# Patient Record
Sex: FEMALE | Race: BLACK OR AFRICAN AMERICAN | Employment: OTHER | URBAN - METROPOLITAN AREA
[De-identification: names, ages, dates, MRNs, and addresses within clinical notes are randomized per-mention and may not be internally consistent; named-entity substitution may affect disease eponyms.]

---

## 2017-01-11 ENCOUNTER — OFFICE VISIT (OUTPATIENT)
Dept: INTERNAL MEDICINE CLINIC | Age: 77
End: 2017-01-11

## 2017-01-11 VITALS
SYSTOLIC BLOOD PRESSURE: 150 MMHG | RESPIRATION RATE: 18 BRPM | BODY MASS INDEX: 32.85 KG/M2 | HEART RATE: 68 BPM | DIASTOLIC BLOOD PRESSURE: 80 MMHG | OXYGEN SATURATION: 95 % | HEIGHT: 61 IN | WEIGHT: 174 LBS | TEMPERATURE: 98 F

## 2017-01-11 DIAGNOSIS — I10 ESSENTIAL HYPERTENSION: ICD-10-CM

## 2017-01-11 DIAGNOSIS — L03.116 CELLULITIS OF LEFT LOWER EXTREMITY: Primary | ICD-10-CM

## 2017-01-11 DIAGNOSIS — E78.00 PURE HYPERCHOLESTEROLEMIA: ICD-10-CM

## 2017-01-11 RX ORDER — CLINDAMYCIN HYDROCHLORIDE 300 MG/1
300 CAPSULE ORAL 3 TIMES DAILY
Qty: 21 CAP | Refills: 0 | Status: SHIPPED | OUTPATIENT
Start: 2017-01-11 | End: 2017-01-25 | Stop reason: ALTCHOICE

## 2017-01-11 NOTE — MR AVS SNAPSHOT
Visit Information Date & Time Provider Department Dept. Phone Encounter #  
 1/11/2017  8:45 AM Erik Dennison MD [address] [phone] [id number] Follow-up Instructions Return in about 1 week (around 1/18/2017), or if symptoms worsen or fail to improve. Upcoming Health Maintenance Date Due Pneumococcal 65+ Low/Medium Risk (2 of 2 - PCV13) 9/11/2015 DTaP/Tdap/Td series (1 - Tdap) 4/20/2017* MEDICARE YEARLY EXAM 1/25/2017 GLAUCOMA SCREENING Q2Y 3/8/2018 *Topic was postponed. The date shown is not the original due date. Allergies as of 1/11/2017  Review Complete On: 1/11/2017 By: Erik Dennison MD  
  
 Severity Noted Reaction Type Reactions Latex  10/10/2011    Rash Aspirin  10/10/2011   Side Effect Other (comments) Anything higher than 81mg makes pt jittery Codeine  10/10/2011    Hives, Itching Iodinated Contrast Media - Oral And Iv Dye  06/23/2011   Side Effect Itching Pcn [Penicillins]  10/10/2011    Hives, Itching Tape [Adhesive]  05/08/2014    Rash Current Immunizations  Reviewed on 12/12/2016 Name Date Influenza High Dose Vaccine PF 10/15/2016 Influenza Vaccine 9/11/2014 Influenza Vaccine Split 11/8/2012 Pneumococcal Polysaccharide (PPSV-23) 9/11/2014 Not reviewed this visit You Were Diagnosed With   
  
 Codes Comments Cellulitis of left lower extremity    -  Primary ICD-10-CM: L03.116 ICD-9-CM: 682.6 Essential hypertension     ICD-10-CM: I10 
ICD-9-CM: 401.9 Pure hypercholesterolemia     ICD-10-CM: E78.00 ICD-9-CM: 272.0 Vitals BP Pulse Temp Resp Height(growth percentile) Weight(growth percentile) 150/80 68 98 °F (36.7 °C) (Oral) 18 5' 1\" (1.549 m) 174 lb (78.9 kg) SpO2 BMI OB Status Smoking Status 95% 32.88 kg/m2 Hysterectomy Never Smoker BMI and BSA Data  Body Mass Index Body Surface Area  
 32.88 kg/m 2 1.84 m 2  
 Preferred Pharmacy Pharmacy Name Phone [phone] [facility] Trevon Jeong [phone] Your Updated Medication List  
  
   
This list is accurate as of: 1/11/17  9:50 AM.  Always use your most recent med list. amLODIPine 10 mg tablet Commonly known as:  [place] Mend Take 0.5 Tabs by mouth daily. aspirin delayed-release 81 mg tablet Take  by mouth daily. atorvastatin 20 mg tablet Commonly known as:  LIPITOR Take 1 Tab by mouth daily. bumetanide 1 mg tablet Commonly known as:  Malachy Bile Take 1 Tab by mouth two (2) times a day. clindamycin 300 mg capsule Commonly known as:  CLEOCIN Take 1 Cap by mouth three (3) times daily. cloNIDine HCl 0.2 mg tablet Commonly known as:  CATAPRES Take 1 Tab by mouth two (2) times a day. fluticasone 50 mcg/actuation nasal spray Commonly known as:  Cathlyn Alan Administer to right and left nostril.  
  
 gabapentin 100 mg capsule Commonly known as:  NEURONTIN Take 1 Cap by mouth two (2) times a day. latanoprost 0.005 % ophthalmic solution Commonly known as:  Armando Blind Administer 1 Drop to right eye nightly. losartan 100 mg tablet Commonly known as:  COZAAR Take 1 Tab by mouth daily. meclizine 25 mg tablet Commonly known as:  ANTIVERT  
1 tab tid prn  
  
 metOLazone 5 mg tablet Commonly known as:  Roxy Gaston Take 1 Tab by mouth every other day. omeprazole 40 mg capsule Commonly known as:  PriLOSEC Take 1 Cap by mouth daily. potassium chloride 10 mEq tablet Commonly known as:  K-DUR, KLOR-CON Take 1 Tab by mouth daily. propafenone 150 mg tablet Commonly known as:  RYTHMOL Take 1 Tab by mouth every eight (8) hours. raNITIdine 150 mg tablet Commonly known as:  ZANTAC Take 150 mg by mouth two (2) times a day. traMADol 50 mg tablet Commonly known as:  [employer]  
 Take 1 Tab by mouth every six (6) hours as needed for Pain. Max Daily Amount: 200 mg.  
  
 WOMEN'S MULTIPLE VITAMINS PO Take  by mouth. Prescriptions Sent to Pharmacy Refills  
 clindamycin (CLEOCIN) 300 mg capsule 0 Sig: Take 1 Cap by mouth three (3) times daily. Class: Normal  
 Pharmacy: [facility] [address] #: [phone] Route: Oral  
  
We Performed the Following REMV/REVISN BOOT/BODY CAST [id number] CPT(R)] Follow-up Instructions Return in about 1 week (around 2017), or if symptoms worsen or fail to improve. Patient Instructions [employer] Activation Thank you for requesting access to [employer]. Please follow the instructions below to securely access and download your online medical record. [facility]Boost allows you to send messages to your doctor, view your test results, renew your prescriptions, schedule appointments, and more. How Do I Sign Up? 1. In your internet browser, go to [email or web address] 
2. Click on the First Time User? Click Here link in the Sign In box. You will be redirect to the New Member Sign Up page. 3. Enter your AlphaBoost Access Code exactly as it appears below. You will not need to use this code after youve completed the sign-up process. If you do not sign up before the expiration date, you must request a new code. AlphaBoost Access Code: Activation code not generated Current AlphaBoost Status: Patient Declined (This is the date your AlphaBoost access code will ) 4. Enter the last four digits of your Social Security Number (xxxx) and Date of Birth (mm/dd/yyyy) as indicated and click Submit. You will be taken to the next sign-up page. 5. Create a AlphaBoost ID. This will be your AlphaBoost login ID and cannot be changed, so think of one that is secure and easy to remember. 6. Create a AlphaBoost password. You can change your password at any time. 7. Enter your Password Reset Question and Answer. This can be used at a later time if you forget your password. 8. Enter your e-mail address. You will receive e-mail notification when new information is available in [address] 9. Click Sign Up. You can now view and download portions of your medical record. 10. Click the Download Summary menu link to download a portable copy of your medical information. Additional Information If you have questions, please visit the Frequently Asked Questions section of the [employer] website at [email or web address] [email or web address]/. Remember, [employer] is NOT to be used for urgent needs. For medical emergencies, dial 911. Please provide this summary of care documentation to your next provider. Your primary care clinician is listed as Sofia Soni. If you have any questions after today's visit, please call [phone].

## 2017-01-11 NOTE — PROGRESS NOTES
Dario Rosas is a 64 y.o. female and presents with Claudication and Leg Pain  . Subjective:  Cellulitis Review:  Patient presents for evaluation of a  skin infection located on the lower lower leg. Onset of symptoms was gradual, with unchanged  since that time. Symptoms included erythema located on leg: left. There is a history trauma to the area. Treatment to date has included antibiotics. she has drainage in the site. the area has been painful;        Hypertension Review:  The patient has hypertension . Diet and Lifestyle: generally follows a low sodium diet, exercises sporadically  Home BP Monitoring: is not measured at home. Pertinent ROS: taking medications as instructed, no medication side effects noted, no TIA's, no chest pain on exertion, no dyspnea on exertion, no swelling of ankles. Dyslipidemia Review:  Patient presents for evaluation of lipids. Compliance with treatment thus far has been excellent. A repeat fasting lipid profile was done. The patient does not use medications that may worsen dyslipidemias . The patient exercises sporadically. The patient is not known to have coexisting coronary artery disease  She has less of a leg drainage      Review of Systems  Constitutional: negative for fevers, chills, anorexia and weight loss  Eyes:   negative for visual disturbance and irritation  ENT:   negative for tinnitus,sore throat,nasal congestion,ear pains. hoarseness  Respiratory:  negative for cough, hemoptysis, dyspnea,wheezing  CV:   negative for chest pain, palpitations, lower extremity edema  GI:   negative for nausea, vomiting, diarrhea, abdominal pain,melena  Endo:               negative for polyuria,polydipsia,polyphagia,heat intolerance  Genitourinary: negative for frequency, dysuria and hematuria  Integument:  negative for rash and pruritus  Hematologic:  negative for easy bruising and gum/nose bleeding  Musculoskel:myalgias, arthralgias, back pain, muscle weakness, joint pain  Neurological:  negative for headaches, dizziness, vertigo, memory problems and gait   Behavl/Psych: negative for feelings of anxiety, depression, mood changes    Past Medical History   Diagnosis Date    Acute pharyngitis 2/8/2011    Allergic rhinitis, cause unspecified 2/8/2011    Arrhythmia      PVC    Asymptomatic varicose veins 2/8/2011    Cancer ([facility] [address]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     Carpal tunnel syndrome 2/8/2011    Degenerative Joint Disese ( improved/resolving) 2/8/2011    Degenetrative Dis Disease, 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    Diverticulosis of colon (without mention of hemorrhage) 2/8/2011    Dysphagia 2/8/2011    Edema, peripheral 2/8/2011    GERD (gastroesophageal reflux disease)     GERD Gastro- Esophageal Reflux Disease 2/8/2011    Herniated nucleus pulposus ( slipped disc) 2/8/2011    Menopausal 2/8/2011    PUD (peptic ulcer disease) 2012    Pure hypercholesterolemia 2/8/2011    Recurrent ear pain 2/8/2011    S/P radiation therapy     Scalp lesion 10/23/2014    Sebaceous cyst 4/22/2014    Unspecified cataract 2/8/2011    Unspecified essential hypertension 2/8/2011    Unspecified sleep apnea      Past Surgical History   Procedure Laterality Date    Hx hysterectomy      Hx orthopaedic  neck/back 2006    Hx other surgical  5/8/2014      Excise recurrent scalp lesion and application of ACell    Hx other surgical  5/8/2014     Excise keloid, anterior chest wall, and application of ACell    Hx other surgical  5/8/2014      Excise sebaceous cyst, anterior chest wall    Hx other surgical  5/8/2014     Punch biopsy, skin lesions, right forearm x2, right calf x1     Social History     Social History    Marital status:      Spouse name: N/A    Number of children: N/A    Years of education: N/A     Social History Main Topics    Smoking status: Never Smoker    Smokeless tobacco: Never Used    Alcohol use No    Drug use: No    Sexual activity: Yes     Partners: Male     Birth control/ protection: None     Other Topics Concern    None     Social History Narrative     Family History   Problem Relation Age of Onset    Hypertension Mother     Stroke Mother     Hypertension Father     Cancer Father      PROSTATE, MELANOMA    Anesth Problems Neg Hx      Current Outpatient Prescriptions   Medication Sig Dispense Refill    clindamycin (CLEOCIN) 300 mg capsule Take 1 Cap by mouth three (3) times daily. 21 Cap 0    traMADol (ULTRAM) 50 mg tablet Take 1 Tab by mouth every six (6) hours as needed for Pain. Max Daily Amount: 200 mg. 30 Tab 0    propafenone (RYTHMOL) 150 mg tablet Take 1 Tab by mouth every eight (8) hours. 90 Tab 3    losartan (COZAAR) 100 mg tablet Take 1 Tab by mouth daily. 30 Tab 12    cloNIDine HCl (CATAPRES) 0.2 mg tablet Take 1 Tab by mouth two (2) times a day. 60 Tab 12    potassium chloride (K-DUR, KLOR-CON) 10 mEq tablet Take 1 Tab by mouth daily. 30 Tab 12    ranitidine (ZANTAC) 150 mg tablet Take 150 mg by mouth two (2) times a day.  latanoprost (XALATAN) 0.005 % ophthalmic solution Administer 1 Drop to right eye nightly.  atorvastatin (LIPITOR) 20 mg tablet Take 1 Tab by mouth daily. 30 Tab 6    gabapentin (NEURONTIN) 100 mg capsule Take 1 Cap by mouth two (2) times a day. 180 Cap 3    bumetanide (BUMEX) 1 mg tablet Take 1 Tab by mouth two (2) times a day. (Patient taking differently: Take 2 mg by mouth two (2) times a day.) 60 Tab 12    omeprazole (PRILOSEC) 40 mg capsule Take 1 Cap by mouth daily. 90 Cap 3    amLODIPine (NORVASC) 10 mg tablet Take 0.5 Tabs by mouth daily. (Patient taking differently: Take 2.5 mg by mouth daily.) 90 Tab 3    meclizine (ANTIVERT) 25 mg tablet 1 tab tid prn 270 Tab 3    MULTIVIT WITH CALCIUM,IRON,MIN (WOMEN'S MULTIPLE VITAMINS PO) Take  by mouth.  aspirin delayed-release 81 mg tablet Take  by mouth daily.  fluticasone (FLONASE) 50 mcg/actuation nasal spray Administer to right and left nostril.  1 Bottle 12    metolazone (ZAROXOLYN) 5 mg tablet Take 1 Tab by mouth every other day. 15 Tab 0     Allergies   Allergen Reactions    Latex Rash    Aspirin Other (comments)     Anything higher than 81mg makes pt jittery    Codeine Hives and Itching    Iodinated Contrast Media - Oral And Iv Dye Itching    Pcn [Penicillins] Hives and Itching    Tape [Adhesive] Rash       Objective:  Visit Vitals    /80    Pulse 68    Temp 98 °F (36.7 °C) (Oral)    Resp 18    Ht 5' 1\" (1.549 m)    Wt 174 lb (78.9 kg)    SpO2 95%    BMI 32.88 kg/m2     Physical Exam:   General appearance - alert, well appearing, and in no distress  Mental status - alert, oriented to person, place, and time  EYE-DARLENE, EOMI, corneas normal, no foreign bodies  ENT-ENT exam normal, no neck nodes or sinus tenderness  Nose - normal and patent, no erythema, discharge or polyps  Mouth - mucous membranes moist, pharynx normal without lesions  Neck - supple, no significant adenopathy   Chest - clear to auscultation, no wheezes, rales or rhonchi, symmetric air entry   Heart - normal rate, regular rhythm, normal S1, S2, no murmurs, rubs, clicks or gallops   Abdomen - soft, nontender, nondistended, no masses or organomegaly  Lymph- no adenopathy palpable  Ext-peripheral pulses normal, no pedal edema, no clubbing or cyanosis  Skin-Warm and dry. no hyperpigmentation, vitiligo, or suspicious lesions  Neuro -alert, oriented, normal speech, no focal findings or movement disorder noted  Neck-normal C-spine, no tenderness, full ROM without pain  oval mass scalp with no active bleeding noted. Lt.lower leg minimal edema noted  pus noted and erythema noted.     Results for orders placed or performed in visit on 11/08/16   AMB POC LIPID PROFILE   Result Value Ref Range    Cholesterol (POC) 172     Triglycerides (POC) 94     HDL Cholesterol (POC) 79     LDL Cholesterol (POC) 74     Non-HDL Goal (POC) 93     TChol/HDL Ratio (POC) 2.2        Assessment/Plan: ICD-10-CM ICD-9-CM    1. Cellulitis of left lower extremity L03.116 682.6 REMV/REVISN BOOT/BODY CAST   2. Essential hypertension I10 401.9    3. Pure hypercholesterolemia E78.00 272.0      Orders Placed This Encounter    REMV/REVISN BOOT/BODY CAST    clindamycin (CLEOCIN) 300 mg capsule     Sig: Take 1 Cap by mouth three (3) times daily. Dispense:  21 Cap     Refill:  0     lose weight, follow low fat diet, follow low salt diet,Take 81mg aspirin daily,unna boot compression placed to bleeding site. unna boot applied    Patient Instructions   MyChart Activation    Thank you for requesting access to [employer]. Please follow the instructions below to securely access and download your online medical record. [employer] allows you to send messages to your doctor, view your test results, renew your prescriptions, schedule appointments, and more. How Do I Sign Up? 1. In your internet browser, go to [email or web address]  2. Click on the First Time User? Click Here link in the Sign In box. You will be redirect to the New Member Sign Up page. 3. Enter your IndigoVision Access Code exactly as it appears below. You will not need to use this code after youve completed the sign-up process. If you do not sign up before the expiration date, you must request a new code. Ind[employer]Vision Access Code: Activation code not generated  Current IndigoVision Status: Patient Declined (This is the date your Nippot access code will )    4. Enter the last four digits of your Social Security Number (xxxx) and Date of Birth (mm/dd/yyyy) as indicated and click Submit. You will be taken to the next sign-up page. 5. Create a IndigoVision ID. This will be your IndigoVision login ID and cannot be changed, so think of one that is secure and easy to remember. 6. Create a IndigoVision password. You can change your password at any time. 7. Enter your Password Reset Question and Answer. This can be used at a later time if you forget your password.    8. Enter your e-mail address. You will receive e-mail notification when new information is available in [address] 9. Click Sign Up. You can now view and download portions of your medical record. 10. Click the Download Summary menu link to download a portable copy of your medical information. Additional Information    If you have questions, please visit the Frequently Asked Questions section of the [employer] website at [email or web address] Remember, [employer] is NOT to be used for urgent needs. For medical emergencies, dial 911. Follow-up Disposition:  Return in about 1 week (around 1/18/2017), or if symptoms worsen or fail to improve. I have reviewed with the patient details of the assessment and plan and all questions were answered. Relevent patient education was performed    An After Visit Summary was printed and given to the patient.

## 2017-01-11 NOTE — PATIENT INSTRUCTIONS
[facility] Activation    Thank you for requesting access to "[employer]. Please follow the instructions below to securely access and download your online medical record. "[employer] allows you to send messages to your doctor, view your test results, renew your prescriptions, schedule appointments, and more. How Do I Sign Up? 1. In your internet browser, go to [email or web address]  2. Click on the First Time User? Click Here link in the Sign In box. You will be redirect to the New Member Sign Up page. 3. Enter your "[employer] Access Code exactly as it appears below. You will not need to use this code after youve completed the sign-up process. If you do not sign up before the expiration date, you must request a new code. "[employer] Access Code: Activation code not generated  Current "[employer] Status: Patient Declined (This is the date your "[employer] access code will )    4. Enter the last four digits of your Social Security Number (xxxx) and Date of Birth (mm/dd/yyyy) as indicated and click Submit. You will be taken to the next sign-up page. 5. Create a "[employer] ID. This will be your "[employer] login ID and cannot be changed, so think of one that is secure and easy to remember. 6. Create a "[employer] password. You can change your password at any time. 7. Enter your Password Reset Question and Answer. This can be used at a later time if you forget your password. 8. Enter your e-mail address. You will receive e-mail notification when new information is available in [address] 9. Click Sign Up. You can now view and download portions of your medical record. 10. Click the Download Summary menu link to download a portable copy of your medical information. Additional Information    If you have questions, please visit the Frequently Asked Questions section of the "[employer] website at [email or web address] Remember, "[employer] is NOT to be used for urgent needs. For medical emergencies, dial 911.

## 2017-01-23 ENCOUNTER — OFFICE VISIT (OUTPATIENT)
Dept: INTERNAL MEDICINE CLINIC | Age: 77
End: 2017-01-23

## 2017-01-23 VITALS
HEIGHT: 61 IN | RESPIRATION RATE: 16 BRPM | SYSTOLIC BLOOD PRESSURE: 138 MMHG | HEART RATE: 75 BPM | BODY MASS INDEX: 31.91 KG/M2 | TEMPERATURE: 98 F | OXYGEN SATURATION: 98 % | DIASTOLIC BLOOD PRESSURE: 72 MMHG | WEIGHT: 169 LBS

## 2017-01-23 DIAGNOSIS — L03.116 CELLULITIS OF LEFT LOWER EXTREMITY: Primary | ICD-10-CM

## 2017-01-23 DIAGNOSIS — R55 SYNCOPE, UNSPECIFIED SYNCOPE TYPE: ICD-10-CM

## 2017-01-23 DIAGNOSIS — E78.00 PURE HYPERCHOLESTEROLEMIA: ICD-10-CM

## 2017-01-23 DIAGNOSIS — I10 ESSENTIAL HYPERTENSION: ICD-10-CM

## 2017-01-23 NOTE — MR AVS SNAPSHOT
Visit Information Date & Time Provider Department Dept. Phone Encounter #  
 1/23/2017 12:00 PM Wandy García MD [facility] [phone] [id number] Follow-up Instructions Return in about 1 week (around 1/30/2017). Follow-up and Disposition History Upcoming Health Maintenance Date Due Pneumococcal 65+ Low/Medium Risk (2 of 2 - PCV13) 9/11/2015 DTaP/Tdap/Td series (1 - Tdap) 4/20/2017* MEDICARE YEARLY EXAM 1/25/2017 GLAUCOMA SCREENING Q2Y 3/8/2018 *Topic was postponed. The date shown is not the original due date. Allergies as of 1/23/2017  Review Complete On: 1/23/2017 By: Wandy García MD  
  
 Severity Noted Reaction Type Reactions Latex  10/10/2011    Rash Aspirin  10/10/2011   Side Effect Other (comments) Anything higher than 81mg makes pt jittery Codeine  10/10/2011    Hives, Itching Iodinated Contrast Media - Oral And Iv Dye  06/23/2011   Side Effect Itching Pcn [Penicillins]  10/10/2011    Hives, Itching Tape [Adhesive]  05/08/2014    Rash Current Immunizations  Reviewed on 12/12/2016 Name Date Influenza High Dose Vaccine PF 10/15/2016 Influenza Vaccine 9/11/2014 Influenza Vaccine Split 11/8/2012 Pneumococcal Polysaccharide (PPSV-23) 9/11/2014 Not reviewed this visit You Were Diagnosed With   
  
 Codes Comments Cellulitis of left lower extremity    -  Primary ICD-10-CM: L03.116 ICD-9-CM: 682.6 Essential hypertension     ICD-10-CM: I10 
ICD-9-CM: 401.9 Pure hypercholesterolemia     ICD-10-CM: E78.00 ICD-9-CM: 272.0 Syncope, unspecified syncope type     ICD-10-CM: R55 
ICD-9-CM: 780. 2 Vitals BP Pulse Temp Resp Height(growth percentile) Weight(growth percentile) 138/72 75 98 °F (36.7 °C) (Oral) 16 5' 1\" (1.549 m) 169 lb (76.7 kg) SpO2 BMI OB Status Smoking Status 98% 31.93 kg/m2 Hysterectomy Never Smoker BMI and BSA Data Body Mass Index Body Surface Area  
 31.93 kg/m 2 1.82 m 2 Preferred Pharmacy Pharmacy Name Phone [facility] [address] [facility] Trevon Jeong [phone] Your Updated Medication List  
  
   
This list is accurate as of: 1/23/17 12:16 PM.  Always use your most recent med list. amLODIPine 10 mg tablet Commonly known as:  Winfield Royals Take 0.5 Tabs by mouth daily. aspirin delayed-release 81 mg tablet Take  by mouth daily. atorvastatin 20 mg tablet Commonly known as:  LIPITOR Take 1 Tab by mouth daily. bumetanide 1 mg tablet Commonly known as:  Shiela Neri Take 1 Tab by mouth two (2) times a day. clindamycin 300 mg capsule Commonly known as:  CLEOCIN Take 1 Cap by mouth three (3) times daily. cloNIDine HCl 0.2 mg tablet Commonly known as:  CATAPRES Take 1 Tab by mouth two (2) times a day. fluticasone 50 mcg/actuation nasal spray Commonly known as:  Rebeca Frederick Administer to right and left nostril.  
  
 gabapentin 100 mg capsule Commonly known as:  NEURONTIN Take 1 Cap by mouth two (2) times a day. latanoprost 0.005 % ophthalmic solution Commonly known as:  Kelly Hark Administer 1 Drop to right eye nightly. losartan 100 mg tablet Commonly known as:  COZAAR Take 1 Tab by mouth daily. meclizine 25 mg tablet Commonly known as:  ANTIVERT  
1 tab tid prn  
  
 metOLazone 5 mg tablet Commonly known as:  Shanique Dominic Take 1 Tab by mouth every other day. omeprazole 40 mg capsule Commonly known as:  PriLOSEC Take 1 Cap by mouth daily. potassium chloride 10 mEq tablet Commonly known as:  K-DUR, KLOR-CON Take 1 Tab by mouth daily. propafenone 150 mg tablet Commonly known as:  RYTHMOL Take 1 Tab by mouth every eight (8) hours. raNITIdine 150 mg tablet Commonly known as:  ZANTAC Take 150 mg by mouth two (2) times a day. traMADol 50 mg tablet Commonly known as:  ULTRAM  
Take 1 Tab by mouth every six (6) hours as needed for Pain. Max Daily Amount: 200 mg.  
  
 WOMEN'S MULTIPLE VITAMINS PO Take  by mouth. We Performed the Following REFERRAL TO CARDIOLOGY [SUL29 Custom] Comments:  
 Please evaluate patient for syncope. Follow-up Instructions Return in about 1 week (around 2017). Referral Information Referral ID Referred By Referred To  
  
 [id number] [place], [address] [place] MD [address] Phone: [phone] Visits Status Start Date End Date 1 New Request 17 If your referral has a status of pending review or denied, additional information will be sent to support the outcome of this decision. Patient Instructions ""hart Activation Thank you for requesting access to KIHEITAI. Please follow the instructions below to securely access and download your online medical record. KIHEITAI allows you to send messages to your doctor, view your test results, renew your prescriptions, schedule appointments, and more. How Do I Sign Up? 1. In your internet browser, go to [email or web address] 
2. Click on the First Time User? Click Here link in the Sign In box. You will be redirect to the New Member Sign Up page. 3. Enter your KIHEITAI Access Code exactly as it appears below. You will not need to use this code after youve completed the sign-up process. If you do not sign up before the expiration date, you must request a new code. KIHEITAI Access Code: Activation code not generated Current KIHEITAI Status: Patient Declined (This is the date your KIHEITAI access code will ) 4. Enter the last four digits of your Social Security Number (xxxx) and Date of Birth (mm/dd/yyyy) as indicated and click Submit.  You will be taken to the next sign-up page. 5. Create a "[employer]" ID. This will be your "[employer]" login ID and cannot be changed, so think of one that is secure and easy to remember. 6. Create a "[employer]" password. You can change your password at any time. 7. Enter your Password Reset Question and Answer. This can be used at a later time if you forget your password. 8. Enter your e-mail address. You will receive e-mail notification when new information is available in [address]. 9. Click Sign Up. You can now view and download portions of your medical record. 10. Click the Download Summary menu link to download a portable copy of your medical information. Additional Information If you have questions, please visit the Frequently Asked Questions section of the "[employer]" website at [email or web address] Remember, "[employer]" is NOT to be used for urgent needs. For medical emergencies, dial 911. Please provide this summary of care documentation to your next provider. Your primary care clinician is listed as Slava Carballo. If you have any questions after today's visit, please call [phone].

## 2017-01-23 NOTE — MR AVS SNAPSHOT
Visit Information Date & Time Provider Department Dept. Phone Encounter #  
 1/23/2017 11:30 AM [place]ennison [place] [id number] [address] [phone] [id number] Upcoming Health Maintenance Date Due Pneumococcal 65+ Low/Medium Risk (2 of 2 - PCV13) 9/11/2015 DTaP/Tdap/Td series (1 - Tdap) 4/20/2017* MEDICARE YEARLY EXAM 1/25/2017 GLAUCOMA SCREENING Q2Y 3/8/2018 *Topic was postponed. The date shown is not the original due date. Allergies as of 1/23/2017  Review Complete On: 1/23/2017 By: Erik Dennison MD  
  
 Severity Noted Reaction Type Reactions Latex  10/10/2011    Rash Aspirin  10/10/2011   Side Effect Other (comments) Anything higher than 81mg makes pt jittery Codeine  10/10/2011    Hives, Itching Iodinated Contrast Media - Oral And Iv Dye  06/23/2011   Side Effect Itching Pcn [Penicillins]  10/10/2011    Hives, Itching Tape [Adhesive]  05/08/2014    Rash Current Immunizations  Reviewed on 12/12/2016 Name Date Influenza High Dose Vaccine PF 10/15/2016 Influenza Vaccine 9/11/2014 Influenza Vaccine Split 11/8/2012 Pneumococcal Polysaccharide (PPSV-23) 9/11/2014 Not reviewed this visit Vitals OB Status Smoking Status Hysterectomy Never Smoker Your Updated Medication List  
  
   
This list is accurate as of: 1/23/17 12:18 PM.  Always use your most recent med list. amLODIPine 10 mg tablet Commonly known as:  Sharron Darting Take 0.5 Tabs by mouth daily. aspirin delayed-release 81 mg tablet Take  by mouth daily. atorvastatin 20 mg tablet Commonly known as:  LIPITOR Take 1 Tab by mouth daily. bumetanide 1 mg tablet Commonly known as:  Arletta Shai Take 1 Tab by mouth two (2) times a day. clindamycin 300 mg capsule Commonly known as:  CLEOCIN Take 1 Cap by mouth three (3) times daily. cloNIDine HCl 0.2 mg tablet Commonly known as:  CATAPRES Take 1 Tab by mouth two (2) times a day. fluticasone 50 mcg/actuation nasal spray Commonly known as:  Filbert Chard Administer to right and left nostril.  
  
 gabapentin 100 mg capsule Commonly known as:  NEURONTIN Take 1 Cap by mouth two (2) times a day. latanoprost 0.005 % ophthalmic solution Commonly known as:  Hussein Quails Administer 1 Drop to right eye nightly. losartan 100 mg tablet Commonly known as:  COZAAR Take 1 Tab by mouth daily. meclizine 25 mg tablet Commonly known as:  ANTIVERT  
1 tab tid prn  
  
 metOLazone 5 mg tablet Commonly known as:  Vivian Savanna Take 1 Tab by mouth every other day. omeprazole 40 mg capsule Commonly known as:  PriLOSEC Take 1 Cap by mouth daily. potassium chloride 10 mEq tablet Commonly known as:  K-DUR, KLOR-CON Take 1 Tab by mouth daily. propafenone 150 mg tablet Commonly known as:  RYTHMOL Take 1 Tab by mouth every eight (8) hours. raNITIdine 150 mg tablet Commonly known as:  ZANTAC Take 150 mg by mouth two (2) times a day. traMADol 50 mg tablet Commonly known as:  ULTRAM  
Take 1 Tab by mouth every six (6) hours as needed for Pain. Max Daily Amount: 200 mg.  
  
 WOMEN'S MULTIPLE VITAMINS PO Take  by mouth. Please provide this summary of care documentation to your next provider. Your primary care clinician is listed as Justino Jaime. If you have any questions after today's visit, please call [phone].

## 2017-01-23 NOTE — PROGRESS NOTES
Augustina Pabon is a 64 y.o. female and presents with Skin Infection (bilateral legs)  . Subjective:  Cellulitis Review:  Patient presents for evaluation of a  skin infection located on the lower lower leg. Onset of symptoms was gradual, with unchanged  since that time. Symptoms included erythema located on leg: left. There is a history trauma to the area. Treatment to date has included antibiotics. she has drainage in the site. the area has been painful; She has admitted to  having a syncopal episode on several occasions she has been followed by cardiology    Hypertension Review:  The patient has hypertension . Diet and Lifestyle: generally follows a low sodium diet, exercises sporadically  Home BP Monitoring: is not measured at home. Pertinent ROS: taking medications as instructed, no medication side effects noted, no TIA's, no chest pain on exertion, no dyspnea on exertion, no swelling of ankles. Dyslipidemia Review:  Patient presents for evaluation of lipids. Compliance with treatment thus far has been excellent. A repeat fasting lipid profile was done. The patient does not use medications that may worsen dyslipidemias . The patient exercises sporadically. The patient is not known to have coexisting coronary artery disease  She has less of a leg drainage      Review of Systems  Constitutional: negative for fevers, chills, anorexia and weight loss  Eyes:   negative for visual disturbance and irritation  ENT:   negative for tinnitus,sore throat,nasal congestion,ear pains. hoarseness  Respiratory:  negative for cough, hemoptysis, dyspnea,wheezing  CV:   negative for chest pain, palpitations, lower extremity edema  GI:   negative for nausea, vomiting, diarrhea, abdominal pain,melena  Endo:               negative for polyuria,polydipsia,polyphagia,heat intolerance  Genitourinary: negative for frequency, dysuria and hematuria  Integument:  negative for rash and pruritus  Hematologic:  negative for easy bruising and gum/nose bleeding  Musculoskel:myalgias, arthralgias, back pain, muscle weakness, joint pain  Neurological:  negative for headaches, dizziness, vertigo, memory problems and gait   Behavl/Psych: negative for feelings of anxiety, depression, mood changes    Past Medical History   Diagnosis Date    Acute pharyngitis 2/8/2011    Allergic rhinitis, cause unspecified 2/8/2011    Arrhythmia      PVC    Asymptomatic varicose veins 2/8/2011    Cancer ([facility] [address]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     Carpal tunnel syndrome 2/8/2011    Degenerative Joint Disese ( improved/resolving) 2/8/2011    Degenetrative Dis Disease, 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    Diverticulosis of colon (without mention of hemorrhage) 2/8/2011    Dysphagia 2/8/2011    Edema, peripheral 2/8/2011    GERD (gastroesophageal reflux disease)     GERD Gastro- Esophageal Reflux Disease 2/8/2011    Herniated nucleus pulposus ( slipped disc) 2/8/2011    Menopausal 2/8/2011    PUD (peptic ulcer disease) 2012    Pure hypercholesterolemia 2/8/2011    Recurrent ear pain 2/8/2011    S/P radiation therapy     Scalp lesion 10/23/2014    Sebaceous cyst 4/22/2014    Unspecified cataract 2/8/2011    Unspecified essential hypertension 2/8/2011    Unspecified sleep apnea      Past Surgical History   Procedure Laterality Date    Hx hysterectomy      Hx orthopaedic  neck/back 2006    Hx other surgical  5/8/2014      Excise recurrent scalp lesion and application of ACell    Hx other surgical  5/8/2014     Excise keloid, anterior chest wall, and application of ACell    Hx other surgical  5/8/2014      Excise sebaceous cyst, anterior chest wall    Hx other surgical  5/8/2014     Punch biopsy, skin lesions, right forearm x2, right calf x1     Social History     Social History    Marital status:      Spouse name: N/A    Number of children: N/A    Years of education: N/A     Social History Main Topics    Smoking status: Never Smoker    Smokeless tobacco: Never Used  Alcohol use No    Drug use: No    Sexual activity: Yes     Partners: Male     Birth control/ protection: None     Other Topics Concern    None     Social History Narrative     Family History   Problem Relation Age of Onset    Hypertension Mother     Stroke Mother     Hypertension Father     Cancer Father      PROSTATE, MELANOMA    Anesth Problems Neg Hx      Current Outpatient Prescriptions   Medication Sig Dispense Refill    traMADol (ULTRAM) 50 mg tablet Take 1 Tab by mouth every six (6) hours as needed for Pain. Max Daily Amount: 200 mg. 30 Tab 0    propafenone (RYTHMOL) 150 mg tablet Take 1 Tab by mouth every eight (8) hours. 90 Tab 3    losartan (COZAAR) 100 mg tablet Take 1 Tab by mouth daily. 30 Tab 12    cloNIDine HCl (CATAPRES) 0.2 mg tablet Take 1 Tab by mouth two (2) times a day. 60 Tab 12    potassium chloride (K-DUR, KLOR-CON) 10 mEq tablet Take 1 Tab by mouth daily. 30 Tab 12    ranitidine (ZANTAC) 150 mg tablet Take 150 mg by mouth two (2) times a day.  latanoprost (XALATAN) 0.005 % ophthalmic solution Administer 1 Drop to right eye nightly.  atorvastatin (LIPITOR) 20 mg tablet Take 1 Tab by mouth daily. 30 Tab 6    gabapentin (NEURONTIN) 100 mg capsule Take 1 Cap by mouth two (2) times a day. 180 Cap 3    bumetanide (BUMEX) 1 mg tablet Take 1 Tab by mouth two (2) times a day. (Patient taking differently: Take 2 mg by mouth two (2) times a day.) 60 Tab 12    omeprazole (PRILOSEC) 40 mg capsule Take 1 Cap by mouth daily. 90 Cap 3    amLODIPine (NORVASC) 10 mg tablet Take 0.5 Tabs by mouth daily. (Patient taking differently: Take 2.5 mg by mouth daily.) 90 Tab 3    meclizine (ANTIVERT) 25 mg tablet 1 tab tid prn 270 Tab 3    MULTIVIT WITH CALCIUM,IRON,MIN (WOMEN'S MULTIPLE VITAMINS PO) Take  by mouth.  fluticasone (FLONASE) 50 mcg/actuation nasal spray Administer to right and left nostril.  1 Bottle 12    clindamycin (CLEOCIN) 300 mg capsule Take 1 Cap by mouth three (3) times daily. 21 Cap 0    metolazone (ZAROXOLYN) 5 mg tablet Take 1 Tab by mouth every other day. 15 Tab 0    aspirin delayed-release 81 mg tablet Take  by mouth daily. Allergies   Allergen Reactions    Latex Rash    Aspirin Other (comments)     Anything higher than 81mg makes pt jittery    Codeine Hives and Itching    Iodinated Contrast Media - Oral And Iv Dye Itching    Pcn [Penicillins] Hives and Itching    Tape [Adhesive] Rash       Objective:  Visit Vitals    /72    Pulse 75    Temp 98 °F (36.7 °C) (Oral)    Resp 16    Ht 5' 1\" (1.549 m)    Wt 169 lb (76.7 kg)    SpO2 98%    BMI 31.93 kg/m2     Physical Exam:   General appearance - alert, well appearing, and in no distress  Mental status - alert, oriented to person, place, and time  EYE-DARLENE, EOMI, corneas normal, no foreign bodies  ENT-ENT exam normal, no neck nodes or sinus tenderness  Nose - normal and patent, no erythema, discharge or polyps  Mouth - mucous membranes moist, pharynx normal without lesions  Neck - supple, no significant adenopathy   Chest - clear to auscultation, no wheezes, rales or rhonchi, symmetric air entry   Heart - normal rate, regular rhythm, normal S1, S2, no murmurs, rubs, clicks or gallops   Abdomen - soft, nontender, nondistended, no masses or organomegaly  Lymph- no adenopathy palpable  Ext-peripheral pulses normal, no pedal edema, no clubbing or cyanosis  Skin-Warm and dry. no hyperpigmentation, vitiligo, or suspicious lesions  Neuro -alert, oriented, normal speech, no focal findings or movement disorder noted  Neck-normal C-spine, no tenderness, full ROM without pain  oval mass scalp with no active bleeding noted. Lt.lower leg minimal edema noted  pus noted and erythema noted.     Results for orders placed or performed in visit on 11/08/16   AMB POC LIPID PROFILE   Result Value Ref Range    Cholesterol (POC) 172     Triglycerides (POC) 94     HDL Cholesterol (POC) 79     LDL Cholesterol (POC) 74     Non-HDL Goal (POC) 93     TChol/HDL Ratio (POC) 2.2        Assessment/Plan:    ICD-10-CM ICD-9-CM    1. Cellulitis of left lower extremity L03.116 682.6    2. Essential hypertension I10 401.9    3. Pure hypercholesterolemia E78.00 272.0    4. Syncope, unspecified syncope type R55 780.2 REFERRAL TO CARDIOLOGY     Orders Placed This Encounter    REFERRAL TO CARDIOLOGY     Referral Priority:   Routine     Referral Type:   Consultation     Referral Reason:   Specialty Services Required     Referral Location:   [facility]     Referred to Provider:   Cassandra Serrato MD     lose weight, follow low fat diet, follow low salt diet,Take 81mg aspirin daily,unna boot compression placed to bleeding site. unna boot applied    Patient Instructions   [employer] Activation    Thank you for requesting access to Brandizi. Please follow the instructions below to securely access and download your online medical record. Brandizi allows you to send messages to your doctor, view your test results, renew your prescriptions, schedule appointments, and more. How Do I Sign Up? 1. In your internet browser, go to [email or web address]  2. Click on the First Time User? Click Here link in the Sign In box. You will be redirect to the New Member Sign Up page. 3. Enter your Brandizi Access Code exactly as it appears below. You will not need to use this code after youve completed the sign-up process. If you do not sign up before the expiration date, you must request a new code. Brandizi Access Code: Activation code not generated  Current Brandizi Status: Patient Declined (This is the date your Brandizi access code will )    4. Enter the last four digits of your Social Security Number (xxxx) and Date of Birth (mm/dd/yyyy) as indicated and click Submit. You will be taken to the next sign-up page. 5. Create a Brandizi ID.  This will be your Brandizi login ID and cannot be changed, so think of one that is secure and easy to remember. 6. Create a [email or web address] password. You can change your password at any time. 7. Enter your Password Reset Question and Answer. This can be used at a later time if you forget your password. 8. Enter your e-mail address. You will receive e-mail notification when new information is available in [address] 9. Click Sign Up. You can now view and download portions of your medical record. 10. Click the Download Summary menu link to download a portable copy of your medical information. Additional Information    If you have questions, please visit the Frequently Asked Questions section of the [email or web address] website at [email or web address] Remember, [email or web address] is NOT to be used for urgent needs. For medical emergencies, dial 911. Follow-up Disposition:  Return in about 1 week (around 1/30/2017). I have reviewed with the patient details of the assessment and plan and all questions were answered. Relevent patient education was performed    An After Visit Summary was printed and given to the patient.

## 2017-01-25 ENCOUNTER — OFFICE VISIT (OUTPATIENT)
Dept: CARDIOLOGY CLINIC | Age: 77
End: 2017-01-25

## 2017-01-25 VITALS
HEIGHT: 61 IN | HEART RATE: 64 BPM | BODY MASS INDEX: 32.28 KG/M2 | WEIGHT: 171 LBS | SYSTOLIC BLOOD PRESSURE: 172 MMHG | DIASTOLIC BLOOD PRESSURE: 68 MMHG

## 2017-01-25 DIAGNOSIS — I10 ESSENTIAL HYPERTENSION: ICD-10-CM

## 2017-01-25 DIAGNOSIS — Z01.810 PREOP CARDIOVASCULAR EXAM: ICD-10-CM

## 2017-01-25 DIAGNOSIS — I25.10 CORONARY ARTERY DISEASE INVOLVING NATIVE CORONARY ARTERY OF NATIVE HEART WITHOUT ANGINA PECTORIS: ICD-10-CM

## 2017-01-25 DIAGNOSIS — L03.116 CELLULITIS OF LEFT LOWER EXTREMITY: ICD-10-CM

## 2017-01-25 DIAGNOSIS — I49.5 TACHY-BRADY SYNDROME (HCC): ICD-10-CM

## 2017-01-25 DIAGNOSIS — I48.0 PAROXYSMAL ATRIAL FIBRILLATION (HCC): Primary | ICD-10-CM

## 2017-01-25 DIAGNOSIS — C44.40 SKIN CANCER OF SCALP: ICD-10-CM

## 2017-01-25 RX ORDER — AMLODIPINE BESYLATE 10 MG/1
5 TABLET ORAL DAILY
COMMUNITY
End: 2018-01-11 | Stop reason: SDUPTHER

## 2017-01-25 NOTE — PROGRESS NOTES
Chief Complaint   Patient presents with    Irregular Heart Beat     a-fib    Hypertension    Follow-up     late 2 week follow up     Attempted to reach patient by telephone. A message was left for return call.

## 2017-01-25 NOTE — PROGRESS NOTES
Cardiac Electrophysiology Office  Note     Subjective: Steve Gregory is a 64 y.o. 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 She has had several changes since she was last seen. She has skin cancer on her scalp and had this removed in September at [facility]. She still has a large malignant area on her head that needs to be removed, so she is going to see a oncologists & plastic surgeon tomorrow about this. She says she has been dealing with chronic cellulitis in the [facility] and she has had the leg wrapped and wearing a boot. She had stopped the rythmol and then Dr Antonia Renner restarted the Rythmol because she has had two episodes of \"A f  The last event was New Years 2016. ib\" in the past two weeks ago. Dr Mynor Simmons had stopped the Rythmol because he thought it could have been contributing to the black outs. She did not take her losartan or norvasc this morning. She only took her Rythmol. She says last Tuesday she felt faint and sat down then she broke out in a sweat. She was not taking the Rythmol at this time. No syncope. No chest pain or SOB. Echo 5/2016 LVEF 60 % to 65 %. No RWMA. Mild concentric hypertrophy. Mild TR      Past Hx:  The last event was New Years 2016.    holter 6/23/2014 sinus rhythm with minimum 39 bpm at 5 pm PVC's , mean HR 50 bpm while she is on medication  She was last seen 8/2014 and has missed several follow up appointments. She was seen last for follow up for syncope, she has passed out twice December and Jan 1. She went the ED at [place] both times. The metoprolol was discontinued and she was started on lisinopril and clonidine for high BP. First episode 12/31/15 occurred while she was sitting on the stool in her kitchen, she started to feel tighten around her head then she LOC and fell off the stool.     The next day she had a similar episode, she was doing things around the house and felt dizzy she sat down and put a cold compress on her head. No LOC. 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 Say Zambrano and had venous duplex without DVT. Echo with normal LVEF and inferior wall hypokinesis in 2014     Mother with stroke and HBP and heart disease death at age 77  Father with cancer at age 68  Sister with cancer  at age of 76  Current Outpatient Prescriptions   Medication Sig Dispense Refill    amLODIPine (NORVASC) 10 mg tablet Take 5 mg by mouth daily.  traMADol (ULTRAM) 50 mg tablet Take 1 Tab by mouth every six (6) hours as needed for Pain. Max Daily Amount: 200 mg. 30 Tab 0    losartan (COZAAR) 100 mg tablet Take 1 Tab by mouth daily. 30 Tab 12    cloNIDine HCl (CATAPRES) 0.2 mg tablet Take 1 Tab by mouth two (2) times a day. 60 Tab 12    potassium chloride (K-DUR, KLOR-CON) 10 mEq tablet Take 1 Tab by mouth daily. 30 Tab 12    ranitidine (ZANTAC) 150 mg tablet Take 150 mg by mouth two (2) times a day.  latanoprost (XALATAN) 0.005 % ophthalmic solution Administer 1 Drop to right eye nightly.  atorvastatin (LIPITOR) 20 mg tablet Take 1 Tab by mouth daily. 30 Tab 6    gabapentin (NEURONTIN) 100 mg capsule Take 1 Cap by mouth two (2) times a day. 180 Cap 3    bumetanide (BUMEX) 1 mg tablet Take 1 Tab by mouth two (2) times a day.  (Patient taking differently: Take 2 mg by mouth two (2) times a day.) 60 Tab 12    omeprazole (PRILOSEC) 40 mg capsule Take 1 Cap by mouth daily. 90 Cap 3    meclizine (ANTIVERT) 25 mg tablet 1 tab tid prn 270 Tab 3    MULTIVIT WITH CALCIUM,IRON,MIN (WOMEN'S MULTIPLE VITAMINS PO) Take  by mouth.  aspirin delayed-release 81 mg tablet Take  by mouth daily.  fluticasone (FLONASE) 50 mcg/actuation nasal spray Administer to right and left nostril.  1 Bottle 12     Allergies   Allergen Reactions    Latex Rash    Aspirin Other (comments)     Anything higher than 81mg makes pt jittery    Codeine Hives and Itching    Iodinated Contrast Media - Oral And Iv Dye Itching    Pcn [Penicillins] Hives and Itching    Tape [Adhesive] Rash     Past Medical History   Diagnosis Date    Acute pharyngitis 2/8/2011    Allergic rhinitis, cause unspecified 2/8/2011    Arrhythmia      PVC    Asymptomatic varicose veins 2/8/2011    Cancer ([facility] [address]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     Carpal tunnel syndrome 2/8/2011    Degenerative Joint Disese ( improved/resolving) 2/8/2011    Degenetrative Dis Disease, 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    Diverticulosis of colon (without mention of hemorrhage) 2/8/2011    Dysphagia 2/8/2011    Edema, peripheral 2/8/2011    GERD (gastroesophageal reflux disease)     GERD Gastro- Esophageal Reflux Disease 2/8/2011    Herniated nucleus pulposus ( slipped disc) 2/8/2011    Menopausal 2/8/2011    PUD (peptic ulcer disease) 2012    Pure hypercholesterolemia 2/8/2011    Recurrent ear pain 2/8/2011    S/P radiation therapy     Scalp lesion 10/23/2014    Sebaceous cyst 4/22/2014    Unspecified cataract 2/8/2011    Unspecified essential hypertension 2/8/2011    Unspecified sleep apnea      Past Surgical History   Procedure Laterality Date    Hx hysterectomy      Hx orthopaedic  neck/back 2006    Hx other surgical  5/8/2014      Excise recurrent scalp lesion and application of ACell    Hx other surgical  5/8/2014 Excise keloid, anterior chest wall, and application of ACell    Hx other surgical  5/8/2014      Excise sebaceous cyst, anterior chest wall    Hx other surgical  5/8/2014     Punch biopsy, skin lesions, right forearm x2, right calf x1     Family History   Problem Relation Age of Onset    Hypertension Mother     Stroke Mother     Hypertension Father     Cancer Father      PROSTATE, MELANOMA    Anesth Problems Neg Hx      Social History   Substance Use Topics    Smoking status: Never Smoker    Smokeless tobacco: Never Used    Alcohol use No        Review of Systems:   Constitutional: Negative for fever, chills, weight loss, + malaise/fatigue. HEENT: Negative for nosebleeds, vision changes. Respiratory: Negative for cough, hemoptysis, sputum production, and wheezing. Cardiovascular: Negative for orthopnea, claudication,  and PND. +  edema   Gastrointestinal: Negative for constipation, blood in stool and melena. +GERD   Genitourinary: Negative for dysuria, and hematuria. Musculoskeletal: Negative for myalgias, + arthralgia. Skin: Negative for rash. Heme: Does not bleed or bruise easily. Neurological: Negative for speech change and focal weakness     Objective:     Visit Vitals    /68 (BP 1 Location: Right arm, BP Patient Position: Sitting)    Pulse 64    Ht 5' 1\" (1.549 m)    Wt 171 lb (77.6 kg)    BMI 32.31 kg/m2      Physical Exam:   Constitutional: well-developed and well-nourished. No distress. Head: Normocephalic and atraumatic. Eyes: Pupils are equal, round  Neck: supple. No JVD present. Cardiovascular: normal rate, regular rhythm and normal heart sounds. Exam reveals no gallop and no friction rub. No murmur heard. Pulmonary/Chest: Effort normal and breath sounds normal.   Abdominal: Soft, mild obesity  Musculoskeletal: LLE 2 + edema wrapped in clean and dry bandage. no edema RLE  Neurological: alert,oriented. Skin: Skin is warm and dry.  Keloid scar at the top of the sternum about 2 inches (mole removal)  Psychiatric: normal mood and affect. Behavior is normal. Judgment and thought content normal.       ECG sinus rhythm HR 64 bpm    Assessment/Plan:       ICD-10-CM ICD-9-CM    1. Paroxysmal atrial fibrillation (HCC) I48.0 427.31 AMB POC EKG ROUTINE W/ 12 LEADS, INTER & REP   2. Essential hypertension I10 401.9 AMB POC EKG ROUTINE W/ 12 LEADS, INTER & REP   3. 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 Coronary artery disease involving native coronary artery of native heart without angina pectoris I25.10 414.01    5. Skin cancer of scalp C44.40 173.40    6. Cellulitis of left lower extremity L03.116 682.6    Stop Rythmol, do not recommend this with hx of bradycardia. No BB d/t bradycardia. She will see the plastic surgeon and oncologists tomorrow for consultation about upcoming surgery. Once we have more information we will be able to make further recommendation regarding surgery. BP elevated because she did not take her antihypertensives today. No OAC d/t high fall risk. Echo 5/2016 shows normal LVEF, she is compensated on exam. No acute s/s of CHF. Dr Adriel Leal is managing her LLE cellulitis. Thank you for involving me in this patient's care and please call with further concerns or questions. Ml Diop M.D.   Electrophysiology/Cardiology  [facility] [place]  [phone] , [address]  [address]  [phone]

## 2017-01-25 NOTE — PROGRESS NOTES
Cardiac Electrophysiology Office  Note     Subjective: Kailyn Montanez is a 64 y.o. 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 She has had several changes since she was last seen. She has skin cancer on her scalp and had this removed in September at [facility]. She still has a large malignant area on her head that needs to be removed, so she is going to see a oncologists & plastic surgeon tomorrow about this. She do not have their names available at this time  She says she has been dealing with chronic cellulitis in the [facility] and she has had the leg wrapped and wearing a boot. She had stopped the rythmol and then Dr Jourdan Espinoza restarted the Rythmol because she has had two episodes of \"A fib\"  I had stopped the Rythmol because it could have been contributing to the black outs, I was concerned about intermittent bradycardia. She did not take her losartan or norvasc this morning so BP is high. She says last Tuesday she felt faint and sat down then she broke out in a sweat. She was not taking the Rythmol at this time. No syncope. No chest pain or SOB. Echo 5/2016 LVEF 60 % to 65 %. No RWMA. Mild concentric hypertrophy. Mild TR      Past Hx:  The last event was New Years 2016.    holter 6/23/2014 sinus rhythm with minimum 39 bpm at 5 pm PVC's , mean HR 50 bpm while she is on medication  She was last seen 8/2014 and has missed several follow up appointments. She was seen last for follow up for syncope, she has passed out twice December and Jan 1. She went the ED at [facility] times. The metoprolol was discontinued and she was started on lisinopril and clonidine for high BP. First episode 12/31/15 occurred while she was sitting on the stool in her kitchen, she started to feel tighten around her head then she LOC and fell off the stool.     The next day she had a similar episode, she was doing things around the house and felt dizzy she sat down and put a cold compress on her head. No LOC. 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 Michaela Hernandez and had venous duplex without DVT. Echo with normal LVEF and inferior wall hypokinesis in 2014     Mother with stroke and HBP and heart disease death at age 77  Father with cancer at age 68  Sister with cancer  at age of 76  Current Outpatient Prescriptions   Medication Sig Dispense Refill    amLODIPine (NORVASC) 10 mg tablet Take 5 mg by mouth daily.  traMADol (ULTRAM) 50 mg tablet Take 1 Tab by mouth every six (6) hours as needed for Pain. Max Daily Amount: 200 mg. 30 Tab 0    losartan (COZAAR) 100 mg tablet Take 1 Tab by mouth daily. 30 Tab 12    cloNIDine HCl (CATAPRES) 0.2 mg tablet Take 1 Tab by mouth two (2) times a day. 60 Tab 12    potassium chloride (K-DUR, KLOR-CON) 10 mEq tablet Take 1 Tab by mouth daily. 30 Tab 12    ranitidine (ZANTAC) 150 mg tablet Take 150 mg by mouth two (2) times a day.  latanoprost (XALATAN) 0.005 % ophthalmic solution Administer 1 Drop to right eye nightly.  atorvastatin (LIPITOR) 20 mg tablet Take 1 Tab by mouth daily. 30 Tab 6    gabapentin (NEURONTIN) 100 mg capsule Take 1 Cap by mouth two (2) times a day. 180 Cap 3    bumetanide (BUMEX) 1 mg tablet Take 1 Tab by mouth two (2) times a day.  (Patient taking differently: Take 2 mg by mouth two (2) times a day.) 60 Tab 12    omeprazole (PRILOSEC) 40 mg capsule Take 1 Cap by mouth daily. 90 Cap 3    meclizine (ANTIVERT) 25 mg tablet 1 tab tid prn 270 Tab 3    MULTIVIT WITH CALCIUM,IRON,MIN (WOMEN'S MULTIPLE VITAMINS PO) Take  by mouth.  aspirin delayed-release 81 mg tablet Take  by mouth daily.  fluticasone (FLONASE) 50 mcg/actuation nasal spray Administer to right and left nostril.  1 Bottle 12     Allergies   Allergen Reactions    Latex Rash    Aspirin Other (comments)     Anything higher than 81mg makes pt jittery    Codeine Hives and Itching    Iodinated Contrast Media - Oral And Iv Dye Itching    Pcn [Penicillins] Hives and Itching    Tape [Adhesive] Rash     Past Medical History   Diagnosis Date    Acute pharyngitis 2/8/2011    Allergic rhinitis, cause unspecified 2/8/2011    Arrhythmia      PVC    Asymptomatic varicose veins 2/8/2011    Cancer ([facility] [address]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     Carpal tunnel syndrome 2/8/2011    Degenerative Joint Disese ( improved/resolving) 2/8/2011    Degenetrative Dis Disease, 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    Diverticulosis of colon (without mention of hemorrhage) 2/8/2011    Dysphagia 2/8/2011    Edema, peripheral 2/8/2011    GERD (gastroesophageal reflux disease)     GERD Gastro- Esophageal Reflux Disease 2/8/2011    Herniated nucleus pulposus ( slipped disc) 2/8/2011    Menopausal 2/8/2011    PUD (peptic ulcer disease) 2012    Pure hypercholesterolemia 2/8/2011    Recurrent ear pain 2/8/2011    S/P radiation therapy     Scalp lesion 10/23/2014    Sebaceous cyst 4/22/2014    Unspecified cataract 2/8/2011    Unspecified essential hypertension 2/8/2011    Unspecified sleep apnea      Past Surgical History   Procedure Laterality Date    Hx hysterectomy      Hx orthopaedic  neck/back 2006    Hx other surgical  5/8/2014      Excise recurrent scalp lesion and application of ACell    Hx other surgical  5/8/2014 Excise keloid, anterior chest wall, and application of ACell    Hx other surgical  5/8/2014      Excise sebaceous cyst, anterior chest wall    Hx other surgical  5/8/2014     Punch biopsy, skin lesions, right forearm x2, right calf x1     Family History   Problem Relation Age of Onset    Hypertension Mother     Stroke Mother     Hypertension Father     Cancer Father      PROSTATE, MELANOMA    Anesth Problems Neg Hx      Social History   Substance Use Topics    Smoking status: Never Smoker    Smokeless tobacco: Never Used    Alcohol use No        Review of Systems:   Constitutional: Negative for fever, chills, weight loss, + malaise/fatigue. HEENT: Negative for nosebleeds, vision changes. Respiratory: Negative for cough, hemoptysis, sputum production, and wheezing. Cardiovascular: Negative for orthopnea, claudication,  and PND. +  edema  + FRANK  Gastrointestinal: Negative for constipation, blood in stool and melena. +GERD   Genitourinary: Negative for dysuria, and hematuria. Musculoskeletal: Negative for myalgias, + arthralgia. Skin: + skin discoloration or cancer on scalp  Heme: Does not bleed or bruise easily. Neurological: Negative for speech change and focal weakness + dizziness     Objective:     Visit Vitals    /68 (BP 1 Location: Right arm, BP Patient Position: Sitting)    Pulse 64    Ht 5' 1\" (1.549 m)    Wt 171 lb (77.6 kg)    BMI 32.31 kg/m2      Physical Exam:   Constitutional: well-developed and well-nourished. No distress. Head: Normocephalic and atraumatic. Eyes: Pupils are equal, round  Neck: supple. No JVD present. Cardiovascular: normal rate, regular rhythm and normal heart sounds. Exam reveals no gallop and no friction rub. No murmur heard. Pulmonary/Chest: Effort normal and breath sounds normal.   Abdominal: Soft, mild obesity  Musculoskeletal: LLE 2 + edema wrapped in clean and dry bandage. no edema RLE  Neurological: alert,oriented.    Skin: Skin is warm and dry. Keloid scar at the top of the sternum about 2 inches (mole removal)  Psychiatric: normal mood and affect. Behavior is normal. Judgment and thought content normal.       ECG sinus rhythm HR 64 bpm    Assessment/Plan:       ICD-10-CM ICD-9-CM    1. Paroxysmal atrial fibrillation (HCC) I48.0 427.31 AMB POC EKG ROUTINE W/ 12 LEADS, INTER & REP   2. Essential hypertension I10 401.9 AMB POC EKG ROUTINE W/ 12 LEADS, INTER & REP   3. 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 Coronary artery disease involving native coronary artery of native heart without angina pectoris I25.10 414.01    5. Skin cancer of scalp C44.40 173.40    6. Cellulitis of left lower extremity L03.116 682.6    7. Preop cardiovascular exam Z01.810 V72.81    Stop Rythmol, do not recommend this with hx of bradycardia unless she has dual chamber pacemaker. No BB or calcium channel blocker at this time   She will see the plastic surgeon and oncologist tomorrow for consultation about upcoming surgery. Once we have more information we will be able to make further recommendation regarding surgery. BP elevated because she did not take her antihypertensives today. She should take them now  No OAC d/t high fall risk. Echo 5/2016 shows normal LVEF, she is compensated on exam.    Dr Heath Matamoros is managing her LLE cellulitis. I recommend that if her surgery is imminent and cannot postpone then we avoid all rate control medications so she would not have syncope or severe bradycardia with anesthesia. Otherwise we will need to proceed with dual chamber pacer and let that heal for a month before surgery. Then she can be on rate or rhythm control medications    Follow-up Disposition:  Return in about 4 weeks (around 2/22/2017). or sooner depending on urgency of surgery    Thank you for involving me in this patient's care and please call with further concerns or questions. Lincoln Gannon M.D.   Electrophysiology/Cardiology  [facility] [place]  [address]  [phone]

## 2017-01-25 NOTE — PATIENT INSTRUCTIONS
Call us with additional information regarding your plastic surgery. Return to see Dr. Mynor Simmons in 4 weeks.

## 2017-01-25 NOTE — MR AVS SNAPSHOT
Visit Information Date & Time Provider Department Dept. Phone Encounter #  
 1/25/2017  9:00 AM Cassandra Serrato MD [facility] [place] [phone] [id number] Your Appointments 1/31/2017  3:00 PM  
ROUTINE CARE with Ashley Velasquez MD  
[facility] - Janice Fisher ([address]) Appt Note: 1 week fu  
 [address] [place] [place] [address] [phone]  
[phone]  
  
   
 [address] [place] [place] [address] [phone] Upcoming Health Maintenance Date Due Pneumococcal 65+ Low/Medium Risk (2 of 2 - PCV13) 9/11/2015 MEDICARE YEARLY EXAM 1/25/2017 DTaP/Tdap/Td series (1 - Tdap) 4/20/2017* GLAUCOMA SCREENING Q2Y 3/8/2018 *Topic was postponed. The date shown is not the original due date. Allergies as of 1/25/2017  Review Complete On: 1/25/2017 By: Cassandra Serrato MD  
  
 Severity Noted Reaction Type Reactions Latex  10/10/2011    Rash Aspirin  10/10/2011   Side Effect Other (comments) Anything higher than 81mg makes pt jittery Codeine  10/10/2011    Hives, Itching Iodinated Contrast Media - Oral And Iv Dye  06/23/2011   Side Effect Itching Pcn [Penicillins]  10/10/2011    Hives, Itching Tape [Adhesive]  05/08/2014    Rash Current Immunizations  Reviewed on 12/12/2016 Name Date Influenza High Dose Vaccine PF 10/15/2016 Influenza Vaccine 9/11/2014 Influenza Vaccine Split 11/8/2012 Pneumococcal Polysaccharide (PPSV-23) 9/11/2014 Not reviewed this visit You Were Diagnosed With   
  
 Codes Comments Paroxysmal atrial fibrillation (HCC)    -  Primary ICD-10-CM: I48.0 ICD-9-CM: 427.31 Essential hypertension     ICD-10-CM: I10 
ICD-9-CM: 401.9 Vitals BP Pulse Height(growth percentile) Weight(growth percentile) BMI OB Status 172/68 (BP 1 Location: Right arm, BP Patient Position: Sitting) 64 5' 1\" (1.549 m) 171 lb (77.6 kg) 32.31 kg/m2 Hysterectomy Smoking Status Never Smoker Vitals History BMI and BSA Data Body Mass Index Body Surface Area  
 32.31 kg/m 2 1.83 m 2 Preferred Pharmacy Pharmacy Name Phone [facility] Trevon Jeong [phone] Your Updated Medication List  
  
   
This list is accurate as of: 1/25/17 10:00 AM.  Always use your most recent med list.  
  
  
  
  
 aspirin delayed-release 81 mg tablet Take  by mouth daily. atorvastatin 20 mg tablet Commonly known as:  LIPITOR Take 1 Tab by mouth daily. bumetanide 1 mg tablet Commonly known as:  Corinn Kiang Take 1 Tab by mouth two (2) times a day. cloNIDine HCl 0.2 mg tablet Commonly known as:  CATAPRES Take 1 Tab by mouth two (2) times a day. fluticasone 50 mcg/actuation nasal spray Commonly known as:  Euna Ben Administer to right and left nostril.  
  
 gabapentin 100 mg capsule Commonly known as:  NEURONTIN Take 1 Cap by mouth two (2) times a day. latanoprost 0.005 % ophthalmic solution Commonly known as:  Deyanne Pace Administer 1 Drop to right eye nightly. losartan 100 mg tablet Commonly known as:  COZAAR Take 1 Tab by mouth daily. meclizine 25 mg tablet Commonly known as:  ANTIVERT  
1 tab tid prn NORVASC 10 mg tablet Generic drug:  amLODIPine Take 5 mg by mouth daily. omeprazole 40 mg capsule Commonly known as:  PriLOSEC Take 1 Cap by mouth daily. potassium chloride 10 mEq tablet Commonly known as:  K-DUR, KLOR-CON Take 1 Tab by mouth daily. raNITIdine 150 mg tablet Commonly known as:  ZANTAC Take 150 mg by mouth two (2) times a day. traMADol 50 mg tablet Commonly known as:  ULTRAM  
Take 1 Tab by mouth every six (6) hours as needed for Pain. Max Daily Amount: 200 mg.  
  
 WOMEN'S MULTIPLE VITAMINS PO Take  by mouth. We Performed the Following AMB POC EKG ROUTINE W/ 12 LEADS, INTER & REP [31273 CPT(R)] Patient Instructions Call us with additional information regarding your plastic surgery. Return to see Dr. Joe Omer in 4 weeks. Please provide this summary of care documentation to your next provider. Your primary care clinician is listed as [place]. If you have any questions after today's visit, please call [phone].

## 2017-01-31 ENCOUNTER — OFFICE VISIT (OUTPATIENT)
Dept: INTERNAL MEDICINE CLINIC | Age: 77
End: 2017-01-31

## 2017-01-31 VITALS
RESPIRATION RATE: 16 BRPM | TEMPERATURE: 98.2 F | BODY MASS INDEX: 32.66 KG/M2 | HEIGHT: 61 IN | OXYGEN SATURATION: 73 % | HEART RATE: 98 BPM | WEIGHT: 173 LBS | DIASTOLIC BLOOD PRESSURE: 64 MMHG | SYSTOLIC BLOOD PRESSURE: 134 MMHG

## 2017-01-31 DIAGNOSIS — L03.116 CELLULITIS OF LEFT LOWER EXTREMITY: Primary | ICD-10-CM

## 2017-01-31 DIAGNOSIS — E78.00 PURE HYPERCHOLESTEROLEMIA: ICD-10-CM

## 2017-01-31 DIAGNOSIS — I10 ESSENTIAL HYPERTENSION: ICD-10-CM

## 2017-01-31 DIAGNOSIS — R22.0 SCALP MASS: ICD-10-CM

## 2017-01-31 NOTE — MR AVS SNAPSHOT
Visit Information Date & Time Provider Department Dept. Phone Encounter #  
 1/31/2017  3:00 PM Estela Thornton MD [facility] [phone] [id number] Follow-up Instructions Return in about 4 weeks (around 2/28/2017), or if symptoms worsen or fail to improve. Your Appointments 2/23/2017  9:40 AM  
ESTABLISHED PATIENT with Delma Weathers MD  
[facility] ([facility]) Appt Note: 4 week follow up  
 [address]  
[address] [facility],[address] [address] Upcoming Health Maintenance Date Due Pneumococcal 65+ High/Highest Risk (2 of 2 - PCV13) 9/11/2015 MEDICARE YEARLY EXAM 1/25/2017 DTaP/Tdap/Td series (1 - Tdap) 4/20/2017* GLAUCOMA SCREENING Q2Y 3/8/2018 *Topic was postponed. The date shown is not the original due date. Allergies as of 1/31/2017  Review Complete On: 1/31/2017 By: Estela Thornton MD  
  
 Severity Noted Reaction Type Reactions Latex  10/10/2011    Rash Aspirin  10/10/2011   Side Effect Other (comments) Anything higher than 81mg makes pt jittery Codeine  10/10/2011    Hives, Itching Iodinated Contrast Media - Oral And Iv Dye  06/23/2011   Side Effect Itching Pcn [Penicillins]  10/10/2011    Hives, Itching Tape [Adhesive]  05/08/2014    Rash Current Immunizations  Reviewed on 12/12/2016 Name Date Influenza High Dose Vaccine PF 10/15/2016 Influenza Vaccine 9/11/2014 Influenza Vaccine Split 11/8/2012 Pneumococcal Polysaccharide (PPSV-23) 9/11/2014 Not reviewed this visit You Were Diagnosed With   
  
 Codes Comments Cellulitis of left lower extremity    -  Primary ICD-10-CM: L03.116 ICD-9-CM: 682.6 Essential hypertension     ICD-10-CM: I10 
ICD-9-CM: 401.9 Pure hypercholesterolemia     ICD-10-CM: E78.00 ICD-9-CM: 272.0 Scalp mass     ICD-10-CM: R22.0 ICD-9-CM: 376. 2 Vitals BP Pulse Temp Resp Height(growth percentile) Weight(growth percentile) 134/64 98 98.2 °F (36.8 °C) (Oral) 16 5' 1\" (1.549 m) 173 lb (78.5 kg) SpO2 BMI OB Status Smoking Status (!) 73% 32.69 kg/m2 Hysterectomy Never Smoker BMI and BSA Data Body Mass Index Body Surface Area  
 32.69 kg/m 2 1.84 m 2 Preferred Pharmacy Pharmacy Name Phone [facility] [phone] [facility] Trevon Jeong [phone] Your Updated Medication List  
  
   
This list is accurate as of: 1/31/17  4:22 PM.  Always use your most recent med list.  
  
  
  
  
 aspirin delayed-release 81 mg tablet Take  by mouth daily. atorvastatin 20 mg tablet Commonly known as:  LIPITOR Take 1 Tab by mouth daily. bumetanide 1 mg tablet Commonly known as:  Assunta Brink Take 1 Tab by mouth two (2) times a day. cloNIDine HCl 0.2 mg tablet Commonly known as:  CATAPRES Take 1 Tab by mouth two (2) times a day. fluticasone 50 mcg/actuation nasal spray Commonly known as:  Caffie Euler Administer to right and left nostril.  
  
 gabapentin 100 mg capsule Commonly known as:  NEURONTIN Take 1 Cap by mouth two (2) times a day. latanoprost 0.005 % ophthalmic solution Commonly known as:  Myron Kulwant Administer 1 Drop to right eye nightly. losartan 100 mg tablet Commonly known as:  COZAAR Take 1 Tab by mouth daily. meclizine 25 mg tablet Commonly known as:  ANTIVERT  
1 tab tid prn NORVASC 10 mg tablet Generic drug:  amLODIPine Take 5 mg by mouth daily. omeprazole 40 mg capsule Commonly known as:  PriLOSEC Take 1 Cap by mouth daily. potassium chloride 10 mEq tablet Commonly known as:  K-DUR, KLOR-CON Take 1 Tab by mouth daily. raNITIdine 150 mg tablet Commonly known as:  ZANTAC Take 150 mg by mouth two (2) times a day. traMADol 50 mg tablet Commonly known as:  ULTRAM  
Take 1 Tab by mouth every six (6) hours as needed for Pain. Max Daily Amount: 200 mg.  
  
 WOMEN'S MULTIPLE VITAMINS PO Take  by mouth. Follow-up Instructions Return in about 4 weeks (around 2017), or if symptoms worsen or fail to improve. Patient Instructions Privlohart Activation Thank you for requesting access to Secucloud. Please follow the instructions below to securely access and download your online medical record. Secucloud allows you to send messages to your doctor, view your test results, renew your prescriptions, schedule appointments, and more. How Do I Sign Up? 1. In your internet browser, go to [email or web address] 
2. Click on the First Time User? Click Here link in the Sign In box. You will be redirect to the New Member Sign Up page. 3. Enter your Secucloud Access Code exactly as it appears below. You will not need to use this code after youve completed the sign-up process. If you do not sign up before the expiration date, you must request a new code. Secucloud Access Code: Activation code not generated Current Secucloud Status: Patient Declined (This is the date your Secucloud access code will ) 4. Enter the last four digits of your Social Security Number (xxxx) and Date of Birth (mm/dd/yyyy) as indicated and click Submit. You will be taken to the next sign-up page. 5. Create a Secucloud ID. This will be your Secucloud login ID and cannot be changed, so think of one that is secure and easy to remember. 6. Create a Secucloud password. You can change your password at any time. 7. Enter your Password Reset Question and Answer. This can be used at a later time if you forget your password. 8. Enter your e-mail address. You will receive e-mail notification when new information is available in [address] 9. Click Sign Up. You can now view and download portions of your medical record. 10. Click the Download Summary menu link to download a portable copy of your medical information. Additional Information If you have questions, please visit the Frequently Asked Questions section of the CinemaNow website at [email or web address] Remember, CinemaNow is NOT to be used for urgent needs. For medical emergencies, dial 911. Please provide this summary of care documentation to your next provider. Your primary care clinician is listed as Nina Looney. If you have any questions after today's visit, please call [phone].

## 2017-01-31 NOTE — PATIENT INSTRUCTIONS
[employer]ek Activation    Thank you for requesting access to AppVault. Please follow the instructions below to securely access and download your online medical record. AppVault allows you to send messages to your doctor, view your test results, renew your prescriptions, schedule appointments, and more. How Do I Sign Up? 1. In your internet browser, go to [email or web address]  2. Click on the First Time User? Click Here link in the Sign In box. You will be redirect to the New Member Sign Up page. 3. Enter your AppVault Access Code exactly as it appears below. You will not need to use this code after youve completed the sign-up process. If you do not sign up before the expiration date, you must request a new code. AppVault Access Code: Activation code not generated  Current AppVault Status: Patient Declined (This is the date your AppVault access code will )    4. Enter the last four digits of your Social Security Number (xxxx) and Date of Birth (mm/dd/yyyy) as indicated and click Submit. You will be taken to the next sign-up page. 5. Create a AppVault ID. This will be your AppVault login ID and cannot be changed, so think of one that is secure and easy to remember. 6. Create a AppVault password. You can change your password at any time. 7. Enter your Password Reset Question and Answer. This can be used at a later time if you forget your password. 8. Enter your e-mail address. You will receive e-mail notification when new information is available in [address] 9. Click Sign Up. You can now view and download portions of your medical record. 10. Click the Download Summary menu link to download a portable copy of your medical information. Additional Information    If you have questions, please visit the Frequently Asked Questions section of the AppVault website at [email or web address] Remember, AppVault is NOT to be used for urgent needs. For medical emergencies, dial 911.

## 2017-01-31 NOTE — PROGRESS NOTES
Oli Pino is a 64 y.o. female and presents with Leg Swelling (left)  . Subjective:  Cellulitis Review:  Patient presents for evaluation of a  skin infection located on the lower lower leg. Onset of symptoms was gradual, with unchanged  since that time. Symptoms included less erythema located on leg: left. There is a history trauma to the area. Treatment to date has included antibiotics. she has had little drainage in the site. the area has been less painful;    Hypertension Review:  The patient has hypertension . Diet and Lifestyle: generally follows a low sodium diet, exercises sporadically  Home BP Monitoring: is not measured at home. Pertinent ROS: taking medications as instructed, no medication side effects noted, no TIA's, no chest pain on exertion, no dyspnea on exertion, no swelling of ankles. Dyslipidemia Review:  Patient presents for evaluation of lipids. Compliance with treatment thus far has been excellent. A repeat fasting lipid profile was done. The patient does not use medications that may worsen dyslipidemias . The patient exercises sporadically. The patient is not known to have coexisting coronary artery disease  She has less of a leg drainage      Review of Systems  Constitutional: negative for fevers, chills, anorexia and weight loss  Eyes:   negative for visual disturbance and irritation  ENT:   negative for tinnitus,sore throat,nasal congestion,ear pains. hoarseness  Respiratory:  negative for cough, hemoptysis, dyspnea,wheezing  CV:   negative for chest pain, palpitations, lower extremity edema  GI:   negative for nausea, vomiting, diarrhea, abdominal pain,melena  Endo:               negative for polyuria,polydipsia,polyphagia,heat intolerance  Genitourinary: negative for frequency, dysuria and hematuria  Integument:  negative for rash and pruritus  Hematologic:  negative for easy bruising and gum/nose bleeding  Musculoskel:myalgias, arthralgias, back pain, muscle weakness, joint pain  Neurological:  negative for headaches, dizziness, vertigo, memory problems and gait   Behavl/Psych: negative for feelings of anxiety, depression, mood changes    Past Medical History   Diagnosis Date    Acute pharyngitis 2/8/2011    Allergic rhinitis, cause unspecified 2/8/2011    Arrhythmia      PVC    Asymptomatic varicose veins 2/8/2011    Cancer ([facility] [address]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     Carpal tunnel syndrome 2/8/2011    Degenerative Joint Disese ( improved/resolving) 2/8/2011    Degenetrative Dis Disease, 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    Diverticulosis of colon (without mention of hemorrhage) 2/8/2011    Dysphagia 2/8/2011    Edema, peripheral 2/8/2011    GERD (gastroesophageal reflux disease)     GERD Gastro- Esophageal Reflux Disease 2/8/2011    Herniated nucleus pulposus ( slipped disc) 2/8/2011    Menopausal 2/8/2011    PUD (peptic ulcer disease) 2012    Pure hypercholesterolemia 2/8/2011    Recurrent ear pain 2/8/2011    S/P radiation therapy     Scalp lesion 10/23/2014    Sebaceous cyst 4/22/2014    Unspecified cataract 2/8/2011    Unspecified essential hypertension 2/8/2011    Unspecified sleep apnea      Past Surgical History   Procedure Laterality Date    Hx hysterectomy      Hx orthopaedic  neck/back 2006    Hx other surgical  5/8/2014      Excise recurrent scalp lesion and application of ACell    Hx other surgical  5/8/2014     Excise keloid, anterior chest wall, and application of ACell    Hx other surgical  5/8/2014      Excise sebaceous cyst, anterior chest wall    Hx other surgical  5/8/2014     Punch biopsy, skin lesions, right forearm x2, right calf x1     Social History     Social History    Marital status:      Spouse name: N/A    Number of children: N/A    Years of education: N/A     Social History Main Topics    Smoking status: Never Smoker    Smokeless tobacco: Never Used    Alcohol use No    Drug use: No    Sexual activity: Yes     Partners: Male     Birth control/ protection: None     Other Topics Concern    None     Social History Narrative     Family History   Problem Relation Age of Onset    Hypertension Mother     Stroke Mother     Hypertension Father     Cancer Father      PROSTATE, MELANOMA    Anesth Problems Neg Hx      Current Outpatient Prescriptions   Medication Sig Dispense Refill    amLODIPine (NORVASC) 10 mg tablet Take 5 mg by mouth daily.  traMADol (ULTRAM) 50 mg tablet Take 1 Tab by mouth every six (6) hours as needed for Pain. Max Daily Amount: 200 mg. 30 Tab 0    losartan (COZAAR) 100 mg tablet Take 1 Tab by mouth daily. 30 Tab 12    cloNIDine HCl (CATAPRES) 0.2 mg tablet Take 1 Tab by mouth two (2) times a day. 60 Tab 12    potassium chloride (K-DUR, KLOR-CON) 10 mEq tablet Take 1 Tab by mouth daily. 30 Tab 12    ranitidine (ZANTAC) 150 mg tablet Take 150 mg by mouth two (2) times a day.  latanoprost (XALATAN) 0.005 % ophthalmic solution Administer 1 Drop to right eye nightly.  atorvastatin (LIPITOR) 20 mg tablet Take 1 Tab by mouth daily. 30 Tab 6    gabapentin (NEURONTIN) 100 mg capsule Take 1 Cap by mouth two (2) times a day. 180 Cap 3    bumetanide (BUMEX) 1 mg tablet Take 1 Tab by mouth two (2) times a day. (Patient taking differently: Take 2 mg by mouth two (2) times a day.) 60 Tab 12    omeprazole (PRILOSEC) 40 mg capsule Take 1 Cap by mouth daily. 90 Cap 3    meclizine (ANTIVERT) 25 mg tablet 1 tab tid prn 270 Tab 3    MULTIVIT WITH CALCIUM,IRON,MIN (WOMEN'S MULTIPLE VITAMINS PO) Take  by mouth.  aspirin delayed-release 81 mg tablet Take  by mouth daily.  fluticasone (FLONASE) 50 mcg/actuation nasal spray Administer to right and left nostril.  1 Bottle 12     Allergies   Allergen Reactions    Latex Rash    Aspirin Other (comments)     Anything higher than 81mg makes pt jittery    Codeine Hives and Itching    Iodinated Contrast Media - Oral And Iv Dye Itching    Pcn [Penicillins] Hives and Itching  Tape [Adhesive] Rash       Objective:  Visit Vitals    /64    Pulse 98    Temp 98.2 °F (36.8 °C) (Oral)    Resp 16    Ht 5' 1\" (1.549 m)    Wt 173 lb (78.5 kg)    SpO2 (!) 73%    BMI 32.69 kg/m2     Physical Exam:   General appearance - alert, well appearing, and in no distress  Mental status - alert, oriented to person, place, and time  EYE-DARLENE, EOMI, corneas normal, no foreign bodies  ENT-ENT exam normal, no neck nodes or sinus tenderness  Nose - normal and patent, no erythema, discharge or polyps  Mouth - mucous membranes moist, pharynx normal without lesions  Neck - supple, no significant adenopathy   Chest - clear to auscultation, no wheezes, rales or rhonchi, symmetric air entry   Heart - normal rate, regular rhythm, normal S1, S2, no murmurs, rubs, clicks or gallops   Abdomen - soft, nontender, nondistended, no masses or organomegaly  Lymph- no adenopathy palpable  Ext-peripheral pulses normal, no pedal edema, no clubbing or cyanosis  Skin-Warm and dry. no hyperpigmentation, vitiligo, or suspicious lesions  Neuro -alert, oriented, normal speech, no focal findings or movement disorder noted  Neck-normal C-spine, no tenderness, full ROM without pain  oval mass scalp with no active bleeding noted. Lt.lower leg minimal edema noted  pus noted and erythema noted. Results for orders placed or performed in visit on 11/08/16   AMB POC LIPID PROFILE   Result Value Ref Range    Cholesterol (POC) 172     Triglycerides (POC) 94     HDL Cholesterol (POC) 79     LDL Cholesterol (POC) 74     Non-HDL Goal (POC) 93     TChol/HDL Ratio (POC) 2.2        Assessment/Plan:    ICD-10-CM ICD-9-CM    1. Cellulitis of left lower extremity L03.116 682.6    2. Essential hypertension I10 401.9    3. Pure hypercholesterolemia E78.00 272.0    4. Scalp mass R22.0 782.2      No orders of the defined types were placed in this encounter.     lose weight, follow low fat diet, follow low salt diet,Take 81mg aspirin daily,unna boot compression removed. Patient Instructions   MyChart Activation    Thank you for requesting access to Talkbits. Please follow the instructions below to securely access and download your online medical record. Talkbits allows you to send messages to your doctor, view your test results, renew your prescriptions, schedule appointments, and more. How Do I Sign Up? 1. In your internet browser, go to [email or web address]  2. Click on the First Time User? Click Here link in the Sign In box. You will be redirect to the New Member Sign Up page. 3. Enter your Talkbits Access Code exactly as it appears below. You will not need to use this code after youve completed the sign-up process. If you do not sign up before the expiration date, you must request a new code. Ozmosist Access Code: Activation code not generated  Current Talkbits Status: Patient Declined (This is the date your Talkbits access code will )    4. Enter the last four digits of your Social Security Number (xxxx) and Date of Birth (mm/dd/yyyy) as indicated and click Submit. You will be taken to the next sign-up page. 5. Create a Talkbits ID. This will be your Talkbits login ID and cannot be changed, so think of one that is secure and easy to remember. 6. Create a Talkbits password. You can change your password at any time. 7. Enter your Password Reset Question and Answer. This can be used at a later time if you forget your password. 8. Enter your e-mail address. You will receive e-mail notification when new information is available in [address] 9. Click Sign Up. You can now view and download portions of your medical record. 10. Click the Download Summary menu link to download a portable copy of your medical information. Additional Information    If you have questions, please visit the Frequently Asked Questions section of the Talkbits website at [email or web address] Remember, Talkbits is NOT to be used for urgent needs. For medical emergencies, dial 911. Follow-up Disposition:  Return in about 4 weeks (around 2/28/2017), or if symptoms worsen or fail to improve. I have reviewed with the patient details of the assessment and plan and all questions were answered. Relevent patient education was performed    An After Visit Summary was printed and given to the patient.

## 2017-02-21 ENCOUNTER — DOCUMENTATION ONLY (OUTPATIENT)
Dept: CARDIOLOGY CLINIC | Age: 77
End: 2017-02-21

## 2017-02-21 NOTE — PROGRESS NOTES
Last office visit note was faxed to the office of Dr. Judith Elena at [phone]. Fax confirmation was received.

## 2017-02-23 ENCOUNTER — OFFICE VISIT (OUTPATIENT)
Dept: CARDIOLOGY CLINIC | Age: 77
End: 2017-02-23

## 2017-02-23 VITALS
DIASTOLIC BLOOD PRESSURE: 80 MMHG | WEIGHT: 174 LBS | HEART RATE: 60 BPM | SYSTOLIC BLOOD PRESSURE: 172 MMHG | HEIGHT: 61 IN | BODY MASS INDEX: 32.85 KG/M2

## 2017-02-23 DIAGNOSIS — I49.5 TACHY-BRADY SYNDROME (HCC): ICD-10-CM

## 2017-02-23 DIAGNOSIS — R60.0 BILATERAL LEG EDEMA: ICD-10-CM

## 2017-02-23 DIAGNOSIS — Z01.810 PREOP CARDIOVASCULAR EXAM: Primary | ICD-10-CM

## 2017-02-23 DIAGNOSIS — C44.40 SKIN CANCER OF SCALP: ICD-10-CM

## 2017-02-23 DIAGNOSIS — I48.0 PAROXYSMAL ATRIAL FIBRILLATION (HCC): ICD-10-CM

## 2017-02-23 DIAGNOSIS — I25.10 CORONARY ARTERY DISEASE INVOLVING NATIVE CORONARY ARTERY OF NATIVE HEART WITHOUT ANGINA PECTORIS: ICD-10-CM

## 2017-02-23 DIAGNOSIS — I10 ESSENTIAL HYPERTENSION: ICD-10-CM

## 2017-02-23 RX ORDER — METOPROLOL SUCCINATE 25 MG/1
25 TABLET, EXTENDED RELEASE ORAL DAILY
Qty: 30 TAB | Refills: 3 | Status: SHIPPED | OUTPATIENT
Start: 2017-02-23 | End: 2017-05-19 | Stop reason: SDUPTHER

## 2017-02-23 RX ORDER — ACETAMINOPHEN 325 MG/1
650 TABLET, FILM COATED ORAL
COMMUNITY
End: 2022-06-07

## 2017-02-23 NOTE — PROGRESS NOTES
Cardiac Electrophysiology Office Note     Subjective: Pita Méndez is a 64 y.o. 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 She is having skin cancer removed from her scalp next Tuesday. She says she has been taking the Rythmol 1-2x week when her 'heart starts up\". She thinks this keeps her heart from beating fast. She denies her \"blacking out\" episodes or syncope. She does not take her bumex daily any more because it makes her urinate to much. She takes it prn for lower extremity. Dr Adam Easton is referring her to the lymphedema for her chronic cellulitis. She is having a lot of pain in her legs because she has some sores. She did not take her losartan or norvasc this morning so BP is high. We spoke with her plastic surgeon on Tuesday Dr Jose Miguel Maxwell who will be doing the surgery. She has skin cancer on her scalp and had this removed in September at [facility]. She still has a large malignant area on her head that needs to be removed. She says she has been dealing with chronic cellulitis in the E and she has had the leg wrapped and wearing a boot. She had stopped the rythmol and then Dr Adam Easton restarted the Rythmol because she has had two episodes of \"A fib\"  I had stopped the Rythmol because it could have been contributing to the black outs, I was concerned about intermittent bradycardia. Last time she was seen she felt faint and sat down then she broke out in a sweat. She was not taking the Rythmol at this time. Echo 5/2016 LVEF 60 % to 65 %. No RWMA. Mild concentric hypertrophy. Mild TR. Dr Adam Easton is referring her to the lymphedema for her chronic cellulitis.      Past Hx:  The last event was New Years 2016.    holter 6/23/2014 sinus rhythm with minimum 39 bpm at 5 pm PVC's , mean HR 50 bpm while she is on medication  She was last seen 2014 and has missed several follow up appointments. She was seen last for follow up for syncope, she has passed out twice December and . She went the ED at [facility] times. The metoprolol was discontinued and she was started on lisinopril and clonidine for high BP. First episode 12/31/15 occurred while she was sitting on the stool in her kitchen, she started to feel tighten around her head then she LOC and fell off the stool. The next day she had a similar episode, she was doing things around the house and felt dizzy she sat down and put a cold compress on her head. No LOC. 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 Austin Carbone and had venous duplex without DVT. Echo with normal LVEF and inferior wall hypokinesis in 2014     Mother with stroke and HBP and heart disease death at age 77  Father with cancer at age 68  Sister with cancer  at age of 76  Current Outpatient Prescriptions   Medication Sig Dispense Refill    acetaminophen (TYLENOL) 325 mg tablet Take 325 mg by mouth every four (4) hours as needed for Pain.  metoprolol succinate (TOPROL-XL) 25 mg XL tablet Take 1 Tab by mouth daily. 30 Tab 3    amLODIPine (NORVASC) 10 mg tablet Take 5 mg by mouth daily.  traMADol (ULTRAM) 50 mg tablet Take 1 Tab by mouth every six (6) hours as needed for Pain. Max Daily Amount: 200 mg. 30 Tab 0    cloNIDine HCl (CATAPRES) 0.2 mg tablet Take 1 Tab by mouth two (2) times a day. 60 Tab 12    potassium chloride (K-DUR, KLOR-CON) 10 mEq tablet Take 1 Tab by mouth daily. 30 Tab 12    ranitidine (ZANTAC) 150 mg tablet Take 150 mg by mouth two (2) times a day.  latanoprost (XALATAN) 0.005 % ophthalmic solution Administer 1 Drop to right eye nightly.  atorvastatin (LIPITOR) 20 mg tablet Take 1 Tab by mouth daily. 30 Tab 6    gabapentin (NEURONTIN) 100 mg capsule Take 1 Cap by mouth two (2) times a day. 180 Cap 3    omeprazole (PRILOSEC) 40 mg capsule Take 1 Cap by mouth daily. 90 Cap 3    meclizine (ANTIVERT) 25 mg tablet 1 tab tid prn 270 Tab 3    MULTIVIT WITH CALCIUM,IRON,MIN (WOMEN'S MULTIPLE VITAMINS PO) Take  by mouth.  aspirin delayed-release 81 mg tablet Take  by mouth daily.  fluticasone (FLONASE) 50 mcg/actuation nasal spray Administer to right and left nostril.  1 Bottle 12     Allergies   Allergen Reactions    Latex Rash    Aspirin Other (comments)     Anything higher than 81mg makes pt jittery    Codeine Hives and Itching    Iodinated Contrast Media - Oral And Iv Dye Itching    Pcn [Penicillins] Hives and Itching    Tape [Adhesive] Rash     Past Medical History:   Diagnosis Date    Acute pharyngitis 2/8/2011    Allergic rhinitis, cause unspecified 2/8/2011    Arrhythmia     PVC    Asymptomatic varicose veins 2/8/2011    Cancer ([facility] [address]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     Carpal tunnel syndrome 2/8/2011    Degenerative Joint Disese ( improved/resolving) 2/8/2011    Degenetrative Dis Disease, 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    Diverticulosis of colon (without mention of hemorrhage) 2/8/2011    Dysphagia 2/8/2011    Edema, peripheral 2/8/2011    GERD (gastroesophageal reflux disease)     GERD Gastro- Esophageal Reflux Disease 2/8/2011    Herniated nucleus pulposus ( slipped disc) 2/8/2011    Menopausal 2/8/2011    PUD (peptic ulcer disease) 2012    Pure hypercholesterolemia 2/8/2011    Recurrent ear pain 2/8/2011    S/P radiation therapy     Scalp lesion 10/23/2014    Sebaceous cyst 4/22/2014    Unspecified cataract 2/8/2011    Unspecified essential hypertension 2/8/2011    Unspecified sleep apnea      Past Surgical History:   Procedure Laterality Date    HX HYSTERECTOMY      HX ORTHOPAEDIC  neck/back 2006    HX OTHER SURGICAL  5/8/2014     Excise recurrent scalp lesion and application of ACell    HX OTHER SURGICAL  5/8/2014    Excise keloid, anterior chest wall, and application of ACell    HX OTHER SURGICAL  5/8/2014     Excise sebaceous cyst, anterior chest wall    HX OTHER SURGICAL  5/8/2014    Punch biopsy, skin lesions, right forearm x2, right calf x1     Family History   Problem Relation Age of Onset    Hypertension Mother     Stroke Mother     Hypertension Father     Cancer Father      PROSTATE, MELANOMA    Anesth Problems Neg Hx      Social History   Substance Use Topics    Smoking status: Never Smoker    Smokeless tobacco: Never Used    Alcohol use No        Review of Systems:   Constitutional: Negative for fever, chills, weight loss, + malaise/fatigue. HEENT: Negative for nosebleeds, vision changes. Respiratory: Negative for cough, hemoptysis, sputum production, and wheezing. Cardiovascular: Negative for orthopnea, claudication,  and PND. +  Chronic cellulitis , + occasional palpitations  Gastrointestinal: Negative for constipation, blood in stool and melena. +GERD   Genitourinary: Negative for dysuria, and hematuria. Musculoskeletal: Negative for myalgias, + arthralgia. Skin: + skin discoloration or cancer on scalp  Heme: Does not bleed or bruise easily.    Neurological: Negative for speech change and focal weakness + dizziness     Objective:     Visit Vitals    /80 (BP 1 Location: Right arm, BP Patient Position: Sitting)    Pulse 60    Ht 5' 1\" (1.549 m)    Wt 174 lb (78.9 kg)    BMI 32.88 kg/m2      Physical Exam:   Constitutional: Well-nourished. No distress. scarf on her head. Head: Normocephalic and atraumatic. Eyes: Pupils are equal, round  Neck: supple. No JVD present. Cardiovascular: normal rate, regular rhythm and normal heart sounds. Exam reveals no gallop and no friction rub. No murmur heard. Pulmonary/Chest: Effort normal and breath sounds normal.   Abdominal: Soft, mild obesity  Musculoskeletal: LLE wrapped in clean and dry bandage   Neurological: alert,oriented. Skin: Skin is warm and dry. Keloid scar at the top of the sternum about 2 inches (mole removal)  Psychiatric: normal mood and affect. Behavior is normal. Judgment and thought content normal.       ECG sinus rhythm HR 61 bpm    Assessment/Plan:       ICD-10-CM ICD-9-CM    1. Preop cardiovascular exam Z01.810 V72.81    2. 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 Coronary artery disease involving native coronary artery of native heart without angina pectoris I25.10 414.01 AMB POC EKG ROUTINE W/ 12 LEADS, INTER & REP   4. Essential hypertension I10 401.9 AMB POC EKG ROUTINE W/ 12 LEADS, INTER & REP   5. Paroxysmal atrial fibrillation (HCC) I48.0 427.31    6. Skin cancer of scalp C44.40 173.40    7. Bilateral leg edema R60.0 782. 3         Reviewed drug and side effects in detail. Highly recommend she does NOT take the Rythmol PRN, I discussed this with her and she agrees. Will start low dose beta blocker to help palpitations. Reviewed importance of taking her BP medications every morning and controlling the blood pressure. Discussed case with Dr Egdar Marquis on Tuesday, she may proceed with surgery this coming Tuesday and she will follow up after for further evaluation to see if pacemaker can be done  Echo 5/2016 shows normal LVEF, she is compensated on exam, no acute s/s of CHF on exam. She has acceptable functional status and no further cardiac testing would be needed prior to surgery.       Follow-up Disposition:  Return in about 4 weeks (around 3/23/2017). or sooner depending on urgency of surgery    Thank you for involving me in this patient's care and please call with further concerns or questions. Radha Casanova M.D.   Electrophysiology/Cardiology  [facility] [place]  [address], [facility] [address] [facility], [address]  [phone]

## 2017-02-23 NOTE — PROGRESS NOTES
Today's office visit notes were faxed to the office of Dr. Jonathan Pittman at [phone]. Fax confirmation was received.

## 2017-02-23 NOTE — PROGRESS NOTES
Chief Complaint   Patient presents with    Irregular Heart Beat     a-fib    Hypertension    Follow-up     4 week follow up     Verified medications with the patient. Verified pharmacy with patient.

## 2017-02-23 NOTE — PATIENT INSTRUCTIONS
Begin taking Toprol XL 25mg once daily. Do not take Rythmol. Return to see Dr. Eduardo Miller in 1 month for pacemaker decision.

## 2017-02-23 NOTE — PROGRESS NOTES
Cardiac Electrophysiology Office  Note     Subjective: Deanna Lucas is a 64 y.o. 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 She still has a large malignant area on her head that needs to be removed. She says she has been dealing with chronic cellulitis in the E and she has had the leg wrapped and wearing a boot. She had stopped the rythmol and then Dr Ajay Green restarted the Rythmol because she has had two episodes of \"A fib\"  I had stopped the Rythmol because it could have been contributing to the black outs, I was concerned about intermittent bradycardia. Last time she was seen she felt faint and sat down then she broke out in a sweat. She was not taking the Rythmol at this time. Echo 5/2016 LVEF 60 % to 65 %. No RWMA. Mild concentric hypertrophy. Mild TR. She is here today for follow up and is scheduled to have surgery to have the skin cancer on her scalp removed. She is having skin cancer removed from her scalp next Tuesday. She says she has been taking the Rythmol 1-2x week when her 'heart starts up\". She thinks this keeps her heart from beating fast. She denies her \"blacking out\" episodes or syncope. She does not take her bumex daily any more because it makes her urinate to much. She takes it prn for lower extremity. Dr Ajay Green is referring her to the lymphedema for her chronic cellulitis. She is having a lot of pain in her legs because she has some sores. She did not take her losartan or norvasc this morning so BP is high. I spoke with her plastic surgeon on Tuesday Dr Fransico Koyanagi who will be doing the surgery. Past Hx:  The last event was New Years 2016.    holter 6/23/2014 sinus rhythm with minimum 39 bpm at 5 pm PVC's , mean HR 50 bpm while she is on medication  She was last seen 8/2014 and has missed several follow up appointments.    She was seen last for follow up for syncope, she has passed out twice December and . She went the ED at [facility] both times. The metoprolol was discontinued and she was started on lisinopril and clonidine for high BP. First episode 12/31/15 occurred while she was sitting on the stool in her kitchen, she started to feel tighten around her head then she LOC and fell off the stool. The next day she had a similar episode, she was doing things around the house and felt dizzy she sat down and put a cold compress on her head. No LOC. 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 Wood Pabon and had venous duplex without DVT. Echo with normal LVEF and inferior wall hypokinesis in 2014     Mother with stroke and HBP and heart disease death at age 77  Father with cancer at age 68  Sister with cancer  at age of 76  Current Outpatient Prescriptions   Medication Sig Dispense Refill    acetaminophen (TYLENOL) 325 mg tablet Take 325 mg by mouth every four (4) hours as needed for Pain.  metoprolol succinate (TOPROL-XL) 25 mg XL tablet Take 1 Tab by mouth daily. 30 Tab 3    amLODIPine (NORVASC) 10 mg tablet Take 5 mg by mouth daily.  traMADol (ULTRAM) 50 mg tablet Take 1 Tab by mouth every six (6) hours as needed for Pain.  Max Daily Amount: 200 mg. 30 Tab 0    cloNIDine HCl (CATAPRES) 0.2 mg tablet Take 1 Tab by mouth two (2) times a day. 60 Tab 12    potassium chloride (K-DUR, KLOR-CON) 10 mEq tablet Take 1 Tab by mouth daily. 30 Tab 12    ranitidine (ZANTAC) 150 mg tablet Take 150 mg by mouth two (2) times a day.  latanoprost (XALATAN) 0.005 % ophthalmic solution Administer 1 Drop to right eye nightly.  atorvastatin (LIPITOR) 20 mg tablet Take 1 Tab by mouth daily. 30 Tab 6    gabapentin (NEURONTIN) 100 mg capsule Take 1 Cap by mouth two (2) times a day. 180 Cap 3    omeprazole (PRILOSEC) 40 mg capsule Take 1 Cap by mouth daily. 90 Cap 3    meclizine (ANTIVERT) 25 mg tablet 1 tab tid prn 270 Tab 3    MULTIVIT WITH CALCIUM,IRON,MIN (WOMEN'S MULTIPLE VITAMINS PO) Take  by mouth.  aspirin delayed-release 81 mg tablet Take  by mouth daily.  fluticasone (FLONASE) 50 mcg/actuation nasal spray Administer to right and left nostril.  1 Bottle 12     Allergies   Allergen Reactions    Latex Rash    Aspirin Other (comments)     Anything higher than 81mg makes pt jittery    Codeine Hives and Itching    Iodinated Contrast Media - Oral And Iv Dye Itching    Pcn [Penicillins] Hives and Itching    Tape [Adhesive] Rash     Past Medical History:   Diagnosis Date    Acute pharyngitis 2/8/2011    Allergic rhinitis, cause unspecified 2/8/2011    Arrhythmia     PVC    Asymptomatic varicose veins 2/8/2011    Cancer ([facility] [address]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     Carpal tunnel syndrome 2/8/2011    Degenerative Joint Disese ( improved/resolving) 2/8/2011    Degenetrative Dis Disease, 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    Diverticulosis of colon (without mention of hemorrhage) 2/8/2011    Dysphagia 2/8/2011    Edema, peripheral 2/8/2011    GERD (gastroesophageal reflux disease)     GERD Gastro- Esophageal Reflux Disease 2/8/2011    Herniated nucleus pulposus ( slipped disc) 2/8/2011    Menopausal 2/8/2011    PUD (peptic ulcer disease) 2012    Pure hypercholesterolemia 2/8/2011    Recurrent ear pain 2/8/2011    S/P radiation therapy     Scalp lesion 10/23/2014    Sebaceous cyst 4/22/2014    Unspecified cataract 2/8/2011    Unspecified essential hypertension 2/8/2011    Unspecified sleep apnea      Past Surgical History:   Procedure Laterality Date    HX HYSTERECTOMY      HX ORTHOPAEDIC  neck/back 2006    HX OTHER SURGICAL  5/8/2014     Excise recurrent scalp lesion and application of ACell    HX OTHER SURGICAL  5/8/2014    Excise keloid, anterior chest wall, and application of ACell    HX OTHER SURGICAL  5/8/2014     Excise sebaceous cyst, anterior chest wall    HX OTHER SURGICAL  5/8/2014    Punch biopsy, skin lesions, right forearm x2, right calf x1     Family History   Problem Relation Age of Onset    Hypertension Mother     Stroke Mother     Hypertension Father     Cancer Father      PROSTATE, MELANOMA    Anesth Problems Neg Hx      Social History   Substance Use Topics    Smoking status: Never Smoker    Smokeless tobacco: Never Used    Alcohol use No        Review of Systems:   Constitutional: Negative for fever, chills, weight loss, + malaise/fatigue. HEENT: Negative for nosebleeds, vision changes. Respiratory: Negative for cough, hemoptysis, sputum production, and wheezing. Cardiovascular: Negative for orthopnea, claudication,  and PND. +  Chronic cellulitis , + occasional palpitations  Gastrointestinal: Negative for constipation, blood in stool and melena. +GERD   Genitourinary: Negative for dysuria, and hematuria. Musculoskeletal: Negative for myalgias, + arthralgia. Skin: + skin discoloration or cancer on scalp  Heme: Does not bleed or bruise easily. Neurological: Negative for speech change and focal weakness + dizziness     Objective:     Visit Vitals    /80 (BP 1 Location: Right arm, BP Patient Position: Sitting)    Pulse 60    Ht 5' 1\" (1.549 m)    Wt 174 lb (78.9 kg)    BMI 32.88 kg/m2      Physical Exam:   Constitutional: Well-nourished. No distress. scarf on her head. Head: Normocephalic and atraumatic. Eyes: Pupils are equal, round  Neck: supple. No JVD present. Cardiovascular: normal rate, regular rhythm and normal heart sounds. Exam reveals no gallop and no friction rub. No murmur heard. Pulmonary/Chest: Effort normal and breath sounds normal.   Abdominal: Soft, mild obesity  Musculoskeletal: LLE wrapped in clean and dry bandage, no edema. no edema RLE. Neurological: alert,oriented. Skin: Skin is warm and dry. Keloid scar at the top of the sternum about 2 inches (mole removal)  Psychiatric: normal mood and affect. Behavior is normal. Judgment and thought content normal.       ECG sinus rhythm HR 61 bpm    Assessment/Plan:       ICD-10-CM ICD-9-CM    1. Preop cardiovascular exam Z01.810 V72.81    2. 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 Coronary artery disease involving native coronary artery of native heart without angina pectoris I25.10 414.01 AMB POC EKG ROUTINE W/ 12 LEADS, INTER & REP   4. Essential hypertension I10 401.9 AMB POC EKG ROUTINE W/ 12 LEADS, INTER & REP   5. Paroxysmal atrial fibrillation (HCC) I48.0 427.31    6. Skin cancer of scalp C44.40 173.40    7. Bilateral leg edema R60.0 782. 3         Reviewed drug and side effects in detail. Highly recommend she does NOT take the Rythmol PRN, I discussed this with her and she agrees. Will start low dose beta blocker to help palpitations. Reviewed importance of taking her BP medications every morning and controlling the blood pressure. Discussed case with Dr Lilian Montana on Tuesday, she may proceed with surgery this coming Tuesday and she will follow up after for further evaluation to see if pacemaker would be need. Echo 5/2016 shows normal LVEF, she is compensated on exam, no acute s/s of CHF on exam. She has acceptable functional status and no further cardiac testing would be needed prior to surgery. Follow-up Disposition:  Return in about 4 weeks (around 3/23/2017).  or sooner depending on urgency of surgery    Thank you for involving me in this patient's care and please call with further concerns or questions. Cruzito Brasher M.D.   Electrophysiology/Cardiology  [facility] [place]  [facility] [address], [facility] [[email or web address]] [address] [place] [address]  [phone]

## 2017-02-23 NOTE — MR AVS SNAPSHOT
Visit Information Date & Time Provider Department Dept. Phone Encounter #  
 2/23/2017  9:40 AM Lilliam Stevenson MD [facility] Shannon Garrett [phone] [id number] Follow-up Instructions Return in about 4 weeks (around 3/23/2017). Your Appointments 2/28/2017  3:00 PM  
ROUTINE CARE with Priscila Stone MD  
[facility] - [facility] ([address]) Appt Note: 1mo fu  
 [address] [facility],[address] [facility] 7 [address]  
[phone]  
  
   
 [address] [place] [address],[address] [place] [id number] Upcoming Health Maintenance Date Due Pneumococcal 65+ High/Highest Risk (2 of 2 - PCV13) 9/11/2015 MEDICARE YEARLY EXAM 1/25/2017 DTaP/Tdap/Td series (1 - Tdap) 4/20/2017* GLAUCOMA SCREENING Q2Y 3/8/2018 *Topic was postponed. The date shown is not the original due date. Allergies as of 2/23/2017  Review Complete On: 2/23/2017 By: Lilliam Stevenson MD  
  
 Severity Noted Reaction Type Reactions Latex  10/10/2011    Rash Aspirin  10/10/2011   Side Effect Other (comments) Anything higher than 81mg makes pt jittery Codeine  10/10/2011    Hives, Itching Iodinated Contrast Media - Oral And Iv Dye  06/23/2011   Side Effect Itching Pcn [Penicillins]  10/10/2011    Hives, Itching Tape [Adhesive]  05/08/2014    Rash Current Immunizations  Reviewed on 12/12/2016 Name Date Influenza High Dose Vaccine PF 10/15/2016 Influenza Vaccine 9/11/2014 Influenza Vaccine Split 11/8/2012 Pneumococcal Polysaccharide (PPSV-23) 9/11/2014 Not reviewed this visit You Were Diagnosed With   
  
 Codes Comments Preop cardiovascular exam    -  Primary ICD-10-CM: Z01.810 ICD-9-CM: V72.81 Tachy-chino syndrome ([facility] [address]     ICD-10-CM: I49.5 ICD-9-CM: 427.81 Coronary artery disease involving native coronary artery of native heart without angina pectoris     ICD-10-CM: I25.10 ICD-9-CM: 414.01   
 Essential hypertension     ICD-10-CM: I10 
ICD-9-CM: 401.9 Paroxysmal atrial fibrillation (HCC)     ICD-10-CM: I48.0 ICD-9-CM: 427.31 Skin cancer of scalp     ICD-10-CM: C44.40 ICD-9-CM: 173.40 Bilateral leg edema     ICD-10-CM: R60.0 ICD-9-CM: 388. 3 Vitals BP  
  
  
  
  
  
 172/80 (BP 1 Location: Right arm, BP Patient Position: Sitting) Vitals History BMI and BSA Data Body Mass Index Body Surface Area  
 32.88 kg/m 2 1.84 m 2 Preferred Pharmacy Pharmacy Name Phone [phone] [phone] Your Updated Medication List  
  
   
This list is accurate as of: 2/23/17 11:05 AM.  Always use your most recent med list.  
  
  
  
  
 aspirin delayed-release 81 mg tablet Take  by mouth daily. atorvastatin 20 mg tablet Commonly known as:  LIPITOR Take 1 Tab by mouth daily. cloNIDine HCl 0.2 mg tablet Commonly known as:  CATAPRES Take 1 Tab by mouth two (2) times a day. fluticasone 50 mcg/actuation nasal spray Commonly known as:  Low Redo Administer to right and left nostril.  
  
 gabapentin 100 mg capsule Commonly known as:  NEURONTIN Take 1 Cap by mouth two (2) times a day. latanoprost 0.005 % ophthalmic solution Commonly known as:  Carlo Jumbo Administer 1 Drop to right eye nightly. meclizine 25 mg tablet Commonly known as:  ANTIVERT  
1 tab tid prn  
  
 metoprolol succinate 25 mg XL tablet Commonly known as:  TOPROL-XL Take 1 Tab by mouth daily. NORVASC 10 mg tablet Generic drug:  amLODIPine Take 5 mg by mouth daily. omeprazole 40 mg capsule Commonly known as:  PriLOSEC Take 1 Cap by mouth daily. potassium chloride 10 mEq tablet Commonly known as:  K-DUR, KLOR-CON Take 1 Tab by mouth daily. raNITIdine 150 mg tablet Commonly known as:  ZANTAC Take 150 mg by mouth two (2) times a day. traMADol 50 mg tablet Commonly known as:  ULTRAM  
Take 1 Tab by mouth every six (6) hours as needed for Pain. Max Daily Amount: 200 mg.  
  
 TYLENOL 325 mg tablet Generic drug:  acetaminophen Take 325 mg by mouth every four (4) hours as needed for Pain. WOMEN'S MULTIPLE VITAMINS PO Take  by mouth. Prescriptions Sent to Pharmacy Refills  
 metoprolol succinate (TOPROL-XL) 25 mg XL tablet 3 Sig: Take 1 Tab by mouth daily. Class: Normal  
 Pharmacy: [facility] [address] #: [phone] Route: Oral  
  
We Performed the Following AMB POC EKG ROUTINE W/ 12 LEADS, INTER & REP [87392 CPT(R)] Follow-up Instructions Return in about 4 weeks (around 3/23/2017). Patient Instructions Begin taking Toprol XL 25mg once daily. Do not take Rythmol. Return to see Dr. Any Cha in 1 month for pacemaker decision. Please provide this summary of care documentation to your next provider. Your primary care clinician is listed as Mac Huber. If you have any questions after today's visit, please call [phone].

## 2017-04-14 ENCOUNTER — OFFICE VISIT (OUTPATIENT)
Dept: CARDIOLOGY CLINIC | Age: 77
End: 2017-04-14

## 2017-04-14 VITALS
DIASTOLIC BLOOD PRESSURE: 80 MMHG | HEIGHT: 61 IN | HEART RATE: 64 BPM | WEIGHT: 173.2 LBS | SYSTOLIC BLOOD PRESSURE: 160 MMHG | RESPIRATION RATE: 16 BRPM | BODY MASS INDEX: 32.7 KG/M2

## 2017-04-14 DIAGNOSIS — I48.0 PAROXYSMAL ATRIAL FIBRILLATION (HCC): Primary | ICD-10-CM

## 2017-04-14 DIAGNOSIS — Z01.812 PRE-PROCEDURE LAB EXAM: ICD-10-CM

## 2017-04-14 DIAGNOSIS — I07.1 TRICUSPID VALVE INSUFFICIENCY, UNSPECIFIED ETIOLOGY: ICD-10-CM

## 2017-04-14 DIAGNOSIS — I10 HTN (HYPERTENSION), BENIGN: ICD-10-CM

## 2017-04-14 DIAGNOSIS — I25.10 CORONARY ARTERY DISEASE INVOLVING NATIVE CORONARY ARTERY OF NATIVE HEART WITHOUT ANGINA PECTORIS: ICD-10-CM

## 2017-04-14 DIAGNOSIS — I49.5 TACHY-BRADY SYNDROME (HCC): ICD-10-CM

## 2017-04-14 DIAGNOSIS — I10 ESSENTIAL HYPERTENSION: ICD-10-CM

## 2017-04-14 NOTE — PATIENT INSTRUCTIONS
Your implantable loop recorder procedure has been scheduled for 5/29/17 at 10:45am, at [address]    Please report to Admitting Department by 8:45am, or 2 hours prior to your scheduled procedure. Please bring a list of your current medications and medication bottles, if able, to the hospital on this day. You will need to have nothing to eat or drink after midnight, the night prior to your procedure. You may have small sips of water, if needed, to take with your medication. You will need labs drawn prior to your procedure. Please go to [facility] to have this done no sooner than 4/29/17 and no later than 5/20/17. You should not stop your medications prior to your scheduled procedure. After your procedure, you will need to follow up with Dr. Mary Villegas.  Your follow-up appointment has been scheduled for 6/21/17 at 11:40am.

## 2017-04-14 NOTE — PROGRESS NOTES
Cardiac Electrophysiology Office Note     Subjective: Rosi Hayden is a 64 y.o. 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 She had skin cancer removed from her scalp at [facility] and said she will have radiation therapy. She said at night she felt palpitations and she can use toprol low dose to avoid syncope with bradycardia  I had stopped the Rythmol because it could have been contributing to the black outs, I was concerned about intermittent bradycardia. Last time she was seen she felt faint and sat down then she broke out in a sweat. She was not taking the Rythmol at this time. Echo 5/2016 LVEF 60 % to 65 %. No RWMA. Mild concentric hypertrophy. Mild TR. External loop recorder 2/2017 showed no AFIB but mild sinus bradycardia and NSR    Past Hx:  The last event was New Years 2016.    holter 6/23/2014 sinus rhythm with minimum 39 bpm at 5 pm PVC's , mean HR 50 bpm while she is on medication  She was last seen 8/2014 and has missed several follow up appointments. She was seen last for follow up for syncope, she has passed out twice December and Jan 1. She went the ED at [facility] both times. The metoprolol was discontinued and she was started on lisinopril and clonidine for high BP. First episode 12/31/15 occurred while she was sitting on the stool in her kitchen, she started to feel tighten around her head then she LOC and fell off the stool. The next day she had a similar episode, she was doing things around the house and felt dizzy she sat down and put a cold compress on her head. No LOC.   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 Samaria Iraheta and had venous duplex without DVT. Echo with normal LVEF and inferior wall hypokinesis in 2014     Mother with stroke and HBP and heart disease death at age 77  Father with cancer at age 68  Sister with cancer  at age of 76  Current Outpatient Prescriptions   Medication Sig Dispense Refill    acetaminophen (TYLENOL) 325 mg tablet Take 325 mg by mouth every four (4) hours as needed for Pain.  metoprolol succinate (TOPROL-XL) 25 mg XL tablet Take 1 Tab by mouth daily. 30 Tab 3    amLODIPine (NORVASC) 10 mg tablet Take 5 mg by mouth daily.  traMADol (ULTRAM) 50 mg tablet Take 1 Tab by mouth every six (6) hours as needed for Pain. Max Daily Amount: 200 mg. 30 Tab 0    cloNIDine HCl (CATAPRES) 0.2 mg tablet Take 1 Tab by mouth two (2) times a day. 60 Tab 12    potassium chloride (K-DUR, KLOR-CON) 10 mEq tablet Take 1 Tab by mouth daily. 30 Tab 12    ranitidine (ZANTAC) 150 mg tablet Take 150 mg by mouth two (2) times a day.  latanoprost (XALATAN) 0.005 % ophthalmic solution Administer 1 Drop to right eye nightly.  atorvastatin (LIPITOR) 20 mg tablet Take 1 Tab by mouth daily. 30 Tab 6    gabapentin (NEURONTIN) 100 mg capsule Take 1 Cap by mouth two (2) times a day. 180 Cap 3    omeprazole (PRILOSEC) 40 mg capsule Take 1 Cap by mouth daily. 90 Cap 3    meclizine (ANTIVERT) 25 mg tablet 1 tab tid prn 270 Tab 3    MULTIVIT WITH CALCIUM,IRON,MIN (WOMEN'S MULTIPLE VITAMINS PO) Take  by mouth.  aspirin delayed-release 81 mg tablet Take  by mouth daily.  fluticasone (FLONASE) 50 mcg/actuation nasal spray Administer to right and left nostril. 1 Bottle 12     Allergies   Allergen Reactions    Latex Rash    Aspirin Other (comments)     Anything higher than 81mg makes pt jittery    Codeine Hives and Itching    Iodinated Contrast Media - Oral And Iv Dye Itching    Pcn [Penicillins] Hives and Itching    Tape [Adhesive] Rash     Past Medical History:   Diagnosis Date    Acute pharyngitis 2/8/2011    Allergic rhinitis, cause unspecified 2/8/2011    Arrhythmia     PVC    Asymptomatic varicose veins 2/8/2011    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     Carpal tunnel syndrome 2/8/2011    Degenerative Joint Disese ( improved/resolving) 2/8/2011    Degenetrative Dis Disease, 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    Diverticulosis of colon (without mention of hemorrhage) 2/8/2011    Dysphagia 2/8/2011    Edema, peripheral 2/8/2011    GERD (gastroesophageal reflux disease)     GERD Gastro- Esophageal Reflux Disease 2/8/2011    Herniated nucleus pulposus ( slipped disc) 2/8/2011    Menopausal 2/8/2011    PUD (peptic ulcer disease) 2012    Pure hypercholesterolemia 2/8/2011    Recurrent ear pain 2/8/2011    S/P radiation therapy     Scalp lesion 10/23/2014    Sebaceous cyst 4/22/2014    Unspecified cataract 2/8/2011    Unspecified essential hypertension 2/8/2011    Unspecified sleep apnea      Past Surgical History:   Procedure Laterality Date    HX HYSTERECTOMY      HX ORTHOPAEDIC  neck/back 2006    HX OTHER SURGICAL  5/8/2014     Excise recurrent scalp lesion and application of ACell    HX OTHER SURGICAL  5/8/2014    Excise keloid, anterior chest wall, and application of ACell    HX OTHER SURGICAL  5/8/2014     Excise sebaceous cyst, anterior chest wall    HX OTHER SURGICAL  5/8/2014    Punch biopsy, skin lesions, right forearm x2, right calf x1     Family History   Problem Relation Age of Onset    Hypertension Mother     Stroke Mother     Hypertension Father     Cancer Father      PROSTATE, MELANOMA    Anesth Problems Neg Hx      Social History Substance Use Topics    Smoking status: Never Smoker    Smokeless tobacco: Never Used    Alcohol use No        Review of Systems:   Constitutional: Negative for fever, chills, weight loss   HEENT: Negative for nosebleeds, vision changes. Respiratory: Negative for cough, hemoptysis, sputum production, and wheezing. Cardiovascular: Negative for orthopnea, claudication,  and PND. Gastrointestinal: Negative for constipation, blood in stool and melena. +GERD   Genitourinary: Negative for dysuria, and hematuria. Musculoskeletal: Negative for myalgias, + arthralgia. Skin: + skin discoloration or cancer on scalp  Heme: Does not bleed or bruise easily. Neurological: Negative for speech change and focal weakness + dizziness     Objective:     Visit Vitals    /80 (BP 1 Location: Left arm, BP Patient Position: Sitting)    Pulse 64    Resp 16    Ht 5' 1\" (1.549 m)    Wt 173 lb 3.2 oz (78.6 kg)    BMI 32.73 kg/m2      Physical Exam:   Constitutional: Well-nourished. No distress. scarf on her head. Head: Normocephalic and atraumatic. Eyes: Pupils are equal, round  Neck: supple. No JVD present. Cardiovascular: normal rate, regular rhythm and normal heart sounds. Exam reveals no gallop and no friction rub. No murmur heard. Pulmonary/Chest: Effort normal and breath sounds normal.   Abdominal: Soft, mild obesity  Musculoskeletal: LLE wrapped in clean and dry bandage   Neurological: alert,oriented. Skin: Skin is warm and dry. Keloid scar at the top of the sternum about 2 inches (mole removal)  Psychiatric: normal mood and affect. Behavior is normal. Judgment and thought content normal.         Assessment/Plan:       ICD-10-CM ICD-9-CM    1. Paroxysmal atrial fibrillation (HCC) I48.0 427.31    2. 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 Essential hypertension I10 401.9    4. Coronary artery disease involving native coronary artery of native heart without angina pectoris I25.10 414.01    5. Tricuspid valve insufficiency, unspecified etiology I07.1 397.0    6. HTN (hypertension), benign I10 401.1         Reviewed drug and side effects in detail. Low dose beta blocker is not enough to help palpitations. She and I talked about pacemaker but I need more evidence of PAF and external loop did not   I recommend implantable loop recorder to sort out need of more medications, indication for pacer and anticoagulant  She is getting radiation therapy. Thank you for involving me in this patient's care and please call with further concerns or questions. Filbert Kayser, M.D.   Electrophysiology/Cardiology  [facility] [place]  [address] [facility], [address]  [phone]

## 2017-04-14 NOTE — MR AVS SNAPSHOT
Visit Information Date & Time Provider Department Dept. Phone Encounter #  
 4/14/2017  9:20 AM Mable Cook MD [facility] Dar Li [phone] [id number] Your Appointments 4/27/2017  2:15 PM  
ROUTINE CARE with Alex Cuellar MD  
[facility] - [address] [facility] ([address]) Appt Note: check up [facility] [address] [address]  
[phone]  
  
   
 [address] [place] [address] Upcoming Health Maintenance Date Due Pneumococcal 65+ High/Highest Risk (2 of 2 - PCV13) 9/11/2015 MEDICARE YEARLY EXAM 1/25/2017 DTaP/Tdap/Td series (1 - Tdap) 4/20/2017* GLAUCOMA SCREENING Q2Y 3/8/2018 *Topic was postponed. The date shown is not the original due date. Allergies as of 4/14/2017  Review Complete On: 4/14/2017 By: Mable Cook MD  
  
 Severity Noted Reaction Type Reactions Latex  10/10/2011    Rash Aspirin  10/10/2011   Side Effect Other (comments) Anything higher than 81mg makes pt jittery Codeine  10/10/2011    Hives, Itching Iodinated Contrast Media - Oral And Iv Dye  06/23/2011   Side Effect Itching Pcn [Penicillins]  10/10/2011    Hives, Itching Tape [Adhesive]  05/08/2014    Rash Current Immunizations  Reviewed on 12/12/2016 Name Date Influenza High Dose Vaccine PF 10/15/2016 Influenza Vaccine 9/11/2014 Influenza Vaccine Split 11/8/2012 Pneumococcal Polysaccharide (PPSV-23) 9/11/2014 Not reviewed this visit You Were Diagnosed With   
  
 Codes Comments Paroxysmal atrial fibrillation (HCC)    -  Primary ICD-10-CM: I48.0 ICD-9-CM: 427.31 Tachy-chino syndrome ([facility] [address]     ICD-10-CM: I49.5 ICD-9-CM: 427.81 Essential hypertension     ICD-10-CM: I10 
ICD-9-CM: 401.9 Coronary artery disease involving native coronary artery of native heart without angina pectoris     ICD-10-CM: I25.10 ICD-9-CM: 414.01   
 Tricuspid valve insufficiency, unspecified etiology     ICD-10-CM: I07.1 ICD-9-CM: 397.0   
 HTN (hypertension), benign     ICD-10-CM: I10 
ICD-9-CM: 401.1 Pre-procedure lab exam     ICD-10-CM: P83.977 ICD-9-CM: V72.63 Vitals BP Pulse Resp Height(growth percentile) Weight(growth percentile) BMI  
 160/80 (BP 1 Location: Left arm, BP Patient Position: Sitting) 64 16 5' 1\" (1.549 m) 173 lb 3.2 oz (78.6 kg) 32.73 kg/m2 OB Status Smoking Status Hysterectomy Never Smoker Vitals History BMI and BSA Data Body Mass Index Body Surface Area 32.73 kg/m 2 1.84 m 2 Preferred Pharmacy Pharmacy Name Phone [facility] [phone] [place] [address] [phone] Your Updated Medication List  
  
   
This list is accurate as of: 4/14/17 10:18 AM.  Always use your most recent med list.  
  
  
  
  
 aspirin delayed-release 81 mg tablet Take  by mouth daily. atorvastatin 20 mg tablet Commonly known as:  LIPITOR Take 1 Tab by mouth daily. cloNIDine HCl 0.2 mg tablet Commonly known as:  CATAPRES Take 1 Tab by mouth two (2) times a day. fluticasone 50 mcg/actuation nasal spray Commonly known as:  Wilda Earnest Administer to right and left nostril.  
  
 gabapentin 100 mg capsule Commonly known as:  NEURONTIN Take 1 Cap by mouth two (2) times a day. latanoprost 0.005 % ophthalmic solution Commonly known as:  Jadine Bjornstad Administer 1 Drop to right eye nightly. meclizine 25 mg tablet Commonly known as:  ANTIVERT  
1 tab tid prn  
  
 metoprolol succinate 25 mg XL tablet Commonly known as:  TOPROL-XL Take 1 Tab by mouth daily. NORVASC 10 mg tablet Generic drug:  amLODIPine Take 5 mg by mouth daily. omeprazole 40 mg capsule Commonly known as:  PriLOSEC Take 1 Cap by mouth daily. potassium chloride 10 mEq tablet Commonly known as:  K-DUR, KLOR-CON Take 1 Tab by mouth daily. raNITIdine 150 mg tablet Commonly known as:  ZANTAC Take 150 mg by mouth two (2) times a day. traMADol 50 mg tablet Commonly known as:  ULTRAM  
Take 1 Tab by mouth every six (6) hours as needed for Pain. Max Daily Amount: 200 mg.  
  
 TYLENOL 325 mg tablet Generic drug:  acetaminophen Take 325 mg by mouth every four (4) hours as needed for Pain. WOMEN'S MULTIPLE VITAMINS PO Take  by mouth. We Performed the Following CBC WITH AUTOMATED DIFF [54900 CPT(R)] METABOLIC PANEL, BASIC [59848 CPT(R)] Patient Instructions Your implantable loop recorder procedure has been scheduled for 5/29/17 at 10:45am, at [facility]. 
 
Please report to Admitting Department by 8:45am, or 2 hours prior to your scheduled procedure. Please bring a list of your current medications and medication bottles, if able, to the hospital on this day. You will need to have nothing to eat or drink after midnight, the night prior to your procedure. You may have small sips of water, if needed, to take with your medication. You will need labs drawn prior to your procedure. Please go to [facility] to have this done no sooner than 4/29/17 and no later than 5/20/17. You should not stop your medications prior to your scheduled procedure. After your procedure, you will need to follow up with Dr. Morelia Edwards. Your follow-up appointment has been scheduled for 6/21/17 at 11:40am.  
 
 
 
  
 Please provide this summary of care documentation to your next provider. Your primary care clinician is listed as Sonido Medrano. If you have any questions after today's visit, please call [phone].

## 2017-04-17 ENCOUNTER — TELEPHONE (OUTPATIENT)
Dept: CARDIOLOGY CLINIC | Age: 77
End: 2017-04-17

## 2017-04-17 NOTE — LETTER
4/17/2017 3:34 PM 
 
Ms. Jaycee Barroso [address] [facility] [phone] Your implantable loop recorder procedure has been rescheduled for 6/6/17 at 1:00pm, at [address]. 
 
Please report to Admitting Department by 11:00am, or 2 hours prior to your scheduled procedure. Please bring a list of your current medications and medication bottles, if able, to the hospital on this day. You will need to have nothing to eat or drink after midnight, the night prior to your procedure. You may have small sips of water, if needed, to take with your medication. You will need labs drawn prior to your procedure. Please go to [facility] to have this done no sooner than 5/3/17 and no later than 6/1/17. You should not stop your medications prior to your scheduled procedure. After your procedure, you will need to follow up with Dr. Timothy Robles.  Your follow-up appointment has been scheduled for 6/21/17 at 11:40am.  
 
 
 
 
 
 
 
 
 
Sincerely, 
 
 
Collin Dumont MD

## 2017-04-17 NOTE — TELEPHONE ENCOUNTER
Procedure date changed due to holiday. Changed procedure to 6/6/17 at 1:00pm. Notified patient of this change and mailed letter to patient with change.

## 2017-04-28 ENCOUNTER — OFFICE VISIT (OUTPATIENT)
Dept: INTERNAL MEDICINE CLINIC | Age: 77
End: 2017-04-28

## 2017-04-28 VITALS
HEART RATE: 74 BPM | DIASTOLIC BLOOD PRESSURE: 80 MMHG | RESPIRATION RATE: 18 BRPM | BODY MASS INDEX: 32.28 KG/M2 | TEMPERATURE: 98.1 F | WEIGHT: 171 LBS | OXYGEN SATURATION: 99 % | HEIGHT: 61 IN | SYSTOLIC BLOOD PRESSURE: 160 MMHG

## 2017-04-28 DIAGNOSIS — Z00.00 MEDICARE ANNUAL WELLNESS VISIT, SUBSEQUENT: ICD-10-CM

## 2017-04-28 DIAGNOSIS — I25.10 CORONARY ARTERY DISEASE INVOLVING NATIVE CORONARY ARTERY OF NATIVE HEART WITHOUT ANGINA PECTORIS: ICD-10-CM

## 2017-04-28 DIAGNOSIS — E78.00 PURE HYPERCHOLESTEROLEMIA: Primary | ICD-10-CM

## 2017-04-28 DIAGNOSIS — I10 ESSENTIAL HYPERTENSION: ICD-10-CM

## 2017-04-28 DIAGNOSIS — R60.0 LOCALIZED EDEMA: ICD-10-CM

## 2017-04-28 LAB
CHOLEST SERPL-MCNC: 176 MG/DL
HDLC SERPL-MCNC: 69 MG/DL
LDL CHOLESTEROL POC: 76
NON-HDL GOAL (POC): 107
TCHOL/HDL RATIO (POC): 2.5
TRIGL SERPL-MCNC: 153 MG/DL

## 2017-04-28 NOTE — PROGRESS NOTES
Stephanie Hutchinson is a 64 y.o. female and presents with Hypertension; Coronary Artery Disease; and Annual Wellness Visit  . Subjective:  Hypertension Review:  The patient has essential hypertension  Diet and Lifestyle: generally follows a  low sodium diet, exercises sporadically  Home BP Monitoring: is not measured at home. Pertinent ROS: taking medications as instructed, no medication side effects noted, no TIA's, no chest pain on exertion, no dyspnea on exertion, no swelling of ankles. Coronary Disease Review:  Patient complains of no chest pain today. There has not been the need to use NTG. Previous cardiac testing has included: Electrocardiogram (EKG), Echocardiogram,. States her wound on her scalp continues to heal.      Stephanie Hutchinson is a 64 y.o. female and presents for annual Medicare Wellness Visit. Problem List: Reviewed with patient and discussed risk factors.     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    Carpal tunnel syndrome G56.00    Unspecified cataract H26.9    GERD Gastro- Esophageal Reflux Disease K21.9    Degenerative Joint Disese ( improved/resolving) M19.90    Menopausal N95.1    Asymptomatic varicose veins I83.90    Sebaceous cyst L72.3    Keloid L91.0    Scalp lesion L98.9    Abnormal nuclear stress test R94.39    Syncope R55    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    CAD (coronary artery disease) I25.10       Current medical providers:  Patient Care Team:  Sony Lopez MD as PCP - General (Internal Medicine)  Marilu Grace MD (Cardiology)    PSH: Reviewed with patient  Past Surgical History:   Procedure Laterality Date    HX HYSTERECTOMY      HX ORTHOPAEDIC  neck/back 2006    HX OTHER SURGICAL  5/8/2014     Excise recurrent scalp lesion and application of ACell    HX OTHER SURGICAL  5/8/2014    Excise keloid, anterior chest wall, and application of ACell    HX OTHER SURGICAL  5/8/2014     Excise sebaceous cyst, anterior chest wall    HX OTHER SURGICAL  5/8/2014    Punch biopsy, skin lesions, right forearm x2, right calf x1        SH: Reviewed with patient  Social History   Substance Use Topics    Smoking status: Never Smoker    Smokeless tobacco: Never Used    Alcohol use No       FH: Reviewed with patient  Family History   Problem Relation Age of Onset    Hypertension Mother     Stroke Mother     Hypertension Father     Cancer Father      PROSTATE, MELANOMA    Anesth Problems Neg Hx        Medications/Allergies: Reviewed with patient  Current Outpatient Prescriptions on File Prior to Visit   Medication Sig Dispense Refill    acetaminophen (TYLENOL) 325 mg tablet Take 325 mg by mouth every four (4) hours as needed for Pain.  metoprolol succinate (TOPROL-XL) 25 mg XL tablet Take 1 Tab by mouth daily. 30 Tab 3    amLODIPine (NORVASC) 10 mg tablet Take 5 mg by mouth daily.  cloNIDine HCl (CATAPRES) 0.2 mg tablet Take 1 Tab by mouth two (2) times a day. 60 Tab 12    potassium chloride (K-DUR, KLOR-CON) 10 mEq tablet Take 1 Tab by mouth daily. 30 Tab 12    ranitidine (ZANTAC) 150 mg tablet Take 150 mg by mouth two (2) times a day.  latanoprost (XALATAN) 0.005 % ophthalmic solution Administer 1 Drop to right eye nightly.  atorvastatin (LIPITOR) 20 mg tablet Take 1 Tab by mouth daily. 30 Tab 6    gabapentin (NEURONTIN) 100 mg capsule Take 1 Cap by mouth two (2) times a day. 180 Cap 3    omeprazole (PRILOSEC) 40 mg capsule Take 1 Cap by mouth daily.  90 Cap 3    meclizine (ANTIVERT) 25 mg tablet 1 tab tid prn 270 Tab 3    MULTIVIT WITH CALCIUM,IRON,MIN [facility] MULTIPLE VITAMINS PO) Take  by mouth.  aspirin delayed-release 81 mg tablet Take  by mouth daily.  fluticasone (FLONASE) 50 mcg/actuation nasal spray Administer to right and left nostril. 1 Bottle 12     No current facility-administered medications on file prior to visit. Allergies   Allergen Reactions    Latex Rash    Aspirin Other (comments)     Anything higher than 81mg makes pt jittery    Codeine Hives and Itching    Iodinated Contrast Media - Oral And Iv Dye Itching    Pcn [Penicillins] Hives and Itching    Tape [Adhesive] Rash       Objective:  Visit Vitals    /80    Pulse 74    Temp 98.1 °F (36.7 °C) (Oral)    Resp 18    Ht 5' 1\" (1.549 m)    Wt 171 lb (77.6 kg)    SpO2 99%    BMI 32.31 kg/m2    Body mass index is 32.31 kg/(m^2). Assessment of cognitive impairment: Alert and oriented x 3    Depression Screen:   PHQ 2 / 9, over the last two weeks 4/28/2017   Little interest or pleasure in doing things Not at all   Feeling down, depressed or hopeless Not at all   Total Score PHQ 2 0       Fall Risk Assessment:    Fall Risk Assessment, last 12 mths 4/28/2017   Able to walk? Yes   Fall in past 12 months? No       Functional Ability:   Does the patient exhibit a steady gait? yes   How long did it take the patient to get up and walk from a sitting position? seconds   Is the patient self reliant?  (ie can do own laundry, meals, household chores)  yes     Does the patient handle his/her own medications? yes     Does the patient handle his/her own money? yes     Is the patients home safe (ie good lighting, handrails on stairs and bath, etc.)? yes     Did you notice or did patient express any hearing difficulties? no     Did you notice or did patient express any vision difficulties?   no     Were distance and reading eye charts used?   no       Advance Care Planning:   Patient was offered the opportunity to discuss advance care planning:  yes     Does patient have an Advance Directive:  no   If no, did you provide information on Caring Connections? yes       Plan:      No orders of the defined types were placed in this encounter. Health Maintenance   Topic Date Due    DTaP/Tdap/Td series (1 - Tdap) 12/21/1959    Pneumococcal 65+ High/Highest Risk (2 of 2 - PCV13) 09/11/2015    GLAUCOMA SCREENING Q2Y  03/08/2018    MEDICARE YEARLY EXAM  04/29/2018    OSTEOPOROSIS SCREENING (DEXA)  Completed    ZOSTER VACCINE AGE 60>  Addressed    INFLUENZA AGE 9 TO ADULT  Completed       *Patient verbalized understanding and agreement with the plan. A copy of the After Visit Summary with personalized health plan was given to the patient today. Review of Systems  Constitutional: negative for fevers, chills, anorexia and weight loss  Eyes:   negative for visual disturbance and irritation  ENT:   negative for tinnitus,sore throat,nasal congestion,ear pains. hoarseness  Respiratory:  negative for cough, hemoptysis, dyspnea,wheezing  CV:   negative for chest pain, palpitations, lower extremity edema  GI:   negative for nausea, vomiting, diarrhea, abdominal pain,melena  Endo:               negative for polyuria,polydipsia,polyphagia,heat intolerance  Genitourinary: negative for frequency, dysuria and hematuria  Integument:  negative for rash and pruritus  Hematologic:  negative for easy bruising and gum/nose bleeding  Musculoskel: negative for myalgias, arthralgias, back pain, muscle weakness, joint pain  Neurological:  negative for headaches, dizziness, vertigo, memory problems and gait   Behavl/Psych: negative for feelings of anxiety, depression, mood changes    Past Medical History:   Diagnosis Date    Acute pharyngitis 2/8/2011    Allergic rhinitis, cause unspecified 2/8/2011    Arrhythmia     PVC    Asymptomatic varicose veins 2/8/2011    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     Carpal tunnel syndrome 2/8/2011    Degenerative Joint Disese ( improved/resolving) 2/8/2011    Degenetrative Dis Disease, 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    Diverticulosis of colon (without mention of hemorrhage) 2/8/2011    Dysphagia 2/8/2011    Edema, peripheral 2/8/2011    GERD (gastroesophageal reflux disease)     GERD Gastro- Esophageal Reflux Disease 2/8/2011    Herniated nucleus pulposus ( slipped disc) 2/8/2011    Menopausal 2/8/2011    PUD (peptic ulcer disease) 2012    Pure hypercholesterolemia 2/8/2011    Recurrent ear pain 2/8/2011    S/P radiation therapy     Scalp lesion 10/23/2014    Sebaceous cyst 4/22/2014    Unspecified cataract 2/8/2011    Unspecified essential hypertension 2/8/2011    Unspecified sleep apnea      Past Surgical History:   Procedure Laterality Date    HX HYSTERECTOMY      HX ORTHOPAEDIC  neck/back 2006    HX OTHER SURGICAL  5/8/2014     Excise recurrent scalp lesion and application of ACell    HX OTHER SURGICAL  5/8/2014    Excise keloid, anterior chest wall, and application of ACell    HX OTHER SURGICAL  5/8/2014     Excise sebaceous cyst, anterior chest wall    HX OTHER SURGICAL  5/8/2014    Punch biopsy, skin lesions, right forearm x2, right calf x1     Social History     Social History    Marital status:      Spouse name: N/A    Number of children: N/A    Years of education: N/A     Social History Main Topics    Smoking status: Never Smoker    Smokeless tobacco: Never Used    Alcohol use No    Drug use: No    Sexual activity: Yes     Partners: Male     Birth control/ protection: None     Other Topics Concern    None     Social History Narrative     Family History   Problem Relation Age of Onset    Hypertension Mother     Stroke Mother     Hypertension Father     Cancer Father      PROSTATE, MELANOMA    Anesth Problems Neg Hx      Current Outpatient Prescriptions   Medication Sig Dispense Refill    acetaminophen (TYLENOL) 325 mg tablet Take 325 mg by mouth every four (4) hours as needed for Pain.       metoprolol succinate (TOPROL-XL) 25 mg XL tablet Take 1 Tab by mouth daily. 30 Tab 3    amLODIPine (NORVASC) 10 mg tablet Take 5 mg by mouth daily.  cloNIDine HCl (CATAPRES) 0.2 mg tablet Take 1 Tab by mouth two (2) times a day. 60 Tab 12    potassium chloride (K-DUR, KLOR-CON) 10 mEq tablet Take 1 Tab by mouth daily. 30 Tab 12    ranitidine (ZANTAC) 150 mg tablet Take 150 mg by mouth two (2) times a day.  latanoprost (XALATAN) 0.005 % ophthalmic solution Administer 1 Drop to right eye nightly.  atorvastatin (LIPITOR) 20 mg tablet Take 1 Tab by mouth daily. 30 Tab 6    gabapentin (NEURONTIN) 100 mg capsule Take 1 Cap by mouth two (2) times a day. 180 Cap 3    omeprazole (PRILOSEC) 40 mg capsule Take 1 Cap by mouth daily. 90 Cap 3    meclizine (ANTIVERT) 25 mg tablet 1 tab tid prn 270 Tab 3    MULTIVIT WITH CALCIUM,IRON,MIN (WOMEN'S MULTIPLE VITAMINS PO) Take  by mouth.  aspirin delayed-release 81 mg tablet Take  by mouth daily.  fluticasone (FLONASE) 50 mcg/actuation nasal spray Administer to right and left nostril.  1 Bottle 12     Allergies   Allergen Reactions    Latex Rash    Aspirin Other (comments)     Anything higher than 81mg makes pt jittery    Codeine Hives and Itching    Iodinated Contrast Media - Oral And Iv Dye Itching    Pcn [Penicillins] Hives and Itching    Tape [Adhesive] Rash       Objective:  Visit Vitals    /80    Pulse 74    Temp 98.1 °F (36.7 °C) (Oral)    Resp 18    Ht 5' 1\" (1.549 m)    Wt 171 lb (77.6 kg)    SpO2 99%    BMI 32.31 kg/m2     Physical Exam:   General appearance - alert, well appearing, and in no distress  Mental status - alert, oriented to person, place, and time  EYE-DARLENE, EOMI, corneas normal, no foreign bodies  ENT-ENT exam normal, no neck nodes or sinus tenderness  Nose - normal and patent, no erythema, discharge or polyps  Mouth - mucous membranes moist, pharynx normal without lesions  Neck - supple, no significant adenopathy   Chest - clear to auscultation, no wheezes, rales or rhonchi, symmetric air entry   Heart - normal rate, regular rhythm, normal S1, S2, no murmurs, rubs, clicks or gallops   Abdomen - soft, nontender, nondistended, no masses or organomegaly  Lymph- no adenopathy palpable  Ext-peripheral pulses normal, no pedal edema, no clubbing or cyanosis  Skin-Warm and dry. no hyperpigmentation, vitiligo, or suspicious lesions  Neuro -alert, oriented, normal speech, no focal findings or movement disorder noted  Neck-normal C-spine, no tenderness, full ROM without pain  Feet-no nail deformities or callus formation with good pulses noted  Lt.left lower wrapped     Results for orders placed or performed in visit on 11/08/16   AMB POC LIPID PROFILE   Result Value Ref Range    Cholesterol (POC) 172     Triglycerides (POC) 94     HDL Cholesterol (POC) 79     LDL Cholesterol (POC) 74     Non-HDL Goal (POC) 93     TChol/HDL Ratio (POC) 2.2        Assessment/Plan:    ICD-10-CM ICD-9-CM    1. Pure hypercholesterolemia E78.00 272.0    2. Essential hypertension I10 401.9    3. Coronary artery disease involving native coronary artery of native heart without angina pectoris I25.10 414.01    4. Localized edema R60.0 782.3    5. Medicare annual wellness visit, subsequent Z00.00 V70.0      No orders of the defined types were placed in this encounter. lose weight, increase physical activity, follow low fat diet, follow low salt diet  Patient Instructions   [employer] Activation    Thank you for requesting access to [employer]. Please follow the instructions below to securely access and download your online medical record. [employer] allows you to send messages to your doctor, view your test results, renew your prescriptions, schedule appointments, and more. How Do I Sign Up? 1. In your internet browser, go to [email or web address]  2. Click on the First Time User? Click Here link in the Sign In box. You will be redirect to the New Member Sign Up page.   3. Enter your [employer] Access Code exactly as it appears below. You will not need to use this code after youve completed the sign-up process. If you do not sign up before the expiration date, you must request a new code. [facility] [facility] Access Code: Activation code not generated  Current [facility] Status: Patient Declined (This is the date your [employer] access code will )    4. Enter the last four digits of your Social Security Number (xxxx) and Date of Birth (mm/dd/yyyy) as indicated and click Submit. You will be taken to the next sign-up page. 5. Create a D[employer]t ID. This will be your [facility] [facility] login ID and cannot be changed, so think of one that is secure and easy to remember. 6. Create a Derm[employer] password. You can change your password at any time. 7. Enter your Password Reset Question and Answer. This can be used at a later time if you forget your password. 8. Enter your e-mail address. You will receive e-mail notification when new information is available in [address] 9. Click Sign Up. You can now view and download portions of your medical record. 10. Click the Download Summary menu link to download a portable copy of your medical information. Additional Information    If you have questions, please visit the Frequently Asked Questions section of the [facility] [facility] website at [email or web address] Remember, [employer] is NOT to be used for urgent needs. For medical emergencies, dial 911. Follow-up Disposition:  Return in about 3 months (around 2017), or if symptoms worsen or fail to improve. I have reviewed with the patient details of the assessment and plan and all questions were answered. Relevent patient education was performed    An After Visit Summary was printed and given to the patient.

## 2017-04-28 NOTE — MR AVS SNAPSHOT
Visit Information Date & Time Provider Department Dept. Phone Encounter #  
 4/28/2017  8:15 AM Coleman Salcedo MD [address] [place] [address] [phone] [id number] Follow-up Instructions Return in about 3 months (around 7/28/2017), or if symptoms worsen or fail to improve. Your Appointments 6/21/2017 11:40 AM  
ESTABLISHED PATIENT with Annalee Pineda MD  
[facility] Appt Note: 2 week ILR wound check [address] [place] [address]  
[address] [id number] Upcoming Health Maintenance Date Due DTaP/Tdap/Td series (1 - Tdap) 12/21/1959 Pneumococcal 65+ High/Highest Risk (2 of 2 - PCV13) 9/11/2015 GLAUCOMA SCREENING Q2Y 3/8/2018 MEDICARE YEARLY EXAM 4/29/2018 Allergies as of 4/28/2017  Review Complete On: 4/28/2017 By: Anup Jose LPN Severity Noted Reaction Type Reactions Latex  10/10/2011    Rash Aspirin  10/10/2011   Side Effect Other (comments) Anything higher than 81mg makes pt jittery Codeine  10/10/2011    Hives, Itching Iodinated Contrast Media - Oral And Iv Dye  06/23/2011   Side Effect Itching Pcn [Penicillins]  10/10/2011    Hives, Itching Tape [Adhesive]  05/08/2014    Rash Current Immunizations  Reviewed on 12/12/2016 Name Date Influenza High Dose Vaccine PF 10/15/2016 Influenza Vaccine 9/11/2014 Influenza Vaccine Split 11/8/2012 Pneumococcal Polysaccharide (PPSV-23) 9/11/2014 Not reviewed this visit You Were Diagnosed With   
  
 Codes Comments Pure hypercholesterolemia    -  Primary ICD-10-CM: E78.00 ICD-9-CM: 272.0 Essential hypertension     ICD-10-CM: I10 
ICD-9-CM: 401.9 Coronary artery disease involving native coronary artery of native heart without angina pectoris     ICD-10-CM: I25.10 ICD-9-CM: 414.01   
 Localized edema     ICD-10-CM: R60.0 ICD-9-CM: 665. 3 Medicare annual wellness visit, subsequent     ICD-10-CM: Z00.00 ICD-9-CM: V70.0 Vitals BP Pulse Temp Resp Height(growth percentile) Weight(growth percentile) 160/80 74 98.1 °F (36.7 °C) (Oral) 18 5' 1\" (1.549 m) 171 lb (77.6 kg) SpO2 BMI OB Status Smoking Status 99% 32.31 kg/m2 Hysterectomy Never Smoker Vitals History BMI and BSA Data Body Mass Index Body Surface Area  
 32.31 kg/m 2 1.83 m 2 Preferred Pharmacy Pharmacy Name Phone [facility] [phone] [facility] Trevon Jeong [facility] [phone] Your Updated Medication List  
  
   
This list is accurate as of: 4/28/17  9:15 AM.  Always use your most recent med list.  
  
  
  
  
 aspirin delayed-release 81 mg tablet Take  by mouth daily. atorvastatin 20 mg tablet Commonly known as:  LIPITOR Take 1 Tab by mouth daily. cloNIDine HCl 0.2 mg tablet Commonly known as:  CATAPRES Take 1 Tab by mouth two (2) times a day. fluticasone 50 mcg/actuation nasal spray Commonly known as:  Edman Shaver Administer to right and left nostril.  
  
 gabapentin 100 mg capsule Commonly known as:  NEURONTIN Take 1 Cap by mouth two (2) times a day. latanoprost 0.005 % ophthalmic solution Commonly known as:  Jeanette Pines Administer 1 Drop to right eye nightly. meclizine 25 mg tablet Commonly known as:  ANTIVERT  
1 tab tid prn  
  
 metoprolol succinate 25 mg XL tablet Commonly known as:  TOPROL-XL Take 1 Tab by mouth daily. NORVASC 10 mg tablet Generic drug:  amLODIPine Take 5 mg by mouth daily. omeprazole 40 mg capsule Commonly known as:  PriLOSEC Take 1 Cap by mouth daily. potassium chloride 10 mEq tablet Commonly known as:  KLOR-CON Take 1 Tab by mouth daily. raNITIdine 150 mg tablet Commonly known as:  ZANTAC Take 150 mg by mouth two (2) times a day. TYLENOL 325 mg tablet Generic drug:  acetaminophen Take 325 mg by mouth every four (4) hours as needed for Pain. WOMEN'S MULTIPLE VITAMINS PO Take  by mouth. We Performed the Following AMB POC LIPID PROFILE [29394 CPT(R)] Follow-up Instructions Return in about 3 months (around 7/28/2017), or if symptoms worsen or fail to improve. To-Do List   
 06/06/2017 12:00 PM  
  Appointment with CATH ROOM 3 [facility] at [address] [place] [address] ([phone]) NPO AFTER MIDNIGHT! ROUTINE CASES:  Please arrive 2 hour prior to your scheduled appointment time. If your scheduled appointment is for 0730, 0800, 0815, please arrive by 0645. NON ROUTINE CASES:  PATIENTS WHO REQUIRE LABS, X-RAY, EKG, or MEDS:  PLEASE ARRIVE 3 HOURS PRIOR TO YOUR SCHEDULED APPOINTMENT. If you require hydration prior to your procedure, PLEASE ARRIVE 4 HOURS PRIOR TO YOUR APPOINTMENT  **** IT IS THE OFFICE SCHEDULARS RESPONSBILITY TO NOTIFY THE CATH LAB SCHEDULAR IF THE PATIENT WILL REQUIRE ANY ADDITIONAL TIME FOR PREP FROM ROUTINE CASE ***** Patient Instructions [employer] Activation Thank you for requesting access to [employer]. Please follow the instructions below to securely access and download your online medical record. [employer] allows you to send messages to your doctor, view your test results, renew your prescriptions, schedule appointments, and more. How Do I Sign Up? 1. In your internet browser, go to [email or web address] 
2. Click on the First Time User? Click Here link in the Sign In box. You will be redirect to the New Member Sign Up page. 3. Enter your [employer] Access Code exactly as it appears below. You will not need to use this code after youve completed the sign-up process. If you do not sign up before the expiration date, you must request a new code. [employer] Access Code: Activation code not generated Current [employer] Status: Patient Declined (This is the date your [employer] access code will ) 4. Enter the last four digits of your Social Security Number (xxxx) and Date of Birth (mm/dd/yyyy) as indicated and click Submit. You will be taken to the next sign-up page. 5. Create a [employer] ID. This will be your [employer] login ID and cannot be changed, so think of one that is secure and easy to remember. 6. Create a [employer] password. You can change your password at any time. 7. Enter your Password Reset Question and Answer. This can be used at a later time if you forget your password. 8. Enter your e-mail address. You will receive e-mail notification when new information is available in [address] 9. Click Sign Up. You can now view and download portions of your medical record. 10. Click the Download Summary menu link to download a portable copy of your medical information. Additional Information If you have questions, please visit the Frequently Asked Questions section of the [employer] website at [email or web address] ([email or web address] [email or web address] Remember, [employer] is NOT to be used for urgent needs. For medical emergencies, dial 911. Please provide this summary of care documentation to your next provider. Your primary care clinician is listed as Colonel Messenger. If you have any questions after today's visit, please call [phone].

## 2017-04-28 NOTE — PATIENT INSTRUCTIONS
[email or web address] Activation    Thank you for requesting access to [employer]. Please follow the instructions below to securely access and download your online medical record. [employer] allows you to send messages to your doctor, view your test results, renew your prescriptions, schedule appointments, and more. How Do I Sign Up? 1. In your internet browser, go to [email or web address]  2. Click on the First Time User? Click Here link in the Sign In box. You will be redirect to the New Member Sign Up page. 3. Enter your [employer] Access Code exactly as it appears below. You will not need to use this code after youve completed the sign-up process. If you do not sign up before the expiration date, you must request a new code. [employer] Access Code: Activation code not generated  Current [employer] Status: Patient Declined (This is the date your [employer] access code will )    4. Enter the last four digits of your Social Security Number (xxxx) and Date of Birth (mm/dd/yyyy) as indicated and click Submit. You will be taken to the next sign-up page. 5. Create a [employer] ID. This will be your [employer] login ID and cannot be changed, so think of one that is secure and easy to remember. 6. Create a [employer] password. You can change your password at any time. 7. Enter your Password Reset Question and Answer. This can be used at a later time if you forget your password. 8. Enter your e-mail address. You will receive e-mail notification when new information is available in [address] 9. Click Sign Up. You can now view and download portions of your medical record. 10. Click the Download Summary menu link to download a portable copy of your medical information. Additional Information    If you have questions, please visit the Frequently Asked Questions section of the [employer] website at [email or web address] Remember, [employer] is NOT to be used for urgent needs. For medical emergencies, dial 911.

## 2017-05-19 DIAGNOSIS — I10 ESSENTIAL HYPERTENSION: ICD-10-CM

## 2017-05-19 DIAGNOSIS — E78.00 PURE HYPERCHOLESTEROLEMIA: ICD-10-CM

## 2017-05-20 LAB
BASOPHILS # BLD AUTO: 0 X10E3/UL (ref 0–0.2)
BASOPHILS NFR BLD AUTO: 1 %
BUN SERPL-MCNC: 25 MG/DL (ref 8–27)
BUN/CREAT SERPL: 25 (ref 12–28)
CALCIUM SERPL-MCNC: 9 MG/DL (ref 8.7–10.3)
CHLORIDE SERPL-SCNC: 106 MMOL/L (ref 96–106)
CO2 SERPL-SCNC: 26 MMOL/L (ref 18–29)
CREAT SERPL-MCNC: 0.99 MG/DL (ref 0.57–1)
EOSINOPHIL # BLD AUTO: 0.2 X10E3/UL (ref 0–0.4)
EOSINOPHIL NFR BLD AUTO: 5 %
ERYTHROCYTE [DISTWIDTH] IN BLOOD BY AUTOMATED COUNT: 18.6 % (ref 12.3–15.4)
GLUCOSE SERPL-MCNC: 91 MG/DL (ref 65–99)
HCT VFR BLD AUTO: 30.7 % (ref 34–46.6)
HGB BLD-MCNC: 9.2 G/DL (ref 11.1–15.9)
IMM GRANULOCYTES # BLD: 0 X10E3/UL (ref 0–0.1)
IMM GRANULOCYTES NFR BLD: 0 %
INTERPRETATION: NORMAL
LYMPHOCYTES # BLD AUTO: 1 X10E3/UL (ref 0.7–3.1)
LYMPHOCYTES NFR BLD AUTO: 22 %
MCH RBC QN AUTO: 21.4 PG (ref 26.6–33)
MCHC RBC AUTO-ENTMCNC: 30 G/DL (ref 31.5–35.7)
MCV RBC AUTO: 71 FL (ref 79–97)
MONOCYTES # BLD AUTO: 0.4 X10E3/UL (ref 0.1–0.9)
MONOCYTES NFR BLD AUTO: 8 %
NEUTROPHILS # BLD AUTO: 2.8 X10E3/UL (ref 1.4–7)
NEUTROPHILS NFR BLD AUTO: 64 %
PLATELET # BLD AUTO: 329 X10E3/UL (ref 150–379)
POTASSIUM SERPL-SCNC: 3.7 MMOL/L (ref 3.5–5.2)
RBC # BLD AUTO: 4.3 X10E6/UL (ref 3.77–5.28)
SODIUM SERPL-SCNC: 146 MMOL/L (ref 134–144)
WBC # BLD AUTO: 4.4 X10E3/UL (ref 3.4–10.8)

## 2017-05-22 ENCOUNTER — TELEPHONE (OUTPATIENT)
Dept: CARDIOLOGY CLINIC | Age: 77
End: 2017-05-22

## 2017-05-22 RX ORDER — METOPROLOL SUCCINATE 25 MG/1
25 TABLET, EXTENDED RELEASE ORAL DAILY
Qty: 30 TAB | Refills: 3 | Status: SHIPPED | OUTPATIENT
Start: 2017-05-22 | End: 2017-08-23 | Stop reason: SDUPTHER

## 2017-05-22 RX ORDER — POTASSIUM CHLORIDE 750 MG/1
10 TABLET, EXTENDED RELEASE ORAL DAILY
Qty: 30 TAB | Refills: 12 | Status: SHIPPED | OUTPATIENT
Start: 2017-05-22 | End: 2017-08-23 | Stop reason: SDUPTHER

## 2017-05-22 NOTE — PROGRESS NOTES
Drop in Hgb 9.2 from 11.5  Please assess for any s/s of bleeding  May repeat CBC day of procedure   Will d/w Dr. Lulu Velasco  6/6/2017   12:00 PM   CATH ROOM 3 [place] [address].

## 2017-05-22 NOTE — TELEPHONE ENCOUNTER
----- Message from Lloyd Brooks NP sent at 5/22/2017  9:42 AM EDT -----  Drop in Hgb 9.2 from 11.5  Please assess for any s/s of bleeding  May repeat CBC day of procedure   Will d/w Dr. Carol Garcia  6/6/2017   12:00 PM   CATH ROOM 3 [place] [address].

## 2017-05-22 NOTE — TELEPHONE ENCOUNTER
Verified patient with two types of identifiers. Spoke to patient and she is doing fine with no s/s of bleeding. Notified her of lab results and told her that we would recheck the day of the procedure. Patient verbalizes understanding. And will call with any questions or concerns.

## 2017-06-02 RX ORDER — SODIUM CHLORIDE 0.9 % (FLUSH) 0.9 %
5-10 SYRINGE (ML) INJECTION EVERY 8 HOURS
Status: CANCELLED | OUTPATIENT
Start: 2017-06-02

## 2017-06-02 RX ORDER — SODIUM CHLORIDE 0.9 % (FLUSH) 0.9 %
5-10 SYRINGE (ML) INJECTION AS NEEDED
Status: CANCELLED | OUTPATIENT
Start: 2017-06-02

## 2017-06-06 ENCOUNTER — OFFICE VISIT (OUTPATIENT)
Dept: CARDIOLOGY CLINIC | Age: 77
End: 2017-06-06

## 2017-06-06 ENCOUNTER — HOSPITAL ENCOUNTER (OUTPATIENT)
Dept: NON INVASIVE DIAGNOSTICS | Age: 77
Discharge: HOME | End: 2017-06-06
Attending: INTERNAL MEDICINE | Admitting: INTERNAL MEDICINE
Payer: MEDICARE

## 2017-06-06 VITALS
HEART RATE: 62 BPM | DIASTOLIC BLOOD PRESSURE: 72 MMHG | SYSTOLIC BLOOD PRESSURE: 254 MMHG | BODY MASS INDEX: 31.47 KG/M2 | HEIGHT: 62 IN | RESPIRATION RATE: 18 BRPM | WEIGHT: 171 LBS | TEMPERATURE: 98.4 F | OXYGEN SATURATION: 96 %

## 2017-06-06 DIAGNOSIS — Z95.818 STATUS POST PLACEMENT OF IMPLANTABLE LOOP RECORDER: Primary | ICD-10-CM

## 2017-06-06 PROCEDURE — 74011000250 HC RX REV CODE- 250: Performed by: INTERNAL MEDICINE

## 2017-06-06 PROCEDURE — 74011250637 HC RX REV CODE- 250/637: Performed by: NURSE PRACTITIONER

## 2017-06-06 PROCEDURE — C1764 EVENT RECORDER, CARDIAC: HCPCS

## 2017-06-06 PROCEDURE — 74011250637 HC RX REV CODE- 250/637: Performed by: INTERNAL MEDICINE

## 2017-06-06 PROCEDURE — 33282 CARDIAC CATHETERIZATION: CPT

## 2017-06-06 RX ORDER — AMLODIPINE BESYLATE 5 MG/1
10 TABLET ORAL DAILY
Status: DISCONTINUED | OUTPATIENT
Start: 2017-06-07 | End: 2017-06-06 | Stop reason: HOSPADM

## 2017-06-06 RX ORDER — LEVOFLOXACIN 500 MG/1
500 TABLET, FILM COATED ORAL ONCE
Status: COMPLETED | OUTPATIENT
Start: 2017-06-06 | End: 2017-06-06

## 2017-06-06 RX ORDER — METOPROLOL SUCCINATE 25 MG/1
25 TABLET, EXTENDED RELEASE ORAL DAILY
Status: DISCONTINUED | OUTPATIENT
Start: 2017-06-06 | End: 2017-06-06 | Stop reason: HOSPADM

## 2017-06-06 RX ORDER — VANCOMYCIN/0.9 % SOD CHLORIDE 1.5G/250ML
1500 PLASTIC BAG, INJECTION (ML) INTRAVENOUS ONCE
Status: DISCONTINUED | OUTPATIENT
Start: 2017-06-06 | End: 2017-06-06 | Stop reason: HOSPADM

## 2017-06-06 RX ORDER — CLONIDINE HYDROCHLORIDE 0.1 MG/1
0.2 TABLET ORAL
Status: COMPLETED | OUTPATIENT
Start: 2017-06-06 | End: 2017-06-06

## 2017-06-06 RX ORDER — CLINDAMYCIN HYDROCHLORIDE 150 MG/1
150 CAPSULE ORAL 3 TIMES DAILY
Qty: 9 CAP | Refills: 0 | Status: SHIPPED | OUTPATIENT
Start: 2017-06-06 | End: 2019-10-31 | Stop reason: SDUPTHER

## 2017-06-06 RX ORDER — LIDOCAINE HYDROCHLORIDE AND EPINEPHRINE 10; 10 MG/ML; UG/ML
1.5 INJECTION, SOLUTION INFILTRATION; PERINEURAL
Status: DISCONTINUED | OUTPATIENT
Start: 2017-06-06 | End: 2017-06-06 | Stop reason: HOSPADM

## 2017-06-06 RX ORDER — AMLODIPINE BESYLATE 5 MG/1
10 TABLET ORAL
Status: COMPLETED | OUTPATIENT
Start: 2017-06-06 | End: 2017-06-06

## 2017-06-06 RX ADMIN — LEVOFLOXACIN 500 MG: 500 TABLET, FILM COATED ORAL at 12:11

## 2017-06-06 RX ADMIN — CLONIDINE HYDROCHLORIDE 0.2 MG: 0.1 TABLET ORAL at 12:30

## 2017-06-06 RX ADMIN — METOPROLOL SUCCINATE 25 MG: 25 TABLET, EXTENDED RELEASE ORAL at 12:29

## 2017-06-06 RX ADMIN — LIDOCAINE HYDROCHLORIDE,EPINEPHRINE BITARTRATE 15 MG: 10; .01 INJECTION, SOLUTION INFILTRATION; PERINEURAL at 12:05

## 2017-06-06 RX ADMIN — AMLODIPINE BESYLATE 5 MG: 5 TABLET ORAL at 12:29

## 2017-06-06 NOTE — PROGRESS NOTES
SBP > 200's; patient stated she did not take any meds today; BP meds x 3 givben which is her home meds--Norvasc 10mg; Toprol XL 25mg; and Clonidine 0.2mg. Continue to monitor.

## 2017-06-06 NOTE — PROGRESS NOTES
Pt. discharge with questions answered and pt/family verbalized understanding. Will call if any questions arise.

## 2017-06-06 NOTE — H&P
Cardiac Electrophysiology H&P Note     Subjective: Juana Jo is a 64 y.o. female patient who is at [facility] today for ILR. Since she was last seen she has not had an syncope. She reports two brief episodes of palpitations. She says she takes the Toprol daily. She has been evaluated in the office sinus bradycardia and also possible tachycardia/bradycardia syndrome  She had surgery to have the skin cancer on her scalp removed. She had skin cancer removed from her scalp at [facility] and said she will have radiation therapy. She had been taking a low dose toprol toprol  to avoid syncope with bradycardia. I had stopped the Rythmol because it could have been contributing to the black outs, I was concerned about intermittent bradycardia. Last time she was seen she felt faint and sat down then she broke out in a sweat. She was not taking the Rythmol at this time. Echo 5/2016 LVEF 60 % to 65 %. No RWMA. Mild concentric hypertrophy. Mild TR. External loop recorder 2/2016 showed no AFIB but mild sinus bradycardia and NSR  2/5/16 Stress test which she did and could do only 3 min exercise, cardiolite stress test with basal to mid inferior wall ischemia LVEF 66%. Poor exercise capacity, HR did increase  I referred her to Dr Nash Cortes to see for cardiac cath   She had cardiac cath and it showed 50% LAD, 60% LCx and 100% RCA with left to right collateral so Dr Nash Cortes only recommended medical therapies  Echo with normal LVEF and inferior wall hypokinesis in June 2014   Holter 6/23/2014 sinus rhythm with minimum 39 bpm at 5 pm PVC's , mean HR 50 bpm while she is on medication    Past Hx:    She was last seen 8/2014 and has missed several follow up appointments. She had passed out twice December 2015 and Jan 1 2016. She went the ED at [facility] both times.    First episode 12/31/15 occurred while she was sitting on the stool in her kitchen, she started to feel tighten around her head then she LOC and fell off the tool.   The next day she had a similar episode, she was doing things around the house and felt dizzy she sat down and put a cold compress on her head. No LOC. 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    She had seen Dr. Sultana Mcclendon and had venous duplex without DVT.      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    Scalp lesion 10/23/2014    Sebaceous cyst 2014    Keloid 2014    Dysphagia 2011    Recurrent ear pain 2011    Acute pharyngitis 2011    Essential hypertension 2011    Edema, peripheral 2011    Pure hypercholesterolemia 2011    Allergic rhinitis, cause unspecified 2011    Diverticulosis of colon (without mention of hemorrhage) 2011    Herniated nucleus pulposus ( slipped disc) 2011    Degenetrative Dis Disease, Cervical 2011    Carpal tunnel syndrome 2011    Unspecified cataract 2011    GERD Gastro- Esophageal Reflux Disease 2011    Degenerative Joint Disese ( improved/resolving) 2011    Menopausal 2011    Asymptomatic varicose veins 2011       Allergies   Allergen Reactions    Latex Rash    Bactrim [Sulfamethoprim Ds] Hives    Aspirin Other (comments)     Anything higher than 81mg makes pt jittery    Codeine Hives and Itching    Iodinated Contrast Media - Oral And Iv Dye Itching    Pcn [Penicillins] Hives and Itching    Tape [Adhesive] Rash     Past Medical History:   Diagnosis Date    Acute pharyngitis 2011    Allergic rhinitis, cause unspecified 2/8/2011    Arrhythmia     PVC    Asymptomatic varicose veins 2/8/2011    Cancer ([facility] [address]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     Carpal tunnel syndrome 2/8/2011    Degenerative Joint Disese ( improved/resolving) 2/8/2011    Degenetrative Dis Disease, 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    Diverticulosis of colon (without mention of hemorrhage) 2/8/2011    Dysphagia 2/8/2011    Edema, peripheral 2/8/2011    GERD (gastroesophageal reflux disease)     GERD Gastro- Esophageal Reflux Disease 2/8/2011    Herniated nucleus pulposus ( slipped disc) 2/8/2011    Menopausal 2/8/2011    PUD (peptic ulcer disease) 2012    Pure hypercholesterolemia 2/8/2011    Recurrent ear pain 2/8/2011    S/P radiation therapy     Scalp lesion 10/23/2014    Sebaceous cyst 4/22/2014    Unspecified cataract 2/8/2011    Unspecified essential hypertension 2/8/2011    Unspecified sleep apnea      Past Surgical History:   Procedure Laterality Date    HX HYSTERECTOMY      HX ORTHOPAEDIC  neck/back 2006    HX OTHER SURGICAL  5/8/2014     Excise recurrent scalp lesion and application of ACell    HX OTHER SURGICAL  5/8/2014    Excise keloid, anterior chest wall, and application of ACell    HX OTHER SURGICAL  5/8/2014     Excise sebaceous cyst, anterior chest wall    HX OTHER SURGICAL  5/8/2014    Punch biopsy, skin lesions, right forearm x2, right calf x1     Family History   Problem Relation Age of Onset    Hypertension Mother     Stroke Mother     Hypertension Father     Cancer Father      PROSTATE, MELANOMA    Anesth Problems Neg Hx      Social History   Substance Use Topics    Smoking status: Never Smoker    Smokeless tobacco: Never Used    Alcohol use No        Review of Systems:   Constitutional: Negative for fever, chills, weight loss, malaise/fatigue. HEENT: Negative for nosebleeds, vision changes. Respiratory: Negative for cough, hemoptysis, sputum production, and wheezing.    Cardiovascular: Negative for chest pain, + occasional palpitations, no orthopnea, claudication, leg swelling, hx of syncope and near syncope, and no PND. Gastrointestinal: Negative for nausea, vomiting, diarrhea, constipation, blood in stool and melena. Genitourinary: Negative for dysuria, and hematuria. Musculoskeletal: Negative for myalgias, arthralgia. Skin: Negative for rash. Heme: Does not bleed or bruise easily. Neurological: Negative for speech change and focal weakness     Objective:     Visit Vitals    Ht 5' 2\" (1.575 m)    Wt 171 lb (77.6 kg)    Breastfeeding No    BMI 31.28 kg/m2      Physical Exam:   Constitutional: well-developed and well-nourished. No distress. Head: Normocephalic and atraumatic. Scars from skin cancer removal. Small scab. Eyes: Pupils are equal, round  Neck: supple. No JVD present. Cardiovascular: Normal rate, regular rhythm. Exam reveals no gallop and no friction rub. No murmur heard. Pulmonary/Chest: Effort normal and breath sounds normal. No wheezes. Abdominal: Soft, no tenderness. Musculoskeletal: no edema. MEY hose are on, LLE two 2x2 bandages CDI  Neurological: alert,oriented. Skin: Skin is warm and dry  Psychiatric: normal mood and affect. Behavior is normal. Judgment and thought content normal.            Assessment/Plan:   Syncope  Near syncope  Sinus bradycardia   CAD  ? PAF    Reviewed ILR procedure and rationale for ILR. Thank you for involving me in this patient's care and please call with further concerns or questions.     Leoncio Anaya NP  [phone]  [facility] [address] from  attending:   I have seen, examined patient, and discussed with nurse practitioner, registered nurse, reviewed, updated note and agree with the assessment and plan    I have talked to her this am. She is feeling fine, no concerns  Vital signs BP is high since she has not taken BP meds today  Exam shows regular rhythm and no rub  Chest wall without redness  Assessment and Plan:  Continue to proceed with ILR to monitor for sick sinus syndrome as the cause of syncope and PAF  She agrees  Give home BP meds  She is not allergic to levaquin so will give this for surgical prophylaxis

## 2017-06-06 NOTE — IP AVS SNAPSHOT
[address] [facility] [address] [place] [address] 
[phone] Patient: Suzanne Mccann MRN: [id number] VDM:[birth year] You are allergic to the following Allergen Reactions Latex Rash Bactrim (Sulfamethoprim Ds) Hives Aspirin Other (comments) Anything higher than 81mg makes pt jittery Codeine Hives Itching Iodinated Contrast Media - Oral And Iv Dye Itching Pcn (Penicillins) Hives Itching Tape (Adhesive) Rash Recent Documentation Height Weight Breastfeeding? BMI OB Status Smoking Status 1.575 m 77.6 kg No 31.28 kg/m2 Hysterectomy Never Smoker Emergency Contacts Name Discharge Info Relation Home Work Mobile Isidro Felton DISCHARGE CAREGIVER [3] Child [2] [phone] Mady Cabrera   [phone] About your hospitalization You were admitted on:  June 6, 2017 You last received care in the:  [address] [place] [address] You were discharged on:  June 6, 2017 Unit phone number:  [phone] Why you were hospitalized Your primary diagnosis was:  Syncope Your diagnoses also included:  Essential Hypertension Providers Seen During Your Hospitalizations Provider Role Specialty Primary office phone Mable Cook MD Attending Provider Cardiology [phone] Your Primary Care Physician (PCP) Primary Care Physician Office Phone Office Fax [address] [facility], [address] [place] [address] [phone] Follow-up Information Follow up With Details Comments Contact Info Mable Cook MD On 6/21/2017 806 [facility] [address] [place] [address] 
[phone] Alex Cuellar MD   [address] 
[phone] Your Appointments Wednesday June 21, 2017 11:40 AM EDT  
ESTABLISHED PATIENT with Mable Cook MD  
[facility] ([address]) [address] [place],[address] [place]   
[phone] Current Discharge Medication List  
  
START taking these medications Dose & Instructions Dispensing Information Comments Morning Noon Evening Bedtime  
 clindamycin 150 mg capsule Commonly known as:  CLEOCIN Your last dose was: Your next dose is:    
   
   
 Dose:  150 mg Take 1 Cap by mouth three (3) times daily for 3 days. Quantity:  9 Cap Refills:  0 CONTINUE these medications which have NOT CHANGED Dose & Instructions Dispensing Information Comments Morning Noon Evening Bedtime  
 aspirin delayed-release 81 mg tablet Your last dose was: Your next dose is: Take  by mouth daily. Refills:  0  
     
   
   
   
  
 atorvastatin 20 mg tablet Commonly known as:  LIPITOR Your last dose was: Your next dose is:    
   
   
 Dose:  20 mg Take 1 Tab by mouth daily. Quantity:  30 Tab Refills:  6  
     
   
   
   
  
 cloNIDine HCl 0.2 mg tablet Commonly known as:  CATAPRES Your last dose was: Your next dose is:    
   
   
 Dose:  0.2 mg Take 1 Tab by mouth two (2) times a day. Quantity:  60 Tab Refills:  12  
     
   
   
   
  
 fluticasone 50 mcg/actuation nasal spray Commonly known as:  Yaya Marrow Your last dose was: Your next dose is:    
   
   
 Administer to right and left nostril. Quantity:  1 Bottle Refills:  12  
     
   
   
   
  
 gabapentin 100 mg capsule Commonly known as:  NEURONTIN Your last dose was: Your next dose is:    
   
   
 Dose:  100 mg Take 1 Cap by mouth two (2) times a day. Quantity:  180 Cap Refills:  3  
     
   
   
   
  
 latanoprost 0.005 % ophthalmic solution Commonly known as:  Ethjanettn Ayan Your last dose was: Your next dose is:    
   
   
 Dose:  1 Drop Administer 1 Drop to right eye nightly. Refills:  0  
     
   
   
   
  
 meclizine 25 mg tablet Commonly known as:  ANTIVERT Your last dose was: Your next dose is:    
   
   
 1 tab tid prn Quantity:  270 Tab Refills:  3  
     
   
   
   
  
 metoprolol succinate 25 mg XL tablet Commonly known as:  TOPROL-XL Your last dose was: Your next dose is:    
   
   
 Dose:  25 mg Take 1 Tab by mouth daily. Quantity:  30 Tab Refills:  3 NORVASC 10 mg tablet Generic drug:  amLODIPine Your last dose was: Your next dose is:    
   
   
 Dose:  5 mg Take 5 mg by mouth daily. Refills:  0  
     
   
   
   
  
 omeprazole 40 mg capsule Commonly known as:  PriLOSEC Your last dose was: Your next dose is:    
   
   
 Dose:  40 mg Take 1 Cap by mouth daily. Quantity:  90 Cap Refills:  3  
     
   
   
   
  
 potassium chloride 10 mEq tablet Commonly known as:  KLOR-CON Your last dose was: Your next dose is:    
   
   
 Dose:  10 mEq Take 1 Tab by mouth daily. Quantity:  30 Tab Refills:  12  
     
   
   
   
  
 raNITIdine 150 mg tablet Commonly known as:  ZANTAC Your last dose was: Your next dose is:    
   
   
 Dose:  150 mg Take 150 mg by mouth two (2) times a day. Refills:  0  
     
   
   
   
  
 TYLENOL 325 mg tablet Generic drug:  acetaminophen Your last dose was: Your next dose is:    
   
   
 Dose:  325 mg Take 325 mg by mouth every four (4) hours as needed for Pain. Refills:  0  
     
   
   
   
  
 WOMEN'S MULTIPLE VITAMINS PO Your last dose was: Your next dose is: Take  by mouth. Refills:  0 Where to Get Your Medications These medications were sent to [address] [place] [address] - [address] [address] Phone:  [phone]  
  clindamycin 150 mg capsule Discharge Instructions PATIENT INSTRUCTIONS FOR IMPLANTABLE LOOP RECORDER 1. Start antibiotic today and continue for 3 days 2. May shower tomorrow. Gentle wash incision with soap and water. Do not scrub. 3.  Once your bandage and tape is taken off your incision will appear shiny, this is skin glue that will keep your incision closed, please DO NOT attempt to peel this off of incision. Please DO NOT try to scrub the skin glue off once you are able to take a shower. The skin glue will eventually fall off.  
 
 
5.  Call Dr. Reid Wilcox at [phone] if you experience any of the following symptoms: 1. Redness at the loop recorder site 2. Swelling at or around the loop recorder site 3. Pain around the loop recorder 4. Dizziness, lightheadedness, fainting spells 5. Lack of energy 6. Shortness of breath 7. Rapid heart rate 8. Chest or muscle twitches 6. Follow up with Dr Reid Wilcox as scheduled @Future Appointments Date Time Provider Nasim Blossom 6/6/2017 12:00 PM CATH ROOM 3 [facility]  
6/21/2017 11:40 AM Adelina Ramirez  [address]  
7/28/2017 8:30 AM MD Abbie Jennings 480  
@  
 
7. You may use Extra Strength Tylenol and ice pack for pain relief as needed. Bharati Herron M.D. [facility] Electrophysiology/Cardiology [facility] [place] [address], [place], [address] 
[phone] Discharge Orders None General Information Please provide this summary of care documentation to your next provider.  
  
  
    
    
 Patient Signature: ____________________________________________________________ Date:  ____________________________________________________________  
  
Christia Sicilian Provider Signature:  ____________________________________________________________ Date:  ____________________________________________________________

## 2017-06-06 NOTE — ROUTINE PROCESS
Cardiac Cath Lab Recovery Arrival Note:      Jaycee Barroso arrived to Cardiac Cath Lab, Recovery Area. Staff introduced to patient. Patient identifiers verified with NAME and DATE OF BIRTH. Procedure verified with patient. Consent forms reviewed and signed by patient or authorized representative and verified. Allergies verified. Patient and family oriented to department. Patient and family informed of procedure and plan of care. Questions answered with review. Patient prepped for procedure, per orders from physician, prior to arrival.    Patient on cardiac monitor, non-invasive blood pressure, SPO2 monitor. On room air. Patient is A&Ox 3. Patient reports no CP. Patient in stretcher, in low position, with side rails up, call bell within reach, patient instructed to call if assistance as needed.     Patient prep in: [facility] Recovery Area, [place] 4   Prep by: DON

## 2017-06-06 NOTE — DISCHARGE INSTRUCTIONS
PATIENT INSTRUCTIONS FOR IMPLANTABLE LOOP RECORDER    1. Start antibiotic today and continue for 3 days    2. May shower tomorrow. Gentle wash incision with soap and water. Do not scrub. 3.  Once your bandage and tape is taken off your incision will appear shiny, this is skin glue that will keep your incision closed, please DO NOT attempt to peel this off of incision. Please DO NOT try to scrub the skin glue off once you are able to take a shower. The skin glue will eventually fall off.       5.  Call Dr. Kyle Grimes at [phone] if you experience any of the following symptoms:  1. Redness at the loop recorder site  2. Swelling at or around the loop recorder site   3. Pain around the loop recorder  4. Dizziness, lightheadedness, fainting spells  5. Lack of energy  6. Shortness of breath  7. Rapid heart rate  8. Chest or muscle twitches    6. Follow up with Dr Kyle Grimes as scheduled   @Future Appointments  Date Time Provider Nasim Cerrato   6/6/2017 12:00 PM CATH ROOM 3 [address] [place] [address]   6/21/2017 11:40 AM Yris Bryant  [address]   7/28/2017 8:30 AM MD Abbie Calderon [address]   @     7. You may use Extra Strength Tylenol and ice pack for pain relief as needed. Malgorzata Adler M.D.  [facility]  Electrophysiology/Cardiology  [facility] [place]  [address], [address], [address]  [facility], [address] [place] [address] Avenue                             [address] [place] [address]  [phone]

## 2017-06-06 NOTE — IP AVS SNAPSHOT
Current Discharge Medication List  
  
START taking these medications Dose & Instructions Dispensing Information Comments Morning Noon Evening Bedtime  
 clindamycin 150 mg capsule Commonly known as:  CLEOCIN Your last dose was: Your next dose is:    
   
   
 Dose:  150 mg Take 1 Cap by mouth three (3) times daily for 3 days. Quantity:  9 Cap Refills:  0 CONTINUE these medications which have NOT CHANGED Dose & Instructions Dispensing Information Comments Morning Noon Evening Bedtime  
 aspirin delayed-release 81 mg tablet Your last dose was: Your next dose is: Take  by mouth daily. Refills:  0  
     
   
   
   
  
 atorvastatin 20 mg tablet Commonly known as:  LIPITOR Your last dose was: Your next dose is:    
   
   
 Dose:  20 mg Take 1 Tab by mouth daily. Quantity:  30 Tab Refills:  6  
     
   
   
   
  
 cloNIDine HCl 0.2 mg tablet Commonly known as:  CATAPRES Your last dose was: Your next dose is:    
   
   
 Dose:  0.2 mg Take 1 Tab by mouth two (2) times a day. Quantity:  60 Tab Refills:  12  
     
   
   
   
  
 fluticasone 50 mcg/actuation nasal spray Commonly known as:  Val Mad Your last dose was: Your next dose is:    
   
   
 Administer to right and left nostril. Quantity:  1 Bottle Refills:  12  
     
   
   
   
  
 gabapentin 100 mg capsule Commonly known as:  NEURONTIN Your last dose was: Your next dose is:    
   
   
 Dose:  100 mg Take 1 Cap by mouth two (2) times a day. Quantity:  180 Cap Refills:  3  
     
   
   
   
  
 latanoprost 0.005 % ophthalmic solution Commonly known as:  Saw [place] Your last dose was: Your next dose is:    
   
   
 Dose:  1 Drop Administer 1 Drop to right eye nightly. Refills:  0  
     
   
   
   
  
 meclizine 25 mg tablet Commonly known as:  ANTIVERT Your last dose was: Your next dose is:    
   
   
 1 tab tid prn Quantity:  270 Tab Refills:  3  
     
   
   
   
  
 metoprolol succinate 25 mg XL tablet Commonly known as:  TOPROL-XL Your last dose was: Your next dose is:    
   
   
 Dose:  25 mg Take 1 Tab by mouth daily. Quantity:  30 Tab Refills:  3 NORVASC 10 mg tablet Generic drug:  amLODIPine Your last dose was: Your next dose is:    
   
   
 Dose:  5 mg Take 5 mg by mouth daily. Refills:  0  
     
   
   
   
  
 omeprazole 40 mg capsule Commonly known as:  PriLOSEC Your last dose was: Your next dose is:    
   
   
 Dose:  40 mg Take 1 Cap by mouth daily. Quantity:  90 Cap Refills:  3  
     
   
   
   
  
 potassium chloride 10 mEq tablet Commonly known as:  KLOR-CON Your last dose was: Your next dose is:    
   
   
 Dose:  10 mEq Take 1 Tab by mouth daily. Quantity:  30 Tab Refills:  12  
     
   
   
   
  
 raNITIdine 150 mg tablet Commonly known as:  ZANTAC Your last dose was: Your next dose is:    
   
   
 Dose:  150 mg Take 150 mg by mouth two (2) times a day. Refills:  0  
     
   
   
   
  
 TYLENOL 325 mg tablet Generic drug:  acetaminophen Your last dose was: Your next dose is:    
   
   
 Dose:  325 mg Take 325 mg by mouth every four (4) hours as needed for Pain. Refills:  0  
     
   
   
   
  
 WOMEN'S MULTIPLE VITAMINS PO Your last dose was: Your next dose is: Take  by mouth. Refills:  0 Where to Get Your Medications These medications were sent to [address] [place] [id number] Phone:  [phone]  
  clindamycin 150 mg capsule

## 2017-06-06 NOTE — PROGRESS NOTES
TRANSFER - IN REPORT:    Verbal report received from Kylee Ruiz on Yvette Kurtz  being received from procedure room for routine progression of care. Report consisted of patients Situation, Background, Assessment and Recommendations(SBAR). Information from the following report(s) SBAR was reviewed with the receiving clinician. Opportunity for questions and clarification was provided. Assessment completed upon patients arrival to [address] [place] [address] and care assumed. Cardiac Cath Lab Recovery Arrival Note:    Yvette Kurtz arrived to [facility] recovery area. Patient procedure= Loop Recorder. Patient on cardiac monitor, non-invasive blood pressure, SPO2 monitor. On room air. Patient status doing well without problems. Patient is A&Ox 3. Patient reports no CP. PROCEDURE SITE CHECK:    Procedure site:without any bleeding and no hematoma, No pain/discomfort reported at procedure site. No change in patient status. Continue to monitor patient and status.

## 2017-06-08 DIAGNOSIS — I10 HTN (HYPERTENSION), MALIGNANT: ICD-10-CM

## 2017-06-08 DIAGNOSIS — G56.00 CARPAL TUNNEL SYNDROME, UNSPECIFIED LATERALITY: ICD-10-CM

## 2017-06-08 DIAGNOSIS — K21.9 GASTROESOPHAGEAL REFLUX DISEASE WITHOUT ESOPHAGITIS: ICD-10-CM

## 2017-06-08 RX ORDER — OMEPRAZOLE 40 MG/1
40 CAPSULE, DELAYED RELEASE ORAL DAILY
Qty: 90 CAP | Refills: 3 | Status: SHIPPED | OUTPATIENT
Start: 2017-06-08 | End: 2017-08-23 | Stop reason: SDUPTHER

## 2017-06-08 RX ORDER — CLONIDINE HYDROCHLORIDE 0.2 MG/1
0.2 TABLET ORAL 2 TIMES DAILY
Qty: 60 TAB | Refills: 12 | Status: SHIPPED | OUTPATIENT
Start: 2017-06-08 | End: 2017-08-23 | Stop reason: SDUPTHER

## 2017-06-08 RX ORDER — GABAPENTIN 100 MG/1
100 CAPSULE ORAL 2 TIMES DAILY
Qty: 180 CAP | Refills: 3 | Status: SHIPPED | OUTPATIENT
Start: 2017-06-08 | End: 2017-08-23 | Stop reason: SDUPTHER

## 2017-06-08 RX ORDER — RANITIDINE 150 MG/1
150 TABLET, FILM COATED ORAL 2 TIMES DAILY
Qty: 60 TAB | Refills: 12 | Status: SHIPPED | OUTPATIENT
Start: 2017-06-08 | End: 2017-08-23 | Stop reason: SDUPTHER

## 2017-06-08 RX ORDER — ATORVASTATIN CALCIUM 20 MG/1
20 TABLET, FILM COATED ORAL DAILY
Qty: 30 TAB | Refills: 6 | Status: SHIPPED | OUTPATIENT
Start: 2017-06-08 | End: 2017-08-07 | Stop reason: SDUPTHER

## 2017-06-21 ENCOUNTER — OFFICE VISIT (OUTPATIENT)
Dept: CARDIOLOGY CLINIC | Age: 77
End: 2017-06-21

## 2017-06-21 VITALS
RESPIRATION RATE: 16 BRPM | HEIGHT: 62 IN | WEIGHT: 170 LBS | OXYGEN SATURATION: 99 % | SYSTOLIC BLOOD PRESSURE: 140 MMHG | DIASTOLIC BLOOD PRESSURE: 70 MMHG | BODY MASS INDEX: 31.28 KG/M2 | HEART RATE: 62 BPM

## 2017-06-21 DIAGNOSIS — Z95.818 STATUS POST PLACEMENT OF IMPLANTABLE LOOP RECORDER: Primary | ICD-10-CM

## 2017-06-21 NOTE — PROGRESS NOTES
Cardiac Electrophysiology Wound Check Note      Wound Check s/p ILR 6/6/17   Patient is here for wound check. No fever, drainage   Physical Exam   Constitutional:  well-nourished. Skin: Left side ILR is healing without redness, drainage, hematoma. The wound edges are intact. ASSESSMENT and PLAN   The incision is healing without redness, drainage, hematoma. Device check shows proper, she activated the loop June 6th for palpitations. Loop showed PVCs. Follow-up Disposition:   In 6 months

## 2017-06-21 NOTE — PROGRESS NOTES
Cardiac Electrophysiology Wound Check Note      Wound Check s/p ILR 6/6/17   Patient is here for wound check. No fever, drainage   I informed her about the expiration date of the loop and it was a few days before the implant but did not get noted by nurses before it was given to me for implant  We did check and device work properly   [employer] and hospital are informed as well  Physical Exam   Constitutional:  well-nourished. Skin: Left side ILR is healing without redness, drainage, hematoma. The wound edges are intact. ASSESSMENT and PLAN     ICD-10-CM ICD-9-CM    1. Status post placement of implantable loop recorder Z95.818 V45.09        The incision is healing without redness, drainage, hematoma. Device check shows proper function, she activated the loop June 6th for palpitations. Loop showed PVCs. There is no PAF or severe bradycardia yet  She is pleased with information received and was not concerned about the label of expiration date on the loop recorder box    Follow-up Disposition:   In 6 months

## 2017-06-21 NOTE — MR AVS SNAPSHOT
Visit Information Date & Time Provider Department Dept. Phone Encounter #  
 6/21/2017 11:40 AM Petar Cuello MD [facility] [place]  [phone] [id number] Your Appointments 7/28/2017  8:30 AM  
ROUTINE CARE with Donnie Back MD  
[facility] [place] ([facility]) Appt Note: 3 month check  up [facility] [address] [address]  
[phone]  
  
   
 [address] [id number] Upcoming Health Maintenance Date Due DTaP/Tdap/Td series (1 - Tdap) 12/21/1961 Pneumococcal 65+ High/Highest Risk (2 of 2 - PCV13) 9/11/2015 INFLUENZA AGE 9 TO ADULT 8/1/2017 GLAUCOMA SCREENING Q2Y 3/8/2018 MEDICARE YEARLY EXAM 4/29/2018 Allergies as of 6/21/2017  Review Complete On: 6/21/2017 By: Unified Severity Noted Reaction Type Reactions Latex  10/10/2011    Rash Bactrim [Sulfamethoprim Ds] High 06/06/2017    Hives Aspirin  10/10/2011   Side Effect Other (comments) Anything higher than 81mg makes pt jittery Codeine  10/10/2011    Hives, Itching Iodinated Contrast- Oral And Iv Dye  06/23/2011   Side Effect Itching Pcn [Penicillins]  10/10/2011    Hives, Itching Tape [Adhesive]  05/08/2014    Rash Current Immunizations  Reviewed on 12/12/2016 Name Date Influenza High Dose Vaccine PF 10/15/2016 Influenza Vaccine 9/11/2014 Influenza Vaccine Split 11/8/2012 Pneumococcal Polysaccharide (PPSV-23) 9/11/2014 Not reviewed this visit Vitals BP Pulse Resp Height(growth percentile) Weight(growth percentile) SpO2  
 140/70 (BP 1 Location: Left arm, BP Patient Position: Sitting) 62 16 5' 2\" (1.575 m) 170 lb (77.1 kg) 99% BMI OB Status Smoking Status 31.09 kg/m2 Hysterectomy Never Smoker BMI and BSA Data Body Mass Index Body Surface Area 31.09 kg/m 2 1.84 m 2 Preferred Pharmacy Pharmacy Name Phone [facility] [address] [facility] Trevon Jeong [phone] Your Updated Medication List  
  
   
This list is accurate as of: 6/21/17 11:56 AM.  Always use your most recent med list.  
  
  
  
  
 aspirin delayed-release 81 mg tablet Take  by mouth daily. atorvastatin 20 mg tablet Commonly known as:  LIPITOR Take 1 Tab by mouth daily. cloNIDine HCl 0.2 mg tablet Commonly known as:  CATAPRES Take 1 Tab by mouth two (2) times a day. fluticasone 50 mcg/actuation nasal spray Commonly known as:  Reanna Fray Administer to right and left nostril.  
  
 gabapentin 100 mg capsule Commonly known as:  NEURONTIN Take 1 Cap by mouth two (2) times a day. latanoprost 0.005 % ophthalmic solution Commonly known as:  [place] Administer 1 Drop to right eye nightly. meclizine 25 mg tablet Commonly known as:  ANTIVERT  
1 tab tid prn  
  
 metoprolol succinate 25 mg XL tablet Commonly known as:  TOPROL-XL Take 1 Tab by mouth daily. NORVASC 10 mg tablet Generic drug:  amLODIPine Take 5 mg by mouth daily. omeprazole 40 mg capsule Commonly known as:  PriLOSEC Take 1 Cap by mouth daily. potassium chloride 10 mEq tablet Commonly known as:  KLOR-CON Take 1 Tab by mouth daily. raNITIdine 150 mg tablet Commonly known as:  ZANTAC Take 1 Tab by mouth two (2) times a day. TYLENOL 325 mg tablet Generic drug:  acetaminophen Take 325 mg by mouth every four (4) hours as needed for Pain. WOMEN'S MULTIPLE VITAMINS PO Take  by mouth. Patient Instructions You will need to follow up in clinic with Dr. Kelsi Zaman in 6 months. Please provide this summary of care documentation to your next provider. Your primary care clinician is listed as Flori Egan. If you have any questions after today's visit, please call [phone].

## 2017-07-06 ENCOUNTER — OFFICE VISIT (OUTPATIENT)
Dept: CARDIOLOGY CLINIC | Age: 77
End: 2017-07-06

## 2017-07-06 DIAGNOSIS — Z95.818 STATUS POST PLACEMENT OF IMPLANTABLE LOOP RECORDER: Primary | ICD-10-CM

## 2017-07-17 ENCOUNTER — OFFICE VISIT (OUTPATIENT)
Dept: CARDIOLOGY CLINIC | Age: 77
End: 2017-07-17

## 2017-07-17 DIAGNOSIS — Z95.818 STATUS POST PLACEMENT OF IMPLANTABLE LOOP RECORDER: Primary | ICD-10-CM

## 2017-08-02 ENCOUNTER — OFFICE VISIT (OUTPATIENT)
Dept: CARDIOLOGY CLINIC | Age: 77
End: 2017-08-02

## 2017-08-02 DIAGNOSIS — Z95.818 STATUS POST PLACEMENT OF IMPLANTABLE LOOP RECORDER: Primary | ICD-10-CM

## 2017-08-07 ENCOUNTER — OFFICE VISIT (OUTPATIENT)
Dept: INTERNAL MEDICINE CLINIC | Age: 77
End: 2017-08-07

## 2017-08-07 VITALS
DIASTOLIC BLOOD PRESSURE: 70 MMHG | WEIGHT: 176 LBS | OXYGEN SATURATION: 97 % | HEIGHT: 62 IN | RESPIRATION RATE: 16 BRPM | HEART RATE: 63 BPM | BODY MASS INDEX: 32.39 KG/M2 | SYSTOLIC BLOOD PRESSURE: 130 MMHG | TEMPERATURE: 98 F

## 2017-08-07 DIAGNOSIS — R22.0 SCALP MASS: ICD-10-CM

## 2017-08-07 DIAGNOSIS — I10 ESSENTIAL HYPERTENSION: ICD-10-CM

## 2017-08-07 DIAGNOSIS — E78.00 PURE HYPERCHOLESTEROLEMIA: Primary | ICD-10-CM

## 2017-08-07 LAB
CHOLEST SERPL-MCNC: 198 MG/DL
HDLC SERPL-MCNC: 57 MG/DL
LDL CHOLESTEROL POC: 105 MG/DL
NON-HDL GOAL (POC): 141
TCHOL/HDL RATIO (POC): 3.5
TRIGL SERPL-MCNC: 180 MG/DL

## 2017-08-07 RX ORDER — ATORVASTATIN CALCIUM 40 MG/1
40 TABLET, FILM COATED ORAL DAILY
Qty: 90 TAB | Refills: 3 | Status: SHIPPED | OUTPATIENT
Start: 2017-08-07 | End: 2017-08-23 | Stop reason: SDUPTHER

## 2017-08-07 NOTE — PATIENT INSTRUCTIONS
ByAllAccountsharHi-Dis([employer]) Activation    Thank you for requesting access to Tryouts. Please follow the instructions below to securely access and download your online medical record. Tryouts allows you to send messages to your doctor, view your test results, renew your prescriptions, schedule appointments, and more. How Do I Sign Up? 1. In your internet browser, go to [email or web address]  2. Click on the First Time User? Click Here link in the Sign In box. You will be redirect to the New Member Sign Up page. 3. Enter your Tryouts Access Code exactly as it appears below. You will not need to use this code after youve completed the sign-up process. If you do not sign up before the expiration date, you must request a new code. Tryouts Access Code: Activation code not generated  Current Tryouts Status: Patient Declined (This is the date your Tryouts access code will )    4. Enter the last four digits of your Social Security Number (xxxx) and Date of Birth (mm/dd/yyyy) as indicated and click Submit. You will be taken to the next sign-up page. 5. Create a Tryouts ID. This will be your Tryouts login ID and cannot be changed, so think of one that is secure and easy to remember. 6. Create a Tryouts password. You can change your password at any time. 7. Enter your Password Reset Question and Answer. This can be used at a later time if you forget your password. 8. Enter your e-mail address. You will receive e-mail notification when new information is available in [address] 9. Click Sign Up. You can now view and download portions of your medical record. 10. Click the Download Summary menu link to download a portable copy of your medical information. Additional Information    If you have questions, please visit the Frequently Asked Questions section of the Tryouts website at [email or web address] Remember, Tryouts is NOT to be used for urgent needs. For medical emergencies, dial 911.

## 2017-08-07 NOTE — PROGRESS NOTES
Catrachita Sommers is a 64 y.o. female and presents with Cholesterol Problem and Hypertension  . Subjective:    Hypertension Review:  The patient has hypertension . Diet and Lifestyle: generally follows a low sodium diet, exercises sporadically  Home BP Monitoring: is not measured at home. Pertinent ROS: taking medications as instructed, no medication side effects noted, no TIA's, no chest pain on exertion, no dyspnea on exertion, no swelling of ankles. Dyslipidemia Review:  Patient presents for evaluation of lipids. Compliance with treatment thus far has been excellent. A repeat fasting lipid profile was done. The patient does not use medications that may worsen dyslipidemias . The patient exercises sporadically. The patient is not known to have coexisting coronary artery disease    States she is to obtain radiation on the scalp. GERD Review:   Patient has a history of gastroesophageal reflux with heartburn. Symptoms have been present for a few months. She denies dysphagia. She  has not lost weight. She denies melena, hematochezia, hematemesis, and coffee ground emesis. This has been associated with fullness after meals. She denies abdominal bloating and none. Medical therapy in the past has included proton pump inhibitor      Review of Systems  Constitutional: negative for fevers, chills, anorexia and weight loss  Eyes:   negative for visual disturbance and irritation  ENT:   negative for tinnitus,sore throat,nasal congestion,ear pains. hoarseness  Respiratory:  negative for cough, hemoptysis, dyspnea,wheezing  CV:   negative for chest pain, palpitations, lower extremity edema  GI:   negative for nausea, vomiting, diarrhea, abdominal pain,melena  Endo:               negative for polyuria,polydipsia,polyphagia,heat intolerance  Genitourinary: negative for frequency, dysuria and hematuria  Integument:  negative for rash and pruritus  Hematologic:  negative for easy bruising and gum/nose bleeding  Musculoskel: negative for myalgias, arthralgias, back pain, muscle weakness, joint pain  Neurological:  negative for headaches, dizziness, vertigo, memory problems and gait   Behavl/Psych: negative for feelings of anxiety, depression, mood changes    Past Medical History:   Diagnosis Date    Acute pharyngitis 2/8/2011    Allergic rhinitis, cause unspecified 2/8/2011    Arrhythmia     PVC    Asymptomatic varicose veins 2/8/2011    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     Carpal tunnel syndrome 2/8/2011    Degenerative Joint Disese ( improved/resolving) 2/8/2011    Degenetrative Dis Disease, 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    Diverticulosis of colon (without mention of hemorrhage) 2/8/2011    Dysphagia 2/8/2011    Edema, peripheral 2/8/2011    GERD (gastroesophageal reflux disease)     GERD Gastro- Esophageal Reflux Disease 2/8/2011    Herniated nucleus pulposus ( slipped disc) 2/8/2011    Menopausal 2/8/2011    PUD (peptic ulcer disease) 2012    Pure hypercholesterolemia 2/8/2011    Recurrent ear pain 2/8/2011    S/P radiation therapy     Scalp lesion 10/23/2014    Sebaceous cyst 4/22/2014    Unspecified cataract 2/8/2011    Unspecified essential hypertension 2/8/2011    Unspecified sleep apnea      Past Surgical History:   Procedure Laterality Date    HX HYSTERECTOMY      HX ORTHOPAEDIC  neck/back 2006    HX OTHER SURGICAL  5/8/2014     Excise recurrent scalp lesion and application of ACell    HX OTHER SURGICAL  5/8/2014    Excise keloid, anterior chest wall, and application of ACell    HX OTHER SURGICAL  5/8/2014     Excise sebaceous cyst, anterior chest wall    HX OTHER SURGICAL  5/8/2014    Punch biopsy, skin lesions, right forearm x2, right calf x1     Social History     Social History    Marital status:      Spouse name: N/A    Number of children: N/A    Years of education: N/A     Social History Main Topics    Smoking status: Never Smoker    Smokeless tobacco: Never Used  Alcohol use No    Drug use: No    Sexual activity: Yes     Partners: Male     Birth control/ protection: None     Other Topics Concern    None     Social History Narrative     Family History   Problem Relation Age of Onset    Hypertension Mother     Stroke Mother     Hypertension Father     Cancer Father      PROSTATE, MELANOMA    Anesth Problems Neg Hx      Current Outpatient Prescriptions   Medication Sig Dispense Refill    atorvastatin (LIPITOR) 40 mg tablet Take 1 Tab by mouth daily. 90 Tab 3    gabapentin (NEURONTIN) 100 mg capsule Take 1 Cap by mouth two (2) times a day. 180 Cap 3    raNITIdine (ZANTAC) 150 mg tablet Take 1 Tab by mouth two (2) times a day. 60 Tab 12    omeprazole (PRILOSEC) 40 mg capsule Take 1 Cap by mouth daily. 90 Cap 3    cloNIDine HCl (CATAPRES) 0.2 mg tablet Take 1 Tab by mouth two (2) times a day. 60 Tab 12    metoprolol succinate (TOPROL-XL) 25 mg XL tablet Take 1 Tab by mouth daily. 30 Tab 3    potassium chloride (KLOR-CON) 10 mEq tablet Take 1 Tab by mouth daily. 30 Tab 12    acetaminophen (TYLENOL) 325 mg tablet Take 325 mg by mouth every four (4) hours as needed for Pain.  amLODIPine (NORVASC) 10 mg tablet Take 5 mg by mouth daily.  latanoprost (XALATAN) 0.005 % ophthalmic solution Administer 1 Drop to right eye nightly.  meclizine (ANTIVERT) 25 mg tablet 1 tab tid prn 270 Tab 3    MULTIVIT WITH CALCIUM,IRON,MIN (WOMEN'S MULTIPLE VITAMINS PO) Take  by mouth.  aspirin delayed-release 81 mg tablet Take  by mouth daily.  fluticasone (FLONASE) 50 mcg/actuation nasal spray Administer to right and left nostril.  1 Bottle 12     Allergies   Allergen Reactions    Latex Rash    Bactrim [Sulfamethoprim Ds] Hives    Aspirin Other (comments)     Anything higher than 81mg makes pt jittery    Codeine Hives and Itching    Iodinated Contrast- Oral And Iv Dye Itching    Pcn [Penicillins] Hives and Itching    Tape [Adhesive] Rash Objective:  Visit Vitals    /70    Pulse 63    Temp 98 °F (36.7 °C) (Oral)    Resp 16    Ht 5' 2\" (1.575 m)    Wt 176 lb (79.8 kg)    SpO2 97%    BMI 32.19 kg/m2     Physical Exam:   General appearance - alert, well appearing, and in no distress  Mental status - alert, oriented to person, place, and time  EYE-DARLENE, EOMI, corneas normal, no foreign bodies  ENT-ENT exam normal, no neck nodes or sinus tenderness  Nose - normal and patent, no erythema, discharge or polyps  Mouth - mucous membranes moist, pharynx normal without lesions  Neck - supple, no significant adenopathy   Chest - clear to auscultation, no wheezes, rales or rhonchi, symmetric air entry   Heart - normal rate, regular rhythm, normal S1, S2, no murmurs, rubs, clicks or gallops   Abdomen - soft, nontender, nondistended, no masses or organomegaly  Lymph- no adenopathy palpable  Ext-peripheral pulses normal, no pedal edema, no clubbing or cyanosis  Skin-Warm and dry. no hyperpigmentation, vitiligo, or suspicious lesions  Neuro -alert, oriented, normal speech, no focal findings or movement disorder noted  Neck-normal C-spine, no tenderness, full ROM without pain  Feet-no nail deformities or callus formation with good pulses noted      Results for orders placed or performed in visit on 08/07/17   AMB POC LIPID PROFILE   Result Value Ref Range    Cholesterol (POC) 198     Triglycerides (POC) 180     HDL Cholesterol (POC) 57     Non-HDL Goal (POC) 141     LDL Cholesterol (POC) 105 MG/DL    TChol/HDL Ratio (POC) 3.5        Assessment/Plan:    ICD-10-CM ICD-9-CM    1. Pure hypercholesterolemia E78.00 272.0 AMB POC LIPID PROFILE   2. Essential hypertension I10 401.9 AMB POC LIPID PROFILE   3. Scalp mass R22.0 782.2      Orders Placed This Encounter    AMB POC LIPID PROFILE    atorvastatin (LIPITOR) 40 mg tablet     Sig: Take 1 Tab by mouth daily.      Dispense:  90 Tab     Refill:  3     lose weight, increase physical activity, follow low fat diet, follow low salt diet, continue present plan,Take 81mg aspirin daily  Patient Instructions   MyChart Activation    Thank you for requesting access to Buy buy tea. Please follow the instructions below to securely access and download your online medical record. Buy buy tea allows you to send messages to your doctor, view your test results, renew your prescriptions, schedule appointments, and more. How Do I Sign Up? 1. In your internet browser, go to [email or web address]  2. Click on the First Time User? Click Here link in the Sign In box. You will be redirect to the New Member Sign Up page. 3. Enter your Buy buy tea Access Code exactly as it appears below. You will not need to use this code after youve completed the sign-up process. If you do not sign up before the expiration date, you must request a new code. Buy buy tea Access Code: Activation code not generated  Current Buy buy tea Status: Patient Declined (This is the date your Buy buy tea access code will )    4. Enter the last four digits of your Social Security Number (xxxx) and Date of Birth (mm/dd/yyyy) as indicated and click Submit. You will be taken to the next sign-up page. 5. Create a Buy buy tea ID. This will be your Buy buy tea login ID and cannot be changed, so think of one that is secure and easy to remember. 6. Create a Buy buy tea password. You can change your password at any time. 7. Enter your Password Reset Question and Answer. This can be used at a later time if you forget your password. 8. Enter your e-mail address. You will receive e-mail notification when new information is available in [address] 9. Click Sign Up. You can now view and download portions of your medical record. 10. Click the Download Summary menu link to download a portable copy of your medical information. Additional Information    If you have questions, please visit the Frequently Asked Questions section of the Buy buy tea website at [email or web address] Remember, MyChart is NOT to be used for urgent needs. For medical emergencies, dial 911. Follow-up Disposition:  Return in about 3 months (around 11/7/2017), or if symptoms worsen or fail to improve. I have reviewed with the patient details of the assessment and plan and all questions were answered. Relevent patient education was performed. The most recent lab findings were reviewed with the patient. An After Visit Summary was printed and given to the patient.

## 2017-08-07 NOTE — MR AVS SNAPSHOT
Visit Information Date & Time Provider Department Dept. Phone Encounter #  
 8/7/2017  3:00 PM [facility] Villegas [facility] [place] [phone] [id number] Follow-up Instructions Return in about 3 months (around 11/7/2017), or if symptoms worsen or fail to improve. Your Appointments 12/14/2017  9:20 AM  
ESTABLISHED PATIENT with Daquan Flanagan MD  
[facility] Appt Note: 6 month follow up  
 [address]  
[address] [address] Upcoming Health Maintenance Date Due DTaP/Tdap/Td series (1 - Tdap) 12/21/1961 Pneumococcal 65+ High/Highest Risk (2 of 2 - PCV13) 9/11/2015 INFLUENZA AGE 9 TO ADULT 8/1/2017 GLAUCOMA SCREENING Q2Y 3/8/2018 MEDICARE YEARLY EXAM 4/29/2018 Allergies as of 8/7/2017  Review Complete On: 8/7/2017 By: Marce Kong LPN Severity Noted Reaction Type Reactions Latex  10/10/2011    Rash Bactrim [Sulfamethoprim Ds] High 06/06/2017    Hives Aspirin  10/10/2011   Side Effect Other (comments) Anything higher than 81mg makes pt jittery Codeine  10/10/2011    Hives, Itching Iodinated Contrast- Oral And Iv Dye  06/23/2011   Side Effect Itching Pcn [Penicillins]  10/10/2011    Hives, Itching Tape [Adhesive]  05/08/2014    Rash Current Immunizations  Reviewed on 12/12/2016 Name Date Influenza High Dose Vaccine PF 10/15/2016 Influenza Vaccine 9/11/2014 Influenza Vaccine Split 11/8/2012 Pneumococcal Polysaccharide (PPSV-23) 9/11/2014 Not reviewed this visit You Were Diagnosed With   
  
 Codes Comments Pure hypercholesterolemia    -  Primary ICD-10-CM: E78.00 ICD-9-CM: 272.0 Essential hypertension     ICD-10-CM: I10 
ICD-9-CM: 401.9 Scalp mass     ICD-10-CM: R22.0 ICD-9-CM: 086. 2 Vitals BP Pulse Temp Resp Height(growth percentile) Weight(growth percentile) 130/70 63 98 °F (36.7 °C) (Oral) 16 5' 2\" (1.575 m) 176 lb (79.8 kg) SpO2 BMI OB Status Smoking Status 97% 32.19 kg/m2 Hysterectomy Never Smoker Vitals History BMI and BSA Data Body Mass Index Body Surface Area  
 32.19 kg/m 2 1.87 m 2 Preferred Pharmacy Pharmacy Name Phone [address] [facility], [address] [place] [address] [address] Praveen Carlson Your Updated Medication List  
  
   
This list is accurate as of: 8/7/17  4:14 PM.  Always use your most recent med list.  
  
  
  
  
 aspirin delayed-release 81 mg tablet Take  by mouth daily. atorvastatin 40 mg tablet Commonly known as:  LIPITOR Take 1 Tab by mouth daily. cloNIDine HCl 0.2 mg tablet Commonly known as:  CATAPRES Take 1 Tab by mouth two (2) times a day. fluticasone 50 mcg/actuation nasal spray Commonly known as:  Therese Mealy Administer to right and left nostril.  
  
 gabapentin 100 mg capsule Commonly known as:  NEURONTIN Take 1 Cap by mouth two (2) times a day. latanoprost 0.005 % ophthalmic solution Commonly known as:  Willem Labrum Administer 1 Drop to right eye nightly. meclizine 25 mg tablet Commonly known as:  ANTIVERT  
1 tab tid prn  
  
 metoprolol succinate 25 mg XL tablet Commonly known as:  TOPROL-XL Take 1 Tab by mouth daily. NORVASC 10 mg tablet Generic drug:  amLODIPine Take 5 mg by mouth daily. omeprazole 40 mg capsule Commonly known as:  PriLOSEC Take 1 Cap by mouth daily. potassium chloride 10 mEq tablet Commonly known as:  KLOR-CON Take 1 Tab by mouth daily. raNITIdine 150 mg tablet Commonly known as:  ZANTAC Take 1 Tab by mouth two (2) times a day. TYLENOL 325 mg tablet Generic drug:  acetaminophen Take 325 mg by mouth every four (4) hours as needed for Pain. WOMEN'S MULTIPLE VITAMINS PO Take  by mouth. Prescriptions Sent to Pharmacy Refills  
 atorvastatin (LIPITOR) 40 mg tablet 3 Sig: Take 1 Tab by mouth daily. Class: Normal  
 Pharmacy: [address], [address]  #: [phone] Route: Oral  
  
We Performed the Following AMB POC LIPID PROFILE [17665 CPT(R)] Follow-up Instructions Return in about 3 months (around 2017), or if symptoms worsen or fail to improve. Patient Instructions Olarkhart Activation Thank you for requesting access to ProductBio. Please follow the instructions below to securely access and download your online medical record. ProductBio allows you to send messages to your doctor, view your test results, renew your prescriptions, schedule appointments, and more. How Do I Sign Up? 1. In your internet browser, go to [email or web address] 
2. Click on the First Time User? Click Here link in the Sign In box. You will be redirect to the New Member Sign Up page. 3. Enter your ProductBio Access Code exactly as it appears below. You will not need to use this code after youve completed the sign-up process. If you do not sign up before the expiration date, you must request a new code. ProductBio Access Code: Activation code not generated Current ProductBio Status: Patient Declined (This is the date your ProductBio access code will ) 4. Enter the last four digits of your Social Security Number (xxxx) and Date of Birth (mm/dd/yyyy) as indicated and click Submit. You will be taken to the next sign-up page. 5. Create a ProductBio ID. This will be your ProductBio login ID and cannot be changed, so think of one that is secure and easy to remember. 6. Create a ProductBio password. You can change your password at any time. 7. Enter your Password Reset Question and Answer. This can be used at a later time if you forget your password. 8. Enter your e-mail address.  You will receive e-mail notification when new information is available in NetManagehart. 9. Click Sign Up. You can now view and download portions of your medical record. 10. Click the Download Summary menu link to download a portable copy of your medical information. Additional Information If you have questions, please visit the Frequently Asked Questions section of the VideoMining website at [email or web address] Remember, VideoMining is NOT to be used for urgent needs. For medical emergencies, dial 911. Please provide this summary of care documentation to your next provider. Your primary care clinician is listed as Kelly Stallworth. If you have any questions after today's visit, please call [phone].

## 2017-08-16 ENCOUNTER — TELEPHONE (OUTPATIENT)
Dept: CARDIOLOGY CLINIC | Age: 77
End: 2017-08-16

## 2017-08-16 NOTE — TELEPHONE ENCOUNTER
A voicemail was left requesting a call back. She needs to manually download from her Kozio remote system. \"Maximum count met\" alert came, episodes are not uploaded to the system.

## 2017-08-18 ENCOUNTER — OFFICE VISIT (OUTPATIENT)
Dept: CARDIOLOGY CLINIC | Age: 77
End: 2017-08-18

## 2017-08-18 DIAGNOSIS — Z95.818 STATUS POST PLACEMENT OF IMPLANTABLE LOOP RECORDER: Primary | ICD-10-CM

## 2017-08-21 ENCOUNTER — TELEPHONE (OUTPATIENT)
Dept: CARDIOLOGY CLINIC | Age: 77
End: 2017-08-21

## 2017-08-21 NOTE — TELEPHONE ENCOUNTER
I was able to reach Tomasz Brood this call; ID verified x2. She prefers to come into clinic, not sure about the [facility] unit.   Appointment scheduled for 8-22 at 10 am.

## 2017-08-22 ENCOUNTER — CLINICAL SUPPORT (OUTPATIENT)
Dept: CARDIOLOGY CLINIC | Age: 77
End: 2017-08-22

## 2017-08-22 DIAGNOSIS — I25.10 CORONARY ARTERY DISEASE INVOLVING NATIVE CORONARY ARTERY OF NATIVE HEART WITHOUT ANGINA PECTORIS: ICD-10-CM

## 2017-08-22 DIAGNOSIS — R00.1 BRADYCARDIA: ICD-10-CM

## 2017-08-22 DIAGNOSIS — I49.5 SICK SINUS SYNDROME DUE TO SA NODE DYSFUNCTION (HCC): Primary | ICD-10-CM

## 2017-08-22 DIAGNOSIS — R55 SYNCOPE, UNSPECIFIED SYNCOPE TYPE: ICD-10-CM

## 2017-08-22 DIAGNOSIS — Z01.812 PRE-PROCEDURE LAB EXAM: ICD-10-CM

## 2017-08-22 DIAGNOSIS — I10 ESSENTIAL HYPERTENSION: ICD-10-CM

## 2017-08-22 RX ORDER — CHLORHEXIDINE GLUCONATE 4 G/100ML
SOLUTION TOPICAL
Qty: 1 BOTTLE | Refills: 0 | Status: SHIPPED | OUTPATIENT
Start: 2017-08-22 | End: 2017-09-29

## 2017-08-22 NOTE — PATIENT INSTRUCTIONS
Your removal of implantable loop recorder and pacemaker implant procedure has been scheduled for 9/29/17 at 10:45am, at [facility].    Please report to Admitting Department by 8:45am, or 2 hours prior to your scheduled procedure. Please bring a list of your current medications and medication bottles, if able, to the hospital on this day. You will be unable to drive after your procedure so please make sure to bring someone with you to your procedure. You will need to have nothing to eat or drink after midnight, the night prior to your procedure. You may have small sips of water, if needed, to take with your medication. You will need labs drawn prior to your procedure. Please go to [facility] to have this done no sooner than 8/29/17 and no later than 9/20/17. You should not stop your medications prior to your scheduled procedure. After your procedure, you will need to follow up with Dr. Abram Wong. Your follow-up appointment has been scheduled for 10/12/17 at 10:30am.     Hibiclens 4% topical solution has been ordered and sent into your pharmacy  Patient it start Hibiclens application 5 days prior to procedure date    Directions Hibiclens 4%: Start cleanse 5 days prior to procedure   1. Rinse area (upper chest and upper arms) with water. 2. Apply minimum amount necessary to scrub the upper chest area from shoulder/neck to mid line of chest and to below the nipple each of  5 nights before the day of the procedure  3. Let solution dry.

## 2017-08-22 NOTE — PROGRESS NOTES
Loop recorder showed many episodes of recurrent sinus bradycardia to 20-30 bpm  No syncope but this confirmed sick sinus syndrome related to her syncope near syncope before  I discussed and recommended pacemaker implant and ILR removal

## 2017-08-22 NOTE — MR AVS SNAPSHOT
Visit Information Date & Time Provider Department Dept. Phone Encounter #  
 8/22/2017 10:00  [place] [facility] [phone] [id number] Your Appointments 9/14/2017 10:45 AM  
ROUTINE CARE with Fer Delacruz MD  
[facility] [address] [facility] ([address]) Appt Note: 1 month check up [address] [facility],[address] [address] [facility]  
[phone]  
  
   
 [address] [address]  
  
    
 10/12/2017 10:30 AM  
PACEMAKER with [address] [facility] ([facility]) Appt Note: 2 week PPM and ILR removal wound check [address]  
[address] [facility] [address]  
  
    
 10/12/2017 10:40 AM  
ESTABLISHED PATIENT with Kailey Azevedo MD  
[facility] ([address]) Appt Note: 2 week PPM and ILR removal wound check [address]  
[address] [facility]  
  
    
 12/14/2017  9:20 AM  
ESTABLISHED PATIENT with Kailey Azevedo MD  
[facility] ([address]) Appt Note: 6 month follow up  
 [address]  
[phone] Upcoming Health Maintenance Date Due DTaP/Tdap/Td series (1 - Tdap) 12/21/1961 Pneumococcal 65+ High/Highest Risk (2 of 2 - PCV13) 9/11/2015 INFLUENZA AGE 9 TO ADULT 8/1/2017 GLAUCOMA SCREENING Q2Y 3/8/2018 MEDICARE YEARLY EXAM 4/29/2018 Allergies as of 8/22/2017  Review Complete On: 8/7/2017 By: Marce Decker LPN Severity Noted Reaction Type Reactions Latex  10/10/2011    Rash Bactrim [Sulfamethoprim Ds] High 06/06/2017    Hives Aspirin  10/10/2011   Side Effect Other (comments) Anything higher than 81mg makes pt jittery Codeine  10/10/2011    Hives, Itching Iodinated Contrast- Oral And Iv Dye  06/23/2011   Side Effect Itching Pcn [Penicillins]  10/10/2011    Hives, Itching Tape [Adhesive]  05/08/2014    Rash Current Immunizations  Reviewed on 12/12/2016 Name Date Influenza High Dose Vaccine PF 10/15/2016 Influenza Vaccine 9/11/2014 Influenza Vaccine Split 11/8/2012 Pneumococcal Polysaccharide (PPSV-23) 9/11/2014 Not reviewed this visit You Were Diagnosed With   
  
 Codes Comments Pre-procedure lab exam    -  Primary ICD-10-CM: N83.013 ICD-9-CM: V72.63 Essential hypertension     ICD-10-CM: I10 
ICD-9-CM: 401.9 Syncope, unspecified syncope type     ICD-10-CM: R55 
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  
  
   
This list is accurate as of: 8/22/17 12:10 PM.  Always use your most recent med list.  
  
  
  
  
 aspirin delayed-release 81 mg tablet Take  by mouth daily. atorvastatin 40 mg tablet Commonly known as:  LIPITOR Take 1 Tab by mouth daily. chlorhexidine 4 % liquid Commonly known as:  HIBICLENS Apply to the upper chest area from shoulder/neck to mid line of chest and to below the nipple every day, 5 days prior to the procedure. cloNIDine HCl 0.2 mg tablet Commonly known as:  CATAPRES Take 1 Tab by mouth two (2) times a day. fluticasone 50 mcg/actuation nasal spray Commonly known as:  Leafy Few Administer to right and left nostril.  
  
 gabapentin 100 mg capsule Commonly known as:  NEURONTIN Take 1 Cap by mouth two (2) times a day. latanoprost 0.005 % ophthalmic solution Commonly known as:  John Herndon Administer 1 Drop to right eye nightly. meclizine 25 mg tablet Commonly known as:  ANTIVERT  
1 tab tid prn  
  
 metoprolol succinate 25 mg XL tablet Commonly known as:  TOPROL-XL Take 1 Tab by mouth daily. NORVASC 10 mg tablet Generic drug:  amLODIPine Take 5 mg by mouth daily. omeprazole 40 mg capsule Commonly known as:  PriLOSEC Take 1 Cap by mouth daily. potassium chloride 10 mEq tablet Commonly known as:  KLOR-CON Take 1 Tab by mouth daily. raNITIdine 150 mg tablet Commonly known as:  ZANTAC Take 1 Tab by mouth two (2) times a day. TYLENOL 325 mg tablet Generic drug:  acetaminophen Take 325 mg by mouth every four (4) hours as needed for Pain. WOMEN'S MULTIPLE VITAMINS PO Take  by mouth. Prescriptions Sent to Pharmacy Refills  
 chlorhexidine (HIBICLENS) 4 % liquid 0 Sig: Apply to the upper chest area from shoulder/neck to mid line of chest and to below the nipple every day, 5 days prior to the procedure. Class: Normal  
 Pharmacy: [facility] [address] [place] [address] #: [phone] We Performed the Following CBC WITH AUTOMATED DIFF [99460 CPT(R)] METABOLIC PANEL, COMPREHENSIVE [06644 CPT(R)] To-Do List   
 09/29/2017 10:45 AM  
  Appointment with CATH ROOM 3 [facility] at [address] [place] [address] ([phone]) Patient Instructions Your removal of implantable loop recorder and pacemaker implant procedure has been scheduled for 9/29/17 at 10:45am, at [facility]. 
 
Please report to Admitting Department by 8:45am, or 2 hours prior to your scheduled procedure. Please bring a list of your current medications and medication bottles, if able, to the hospital on this day.   You will be unable to drive after your procedure so please make sure to bring someone with you to your procedure. You will need to have nothing to eat or drink after midnight, the night prior to your procedure. You may have small sips of water, if needed, to take with your medication. You will need labs drawn prior to your procedure. Please go to [facility] to have this done no sooner than 8/29/17 and no later than 9/20/17. You should not stop your medications prior to your scheduled procedure. After your procedure, you will need to follow up with Dr. Rhonda Hays. Your follow-up appointment has been scheduled for 10/12/17 at 10:30am.  
 
Hibiclens 4% topical solution has been ordered and sent into your pharmacy Patient it start Hibiclens application 5 days prior to procedure date Directions Hibiclens 4%: Start cleanse 5 days prior to procedure 1. Rinse area (upper chest and upper arms) with water. 2. Apply minimum amount necessary to scrub the upper chest area from shoulder/neck to mid line of chest and to below the nipple each of  5 nights before the day of the procedure 3. Let solution dry. Please provide this summary of care documentation to your next provider. Your primary care clinician is listed as Worthy Gerard. If you have any questions after today's visit, please call [phone].

## 2017-08-23 DIAGNOSIS — K21.9 GASTROESOPHAGEAL REFLUX DISEASE WITHOUT ESOPHAGITIS: ICD-10-CM

## 2017-08-23 DIAGNOSIS — I10 ESSENTIAL HYPERTENSION: ICD-10-CM

## 2017-08-23 DIAGNOSIS — I10 HTN (HYPERTENSION), MALIGNANT: ICD-10-CM

## 2017-08-23 DIAGNOSIS — G56.00 CARPAL TUNNEL SYNDROME, UNSPECIFIED LATERALITY: ICD-10-CM

## 2017-08-23 DIAGNOSIS — E78.00 PURE HYPERCHOLESTEROLEMIA: ICD-10-CM

## 2017-08-24 RX ORDER — CLONIDINE HYDROCHLORIDE 0.2 MG/1
0.2 TABLET ORAL 2 TIMES DAILY
Qty: 180 TAB | Refills: 3 | Status: SHIPPED | OUTPATIENT
Start: 2017-08-24 | End: 2018-08-21

## 2017-08-24 RX ORDER — GABAPENTIN 100 MG/1
100 CAPSULE ORAL 2 TIMES DAILY
Qty: 180 CAP | Refills: 3 | Status: SHIPPED | OUTPATIENT
Start: 2017-08-24 | End: 2018-01-24 | Stop reason: SDUPTHER

## 2017-08-24 RX ORDER — RANITIDINE 150 MG/1
150 TABLET, FILM COATED ORAL 2 TIMES DAILY
Qty: 180 TAB | Refills: 3 | Status: SHIPPED | OUTPATIENT
Start: 2017-08-24 | End: 2017-09-22 | Stop reason: SDUPTHER

## 2017-08-24 RX ORDER — METOPROLOL SUCCINATE 25 MG/1
25 TABLET, EXTENDED RELEASE ORAL DAILY
Qty: 90 TAB | Refills: 0 | Status: SHIPPED | OUTPATIENT
Start: 2017-08-24 | End: 2017-09-18 | Stop reason: SDUPTHER

## 2017-08-24 RX ORDER — OMEPRAZOLE 40 MG/1
40 CAPSULE, DELAYED RELEASE ORAL DAILY
Qty: 90 CAP | Refills: 3 | Status: SHIPPED | OUTPATIENT
Start: 2017-08-24 | End: 2018-12-05 | Stop reason: SDUPTHER

## 2017-08-24 RX ORDER — ATORVASTATIN CALCIUM 40 MG/1
40 TABLET, FILM COATED ORAL DAILY
Qty: 90 TAB | Refills: 3 | Status: SHIPPED | OUTPATIENT
Start: 2017-08-24 | End: 2018-10-29 | Stop reason: SDUPTHER

## 2017-08-24 RX ORDER — POTASSIUM CHLORIDE 750 MG/1
10 TABLET, EXTENDED RELEASE ORAL DAILY
Qty: 90 TAB | Refills: 3 | Status: SHIPPED | OUTPATIENT
Start: 2017-08-24 | End: 2019-04-03 | Stop reason: SDUPTHER

## 2017-09-18 ENCOUNTER — OFFICE VISIT (OUTPATIENT)
Dept: INTERNAL MEDICINE CLINIC | Age: 77
End: 2017-09-18

## 2017-09-18 VITALS
SYSTOLIC BLOOD PRESSURE: 136 MMHG | RESPIRATION RATE: 16 BRPM | TEMPERATURE: 97.9 F | HEART RATE: 62 BPM | DIASTOLIC BLOOD PRESSURE: 80 MMHG | OXYGEN SATURATION: 97 % | HEIGHT: 62 IN | WEIGHT: 169 LBS | BODY MASS INDEX: 31.1 KG/M2

## 2017-09-18 DIAGNOSIS — Z23 ENCOUNTER FOR IMMUNIZATION: ICD-10-CM

## 2017-09-18 DIAGNOSIS — I10 ESSENTIAL HYPERTENSION: ICD-10-CM

## 2017-09-18 DIAGNOSIS — K21.9 GASTROESOPHAGEAL REFLUX DISEASE WITHOUT ESOPHAGITIS: ICD-10-CM

## 2017-09-18 DIAGNOSIS — M12.811 ROTATOR CUFF ARTHROPATHY, RIGHT: Primary | ICD-10-CM

## 2017-09-18 RX ORDER — TRIAMCINOLONE ACETONIDE 40 MG/ML
40 INJECTION, SUSPENSION INTRA-ARTICULAR; INTRAMUSCULAR ONCE
Qty: 1 VIAL | Refills: 0
Start: 2017-09-18 | End: 2017-09-18

## 2017-09-18 RX ORDER — METOPROLOL SUCCINATE 25 MG/1
25 TABLET, EXTENDED RELEASE ORAL DAILY
Qty: 90 TAB | Refills: 3 | Status: ON HOLD | OUTPATIENT
Start: 2017-09-18 | End: 2017-09-29

## 2017-09-18 NOTE — MR AVS SNAPSHOT
Visit Information Date & Time Provider Department Dept. Phone Encounter #  
 9/18/2017  2:45 PM Rubina Lea MD [address] [facility] [phone] [id number] Follow-up Instructions Return in about 3 months (around 12/18/2017), or if symptoms worsen or fail to improve. Follow-up and Disposition History Your Appointments 10/12/2017 10:30 AM  
PACEMAKER with PACEMAKER3 [facility] ([facility]) Appt Note: 2 week PPM and ILR removal wound check [address]  
[address] [address]  
  
    
 10/12/2017 10:40 AM  
ESTABLISHED PATIENT with Hanna Kearns MD  
[facility] ([facility]) Appt Note: 2 week PPM and ILR removal wound check [address]  
[address] [facility] [address]  
  
    
 12/14/2017  9:20 AM  
ESTABLISHED PATIENT with Hanna Kearns MD  
[facility] ([facility]) Appt Note: 6 month follow up  
 [address]  
[phone] Upcoming Health Maintenance Date Due DTaP/Tdap/Td series (1 - Tdap) 12/21/1961 Pneumococcal 65+ High/Highest Risk (2 of 2 - PCV13) 9/11/2015 INFLUENZA AGE 9 TO ADULT 8/1/2017 GLAUCOMA SCREENING Q2Y 3/8/2018 MEDICARE YEARLY EXAM 4/29/2018 Allergies as of 9/18/2017  Review Complete On: 9/18/2017 By: Rubina Lea MD  
  
 Severity Noted Reaction Type Reactions Latex  10/10/2011    Rash Bactrim [Sulfamethoprim Ds] High 06/06/2017    Hives Aspirin  10/10/2011   Side Effect Other (comments) Anything higher than 81mg makes pt jittery Codeine  10/10/2011    Hives, Itching Iodinated Contrast- Oral And Iv Dye  06/23/2011   Side Effect Itching Pcn [Penicillins]  10/10/2011    Hives, Itching Tape [Adhesive]  05/08/2014    Rash Current Immunizations  Reviewed on 12/12/2016 Name Date Influenza High Dose Vaccine PF 10/15/2016 Influenza Vaccine 9/11/2014 Influenza Vaccine Split 11/8/2012 Pneumococcal Polysaccharide (PPSV-23) 9/11/2014 Not reviewed this visit You Were Diagnosed With   
  
 Codes Comments Rotator cuff arthropathy, right    -  Primary ICD-10-CM: D49.818 ICD-9-CM: 716.81 Encounter for immunization     ICD-10-CM: G06 ICD-9-CM: V03.89 Essential hypertension     ICD-10-CM: I10 
ICD-9-CM: 401.9 Gastroesophageal reflux disease without esophagitis     ICD-10-CM: K21.9 ICD-9-CM: 530.81 Vitals BP Pulse Temp Resp Height(growth percentile) Weight(growth percentile) 136/80 62 97.9 °F (36.6 °C) (Oral) 16 5' 2\" (1.575 m) 169 lb (76.7 kg) SpO2 BMI OB Status Smoking Status 97% 30.91 kg/m2 Hysterectomy Never Smoker Vitals History BMI and BSA Data Body Mass Index Body Surface Area 30.91 kg/m 2 1.83 m 2 Preferred Pharmacy Pharmacy Name Phone [address] 55[address] [facility] [address] [phone] Your Updated Medication List  
  
   
This list is accurate as of: 9/18/17  4:38 PM.  Always use your most recent med list.  
  
  
  
  
 aspirin delayed-release 81 mg tablet Take  by mouth daily. atorvastatin 40 mg tablet Commonly known as:  LIPITOR Take 1 Tab by mouth daily. chlorhexidine 4 % liquid Commonly known as:  HIBICLENS Apply to the upper chest area from shoulder/neck to mid line of chest and to below the nipple every day, 5 days prior to the procedure. cloNIDine HCl 0.2 mg tablet Commonly known as:  CATAPRES Take 1 Tab by mouth two (2) times a day. fluticasone 50 mcg/actuation nasal spray Commonly known as:  Joss Bruce Administer to right and left nostril. gabapentin 100 mg capsule Commonly known as:  NEURONTIN Take 1 Cap by mouth two (2) times a day. latanoprost 0.005 % ophthalmic solution Commonly known as:  Teresa Finders Administer 1 Drop to right eye nightly. meclizine 25 mg tablet Commonly known as:  ANTIVERT  
1 tab tid prn  
  
 metoprolol succinate 25 mg XL tablet Commonly known as:  TOPROL-XL Take 1 Tab by mouth daily. NORVASC 10 mg tablet Generic drug:  amLODIPine Take 5 mg by mouth daily. omeprazole 40 mg capsule Commonly known as:  PriLOSEC Take 1 Cap by mouth daily. potassium chloride 10 mEq tablet Commonly known as:  KLOR-CON Take 1 Tab by mouth daily. raNITIdine 150 mg tablet Commonly known as:  ZANTAC Take 1 Tab by mouth two (2) times a day. triamcinolone acetonide 40 mg/mL injection Commonly known as:  KENALOG-40  
1 mL by Intra artICUlar route once for 1 dose. TYLENOL 325 mg tablet Generic drug:  acetaminophen Take 325 mg by mouth every four (4) hours as needed for Pain. WOMEN'S MULTIPLE VITAMINS PO Take  by mouth. Prescriptions Sent to Pharmacy Refills  
 metoprolol succinate (TOPROL-XL) 25 mg XL tablet 3 Sig: Take 1 Tab by mouth daily. Class: Normal  
 Pharmacy: [address] [place] [address], [address] [place] [address] #: [phone] Route: Oral  
  
Follow-up Instructions Return in about 3 months (around 12/18/2017), or if symptoms worsen or fail to improve. To-Do List   
 09/29/2017 10:45 AM  
  Appointment with [facility] 3 [facility] at [address] [place] [address] ([phone]) Patient Instructions Powerhouse Biologicst Activation Thank you for requesting access to Artillery. Please follow the instructions below to securely access and download your online medical record. Artillery allows you to send messages to your doctor, view your test results, renew your prescriptions, schedule appointments, and more. How Do I Sign Up? 1. In your internet browser, go to [email or web address] 
2. Click on the First Time User? Click Here link in the Sign In box. You will be redirect to the New Member Sign Up page. 3. Enter your AB Tasty Access Code exactly as it appears below. You will not need to use this code after youve completed the sign-up process. If you do not sign up before the expiration date, you must request a new code. MyChart Access Code: Activation code not generated Current AB Tasty Status: Patient Declined (This is the date your MyChart access code will ) 4. Enter the last four digits of your Social Security Number (xxxx) and Date of Birth (mm/dd/yyyy) as indicated and click Submit. You will be taken to the next sign-up page. 5. Create a Vanut ID. This will be your AB Tasty login ID and cannot be changed, so think of one that is secure and easy to remember. 6. Create a AB Tasty password. You can change your password at any time. 7. Enter your Password Reset Question and Answer. This can be used at a later time if you forget your password. 8. Enter your e-mail address. You will receive e-mail notification when new information is available in [address] 9. Click Sign Up. You can now view and download portions of your medical record. 10. Click the Download Summary menu link to download a portable copy of your medical information. Additional Information If you have questions, please visit the Frequently Asked Questions section of the AB Tasty website at [email or web address] Remember, AB Tasty is NOT to be used for urgent needs. For medical emergencies, dial 911. Please provide this summary of care documentation to your next provider. Your primary care clinician is listed as Yuan Priest. If you have any questions after today's visit, please call [phone].

## 2017-09-18 NOTE — PATIENT INSTRUCTIONS
[employer] Activation    Thank you for requesting access to DoubleVerify. Please follow the instructions below to securely access and download your online medical record. DoubleVerify allows you to send messages to your doctor, view your test results, renew your prescriptions, schedule appointments, and more. How Do I Sign Up? 1. In your internet browser, go to [email or web address]  2. Click on the First Time User? Click Here link in the Sign In box. You will be redirect to the New Member Sign Up page. 3. Enter your DoubleVerify Access Code exactly as it appears below. You will not need to use this code after youve completed the sign-up process. If you do not sign up before the expiration date, you must request a new code. DoubleVerify Access Code: Activation code not generated  Current DoubleVerify Status: Patient Declined (This is the date your DoubleVerify access code will )    4. Enter the last four digits of your Social Security Number (xxxx) and Date of Birth (mm/dd/yyyy) as indicated and click Submit. You will be taken to the next sign-up page. 5. Create a DoubleVerify ID. This will be your DoubleVerify login ID and cannot be changed, so think of one that is secure and easy to remember. 6. Create a DoubleVerify password. You can change your password at any time. 7. Enter your Password Reset Question and Answer. This can be used at a later time if you forget your password. 8. Enter your e-mail address. You will receive e-mail notification when new information is available in [address] 9. Click Sign Up. You can now view and download portions of your medical record. 10. Click the Download Summary menu link to download a portable copy of your medical information. Additional Information    If you have questions, please visit the Frequently Asked Questions section of the DoubleVerify website at [email or web address] Remember, DoubleVerify is NOT to be used for urgent needs. For medical emergencies, dial 911.

## 2017-09-18 NOTE — PROGRESS NOTES
Brenton Alfaro is a 64 y.o. female and presents with Leg Pain (left); Shoulder Pain (right); and Immunization/Injection  . Subjective:    Shoulder Pain Review:  Patient complains of right side shoulder pain. The symptoms began several weeks ago Course of symptoms since onset has been gradually worsening. Pain is described as overall severity = moderate. Symptoms were incited by no known event. Patient denies N/A. Therapy to date includes OTC analgesics: effective. Hypertension Review:  The patient has hypertension . Diet and Lifestyle: generally follows a low sodium diet, exercises sporadically  Home BP Monitoring: is not measured at home. Pertinent ROS: taking medications as instructed, no medication side effects noted, no TIA's, no chest pain on exertion, no dyspnea on exertion, she has bilateral swelling of ankles that is intermittent. Dyslipidemia Review:  Patient presents for evaluation of lipids. Compliance with treatment thus far has been excellent. A repeat fasting lipid profile was done. The patient does not use medications that may worsen dyslipidemias . The patient exercises sporadically. The patient is not known to have coexisting coronary artery disease    States she is to obtain radiation on the scalp. GERD Review:   Patient has a history of gastroesophageal reflux with heartburn. Symptoms have been present for a few months. She denies dysphagia. She  has not lost weight. She denies melena, hematochezia, hematemesis, and coffee ground emesis. This has been associated with fullness after meals. She denies abdominal bloating and none. Medical therapy in the past has included proton pump inhibitor      Review of Systems  Constitutional: negative for fevers, chills, anorexia and weight loss  Eyes:   negative for visual disturbance and irritation  ENT:   negative for tinnitus,sore throat,nasal congestion,ear pains. hoarseness  Respiratory:  negative for cough, hemoptysis, dyspnea,wheezing  CV:   negative for chest pain, palpitations, lower extremity edema  GI:   negative for nausea, vomiting, diarrhea, abdominal pain,melena  Endo:               negative for polyuria,polydipsia,polyphagia,heat intolerance  Genitourinary: negative for frequency, dysuria and hematuria  Integument:  negative for rash and pruritus  Hematologic:  negative for easy bruising and gum/nose bleeding  Musculoskel: myalgias, arthralgias, joint pain  Neurological:  negative for headaches, dizziness, vertigo, memory problems and gait   Behavl/Psych: negative for feelings of anxiety, depression, mood changes    Past Medical History:   Diagnosis Date    Acute pharyngitis 2/8/2011    Allergic rhinitis, cause unspecified 2/8/2011    Arrhythmia     PVC    Asymptomatic varicose veins 2/8/2011    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     Carpal tunnel syndrome 2/8/2011    Degenerative Joint Disese ( improved/resolving) 2/8/2011    Degenetrative Dis Disease, 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    Diverticulosis of colon (without mention of hemorrhage) 2/8/2011    Dysphagia 2/8/2011    Edema, peripheral 2/8/2011    GERD (gastroesophageal reflux disease)     GERD Gastro- Esophageal Reflux Disease 2/8/2011    Herniated nucleus pulposus ( slipped disc) 2/8/2011    Menopausal 2/8/2011    PUD (peptic ulcer disease) 2012    Pure hypercholesterolemia 2/8/2011    Recurrent ear pain 2/8/2011    S/P radiation therapy     Scalp lesion 10/23/2014    Sebaceous cyst 4/22/2014    Unspecified cataract 2/8/2011    Unspecified essential hypertension 2/8/2011    Unspecified sleep apnea      Past Surgical History:   Procedure Laterality Date    HX HYSTERECTOMY      HX ORTHOPAEDIC  neck/back 2006    HX OTHER SURGICAL  5/8/2014     Excise recurrent scalp lesion and application of ACell    HX OTHER SURGICAL  5/8/2014    Excise keloid, anterior chest wall, and application of ACell    HX OTHER SURGICAL  5/8/2014     Excise sebaceous cyst, anterior chest wall    HX OTHER SURGICAL  5/8/2014    Punch biopsy, skin lesions, right forearm x2, right calf x1     Social History     Social History    Marital status:      Spouse name: N/A    Number of children: N/A    Years of education: N/A     Social History Main Topics    Smoking status: Never Smoker    Smokeless tobacco: Never Used    Alcohol use No    Drug use: No    Sexual activity: Yes     Partners: Male     Birth control/ protection: None     Other Topics Concern    None     Social History Narrative     Family History   Problem Relation Age of Onset    Hypertension Mother     Stroke Mother     Hypertension Father     Cancer Father      PROSTATE, MELANOMA    Anesth Problems Neg Hx      Current Outpatient Prescriptions   Medication Sig Dispense Refill    metoprolol succinate (TOPROL-XL) 25 mg XL tablet Take 1 Tab by mouth daily. 90 Tab 3    triamcinolone acetonide (KENALOG-40) 40 mg/mL injection 1 mL by Intra artICUlar route once for 1 dose. 1 Vial 0    omeprazole (PRILOSEC) 40 mg capsule Take 1 Cap by mouth daily. 90 Cap 3    gabapentin (NEURONTIN) 100 mg capsule Take 1 Cap by mouth two (2) times a day. 180 Cap 3    atorvastatin (LIPITOR) 40 mg tablet Take 1 Tab by mouth daily. 90 Tab 3    raNITIdine (ZANTAC) 150 mg tablet Take 1 Tab by mouth two (2) times a day. 180 Tab 3    potassium chloride (KLOR-CON) 10 mEq tablet Take 1 Tab by mouth daily. 90 Tab 3    cloNIDine HCl (CATAPRES) 0.2 mg tablet Take 1 Tab by mouth two (2) times a day. 180 Tab 3    chlorhexidine (HIBICLENS) 4 % liquid Apply to the upper chest area from shoulder/neck to mid line of chest and to below the nipple every day, 5 days prior to the procedure. 1 Bottle 0    acetaminophen (TYLENOL) 325 mg tablet Take 325 mg by mouth every four (4) hours as needed for Pain.  amLODIPine (NORVASC) 10 mg tablet Take 5 mg by mouth daily.       latanoprost (XALATAN) 0.005 % ophthalmic solution Administer 1 Drop to right eye nightly.  meclizine (ANTIVERT) 25 mg tablet 1 tab tid prn 270 Tab 3    MULTIVIT WITH CALCIUM,IRON,MIN (WOMEN'S MULTIPLE VITAMINS PO) Take  by mouth.  aspirin delayed-release 81 mg tablet Take  by mouth daily.  fluticasone (FLONASE) 50 mcg/actuation nasal spray Administer to right and left nostril. 1 Bottle 12     Allergies   Allergen Reactions    Latex Rash    Bactrim [Sulfamethoprim Ds] Hives    Aspirin Other (comments)     Anything higher than 81mg makes pt jittery    Codeine Hives and Itching    Iodinated Contrast- Oral And Iv Dye Itching    Pcn [Penicillins] Hives and Itching    Tape [Adhesive] Rash       Objective:  Visit Vitals    /80    Pulse 62    Temp 97.9 °F (36.6 °C) (Oral)    Resp 16    Ht 5' 2\" (1.575 m)    Wt 169 lb (76.7 kg)    SpO2 97%    BMI 30.91 kg/m2     Physical Exam:   General appearance - alert, well appearing, and in mild distress  Mental status - alert, oriented to person, place, and time  EYE-DARLENE, EOMI, corneas normal, no foreign bodies  ENT-ENT exam normal, no neck nodes or sinus tenderness  Nose - normal and patent, no erythema, discharge or polyps  Mouth - mucous membranes moist, pharynx normal without lesions  Neck - supple, no significant adenopathy   Chest - clear to auscultation, no wheezes, rales or rhonchi, symmetric air entry   Heart - normal rate, regular rhythm, normal S1, S2, no murmurs, rubs, clicks or gallops   Abdomen - soft, nontender, nondistended, no masses or organomegaly  Lymph- no adenopathy palpable  Ext-peripheral pulses normal, no pedal edema, no clubbing or cyanosis  Skin-Warm and dry.  no hyperpigmentation, vitiligo, or suspicious lesions  Neuro -alert, oriented, normal speech, no focal findings or movement disorder noted  Neck-normal C-spine, no tenderness, full ROM without pain  Feet-no nail deformities or callus formation with good pulses noted  Rt.shoulder-reduced range of motion of rt., subdeltoid tenderness    Results for orders placed or performed in visit on 17   AMB POC LIPID PROFILE   Result Value Ref Range    Cholesterol (POC) 198     Triglycerides (POC) 180     HDL Cholesterol (POC) 57     Non-HDL Goal (POC) 141     LDL Cholesterol (POC) 105 MG/DL    TChol/HDL Ratio (POC) 3.5        Assessment/Plan:    ICD-10-CM ICD-9-CM    1. Rotator cuff arthropathy, right M12.811 716.81 triamcinolone acetonide (KENALOG-40) 40 mg/mL injection   2. Encounter for immunization Z23 V03.89 CANCELED: INFLUENZA VIRUS VACCINE, HIGH DOSE SEASONAL, PRESERVATIVE FREE   3. Essential hypertension I10 401.9 metoprolol succinate (TOPROL-XL) 25 mg XL tablet   4. Gastroesophageal reflux disease without esophagitis K21.9 530.81      Orders Placed This Encounter    metoprolol succinate (TOPROL-XL) 25 mg XL tablet     Sig: Take 1 Tab by mouth daily. Dispense:  90 Tab     Refill:  3    triamcinolone acetonide (KENALOG-40) 40 mg/mL injection     Si mL by Intra artICUlar route once for 1 dose. Dispense:  1 Vial     Refill:  0     lose weight, increase physical activity, follow low fat diet, follow low salt diet, continue present plan,Take 81mg aspirin daily    Indications:   Symptomatic relief of pain    Procedure:  After consent was obtained, using sterile technique the right shoulder joint was prepped using alcohol. Local anesthetic used: 1% lidocaine. . The joint was entered and Kenalog 40 mg was mixed with 1% lidocaine 3 ml  and injected into the joint and the needle withdrawn. The procedure was well tolerated. The patient is asked to continue to rest the joint for a few more days before resuming regular activities. It may be more painful for the first 1-2 days. Watch for fever, or increased swelling or persistent pain in the joint. Call or return to clinic prn if such symptoms occur or there is failure to improve as anticipated.       [address], [address] NOTE        Chart reviewed for the following:   Aguila Dahl MD, have reviewed the History, Physical and updated the Allergic reactions for Lokerrya Marrowbone     TIME OUT performed immediately prior to start of procedure:   I, Zak Linton MD, have performed the following reviews on Lomonica Marrowbone prior to the start of the procedure:            * Patient was identified by name and date of birth   * Agreement on procedure being performed was verified  * Risks and Benefits explained to the patient  * Procedure site verified and marked as necessary  * Patient was positioned for comfort       Time: 4:24pm      Date of procedure: 2017    Procedure performed by:  Zak Linton MD    Patient assisted by: self    How tolerated by patient: tolerated the procedure well with no complications    Comments: none                Patient Instructions   OneTouchEMR[employer] Activation    Thank you for requesting access to [employer]. Please follow the instructions below to securely access and download your online medical record. SportsPursuit allows you to send messages to your doctor, view your test results, renew your prescriptions, schedule appointments, and more. How Do I Sign Up? 1. In your internet browser, go to [email or web address]  2. Click on the First Time User? Click Here link in the Sign In box. You will be redirect to the New Member Sign Up page. 3. Enter your SportsPursuit Access Code exactly as it appears below. You will not need to use this code after youve completed the sign-up process. If you do not sign up before the expiration date, you must request a new code. SportsPursuit Access Code: Activation code not generated  Current SportsPursuit Status: Patient Declined (This is the date your SportsPursuit access code will )    4. Enter the last four digits of your Social Security Number (xxxx) and Date of Birth (mm/dd/yyyy) as indicated and click Submit. You will be taken to the next sign-up page. 5. Create a SportsPursuit ID. This will be your [employer]Mater[employer]ux login ID and cannot be changed, so think of one that is secure and easy to remember. 6. Create a [employer] password. You can change your password at any time. 7. Enter your Password Reset Question and Answer. This can be used at a later time if you forget your password. 8. Enter your e-mail address. You will receive e-mail notification when new information is available in [address] 9. Click Sign Up. You can now view and download portions of your medical record. 10. Click the Download Summary menu link to download a portable copy of your medical information. Additional Information    If you have questions, please visit the Frequently Asked Questions section of the MesM[employer]ux website at [email or web address] Remember, MesMateriaux is NOT to be used for urgent needs. For medical emergencies, dial 911. Follow-up Disposition:  Return in about 3 months (around 12/18/2017), or if symptoms worsen or fail to improve. I have reviewed with the patient details of the assessment and plan and all questions were answered. Relevent patient education was performed. The most recent lab findings were reviewed with the patient. An After Visit Summary was printed and given to the patient.

## 2017-09-21 RX ORDER — SODIUM CHLORIDE 0.9 % (FLUSH) 0.9 %
5-10 SYRINGE (ML) INJECTION AS NEEDED
Status: CANCELLED | OUTPATIENT
Start: 2017-09-21

## 2017-09-21 RX ORDER — SODIUM CHLORIDE 0.9 % (FLUSH) 0.9 %
5-10 SYRINGE (ML) INJECTION EVERY 8 HOURS
Status: CANCELLED | OUTPATIENT
Start: 2017-09-21

## 2017-09-22 DIAGNOSIS — R42 VERTIGO: ICD-10-CM

## 2017-09-22 DIAGNOSIS — G44.319 ACUTE POST-TRAUMATIC HEADACHE, NOT INTRACTABLE: ICD-10-CM

## 2017-09-25 RX ORDER — RANITIDINE 150 MG/1
150 TABLET, FILM COATED ORAL 2 TIMES DAILY
Qty: 180 TAB | Refills: 3 | Status: SHIPPED | OUTPATIENT
Start: 2017-09-25 | End: 2018-12-05 | Stop reason: SDUPTHER

## 2017-09-25 RX ORDER — MECLIZINE HYDROCHLORIDE 25 MG/1
TABLET ORAL
Qty: 270 TAB | Refills: 3 | Status: SHIPPED | OUTPATIENT
Start: 2017-09-25 | End: 2017-10-12

## 2017-09-25 RX ORDER — FLUTICASONE PROPIONATE 50 MCG
SPRAY, SUSPENSION (ML) NASAL
Qty: 1 BOTTLE | Refills: 12 | Status: SHIPPED | OUTPATIENT
Start: 2017-09-25 | End: 2018-12-05 | Stop reason: SDUPTHER

## 2017-09-26 LAB
ALBUMIN SERPL-MCNC: 3.7 G/DL (ref 3.5–4.8)
ALBUMIN/GLOB SERPL: 1.3 {RATIO} (ref 1.2–2.2)
ALP SERPL-CCNC: 94 IU/L (ref 39–117)
ALT SERPL-CCNC: 29 IU/L (ref 0–32)
AST SERPL-CCNC: 19 IU/L (ref 0–40)
BASOPHILS # BLD AUTO: 0 X10E3/UL (ref 0–0.2)
BASOPHILS NFR BLD AUTO: 0 %
BILIRUB SERPL-MCNC: 0.2 MG/DL (ref 0–1.2)
BUN SERPL-MCNC: 30 MG/DL (ref 8–27)
BUN/CREAT SERPL: 31 (ref 12–28)
CALCIUM SERPL-MCNC: 9.1 MG/DL (ref 8.7–10.3)
CHLORIDE SERPL-SCNC: 106 MMOL/L (ref 96–106)
CO2 SERPL-SCNC: 26 MMOL/L (ref 18–29)
CREAT SERPL-MCNC: 0.96 MG/DL (ref 0.57–1)
EOSINOPHIL # BLD AUTO: 0.1 X10E3/UL (ref 0–0.4)
EOSINOPHIL NFR BLD AUTO: 1 %
ERYTHROCYTE [DISTWIDTH] IN BLOOD BY AUTOMATED COUNT: 23.8 % (ref 12.3–15.4)
GLOBULIN SER CALC-MCNC: 2.8 G/DL (ref 1.5–4.5)
GLUCOSE SERPL-MCNC: 87 MG/DL (ref 65–99)
HCT VFR BLD AUTO: 33.7 % (ref 34–46.6)
HGB BLD-MCNC: 10.2 G/DL (ref 11.1–15.9)
IMM GRANULOCYTES # BLD: 0.1 X10E3/UL (ref 0–0.1)
IMM GRANULOCYTES NFR BLD: 1 %
INTERPRETATION: NORMAL
LYMPHOCYTES # BLD AUTO: 1.1 X10E3/UL (ref 0.7–3.1)
LYMPHOCYTES NFR BLD AUTO: 11 %
MCH RBC QN AUTO: 21.7 PG (ref 26.6–33)
MCHC RBC AUTO-ENTMCNC: 30.3 G/DL (ref 31.5–35.7)
MCV RBC AUTO: 72 FL (ref 79–97)
MONOCYTES # BLD AUTO: 0.6 X10E3/UL (ref 0.1–0.9)
MONOCYTES NFR BLD AUTO: 6 %
MORPHOLOGY BLD-IMP: ABNORMAL
NEUTROPHILS # BLD AUTO: 8.2 X10E3/UL (ref 1.4–7)
NEUTROPHILS NFR BLD AUTO: 81 %
PLATELET # BLD AUTO: 304 X10E3/UL (ref 150–379)
POTASSIUM SERPL-SCNC: 4 MMOL/L (ref 3.5–5.2)
PROT SERPL-MCNC: 6.5 G/DL (ref 6–8.5)
RBC # BLD AUTO: 4.69 X10E6/UL (ref 3.77–5.28)
SODIUM SERPL-SCNC: 147 MMOL/L (ref 134–144)
WBC # BLD AUTO: 10.1 X10E3/UL (ref 3.4–10.8)

## 2017-09-29 ENCOUNTER — APPOINTMENT (OUTPATIENT)
Dept: GENERAL RADIOLOGY | Age: 77
End: 2017-09-29
Attending: INTERNAL MEDICINE
Payer: MEDICARE

## 2017-09-29 ENCOUNTER — TELEPHONE (OUTPATIENT)
Dept: CARDIOLOGY CLINIC | Age: 77
End: 2017-09-29

## 2017-09-29 ENCOUNTER — APPOINTMENT (OUTPATIENT)
Dept: GENERAL RADIOLOGY | Age: 77
End: 2017-09-29
Attending: NURSE PRACTITIONER
Payer: MEDICARE

## 2017-09-29 ENCOUNTER — HOSPITAL ENCOUNTER (OUTPATIENT)
Dept: NON INVASIVE DIAGNOSTICS | Age: 77
Discharge: HOME | End: 2017-09-29
Attending: INTERNAL MEDICINE | Admitting: INTERNAL MEDICINE
Payer: MEDICARE

## 2017-09-29 VITALS
HEIGHT: 62 IN | DIASTOLIC BLOOD PRESSURE: 56 MMHG | TEMPERATURE: 98.2 F | OXYGEN SATURATION: 99 % | SYSTOLIC BLOOD PRESSURE: 151 MMHG | BODY MASS INDEX: 31.65 KG/M2 | WEIGHT: 172 LBS | RESPIRATION RATE: 17 BRPM | HEART RATE: 66 BPM

## 2017-09-29 DIAGNOSIS — I10 ESSENTIAL HYPERTENSION: ICD-10-CM

## 2017-09-29 PROBLEM — I49.5 SSS (SICK SINUS SYNDROME) (HCC): Status: ACTIVE | Noted: 2017-09-29

## 2017-09-29 PROBLEM — R00.1 BRADYCARDIA: Status: ACTIVE | Noted: 2017-09-29

## 2017-09-29 PROBLEM — Z95.0 S/P CARDIAC PACEMAKER PROCEDURE: Status: ACTIVE | Noted: 2017-09-29

## 2017-09-29 PROBLEM — Z95.818 STATUS POST PLACEMENT OF IMPLANTABLE LOOP RECORDER: Status: ACTIVE | Noted: 2017-09-29

## 2017-09-29 PROBLEM — Z95.818 STATUS POST PLACEMENT OF IMPLANTABLE LOOP RECORDER: Status: RESOLVED | Noted: 2017-09-29 | Resolved: 2017-09-29

## 2017-09-29 PROCEDURE — 77030031139 HC SUT VCRL2 J&J -A

## 2017-09-29 PROCEDURE — 71010 XR CHEST PORT: CPT

## 2017-09-29 PROCEDURE — 74011000250 HC RX REV CODE- 250: Performed by: INTERNAL MEDICINE

## 2017-09-29 PROCEDURE — C1892 INTRO/SHEATH,FIXED,PEEL-AWAY: HCPCS

## 2017-09-29 PROCEDURE — A4565 SLINGS: HCPCS

## 2017-09-29 PROCEDURE — 77030018729 HC ELECTRD DEFIB PAD CARD -B

## 2017-09-29 PROCEDURE — 77030011640 HC PAD GRND REM COVD -A

## 2017-09-29 PROCEDURE — C1785 PMKR, DUAL, RATE-RESP: HCPCS

## 2017-09-29 PROCEDURE — 74011250636 HC RX REV CODE- 250/636: Performed by: INTERNAL MEDICINE

## 2017-09-29 PROCEDURE — 74011250636 HC RX REV CODE- 250/636: Performed by: NURSE PRACTITIONER

## 2017-09-29 PROCEDURE — 77030018547 HC SUT ETHBND1 J&J -B

## 2017-09-29 PROCEDURE — C1898 LEAD, PMKR, OTHER THAN TRANS: HCPCS

## 2017-09-29 PROCEDURE — 33208 INSRT HEART PM ATRIAL & VENT: CPT

## 2017-09-29 PROCEDURE — 74011000272 HC RX REV CODE- 272: Performed by: INTERNAL MEDICINE

## 2017-09-29 PROCEDURE — 77030012935 HC DRSG AQUACEL BMS -B

## 2017-09-29 RX ORDER — VANCOMYCIN/0.9 % SOD CHLORIDE 1.5G/250ML
1500 PLASTIC BAG, INJECTION (ML) INTRAVENOUS ONCE
Status: COMPLETED | OUTPATIENT
Start: 2017-09-29 | End: 2017-09-29

## 2017-09-29 RX ORDER — METOPROLOL SUCCINATE 100 MG/1
100 TABLET, EXTENDED RELEASE ORAL DAILY
Qty: 30 TAB | Refills: 5 | Status: SHIPPED | OUTPATIENT
Start: 2017-09-29 | End: 2019-05-02 | Stop reason: ALTCHOICE

## 2017-09-29 RX ORDER — SODIUM CHLORIDE 0.9 % (FLUSH) 0.9 %
5-10 SYRINGE (ML) INJECTION EVERY 8 HOURS
Status: DISCONTINUED | OUTPATIENT
Start: 2017-09-29 | End: 2017-09-29 | Stop reason: HOSPADM

## 2017-09-29 RX ORDER — LIDOCAINE HYDROCHLORIDE 10 MG/ML
10-50 INJECTION INFILTRATION; PERINEURAL ONCE
Status: COMPLETED | OUTPATIENT
Start: 2017-09-29 | End: 2017-09-29

## 2017-09-29 RX ORDER — SODIUM CHLORIDE 0.9 % (FLUSH) 0.9 %
5-10 SYRINGE (ML) INJECTION AS NEEDED
Status: DISCONTINUED | OUTPATIENT
Start: 2017-09-29 | End: 2017-09-29 | Stop reason: HOSPADM

## 2017-09-29 RX ORDER — SODIUM CHLORIDE 0.9 % (FLUSH) 0.9 %
10 SYRINGE (ML) INJECTION AS NEEDED
Status: DISCONTINUED | OUTPATIENT
Start: 2017-09-29 | End: 2017-09-29 | Stop reason: HOSPADM

## 2017-09-29 RX ORDER — HYDRALAZINE HYDROCHLORIDE 20 MG/ML
10 INJECTION INTRAMUSCULAR; INTRAVENOUS ONCE
Status: COMPLETED | OUTPATIENT
Start: 2017-09-29 | End: 2017-09-29

## 2017-09-29 RX ORDER — FENTANYL CITRATE 50 UG/ML
25-200 INJECTION, SOLUTION INTRAMUSCULAR; INTRAVENOUS
Status: DISCONTINUED | OUTPATIENT
Start: 2017-09-29 | End: 2017-09-29

## 2017-09-29 RX ORDER — TRAMADOL HYDROCHLORIDE 50 MG/1
50 TABLET ORAL
Qty: 15 TAB | Refills: 0 | Status: SHIPPED | OUTPATIENT
Start: 2017-09-29 | End: 2017-12-07 | Stop reason: SDUPTHER

## 2017-09-29 RX ORDER — SODIUM CHLORIDE 9 MG/ML
25 INJECTION, SOLUTION INTRAVENOUS CONTINUOUS
Status: DISCONTINUED | OUTPATIENT
Start: 2017-09-29 | End: 2017-09-29 | Stop reason: HOSPADM

## 2017-09-29 RX ORDER — VANCOMYCIN HYDROCHLORIDE 1 G/20ML
1000 INJECTION, POWDER, LYOPHILIZED, FOR SOLUTION INTRAVENOUS ONCE
Status: COMPLETED | OUTPATIENT
Start: 2017-09-29 | End: 2017-09-29

## 2017-09-29 RX ORDER — MIDAZOLAM HYDROCHLORIDE 1 MG/ML
.5-1 INJECTION, SOLUTION INTRAMUSCULAR; INTRAVENOUS
Status: DISCONTINUED | OUTPATIENT
Start: 2017-09-29 | End: 2017-09-29

## 2017-09-29 RX ORDER — CLINDAMYCIN HYDROCHLORIDE 300 MG/1
300 CAPSULE ORAL 3 TIMES DAILY
Qty: 15 CAP | Refills: 0 | Status: SHIPPED | OUTPATIENT
Start: 2017-09-29 | End: 2017-10-12 | Stop reason: ALTCHOICE

## 2017-09-29 RX ORDER — NALOXONE HYDROCHLORIDE 0.4 MG/ML
0.4 INJECTION, SOLUTION INTRAMUSCULAR; INTRAVENOUS; SUBCUTANEOUS AS NEEDED
Status: DISCONTINUED | OUTPATIENT
Start: 2017-09-29 | End: 2017-09-29 | Stop reason: HOSPADM

## 2017-09-29 RX ORDER — HYDRALAZINE HYDROCHLORIDE 20 MG/ML
20 INJECTION INTRAMUSCULAR; INTRAVENOUS ONCE
Status: COMPLETED | OUTPATIENT
Start: 2017-09-29 | End: 2017-09-29

## 2017-09-29 RX ORDER — ATROPINE SULFATE 0.1 MG/ML
1 INJECTION INTRAVENOUS AS NEEDED
Status: DISCONTINUED | OUTPATIENT
Start: 2017-09-29 | End: 2017-09-29

## 2017-09-29 RX ADMIN — SODIUM CHLORIDE 50000 UNITS: 900 IRRIGANT IRRIGATION at 14:16

## 2017-09-29 RX ADMIN — SODIUM CHLORIDE 25 ML/HR: 900 INJECTION, SOLUTION INTRAVENOUS at 10:45

## 2017-09-29 RX ADMIN — VANCOMYCIN HYDROCHLORIDE 1000 MG: 1 INJECTION, POWDER, LYOPHILIZED, FOR SOLUTION INTRAVENOUS at 14:17

## 2017-09-29 RX ADMIN — FENTANYL CITRATE 50 MCG: 50 INJECTION, SOLUTION INTRAMUSCULAR; INTRAVENOUS at 13:58

## 2017-09-29 RX ADMIN — VANCOMYCIN HYDROCHLORIDE 1500 MG: 10 INJECTION, POWDER, LYOPHILIZED, FOR SOLUTION INTRAVENOUS at 13:00

## 2017-09-29 RX ADMIN — MIDAZOLAM HYDROCHLORIDE 2 MG: 1 INJECTION, SOLUTION INTRAMUSCULAR; INTRAVENOUS at 13:58

## 2017-09-29 RX ADMIN — LIDOCAINE HYDROCHLORIDE 30 ML: 10 INJECTION, SOLUTION INFILTRATION; PERINEURAL at 13:56

## 2017-09-29 RX ADMIN — HYDRALAZINE HYDROCHLORIDE 20 MG: 20 INJECTION INTRAMUSCULAR; INTRAVENOUS at 16:27

## 2017-09-29 RX ADMIN — MIDAZOLAM HYDROCHLORIDE 1 MG: 1 INJECTION, SOLUTION INTRAMUSCULAR; INTRAVENOUS at 13:51

## 2017-09-29 RX ADMIN — HYDRALAZINE HYDROCHLORIDE 10 MG: 20 INJECTION INTRAMUSCULAR; INTRAVENOUS at 12:02

## 2017-09-29 NOTE — PROGRESS NOTES
Cardiac Cath Lab Procedure Area Arrival Note:    Cristel Vicente arrived to Cardiac Cath Lab, Procedure Area. Patient identifiers verified with NAME and DATE OF BIRTH. Procedure verified with patient. Consent forms verified. Allergies verified. Patient informed of procedure and plan of care. Questions answered with review. Patient voiced understanding of procedure and plan of care. Patient on cardiac monitor, non-invasive blood pressure, SPO2 monitor. On RA, placed on O2 @ 2 lpm via nc. IV of ns on pump at 25 ml/hr. Patient status doing well without problems. Patient is A&Ox 3. Patient reports no pain. Patient medicated during procedure with orders obtained and verified by Dr. Destiney Hameed. Refer to patients Cardiac Cath Lab PROCEDURE REPORT for vital signs, assessment, status, and response during procedure, printed at end of case. Printed report on chart or scanned into chart.

## 2017-09-29 NOTE — DISCHARGE INSTRUCTIONS
INCREASE toprol XL to 100 mg daily    PATIENT INSTRUCTIONS POST-PACEMAKER IMPLANT    1. No heavy lifting or exercises with the left arm for 4 weeks. This includes the following:  Do not raise arm above the shoulder level to comb hair, pull on clothes, etc... Do not use the affected arm to pull up or push up from a seated or laying  down position. Do not allow anyone else to pull on the affected arm. 2. Remove aquacel dressing in a week. Your incision will have skin glue covering the incision, the skin glue will help to reinforce the incision to prevent it from opening, please DO NOT attempt to peel the glue off of the incision. You do have sutures on the inside of the incision that are not visible. Please DO NOT try to scrub the skin glue off once you are able to take a shower. The skin glue will eventually fall off     3. Do not drive for 3 days    4. Call Dr. Shazia Terry at [phone] if you experience any of the following symptoms:  1. Redness at the pacemaker site  2. Swelling at or around the pacemaker or in the left arm  3. Pain around the pacemaker  4. Dizziness, lightheadedness, fainting spells  5. Lack of energy  6. Shortness of breath  7. Rapid heart rate  8. Chest or muscle twitches    5. Follow-up with Dr. Shazia Terry as scheduled  Future Appointments  Date Time Provider Nasim Cerrato          10/12/2017 10:30 AM Katei Knee DOLORES [address]   10/12/2017 10:40 AM Daquan Flanagan  [address]   12/14/2017 9:20 AM Daquan Flanagan  [address]       6. You may use pain medication and ice pack for pain relief as needed. You may wear the sling as a reminder to keep your arm below the your shoulder. Jovany Chaparro M.D.  [facility]  Electrophysiology/Cardiology  [facility]  [address], [address], [address]  [place] [phone]

## 2017-09-29 NOTE — PROGRESS NOTES
TRANSFER - IN REPORT:    Verbal report received from [facility] on Cyrus León  being received from procedure area for routine progression of care. Report consisted of patients Situation, Background, Assessment and Recommendations(SBAR). Information from the following report(s) SBAR, Kardex, MAR, Recent Results and Cardiac Rhythm NSR was reviewed with the receiving clinician. Opportunity for questions and clarification was provided. Assessment completed upon patients arrival to [address] [place] [address] and care assumed. Cardiac Cath Lab Recovery Arrival Note:    Cyrus León arrived to [facility] recovery area. Patient procedure= PPI/ LOOP RECORDER REMOVAL. Patient on cardiac monitor, non-invasive blood pressure, SPO2 monitor. On room air. IV  of NS on pump at 25 ml/hr. Patient status doing well without problems. Patient is A&Ox 4  . Patient reports No Pain. PROCEDURE SITE CHECK:    Procedure site:without any bleeding and No hematoma, No pain/discomfort reported at procedure site. No change in patient status. Continue to monitor patient and status.

## 2017-09-29 NOTE — PROGRESS NOTES
Pt and Family verbalized understanding of discharge instructions. Pt feeling well and ambulated to bathroom with no complaints of dizziness. Right and Left PIV lockks removed; no redness or swelling. Left chest PPI site and Loop recorder removal site is unremarkable. Pt escorted to car via wheelchair with belongings.

## 2017-09-29 NOTE — H&P
Cardiac Electrophysiology H&P Note     Subjective: Robb Power is a 64 y.o. female patient who is here today for removal of implantable loop monitor and placement of permanent pacemaker. S/p ILR 6/2017. ILR recorder showed many episodes of recurrent sinus bradycardia to 20-30 bpm  No syncope, but this confirmed sick sinus syndrome related to her syncope near syncope before. She has been having occasional dizziness episodes with turning her head to fast.   She also mentions she has been having episodes of \"sweating\" suddenly and randomly. 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 She had surgery to have the skin cancer on her scalp removed. She had skin cancer removed from her scalp at [facility] and said she will have radiation therapy. She had been taking a low dose toprol toprol  to avoid syncope with bradycardia. I had stopped the Rythmol because it could have been contributing to the black outs, I was concerned about intermittent bradycardia. Last time she was seen she felt faint and sat down then she broke out in a sweat. She was not taking the Rythmol at this time.      Echo 5/2016 LVEF 60 % to 65 %. No RWMA. Mild concentric hypertrophy. Mild TR. External loop recorder 2/2016 showed no AFIB but mild sinus bradycardia and NSR  2/5/16 Stress test which she did and could do only 3 min exercise, cardiolite stress test with basal to mid inferior wall ischemia LVEF 66%.  Poor exercise capacity, HR did increase  I referred her to Dr Loren Carey to see for cardiac cath   She had cardiac cath and it showed 50% LAD, 60% LCx and 100% RCA with left to right collateral so Dr Loren Carey only recommended medical therapies  Echo with normal LVEF and inferior wall hypokinesis in June 2014   Holter 6/23/2014 sinus rhythm with minimum 39 bpm at 5 pm PVC's , mean HR 50 bpm while she is on medication     Past Hx:      She had passed out twice December  and 2016. She went the ED at [facility] both times. First episode 12/31/15 occurred while she was sitting on the stool in her kitchen, she started to feel tighten around her head then she LOC and fell off the tool. The next day she had a similar episode, she was doing things around the house and felt dizzy she sat down and put a cold compress on her head. No LOC. 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 She had seen Dr. Terrell Stone and had venous duplex without DVT.        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    Scalp lesion 10/23/2014    Sebaceous cyst 2014    Keloid 2014    Dysphagia 2011    Recurrent ear pain 2011    Acute pharyngitis 2011    Essential hypertension 2011    Edema, peripheral 2011    Pure hypercholesterolemia 2011    Allergic rhinitis, cause unspecified 2011    Diverticulosis of colon (without mention of hemorrhage) 2011    Herniated nucleus pulposus ( slipped disc) 2011    Degenetrative Dis Disease, Cervical 2011    Carpal tunnel syndrome 2011    Unspecified cataract 2011    GERD Gastro- Esophageal Reflux Disease 2011    Degenerative Joint Disese ( improved/resolving) 2011    Menopausal 2011    Asymptomatic varicose veins 2011       Allergies   Allergen Reactions    Latex Rash    Bactrim [Sulfamethoprim Ds] Hives    Aspirin Other (comments)     Anything higher than 81mg makes pt jittery    Codeine Hives and Itching    Iodinated Contrast- Oral And Iv Dye Itching    Pcn [Penicillins] Hives and Itching    Tape [Adhesive] Rash     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    Diverticulosis of colon (without mention of hemorrhage) 2/8/2011    Dysphagia 2/8/2011    Edema, peripheral 2/8/2011    GERD (gastroesophageal reflux disease)     GERD Gastro- Esophageal Reflux Disease 2/8/2011    Herniated nucleus pulposus ( slipped disc) 2/8/2011    Menopausal 2/8/2011    PUD (peptic ulcer disease) 2012    Pure hypercholesterolemia 2/8/2011    Recurrent ear pain 2/8/2011    S/P radiation therapy     Scalp lesion 10/23/2014    Sebaceous cyst 4/22/2014    Unspecified cataract 2/8/2011    Unspecified essential hypertension 2/8/2011    Unspecified sleep apnea      Past Surgical History:   Procedure Laterality Date    HX HYSTERECTOMY      HX ORTHOPAEDIC  neck/back 2006    HX OTHER SURGICAL  5/8/2014     Excise recurrent scalp lesion and application of ACell    HX OTHER SURGICAL  5/8/2014    Excise keloid, anterior chest wall, and application of ACell    HX OTHER SURGICAL  5/8/2014     Excise sebaceous cyst, anterior chest wall    HX OTHER SURGICAL  5/8/2014    Punch biopsy, skin lesions, right forearm x2, right calf x1     Family History   Problem Relation Age of Onset    Hypertension Mother     Stroke Mother     Hypertension Father     Cancer Father      PROSTATE, MELANOMA    Anesth Problems Neg Hx      Social History   Substance Use Topics    Smoking status: Never Smoker    Smokeless tobacco: Never Used    Alcohol use No        Review of Systems:   Constitutional: Negative for fever, chills, weight loss, malaise/fatigue. + dizziness/ + diaphoresis   HEENT: Negative for nosebleeds, vision changes. Respiratory: Negative for cough, hemoptysis, sputum production, and wheezing. Cardiovascular: Negative for chest pain, palpitations, orthopnea, claudication, leg swelling, + hx syncope, and PND. Gastrointestinal: Negative for nausea, vomiting, diarrhea, constipation, blood in stool and melena. Genitourinary: Negative for dysuria, and hematuria. Musculoskeletal: Negative for myalgias, arthralgia. Skin: Negative for rash. Heme: Does not bleed or bruise easily. Neurological: Negative for speech change and focal weakness     Objective:     Visit Vitals    Ht 5' 2\" (1.575 m)    Wt 172 lb (78 kg)    Breastfeeding No    BMI 31.46 kg/m2      Physical Exam:   Constitutional: well-developed and well-nourished. No distress. Head: Normocephalic and atraumatic. Scars from skin cancer removal.   Eyes: Pupils are equal, round  Neck: supple. No JVD present. Cardiovascular: Normal rate, regular rhythm. Exam reveals no gallop and no friction rub. No murmur heard. Pulmonary/Chest: Effort normal and breath sounds normal. No wheezes. loop scar unremarkable left side  Abdominal: Soft, no tenderness. Musculoskeletal: +1 edema RLE. No edema LLE. RLE healing ulcer, no openings, redness or drainage. Neurological: alert,oriented. Skin: Skin is warm and dry  Psychiatric: normal mood and affect. Behavior is normal. Judgment and thought content normal.          Assessment/Plan:   Hx of Syncope  Near syncope  Sick sinus syndrome   Sinus bradycardia   CAD  Anemia       Pre procedure labs reviewed, chronic anemia. Reviewed removal of ILR and  Pacemaker procedure & risk. She agrees to proceed.    Discussed with the patient     - Post procedure wound care and arm restrictions  - 2 week follow up - wound and device check   - Prophylactic antibiotic for 5 days post procedure  - Pain medication and ice pack for pain relief         Thank you for involving me in this patient's care and please call with further concerns or questions. Lovie Lesches NP  [phone]  [facility] [address] from  attending:   I have seen, examined patient, and discussed with nurse practitioner, registered nurse, reviewed, updated note and agree with the assessment and plan    I have talked to her today and she is glad she is here she said she is dizzy and cannot wait to have pacemaker done  Vital signs are stable  Exam shows regular rhythm and no rub     Assessment and Plan:  Continue to proceed with dual chamber pacemaker implant and remove loop recorder  She agrees with risks and benefits  Risks involve device implant include but are not limited to bleeding, infection, valvular damage, heart attack, stroke, lung collapse (pneumothorax or hemothorax), heart collapse (pericardial tamponade), heart perforation, kidney failure, death. Elective or emergency surgery may be required to repair some of these complications. Prolonged hospitalization would be required. General anesthesia may be required for the procedure.

## 2017-09-29 NOTE — PROGRESS NOTES
Cardiac Cath Lab Recovery Arrival Note:      Pamela Holland arrived to Cardiac Cath Lab, Recovery Area. Staff introduced to patient. Patient identifiers verified with NAME and DATE OF BIRTH. Procedure verified with patient. Consent forms reviewed and signed by patient or authorized representative and verified. Allergies verified. Patient and family oriented to department. Patient and family informed of procedure and plan of care. Questions answered with review. Patient prepped for procedure, per orders from physician, prior to arrival.    Patient on cardiac monitor, non-invasive blood pressure, SPO2 monitor. On Room Air. Patient is A&Ox 4. Patient reports No Pain. Patient in stretcher, in low position, with side rails up, call bell within reach, patient instructed to call if assistance as needed. Patient prep in: [address], [place]. Family in: Waiting Room.    Prep by: Mahesh Blackwell RN

## 2017-09-29 NOTE — PROGRESS NOTES
Pt with elevated BP; assisted to bathroom and continued to monitor BP post activity and at rest. BP remained 198/70 HR=42. Pt asymptomatic. Judy Clifford NP made aware. See MAR for Hydralazine order. Will monitor Pt status.

## 2017-09-29 NOTE — PROGRESS NOTES
Pt assisted to dress and felt dizzy. Pt placed back on strectcher and placed on monitor. Dr Ami Cooper to be made aware. Will Monitor BP and Pt status.

## 2017-09-29 NOTE — PROGRESS NOTES
TRANSFER - OUT REPORT:    Verbal report given to Radha Brady on Adelina Nolan being transferred to  for routine progression of care       Report consisted of patients Situation, Background, Assessment and   Recommendations(SBAR). Information from the following report(s) SBAR, Procedure Summary and MAR was reviewed with the receiving nurse. Opportunity for questions and clarification was provided.

## 2017-09-29 NOTE — IP AVS SNAPSHOT
[address] 
[phone] Patient: Marc Gibbons MRN: [id number]:[id number] Current Discharge Medication List  
  
START taking these medications Dose & Instructions Dispensing Information Comments Morning Noon Evening Bedtime  
 clindamycin 300 mg capsule Commonly known as:  CLEOCIN Your last dose was: Your next dose is:    
   
   
 Dose:  300 mg Take 1 Cap by mouth three (3) times daily. Quantity:  15 Cap Refills:  0  
     
   
   
   
  
 traMADol 50 mg tablet Commonly known as:  ULTRAM  
   
Your last dose was: Your next dose is:    
   
   
 Dose:  50 mg Take 1 Tab by mouth every eight (8) hours as needed for Pain. Max Daily Amount: 150 mg.  
 Quantity:  15 Tab Refills:  0 CONTINUE these medications which have CHANGED Dose & Instructions Dispensing Information Comments Morning Noon Evening Bedtime  
 metoprolol succinate 100 mg tablet Commonly known as:  TOPROL-XL What changed:   
- medication strength 
- how much to take Your last dose was: Your next dose is:    
   
   
 Dose:  100 mg Take 1 Tab by mouth daily. Quantity:  30 Tab Refills:  5 CONTINUE these medications which have NOT CHANGED Dose & Instructions Dispensing Information Comments Morning Noon Evening Bedtime  
 aspirin delayed-release 81 mg tablet Your last dose was: Your next dose is: Take  by mouth daily. Refills:  0  
     
   
   
   
  
 atorvastatin 40 mg tablet Commonly known as:  LIPITOR Your last dose was: Your next dose is:    
   
   
 Dose:  40 mg Take 1 Tab by mouth daily. Quantity:  90 Tab Refills:  3  
     
   
   
   
  
 cloNIDine HCl 0.2 mg tablet Commonly known as:  CATAPRES Your last dose was: Your next dose is: Dose:  0.2 mg Take 1 Tab by mouth two (2) times a day. Quantity:  180 Tab Refills:  3  
     
   
   
   
  
 fluticasone 50 mcg/actuation nasal spray Commonly known as:  Kristen Davis Your last dose was: Your next dose is:    
   
   
 Administer to right and left nostril. Quantity:  1 Bottle Refills:  12  
     
   
   
   
  
 gabapentin 100 mg capsule Commonly known as:  NEURONTIN Your last dose was: Your next dose is:    
   
   
 Dose:  100 mg Take 1 Cap by mouth two (2) times a day. Quantity:  180 Cap Refills:  3  
     
   
   
   
  
 latanoprost 0.005 % ophthalmic solution Commonly known as:  Donata [place] Your last dose was: Your next dose is:    
   
   
 Dose:  1 Drop Administer 1 Drop to right eye nightly. Refills:  0  
     
   
   
   
  
 meclizine 25 mg tablet Commonly known as:  ANTIVERT Your last dose was: Your next dose is:    
   
   
 1 tab tid prn Quantity:  270 Tab Refills:  3 NORVASC 10 mg tablet Generic drug:  amLODIPine Your last dose was: Your next dose is:    
   
   
 Dose:  5 mg Take 5 mg by mouth daily. Refills:  0  
     
   
   
   
  
 omeprazole 40 mg capsule Commonly known as:  PriLOSEC Your last dose was: Your next dose is:    
   
   
 Dose:  40 mg Take 1 Cap by mouth daily. Quantity:  90 Cap Refills:  3  
     
   
   
   
  
 potassium chloride 10 mEq tablet Commonly known as:  KLOR-CON Your last dose was: Your next dose is:    
   
   
 Dose:  10 mEq Take 1 Tab by mouth daily. Quantity:  90 Tab Refills:  3  
     
   
   
   
  
 raNITIdine 150 mg tablet Commonly known as:  ZANTAC Your last dose was: Your next dose is:    
   
   
 Dose:  150 mg Take 1 Tab by mouth two (2) times a day. Quantity:  180 Tab Refills:  3  
     
   
   
   
  
 TYLENOL 325 mg tablet Generic drug:  acetaminophen Your last dose was: Your next dose is:    
   
   
 Dose:  325 mg Take 325 mg by mouth every four (4) hours as needed for Pain. Refills:  0  
     
   
   
   
  
 WOMEN'S MULTIPLE VITAMINS PO Your last dose was: Your next dose is: Take  by mouth. Refills:  0 STOP taking these medications   
 chlorhexidine 4 % liquid Commonly known as:  HIBICLENS Where to Get Your Medications These medications were sent to [address] [place] [address] Phone:  [phone]  
  clindamycin 300 mg capsule  
 metoprolol succinate 100 mg tablet Information on where to get these meds will be given to you by the nurse or doctor. ! Ask your nurse or doctor about these medications  
  traMADol 50 mg tablet

## 2017-09-29 NOTE — PROCEDURES
Cardiac Procedure Note   Patient: Jarod Goins  MRN: [id number]  SSN: [id number]   YOB: 1940 Age: 64 y.o.   Sex: female    Date of Procedure: 9/29/2017   Pre-procedure Diagnosis: syncope sinus bradycardia sick sinus syndrome  Post-procedure Diagnosis: same  Procedure: Permanent Pacemaker Insertion  Removal of ILR  :  Dr. Dawn Zavala MD    Assistant(s):  None  Anesthesia: Moderate Sedation   Estimated Blood Loss: Less than 10 mL   Specimens Removed: Medtronic ILR  Findings: no venogram  Apical RV septum RV lead  Complications: None   Implants:  Dual chamber Medtronic pacemaker  Signed by:  Dawn Zavala MD  9/29/2017  2:39 PM

## 2017-09-29 NOTE — IP AVS SNAPSHOT
[address] 
[phone] Patient: Shamika Chambers MRN: [id number] You are allergic to the following Allergen Reactions Latex Rash Bactrim (Sulfamethoprim Ds) Hives Aspirin Other (comments) Anything higher than 81mg makes pt jittery Codeine Hives Itching Iodinated Contrast- Oral And Iv Dye Itching Pcn (Penicillins) Hives Itching Tape (Adhesive) Rash Recent Documentation Height Weight Breastfeeding? BMI OB Status Smoking Status 1.575 m 78 kg No 31.46 kg/m2 Hysterectomy Never Smoker Emergency Contacts Name Discharge Info Relation Home Work Mobile Jose FranciscoIsidro DISCHARGE CAREGIVER [3] Child [2] [phone] [facility]   [phone] About your hospitalization You were admitted on:  September 29, 2017 You last received care in the[address] You were discharged on:  September 29, 2017 Unit phone number:  [phone] Why you were hospitalized Your primary diagnosis was:  S/P Cardiac Pacemaker Procedure Your diagnoses also included:  Status Post Placement Of Implantable Loop Recorder, Bradycardia, Sss (Sick Sinus Syndrome) (Hcc), Syncope Providers Seen During Your Hospitalizations Provider Role Specialty Primary office phone Lance Lynch MD Attending Provider Cardiology [phone] Your Primary Care Physician (PCP) Primary Care Physician Office Phone Office Fax [address] [phone] Follow-up Information Follow up With Details Comments Contact Info Lance Lynch MD On 10/10/2017 [address] [place] [address] [address] [place] [address] 
[phone] Nima Nelson MD   [address] [place] [address] 
[phone] Your Appointments  Thursday October 12, 2017 10:30 AM EDT  
 PACEMAKER with Ana Napoles [facility] ([facility]) [address]  
[phone] Thursday October 12, 2017 10:40 AM EDT  
ESTABLISHED PATIENT with Cornelius Youngblood MD  
[facility] ([facility]) [address]  
[phone] Current Discharge Medication List  
  
START taking these medications Dose & Instructions Dispensing Information Comments Morning Noon Evening Bedtime  
 clindamycin 300 mg capsule Commonly known as:  CLEOCIN Your last dose was: Your next dose is:    
   
   
 Dose:  300 mg Take 1 Cap by mouth three (3) times daily. Quantity:  15 Cap Refills:  0  
     
   
   
   
  
 traMADol 50 mg tablet Commonly known as:  ULTRAM  
   
Your last dose was: Your next dose is:    
   
   
 Dose:  50 mg Take 1 Tab by mouth every eight (8) hours as needed for Pain. Max Daily Amount: 150 mg.  
 Quantity:  15 Tab Refills:  0 CONTINUE these medications which have CHANGED Dose & Instructions Dispensing Information Comments Morning Noon Evening Bedtime  
 metoprolol succinate 100 mg tablet Commonly known as:  TOPROL-XL What changed:   
- medication strength 
- how much to take Your last dose was: Your next dose is:    
   
   
 Dose:  100 mg Take 1 Tab by mouth daily. Quantity:  30 Tab Refills:  5 CONTINUE these medications which have NOT CHANGED Dose & Instructions Dispensing Information Comments Morning Noon Evening Bedtime  
 aspirin delayed-release 81 mg tablet Your last dose was: Your next dose is: Take  by mouth daily. Refills:  0  
     
   
   
   
  
 atorvastatin 40 mg tablet Commonly known as:  LIPITOR Your last dose was: Your next dose is:    
   
   
 Dose:  40 mg Take 1 Tab by mouth daily. Quantity:  90 Tab Refills:  3  
     
   
   
   
  
 cloNIDine HCl 0.2 mg tablet Commonly known as:  CATAPRES Your last dose was: Your next dose is:    
   
   
 Dose:  0.2 mg Take 1 Tab by mouth two (2) times a day. Quantity:  180 Tab Refills:  3  
     
   
   
   
  
 fluticasone 50 mcg/actuation nasal spray Commonly known as:  Genie Jass Your last dose was: Your next dose is:    
   
   
 Administer to right and left nostril. Quantity:  1 Bottle Refills:  12  
     
   
   
   
  
 gabapentin 100 mg capsule Commonly known as:  NEURONTIN Your last dose was: Your next dose is:    
   
   
 Dose:  100 mg Take 1 Cap by mouth two (2) times a day. Quantity:  180 Cap Refills:  3  
     
   
   
   
  
 latanoprost 0.005 % ophthalmic solution Commonly known as:  Adelbert Holding Your last dose was: Your next dose is:    
   
   
 Dose:  1 Drop Administer 1 Drop to right eye nightly. Refills:  0  
     
   
   
   
  
 meclizine 25 mg tablet Commonly known as:  ANTIVERT Your last dose was: Your next dose is:    
   
   
 1 tab tid prn Quantity:  270 Tab Refills:  3 NORVASC 10 mg tablet Generic drug:  amLODIPine Your last dose was: Your next dose is:    
   
   
 Dose:  5 mg Take 5 mg by mouth daily. Refills:  0  
     
   
   
   
  
 omeprazole 40 mg capsule Commonly known as:  PriLOSEC Your last dose was: Your next dose is:    
   
   
 Dose:  40 mg Take 1 Cap by mouth daily. Quantity:  90 Cap Refills:  3  
     
   
   
   
  
 potassium chloride 10 mEq tablet Commonly known as:  KLOR-CON Your last dose was: Your next dose is:    
   
   
 Dose:  10 mEq Take 1 Tab by mouth daily. Quantity:  90 Tab Refills:  3 raNITIdine 150 mg tablet Commonly known as:  ZANTAC Your last dose was: Your next dose is:    
   
   
 Dose:  150 mg Take 1 Tab by mouth two (2) times a day. Quantity:  180 Tab Refills:  3  
     
   
   
   
  
 TYLENOL 325 mg tablet Generic drug:  acetaminophen Your last dose was: Your next dose is:    
   
   
 Dose:  325 mg Take 325 mg by mouth every four (4) hours as needed for Pain. Refills:  0  
     
   
   
   
  
 WOMEN'S MULTIPLE VITAMINS PO Your last dose was: Your next dose is: Take  by mouth. Refills:  0 STOP taking these medications   
 chlorhexidine 4 % liquid Commonly known as:  HIBICLENS Where to Get Your Medications These medications were sent to [address] [place] [address] Phone:  [phone]  
  clindamycin 300 mg capsule  
 metoprolol succinate 100 mg tablet Information on where to get these meds will be given to you by the nurse or doctor. ! Ask your nurse or doctor about these medications  
  traMADol 50 mg tablet Discharge Instructions INCREASE toprol XL to 100 mg daily PATIENT INSTRUCTIONS POST-PACEMAKER IMPLANT 1. No heavy lifting or exercises with the left arm for 4 weeks. This includes the following: Do not raise arm above the shoulder level to comb hair, pull on clothes, etc... Do not use the affected arm to pull up or push up from a seated or laying 
down position. Do not allow anyone else to pull on the affected arm. 2. Remove aquacel dressing in a week. Your incision will have skin glue covering the incision, the skin glue will help to reinforce the incision to prevent it from opening, please DO NOT attempt to peel the glue off of the incision.  You do have sutures on the inside of the incision that are not visible. Please DO NOT try to scrub the skin glue off once you are able to take a shower. The skin glue will eventually fall off 3. Do not drive for 3 days 4. Call Dr. Viky Ortez at [phone] if you experience any of the following symptoms: 1. Redness at the pacemaker site 2. Swelling at or around the pacemaker or in the left arm 3. Pain around the pacemaker 4. Dizziness, lightheadedness, fainting spells 5. Lack of energy 6. Shortness of breath 7. Rapid heart rate 8. Chest or muscle twitches 5. Follow-up with Dr. Viky Ortez as scheduled Future Appointments Date Time Provider Nasim Cerrato 10/12/2017 10:30 AM PACEMAKER3, [place]  
10/12/2017 10:40 AM Rose Ryan  [address]  
12/14/2017 9:20 AM Rose Ryan  [address]. You may use pain medication and ice pack for pain relief as needed. You may wear the sling as a reminder to keep your arm below the your shoulder. Adalgisa Meek M.D. [facility] Electrophysiology/Cardiology [facility] [place] [facility], [facility] [address], [place] [place], [address] [place] [address] 
[phone] Discharge Orders None General Information Please provide this summary of care documentation to your next provider. Patient Signature:  ____________________________________________________________ Date:  ____________________________________________________________  
  
Cesia Vieyra Provider Signature:  ____________________________________________________________ Date:  ____________________________________________________________

## 2017-09-29 NOTE — TELEPHONE ENCOUNTER
Attempted to reach patient by telephone. Patient not at [facility] yet for scheduled procedure at 10:45 am.  A message was left for return call.

## 2017-09-30 NOTE — PROCEDURES
DATE OF PROCEDURE: 9/29/2017    PROCEDURE:    1. Implantation of dual-chamber pacemaker with fluoroscopy. 2. Removal of implantable loop recorder    HISTORY:  This is a 64 y.o. woman with recurrent syncope, hypertension and palpitations from paroxsymal atrial fibrillation but she could not take antiarrhythmic medication or beta blocker duet o intermittent sinus bradycardia. The implantable loop recorder confirmed symptomatic irreversible sinus bradycardia while she was off beta blocker. She meets the indication for dual chamber pacer insertion and removal of the loop recorder. The risks and benefits were discussed with the patient and her family and she agreed to proceed. PROCEDURE IN DETAIL:  After the informed written consent had been obtained, the patient was brought to the Electrophysiology Suite where the patient was prepped and draped in the usual sterile fashion. Conscious sedation was initiated and maintain with intravenous Versed and fentanyl. Local anesthesia with 2% Xylocaine was given in the left infraclavicular area. The #10 blade scalpel was then used to make a 3 cm incision below the left clavicle. Using the modified Seldinger technique and wire retention 1 guidewire was advanced into the left subclavian vein. A subcutaneous device pocket was made in the inferomedial direction by blunt dissection. Over the guidewire, a 9-Bengali peel-away introducer dilator was then advanced inside the vein. An active fixation pacing and sensing lead was positioned in the right ventricular apical septum. The lead was anchored down with #2 Ethibond sutures after the pacing and sensing were optimal.  Over the retained guidewire another 7-Bengali peel-away introducer dilator was then advanced inside the vein. The active fixation pacing and sensing lead was then advanced and positioned in the right atrial atrial appendage    There was no diaphragmatic stimulation with 10 volts maximal output for either lead. The pocket is now irrigated with antibiotic solution and the lead was connected to the device and inserted inside the pocket. Vancomycin powder is placed inside the pocket. The pocket is now closed in three consecutive layers using 2-0 Vicryl sutures in continuous fashion. The incision over the implantable loop recorder was anesthetized with lidocaine and # 10 blade scapel was used to make incision, the loop recorder was removed and the incision was closed with 2-0 vicryl    Dermabond tape is now applied over the surgical wounds. COMPLICATIONS: none    Aquacel is now applied over the surgical wound. ESTIMATED BLOOD LOSS: 20 mL. Specimen removed: loop recorder [id number], Serial # [id number], DOI 6/6/17    IMPLANTED HARDWARE:  The pacemaker is a Qubell RUTH Sneed DR, model # [id number] [id number], serial # [id number]    ATRIAL LEAD: medtronic [id number], serial # [id number]    VENTRICULAR LEAD:  Medtronic model # [id number] and serial # [id number]    DATA:  The P wave is 2.9 mV, pacing impedance 771 ohms and pacing threshold acutely is 1.5 volts at 0.5 ms. The ventricular lead had the R wave sensing 16.9 mV and the pacing impedance 962 ohms and pacing threshold 0.6 volts at 0.5 ms. PROGRAMMED PARAMETER:  The device is programmed to MVPR pacing mode with the low rate of 60 beats per minute and upper tracking rate of 130 beats per minute. ASSESSMENT AND PLAN:  The patient will follow up with me in 2 weeks    Jannette Arnold M.D.  [facility]  Electrophysiology/Cardiology  [facility] [place]  [address] [place] [address]  [phone]

## 2017-10-02 ENCOUNTER — TELEPHONE (OUTPATIENT)
Dept: CARDIAC CATH/INVASIVE PROCEDURES | Age: 77
End: 2017-10-02

## 2017-10-12 ENCOUNTER — OFFICE VISIT (OUTPATIENT)
Dept: CARDIOLOGY CLINIC | Age: 77
End: 2017-10-12

## 2017-10-12 ENCOUNTER — CLINICAL SUPPORT (OUTPATIENT)
Dept: CARDIOLOGY CLINIC | Age: 77
End: 2017-10-12

## 2017-10-12 DIAGNOSIS — Z95.0 S/P CARDIAC PACEMAKER PROCEDURE: Primary | ICD-10-CM

## 2017-10-12 DIAGNOSIS — Z95.0 CARDIAC PACEMAKER IN SITU: Primary | ICD-10-CM

## 2017-10-12 DIAGNOSIS — I49.5 SSS (SICK SINUS SYNDROME) (HCC): ICD-10-CM

## 2017-10-12 DIAGNOSIS — I49.5 TACHY-BRADY SYNDROME (HCC): ICD-10-CM

## 2017-10-12 DIAGNOSIS — I48.0 PAROXYSMAL ATRIAL FIBRILLATION (HCC): ICD-10-CM

## 2017-10-12 NOTE — MR AVS SNAPSHOT
Visit Information Date & Time Provider Department Dept. Phone Encounter #  
 10/12/2017 10:40 AM Pb Ge [facility] [phone] [id number] Your Appointments 12/14/2017  9:20 AM  
ESTABLISHED PATIENT with Pb Ge MD  
[facility] ([address]) Appt Note: 6 month follow up  
 [address]  
[address] [address]  
  
    
 1/23/2018 10:30 AM  
PACEMAKER with Mir Valente [facility] ([facility]) Appt Note: mdt ppi, rc, chronic ck, CL, see Valdez b 10/12/17  
 [address] [place] [address] [address]  
[address] [facility]  
  
    
 1/23/2018 10:40 AM  
ESTABLISHED PATIENT with Pb Ge MD  
[facility] ([address]) Appt Note: mdt ppi, rc, chronic ck, CL, see Valdez b 10/12/17  
 [address] [place] [address] [address]  
[phone] Upcoming Health Maintenance Date Due DTaP/Tdap/Td series (1 - Tdap) 12/21/1959 Pneumococcal 65+ High/Highest Risk (2 of 2 - PCV13) 9/11/2015 INFLUENZA AGE 9 TO ADULT 8/1/2017 GLAUCOMA SCREENING Q2Y 3/8/2018 MEDICARE YEARLY EXAM 4/29/2018 Allergies as of 10/12/2017  Review Complete On: 10/12/2017 By: Modesta Zaragoza LPN Severity Noted Reaction Type Reactions Latex  10/10/2011    Rash Bactrim [Sulfamethoprim Ds] High 06/06/2017    Hives Aspirin  10/10/2011   Side Effect Other (comments) Anything higher than 81mg makes pt jittery Codeine  10/10/2011    Hives, Itching Iodinated Contrast- Oral And Iv Dye  06/23/2011   Side Effect Itching Pcn [Penicillins]  10/10/2011    Hives, Itching Tape [Adhesive]  05/08/2014    Rash Current Immunizations  Reviewed on 12/12/2016 Name Date Influenza High Dose Vaccine PF 10/15/2016 Influenza Vaccine 9/11/2014 Influenza Vaccine Split 11/8/2012 Pneumococcal Polysaccharide (PPSV-23) 9/11/2014 Not reviewed this visit Vitals OB Status Smoking Status Hysterectomy Never Smoker Preferred Pharmacy Pharmacy Name Phone [facility]essia [facility] [place] [facility] [address] [phone] Your Updated Medication List  
  
   
This list is accurate as of: 10/12/17 11:22 AM.  Always use your most recent med list.  
  
  
  
  
 aspirin delayed-release 81 mg tablet Take  by mouth daily. atorvastatin 40 mg tablet Commonly known as:  LIPITOR Take 1 Tab by mouth daily. cloNIDine HCl 0.2 mg tablet Commonly known as:  CATAPRES Take 1 Tab by mouth two (2) times a day. fluticasone 50 mcg/actuation nasal spray Commonly known as:  Vivian Jumper Administer to right and left nostril.  
  
 gabapentin 100 mg capsule Commonly known as:  NEURONTIN Take 1 Cap by mouth two (2) times a day. latanoprost 0.005 % ophthalmic solution Commonly known as:  Abiodun Ramirez Administer 1 Drop to right eye nightly. metoprolol succinate 100 mg tablet Commonly known as:  TOPROL-XL Take 1 Tab by mouth daily. NORVASC 10 mg tablet Generic drug:  amLODIPine Take 5 mg by mouth daily. omeprazole 40 mg capsule Commonly known as:  PriLOSEC Take 1 Cap by mouth daily. potassium chloride 10 mEq tablet Commonly known as:  KLOR-CON Take 1 Tab by mouth daily. raNITIdine 150 mg tablet Commonly known as:  ZANTAC Take 1 Tab by mouth two (2) times a day. traMADol 50 mg tablet Commonly known as:  ULTRAM  
Take 1 Tab by mouth every eight (8) hours as needed for Pain. Max Daily Amount: 150 mg.  
  
 TYLENOL 325 mg tablet Generic drug:  acetaminophen Take 325 mg by mouth every four (4) hours as needed for Pain.   
  
 WOMEN'S MULTIPLE VITAMINS PO  
 Take  by mouth. Please provide this summary of care documentation to your next provider. Your primary care clinician is listed as Sallie Vora. If you have any questions after today's visit, please call [phone].

## 2017-10-12 NOTE — PROGRESS NOTES
Cardiac Electrophysiology Wound Check Note      Wound Check  s/p 9/29/17 dual chamber pacemaker and removal of ILR  Patient is here for wound check. No fever, drainage     Physical Exam   Constitutional: well-developed and well-nourished. Skin: Left side pocket is healing without redness, drainage, hematoma. The wound is intact with skin glue. ILR removal incision is well approximated, covered with skin glue. ASSESSMENT and PLAN     ICD-10-CM ICD-9-CM    1. S/P cardiac pacemaker procedure Z95.0 V45.01    2. SSS (sick sinus syndrome) ([facility]) I49.5 427.81    3. 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 Paroxysmal atrial fibrillation ([facility]) I48.0 427.31      The incision is healing without redness, drainage, hematoma. Intact with skin glue. The patient has finished their anti-biotic and been compliant with arm restrictions.    They will continue arms restrictions for 2 more weeks until 10/29  current treatment plan is effective, no change in therapy she has resumed beta blocker for bp control and PAF rate control  Device check shows proper lead and generator functions    Follow-up Disposition:  Return 3 months I will check via device clinic or remote monitoring in the future     Future Appointments  Date Time Provider Nasim Cerrato   12/14/2017 9:20 AM Betsy Blizzard,  [address]   1/23/2018 10:30 AM PACEMAKER3, [address]   1/23/2018 10:40 AM Betsy Blizzard,  [address]

## 2017-10-12 NOTE — PROGRESS NOTES
Cardiac Electrophysiology Wound Check Note      Wound Check s/p 9/29/17 dual chamber pacemaker and removal of ILR  Patient is here for wound check. No fever, drainage     Physical Exam   Constitutional: well-developed and well-nourished. Skin: Left side pocket is healing without redness, drainage, hematoma. The wound is intact with skin glue. ILR removal incision is well approximated, covered with skin glue. ASSESSMENT and PLAN     ICD-10-CM ICD-9-CM    1. S/P cardiac pacemaker procedure Z95.0 V45.01    2. SSS (sick sinus syndrome) ([facility]) I49.5 427.81    3. 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 Intact with skin glue. The patient has finished their anti-biotic and been compliant with arm restrictions.    They will continue arms restrictions for 2 more weeks until 10/29  current treatment plan is effective, no change in therapy   Device check shows proper lead and generator functions    Follow-up Disposition:  Return 3 months I will check via device clinic or remote monitoring in the future     Future Appointments  Date Time Provider Nasim Cerrato   12/14/2017 9:20 AM Karen Andrade  [address]   1/23/2018 10:30 AM Cristiana Watson, [address] [facility]   1/23/2018 10:40 AM Karen Andrade  [address]

## 2017-12-05 ENCOUNTER — TELEPHONE (OUTPATIENT)
Dept: CARDIOLOGY CLINIC | Age: 77
End: 2017-12-05

## 2017-12-05 NOTE — TELEPHONE ENCOUNTER
Verified patient with two types of identifiers. C/o of shooting pain in her left leg from her calf to her foot. She does note that both ankles are slightly swollen. No redness in the left calf area. It hurts all day and night to the point that she is not able to walk. Will check with MD/NP to see if ok to do left LE doppler.

## 2017-12-05 NOTE — TELEPHONE ENCOUNTER
Pt would like to speak with you regarding pain in her legs and her feet. Pt can be reached at [phone].     Thank you,  Ho Rivera

## 2017-12-06 NOTE — TELEPHONE ENCOUNTER
Verified patient with two types of identifiers. Notified patient to have leg assessed by PCP. Patient states she will get an appointment with PCP. Patient verbalized understanding and will call with any other questions.

## 2017-12-07 ENCOUNTER — OFFICE VISIT (OUTPATIENT)
Dept: INTERNAL MEDICINE CLINIC | Age: 77
End: 2017-12-07

## 2017-12-07 VITALS
SYSTOLIC BLOOD PRESSURE: 144 MMHG | HEART RATE: 84 BPM | BODY MASS INDEX: 31.65 KG/M2 | RESPIRATION RATE: 16 BRPM | TEMPERATURE: 97.7 F | WEIGHT: 172 LBS | OXYGEN SATURATION: 99 % | DIASTOLIC BLOOD PRESSURE: 74 MMHG | HEIGHT: 62 IN

## 2017-12-07 DIAGNOSIS — L03.116 CELLULITIS OF LEFT LOWER EXTREMITY: Primary | ICD-10-CM

## 2017-12-07 DIAGNOSIS — E78.00 PURE HYPERCHOLESTEROLEMIA: ICD-10-CM

## 2017-12-07 DIAGNOSIS — I73.9 PVD (PERIPHERAL VASCULAR DISEASE) (HCC): ICD-10-CM

## 2017-12-07 DIAGNOSIS — I10 ESSENTIAL HYPERTENSION: ICD-10-CM

## 2017-12-07 LAB
CHOLEST SERPL-MCNC: 167 MG/DL
HDLC SERPL-MCNC: 70 MG/DL
LDL CHOLESTEROL POC: 66 MG/DL
NON-HDL CHOLESTEROL, 011976: 97
TCHOL/HDL RATIO (POC): 2.4
TRIGL SERPL-MCNC: 155 MG/DL

## 2017-12-07 RX ORDER — TRAMADOL HYDROCHLORIDE 50 MG/1
50 TABLET ORAL
Qty: 30 TAB | Refills: 0 | Status: SHIPPED | OUTPATIENT
Start: 2017-12-07 | End: 2018-08-21 | Stop reason: ALTCHOICE

## 2017-12-07 NOTE — PATIENT INSTRUCTIONS
Tyfone Activation    Thank you for requesting access to [employer]. Please follow the instructions below to securely access and download your online medical record. Tyfone allows you to send messages to your doctor, view your test results, renew your prescriptions, schedule appointments, and more. How Do I Sign Up? 1. In your internet browser, go to [email or web address]  2. Click on the First Time User? Click Here link in the Sign In box. You will be redirect to the New Member Sign Up page. 3. Enter your Tyfone Access Code exactly as it appears below. You will not need to use this code after youve completed the sign-up process. If you do not sign up before the expiration date, you must request a new code. Tyfone Access Code: [email or web address]  Expires: 3/7/2018 10:52 AM (This is the date your Tyfone access code will )    4. Enter the last four digits of your Social Security Number (xxxx) and Date of Birth (mm/dd/yyyy) as indicated and click Submit. You will be taken to the next sign-up page. 5. Create a Tyfone ID. This will be your Tyfone login ID and cannot be changed, so think of one that is secure and easy to remember. 6. Create a Tyfone password. You can change your password at any time. 7. Enter your Password Reset Question and Answer. This can be used at a later time if you forget your password. 8. Enter your e-mail address. You will receive e-mail notification when new information is available in [address]. 9. Click Sign Up. You can now view and download portions of your medical record. 10. Click the Download Summary menu link to download a portable copy of your medical information. Additional Information    If you have questions, please visit the Frequently Asked Questions section of the Tyfone website at [email or web address] Remember, Tyfone is NOT to be used for urgent needs. For medical emergencies, dial 911.

## 2017-12-07 NOTE — MR AVS SNAPSHOT
Visit Information Date & Time Provider Department Dept. Phone Encounter #  
 12/7/2017  8:45 AM Jason Prater  [facility] [phone] [id number] Follow-up Instructions Return in about 4 weeks (around 1/4/2018), or if symptoms worsen or fail to improve. Follow-up and Disposition History Your Appointments 1/23/2018 10:30 AM  
PACEMAKER with PACEMAKER3JES [facility] ([facility]) Appt Note: mdt ppi, rc, chronic ck, CL, see Valdez b 10/12/17  
 [address] [place] [address] Dr  
  
    
 1/23/2018 10:40 AM  
ESTABLISHED PATIENT with Diane Carbone MD  
[facility] ([address]) Appt Note: mdt ppi, rc, chronic ck, CL, see Valdez b 10/12/17  
 [address] [place] [address] Dr  
  
    
 2/15/2018  4:00 PM  
ESTABLISHED PATIENT with Diane Carbone MD  
[facility] ([address]) Appt Note: 6 month follow up; 6 month follow up r/s from 12/14 to 2/15  
 [address] [place] [address]  
[phone] Upcoming Health Maintenance Date Due DTaP/Tdap/Td series (1 - Tdap) 12/21/1959 Pneumococcal 65+ High/Highest Risk (2 of 2 - PCV13) 9/11/2015 Influenza Age 5 to Adult 8/1/2017 GLAUCOMA SCREENING Q2Y 3/8/2018 MEDICARE YEARLY EXAM 4/29/2018 Allergies as of 12/7/2017  Review Complete On: 10/12/2017 By: Diane Carbone MD  
  
 Severity Noted Reaction Type Reactions Latex  10/10/2011    Rash Bactrim [Sulfamethoprim Ds] High 06/06/2017    Hives Aspirin  10/10/2011   Side Effect Other (comments) Anything higher than 81mg makes pt jittery Codeine  10/10/2011    Hives, Itching Iodinated Contrast- Oral And Iv Dye  06/23/2011   Side Effect Itching Pcn [Penicillins]  10/10/2011    Hives, Itching Tape [Adhesive]  05/08/2014    Rash Current Immunizations  Reviewed on 12/12/2016 Name Date Influenza High Dose Vaccine PF 10/15/2016 Influenza Vaccine 9/11/2014 Influenza Vaccine Split 11/8/2012 Pneumococcal Polysaccharide (PPSV-23) 9/11/2014 Not reviewed this visit You Were Diagnosed With   
  
 Codes Comments Cellulitis of left lower extremity    -  Primary ICD-10-CM: L03.116 ICD-9-CM: 682.6 Pure hypercholesterolemia     ICD-10-CM: E78.00 ICD-9-CM: 272.0 Essential hypertension     ICD-10-CM: I10 
ICD-9-CM: 401.9 PVD (peripheral vascular disease) ([facility]     ICD-10-CM: I73.9 ICD-9-CM: 443. 9 Vitals BP Pulse Temp Resp Height(growth percentile) Weight(growth percentile) 144/74 84 97.7 °F (36.5 °C) (Oral) 16 5' 2\" (1.575 m) 172 lb (78 kg) SpO2 BMI OB Status Smoking Status 99% 31.46 kg/m2 Hysterectomy Never Smoker BMI and BSA Data Body Mass Index Body Surface Area  
 31.46 kg/m 2 1.85 m 2 Preferred Pharmacy Pharmacy Name Phone [facility]ia  [address] [facility] [address] [place] [address] [phone] Your Updated Medication List  
  
   
This list is accurate as of: 12/7/17 10:56 AM.  Always use your most recent med list.  
  
  
  
  
 aspirin delayed-release 81 mg tablet Take  by mouth daily. atorvastatin 40 mg tablet Commonly known as:  LIPITOR Take 1 Tab by mouth daily. cloNIDine HCl 0.2 mg tablet Commonly known as:  CATAPRES Take 1 Tab by mouth two (2) times a day. fluticasone 50 mcg/actuation nasal spray Commonly known as:  Le Disla Administer to right and left nostril.  
  
 gabapentin 100 mg capsule Commonly known as:  NEURONTIN Take 1 Cap by mouth two (2) times a day. latanoprost 0.005 % ophthalmic solution Commonly known as:  Meaghan Rogers  
 Administer 1 Drop to right eye nightly. metoprolol succinate 100 mg tablet Commonly known as:  TOPROL-XL Take 1 Tab by mouth daily. NORVASC 10 mg tablet Generic drug:  amLODIPine Take 5 mg by mouth daily. omeprazole 40 mg capsule Commonly known as:  PriLOSEC Take 1 Cap by mouth daily. potassium chloride 10 mEq tablet Commonly known as:  KLOR-CON Take 1 Tab by mouth daily. raNITIdine 150 mg tablet Commonly known as:  ZANTAC Take 1 Tab by mouth two (2) times a day. traMADol 50 mg tablet Commonly known as:  ULTRAM  
Take 1 Tab by mouth every eight (8) hours as needed for Pain. Max Daily Amount: 150 mg.  
  
 TYLENOL 325 mg tablet Generic drug:  acetaminophen Take 325 mg by mouth every four (4) hours as needed for Pain. WOMEN'S MULTIPLE VITAMINS PO Take  by mouth. Prescriptions Printed Refills  
 traMADol (ULTRAM) 50 mg tablet 0 Sig: Take 1 Tab by mouth every eight (8) hours as needed for Pain. Max Daily Amount: 150 mg.  
 Class: Print Route: Oral  
  
We Performed the Following AMB POC LIPID PROFILE [40119 CPT(R)] Follow-up Instructions Return in about 4 weeks (around 1/4/2018), or if symptoms worsen or fail to improve. To-Do List   
 12/07/2017 Imaging:  ANKLE BRACHIAL INDEX Patient Instructions Blue SourceharWallstr Activation Thank you for requesting access to [employer]. Please follow the instructions below to securely access and download your online medical record. [employer] allows you to send messages to your doctor, view your test results, renew your prescriptions, schedule appointments, and more. How Do I Sign Up? 1. In your internet browser, go to [email or web address] 
2. Click on the First Time User? Click Here link in the Sign In box. You will be redirect to the New Member Sign Up page. 3. Enter your [employer] Access Code exactly as it appears below.  You will not need to use this code after youve completed the sign-up process. If you do not sign up before the expiration date, you must request a new code. Rewardable Access Code: [email or web address] Expires: 3/7/2018 10:52 AM (This is the date your Rewardable access code will ) 4. Enter the last four digits of your Social Security Number (xxxx) and Date of Birth (mm/dd/yyyy) as indicated and click Submit. You will be taken to the next sign-up page. 5. Create a Rewardable ID. This will be your Rewardable login ID and cannot be changed, so think of one that is secure and easy to remember. 6. Create a Rewardable password. You can change your password at any time. 7. Enter your Password Reset Question and Answer. This can be used at a later time if you forget your password. 8. Enter your e-mail address. You will receive e-mail notification when new information is available in [address] 9. Click Sign Up. You can now view and download portions of your medical record. 10. Click the Download Summary menu link to download a portable copy of your medical information. Additional Information If you have questions, please visit the Frequently Asked Questions section of the Rewardable website at [email or web address] Remember, Rewardable is NOT to be used for urgent needs. For medical emergencies, dial 911. Introducing [facility] & [facility]! [employer] introduces Rewardable patient portal. Now you can access parts of your medical record, email your doctor's office, and request medication refills online. 1. In your internet browser, go to [email or web address] 2. Click on the First Time User? Click Here link in the Sign In box. You will see the New Member Sign Up page. 3. Enter your Rewardable Access Code exactly as it appears below. You will not need to use this code after youve completed the sign-up process.  If you do not sign up before the expiration date, you must request a new code. 
 
· B[employer]more Access Code: [email or web address] Expires: 3/7/2018 10:52 AM 
 
4. Enter the last four digits of your Social Security Number (xxxx) and Date of Birth (mm/dd/yyyy) as indicated and click Submit. You will be taken to the next sign-up page. 5. Create a Bizmore ID. This will be your Bizmore login ID and cannot be changed, so think of one that is secure and easy to remember. 6. Create a Bizmore password. You can change your password at any time. 7. Enter your Password Reset Question and Answer. This can be used at a later time if you forget your password. 8. Enter your e-mail address. You will receive e-mail notification when new information is available in [address] 9. Click Sign Up. You can now view and download portions of your medical record. 10. Click the Download Summary menu link to download a portable copy of your medical information. If you have questions, please visit the Frequently Asked Questions section of the Bizmore website. Remember, Bizmore is NOT to be used for urgent needs. For medical emergencies, dial 911. Now available from your iPhone and Android! Please provide this summary of care documentation to your next provider. Your primary care clinician is listed as Faheem Fernández. If you have any questions after today's visit, please call [phone].

## 2017-12-07 NOTE — PROGRESS NOTES
Dayo Morris is a 64 y.o. female and presents with Cholesterol Problem; Hypertension; and Leg Pain (left)  . Subjective:  She has pains involving the lt. lower leg with swelling reported    Hypertension Review:  The patient has essential hypertension . Diet and Lifestyle: generally follows a low sodium diet, exercises sporadically  Home BP Monitoring: is not measured at home. Pertinent ROS: taking medications as instructed, no medication side effects noted, no TIA's, no chest pain on exertion, no dyspnea on exertion, no swelling of ankles. Diabetes Mellitus Review:  She has diabetes mellitus. Diabetic ROS - medication compliance: compliant all of the time, diabetic diet compliance: compliant all of the time, home glucose monitoring: is performed. Known diabetic complications: none  Cardiovascular risk factors: family history, dyslipidemia, diabetes mellitus, obesity, hypertension  Current diabetic medications include oral agents/insulin   Eye exam current (within one year): no  Weight trend: stable  Prior visit with dietician: no  Current diet: \"healthy\" diet  in general  Current exercise: walking  Current monitoring regimen: home blood tests - daily  Home blood sugar records: trend: stable  Any episodes of hypoglycemia? no  Is She on ACE inhibitor or angiotensin II receptor blocker? Yes         Review of Systems  Constitutional: negative for fevers, chills, anorexia and weight loss  Eyes:   negative for visual disturbance and irritation  ENT:   negative for tinnitus,sore throat,nasal congestion,ear pains. hoarseness  Respiratory:  negative for cough, hemoptysis, dyspnea,wheezing  CV:   negative for chest pain, palpitations, lower extremity edema  GI:   negative for nausea, vomiting, diarrhea, abdominal pain,melena  Endo:               negative for polyuria,polydipsia,polyphagia,heat intolerance  Genitourinary: negative for frequency, dysuria and hematuria  Integument:  negative for rash and pruritus  Hematologic:  negative for easy bruising and gum/nose bleeding  Musculoskel: myalgias, arthralgias, joint pain  Neurological:  negative for headaches, dizziness, vertigo, memory problems and gait   Behavl/Psych: negative for feelings of anxiety, depression, mood changes    Past Medical History:   Diagnosis Date    Acute pharyngitis 2/8/2011    Allergic rhinitis, cause unspecified 2/8/2011    Arrhythmia     PVC    Asymptomatic varicose veins 2/8/2011    Cancer ([facility] [address]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     Carpal tunnel syndrome 2/8/2011    Degenerative Joint Disese ( improved/resolving) 2/8/2011    Degenetrative Dis Disease, 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    Diverticulosis of colon (without mention of hemorrhage) 2/8/2011    Dysphagia 2/8/2011    Edema, peripheral 2/8/2011    GERD (gastroesophageal reflux disease)     GERD Gastro- Esophageal Reflux Disease 2/8/2011    Herniated nucleus pulposus ( slipped disc) 2/8/2011    Menopausal 2/8/2011    PUD (peptic ulcer disease) 2012    Pure hypercholesterolemia 2/8/2011    Recurrent ear pain 2/8/2011    S/P radiation therapy     Scalp lesion 10/23/2014    Sebaceous cyst 4/22/2014    Unspecified cataract 2/8/2011    Unspecified essential hypertension 2/8/2011    Unspecified sleep apnea      Past Surgical History:   Procedure Laterality Date    HX HYSTERECTOMY      HX ORTHOPAEDIC  neck/back 2006    HX OTHER SURGICAL  5/8/2014     Excise recurrent scalp lesion and application of ACell    HX OTHER SURGICAL  5/8/2014    Excise keloid, anterior chest wall, and application of ACell    HX OTHER SURGICAL  5/8/2014     Excise sebaceous cyst, anterior chest wall    HX OTHER SURGICAL  5/8/2014    Punch biopsy, skin lesions, right forearm x2, right calf x1    MS INS NEW/RPLCMT PRM PM W/TRANSV ELTRD ATRIAL&VENT  9/30/2017         MS RMVL IMPLANTABLE PT-ACTIVATED CAR EVENT RECORDER  9/30/2017          Social History     Social History    Marital status:      Spouse name: N/A    Number of children: N/A    Years of education: N/A     Social History Main Topics    Smoking status: Never Smoker    Smokeless tobacco: Never Used    Alcohol use No    Drug use: No    Sexual activity: Yes     Partners: Male     Birth control/ protection: None     Other Topics Concern    None     Social History Narrative     Family History   Problem Relation Age of Onset    Hypertension Mother     Stroke Mother     Hypertension Father     Cancer Father      PROSTATE, MELANOMA    Anesth Problems Neg Hx      Current Outpatient Prescriptions   Medication Sig Dispense Refill    traMADol (ULTRAM) 50 mg tablet Take 1 Tab by mouth every eight (8) hours as needed for Pain. Max Daily Amount: 150 mg. 30 Tab 0    metoprolol succinate (TOPROL-XL) 100 mg tablet Take 1 Tab by mouth daily. 30 Tab 5    raNITIdine (ZANTAC) 150 mg tablet Take 1 Tab by mouth two (2) times a day. 180 Tab 3    fluticasone (FLONASE) 50 mcg/actuation nasal spray Administer to right and left nostril. 1 Bottle 12    gabapentin (NEURONTIN) 100 mg capsule Take 1 Cap by mouth two (2) times a day. 180 Cap 3    atorvastatin (LIPITOR) 40 mg tablet Take 1 Tab by mouth daily. 90 Tab 3    potassium chloride (KLOR-CON) 10 mEq tablet Take 1 Tab by mouth daily. 90 Tab 3    cloNIDine HCl (CATAPRES) 0.2 mg tablet Take 1 Tab by mouth two (2) times a day. 180 Tab 3    acetaminophen (TYLENOL) 325 mg tablet Take 325 mg by mouth every four (4) hours as needed for Pain.  amLODIPine (NORVASC) 10 mg tablet Take 5 mg by mouth daily.  latanoprost (XALATAN) 0.005 % ophthalmic solution Administer 1 Drop to right eye nightly.  MULTIVIT WITH CALCIUM,IRON,MIN [facility] MULTIPLE VITAMINS PO) Take  by mouth.  aspirin delayed-release 81 mg tablet Take  by mouth daily.  omeprazole (PRILOSEC) 40 mg capsule Take 1 Cap by mouth daily.  90 Cap 3     Allergies   Allergen Reactions    Latex Rash    Bactrim [Sulfamethoprim Ds] Hives    Aspirin Other (comments)     Anything higher than 81mg makes pt jittery    Codeine Hives and Itching    Iodinated Contrast- Oral And Iv Dye Itching    Pcn [Penicillins] Hives and Itching    Tape [Adhesive] Rash       Objective:  Visit Vitals    /74    Pulse 84    Temp 97.7 °F (36.5 °C) (Oral)    Resp 16    Ht 5' 2\" (1.575 m)    Wt 172 lb (78 kg)    SpO2 99%    BMI 31.46 kg/m2     Physical Exam:   General appearance - alert, well appearing, and in no distress  Mental status - alert, oriented to person, place, and time  EYE-DARLENE, EOMI, corneas normal, no foreign bodies  ENT-ENT exam normal, no neck nodes or sinus tenderness  Nose - normal and patent, no erythema, discharge or polyps  Mouth - mucous membranes moist, pharynx normal without lesions  Neck - supple, no significant adenopathy   Chest - clear to auscultation, no wheezes, rales or rhonchi, symmetric air entry   Heart - normal rate, regular rhythm, normal S1, S2, no murmurs, rubs, clicks or gallops   Abdomen - soft, nontender, nondistended, no masses or organomegaly  Lymph- no adenopathy palpable  Ext-peripheral pulses normal, no pedal edema, no clubbing or cyanosis  Skin-Warm and dry. no hyperpigmentation, vitiligo, or suspicious lesions  Neuro -alert, oriented, normal speech, no focal findings or movement disorder noted  Neck-normal C-spine, no tenderness, full ROM without pain  Feet-no nail deformities or callus formation with good pulses noted      Results for orders placed or performed in visit on 12/07/17   AMB POC LIPID PROFILE   Result Value Ref Range    Cholesterol (POC) 167     Triglycerides (POC) 155     HDL Cholesterol (POC) 70     Non-HDL Cholesterol 97     LDL Cholesterol (POC) 66 MG/DL    TChol/HDL Ratio (POC) 2.4        Assessment/Plan:    ICD-10-CM ICD-9-CM    1. Cellulitis of left lower extremity L03.116 682.6 traMADol (ULTRAM) 50 mg tablet   2. Pure hypercholesterolemia E78.00 272.0 AMB POC LIPID PROFILE   3.  Essential hypertension I10 401.9 AMB POC LIPID PROFILE   4. PVD (peripheral vascular disease) ([facility]) I73.9 443. 9 ANKLE BRACHIAL INDEX      traMADol (ULTRAM) 50 mg tablet     Orders Placed This Encounter    ANKLE BRACHIAL INDEX     Standing Status:   Future     Standing Expiration Date:   2019     Order Specific Question:   Reason for Exam     Answer:   leg pain    AMB POC LIPID PROFILE    traMADol (ULTRAM) 50 mg tablet     Sig: Take 1 Tab by mouth every eight (8) hours as needed for Pain. Max Daily Amount: 150 mg. Dispense:  30 Tab     Refill:  0     lose weight, increase physical activity, follow low fat diet, follow low salt diet,Take 81mg aspirin daily  Patient Instructions   Authentixhart Activation    Thank you for requesting access to [employer]. Please follow the instructions below to securely access and download your online medical record. NanoTune allows you to send messages to your doctor, view your test results, renew your prescriptions, schedule appointments, and more. How Do I Sign Up? 1. In your internet browser, go to [email or web address]  2. Click on the First Time User? Click Here link in the Sign In box. You will be redirect to the New Member Sign Up page. 3. Enter your NanoTune Access Code exactly as it appears below. You will not need to use this code after youve completed the sign-up process. If you do not sign up before the expiration date, you must request a new code. NanoTune Access Code: [email or web address]  Expires: 3/7/2018 10:52 AM (This is the date your NanoTune access code will )    4. Enter the last four digits of your Social Security Number (xxxx) and Date of Birth (mm/dd/yyyy) as indicated and click Submit. You will be taken to the next sign-up page. 5. Create a NanoTune ID. This will be your NanoTune login ID and cannot be changed, so think of one that is secure and easy to remember. 6. Create a NanoTune password. You can change your password at any time.   7. Enter your Password Reset Question and Answer. This can be used at a later time if you forget your password. 8. Enter your e-mail address. You will receive e-mail notification when new information is available in [address] 9. Click Sign Up. You can now view and download portions of your medical record. 10. Click the Download Summary menu link to download a portable copy of your medical information. Additional Information    If you have questions, please visit the Frequently Asked Questions section of the [employer] website at [email or web address] Mobile Cohesion/The Mother Listt/. Remember, [employer] is NOT to be used for urgent needs. For medical emergencies, dial 911. Follow-up Disposition:  Return in about 4 weeks (around 1/4/2018), or if symptoms worsen or fail to improve. I have reviewed with the patient details of the assessment and plan and all questions were answered. Relevent patient education was performed. The most recent lab findings were reviewed with the patient. An After Visit Summary was printed and given to the patient.

## 2017-12-12 ENCOUNTER — HOSPITAL ENCOUNTER (OUTPATIENT)
Dept: VASCULAR SURGERY | Age: 77
Discharge: HOME | End: 2017-12-12
Attending: INTERNAL MEDICINE
Payer: MEDICARE

## 2017-12-12 DIAGNOSIS — I73.9 PVD (PERIPHERAL VASCULAR DISEASE) (HCC): ICD-10-CM

## 2017-12-12 PROCEDURE — 93922 UPR/L XTREMITY ART 2 LEVELS: CPT

## 2017-12-12 NOTE — PROCEDURES
[facility]  *** FINAL REPORT ***    Name: Matty Kim  MRN: [id number]    Outpatient  : 21 Dec 1940  HIS Order #: [id number]  [address] [facility] Visit #: [id number]  Date: 12 Dec 2017    TYPE OF TEST: Peripheral Arterial Testing    REASON FOR TEST  Rest pain (both sides)    Right Leg  Segmentals: Normal                     mmHg  Brachial         153  High thigh  Low thigh  Calf  Posterior tibial 174  Dorsalis pedis   182  Peroneal  Metatarsal  Toe pressure  Doppler:    Normal  PVR:  Ankle/Brachial: 1.18    Left Leg  Segmentals: Normal                     mmHg  Brachial         154  High thigh  Low thigh  Calf  Posterior tibial 167  Dorsalis pedis   164  Peroneal  Metatarsal  Toe pressure  Doppler:    Normal  PVR:  Ankle/Brachial: 1.08    INTERPRETATION/FINDINGS  1. No evidence of significant peripheral arterial disease at rest in  the right leg. 2. No evidence of significant peripheral arterial disease at rest in  the left leg. 3. The right ankle/brachial index is 1.18 and the left ankle/brachial  index is 1.08.    ADDITIONAL COMMENTS    I have personally reviewed the data relevant to the interpretation of  this  study. TECHNOLOGIST: GIBRAN Botello  Signed:    PHYSICIAN: Jonny Christine.  Sohan Voss MD  Signed: 2017 04:00 PM

## 2018-01-11 RX ORDER — AMLODIPINE BESYLATE 10 MG/1
5 TABLET ORAL DAILY
Qty: 30 TAB | Refills: 11 | Status: SHIPPED | COMMUNITY
Start: 2018-01-11 | End: 2019-09-18 | Stop reason: SDUPTHER

## 2018-01-23 ENCOUNTER — OFFICE VISIT (OUTPATIENT)
Dept: CARDIOLOGY CLINIC | Age: 78
End: 2018-01-23

## 2018-01-23 DIAGNOSIS — I49.5 SSS (SICK SINUS SYNDROME) (HCC): ICD-10-CM

## 2018-01-23 DIAGNOSIS — I48.0 PAROXYSMAL ATRIAL FIBRILLATION (HCC): ICD-10-CM

## 2018-01-23 DIAGNOSIS — Z95.0 CARDIAC PACEMAKER IN SITU: Primary | ICD-10-CM

## 2018-01-24 ENCOUNTER — OFFICE VISIT (OUTPATIENT)
Dept: INTERNAL MEDICINE CLINIC | Age: 78
End: 2018-01-24

## 2018-01-24 VITALS
RESPIRATION RATE: 16 BRPM | TEMPERATURE: 98.2 F | OXYGEN SATURATION: 97 % | BODY MASS INDEX: 31.65 KG/M2 | HEIGHT: 62 IN | DIASTOLIC BLOOD PRESSURE: 86 MMHG | WEIGHT: 172 LBS | HEART RATE: 87 BPM | SYSTOLIC BLOOD PRESSURE: 124 MMHG

## 2018-01-24 DIAGNOSIS — L03.116 CELLULITIS AND ABSCESS OF LEFT LEG: ICD-10-CM

## 2018-01-24 DIAGNOSIS — I25.10 CORONARY ARTERY DISEASE INVOLVING NATIVE CORONARY ARTERY OF NATIVE HEART WITHOUT ANGINA PECTORIS: ICD-10-CM

## 2018-01-24 DIAGNOSIS — L02.416 CELLULITIS AND ABSCESS OF LEFT LEG: ICD-10-CM

## 2018-01-24 DIAGNOSIS — G56.00 CARPAL TUNNEL SYNDROME, UNSPECIFIED LATERALITY: ICD-10-CM

## 2018-01-24 DIAGNOSIS — Z95.0 S/P CARDIAC PACEMAKER PROCEDURE: ICD-10-CM

## 2018-01-24 DIAGNOSIS — I10 ESSENTIAL HYPERTENSION: ICD-10-CM

## 2018-01-24 DIAGNOSIS — G63 NEUROPATHY DUE TO MEDICAL CONDITION (HCC): Primary | ICD-10-CM

## 2018-01-24 RX ORDER — GABAPENTIN 300 MG/1
300 CAPSULE ORAL 3 TIMES DAILY
Qty: 270 CAP | Refills: 3 | Status: SHIPPED | OUTPATIENT
Start: 2018-01-24 | End: 2019-08-15 | Stop reason: SDUPTHER

## 2018-01-24 NOTE — MR AVS SNAPSHOT
Barbara Pulse 
 
 
 [address] [facility] [facility] [address] [address] 
[phone] Patient: Everett Garcia MRN: [id number] WBI:[id number] Visit Information Date & Time Provider Department Dept. Phone Encounter #  
 1/24/2018  8:00 AM Clifford Magana MD [address] [facility] [phone] [id number] Follow-up Instructions Return in about 2 weeks (around 2/8/2018), or if symptoms worsen or fail to improve. Your Appointments 2/8/2018  9:30 AM  
ROUTINE CARE with Clifford Magana MD  
[facility] [address] [facility] ([address]) Appt Note: fu  
 [address] [facility]  
[phone]  
  
   
 [address] [facility]  
  
    
 2/15/2018  4:00 PM  
ESTABLISHED PATIENT with Loerto Rodriguez MD  
[facility] ([address]) Appt Note: 6 month follow up; 6 month follow up r/s from 12/14 to 2/15  
 [address]  
[address] [facility] [facility] [address] Upcoming Health Maintenance Date Due DTaP/Tdap/Td series (1 - Tdap) 12/21/1961 Pneumococcal 65+ High/Highest Risk (2 of 2 - PCV13) 9/11/2015 GLAUCOMA SCREENING Q2Y 3/8/2018 MEDICARE YEARLY EXAM 4/29/2018 Allergies as of 1/24/2018  Review Complete On: 1/24/2018 By: April Adwoa Esquivel LPN Severity Noted Reaction Type Reactions Latex  10/10/2011    Rash Bactrim [Sulfamethoprim Ds] High 06/06/2017    Hives Aspirin  10/10/2011   Side Effect Other (comments) Anything higher than 81mg makes pt jittery Codeine  10/10/2011    Hives, Itching Iodinated Contrast- Oral And Iv Dye  06/23/2011   Side Effect Itching Pcn [Penicillins]  10/10/2011    Hives, Itching Tape [Adhesive]  05/08/2014    Rash Current Immunizations  Reviewed on 12/12/2016 Name Date Influenza High Dose Vaccine PF 10/15/2016 Influenza Vaccine 9/11/2014 Influenza Vaccine Split 11/8/2012 Pneumococcal Polysaccharide (PPSV-23) 9/11/2014 Not reviewed this visit You Were Diagnosed With   
  
 Codes Comments Neuropathy due to medical condition [facility])    -  Primary ICD-10-CM: E00 ICD-9-CM: 357.4 Carpal tunnel syndrome, unspecified laterality     ICD-10-CM: G56.00 
ICD-9-CM: 354.0 Coronary artery disease involving native coronary artery of native heart without angina pectoris     ICD-10-CM: I25.10 ICD-9-CM: 414.01   
 S/P cardiac pacemaker procedure     ICD-10-CM: Z95.0 ICD-9-CM: V45.01 Essential hypertension     ICD-10-CM: I10 
ICD-9-CM: 401.9 Cellulitis and abscess of left leg     ICD-10-CM: L03.116, L02.416 ICD-9-CM: 248. 6 Vitals BP Pulse Temp Resp Height(growth percentile) Weight(growth percentile) 124/86 87 98.2 °F (36.8 °C) (Oral) 16 5' 2\" (1.575 m) 172 lb (78 kg) SpO2 BMI OB Status Smoking Status 97% 31.46 kg/m2 Hysterectomy Never Smoker BMI and BSA Data Body Mass Index Body Surface Area  
 31.46 kg/m 2 1.85 m 2 Preferred Pharmacy Pharmacy Name Phone [facility] [place] [address] [facility] [address] [phone] Your Updated Medication List  
  
   
This list is accurate as of: 1/24/18 10:22 AM.  Always use your most recent med list. amLODIPine 10 mg tablet Commonly known as:  Madelyn Cazares Take 0.5 Tabs by mouth daily. aspirin delayed-release 81 mg tablet Take  by mouth daily. atorvastatin 40 mg tablet Commonly known as:  LIPITOR Take 1 Tab by mouth daily. cloNIDine HCl 0.2 mg tablet Commonly known as:  CATAPRES Take 1 Tab by mouth two (2) times a day. fluticasone 50 mcg/actuation nasal spray Commonly known as:  Ian Shyam Administer to right and left nostril.  
  
 gabapentin 300 mg capsule Commonly known as:  NEURONTIN  
 Take 1 Cap by mouth three (3) times daily. latanoprost 0.005 % ophthalmic solution Commonly known as:  Beth Candelario Administer 1 Drop to right eye nightly. metoprolol succinate 100 mg tablet Commonly known as:  TOPROL-XL Take 1 Tab by mouth daily. omeprazole 40 mg capsule Commonly known as:  PriLOSEC Take 1 Cap by mouth daily. potassium chloride 10 mEq tablet Commonly known as:  KLOR-CON Take 1 Tab by mouth daily. raNITIdine 150 mg tablet Commonly known as:  ZANTAC Take 1 Tab by mouth two (2) times a day. traMADol 50 mg tablet Commonly known as:  ULTRAM  
Take 1 Tab by mouth every eight (8) hours as needed for Pain. Max Daily Amount: 150 mg.  
  
 TYLENOL 325 mg tablet Generic drug:  acetaminophen Take 325 mg by mouth every four (4) hours as needed for Pain. WOMEN'S MULTIPLE VITAMINS PO Take  by mouth. Prescriptions Sent to Pharmacy Refills  
 gabapentin (NEURONTIN) 300 mg capsule 3 Sig: Take 1 Cap by mouth three (3) times daily. Class: Normal  
 Pharmacy: [address] [place] [address], [address] [place] [address] #: [phone] Route: Oral  
  
Follow-up Instructions Return in about 2 weeks (around 2/8/2018), or if symptoms worsen or fail to improve. Patient Instructions FatRedCouch Activation Thank you for requesting access to FatRedCouch. Please follow the instructions below to securely access and download your online medical record. FatRedCouch allows you to send messages to your doctor, view your test results, renew your prescriptions, schedule appointments, and more. How Do I Sign Up? 1. In your internet browser, go to [email or web address] 
2. Click on the First Time User? Click Here link in the Sign In box. You will be redirect to the New Member Sign Up page. 3. Enter your FatRedCouch Access Code exactly as it appears below.  You will not need to use this code after youve completed the sign-up process. If you do not sign up before the expiration date, you must request a new code. Wand[employer]jia Access Code: [email or web address] Expires: 3/7/2018 10:52 AM (This is the date your Wand[employer]jia access code will ) 4. Enter the last four digits of your Social Security Number (xxxx) and Date of Birth (mm/dd/yyyy) as indicated and click Submit. You will be taken to the next sign-up page. 5. Create a Wandoujia ID. This will be your Wandoujia login ID and cannot be changed, so think of one that is secure and easy to remember. 6. Create a Wandoujia password. You can change your password at any time. 7. Enter your Password Reset Question and Answer. This can be used at a later time if you forget your password. 8. Enter your e-mail address. You will receive e-mail notification when new information is available in [address] 9. Click Sign Up. You can now view and download portions of your medical record. 10. Click the Download Summary menu link to download a portable copy of your medical information. Additional Information If you have questions, please visit the Frequently Asked Questions section of the [employer]jia website at [email or web address] Remember, Wand[employer]jia is NOT to be used for urgent needs. For medical emergencies, dial 911. Introducing \[facility] [place] [facility]\"" & HEALTH SERVICES! Merritt Orantes introduces [employer] patient portal. Now you can access parts of your medical record, email your doctor's office, and request medication refills online. 1. In your internet browser, go to [email or web address]/Good [email or web address] 2. Click on the First Time User? Click Here link in the Sign In box. You will see the New Member Sign Up page. 3. Enter your Wandoujia Access Code exactly as it appears below. You will not need to use this code after youve completed the sign-up process.  If you do not sign up before the expiration date, you must request a new code. 
 
· Iker[employer] Access Code: [email or web address] Expires: 3/7/2018 10:52 AM 
 
4. Enter the last four digits of your Social Security Number (xxxx) and Date of Birth (mm/dd/yyyy) as indicated and click Submit. You will be taken to the next sign-up page. 5. Create a IkerChem ID. This will be your I[employer] login ID and cannot be changed, so think of one that is secure and easy to remember. 6. Create a IkerChem password. You can change your password at any time. 7. Enter your Password Reset Question and Answer. This can be used at a later time if you forget your password. 8. Enter your e-mail address. You will receive e-mail notification when new information is available in [address] 9. Click Sign Up. You can now view and download portions of your medical record. 10. Click the Download Summary menu link to download a portable copy of your medical information. If you have questions, please visit the Frequently Asked Questions section of the [employer] website. Remember, [employer] is NOT to be used for urgent needs. For medical emergencies, dial 911. Now available from your iPhone and Android! Please provide this summary of care documentation to your next provider. Your primary care clinician is listed as Joanne Grimes. If you have any questions after today's visit, please call [phone].

## 2018-01-24 NOTE — PROGRESS NOTES
Luis Angel Stanley is a 76 y.o. female and presents with Results  . Subjective:  She has recurrent pains involving the lt. lower leg with swelling reported  The pains are shooting in nature. Hypertension Review:  The patient has essential hypertension . Diet and Lifestyle: generally follows a low sodium diet, exercises sporadically  Home BP Monitoring: is not measured at home. Pertinent ROS: taking medications as instructed, no medication side effects noted, no TIA's, no chest pain on exertion, no dyspnea on exertion, no swelling of ankles. Diabetes Mellitus Review:  She has diabetes mellitus. Diabetic ROS - medication compliance: compliant all of the time, diabetic diet compliance: compliant all of the time, home glucose monitoring: is performed. Known diabetic complications: none  Cardiovascular risk factors: family history, dyslipidemia, diabetes mellitus, obesity, hypertension  Current diabetic medications include oral agents/insulin   Eye exam current (within one year): no  Weight trend: stable  Prior visit with dietician: no  Current diet: \"healthy\" diet  in general  Current exercise: walking  Current monitoring regimen: home blood tests - daily  Home blood sugar records: trend: stable  Any episodes of hypoglycemia? no  Is She on ACE inhibitor or angiotensin II receptor blocker? Yes         Review of Systems  Constitutional: negative for fevers, chills, anorexia and weight loss  Eyes:   negative for visual disturbance and irritation  ENT:   negative for tinnitus,sore throat,nasal congestion,ear pains. hoarseness  Respiratory:  negative for cough, hemoptysis, dyspnea,wheezing  CV:   negative for chest pain, palpitations, lower extremity edema  GI:   negative for nausea, vomiting, diarrhea, abdominal pain,melena  Endo:               negative for polyuria,polydipsia,polyphagia,heat intolerance  Genitourinary: negative for frequency, dysuria and hematuria  Integument:  negative for rash and pruritus  Hematologic:  negative for easy bruising and gum/nose bleeding  Musculoskel: myalgias, arthralgias, joint pain  Neurological:  negative for headaches, dizziness, vertigo, memory problems and gait   Behavl/Psych: negative for feelings of anxiety, depression, mood changes    Past Medical History:   Diagnosis Date    Acute pharyngitis 2/8/2011    Allergic rhinitis, cause unspecified 2/8/2011    Arrhythmia     PVC    Asymptomatic varicose veins 2/8/2011    Cancer ([facility] [address]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     Carpal tunnel syndrome 2/8/2011    Degenerative Joint Disese ( improved/resolving) 2/8/2011    Degenetrative Dis Disease, 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    Diverticulosis of colon (without mention of hemorrhage) 2/8/2011    Dysphagia 2/8/2011    Edema, peripheral 2/8/2011    GERD (gastroesophageal reflux disease)     GERD Gastro- Esophageal Reflux Disease 2/8/2011    Herniated nucleus pulposus ( slipped disc) 2/8/2011    Menopausal 2/8/2011    PUD (peptic ulcer disease) 2012    Pure hypercholesterolemia 2/8/2011    Recurrent ear pain 2/8/2011    S/P radiation therapy     Scalp lesion 10/23/2014    Sebaceous cyst 4/22/2014    Unspecified cataract 2/8/2011    Unspecified essential hypertension 2/8/2011    Unspecified sleep apnea      Past Surgical History:   Procedure Laterality Date    HX HYSTERECTOMY      HX ORTHOPAEDIC  neck/back 2006    HX OTHER SURGICAL  5/8/2014     Excise recurrent scalp lesion and application of ACell    HX OTHER SURGICAL  5/8/2014    Excise keloid, anterior chest wall, and application of ACell    HX OTHER SURGICAL  5/8/2014     Excise sebaceous cyst, anterior chest wall    HX OTHER SURGICAL  5/8/2014    Punch biopsy, skin lesions, right forearm x2, right calf x1    NE INS NEW/RPLCMT PRM PM W/TRANSV ELTRD ATRIAL&VENT  9/30/2017         NE RMVL IMPLANTABLE PT-ACTIVATED CAR EVENT RECORDER  9/30/2017          Social History     Social History    Marital status:      Spouse name: N/A    Number of children: N/A    Years of education: N/A     Social History Main Topics    Smoking status: Never Smoker    Smokeless tobacco: Never Used    Alcohol use No    Drug use: No    Sexual activity: Yes     Partners: Male     Birth control/ protection: None     Other Topics Concern    None     Social History Narrative     Family History   Problem Relation Age of Onset    Hypertension Mother     Stroke Mother     Hypertension Father     Cancer Father      PROSTATE, MELANOMA    Anesth Problems Neg Hx      Current Outpatient Prescriptions   Medication Sig Dispense Refill    gabapentin (NEURONTIN) 300 mg capsule Take 1 Cap by mouth three (3) times daily. 270 Cap 3    amLODIPine (NORVASC) 10 mg tablet Take 0.5 Tabs by mouth daily. 30 Tab 11    traMADol (ULTRAM) 50 mg tablet Take 1 Tab by mouth every eight (8) hours as needed for Pain. Max Daily Amount: 150 mg. 30 Tab 0    metoprolol succinate (TOPROL-XL) 100 mg tablet Take 1 Tab by mouth daily. 30 Tab 5    raNITIdine (ZANTAC) 150 mg tablet Take 1 Tab by mouth two (2) times a day. 180 Tab 3    fluticasone (FLONASE) 50 mcg/actuation nasal spray Administer to right and left nostril. 1 Bottle 12    omeprazole (PRILOSEC) 40 mg capsule Take 1 Cap by mouth daily. 90 Cap 3    atorvastatin (LIPITOR) 40 mg tablet Take 1 Tab by mouth daily. 90 Tab 3    potassium chloride (KLOR-CON) 10 mEq tablet Take 1 Tab by mouth daily. 90 Tab 3    cloNIDine HCl (CATAPRES) 0.2 mg tablet Take 1 Tab by mouth two (2) times a day. 180 Tab 3    acetaminophen (TYLENOL) 325 mg tablet Take 325 mg by mouth every four (4) hours as needed for Pain.  latanoprost (XALATAN) 0.005 % ophthalmic solution Administer 1 Drop to right eye nightly.  MULTIVIT WITH CALCIUM,IRON,MIN [facility] MULTIPLE VITAMINS PO) Take  by mouth.  aspirin delayed-release 81 mg tablet Take  by mouth daily.        Allergies   Allergen Reactions    Latex Rash    Bactrim [Sulfamethoprim Ds] Hives    Aspirin Other (comments)     Anything higher than 81mg makes pt jittery    Codeine Hives and Itching    Iodinated Contrast- Oral And Iv Dye Itching    Pcn [Penicillins] Hives and Itching    Tape [Adhesive] Rash       Objective:  Visit Vitals    /86    Pulse 87    Temp 98.2 °F (36.8 °C) (Oral)    Resp 16    Ht 5' 2\" (1.575 m)    Wt 172 lb (78 kg)    SpO2 97%    BMI 31.46 kg/m2     Physical Exam:   General appearance - alert, well appearing, and in no distress  Mental status - alert, oriented to person, place, and time  EYE-DARLENE, EOMI, corneas normal, no foreign bodies  ENT-ENT exam normal, no neck nodes or sinus tenderness  Nose - normal and patent, no erythema, discharge or polyps  Mouth - mucous membranes moist, pharynx normal without lesions  Neck - supple, no significant adenopathy   Chest - clear to auscultation, no wheezes, rales or rhonchi, symmetric air entry   Heart - normal rate, regular rhythm, normal S1, S2, no murmurs, rubs, clicks or gallops   Abdomen - soft, nontender, nondistended, no masses or organomegaly  Lymph- no adenopathy palpable  Ext-peripheral pulses normal, no pedal edema, no clubbing or cyanosis  Skin-Warm and dry. no hyperpigmentation, vitiligo, or suspicious lesions  Neuro -alert, oriented, normal speech, no focal findings or movement disorder noted  Neck-normal C-spine, no tenderness, full ROM without pain  Feet-no nail deformities or callus formation with good pulses noted  Lt.foot-erythema stocking apperance    Results for orders placed or performed in visit on 12/07/17   AMB POC LIPID PROFILE   Result Value Ref Range    Cholesterol (POC) 167     Triglycerides (POC) 155     HDL Cholesterol (POC) 70     Non-HDL Cholesterol 97     LDL Cholesterol (POC) 66 MG/DL    TChol/HDL Ratio (POC) 2.4        Assessment/Plan:    ICD-10-CM ICD-9-CM    1. Neuropathy due to medical condition ([facility] [address] G63 357.4    2.  Carpal tunnel syndrome, unspecified laterality G56.00 354.0 gabapentin (NEURONTIN) 300 mg capsule   3. Coronary artery disease involving native coronary artery of native heart without angina pectoris I25.10 414.01    4. S/P cardiac pacemaker procedure Z95.0 V45.01    5. Essential hypertension I10 401.9    6. Cellulitis and abscess of left leg L03.116 682.6     L02.416       Orders Placed This Encounter    gabapentin (NEURONTIN) 300 mg capsule     Sig: Take 1 Cap by mouth three (3) times daily. Dispense:  270 Cap     Refill:  3     lose weight, increase physical activity, follow low fat diet, follow low salt diet,Take 81mg aspirin daily  Patient Instructions   MyChart Activation    Thank you for requesting access to [email or web address]. Please follow the instructions below to securely access and download your online medical record. [email or web address] allows you to send messages to your doctor, view your test results, renew your prescriptions, schedule appointments, and more. How Do I Sign Up? 1. In your internet browser, go to [email or web address]  2. Click on the First Time User? Click Here link in the Sign In box. You will be redirect to the New Member Sign Up page. 3. Enter your [email or web address].com Access Code exactly as it appears below. You will not need to use this code after youve completed the sign-up process. If you do not sign up before the expiration date, you must request a new code. [email or web address].com Access Code: [email or web address]  Expires: 3/7/2018 10:52 AM (This is the date your [email or web address].com access code will )    4. Enter the last four digits of your Social Security Number (xxxx) and Date of Birth (mm/dd/yyyy) as indicated and click Submit. You will be taken to the next sign-up page. 5. Create a [email or web address] ID. This will be your [email or web address].com login ID and cannot be changed, so think of one that is secure and easy to remember. 6. Create a [email or web address].com password. You can change your password at any time. 7. Enter your Password Reset Question and Answer.  This can be used at a later time if you forget your password. 8. Enter your e-mail address. You will receive e-mail notification when new information is available in [address] 9. Click Sign Up. You can now view and download portions of your medical record. 10. Click the Download Summary menu link to download a portable copy of your medical information. Additional Information    If you have questions, please visit the Frequently Asked Questions section of the [employer] website at [email or web address] Remember, [employer] is NOT to be used for urgent needs. For medical emergencies, dial 911. Follow-up Disposition:  Return in about 2 weeks (around 2/8/2018), or if symptoms worsen or fail to improve. I have reviewed with the patient details of the assessment and plan and all questions were answered. Relevent patient education was performed. The most recent lab findings were reviewed with the patient. An After Visit Summary was printed and given to the patient.

## 2018-02-08 ENCOUNTER — OFFICE VISIT (OUTPATIENT)
Dept: INTERNAL MEDICINE CLINIC | Age: 78
End: 2018-02-08

## 2018-02-08 ENCOUNTER — PATIENT OUTREACH (OUTPATIENT)
Dept: INTERNAL MEDICINE CLINIC | Age: 78
End: 2018-02-08

## 2018-02-08 VITALS
SYSTOLIC BLOOD PRESSURE: 150 MMHG | WEIGHT: 174 LBS | OXYGEN SATURATION: 97 % | HEIGHT: 62 IN | BODY MASS INDEX: 32.02 KG/M2 | HEART RATE: 82 BPM | TEMPERATURE: 98.1 F | RESPIRATION RATE: 14 BRPM | DIASTOLIC BLOOD PRESSURE: 70 MMHG

## 2018-02-08 DIAGNOSIS — L03.116 CELLULITIS AND ABSCESS OF LEFT LEG: ICD-10-CM

## 2018-02-08 DIAGNOSIS — L02.416 CELLULITIS AND ABSCESS OF LEFT LEG: ICD-10-CM

## 2018-02-08 DIAGNOSIS — I25.10 CORONARY ARTERY DISEASE INVOLVING NATIVE CORONARY ARTERY OF NATIVE HEART WITHOUT ANGINA PECTORIS: ICD-10-CM

## 2018-02-08 DIAGNOSIS — I10 ESSENTIAL HYPERTENSION: ICD-10-CM

## 2018-02-08 DIAGNOSIS — Z95.0 S/P CARDIAC PACEMAKER PROCEDURE: ICD-10-CM

## 2018-02-08 DIAGNOSIS — G63 NEUROPATHY DUE TO MEDICAL CONDITION (HCC): Primary | ICD-10-CM

## 2018-02-08 NOTE — PROGRESS NOTES
Case management    Navigator met the patient during today's office visit. The patient states that she has been doing good. Except for pain in her LLE.   Mrs. Sonu Muñoz has noticed increased drowsiness and decreased energy since she started taking Gabapentin 2 weeks ago. We discussed a dosing schedule of: 0800,1600,2400. She admits that she may have been taking the doses too close to each other. The patient states that she can adopt to the new schedule. I asked the patient to examine her energy level over the next 2 weeks and to contact the PCP office if she does notice an improvement. She agrees to do so and continued to her appointment. Plan: There were no other case management needs identified during this contact. Will close the current episode at this time.

## 2018-02-08 NOTE — PATIENT INSTRUCTIONS
eMagin Activation    Thank you for requesting access to eMagin. Please follow the instructions below to securely access and download your online medical record. eMagin allows you to send messages to your doctor, view your test results, renew your prescriptions, schedule appointments, and more. How Do I Sign Up? 1. In your internet browser, go to [email or web address] [email or web address]  2. Click on the First Time User? Click Here link in the Sign In box. You will be redirect to the New Member Sign Up page. 3. Enter your eMagin Access Code exactly as it appears below. You will not need to use this code after youve completed the sign-up process. If you do not sign up before the expiration date, you must request a new code. eMagin Access Code: [email or web address]  Expires: 3/7/2018 10:52 AM (This is the date your eMagin access code will )    4. Enter the last four digits of your Social Security Number (xxxx) and Date of Birth (mm/dd/yyyy) as indicated and click Submit. You will be taken to the next sign-up page. 5. Create a eMagin ID. This will be your eMagin login ID and cannot be changed, so think of one that is secure and easy to remember. 6. Create a eMagin password. You can change your password at any time. 7. Enter your Password Reset Question and Answer. This can be used at a later time if you forget your password. 8. Enter your e-mail address. You will receive e-mail notification when new information is available in [address] 9. Click Sign Up. You can now view and download portions of your medical record. 10. Click the Download Summary menu link to download a portable copy of your medical information. Additional Information    If you have questions, please visit the Frequently Asked Questions section of the eMagin website at [email or web address] Remember, eMagin is NOT to be used for urgent needs. For medical emergencies, dial 911.

## 2018-02-08 NOTE — MR AVS SNAPSHOT
[address] [facility] [address] 
[phone] Patient: Jewell Jones MRN: [id number] Visit Information Date & Time Provider Department Dept. Phone Encounter #  
 2/8/2018  9:30 AM MD Nestor Alatorre [address] [place] [address] [phone] [id number] Follow-up Instructions Return in about 3 months (around 5/8/2018), or if symptoms worsen or fail to improve. Follow-up and Disposition History Your Appointments 2/15/2018  4:00 PM  
ESTABLISHED PATIENT with Naa Jacome MD  
[facility] ([facility]) Appt Note: 6 month follow up; 6 month follow up r/s from 12/14 to 2/15  
 [address]  
[address] [facility] 7 [address] Upcoming Health Maintenance Date Due DTaP/Tdap/Td series (1 - Tdap) 12/21/1961 Pneumococcal 65+ High/Highest Risk (2 of 2 - PCV13) 9/11/2015 GLAUCOMA SCREENING Q2Y 3/8/2018 MEDICARE YEARLY EXAM 4/29/2018 Allergies as of 2/8/2018  Review Complete On: 2/8/2018 By: Levy Toney MD  
  
 Severity Noted Reaction Type Reactions Latex  10/10/2011    Rash Bactrim [Sulfamethoprim Ds] High 06/06/2017    Hives Aspirin  10/10/2011   Side Effect Other (comments) Anything higher than 81mg makes pt jittery Codeine  10/10/2011    Hives, Itching Iodinated Contrast- Oral And Iv Dye  06/23/2011   Side Effect Itching Pcn [Penicillins]  10/10/2011    Hives, Itching Tape [Adhesive]  05/08/2014    Rash Current Immunizations  Reviewed on 12/12/2016 Name Date Influenza High Dose Vaccine PF 10/15/2016 Influenza Vaccine 9/11/2014 Influenza Vaccine Split 11/8/2012 Pneumococcal Polysaccharide (PPSV-23) 9/11/2014 Not reviewed this visit You Were Diagnosed With   
  
 Codes Comments Neuropathy due to medical condition [facility])    -  Primary ICD-10-CM: C47 ICD-9-CM: 357.4 Coronary artery disease involving native coronary artery of native heart without angina pectoris     ICD-10-CM: I25.10 ICD-9-CM: 414.01   
 S/P cardiac pacemaker procedure     ICD-10-CM: Z95.0 ICD-9-CM: V45.01 Essential hypertension     ICD-10-CM: I10 
ICD-9-CM: 401.9 Cellulitis and abscess of left leg     ICD-10-CM: L03.116, L02.416 ICD-9-CM: 773. 6 Vitals BP Pulse Temp Resp Height(growth percentile) Weight(growth percentile) 150/70 82 98.1 °F (36.7 °C) (Oral) 14 5' 2\" (1.575 m) 174 lb (78.9 kg) SpO2 BMI OB Status Smoking Status 97% 31.83 kg/m2 Hysterectomy Never Smoker Vitals History BMI and BSA Data Body Mass Index Body Surface Area  
 31.83 kg/m 2 1.86 m 2 Preferred Pharmacy Pharmacy Name Phone [address] [facility] [address] [place] [address] [phone] Your Updated Medication List  
  
   
This list is accurate as of: 2/8/18 11:42 AM.  Always use your most recent med list. amLODIPine 10 mg tablet Commonly known as:  Arva Brazen Take 0.5 Tabs by mouth daily. aspirin delayed-release 81 mg tablet Take  by mouth daily. atorvastatin 40 mg tablet Commonly known as:  LIPITOR Take 1 Tab by mouth daily. cloNIDine HCl 0.2 mg tablet Commonly known as:  CATAPRES Take 1 Tab by mouth two (2) times a day. fluticasone 50 mcg/actuation nasal spray Commonly known as:  [place] Oats Administer to right and left nostril.  
  
 gabapentin 300 mg capsule Commonly known as:  NEURONTIN Take 1 Cap by mouth three (3) times daily. latanoprost 0.005 % ophthalmic solution Commonly known as:  [place] Administer 1 Drop to right eye nightly. metoprolol succinate 100 mg tablet Commonly known as:  TOPROL-XL Take 1 Tab by mouth daily. omeprazole 40 mg capsule Commonly known as:  PriLOSEC Take 1 Cap by mouth daily. potassium chloride 10 mEq tablet Commonly known as:  KLOR-CON Take 1 Tab by mouth daily. raNITIdine 150 mg tablet Commonly known as:  ZANTAC Take 1 Tab by mouth two (2) times a day. traMADol 50 mg tablet Commonly known as:  ULTRAM  
Take 1 Tab by mouth every eight (8) hours as needed for Pain. Max Daily Amount: 150 mg.  
  
 TYLENOL 325 mg tablet Generic drug:  acetaminophen Take 325 mg by mouth every four (4) hours as needed for Pain. WOMEN'S MULTIPLE VITAMINS PO Take  by mouth. We Performed the Following CBC W/O DIFF [42346 CPT(R)] METABOLIC PANEL, COMPREHENSIVE [17634 CPT(R)] Follow-up Instructions Return in about 3 months (around 2018), or if symptoms worsen or fail to improve. Patient Instructions ExactTarget Activation Thank you for requesting access to Exact[employer]. Please follow the instructions below to securely access and download your online medical record. [employer] allows you to send messages to your doctor, view your test results, renew your prescriptions, schedule appointments, and more. How Do I Sign Up? 1. In your internet browser, go to [email or web address] 
2. Click on the First Time User? Click Here link in the Sign In box. You will be redirect to the New Member Sign Up page. 3. Enter your Ex[employer] Access Code exactly as it appears below. You will not need to use this code after youve completed the sign-up process. If you do not sign up before the expiration date, you must request a new code. ExactTarget Access Code: [email or web address] Expires: 3/7/2018 10:52 AM (This is the date your ExactTarget access code will ) 4. Enter the last four digits of your Social Security Number (xxxx) and Date of Birth (mm/dd/yyyy) as indicated and click Submit. You will be taken to the next sign-up page. 5. Create a Wellkeepert ID. This will be your [employer] login ID and cannot be changed, so think of one that is secure and easy to remember. 6. Create a [employer] password. You can change your password at any time. 7. Enter your Password Reset Question and Answer. This can be used at a later time if you forget your password. 8. Enter your e-mail address. You will receive e-mail notification when new information is available in [address] 9. Click Sign Up. You can now view and download portions of your medical record. 10. Click the Download Summary menu link to download a portable copy of your medical information. Additional Information If you have questions, please visit the Frequently Asked Questions section of the [employer] website at [email or web address] Remember, [employer] is NOT to be used for urgent needs. For medical emergencies, dial 911. Introducing hospitals & [facility] SERVICES! Karen Webb introduces Global Grind patient portal. Now you can access parts of your medical record, email your doctor's office, and request medication refills online. 1. In your internet browser, go to [email or web address] 2. Click on the First Time User? Click Here link in the Sign In box. You will see the New Member Sign Up page. 3. Enter your [employer] Access Code exactly as it appears below. You will not need to use this code after youve completed the sign-up process. If you do not sign up before the expiration date, you must request a new code. · [employer] Access Code: [email or web address] Expires: 3/7/2018 10:52 AM 
 
4. Enter the last four digits of your Social Security Number (xxxx) and Date of Birth (mm/dd/yyyy) as indicated and click Submit. You will be taken to the next sign-up page. 5. Create a Wellkeepert ID. This will be your Global Grind login ID and cannot be changed, so think of one that is secure and easy to remember. 6. Create a [employer] Systems password. You can change your password at any time. 7. Enter your Password Reset Question and Answer. This can be used at a later time if you forget your password. 8. Enter your e-mail address. You will receive e-mail notification when new information is available in [address] 9. Click Sign Up. You can now view and download portions of your medical record. 10. Click the Download Summary menu link to download a portable copy of your medical information. If you have questions, please visit the Frequently Asked Questions section of the [employer] website. Remember, [employer] is NOT to be used for urgent needs. For medical emergencies, dial 911. Now available from your iPhone and Android! Please provide this summary of care documentation to your next provider. Your primary care clinician is listed as Hortensia Meyer. If you have any questions after today's visit, please call [phone].

## 2018-02-08 NOTE — PROGRESS NOTES
Yoli Benedict is a 76 y.o. female and presents with Leg Pain (left leg)  . Subjective:  She has recurrent pains involving the lt. lower leg with swelling reported  The pains are shooting in nature. Hypertension Review:  The patient has essential hypertension . Diet and Lifestyle: generally follows a low sodium diet, exercises sporadically  Home BP Monitoring: is not measured at home. Pertinent ROS: taking medications as instructed, no medication side effects noted, no TIA's, no chest pain on exertion, no dyspnea on exertion, no swelling of ankles. Diabetes Mellitus Review:  She has diabetes mellitus. Diabetic ROS - medication compliance: compliant all of the time, diabetic diet compliance: compliant all of the time, home glucose monitoring: is performed. Known diabetic complications: none  Cardiovascular risk factors: family history, dyslipidemia, diabetes mellitus, obesity, hypertension  Current diabetic medications include oral agents/  Eye exam current (within one year): no  Weight trend: stable  Prior visit with dietician: no  Current diet: \"healthy\" diet  in general  Current exercise: walking  Current monitoring regimen: home blood tests - daily  Home blood sugar records: trend: stable  Any episodes of hypoglycemia? no  Is She on ACE inhibitor or angiotensin II receptor blocker? Yes         Review of Systems  Constitutional: negative for fevers, chills, anorexia and weight loss  Eyes:   negative for visual disturbance and irritation  ENT:   negative for tinnitus,sore throat,nasal congestion,ear pains. hoarseness  Respiratory:  negative for cough, hemoptysis, dyspnea,wheezing  CV:   negative for chest pain, palpitations, lower extremity edema  GI:   negative for nausea, vomiting, diarrhea, abdominal pain,melena  Endo:               negative for polyuria,polydipsia,polyphagia,heat intolerance  Genitourinary: negative for frequency, dysuria and hematuria  Integument:  negative for rash and pruritus  Hematologic:  negative for easy bruising and gum/nose bleeding  Musculoskel: myalgias, arthralgias, joint pain  Neurological:  negative for headaches, dizziness, vertigo, memory problems and gait   Behavl/Psych: negative for feelings of anxiety, depression, mood changes    Past Medical History:   Diagnosis Date    Acute pharyngitis 2/8/2011    Allergic rhinitis, cause unspecified 2/8/2011    Arrhythmia     PVC    Asymptomatic varicose veins 2/8/2011    Cancer ([facility] [address] [facility]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     Carpal tunnel syndrome 2/8/2011    Degenerative Joint Disese ( improved/resolving) 2/8/2011    Degenetrative Dis Disease, 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    Diverticulosis of colon (without mention of hemorrhage) 2/8/2011    Dysphagia 2/8/2011    Edema, peripheral 2/8/2011    GERD (gastroesophageal reflux disease)     GERD Gastro- Esophageal Reflux Disease 2/8/2011    Herniated nucleus pulposus ( slipped disc) 2/8/2011    Menopausal 2/8/2011    PUD (peptic ulcer disease) 2012    Pure hypercholesterolemia 2/8/2011    Recurrent ear pain 2/8/2011    S/P radiation therapy     Scalp lesion 10/23/2014    Sebaceous cyst 4/22/2014    Unspecified cataract 2/8/2011    Unspecified essential hypertension 2/8/2011    Unspecified sleep apnea      Past Surgical History:   Procedure Laterality Date    HX HYSTERECTOMY      HX ORTHOPAEDIC  neck/back 2006    HX OTHER SURGICAL  5/8/2014     Excise recurrent scalp lesion and application of ACell    HX OTHER SURGICAL  5/8/2014    Excise keloid, anterior chest wall, and application of ACell    HX OTHER SURGICAL  5/8/2014     Excise sebaceous cyst, anterior chest wall    HX OTHER SURGICAL  5/8/2014    Punch biopsy, skin lesions, right forearm x2, right calf x1    NM INS NEW/RPLCMT PRM PM W/TRANSV ELTRD ATRIAL&VENT  9/30/2017         NM RMVL IMPLANTABLE PT-ACTIVATED CAR EVENT RECORDER  9/30/2017          Social History     Social History    Marital status:      Spouse name: N/A    Number of children: N/A    Years of education: N/A     Social History Main Topics    Smoking status: Never Smoker    Smokeless tobacco: Never Used    Alcohol use No    Drug use: No    Sexual activity: Yes     Partners: Male     Birth control/ protection: None     Other Topics Concern    None     Social History Narrative     Family History   Problem Relation Age of Onset    Hypertension Mother     Stroke Mother     Hypertension Father     Cancer Father      PROSTATE, MELANOMA    Anesth Problems Neg Hx      Current Outpatient Prescriptions   Medication Sig Dispense Refill    gabapentin (NEURONTIN) 300 mg capsule Take 1 Cap by mouth three (3) times daily. 270 Cap 3    amLODIPine (NORVASC) 10 mg tablet Take 0.5 Tabs by mouth daily. 30 Tab 11    traMADol (ULTRAM) 50 mg tablet Take 1 Tab by mouth every eight (8) hours as needed for Pain. Max Daily Amount: 150 mg. 30 Tab 0    metoprolol succinate (TOPROL-XL) 100 mg tablet Take 1 Tab by mouth daily. 30 Tab 5    raNITIdine (ZANTAC) 150 mg tablet Take 1 Tab by mouth two (2) times a day. 180 Tab 3    fluticasone (FLONASE) 50 mcg/actuation nasal spray Administer to right and left nostril. 1 Bottle 12    omeprazole (PRILOSEC) 40 mg capsule Take 1 Cap by mouth daily. 90 Cap 3    atorvastatin (LIPITOR) 40 mg tablet Take 1 Tab by mouth daily. 90 Tab 3    potassium chloride (KLOR-CON) 10 mEq tablet Take 1 Tab by mouth daily. 90 Tab 3    cloNIDine HCl (CATAPRES) 0.2 mg tablet Take 1 Tab by mouth two (2) times a day. 180 Tab 3    acetaminophen (TYLENOL) 325 mg tablet Take 325 mg by mouth every four (4) hours as needed for Pain.  latanoprost (XALATAN) 0.005 % ophthalmic solution Administer 1 Drop to right eye nightly.  MULTIVIT WITH CALCIUM,IRON,MIN [facility] MULTIPLE VITAMINS PO) Take  by mouth.  aspirin delayed-release 81 mg tablet Take  by mouth daily.        Allergies   Allergen Reactions    Latex Rash    Bactrim [Sulfamethoprim Ds] Hives    Aspirin Other (comments)     Anything higher than 81mg makes pt jittery    Codeine Hives and Itching    Iodinated Contrast- Oral And Iv Dye Itching    Pcn [Penicillins] Hives and Itching    Tape [Adhesive] Rash       Objective:  Visit Vitals    /70    Pulse 82    Temp 98.1 °F (36.7 °C) (Oral)    Resp 14    Ht 5' 2\" (1.575 m)    Wt 174 lb (78.9 kg)    SpO2 97%    BMI 31.83 kg/m2     Physical Exam:   General appearance - alert, well appearing, and in no distress  Mental status - alert, oriented to person, place, and time  EYE-DARLENE, EOMI, corneas normal, no foreign bodies  ENT-ENT exam normal, no neck nodes or sinus tenderness  Nose - normal and patent, no erythema, discharge or polyps  Mouth - mucous membranes moist, pharynx normal without lesions  Neck - supple, no significant adenopathy   Chest - clear to auscultation, no wheezes, rales or rhonchi, symmetric air entry   Heart - normal rate, regular rhythm, normal S1, S2, no murmurs, rubs, clicks or gallops   Abdomen - soft, nontender, nondistended, no masses or organomegaly  Lymph- no adenopathy palpable  Ext-peripheral pulses normal, no pedal edema, no clubbing or cyanosis  Skin-Warm and dry. no hyperpigmentation, vitiligo, or suspicious lesions  Neuro -alert, oriented, normal speech, no focal findings or movement disorder noted  Neck-normal C-spine, no tenderness, full ROM without pain  Feet-no nail deformities or callus formation with good pulses noted  Lt.foot-erythema stocking apperance    Results for orders placed or performed in visit on 12/07/17   AMB POC LIPID PROFILE   Result Value Ref Range    Cholesterol (POC) 167     Triglycerides (POC) 155     HDL Cholesterol (POC) 70     Non-HDL Cholesterol 97     LDL Cholesterol (POC) 66 MG/DL    TChol/HDL Ratio (POC) 2.4        Assessment/Plan:    ICD-10-CM ICD-9-CM    1. [facility]urop[facility] ([facility] G63 357.4    2.  Coronary artery disease involving native coronary artery of native heart without angina pectoris I25.10 414.01    3. S/P cardiac pacemaker procedure Z95.0 V45.01    4. Essential hypertension X49 668.4 METABOLIC PANEL, COMPREHENSIVE      CBC W/O DIFF   5. Cellulitis and abscess of left leg L03.116 682.6     L02.416       Orders Placed This Encounter    METABOLIC PANEL, COMPREHENSIVE    CBC W/O DIFF     lose weight, increase physical activity, follow low fat diet, follow low salt diet,Take 81mg aspirin daily  Patient Instructions   MyChart Activation    Thank you for requesting access to [employer]. Please follow the instructions below to securely access and download your online medical record. [employer] allows you to send messages to your doctor, view your test results, renew your prescriptions, schedule appointments, and more. How Do I Sign Up? 1. In your internet browser, go to [email or web address]  2. Click on the First Time User? Click Here link in the Sign In box. You will be redirect to the New Member Sign Up page. 3. Enter your [employer] Access Code exactly as it appears below. You will not need to use this code after youve completed the sign-up process. If you do not sign up before the expiration date, you must request a new code. [employer] Access Code: [email or web address]  Expires: 3/7/2018 10:52 AM (This is the date your [employer] access code will )    4. Enter the last four digits of your Social Security Number (xxxx) and Date of Birth (mm/dd/yyyy) as indicated and click Submit. You will be taken to the next sign-up page. 5. Create a [employer] ID. This will be your [employer] login ID and cannot be changed, so think of one that is secure and easy to remember. 6. Create a [employer] password. You can change your password at any time. 7. Enter your Password Reset Question and Answer. This can be used at a later time if you forget your password. 8. Enter your e-mail address.  You will receive e-mail notification when new information is available in [employer]. 9. Click Sign Up. You can now view and download portions of your medical record. 10. Click the Download Summary menu link to download a portable copy of your medical information. Additional Information    If you have questions, please visit the Frequently Asked Questions section of the [employer] website at [email or web address] Remember, [employer] is NOT to be used for urgent needs. For medical emergencies, dial 911. Follow-up Disposition:  Return in about 3 months (around 5/8/2018), or if symptoms worsen or fail to improve. I have reviewed with the patient details of the assessment and plan and all questions were answered. Relevent patient education was performed. The most recent lab findings were reviewed with the patient. An After Visit Summary was printed and given to the patient.

## 2018-02-09 LAB
ALBUMIN SERPL-MCNC: 4.1 G/DL (ref 3.5–4.8)
ALBUMIN/GLOB SERPL: 1.5 {RATIO} (ref 1.2–2.2)
ALP SERPL-CCNC: 99 IU/L (ref 39–117)
ALT SERPL-CCNC: 15 IU/L (ref 0–32)
AST SERPL-CCNC: 16 IU/L (ref 0–40)
BILIRUB SERPL-MCNC: <0.2 MG/DL (ref 0–1.2)
BUN SERPL-MCNC: 32 MG/DL (ref 8–27)
BUN/CREAT SERPL: 30 (ref 12–28)
CALCIUM SERPL-MCNC: 9.1 MG/DL (ref 8.7–10.3)
CHLORIDE SERPL-SCNC: 105 MMOL/L (ref 96–106)
CO2 SERPL-SCNC: 27 MMOL/L (ref 18–29)
CREAT SERPL-MCNC: 1.08 MG/DL (ref 0.57–1)
ERYTHROCYTE [DISTWIDTH] IN BLOOD BY AUTOMATED COUNT: 21 % (ref 12.3–15.4)
GFR SERPLBLD CREATININE-BSD FMLA CKD-EPI: 49 ML/MIN/1.73
GFR SERPLBLD CREATININE-BSD FMLA CKD-EPI: 56 ML/MIN/1.73
GLOBULIN SER CALC-MCNC: 2.8 G/DL (ref 1.5–4.5)
GLUCOSE SERPL-MCNC: 86 MG/DL (ref 65–99)
HCT VFR BLD AUTO: 37.8 % (ref 34–46.6)
HGB BLD-MCNC: 11.4 G/DL (ref 11.1–15.9)
INTERPRETATION: NORMAL
MCH RBC QN AUTO: 22.8 PG (ref 26.6–33)
MCHC RBC AUTO-ENTMCNC: 30.2 G/DL (ref 31.5–35.7)
MCV RBC AUTO: 76 FL (ref 79–97)
PLATELET # BLD AUTO: 305 X10E3/UL (ref 150–379)
POTASSIUM SERPL-SCNC: 3.9 MMOL/L (ref 3.5–5.2)
PROT SERPL-MCNC: 6.9 G/DL (ref 6–8.5)
RBC # BLD AUTO: 5 X10E6/UL (ref 3.77–5.28)
SODIUM SERPL-SCNC: 148 MMOL/L (ref 134–144)
WBC # BLD AUTO: 6.1 X10E3/UL (ref 3.4–10.8)

## 2018-02-13 ENCOUNTER — TELEPHONE (OUTPATIENT)
Dept: CARDIOLOGY CLINIC | Age: 78
End: 2018-02-13

## 2018-02-13 NOTE — TELEPHONE ENCOUNTER
Returned call; unable to leave a message. Her mailbox is not set up. She is scheduled to come into clinic 2-15 (her pacer check was not rescheduled when made; added on her device check. She will be notified when she comes in.

## 2018-02-13 NOTE — TELEPHONE ENCOUNTER
Pt would like to reschedule her pacemaker appointment that she missed in January. She has an appointment with Dr. Jaguar Lacey on Thursday and would like to know if she can come in before that to have her pacemaker checked. Please give her a call back @ [phone]. Thanks!   Tika Pryor

## 2018-02-13 NOTE — TELEPHONE ENCOUNTER
Patient would like a call back regarding her pacemaker. She stated that she hasn't used the pacemaker.   Phone [phone]

## 2018-02-15 ENCOUNTER — OFFICE VISIT (OUTPATIENT)
Dept: CARDIOLOGY CLINIC | Age: 78
End: 2018-02-15

## 2018-02-15 ENCOUNTER — CLINICAL SUPPORT (OUTPATIENT)
Dept: CARDIOLOGY CLINIC | Age: 78
End: 2018-02-15

## 2018-02-15 VITALS
OXYGEN SATURATION: 99 % | BODY MASS INDEX: 32.87 KG/M2 | SYSTOLIC BLOOD PRESSURE: 136 MMHG | RESPIRATION RATE: 18 BRPM | DIASTOLIC BLOOD PRESSURE: 76 MMHG | WEIGHT: 178.6 LBS | HEART RATE: 65 BPM | HEIGHT: 62 IN

## 2018-02-15 DIAGNOSIS — Z95.0 CARDIAC PACEMAKER IN SITU: Primary | ICD-10-CM

## 2018-02-15 DIAGNOSIS — I10 ESSENTIAL HYPERTENSION: ICD-10-CM

## 2018-02-15 DIAGNOSIS — I48.0 PAROXYSMAL ATRIAL FIBRILLATION (HCC): ICD-10-CM

## 2018-02-15 DIAGNOSIS — I49.5 SSS (SICK SINUS SYNDROME) (HCC): ICD-10-CM

## 2018-02-15 DIAGNOSIS — I47.1 PAT (PAROXYSMAL ATRIAL TACHYCARDIA) (HCC): ICD-10-CM

## 2018-02-15 DIAGNOSIS — I51.7 LVH (LEFT VENTRICULAR HYPERTROPHY): ICD-10-CM

## 2018-02-15 NOTE — PROGRESS NOTES
Cardiac Electrophysiology Office Note     Subjective: Romulo Roper is a 76 y.o. 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 Since then she has been doing well without recurrent syncope. She does not feel palpitation chest pain shortness of breath. Her leg edema also resolved. The patient has peripheral neuropathy and she taking gabapentin and that has helped significantly. In the past,  She had surgery to have the skin cancer on her scalp removed. She had skin cancer removed from her scalp at [address] [place] [address] and said she will have radiation therapy. She said at night she felt palpitations and she can use toprol low dose to avoid syncope with bradycardia  I had stopped the Rythmol because it could have been contributing to the black outs, I was concerned about intermittent bradycardia. Last time she was seen she felt faint and sat down then she broke out in a sweat. She was not taking the Rythmol at this time. Echo 5/2016 LVEF 60 % to 65 %. No RWMA. Mild concentric hypertrophy. Mild TR. External loop recorder 2/2017 showed no AFIB but mild sinus bradycardia and NSR    Past Hx:  The last event was New Years 2016.    holter 6/23/2014 sinus rhythm with minimum 39 bpm at 5 pm PVC's , mean HR 50 bpm while she is on medication  She was last seen 8/2014 and has missed several follow up appointments. She was seen last for follow up for syncope, she has passed out twice December and Jan 1. She went the ED at [facility] times. The metoprolol was discontinued and she was started on lisinopril and clonidine for high BP. First episode 12/31/15 occurred while she was sitting on the stool in her kitchen, she started to feel tighten around her head then she LOC and fell off the stool.     The next day she had a similar episode, she was doing things around the house and felt dizzy she sat down and put a cold compress on her head. No LOC. 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 Keeley Ackerman and had venous duplex without DVT. Echo with normal LVEF and inferior wall hypokinesis in 2014     Mother with stroke and HBP and heart disease death at age 77  Father with cancer at age 68  Sister with cancer  at age of 76  Current Outpatient Prescriptions   Medication Sig Dispense Refill    gabapentin (NEURONTIN) 300 mg capsule Take 1 Cap by mouth three (3) times daily. 270 Cap 3    amLODIPine (NORVASC) 10 mg tablet Take 0.5 Tabs by mouth daily. 30 Tab 11    traMADol (ULTRAM) 50 mg tablet Take 1 Tab by mouth every eight (8) hours as needed for Pain. Max Daily Amount: 150 mg. 30 Tab 0    metoprolol succinate (TOPROL-XL) 100 mg tablet Take 1 Tab by mouth daily. 30 Tab 5    raNITIdine (ZANTAC) 150 mg tablet Take 1 Tab by mouth two (2) times a day. 180 Tab 3    fluticasone (FLONASE) 50 mcg/actuation nasal spray Administer to right and left nostril. 1 Bottle 12    omeprazole (PRILOSEC) 40 mg capsule Take 1 Cap by mouth daily. 90 Cap 3    atorvastatin (LIPITOR) 40 mg tablet Take 1 Tab by mouth daily. 90 Tab 3    potassium chloride (KLOR-CON) 10 mEq tablet Take 1 Tab by mouth daily. 90 Tab 3    cloNIDine HCl (CATAPRES) 0.2 mg tablet Take 1 Tab by mouth two (2) times a day.  180 Tab 3    acetaminophen (TYLENOL) 325 mg tablet Take 325 mg by mouth every four (4) hours as needed for Pain.  latanoprost (XALATAN) 0.005 % ophthalmic solution Administer 1 Drop to right eye nightly.  MULTIVIT WITH CALCIUM,IRON,MIN [facility] MULTIPLE VITAMINS PO) Take  by mouth daily.  aspirin delayed-release 81 mg tablet Take  by mouth daily.        Allergies   Allergen Reactions    Latex Rash    Bactrim [Sulfamethoprim Ds] Hives    Aspirin Other (comments)     Anything higher than 81mg makes pt jittery    Codeine Hives and Itching    Iodinated Contrast- Oral And Iv Dye Itching    Pcn [Penicillins] Hives and Itching    Tape [Adhesive] Rash     Past Medical History:   Diagnosis Date    Acute pharyngitis 2/8/2011    Allergic rhinitis, cause unspecified 2/8/2011    Arrhythmia     PVC    Asymptomatic varicose veins 2/8/2011    Cancer ([facility] [address]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     Carpal tunnel syndrome 2/8/2011    Degenerative Joint Disese ( improved/resolving) 2/8/2011    Degenetrative Dis Disease, 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    Diverticulosis of colon (without mention of hemorrhage) 2/8/2011    Dysphagia 2/8/2011    Edema, peripheral 2/8/2011    GERD (gastroesophageal reflux disease)     GERD Gastro- Esophageal Reflux Disease 2/8/2011    Herniated nucleus pulposus ( slipped disc) 2/8/2011    Menopausal 2/8/2011    PUD (peptic ulcer disease) 2012    Pure hypercholesterolemia 2/8/2011    Recurrent ear pain 2/8/2011    S/P radiation therapy     Scalp lesion 10/23/2014    Sebaceous cyst 4/22/2014    Unspecified cataract 2/8/2011    Unspecified essential hypertension 2/8/2011    Unspecified sleep apnea      Past Surgical History:   Procedure Laterality Date    HX HYSTERECTOMY      HX ORTHOPAEDIC  neck/back 2006    HX OTHER SURGICAL  5/8/2014     Excise recurrent scalp lesion and application of ACell    HX OTHER SURGICAL  5/8/2014    Excise keloid, anterior chest wall, and application of ACell    HX OTHER SURGICAL  5/8/2014     Excise sebaceous cyst, anterior chest wall    HX OTHER SURGICAL  5/8/2014    Punch biopsy, skin lesions, right forearm x2, right calf x1    AR INS NEW/RPLCMT PRM PM W/TRANSV ELTRD ATRIAL&VENT  9/30/2017         AR RMVL IMPLANTABLE PT-ACTIVATED CAR EVENT RECORDER  9/30/2017          Family History   Problem Relation Age of Onset    Hypertension Mother     Stroke Mother     Hypertension Father     Cancer Father      PROSTATE, MELANOMA    Anesth Problems Neg Hx      Social History   Substance Use Topics    Smoking status: Never Smoker    Smokeless tobacco: Never Used    Alcohol use No        Review of Systems:   Constitutional: Negative for fever, chills, weight loss   HEENT: Negative for nosebleeds, vision changes. Respiratory: Negative for cough, hemoptysis, sputum production, and wheezing. Cardiovascular: Negative for orthopnea, claudication,  and PND. Gastrointestinal: Negative for constipation, blood in stool and melena. Genitourinary: Negative for dysuria, and hematuria. Musculoskeletal: Negative for myalgias, + arthralgia. Skin: + skin discoloration or cancer on scalp  Heme: Does not bleed or bruise easily. Neurological: Negative for speech change and focal weakness      Objective:     Visit Vitals    /76 (BP 1 Location: Left arm)    Pulse 65    Resp 18    Ht 5' 2\" (1.575 m)    Wt 178 lb 9.6 oz (81 kg)    SpO2 99%    BMI 32.67 kg/m2      Physical Exam:   Constitutional: Well-nourished. No distress  Head: Normocephalic and atraumatic. Eyes: Pupils are equal, round  Neck: supple. No JVD present. Cardiovascular: normal rate, regular rhythm and normal heart sounds. Exam reveals no gallop and no friction rub. No murmur heard. Pulmonary/Chest: Effort normal and breath sounds normal.   Abdominal: Soft, mild obesity  Musculoskeletal: LLE wrapped in clean and dry bandage   Neurological: alert,oriented. Skin: Skin is warm and dry.   Left-sided pacemaker site is clean no drainage or redness. Psychiatric: normal mood and affect. Behavior is normal. Judgment and thought content normal.         Assessment/Plan:       ICD-10-CM ICD-9-CM    1. Cardiac pacemaker in situ Z95.0 V45.01    2. Paroxysmal atrial fibrillation ([facility]) I48.0 427.31    3. SSS (sick sinus syndrome) ([facility]) I49.5 427.81    4. Essential hypertension I10 401.9    5. LVH (left ventricular hypertrophy) I51.7 429.3    6. PAT (paroxysmal atrial tachycardia) ([facility]) I47.1 427.0       The patient has done well after the pacemaker implantation. She has sick sinus and therefore pacing in the atrium of 51% at a time. The patient has episodes of atrial tachycardia. The EGM also showed nonsustained ventricular tachycardia. The patient is asymptomatic does not have angina at this time. Her functional capacity has improved and the pacemaker was placed. She no longer have syncope or dizziness. I have not seen atrial fibrillation. She is on aspirin. She has coronary disease before. If she has recurrent angina or shortness of breath on exertion I would recommend to repeat the stress test  cardiolite stress test 2014 with basal to mid inferior wall ischemia LVEF 66%  Poor exercise capacity  I referred her to Dr Lelia Ahumada to see for cardiac cath   She had cardiac cath 2014 and it showed 50% LAD, 60% LCx and 100% RCA with left to right collateral so Dr Lelia Ahumada only recommended medical therapies  Follow-up Disposition:  Return in about 6 months (around 8/15/2018). Thank you for involving me in this patient's care and please call with further concerns or questions. Naye Wyatt M.D.   Electrophysiology/Cardiology  [facility]  [address] , [address]  [facility], [address]                             [address] [place] [address]  [phone]

## 2018-02-28 ENCOUNTER — TELEPHONE (OUTPATIENT)
Dept: CARDIOLOGY CLINIC | Age: 78
End: 2018-02-28

## 2018-02-28 NOTE — TELEPHONE ENCOUNTER
Patient called and needs cardiac clearance for her dental work @ 9am tomorrow morning. She stated that she has chipped a tooth and is having another tooth pulled.   Phone [phone]  Shakira Ramírez [phone]  Tesha Foss

## 2018-03-01 NOTE — TELEPHONE ENCOUNTER
Verified patient with two types of identifiers. Spoke with patient's son, Payam Tanner, notified no clearance necessary unless dentist needs something specific. Patient's son verbalized understanding and will call with any other questions.

## 2018-05-10 ENCOUNTER — OFFICE VISIT (OUTPATIENT)
Dept: INTERNAL MEDICINE CLINIC | Age: 78
End: 2018-05-10

## 2018-05-10 VITALS
OXYGEN SATURATION: 98 % | DIASTOLIC BLOOD PRESSURE: 90 MMHG | RESPIRATION RATE: 16 BRPM | HEIGHT: 62 IN | TEMPERATURE: 98 F | BODY MASS INDEX: 31.83 KG/M2 | WEIGHT: 173 LBS | HEART RATE: 88 BPM | SYSTOLIC BLOOD PRESSURE: 150 MMHG

## 2018-05-10 DIAGNOSIS — I89.0 LYMPHEDEMA OF LEFT ARM: ICD-10-CM

## 2018-05-10 DIAGNOSIS — I25.10 CORONARY ARTERY DISEASE INVOLVING NATIVE CORONARY ARTERY OF NATIVE HEART WITHOUT ANGINA PECTORIS: ICD-10-CM

## 2018-05-10 DIAGNOSIS — Z00.00 MEDICARE ANNUAL WELLNESS VISIT, SUBSEQUENT: ICD-10-CM

## 2018-05-10 DIAGNOSIS — E78.00 PURE HYPERCHOLESTEROLEMIA: Primary | ICD-10-CM

## 2018-05-10 DIAGNOSIS — Z95.0 S/P CARDIAC PACEMAKER PROCEDURE: ICD-10-CM

## 2018-05-10 DIAGNOSIS — I10 ESSENTIAL HYPERTENSION: ICD-10-CM

## 2018-05-10 LAB
CHOLEST SERPL-MCNC: 171 MG/DL
HDLC SERPL-MCNC: 61 MG/DL
LDL CHOLESTEROL POC: 85 MG/DL
NON-HDL CHOLESTEROL, 011976: 110
TCHOL/HDL RATIO (POC): 2.8
TRIGL SERPL-MCNC: 126 MG/DL

## 2018-05-10 NOTE — PATIENT INSTRUCTIONS
[employer] Activation    Thank you for requesting access to [employer]. Please follow the instructions below to securely access and download your online medical record. [employer] allows you to send messages to your doctor, view your test results, renew your prescriptions, schedule appointments, and more. How Do I Sign Up? 1. In your internet browser, go to [email or web address]  2. Click on the First Time User? Click Here link in the Sign In box. You will be redirect to the New Member Sign Up page. 3. Enter your [employer] Access Code exactly as it appears below. You will not need to use this code after youve completed the sign-up process. If you do not sign up before the expiration date, you must request a new code. [employer] Access Code: [email or web address]  Expires: 2018 11:52 AM (This is the date your [employer] access code will )    4. Enter the last four digits of your Social Security Number (xxxx) and Date of Birth (mm/dd/yyyy) as indicated and click Submit. You will be taken to the next sign-up page. 5. Create a [employer] ID. This will be your [employer] login ID and cannot be changed, so think of one that is secure and easy to remember. 6. Create a [employer] password. You can change your password at any time. 7. Enter your Password Reset Question and Answer. This can be used at a later time if you forget your password. 8. Enter your e-mail address. You will receive e-mail notification when new information is available in [address]. 9. Click Sign Up. You can now view and download portions of your medical record. 10. Click the Download Summary menu link to download a portable copy of your medical information. Additional Information    If you have questions, please visit the Frequently Asked Questions section of the [employer] website at [email or web address] Remember, [employer] is NOT to be used for urgent needs. For medical emergencies, dial 911.

## 2018-05-10 NOTE — MR AVS SNAPSHOT
Apoorva Mckeon 
 
 
 [address] [facility] [place] [address] 
[phone] Patient: Catrachita Sommers MRN: [id number] PHN:[id number] Visit Information Date & Time Provider Department Dept. Phone Encounter #  
 5/10/2018 11:00 AM Han Grant MD [address] [place] [address] [phone] [id number] Follow-up Instructions Return in about 3 months (around 8/10/2018), or if symptoms worsen or fail to improve. Your Appointments 8/21/2018  8:40 AM  
ESTABLISHED PATIENT with Marin Miller MD  
[facility] Appt Note: 6 month f/u  
 [address]  
[address] [address] [facility] [address]  
  
    
 3/12/2019  9:30 AM  
PACEMAKER with Danielle Foster [facility] ([facility]) Appt Note: med threshold/rc/Valdez annual b [address] [place]  
[phone]  
  
   
 [address] [address] [facility] [address]  
  
    
 3/12/2019  9:40 AM  
ESTABLISHED PATIENT with Marin Miller MD  
[facility] ([facility]) Appt Note: med threshold/rc/Valdez annual b [address] [place]  
[phone]  
  
    
  
 5/21/2018  2:30 PM  
REMOTE OFFICE VISIT with [place] Park [facility] ([facility]) Appt Note: CL PPI/rc b 2-15-18  
 [address] [place]  
[address] [address] [facility] [address]  
  
    
 8/27/2018  1:00 PM  
REMOTE OFFICE VISIT with Merrill Park [facility] ([facility]) Appt Note: CL PPI/rc b  
 [address] [place]  
[phone]  
  
    
 11/28/2018 11:00 AM  
REMOTE OFFICE VISIT with Merrill Park  
 [facility] ([facility]) Appt Note: CL PPI/rc b  
 [address] [place] [address]  
[phone] Upcoming Health Maintenance Date Due DTaP/Tdap/Td series (1 - Tdap) 12/21/1961 Pneumococcal 65+ Low/Medium Risk (2 of 2 - PCV13) 9/11/2015 GLAUCOMA SCREENING Q2Y 3/8/2018 Influenza Age 5 to Adult 8/1/2018 MEDICARE YEARLY EXAM 5/11/2019 Allergies as of 5/10/2018  Review Complete On: 5/10/2018 By: Gabe Schulte MD  
  
 Severity Noted Reaction Type Reactions Latex  10/10/2011    Rash Bactrim [Sulfamethoprim Ds] High 06/06/2017    Hives Aspirin  10/10/2011   Side Effect Other (comments) Anything higher than 81mg makes pt jittery Codeine  10/10/2011    Hives, Itching Iodinated Contrast- Oral And Iv Dye  06/23/2011   Side Effect Itching Pcn [Penicillins]  10/10/2011    Hives, Itching Tape [Adhesive]  05/08/2014    Rash Current Immunizations  Reviewed on 12/12/2016 Name Date Influenza High Dose Vaccine PF 10/15/2016 Influenza Vaccine 9/11/2014 Influenza Vaccine Split 11/8/2012 Pneumococcal Polysaccharide (PPSV-23) 9/11/2014 Not reviewed this visit You Were Diagnosed With   
  
 Codes Comments Pure hypercholesterolemia    -  Primary ICD-10-CM: E78.00 ICD-9-CM: 272.0 Essential hypertension     ICD-10-CM: I10 
ICD-9-CM: 401.9 Lymphedema of left arm     ICD-10-CM: I89.0 ICD-9-CM: 133.0 S/P cardiac pacemaker procedure     ICD-10-CM: Z95.0 ICD-9-CM: V45.01 Coronary artery disease involving native coronary artery of native heart without angina pectoris     ICD-10-CM: I25.10 ICD-9-CM: 414.01 Medicare annual wellness visit, subsequent     ICD-10-CM: Z00.00 ICD-9-CM: V70.0 Vitals BP Pulse Temp Resp Height(growth percentile) Weight(growth percentile) 150/90 88 98 °F (36.7 °C) (Oral) 16 5' 2\" (1.575 m) 173 lb (78.5 kg) SpO2 BMI OB Status Smoking Status 98% 31.64 kg/m2 Hysterectomy Never Smoker Vitals History BMI and BSA Data Body Mass Index Body Surface Area  
 31.64 kg/m 2 1.85 m 2 Preferred Pharmacy Pharmacy Name Phone Alessia Bat[facility] [place] - [address] [facility] [address] [phone] Your Updated Medication List  
  
   
This list is accurate as of 5/10/18 12:08 PM.  Always use your most recent med list. amLODIPine 10 mg tablet Commonly known as:  Luan Griffin Take 0.5 Tabs by mouth daily. aspirin delayed-release 81 mg tablet Take  by mouth daily. atorvastatin 40 mg tablet Commonly known as:  LIPITOR Take 1 Tab by mouth daily. cloNIDine HCl 0.2 mg tablet Commonly known as:  CATAPRES Take 1 Tab by mouth two (2) times a day. fluticasone 50 mcg/actuation nasal spray Commonly known as:  Cyndra Puna Administer to right and left nostril.  
  
 gabapentin 300 mg capsule Commonly known as:  NEURONTIN Take 1 Cap by mouth three (3) times daily. latanoprost 0.005 % ophthalmic solution Commonly known as:  Lizette Noonan Administer 1 Drop to right eye nightly. metoprolol succinate 100 mg tablet Commonly known as:  TOPROL-XL Take 1 Tab by mouth daily. omeprazole 40 mg capsule Commonly known as:  PriLOSEC Take 1 Cap by mouth daily. potassium chloride 10 mEq tablet Commonly known as:  KLOR-CON Take 1 Tab by mouth daily. raNITIdine 150 mg tablet Commonly known as:  ZANTAC Take 1 Tab by mouth two (2) times a day. traMADol 50 mg tablet Commonly known as:  ULTRAM  
Take 1 Tab by mouth every eight (8) hours as needed for Pain. Max Daily Amount: 150 mg.  
  
 TYLENOL 325 mg tablet Generic drug:  acetaminophen Take 325 mg by mouth every four (4) hours as needed for Pain. WOMEN'S MULTIPLE VITAMINS PO Take  by mouth daily. We Performed the Following AMB POC LIPID PROFILE [17339 CPT(R)] Follow-up Instructions Return in about 3 months (around 8/10/2018), or if symptoms worsen or fail to improve. Patient Instructions MyChart Activation Thank you for requesting access to [facility]. Please follow the instructions below to securely access and download your online medical record. [facility] allows you to send messages to your doctor, view your test results, renew your prescriptions, schedule appointments, and more. How Do I Sign Up? 1. In your internet browser, go to [email or web address] 
2. Click on the First Time User? Click Here link in the Sign In box. You will be redirect to the New Member Sign Up page. 3. Enter your [facility] Access Code exactly as it appears below. You will not need to use this code after youve completed the sign-up process. If you do not sign up before the expiration date, you must request a new code. [facility] Access Code: [email or web address] 
Expires: 2018 11:52 AM (This is the date your [facility] access code will ) 4. Enter the last four digits of your Social Security Number (xxxx) and Date of Birth (mm/dd/yyyy) as indicated and click Submit. You will be taken to the next sign-up page. 5. Create a [facility] ID. This will be your [facility] login ID and cannot be changed, so think of one that is secure and easy to remember. 6. Create a [facility] password. You can change your password at any time. 7. Enter your Password Reset Question and Answer. This can be used at a later time if you forget your password. 8. Enter your e-mail address. You will receive e-mail notification when new information is available in [address] 9. Click Sign Up. You can now view and download portions of your medical record. 10. Click the Download Summary menu link to download a portable copy of your medical information. Additional Information If you have questions, please visit the Frequently Asked Questions section of the [facility] website at [email or web address] Remember, [employer] is NOT to be used for urgent needs. For medical emergencies, dial 911. Introducing [address]! Alyse Karimi introduces [facility] patient portal. Now you can access parts of your medical record, email your doctor's office, and request medication refills online. 1. In your internet browser, go to [email or web address] 2. Click on the First Time User? Click Here link in the Sign In box. You will see the New Member Sign Up page. 3. Enter your [facility] Access Code exactly as it appears below. You will not need to use this code after youve completed the sign-up process. If you do not sign up before the expiration date, you must request a new code. · [facility] Access Code: [email or web address] 
Expires: 8/8/2018 11:52 AM 
 
4. Enter the last four digits of your Social Security Number (xxxx) and Date of Birth (mm/dd/yyyy) as indicated and click Submit. You will be taken to the next sign-up page. 5. Create a [facility] ID. This will be your [facility] login ID and cannot be changed, so think of one that is secure and easy to remember. 6. Create a [facility] password. You can change your password at any time. 7. Enter your Password Reset Question and Answer. This can be used at a later time if you forget your password. 8. Enter your e-mail address. You will receive e-mail notification when new information is available in [address] 9. Click Sign Up. You can now view and download portions of your medical record. 10. Click the Download Summary menu link to download a portable copy of your medical information. If you have questions, please visit the Frequently Asked Questions section of the [facility] website. Remember, [facility] is NOT to be used for urgent needs. For medical emergencies, dial 911. Now available from your iPhone and Android! Please provide this summary of care documentation to your next provider. Your primary care clinician is listed as [facility]. If you have any questions after today's visit, please call [phone].

## 2018-05-10 NOTE — PROGRESS NOTES
Shama Capps is a 76 y.o. female and presents with Cholesterol Problem; Hypertension; GERD; Annual Wellness Visit; and Leg Swelling  . Subjective:  Edema review:  Presents to the office today with a  2 week ago history of leg edema. The edema is not dependent, she has had an associated cellulitis in the past.    Hypertension Review:  The patient has essential hypertension  Diet and Lifestyle: generally follows a  low sodium diet, exercises sporadically  Home BP Monitoring: is not measured at home. Pertinent ROS: taking medications as instructed, no medication side effects noted, no TIA's, no chest pain on exertion, no dyspnea on exertion, no swelling of ankles. Dyslipidemia Review:  Patient presents for evaluation of lipids. Compliance with treatment thus far has been excellent. A repeat fasting lipid profile was done. The patient does not use medications that may worsen dyslipidemias (corticosteroids, progestins, anabolic steroids, diuretics, beta-blockers, amiodarone, cyclosporine, olanzapine). The patient exercises some      She has a pacemaker in place. Shama Capps is a 76 y.o. female and presents for annual Medicare Wellness Visit. Problem List: Reviewed with patient and discussed risk factors.     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    Carpal tunnel syndrome G56.00    Unspecified cataract H26.9    GERD Gastro- Esophageal Reflux Disease K21.9    Degenerative Joint Disese ( improved/resolving) M19.90    Menopausal N95.1    Asymptomatic varicose veins I83.90    Sebaceous cyst L72.3    Keloid L91.0    Scalp lesion L98.9    Abnormal nuclear stress test R94.39    Syncope R55    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    CAD (coronary artery disease) I25.10    S/P cardiac pacemaker procedure Z95.0    Bradycardia R00.1    SSS (sick sinus syndrome) ([facility] [address] I49.5       Current medical providers:  Patient Care Team:  Mara Turner MD as PCP - General (Internal Medicine)  Russel Rivera MD (Cardiology)  Yossi Azar, RN as Ambulatory Care Navigator    PSH: Reviewed with patient  Past Surgical History:   Procedure Laterality Date    HX HYSTERECTOMY      HX ORTHOPAEDIC  neck/back 2006    HX OTHER SURGICAL  5/8/2014     Excise recurrent scalp lesion and application of ACell    HX OTHER SURGICAL  5/8/2014    Excise keloid, anterior chest wall, and application of ACell    HX OTHER SURGICAL  5/8/2014     Excise sebaceous cyst, anterior chest wall    HX OTHER SURGICAL  5/8/2014    Punch biopsy, skin lesions, right forearm x2, right calf x1    [place] INS NEW/RPLCMT PRM PM W/TRANSV ELTRD ATRIAL&VENT  9/30/2017         [place] RMVL IMPLANTABLE PT-ACTIVATED CAR EVENT RECORDER  9/30/2017             SH: Reviewed with patient  Social History   Substance Use Topics    Smoking status: Never Smoker    Smokeless tobacco: Never Used    Alcohol use No       FH: Reviewed with patient  Family History   Problem Relation Age of Onset    Hypertension Mother     Stroke Mother     Hypertension Father     Cancer Father      PROSTATE, MELANOMA    Anesth Problems Neg Hx        Medications/Allergies: Reviewed with patient  Current Outpatient Prescriptions on File Prior to Visit   Medication Sig Dispense Refill    gabapentin (NEURONTIN) 300 mg capsule Take 1 Cap by mouth three (3) times daily. 270 Cap 3    amLODIPine (NORVASC) 10 mg tablet Take 0.5 Tabs by mouth daily. 30 Tab 11    traMADol (ULTRAM) 50 mg tablet Take 1 Tab by mouth every eight (8) hours as needed for Pain.  Max Daily Amount: 150 mg. 30 Tab 0    metoprolol succinate (TOPROL-XL) 100 mg tablet Take 1 Tab by mouth daily. 30 Tab 5    raNITIdine (ZANTAC) 150 mg tablet Take 1 Tab by mouth two (2) times a day. 180 Tab 3    fluticasone (FLONASE) 50 mcg/actuation nasal spray Administer to right and left nostril. 1 Bottle 12    omeprazole (PRILOSEC) 40 mg capsule Take 1 Cap by mouth daily. 90 Cap 3    atorvastatin (LIPITOR) 40 mg tablet Take 1 Tab by mouth daily. 90 Tab 3    potassium chloride (KLOR-CON) 10 mEq tablet Take 1 Tab by mouth daily. 90 Tab 3    cloNIDine HCl (CATAPRES) 0.2 mg tablet Take 1 Tab by mouth two (2) times a day. 180 Tab 3    acetaminophen (TYLENOL) 325 mg tablet Take 325 mg by mouth every four (4) hours as needed for Pain.  latanoprost (XALATAN) 0.005 % ophthalmic solution Administer 1 Drop to right eye nightly.  MULTIVIT WITH CALCIUM,IRON,MIN [facility] MULTIPLE VITAMINS PO) Take  by mouth daily.  aspirin delayed-release 81 mg tablet Take  by mouth daily. No current facility-administered medications on file prior to visit. Allergies   Allergen Reactions    Latex Rash    Bactrim [Sulfamethoprim Ds] Hives    Aspirin Other (comments)     Anything higher than 81mg makes pt jittery    Codeine Hives and Itching    Iodinated Contrast- Oral And Iv Dye Itching    Pcn [Penicillins] Hives and Itching    Tape [Adhesive] Rash       Objective:  Visit Vitals    /90    Pulse 88    Temp 98 °F (36.7 °C) (Oral)    Resp 16    Ht 5' 2\" (1.575 m)    Wt 173 lb (78.5 kg)    SpO2 98%    BMI 31.64 kg/m2    Body mass index is 31.64 kg/(m^2).     Assessment of cognitive impairment: Alert and oriented x 3    Depression Screen:   PHQ over the last two weeks 5/10/2018   PHQ Not Done Patient Decline   Little interest or pleasure in doing things Not at all   Feeling down, depressed or hopeless Not at all   Total Score PHQ 2 0     Depression Review:  Patient is seen for screen of depression,denies anhedonia, weight gain, insomnia, hypersomnia, psychomotor agitation, psychomotor retardation, fatigue, feelings of worthlessness/guilt, difficulty concentrating, hopelessness, impaired memory and recurrent thoughts of death Treatment includes no medication   She denies recurrent thoughts of death and suicidal thoughts without plan. Fall Risk Assessment:    Fall Risk Assessment, last 12 mths 2/8/2018   Able to walk? Yes   Fall in past 12 months? No       Functional Ability:   Does the patient exhibit a steady gait? yes   How long did it take the patient to get up and walk from a sitting position? seconds   Is the patient self reliant?  (ie can do own laundry, meals, household chores)  yes     Does the patient handle his/her own medications? yes     Does the patient handle his/her own money? yes     Is the patients home safe (ie good lighting, handrails on stairs and bath, etc.)? yes     Did you notice or did patient express any hearing difficulties? no     Did you notice or did patient express any vision difficulties?   no     Were distance and reading eye charts used? no       Advance Care Planning:   Patient was offered the opportunity to discuss advance care planning:  yes     Does patient have an Advance Directive:  no   If no, did you provide information on Caring Connections? yes       Plan:      Orders Placed This Encounter    AMB POC LIPID PROFILE       Health Maintenance   Topic Date Due    DTaP/Tdap/Td series (1 - Tdap) 12/21/1961    Pneumococcal 65+ Low/Medium Risk (2 of 2 - PCV13) 09/11/2015    GLAUCOMA SCREENING Q2Y  03/08/2018    Influenza Age 9 to Adult  08/01/2018    MEDICARE YEARLY EXAM  05/11/2019    Bone Densitometry (Dexa) Screening  Completed    ZOSTER VACCINE AGE 60>  Addressed       *Patient verbalized understanding and agreement with the plan. A copy of the After Visit Summary with personalized health plan was given to the patient today.       Review of Systems  Constitutional: negative for fevers, chills, anorexia and weight loss  Eyes:   negative for visual disturbance and irritation  ENT:   negative for tinnitus,sore throat,nasal congestion,ear pains. hoarseness  Respiratory:  negative for cough, hemoptysis, dyspnea,wheezing  CV:   negative for chest pain, palpitations, lower extremity edema  GI:   negative for nausea, vomiting, diarrhea, abdominal pain,melena  Endo:               negative for polyuria,polydipsia,polyphagia,heat intolerance  Genitourinary: negative for frequency, dysuria and hematuria  Integument:  negative for rash and pruritus  Hematologic:  negative for easy bruising and gum/nose bleeding  Musculoskel: negative for myalgias, arthralgias, back pain, muscle weakness, joint pain  Neurological:  negative for headaches, dizziness, vertigo, memory problems and gait   Behavl/Psych: negative for feelings of anxiety, depression, mood changes    Past Medical History:   Diagnosis Date    Acute pharyngitis 2/8/2011    Allergic rhinitis, cause unspecified 2/8/2011    Arrhythmia     PVC    Asymptomatic varicose veins 2/8/2011    Cancer ([facility] [address]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     Carpal tunnel syndrome 2/8/2011    Degenerative Joint Disese ( improved/resolving) 2/8/2011    Degenetrative Dis Disease, 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    Diverticulosis of colon (without mention of hemorrhage) 2/8/2011    Dysphagia 2/8/2011    Edema, peripheral 2/8/2011    GERD (gastroesophageal reflux disease)     GERD Gastro- Esophageal Reflux Disease 2/8/2011    Herniated nucleus pulposus ( slipped disc) 2/8/2011    Menopausal 2/8/2011    PUD (peptic ulcer disease) 2012    Pure hypercholesterolemia 2/8/2011    Recurrent ear pain 2/8/2011    S/P radiation therapy     Scalp lesion 10/23/2014    Sebaceous cyst 4/22/2014    Unspecified cataract 2/8/2011    Unspecified essential hypertension 2/8/2011    Unspecified sleep apnea      Past Surgical History:   Procedure Laterality Date    HX HYSTERECTOMY      HX ORTHOPAEDIC neck/back 2006    HX OTHER SURGICAL  5/8/2014     Excise recurrent scalp lesion and application of ACell    HX OTHER SURGICAL  5/8/2014    Excise keloid, anterior chest wall, and application of ACell    HX OTHER SURGICAL  5/8/2014     Excise sebaceous cyst, anterior chest wall    HX OTHER SURGICAL  5/8/2014    Punch biopsy, skin lesions, right forearm x2, right calf x1    [place] INS NEW/RPLCMT PRM PM W/TRANSV ELTRD ATRIAL&VENT  9/30/2017         [place] RMVL IMPLANTABLE PT-ACTIVATED CAR EVENT RECORDER  9/30/2017          Social History     Social History    Marital status:      Spouse name: N/A    Number of children: N/A    Years of education: N/A     Social History Main Topics    Smoking status: Never Smoker    Smokeless tobacco: Never Used    Alcohol use No    Drug use: No    Sexual activity: Yes     Partners: Male     Birth control/ protection: None     Other Topics Concern    None     Social History Narrative     Family History   Problem Relation Age of Onset    Hypertension Mother     Stroke Mother     Hypertension Father     Cancer Father      PROSTATE, MELANOMA    Anesth Problems Neg Hx      Current Outpatient Prescriptions   Medication Sig Dispense Refill    gabapentin (NEURONTIN) 300 mg capsule Take 1 Cap by mouth three (3) times daily. 270 Cap 3    amLODIPine (NORVASC) 10 mg tablet Take 0.5 Tabs by mouth daily. 30 Tab 11    traMADol (ULTRAM) 50 mg tablet Take 1 Tab by mouth every eight (8) hours as needed for Pain. Max Daily Amount: 150 mg. 30 Tab 0    metoprolol succinate (TOPROL-XL) 100 mg tablet Take 1 Tab by mouth daily. 30 Tab 5    raNITIdine (ZANTAC) 150 mg tablet Take 1 Tab by mouth two (2) times a day. 180 Tab 3    fluticasone (FLONASE) 50 mcg/actuation nasal spray Administer to right and left nostril. 1 Bottle 12    omeprazole (PRILOSEC) 40 mg capsule Take 1 Cap by mouth daily. 90 Cap 3    atorvastatin (LIPITOR) 40 mg tablet Take 1 Tab by mouth daily.  90 Tab 3    potassium chloride (KLOR-CON) 10 mEq tablet Take 1 Tab by mouth daily. 90 Tab 3    cloNIDine HCl (CATAPRES) 0.2 mg tablet Take 1 Tab by mouth two (2) times a day. 180 Tab 3    acetaminophen (TYLENOL) 325 mg tablet Take 325 mg by mouth every four (4) hours as needed for Pain.  latanoprost (XALATAN) 0.005 % ophthalmic solution Administer 1 Drop to right eye nightly.  MULTIVIT WITH CALCIUM,IRON,MIN [facility] MULTIPLE VITAMINS PO) Take  by mouth daily.  aspirin delayed-release 81 mg tablet Take  by mouth daily. Allergies   Allergen Reactions    Latex Rash    Bactrim [Sulfamethoprim Ds] Hives    Aspirin Other (comments)     Anything higher than 81mg makes pt jittery    Codeine Hives and Itching    Iodinated Contrast- Oral And Iv Dye Itching    Pcn [Penicillins] Hives and Itching    Tape [Adhesive] Rash       Objective:  Visit Vitals    /90    Pulse 88    Temp 98 °F (36.7 °C) (Oral)    Resp 16    Ht 5' 2\" (1.575 m)    Wt 173 lb (78.5 kg)    SpO2 98%    BMI 31.64 kg/m2     Physical Exam:   General appearance - alert, well appearing, and in no distress  Mental status - alert, oriented to person, place, and time  EYE-DARLENE, EOMI, corneas normal, no foreign bodies  ENT-ENT exam normal, no neck nodes or sinus tenderness  Nose - normal and patent, no erythema, discharge or polyps  Mouth - mucous membranes moist, pharynx normal without lesions  Neck - supple, no significant adenopathy   Chest - clear to auscultation, no wheezes, rales or rhonchi, symmetric air entry   Heart - normal rate, regular rhythm, normal S1, S2, no murmurs, rubs, clicks or gallops   Abdomen - soft, nontender, nondistended, no masses or organomegaly  Lymph- no adenopathy palpable  Ext-peripheral pulses normal, no pedal edema, no clubbing or cyanosis  Skin-Warm and dry.  no hyperpigmentation, vitiligo, or suspicious lesions  Neuro -alert, oriented, normal speech, no focal findings or movement disorder noted  Neck-normal C-spine, no tenderness, full ROM without pain  Feet-no nail deformities or callus formation with good pulses noted      Results for orders placed or performed in visit on 35/89/03   METABOLIC PANEL, COMPREHENSIVE   Result Value Ref Range    Glucose 86 65 - 99 mg/dL    BUN 32 (H) 8 - 27 mg/dL    Creatinine 1.08 (H) 0.57 - 1.00 mg/dL    GFR est non-AA 49 (L) >59 mL/min/1.73    GFR est AA 56 (L) >59 mL/min/1.73    BUN/Creatinine ratio 30 (H) 12 - 28    Sodium 148 (H) 134 - 144 mmol/L    Potassium 3.9 3.5 - 5.2 mmol/L    Chloride 105 96 - 106 mmol/L    CO2 27 18 - 29 mmol/L    Calcium 9.1 8.7 - 10.3 mg/dL    Protein, total 6.9 6.0 - 8.5 g/dL    Albumin 4.1 3.5 - 4.8 g/dL    GLOBULIN, TOTAL 2.8 1.5 - 4.5 g/dL    A-G Ratio 1.5 1.2 - 2.2    Bilirubin, total <0.2 0.0 - 1.2 mg/dL    Alk. phosphatase 99 39 - 117 IU/L    AST (SGOT) 16 0 - 40 IU/L    ALT (SGPT) 15 0 - 32 IU/L   CBC W/O DIFF   Result Value Ref Range    WBC 6.1 3.4 - 10.8 x10E3/uL    RBC 5.00 3.77 - 5.28 x10E6/uL    HGB 11.4 11.1 - 15.9 g/dL    HCT 37.8 34.0 - 46.6 %    MCV 76 (L) 79 - 97 fL    MCH 22.8 (L) 26.6 - 33.0 pg    MCHC 30.2 (L) 31.5 - 35.7 g/dL    RDW 21.0 (H) 12.3 - 15.4 %    PLATELET 408 537 - 355 x10E3/uL   CKD REPORT   Result Value Ref Range    Interpretation Note        Assessment/Plan:    ICD-10-CM ICD-9-CM    1. Pure hypercholesterolemia E78.00 272.0 AMB POC LIPID PROFILE   2. Essential hypertension I10 401.9 AMB POC LIPID PROFILE   3. Lymphedema of left arm I89.0 457.1    4. S/P cardiac pacemaker procedure Z95.0 V45.01    5. Coronary artery disease involving native coronary artery of native heart without angina pectoris I25.10 414.01    6.  Medicare annual wellness visit, subsequent Z00.00 V70.0      Orders Placed This Encounter    AMB POC LIPID PROFILE     lose weight, increase physical activity, follow low fat diet, follow low salt diet, continue present plan  Patient Instructions   MyChart Activation    Thank you for requesting access to [employer]. Please follow the instructions below to securely access and download your online medical record. [employer] allows you to send messages to your doctor, view your test results, renew your prescriptions, schedule appointments, and more. How Do I Sign Up? 1. In your internet browser, go to [email or web address]  2. Click on the First Time User? Click Here link in the Sign In box. You will be redirect to the New Member Sign Up page. 3. Enter your [employer] Access Code exactly as it appears below. You will not need to use this code after youve completed the sign-up process. If you do not sign up before the expiration date, you must request a new code. [employer] Access Code: [email or web address]  Expires: 2018 11:52 AM (This is the date your [employer] access code will )    4. Enter the last four digits of your Social Security Number (xxxx) and Date of Birth (mm/dd/yyyy) as indicated and click Submit. You will be taken to the next sign-up page. 5. Create a [employer] ID. This will be your [employer] login ID and cannot be changed, so think of one that is secure and easy to remember. 6. Create a [employer] password. You can change your password at any time. 7. Enter your Password Reset Question and Answer. This can be used at a later time if you forget your password. 8. Enter your e-mail address. You will receive e-mail notification when new information is available in [address] 9. Click Sign Up. You can now view and download portions of your medical record. 10. Click the Download Summary menu link to download a portable copy of your medical information. Additional Information    If you have questions, please visit the Frequently Asked Questions section of the [employer] website at [email or web address] Remember, [employer] is NOT to be used for urgent needs. For medical emergencies, dial 911.            Follow-up Disposition:  Return in about 3 months (around 8/10/2018), or if symptoms worsen or fail to improve. I have reviewed with the patient details of the assessment and plan and all questions were answered. Relevent patient education was performed    An After Visit Summary was printed and given to the patient.

## 2018-06-07 ENCOUNTER — OFFICE VISIT (OUTPATIENT)
Dept: INTERNAL MEDICINE CLINIC | Age: 78
End: 2018-06-07

## 2018-06-07 VITALS
SYSTOLIC BLOOD PRESSURE: 190 MMHG | TEMPERATURE: 98.1 F | RESPIRATION RATE: 16 BRPM | OXYGEN SATURATION: 97 % | HEIGHT: 62 IN | BODY MASS INDEX: 31.28 KG/M2 | DIASTOLIC BLOOD PRESSURE: 80 MMHG | HEART RATE: 87 BPM | WEIGHT: 170 LBS

## 2018-06-07 DIAGNOSIS — E78.00 PURE HYPERCHOLESTEROLEMIA: ICD-10-CM

## 2018-06-07 DIAGNOSIS — L03.116 CELLULITIS OF LEFT LOWER EXTREMITY: Primary | ICD-10-CM

## 2018-06-07 DIAGNOSIS — L03.116 CELLULITIS OF LEFT LOWER EXTREMITY: ICD-10-CM

## 2018-06-07 DIAGNOSIS — Z95.0 S/P CARDIAC PACEMAKER PROCEDURE: ICD-10-CM

## 2018-06-07 DIAGNOSIS — I10 ESSENTIAL HYPERTENSION: ICD-10-CM

## 2018-06-07 RX ORDER — FUROSEMIDE 20 MG/1
20 TABLET ORAL DAILY
Qty: 30 TAB | Refills: 6 | Status: SHIPPED | OUTPATIENT
Start: 2018-06-07 | End: 2018-06-07 | Stop reason: SDUPTHER

## 2018-06-07 RX ORDER — LEVOFLOXACIN 500 MG/1
500 TABLET, FILM COATED ORAL DAILY
Qty: 14 TAB | Refills: 0 | Status: SHIPPED | OUTPATIENT
Start: 2018-06-07 | End: 2018-08-21 | Stop reason: ALTCHOICE

## 2018-06-07 NOTE — MR AVS SNAPSHOT
[address] [facility] 
 
 
 [address] [facility] [facility] [address] 
[phone] Patient: Kinza Terrell MRN: [id number] Visit Information Date & Time Provider Department Dept. Phone Encounter #  
 6/7/2018  9:45 AM Fer Delacruz MD [address] [facility] [phone] [id number] Follow-up Instructions Return in about 2 weeks (around 6/21/2018), or if symptoms worsen or fail to improve. Your Appointments 8/9/2018 11:00 AM  
ROUTINE CARE with Fer Delacruz MD  
[facility] - [facility] ([address]) Appt Note: 3 month check up [address] [facility]  
[phone]  
  
   
 [address]  [address]  
  
    
 8/21/2018  8:40 AM  
ESTABLISHED PATIENT with Kailey Azevedo MD  
[facility] ([facility]) Appt Note: 6 month f/u  
 [address] [place]  
[address] [facility] [address]  
  
    
 3/12/2019  9:30 AM  
PACEMAKER with Veronica Sánchez [facility] ([facility]) Appt Note: med threshold/rc/Valdez annual b [address] [place]  
[phone]  
  
   
 [address] [place] [facility] [address]  
  
    
 3/12/2019  9:40 AM  
ESTABLISHED PATIENT with Kailey Azevedo MD  
[facility] ([facility]) Appt Note: med threshold/rc/Valdez annual b [address] [place]  
[phone]  
  
    
  
 8/27/2018  1:00 PM  
REMOTE OFFICE VISIT with Zach Castillo [facility] ([facility]) Appt Note: CL PPI/rc b  
 [address] [place]  
[address] [place] [facility] [address]  
  
    
 11/28/2018 11:00 AM  
 REMOTE OFFICE VISIT with Karen Parry [facility] ([facility]) Appt Note: CL PPI/rc b  
 [address] [facility]  
[phone] Upcoming Health Maintenance Date Due DTaP/Tdap/Td series (1 - Tdap) 12/21/1961 Pneumococcal 65+ Low/Medium Risk (2 of 2 - PCV13) 9/11/2015 GLAUCOMA SCREENING Q2Y 3/8/2018 Influenza Age 5 to Adult 8/1/2018 MEDICARE YEARLY EXAM 5/11/2019 Allergies as of 6/7/2018  Review Complete On: 6/7/2018 By: April Tabatha Ortiz LPN Severity Noted Reaction Type Reactions Latex  10/10/2011    Rash Bactrim [Sulfamethoprim Ds] High 06/06/2017    Hives Aspirin  10/10/2011   Side Effect Other (comments) Anything higher than 81mg makes pt jittery Codeine  10/10/2011    Hives, Itching Iodinated Contrast- Oral And Iv Dye  06/23/2011   Side Effect Itching Pcn [Penicillins]  10/10/2011    Hives, Itching Tape [Adhesive]  05/08/2014    Rash Current Immunizations  Reviewed on 12/12/2016 Name Date Influenza High Dose Vaccine PF 10/15/2016 Influenza Vaccine 9/11/2014 Influenza Vaccine Split 11/8/2012 Pneumococcal Polysaccharide (PPSV-23) 9/11/2014 Not reviewed this visit You Were Diagnosed With   
  
 Codes Comments Cellulitis of left lower extremity    -  Primary ICD-10-CM: L03.116 ICD-9-CM: 682.6 Essential hypertension     ICD-10-CM: I10 
ICD-9-CM: 401.9 S/P cardiac pacemaker procedure     ICD-10-CM: Z95.0 ICD-9-CM: V45.01   
 Pure hypercholesterolemia     ICD-10-CM: E78.00 ICD-9-CM: 272.0 Vitals BP Pulse Temp Resp Height(growth percentile) Weight(growth percentile) 190/80 87 98.1 °F (36.7 °C) (Oral) 16 5' 2\" (1.575 m) 170 lb (77.1 kg) SpO2 BMI OB Status Smoking Status 97% 31.09 kg/m2 Hysterectomy Never Smoker Vitals History BMI and BSA Data Body Mass Index Body Surface Area  31.09 kg/m 2 1.84 m 2  
  
  
 Preferred Pharmacy Pharmacy Name Phone [facility] [phone] [facility] Trevon Jeong [phone] Your Updated Medication List  
  
   
This list is accurate as of 6/7/18 12:05 PM.  Always use your most recent med list. amLODIPine 10 mg tablet Commonly known as:  Eminence Cordial Take 0.5 Tabs by mouth daily. aspirin delayed-release 81 mg tablet Take  by mouth daily. atorvastatin 40 mg tablet Commonly known as:  LIPITOR Take 1 Tab by mouth daily. cloNIDine HCl 0.2 mg tablet Commonly known as:  CATAPRES Take 1 Tab by mouth two (2) times a day. fluticasone 50 mcg/actuation nasal spray Commonly known as:  Leafy Few Administer to right and left nostril. furosemide 20 mg tablet Commonly known as:  LASIX Take 1 Tab by mouth daily. gabapentin 300 mg capsule Commonly known as:  NEURONTIN Take 1 Cap by mouth three (3) times daily. latanoprost 0.005 % ophthalmic solution Commonly known as:  Analilia Winkler Administer 1 Drop to right eye nightly. levoFLOXacin 500 mg tablet Commonly known as:  Yao [place] Take 1 Tab by mouth daily. metoprolol succinate 100 mg tablet Commonly known as:  TOPROL-XL Take 1 Tab by mouth daily. omeprazole 40 mg capsule Commonly known as:  PriLOSEC Take 1 Cap by mouth daily. potassium chloride 10 mEq tablet Commonly known as:  KLOR-CON Take 1 Tab by mouth daily. raNITIdine 150 mg tablet Commonly known as:  ZANTAC Take 1 Tab by mouth two (2) times a day. traMADol 50 mg tablet Commonly known as:  ULTRAM  
Take 1 Tab by mouth every eight (8) hours as needed for Pain. Max Daily Amount: 150 mg.  
  
 TYLENOL 325 mg tablet Generic drug:  acetaminophen Take 325 mg by mouth every four (4) hours as needed for Pain. WOMEN'S MULTIPLE VITAMINS PO Take  by mouth daily. Prescriptions Sent to Pharmacy Refills  
 furosemide (LASIX) 20 mg tablet 6 Sig: Take 1 Tab by mouth daily. Class: Normal  
 Pharmacy: [facility] [address] Ph #: [phone] Route: Oral  
 levoFLOXacin (LEVAQUIN) 500 mg tablet 0 Sig: Take 1 Tab by mouth daily. Class: Normal  
 Pharmacy: [facility] [address] Ph #: [phone] Route: Oral  
  
Follow-up Instructions Return in about 2 weeks (around 2018), or if symptoms worsen or fail to improve. Patient Instructions Chatoushart Activation Thank you for requesting access to [employer] Please follow the instructions below to securely access and download your online medical record. [employer] allows you to send messages to your doctor, view your test results, renew your prescriptions, schedule appointments, and more. How Do I Sign Up? 1. In your internet browser, go to [email or web address] 
2. Click on the First Time User? Click Here link in the Sign In box. You will be redirect to the New Member Sign Up page. 3. Enter your [employer] Access Code exactly as it appears below. You will not need to use this code after youve completed the sign-up process. If you do not sign up before the expiration date, you must request a new code. [employer] Access Code: [email or web address] 
Expires: 2018 11:52 AM (This is the date your [employer] access code will ) 4. Enter the last four digits of your Social Security Number (xxxx) and Date of Birth (mm/dd/yyyy) as indicated and click Submit. You will be taken to the next sign-up page. 5. Create a [employer] ID. This will be your [employer] login ID and cannot be changed, so think of one that is secure and easy to remember. 6. Create a [employer] password. You can change your password at any time. 7. Enter your Password Reset Question and Answer.  This can be used at a later time if you forget your password. 8. Enter your e-mail address. You will receive e-mail notification when new information is available in [address] 9. Click Sign Up. You can now view and download portions of your medical record. 10. Click the Download Summary menu link to download a portable copy of your medical information. Additional Information If you have questions, please visit the Frequently Asked Questions section of the [employer] website at [email or web address] Remember, [employer] is NOT to be used for urgent needs. For medical emergencies, dial 911. Introducing [facility]! Lavonne Pagan introduces [employer]ates patient portal. Now you can access parts of your medical record, email your doctor's office, and request medication refills online. 1. In your internet browser, go to [email or web address] 2. Click on the First Time User? Click Here link in the Sign In box. You will see the New Member Sign Up page. 3. Enter your [employer] Access Code exactly as it appears below. You will not need to use this code after youve completed the sign-up process. If you do not sign up before the expiration date, you must request a new code. · [employer] Access Code: [email or web address] 
Expires: 8/8/2018 11:52 AM 
 
4. Enter the last four digits of your Social Security Number (xxxx) and Date of Birth (mm/dd/yyyy) as indicated and click Submit. You will be taken to the next sign-up page. 5. Create a i[employer]ates ID. This will be your [employer] login ID and cannot be changed, so think of one that is secure and easy to remember. 6. Create a [employer] password. You can change your password at any time. 7. Enter your Password Reset Question and Answer. This can be used at a later time if you forget your password. 8. Enter your e-mail address. You will receive e-mail notification when new information is available in [address] 9. Click Sign Up. You can now view and download portions of your medical record. 10. Click the Download Summary menu link to download a portable copy of your medical information. If you have questions, please visit the Frequently Asked Questions section of the [employer] website. Remember, [employer] is NOT to be used for urgent needs. For medical emergencies, dial 911. Now available from your iPhone and Android! Please provide this summary of care documentation to your next provider. Your primary care clinician is listed as Angeles Salas. If you have any questions after today's visit, please call [phone].

## 2018-06-07 NOTE — PROGRESS NOTES
Arleth Cr is a 76 y.o. female and presents with Leg Pain (right)  . Subjective:  Cellulitis Review:  Patient presents for evaluation of a possible skin infection located on the lower leg. Onset of symptoms was gradual, with gradually worsening  since that time. Symptoms include erythema located on leg: left and pain of moderate. Patient denies chills. There is not a history trauma to the area. Treatment to date has included OTC analgesics with minimal relief. Hypertension Review:  The patient has essential hypertension  Diet and Lifestyle: generally follows a  low sodium diet, exercises sporadically  Home BP Monitoring: is not measured at home. Pertinent ROS: taking medications as instructed, no medication side effects noted, no TIA's, no chest pain on exertion, no dyspnea on exertion, no swelling of ankles. Dyslipidemia Review:  Patient presents for evaluation of lipids. Compliance with treatment thus far has been excellent. A repeat fasting lipid profile was done. The patient does not use medications that may worsen dyslipidemias (corticosteroids, progestins, anabolic steroids, diuretics, beta-blockers, amiodarone, cyclosporine, olanzapine). The patient exercises some      Review of Systems  Constitutional: negative for fevers, chills, anorexia and weight loss  Eyes:   negative for visual disturbance and irritation  ENT:   negative for tinnitus,sore throat,nasal congestion,ear pains. hoarseness  Respiratory:  negative for cough, hemoptysis, dyspnea,wheezing  CV:   negative for chest pain, palpitations, lower extremity edema  GI:   negative for nausea, vomiting, diarrhea, abdominal pain,melena  Endo:               negative for polyuria,polydipsia,polyphagia,heat intolerance  Genitourinary: negative for frequency, dysuria and hematuria  Integument:  negative for rash and pruritus  Hematologic:  negative for easy bruising and gum/nose bleeding  Musculoskel: myalgias, arthralgias,  joint pain  Neurological:  negative for headaches, dizziness, vertigo, memory problems and gait   Behavl/Psych: negative for feelings of anxiety, depression, mood changes    Past Medical History:   Diagnosis Date    Acute pharyngitis 2/8/2011    Allergic rhinitis, cause unspecified 2/8/2011    Arrhythmia     PVC    Asymptomatic varicose veins 2/8/2011    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     Carpal tunnel syndrome 2/8/2011    Degenerative Joint Disese ( improved/resolving) 2/8/2011    Degenetrative Dis Disease, 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    Diverticulosis of colon (without mention of hemorrhage) 2/8/2011    Dysphagia 2/8/2011    Edema, peripheral 2/8/2011    GERD (gastroesophageal reflux disease)     GERD Gastro- Esophageal Reflux Disease 2/8/2011    Herniated nucleus pulposus ( slipped disc) 2/8/2011    Menopausal 2/8/2011    PUD (peptic ulcer disease) 2012    Pure hypercholesterolemia 2/8/2011    Recurrent ear pain 2/8/2011    S/P radiation therapy     Scalp lesion 10/23/2014    Sebaceous cyst 4/22/2014    Unspecified cataract 2/8/2011    Unspecified essential hypertension 2/8/2011    Unspecified sleep apnea      Past Surgical History:   Procedure Laterality Date    HX HYSTERECTOMY      HX ORTHOPAEDIC  neck/back 2006    HX OTHER SURGICAL  5/8/2014     Excise recurrent scalp lesion and application of ACell    HX OTHER SURGICAL  5/8/2014    Excise keloid, anterior chest wall, and application of ACell    HX OTHER SURGICAL  5/8/2014     Excise sebaceous cyst, anterior chest wall    HX OTHER SURGICAL  5/8/2014    Punch biopsy, skin lesions, right forearm x2, right calf x1    SD INS NEW/RPLCMT PRM PM W/TRANSV ELTRD ATRIAL&VENT  9/30/2017         SD RMVL IMPLANTABLE PT-ACTIVATED CAR EVENT RECORDER  9/30/2017          Social History     Social History    Marital status:      Spouse name: N/A    Number of children: N/A    Years of education: N/A     Social History Main Topics    Smoking status: Never Smoker    Smokeless tobacco: Never Used    Alcohol use No    Drug use: No    Sexual activity: Yes     Partners: Male     Birth control/ protection: None     Other Topics Concern    None     Social History Narrative     Family History   Problem Relation Age of Onset    Hypertension Mother     Stroke Mother     Hypertension Father     Cancer Father      PROSTATE, MELANOMA    Anesth Problems Neg Hx      Current Outpatient Prescriptions   Medication Sig Dispense Refill    furosemide (LASIX) 20 mg tablet Take 1 Tab by mouth daily. 30 Tab 6    levoFLOXacin (LEVAQUIN) 500 mg tablet Take 1 Tab by mouth daily. 14 Tab 0    gabapentin (NEURONTIN) 300 mg capsule Take 1 Cap by mouth three (3) times daily. 270 Cap 3    amLODIPine (NORVASC) 10 mg tablet Take 0.5 Tabs by mouth daily. 30 Tab 11    traMADol (ULTRAM) 50 mg tablet Take 1 Tab by mouth every eight (8) hours as needed for Pain. Max Daily Amount: 150 mg. 30 Tab 0    metoprolol succinate (TOPROL-XL) 100 mg tablet Take 1 Tab by mouth daily. 30 Tab 5    raNITIdine (ZANTAC) 150 mg tablet Take 1 Tab by mouth two (2) times a day. 180 Tab 3    fluticasone (FLONASE) 50 mcg/actuation nasal spray Administer to right and left nostril. 1 Bottle 12    omeprazole (PRILOSEC) 40 mg capsule Take 1 Cap by mouth daily. 90 Cap 3    atorvastatin (LIPITOR) 40 mg tablet Take 1 Tab by mouth daily. 90 Tab 3    potassium chloride (KLOR-CON) 10 mEq tablet Take 1 Tab by mouth daily. 90 Tab 3    cloNIDine HCl (CATAPRES) 0.2 mg tablet Take 1 Tab by mouth two (2) times a day. 180 Tab 3    acetaminophen (TYLENOL) 325 mg tablet Take 325 mg by mouth every four (4) hours as needed for Pain.  latanoprost (XALATAN) 0.005 % ophthalmic solution Administer 1 Drop to right eye nightly.  MULTIVIT WITH CALCIUM,IRON,MIN [facility] MULTIPLE VITAMINS PO) Take  by mouth daily.  aspirin delayed-release 81 mg tablet Take  by mouth daily.        Allergies   Allergen Reactions    Latex Rash    Bactrim [Sulfamethoprim Ds] Hives    Aspirin Other (comments)     Anything higher than 81mg makes pt jittery    Codeine Hives and Itching    Iodinated Contrast- Oral And Iv Dye Itching    Pcn [Penicillins] Hives and Itching    Tape [Adhesive] Rash       Objective:  Visit Vitals    /80    Pulse 87    Temp 98.1 °F (36.7 °C) (Oral)    Resp 16    Ht 5' 2\" (1.575 m)    Wt 170 lb (77.1 kg)    SpO2 97%    BMI 31.09 kg/m2     Physical Exam:   General appearance - alert, well appearing, and in no distress  Mental status - alert, oriented to person, place, and time  EYE-DARLENE, EOMI, corneas normal, no foreign bodies  ENT-ENT exam normal, no neck nodes or sinus tenderness  Nose - normal and patent, no erythema, discharge or polyps  Mouth - mucous membranes moist, pharynx normal without lesions  Neck - supple, no significant adenopathy   Chest - clear to auscultation, no wheezes, rales or rhonchi, symmetric air entry   Heart - normal rate, regular rhythm, normal S1, S2, no murmurs, rubs, clicks or gallops   Abdomen - soft, nontender, nondistended, no masses or organomegaly  Lymph- no adenopathy palpable  Ext-peripheral pulses normal, no pedal edema, no clubbing or cyanosis  Skin-Warm and dry. no hyperpigmentation, vitiligo, or suspicious lesions  Neuro -alert, oriented, normal speech, no focal findings or movement disorder noted  Neck-normal C-spine, no tenderness, full ROM without pain  Feet-no nail deformities or callus formation with good pulses noted  Lt.leg-erythema with edema 2+    Results for orders placed or performed in visit on 05/10/18   AMB POC LIPID PROFILE   Result Value Ref Range    Cholesterol (POC) 171     Triglycerides (POC) 126     HDL Cholesterol (POC) 61     Non-HDL Cholesterol 110     LDL Cholesterol (POC) 85 MG/DL    TChol/HDL Ratio (POC) 2.8        Assessment/Plan:    ICD-10-CM ICD-9-CM    1.  Cellulitis of left lower extremity L03.116 682.6 furosemide (LASIX) 20 mg tablet      levoFLOXacin (LEVAQUIN) 500 mg tablet   2. Essential hypertension I10 401.9    3. S/P cardiac pacemaker procedure Z95.0 V45.01    4. Pure hypercholesterolemia E78.00 272.0      Orders Placed This Encounter    furosemide (LASIX) 20 mg tablet     Sig: Take 1 Tab by mouth daily. Dispense:  30 Tab     Refill:  6    levoFLOXacin (LEVAQUIN) 500 mg tablet     Sig: Take 1 Tab by mouth daily. Dispense:  14 Tab     Refill:  0     lose weight, follow low fat diet, follow low salt diet, continue present plan,Take 81mg aspirin daily  Patient Instructions   MyChart Activation    Thank you for requesting access to "[employer]. Please follow the instructions below to securely access and download your online medical record. "[employer] allows you to send messages to your doctor, view your test results, renew your prescriptions, schedule appointments, and more. How Do I Sign Up? 1. In your internet browser, go to [email or web address]  2. Click on the First Time User? Click Here link in the Sign In box. You will be redirect to the New Member Sign Up page. 3. Enter your "[employer] Access Code exactly as it appears below. You will not need to use this code after youve completed the sign-up process. If you do not sign up before the expiration date, you must request a new code. "[employer] Access Code: [email or web address]  Expires: 2018 11:52 AM (This is the date your "[employer] access code will )    4. Enter the last four digits of your Social Security Number (xxxx) and Date of Birth (mm/dd/yyyy) as indicated and click Submit. You will be taken to the next sign-up page. 5. Create a "[employer] ID. This will be your "[employer] login ID and cannot be changed, so think of one that is secure and easy to remember. 6. Create a "[employer] password. You can change your password at any time. 7. Enter your Password Reset Question and Answer. This can be used at a later time if you forget your password.    8. Enter your e-mail address. You will receive e-mail notification when new information is available in [address] 9. Click Sign Up. You can now view and download portions of your medical record. 10. Click the Download Summary menu link to download a portable copy of your medical information. Additional Information    If you have questions, please visit the Frequently Asked Questions section of the [employer] website at [email or web address]/"[email or web address][email or web address] Remember, [employer] is NOT to be used for urgent needs. For medical emergencies, dial 911. Follow-up Disposition:  Return in about 2 weeks (around 6/21/2018), or if symptoms worsen or fail to improve. I have reviewed with the patient details of the assessment and plan and all questions were answered. Relevent patient education was performed. The most recent lab findings were reviewed with the patient. An After Visit Summary was printed and given to the patient.

## 2018-06-07 NOTE — PATIENT INSTRUCTIONS
[facility] Activation    Thank you for requesting access to [facility]. Please follow the instructions below to securely access and download your online medical record. [facility] allows you to send messages to your doctor, view your test results, renew your prescriptions, schedule appointments, and more. How Do I Sign Up? 1. In your internet browser, go to [email or web address]  2. Click on the First Time User? Click Here link in the Sign In box. You will be redirect to the New Member Sign Up page. 3. Enter your [facility] Access Code exactly as it appears below. You will not need to use this code after youve completed the sign-up process. If you do not sign up before the expiration date, you must request a new code. [facility] Access Code: [email or web address]  Expires: 2018 11:52 AM (This is the date your [facility] access code will )    4. Enter the last four digits of your Social Security Number (xxxx) and Date of Birth (mm/dd/yyyy) as indicated and click Submit. You will be taken to the next sign-up page. 5. Create a [facility] ID. This will be your [facility] login ID and cannot be changed, so think of one that is secure and easy to remember. 6. Create a [facility] password. You can change your password at any time. 7. Enter your Password Reset Question and Answer. This can be used at a later time if you forget your password. 8. Enter your e-mail address. You will receive e-mail notification when new information is available in [address] 9. Click Sign Up. You can now view and download portions of your medical record. 10. Click the Download Summary menu link to download a portable copy of your medical information. Additional Information    If you have questions, please visit the Frequently Asked Questions section of the [facility] website at [email or web address] Remember, [facility] is NOT to be used for urgent needs. For medical emergencies, dial 911.

## 2018-06-11 RX ORDER — FUROSEMIDE 20 MG/1
TABLET ORAL
Qty: 90 TAB | Refills: 6 | Status: SHIPPED | OUTPATIENT
Start: 2018-06-11 | End: 2018-10-10 | Stop reason: SDUPTHER

## 2018-06-19 ENCOUNTER — OFFICE VISIT (OUTPATIENT)
Dept: INTERNAL MEDICINE CLINIC | Age: 78
End: 2018-06-19

## 2018-06-19 VITALS
SYSTOLIC BLOOD PRESSURE: 132 MMHG | RESPIRATION RATE: 16 BRPM | TEMPERATURE: 98.3 F | OXYGEN SATURATION: 94 % | BODY MASS INDEX: 31.83 KG/M2 | HEART RATE: 96 BPM | DIASTOLIC BLOOD PRESSURE: 64 MMHG | HEIGHT: 62 IN | WEIGHT: 173 LBS

## 2018-06-19 DIAGNOSIS — L03.116 CELLULITIS OF LEFT LOWER EXTREMITY: ICD-10-CM

## 2018-06-19 DIAGNOSIS — Z95.0 S/P CARDIAC PACEMAKER PROCEDURE: ICD-10-CM

## 2018-06-19 DIAGNOSIS — I10 ESSENTIAL HYPERTENSION: ICD-10-CM

## 2018-06-19 DIAGNOSIS — M12.811 ROTATOR CUFF ARTHROPATHY, RIGHT: Primary | ICD-10-CM

## 2018-06-19 NOTE — PROGRESS NOTES
Shama Capps is a 76 y.o. female and presents with Skin Infection (lower extremities(Cellulitis))  . Subjective:  Cellulitis Review:  Patient presents for evaluation of a possible skin infection located on the lower leg. Onset of symptoms was gradual, with gradually worsening  since that time. Symptoms include erythema located on leg: left and pain of moderate. Patient denies chills. There is not a history trauma to the area. Treatment to date has included OTC analgesics with minimal relief. Hypertension Review:  The patient has essential hypertension  Diet and Lifestyle: generally follows a  low sodium diet, exercises sporadically  Home BP Monitoring: is not measured at home. Pertinent ROS: taking medications as instructed, no medication side effects noted, no TIA's, no chest pain on exertion, no dyspnea on exertion, no swelling of ankles. Dyslipidemia Review:  Patient presents for evaluation of lipids. Compliance with treatment thus far has been excellent. A repeat fasting lipid profile was done. The patient does not use medications that may worsen dyslipidemias (corticosteroids, progestins, anabolic steroids, diuretics, beta-blockers, amiodarone, cyclosporine, olanzapine). The patient exercises some    Shoulder Pain Review:  Patient complains of right side shoulder pain. The symptoms began months ago Course of symptoms since onset has been gradually worsening. Pain is described as overall severity = moderate. Symptoms were incited by no known event. Patient denies N/A. Therapy to date includes OTC analgesics: effective. Review of Systems  Constitutional: negative for fevers, chills, anorexia and weight loss  Eyes:   negative for visual disturbance and irritation  ENT:   negative for tinnitus,sore throat,nasal congestion,ear pains. hoarseness  Respiratory:  negative for cough, hemoptysis, dyspnea,wheezing  CV:   negative for chest pain, palpitations, lower extremity edema  GI:   negative for nausea, vomiting, diarrhea, abdominal pain,melena  Endo:               negative for polyuria,polydipsia,polyphagia,heat intolerance  Genitourinary: negative for frequency, dysuria and hematuria  Integument:  negative for rash and pruritus  Hematologic:  negative for easy bruising and gum/nose bleeding  Musculoskel: myalgias, arthralgias,  joint pain  Neurological:  negative for headaches, dizziness, vertigo, memory problems and gait   Behavl/Psych: negative for feelings of anxiety, depression, mood changes    Past Medical History:   Diagnosis Date    Acute pharyngitis 2/8/2011    Allergic rhinitis, cause unspecified 2/8/2011    Arrhythmia     PVC    Asymptomatic varicose veins 2/8/2011    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     Carpal tunnel syndrome 2/8/2011    Degenerative Joint Disese ( improved/resolving) 2/8/2011    Degenetrative Dis Disease, 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    Diverticulosis of colon (without mention of hemorrhage) 2/8/2011    Dysphagia 2/8/2011    Edema, peripheral 2/8/2011    GERD (gastroesophageal reflux disease)     GERD Gastro- Esophageal Reflux Disease 2/8/2011    Herniated nucleus pulposus ( slipped disc) 2/8/2011    Menopausal 2/8/2011    PUD (peptic ulcer disease) 2012    Pure hypercholesterolemia 2/8/2011    Recurrent ear pain 2/8/2011    S/P radiation therapy     Scalp lesion 10/23/2014    Sebaceous cyst 4/22/2014    Unspecified cataract 2/8/2011    Unspecified essential hypertension 2/8/2011    Unspecified sleep apnea      Past Surgical History:   Procedure Laterality Date    HX HYSTERECTOMY      HX ORTHOPAEDIC  neck/back 2006    HX OTHER SURGICAL  5/8/2014     Excise recurrent scalp lesion and application of ACell    HX OTHER SURGICAL  5/8/2014    Excise keloid, anterior chest wall, and application of ACell    HX OTHER SURGICAL  5/8/2014     Excise sebaceous cyst, anterior chest wall    HX OTHER SURGICAL  5/8/2014    Punch biopsy, skin lesions, right forearm x2, right calf x1    VT INS NEW/RPLCMT PRM PM W/TRANSV ELTRD ATRIAL&VENT  9/30/2017         VT RMVL IMPLANTABLE PT-ACTIVATED CAR EVENT RECORDER  9/30/2017          Social History     Social History    Marital status:      Spouse name: N/A    Number of children: N/A    Years of education: N/A     Social History Main Topics    Smoking status: Never Smoker    Smokeless tobacco: Never Used    Alcohol use No    Drug use: No    Sexual activity: Yes     Partners: Male     Birth control/ protection: None     Other Topics Concern    None     Social History Narrative     Family History   Problem Relation Age of Onset    Hypertension Mother     Stroke Mother     Hypertension Father     Cancer Father      PROSTATE, MELANOMA    Anesth Problems Neg Hx      Current Outpatient Prescriptions   Medication Sig Dispense Refill    furosemide (LASIX) 20 mg tablet TAKE 1 TABLET BY MOUTH DAILY 90 Tab 6    levoFLOXacin (LEVAQUIN) 500 mg tablet Take 1 Tab by mouth daily. 14 Tab 0    gabapentin (NEURONTIN) 300 mg capsule Take 1 Cap by mouth three (3) times daily. 270 Cap 3    amLODIPine (NORVASC) 10 mg tablet Take 0.5 Tabs by mouth daily. 30 Tab 11    traMADol (ULTRAM) 50 mg tablet Take 1 Tab by mouth every eight (8) hours as needed for Pain. Max Daily Amount: 150 mg. 30 Tab 0    metoprolol succinate (TOPROL-XL) 100 mg tablet Take 1 Tab by mouth daily. 30 Tab 5    raNITIdine (ZANTAC) 150 mg tablet Take 1 Tab by mouth two (2) times a day. 180 Tab 3    fluticasone (FLONASE) 50 mcg/actuation nasal spray Administer to right and left nostril. 1 Bottle 12    omeprazole (PRILOSEC) 40 mg capsule Take 1 Cap by mouth daily. 90 Cap 3    atorvastatin (LIPITOR) 40 mg tablet Take 1 Tab by mouth daily. 90 Tab 3    potassium chloride (KLOR-CON) 10 mEq tablet Take 1 Tab by mouth daily. 90 Tab 3    cloNIDine HCl (CATAPRES) 0.2 mg tablet Take 1 Tab by mouth two (2) times a day.  180 Tab 3    acetaminophen (TYLENOL) 325 mg tablet Take 325 mg by mouth every four (4) hours as needed for Pain.  latanoprost (XALATAN) 0.005 % ophthalmic solution Administer 1 Drop to right eye nightly.  MULTIVIT WITH CALCIUM,IRON,MIN [facility] MULTIPLE VITAMINS PO) Take  by mouth daily.  aspirin delayed-release 81 mg tablet Take  by mouth daily. Allergies   Allergen Reactions    Latex Rash    Bactrim [Sulfamethoprim Ds] Hives    Aspirin Other (comments)     Anything higher than 81mg makes pt jittery    Codeine Hives and Itching    Iodinated Contrast- Oral And Iv Dye Itching    Pcn [Penicillins] Hives and Itching    Tape [Adhesive] Rash       Objective:  Visit Vitals    /64    Pulse 96    Temp 98.3 °F (36.8 °C) (Oral)    Resp 16    Ht 5' 2\" (1.575 m)    Wt 173 lb (78.5 kg)    SpO2 94%    BMI 31.64 kg/m2     Physical Exam:   General appearance - alert, well appearing, and in no distress  Mental status - alert, oriented to person, place, and time  EYE-DARLENE, EOMI, corneas normal, no foreign bodies  ENT-ENT exam normal, no neck nodes or sinus tenderness  Nose - normal and patent, no erythema, discharge or polyps  Mouth - mucous membranes moist, pharynx normal without lesions  Neck - supple, no significant adenopathy   Chest - clear to auscultation, no wheezes, rales or rhonchi, symmetric air entry   Heart - normal rate, regular rhythm, normal S1, S2, no murmurs, rubs, clicks or gallops   Abdomen - soft, nontender, nondistended, no masses or organomegaly  Lymph- no adenopathy palpable  Ext-peripheral pulses normal, no pedal edema, no clubbing or cyanosis  Skin-Warm and dry.  no hyperpigmentation, vitiligo, or suspicious lesions  Neuro -alert, oriented, normal speech, no focal findings or movement disorder noted  Neck-normal C-spine, no tenderness, full ROM without pain  Feet-no nail deformities or callus formation with good pulses noted  Lt.leg-erythema with edema 2+  Rt.shoulderreduced range of motion of rt., positive impingement signs    Results for orders placed or performed in visit on 05/10/18   AMB POC LIPID PROFILE   Result Value Ref Range    Cholesterol (POC) 171     Triglycerides (POC) 126     HDL Cholesterol (POC) 61     Non-HDL Cholesterol 110     LDL Cholesterol (POC) 85 MG/DL    TChol/HDL Ratio (POC) 2.8        Assessment/Plan:    ICD-10-CM ICD-9-CM    1. Rotator cuff arthropathy, right M12.811 716.81 REFERRAL TO ORTHOPEDICS   2. Cellulitis of left lower extremity L03.116 682.6    3. Essential hypertension I10 401.9    4. S/P cardiac pacemaker procedure Z95.0 V45.01      Orders Placed This Encounter    REFERRAL TO ORTHOPEDICS     Referral Priority:   Routine     Referral Type:   Consultation     Referral Reason:   Specialty Services Required     Referral Location:   [facility]     Referred to Provider:   John Siegel MD     Number of Visits Requested:   1     lose weight, follow low fat diet, follow low salt diet, continue present plan,Take 81mg aspirin daily  Patient Instructions   zerved Activation    Thank you for requesting access to zerved. Please follow the instructions below to securely access and download your online medical record. zerved allows you to send messages to your doctor, view your test results, renew your prescriptions, schedule appointments, and more. How Do I Sign Up? 1. In your internet browser, go to [email or web address]  2. Click on the First Time User? Click Here link in the Sign In box. You will be redirect to the New Member Sign Up page. 3. Enter your zerved Access Code exactly as it appears below. You will not need to use this code after youve completed the sign-up process. If you do not sign up before the expiration date, you must request a new code. zerved Access Code: [email or web address]  Expires: 2018 11:52 AM (This is the date your zerved access code will )    4.  Enter the last four digits of your Social Security Number (xxxx) and Date of Birth (enrique/jaclyn/yyyy) as indicated and click Submit. You will be taken to the next sign-up page. 5. Create a Plaza Bankt ID. This will be your PixelPin login ID and cannot be changed, so think of one that is secure and easy to remember. 6. Create a PixelPin password. You can change your password at any time. 7. Enter your Password Reset Question and Answer. This can be used at a later time if you forget your password. 8. Enter your e-mail address. You will receive e-mail notification when new information is available in [address] 9. Click Sign Up. You can now view and download portions of your medical record. 10. Click the Download Summary menu link to download a portable copy of your medical information. Additional Information    If you have questions, please visit the Frequently Asked Questions section of the PixelPin website at [email or web address] Remember, PixelPin is NOT to be used for urgent needs. For medical emergencies, dial 911. Follow-up Disposition:  Return in about 3 months (around 9/19/2018), or if symptoms worsen or fail to improve. I have reviewed with the patient details of the assessment and plan and all questions were answered. Relevent patient education was performed. The most recent lab findings were reviewed with the patient. An After Visit Summary was printed and given to the patient.

## 2018-06-19 NOTE — MR AVS SNAPSHOT
[address] [facility] 
 
 
 [address] [facility] [facility] [address] [address] 
[phone] Patient: Brenton Alfaro MRN: [id number] Visit Information Date & Time Provider Department Dept. Phone Encounter #  
 6/19/2018  2:00 PM Kailash Navarro MD [address] [facility] [phone] [id number] Follow-up Instructions Return in about 3 months (around 9/19/2018), or if symptoms worsen or fail to improve. Your Appointments 8/9/2018 11:00 AM  
ROUTINE CARE with Kailash Navarro MD  
[facility] - [address] [place] [address] ([facility]) Appt Note: 3 month check up [address] [facility] [facility] [address] [place] [address]  
[phone]  
  
   
 [address] [address]  
  
    
 8/21/2018  8:40 AM  
ESTABLISHED PATIENT with Jonathon Neumann MD  
[facility] ([facility]) Appt Note: 6 month f/u  
 [address] [place] [address]  
[address] [facility] [address]  
  
    
 3/12/2019  9:30 AM  
PACEMAKER with Carroll Sherman [facility] ([facility]EDUL[facility]) Appt Note: med threshold/rc/Valdez annual b [address] [place]  
[phone]  
  
   
 [address] [facility] [address]  
  
    
 3/12/2019  9:40 AM  
ESTABLISHED PATIENT with Jonathon Neumann MD  
[facility] ([facility]) Appt Note: med threshold/rc/Valdez annual b [address] [place]  
[phone]  
  
    
  
 8/27/2018  1:00 PM  
REMOTE OFFICE VISIT with Larry Kate [facility] ([facility]) Appt Note: CL PPI/rc b  
 [address] [place] [address]  
[address] [place] [address]  
  
    
 11/28/2018 11:00 AM  
 REMOTE OFFICE VISIT with Jose De Jesus Santiago [facility] ([facility]ED[facility]) Appt Note: CL PPI/rc b  
 [address] [place] [address]  
[phone] Upcoming Health Maintenance Date Due DTaP/Tdap/Td series (1 - Tdap) 12/21/1961 Pneumococcal 65+ Low/Medium Risk (2 of 2 - PCV13) 9/11/2015 GLAUCOMA SCREENING Q2Y 3/8/2018 Influenza Age 5 to Adult 8/1/2018 MEDICARE YEARLY EXAM 5/11/2019 Allergies as of 6/19/2018  Review Complete On: 6/19/2018 By: Cecille Sullivan MD  
  
 Severity Noted Reaction Type Reactions Latex  10/10/2011    Rash Bactrim [Sulfamethoprim Ds] High 06/06/2017    Hives Aspirin  10/10/2011   Side Effect Other (comments) Anything higher than 81mg makes pt jittery Codeine  10/10/2011    Hives, Itching Iodinated Contrast- Oral And Iv Dye  06/23/2011   Side Effect Itching Pcn [Penicillins]  10/10/2011    Hives, Itching Tape [Adhesive]  05/08/2014    Rash Current Immunizations  Reviewed on 12/12/2016 Name Date Influenza High Dose Vaccine PF 10/15/2016 Influenza Vaccine 9/11/2014 Influenza Vaccine Split 11/8/2012 Pneumococcal Polysaccharide (PPSV-23) 9/11/2014 Not reviewed this visit You Were Diagnosed With   
  
 Codes Comments Rotator cuff arthropathy, right    -  Primary ICD-10-CM: C21.665 ICD-9-CM: 716.81 Cellulitis of left lower extremity     ICD-10-CM: L03.116 ICD-9-CM: 682.6 Essential hypertension     ICD-10-CM: I10 
ICD-9-CM: 401.9 S/P cardiac pacemaker procedure     ICD-10-CM: Z95.0 ICD-9-CM: V45.01 Vitals BP Pulse Temp Resp Height(growth percentile) Weight(growth percentile) 132/64 96 98.3 °F (36.8 °C) (Oral) 16 5' 2\" (1.575 m) 173 lb (78.5 kg) SpO2 BMI OB Status Smoking Status 94% 31.64 kg/m2 Hysterectomy Never Smoker Vitals History BMI and BSA Data Body Mass Index Body Surface Area 31.64 kg/m 2 1.85 m 2 Preferred Pharmacy Pharmacy Name Phone [facility] [address] [facility] Trevon Jeong [phone] Your Updated Medication List  
  
   
This list is accurate as of 6/19/18  3:23 PM.  Always use your most recent med list. amLODIPine 10 mg tablet Commonly known as:  Samir Hiren Take 0.5 Tabs by mouth daily. aspirin delayed-release 81 mg tablet Take  by mouth daily. atorvastatin 40 mg tablet Commonly known as:  LIPITOR Take 1 Tab by mouth daily. cloNIDine HCl 0.2 mg tablet Commonly known as:  CATAPRES Take 1 Tab by mouth two (2) times a day. fluticasone 50 mcg/actuation nasal spray Commonly known as:  Star Serve Administer to right and left nostril. furosemide 20 mg tablet Commonly known as:  LASIX TAKE 1 TABLET BY MOUTH DAILY  
  
 gabapentin 300 mg capsule Commonly known as:  NEURONTIN Take 1 Cap by mouth three (3) times daily. latanoprost 0.005 % ophthalmic solution Commonly known as:  Garnell Anis Administer 1 Drop to right eye nightly. levoFLOXacin 500 mg tablet Commonly known as:  Nicole [place] Take 1 Tab by mouth daily. metoprolol succinate 100 mg tablet Commonly known as:  TOPROL-XL Take 1 Tab by mouth daily. omeprazole 40 mg capsule Commonly known as:  PriLOSEC Take 1 Cap by mouth daily. potassium chloride 10 mEq tablet Commonly known as:  KLOR-CON Take 1 Tab by mouth daily. raNITIdine 150 mg tablet Commonly known as:  ZANTAC Take 1 Tab by mouth two (2) times a day. traMADol 50 mg tablet Commonly known as:  ULTRAM  
Take 1 Tab by mouth every eight (8) hours as needed for Pain. Max Daily Amount: 150 mg.  
  
 TYLENOL 325 mg tablet Generic drug:  acetaminophen Take 325 mg by mouth every four (4) hours as needed for Pain. WOMEN'S MULTIPLE VITAMINS PO Take  by mouth daily. We Performed the Following REFERRAL TO ORTHOPEDICS [GOT472 Custom] Follow-up Instructions Return in about 3 months (around 2018), or if symptoms worsen or fail to improve. Referral Information Referral ID Referred By Referred To  
  
 [id number] Antonio Shields ALISHA TIFFANY Toro   
   [address] [facility] [address] [place], Alonzo Li Visits Status Start Date End Date 1 New Request 18 If your referral has a status of pending review or denied, additional information will be sent to support the outcome of this decision. Patient Instructions MyChart Activation Thank you for requesting access to Whiphand. Please follow the instructions below to securely access and download your online medical record. Whiphand allows you to send messages to your doctor, view your test results, renew your prescriptions, schedule appointments, and more. How Do I Sign Up? 1. In your internet browser, go to [email or web address] 
2. Click on the First Time User? Click Here link in the Sign In box. You will be redirect to the New Member Sign Up page. 3. Enter your Whiphand Access Code exactly as it appears below. You will not need to use this code after youve completed the sign-up process. If you do not sign up before the expiration date, you must request a new code. Whiphand Access Code: [email or web address] 
Expires: 2018 11:52 AM (This is the date your Whiphand access code will ) 4. Enter the last four digits of your Social Security Number (xxxx) and Date of Birth (mm/dd/yyyy) as indicated and click Submit. You will be taken to the next sign-up page. 5. Create a Whiphand ID. This will be your Whiphand login ID and cannot be changed, so think of one that is secure and easy to remember. 6. Create a Whiphand password. You can change your password at any time. 7. Enter your Password Reset Question and Answer.  This can be used at a later time if you forget your password. 8. Enter your e-mail address. You will receive e-mail notification when new information is available in [address] 9. Click Sign Up. You can now view and download portions of your medical record. 10. Click the Download Summary menu link to download a portable copy of your medical information. Additional Information If you have questions, please visit the Frequently Asked Questions section of the [employer] website at [email or web address] Remember, [employer] is NOT to be used for urgent needs. For medical emergencies, dial 911. Introducing [facility]! [place] introduces [employer] patient portal. Now you can access parts of your medical record, email your doctor's office, and request medication refills online. 1. In your internet browser, go to [email or web address] 2. Click on the First Time User? Click Here link in the Sign In box. You will see the New Member Sign Up page. 3. Enter your [employer] Access Code exactly as it appears below. You will not need to use this code after youve completed the sign-up process. If you do not sign up before the expiration date, you must request a new code. · [employer] Access Code: [email or web address] 
Expires: 8/8/2018 11:52 AM 
 
4. Enter the last four digits of your Social Security Number (xxxx) and Date of Birth (mm/dd/yyyy) as indicated and click Submit. You will be taken to the next sign-up page. 5. Create a Espinelat ID. This will be your [employer] login ID and cannot be changed, so think of one that is secure and easy to remember. 6. Create a [employer] password. You can change your password at any time. 7. Enter your Password Reset Question and Answer. This can be used at a later time if you forget your password. 8. Enter your e-mail address. You will receive e-mail notification when new information is available in [address] 9. Click Sign Up. You can now view and download portions of your medical record. 10. Click the Download Summary menu link to download a portable copy of your medical information. If you have questions, please visit the Frequently Asked Questions section of the V[employer] website. Remember, [employer] is NOT to be used for urgent needs. For medical emergencies, dial 911. Now available from your iPhone and Android! Please provide this summary of care documentation to your next provider. Your primary care clinician is listed as Sterling Lovelace. If you have any questions after today's visit, please call [phone].

## 2018-06-19 NOTE — PATIENT INSTRUCTIONS
MediaBrix Activation    Thank you for requesting access to MediaBrix. Please follow the instructions below to securely access and download your online medical record. MediaBrix allows you to send messages to your doctor, view your test results, renew your prescriptions, schedule appointments, and more. How Do I Sign Up? 1. In your internet browser, go to [email or web address]  2. Click on the First Time User? Click Here link in the Sign In box. You will be redirect to the New Member Sign Up page. 3. Enter your MediaBrix Access Code exactly as it appears below. You will not need to use this code after youve completed the sign-up process. If you do not sign up before the expiration date, you must request a new code. MediaBrix Access Code: [email or web address]  Expires: 2018 11:52 AM (This is the date your MediaBrix access code will )    4. Enter the last four digits of your Social Security Number (xxxx) and Date of Birth (mm/dd/yyyy) as indicated and click Submit. You will be taken to the next sign-up page. 5. Create a MediaBrix ID. This will be your MediaBrix login ID and cannot be changed, so think of one that is secure and easy to remember. 6. Create a MediaBrix password. You can change your password at any time. 7. Enter your Password Reset Question and Answer. This can be used at a later time if you forget your password. 8. Enter your e-mail address. You will receive e-mail notification when new information is available in [address] 9. Click Sign Up. You can now view and download portions of your medical record. 10. Click the Download Summary menu link to download a portable copy of your medical information. Additional Information    If you have questions, please visit the Frequently Asked Questions section of the MediaBrix website at [email or web address] Remember, MediaBrix is NOT to be used for urgent needs. For medical emergencies, dial 911.

## 2018-07-31 ENCOUNTER — HOSPITAL ENCOUNTER (OUTPATIENT)
Dept: VASCULAR SURGERY | Age: 78
Discharge: HOME | End: 2018-07-31
Attending: PODIATRIST
Payer: MEDICARE

## 2018-07-31 DIAGNOSIS — M79.672 BILATERAL PAIN OF LEG AND FOOT: ICD-10-CM

## 2018-07-31 DIAGNOSIS — M79.671 BILATERAL PAIN OF LEG AND FOOT: ICD-10-CM

## 2018-07-31 DIAGNOSIS — M79.605 BILATERAL PAIN OF LEG AND FOOT: ICD-10-CM

## 2018-07-31 DIAGNOSIS — M79.604 BILATERAL PAIN OF LEG AND FOOT: ICD-10-CM

## 2018-07-31 PROCEDURE — 93922 UPR/L XTREMITY ART 2 LEVELS: CPT

## 2018-07-31 NOTE — PROCEDURES
[facility]  *** FINAL REPORT ***    Name: Annie العراقي  MRN: [id number]    Outpatient  : 21 Dec 1940  HIS Order #: [id number]  [address] [facility] Visit #: [id number]  Date: 2018    TYPE OF TEST: Peripheral Arterial Testing    REASON FOR TEST  General health evaluation    Right Leg  Segmentals: Normal                     mmHg  Brachial         133  High thigh  Low thigh  Calf  Posterior tibial 164  Dorsalis pedis   160  Peroneal  Metatarsal  Toe pressure  Doppler:    Normal  PVR:  Ankle/Brachial: 1.22    Left Leg  Segmentals: Normal                     mmHg  Brachial         134  High thigh  Low thigh  Calf  Posterior tibial 152  Dorsalis pedis   157  Peroneal  Metatarsal  Toe pressure  Doppler:    Normal  PVR:  Ankle/Brachial: 1.17    INTERPRETATION/FINDINGS  1. No evidence of significant peripheral arterial disease at rest in  the right leg. 2. No evidence of significant peripheral arterial disease at rest in  the left leg. 3. The right ankle/brachial index is 1.22 and the left ankle/brachial  index is 1.17.    ADDITIONAL COMMENTS    I have personally reviewed the data relevant to the interpretation of  this  study. TECHNOLOGIST: Demian Stroud RVT  Signed: 2018 02:17 PM    PHYSICIAN: Sheyla Garcia.  La Perez MD  Signed: 2018 02:29 PM

## 2018-08-21 ENCOUNTER — OFFICE VISIT (OUTPATIENT)
Dept: CARDIOLOGY CLINIC | Age: 78
End: 2018-08-21

## 2018-08-21 ENCOUNTER — CLINICAL SUPPORT (OUTPATIENT)
Dept: CARDIOLOGY CLINIC | Age: 78
End: 2018-08-21

## 2018-08-21 VITALS
HEIGHT: 62 IN | SYSTOLIC BLOOD PRESSURE: 150 MMHG | DIASTOLIC BLOOD PRESSURE: 88 MMHG | HEART RATE: 88 BPM | WEIGHT: 179 LBS | BODY MASS INDEX: 32.94 KG/M2

## 2018-08-21 DIAGNOSIS — I49.5 TACHY-BRADY SYNDROME (HCC): ICD-10-CM

## 2018-08-21 DIAGNOSIS — I10 ESSENTIAL HYPERTENSION: ICD-10-CM

## 2018-08-21 DIAGNOSIS — R60.0 LOCALIZED EDEMA: ICD-10-CM

## 2018-08-21 DIAGNOSIS — Z95.0 CARDIAC PACEMAKER IN SITU: Primary | ICD-10-CM

## 2018-08-21 DIAGNOSIS — I25.10 CORONARY ARTERY DISEASE INVOLVING NATIVE CORONARY ARTERY OF NATIVE HEART WITHOUT ANGINA PECTORIS: ICD-10-CM

## 2018-08-21 DIAGNOSIS — Z95.0 PACEMAKER: Primary | ICD-10-CM

## 2018-08-21 DIAGNOSIS — I49.5 SSS (SICK SINUS SYNDROME) (HCC): ICD-10-CM

## 2018-08-21 NOTE — MR AVS SNAPSHOT
[address] [facility] 
[phone] Patient: Suyapa May MRN: [id number] GBU:[id number] Visit Information Date & Time Provider Department Dept. Phone Encounter #  
 8/21/2018  8:40 AM Cleo Arellano MD [facility] Arianne Lockwood [phone] [id number] Follow-up Instructions Return in about 6 months (around 2/21/2019). Your Appointments 9/4/2018 12:15 PM  
ACUTE CARE with Yari Gonzalez MD  
[facility] ([facility]) Appt Note: 3 mo fu; 3 Month follow-Up  
 [address] [facility],[address] [place] [address]  
[phone]  
  
   
 [facility]  
  
    
 11/27/2018  9:00 AM  
PACEMAKER with Mónica Garcia [facility] (DOLORES SCHEDULING) Appt Note: med threshold/rc/Valdez annual b CL; med interrogation/rc   b 8-21-18 [facility]   no [address] [facility] [address] [facility]  
[address] [facility] [phone] Upcoming Health Maintenance Date Due DTaP/Tdap/Td series (1 - Tdap) 12/21/1959 Pneumococcal 65+ Low/Medium Risk (2 of 2 - PCV13) 9/11/2015 GLAUCOMA SCREENING Q2Y 3/8/2018 Influenza Age 5 to Adult 8/1/2018 MEDICARE YEARLY EXAM 5/11/2019 Allergies as of 8/21/2018  Review Complete On: 8/21/2018 By: Cleo Arellano MD  
  
 Severity Noted Reaction Type Reactions Latex  10/10/2011    Rash Bactrim [Sulfamethoprim Ds] High 06/06/2017    Hives Aspirin  10/10/2011   Side Effect Other (comments) Anything higher than 81mg makes pt jittery Codeine  10/10/2011    Hives, Itching Iodinated Contrast- Oral And Iv Dye  06/23/2011   Side Effect Itching Pcn [Penicillins]  10/10/2011    Hives, Itching Tape [Adhesive]  05/08/2014    Rash Current Immunizations  Reviewed on 12/12/2016 Name Date Influenza High Dose Vaccine PF 10/15/2016 Influenza Vaccine 9/11/2014 Influenza Vaccine Split 11/8/2012 Pneumococcal Polysaccharide (PPSV-23) 9/11/2014 Not reviewed this visit You Were Diagnosed With   
  
 Codes Comments Pacemaker    -  Primary ICD-10-CM: Z95.0 ICD-9-CM: V45.01   
 SSS (sick sinus syndrome) ([facility])     ICD-10-CM: I49.5 ICD-9-CM: 427.81 Tachy-chino syndrome ([address]     ICD-10-CM: I49.5 ICD-9-CM: 427.81 Coronary artery disease involving native coronary artery of native heart without angina pectoris     ICD-10-CM: I25.10 ICD-9-CM: 414.01 Essential hypertension     ICD-10-CM: I10 
ICD-9-CM: 401.9 Localized edema     ICD-10-CM: R60.0 ICD-9-CM: 524. 3 Vitals BP Pulse Height(growth percentile) Weight(growth percentile) BMI OB Status 150/88 (BP 1 Location: Left arm, BP Patient Position: Sitting) 88 5' 2\" (1.575 m) 179 lb (81.2 kg) 32.74 kg/m2 Hysterectomy Smoking Status Never Smoker Vitals History BMI and BSA Data Body Mass Index Body Surface Area 32.74 kg/m 2 1.88 m 2 Preferred Pharmacy Pharmacy Name Phone [facility] [address] [address] [phone] Your Updated Medication List  
  
   
This list is accurate as of 8/21/18  9:42 AM.  Always use your most recent med list. amLODIPine 10 mg tablet Commonly known as:  Love [place]ach Take 0.5 Tabs by mouth daily. aspirin delayed-release 81 mg tablet Take  by mouth daily. atorvastatin 40 mg tablet Commonly known as:  LIPITOR Take 1 Tab by mouth daily. fluticasone 50 mcg/actuation nasal spray Commonly known as:  Sanaz [place] Administer to right and left nostril. furosemide 20 mg tablet Commonly known as:  LASIX TAKE 1 TABLET BY MOUTH DAILY  
  
 gabapentin 300 mg capsule Commonly known as:  NEURONTIN  
 Take 1 Cap by mouth three (3) times daily. latanoprost 0.005 % ophthalmic solution Commonly known as:  Verta Piety Administer 1 Drop to right eye nightly. metoprolol succinate 100 mg tablet Commonly known as:  TOPROL-XL Take 1 Tab by mouth daily. omeprazole 40 mg capsule Commonly known as:  PriLOSEC Take 1 Cap by mouth daily. potassium chloride 10 mEq tablet Commonly known as:  KLOR-CON Take 1 Tab by mouth daily. raNITIdine 150 mg tablet Commonly known as:  ZANTAC Take 1 Tab by mouth two (2) times a day. TYLENOL 325 mg tablet Generic drug:  acetaminophen Take 325 mg by mouth every four (4) hours as needed for Pain. WOMEN'S MULTIPLE VITAMINS PO Take  by mouth daily. Follow-up Instructions Return in about 6 months (around 2/21/2019). Patient Instructions You will need to follow up in clinic with Dr. Keerthi Pino in 6 months. Introducing [facility]! [employer] introduces [employer] patient portal. Now you can access parts of your medical record, email your doctor's office, and request medication refills online. 1. In your internet browser, go to [email or web address] 2. Click on the First Time User? Click Here link in the Sign In box. You will see the New Member Sign Up page. 3. Enter your Parso Access Code exactly as it appears below. You will not need to use this code after youve completed the sign-up process. If you do not sign up before the expiration date, you must request a new code. · [facility] Access Code: [email or web address] Expires: 11/19/2018  5:21 AM 
 
4. Enter the last four digits of your Social Security Number (xxxx) and Date of Birth (mm/dd/yyyy) as indicated and click Submit. You will be taken to the next sign-up page. 5. Create a Parso ID. This will be your [facility]so login ID and cannot be changed, so think of one that is secure and easy to remember. 6. Create a Bedloo password. You can change your password at any time. 7. Enter your Password Reset Question and Answer. This can be used at a later time if you forget your password. 8. Enter your e-mail address. You will receive e-mail notification when new information is available in [address] 9. Click Sign Up. You can now view and download portions of your medical record. 10. Click the Download Summary menu link to download a portable copy of your medical information. If you have questions, please visit the Frequently Asked Questions section of the Bed[employer] website. Remember, [employer] is NOT to be used for urgent needs. For medical emergencies, dial 911. Now available from your iPhone and Android! Please provide this summary of care documentation to your next provider. Your primary care clinician is listed as Augustus Banerjee. If you have any questions after today's visit, please call [phone].

## 2018-08-21 NOTE — PROGRESS NOTES
Verified patient with two types of identifiers. Verified medications with the patient. Verified pharmacy with patient. Per Dr Mark Trent discontinued all medications not taken.

## 2018-08-21 NOTE — PROGRESS NOTES
Cardiac Electrophysiology Office Note     Subjective: Orlando Flynn is a 76 y.o. 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 Since then she has been doing well without recurrent syncope. She does not feel palpitation chest pain shortness of breath. Her leg edema also resolved on right but has on the left. The patient has peripheral neuropathy and she taking gabapentin and that has helped significantly. She did not take her bp meds this am as she was in a hurry out    In the past,  She had surgery to have the skin cancer on her scalp removed. She had skin cancer removed from her scalp at [address] [place] [address] and said she will have radiation therapy. She said at night she felt palpitations and she can use toprol low dose to avoid syncope with bradycardia  I had stopped the Rythmol because it could have been contributing to the black outs, I was concerned about intermittent bradycardia. Last time she was seen she felt faint and sat down then she broke out in a sweat. She was not taking the Rythmol at this time. Echo 5/2016 LVEF 60 % to 65 %. No RWMA. Mild concentric hypertrophy. Mild TR. External loop recorder 2/2017 showed no AFIB but mild sinus bradycardia and NSR    Past Hx:  The last event was New Years 2016.    holter 6/23/2014 sinus rhythm with minimum 39 bpm at 5 pm PVC's , mean HR 50 bpm while she is on medication  She was last seen 8/2014 and has missed several follow up appointments. She was seen last for follow up for syncope, she has passed out twice December and Jan 1. She went the ED at [facility] both times. The metoprolol was discontinued and she was started on lisinopril and clonidine for high BP. First episode 12/31/15 occurred while she was sitting on the stool in her kitchen, she started to feel tighten around her head then she LOC and fell off the stool.     The next day she had a similar episode, she was doing things around the house and felt dizzy she sat down and put a cold compress on her head. No LOC. 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 Viri De Jesus and had venous duplex without DVT. Echo with normal LVEF and inferior wall hypokinesis in 2014     Mother with stroke and HBP and heart disease death at age 77  Father with cancer at age 68  Sister with cancer  at age of 76  Current Outpatient Prescriptions   Medication Sig Dispense Refill    furosemide (LASIX) 20 mg tablet TAKE 1 TABLET BY MOUTH DAILY 90 Tab 6    gabapentin (NEURONTIN) 300 mg capsule Take 1 Cap by mouth three (3) times daily. 270 Cap 3    amLODIPine (NORVASC) 10 mg tablet Take 0.5 Tabs by mouth daily. 30 Tab 11    metoprolol succinate (TOPROL-XL) 100 mg tablet Take 1 Tab by mouth daily. 30 Tab 5    raNITIdine (ZANTAC) 150 mg tablet Take 1 Tab by mouth two (2) times a day. 180 Tab 3    fluticasone (FLONASE) 50 mcg/actuation nasal spray Administer to right and left nostril. 1 Bottle 12    omeprazole (PRILOSEC) 40 mg capsule Take 1 Cap by mouth daily. 90 Cap 3    atorvastatin (LIPITOR) 40 mg tablet Take 1 Tab by mouth daily. 90 Tab 3    potassium chloride (KLOR-CON) 10 mEq tablet Take 1 Tab by mouth daily.  90 Tab 3    acetaminophen (TYLENOL) 325 mg tablet Take 325 mg by mouth every four (4) hours as needed for Pain.      latanoprost (XALATAN) 0.005 % ophthalmic solution Administer 1 Drop to right eye nightly.  MULTIVIT WITH CALCIUM,IRON,MIN [facility] MULTIPLE VITAMINS PO) Take  by mouth daily.  aspirin delayed-release 81 mg tablet Take  by mouth daily.        Allergies   Allergen Reactions    Latex Rash    Bactrim [Sulfamethoprim Ds] Hives    Aspirin Other (comments)     Anything higher than 81mg makes pt jittery    Codeine Hives and Itching    Iodinated Contrast- Oral And Iv Dye Itching    Pcn [Penicillins] Hives and Itching    Tape [Adhesive] Rash     Past Medical History:   Diagnosis Date    Acute pharyngitis 2/8/2011    Allergic rhinitis, cause unspecified 2/8/2011    Arrhythmia     PVC    Asymptomatic varicose veins 2/8/2011    Cancer ([facility]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     Carpal tunnel syndrome 2/8/2011    Degenerative Joint Disese ( improved/resolving) 2/8/2011    Degenetrative Dis Disease, 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    Diverticulosis of colon (without mention of hemorrhage) 2/8/2011    Dysphagia 2/8/2011    Edema, peripheral 2/8/2011    GERD (gastroesophageal reflux disease)     GERD Gastro- Esophageal Reflux Disease 2/8/2011    Herniated nucleus pulposus ( slipped disc) 2/8/2011    Menopausal 2/8/2011    PUD (peptic ulcer disease) 2012    Pure hypercholesterolemia 2/8/2011    Recurrent ear pain 2/8/2011    S/P radiation therapy     Scalp lesion 10/23/2014    Sebaceous cyst 4/22/2014    Unspecified cataract 2/8/2011    Unspecified essential hypertension 2/8/2011    Unspecified sleep apnea      Past Surgical History:   Procedure Laterality Date    HX HYSTERECTOMY      HX ORTHOPAEDIC  neck/back 2006    HX OTHER SURGICAL  5/8/2014     Excise recurrent scalp lesion and application of ACell    HX OTHER SURGICAL  5/8/2014    Excise keloid, anterior chest wall, and application of ACell    HX OTHER SURGICAL  5/8/2014     Excise sebaceous cyst, anterior chest wall    HX OTHER SURGICAL 5/8/2014    Punch biopsy, skin lesions, right forearm x2, right calf x1    TX INS NEW/RPLCMT PRM PM W/TRANSV ELTRD ATRIAL&VENT  9/30/2017         TX RMVL IMPLANTABLE PT-ACTIVATED CAR EVENT RECORDER  9/30/2017          Family History   Problem Relation Age of Onset    Hypertension Mother     Stroke Mother     Hypertension Father     Cancer Father      PROSTATE, MELANOMA    Anesth Problems Neg Hx      Social History   Substance Use Topics    Smoking status: Never Smoker    Smokeless tobacco: Never Used    Alcohol use No        Review of Systems:   Constitutional: Negative for fever, chills, weight loss   HEENT: Negative for nosebleeds, vision changes. Respiratory: Negative for cough, hemoptysis, sputum production, and wheezing. Cardiovascular: Negative for orthopnea, claudication,  and PND. Gastrointestinal: Negative for constipation, blood in stool and melena. Genitourinary: Negative for dysuria, and hematuria. Musculoskeletal: Negative for myalgias, + arthralgia. Skin: + skin discoloration or cancer on scalp  Heme: Does not bleed or bruise easily. Neurological: Negative for speech change and focal weakness      Objective:     Visit Vitals    /88 (BP 1 Location: Left arm, BP Patient Position: Sitting)    Pulse 88    Ht 5' 2\" (1.575 m)    Wt 179 lb (81.2 kg)    BMI 32.74 kg/m2      Physical Exam:   Constitutional: Well-nourished. No distress  Head: Normocephalic and atraumatic. Eyes: Pupils are equal, round  Neck: supple. No JVD present. Cardiovascular: normal rate, regular rhythm and normal heart sounds. Exam reveals no gallop and no friction rub. No murmur heard. Pulmonary/Chest: Effort normal and breath sounds normal.   Abdominal: Soft, mild obesity  Musculoskeletal: LLE wrapped in clean and dry bandage   Neurological: alert, oriented. Skin: Skin is warm and dry. Left-sided pacemaker site is clean no drainage or redness. Psychiatric: normal mood and affect.  Behavior is normal. Judgment and thought content normal.         Assessment/Plan:       ICD-10-CM ICD-9-CM    1. Pacemaker Z95.0 V45.01    2. SSS (sick sinus syndrome) (HCC) I49.5 427.81    3. 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 Coronary artery disease involving native coronary artery of native heart without angina pectoris I25.10 414.01    5. Essential hypertension I10 401.9    6. Localized edema R60.0 782. 3       The patient has done well after the pacemaker implantation. She has sick sinus and therefore pacing in the atrium most of the time. The patient has episodes of atrial tachycardia. I discussed with her about pacemaker check today  I referred her to Dr Truong Diop to see for cardiac cath   She has no chest pain so no indication for repeating stress test  She needs to take BP meds at home when she gets home today    Follow-up Disposition:  Return in about 6 months (around 2/21/2019). Thank you for involving me in this patient's care and please call with further concerns or questions. Mani Renee M.D.   Electrophysiology/Cardiology  [facility]  [address], [address] [place] [address] 91  [address] [facility], [address] [place] [address]  [phone]

## 2018-09-06 ENCOUNTER — OFFICE VISIT (OUTPATIENT)
Dept: INTERNAL MEDICINE CLINIC | Age: 78
End: 2018-09-06

## 2018-09-06 VITALS
DIASTOLIC BLOOD PRESSURE: 80 MMHG | WEIGHT: 168 LBS | RESPIRATION RATE: 18 BRPM | OXYGEN SATURATION: 100 % | BODY MASS INDEX: 30.91 KG/M2 | SYSTOLIC BLOOD PRESSURE: 130 MMHG | HEART RATE: 70 BPM | TEMPERATURE: 98.1 F | HEIGHT: 62 IN

## 2018-09-06 DIAGNOSIS — I10 ESSENTIAL HYPERTENSION: Primary | ICD-10-CM

## 2018-09-06 DIAGNOSIS — I25.10 CORONARY ARTERY DISEASE INVOLVING NATIVE CORONARY ARTERY OF NATIVE HEART WITHOUT ANGINA PECTORIS: ICD-10-CM

## 2018-09-06 DIAGNOSIS — E78.00 PURE HYPERCHOLESTEROLEMIA: ICD-10-CM

## 2018-09-06 DIAGNOSIS — Z23 ENCOUNTER FOR IMMUNIZATION: ICD-10-CM

## 2018-09-06 DIAGNOSIS — Z95.0 S/P CARDIAC PACEMAKER PROCEDURE: ICD-10-CM

## 2018-09-06 LAB
CHOLEST SERPL-MCNC: 168 MG/DL
HDLC SERPL-MCNC: 66 MG/DL
LDL CHOLESTEROL POC: 84 MG/DL
NON-HDL GOAL (POC): 102
TCHOL/HDL RATIO (POC): 2.5
TRIGL SERPL-MCNC: 89 MG/DL

## 2018-09-06 NOTE — PROGRESS NOTES
1. Have you been to the ER, urgent care clinic since your last visit? Hospitalized since your last visit?no    2. Have you seen or consulted any other health care providers outside of the [address] [place] [address] since your last visit? Include any pap smears or colon screening. No  PHQ over the last two weeks 9/6/2018   PHQ Not Done -   Little interest or pleasure in doing things Not at all   Feeling down, depressed, irritable, or hopeless Not at all   Total Score PHQ 2 0          31 Abbie Miller is a 76 y.o. female and presents with Immunization/Injection; Hypertension; and Coronary Artery Disease  . Subjective:    Hypertension Review:  The patient has essential hypertension  Diet and Lifestyle: generally follows a  low sodium diet, exercises sporadically  Home BP Monitoring: is not measured at home. Pertinent ROS: taking medications as instructed, no medication side effects noted, no TIA's, no chest pain on exertion, no dyspnea on exertion, no swelling of ankles. Dyslipidemia Review:  Patient presents for evaluation of lipids. Compliance with treatment thus far has been excellent. A repeat fasting lipid profile was done. The patient does not use medications that may worsen dyslipidemias (corticosteroids, progestins, anabolic steroids, diuretics, beta-blockers, amiodarone, cyclosporine, olanzapine). The patient exercises some      She has a pacemaker in place. GERD Review:   Patient has a history of gastroesophageal reflux with heartburn. Symptoms have been present for a few months. She denies dysphagia. She  has not lost weight. She denies melena, hematochezia, hematemesis, and coffee ground emesis. This has been associated with fullness after meals. She denies abdominal bloating and none.   Medical therapy in the past has included proton pump inhibitor        Review of Systems  Constitutional: negative for fevers, chills, anorexia and weight loss  Eyes:   negative for visual disturbance and irritation  ENT:   negative for tinnitus,sore throat,nasal congestion,ear pains. hoarseness  Respiratory:  negative for cough, hemoptysis, dyspnea,wheezing  CV:   negative for chest pain, palpitations, lower extremity edema  GI:   negative for nausea, vomiting, diarrhea, abdominal pain,melena  Endo:               negative for polyuria,polydipsia,polyphagia,heat intolerance  Genitourinary: negative for frequency, dysuria and hematuria  Integument:  negative for rash and pruritus  Hematologic:  negative for easy bruising and gum/nose bleeding  Musculoskel: negative for myalgias, arthralgias, back pain, muscle weakness, joint pain  Neurological:  negative for headaches, dizziness, vertigo, memory problems and gait   Behavl/Psych: negative for feelings of anxiety, depression, mood changes    Past Medical History:   Diagnosis Date    Acute pharyngitis 2/8/2011    Allergic rhinitis, cause unspecified 2/8/2011    Arrhythmia     PVC    Asymptomatic varicose veins 2/8/2011    Cancer ([facility] [address]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     Carpal tunnel syndrome 2/8/2011    Degenerative Joint Disese ( improved/resolving) 2/8/2011    Degenetrative Dis Disease, 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    Diverticulosis of colon (without mention of hemorrhage) 2/8/2011    Dysphagia 2/8/2011    Edema, peripheral 2/8/2011    GERD (gastroesophageal reflux disease)     GERD Gastro- Esophageal Reflux Disease 2/8/2011    Herniated nucleus pulposus ( slipped disc) 2/8/2011    Menopausal 2/8/2011    PUD (peptic ulcer disease) 2012    Pure hypercholesterolemia 2/8/2011    Recurrent ear pain 2/8/2011    S/P radiation therapy     Scalp lesion 10/23/2014    Sebaceous cyst 4/22/2014    Unspecified cataract 2/8/2011    Unspecified essential hypertension 2/8/2011    Unspecified sleep apnea      Past Surgical History:   Procedure Laterality Date    HX HYSTERECTOMY      HX ORTHOPAEDIC  neck/back 2006    HX OTHER SURGICAL  5/8/2014     Excise recurrent scalp lesion and application of ACell    HX OTHER SURGICAL  5/8/2014    Excise keloid, anterior chest wall, and application of ACell    HX OTHER SURGICAL  5/8/2014     Excise sebaceous cyst, anterior chest wall    HX OTHER SURGICAL  5/8/2014    Punch biopsy, skin lesions, right forearm x2, right calf x1    [place] INS NEW/RPLCMT PRM PM W/TRANSV ELTRD ATRIAL&VENT  9/30/2017         [place] RMVL IMPLANTABLE PT-ACTIVATED CAR EVENT RECORDER  9/30/2017          Social History     Social History    Marital status:      Spouse name: N/A    Number of children: N/A    Years of education: N/A     Social History Main Topics    Smoking status: Never Smoker    Smokeless tobacco: Never Used    Alcohol use No    Drug use: No    Sexual activity: Yes     Partners: Male     Birth control/ protection: None     Other Topics Concern    None     Social History Narrative     Family History   Problem Relation Age of Onset    Hypertension Mother     Stroke Mother     Hypertension Father     Cancer Father      PROSTATE, MELANOMA    Anesth Problems Neg Hx      Current Outpatient Prescriptions   Medication Sig Dispense Refill    furosemide (LASIX) 20 mg tablet TAKE 1 TABLET BY MOUTH DAILY 90 Tab 6    gabapentin (NEURONTIN) 300 mg capsule Take 1 Cap by mouth three (3) times daily. 270 Cap 3    amLODIPine (NORVASC) 10 mg tablet Take 0.5 Tabs by mouth daily. 30 Tab 11    metoprolol succinate (TOPROL-XL) 100 mg tablet Take 1 Tab by mouth daily. 30 Tab 5    raNITIdine (ZANTAC) 150 mg tablet Take 1 Tab by mouth two (2) times a day. 180 Tab 3    fluticasone (FLONASE) 50 mcg/actuation nasal spray Administer to right and left nostril. 1 Bottle 12    omeprazole (PRILOSEC) 40 mg capsule Take 1 Cap by mouth daily. 90 Cap 3    atorvastatin (LIPITOR) 40 mg tablet Take 1 Tab by mouth daily. 90 Tab 3    potassium chloride (KLOR-CON) 10 mEq tablet Take 1 Tab by mouth daily.  90 Tab 3    acetaminophen (TYLENOL) 325 mg tablet Take 325 mg by mouth every four (4) hours as needed for Pain.  MULTIVIT WITH CALCIUM,IRON,MIN [facility] MULTIPLE VITAMINS PO) Take  by mouth daily.  aspirin delayed-release 81 mg tablet Take  by mouth daily. Allergies   Allergen Reactions    Latex Rash    Bactrim [Sulfamethoprim Ds] Hives    Aspirin Other (comments)     Anything higher than 81mg makes pt jittery    Codeine Hives and Itching    Iodinated Contrast- Oral And Iv Dye Itching    Pcn [Penicillins] Hives and Itching    Tape [Adhesive] Rash       Objective:  Visit Vitals    /80 (BP 1 Location: Right arm, BP Patient Position: Sitting)    Pulse 70    Temp 98.1 °F (36.7 °C) (Oral)    Resp 18    Ht 5' 2\" (1.575 m)    Wt 168 lb (76.2 kg)    SpO2 100%    BMI 30.73 kg/m2     Physical Exam:   General appearance - alert, well appearing, and in no distress  Mental status - alert, oriented to person, place, and time  EYE-DARLENE, EOMI, corneas normal, no foreign bodies  ENT-ENT exam normal, no neck nodes or sinus tenderness  Nose - normal and patent, no erythema, discharge or polyps  Mouth - mucous membranes moist, pharynx normal without lesions  Neck - supple, no significant adenopathy   Chest - clear to auscultation, no wheezes, rales or rhonchi, symmetric air entry   Heart - normal rate, regular rhythm, normal S1, S2, no murmurs, rubs, clicks or gallops   Abdomen - soft, nontender, nondistended, no masses or organomegaly  Lymph- no adenopathy palpable  Ext-peripheral pulses normal, no pedal edema, no clubbing or cyanosis  Skin-Warm and dry.  no hyperpigmentation, vitiligo, or suspicious lesions  Neuro -alert, oriented, normal speech, no focal findings or movement disorder noted  Neck-normal C-spine, no tenderness, full ROM without pain  Feet-no nail deformities or callus formation with good pulses noted      Results for orders placed or performed in visit on 09/06/18   AMB POC LIPID PROFILE   Result Value Ref Range    Cholesterol (POC) 168 Triglycerides (POC) 89     HDL Cholesterol (POC) 66     LDL Cholesterol (POC) 84 MG/DL    Non-HDL Goal (POC) 102     TChol/HDL Ratio (POC) 2.5        Assessment/Plan:    ICD-10-CM ICD-9-CM    1. Essential hypertension I10 401.9 CBC W/O DIFF      METABOLIC PANEL, COMPREHENSIVE   2. S/P cardiac pacemaker procedure Z95.0 V45.01    3. Pure hypercholesterolemia E78.00 272.0 AMB POC LIPID PROFILE   4. Coronary artery disease involving native coronary artery of native heart without angina pectoris I25.10 414.01    5. Encounter for immunization Z23 V03.89 INFLUENZA VIRUS VACCINE, HIGH DOSE SEASONAL, PRESERVATIVE FREE     Orders Placed This Encounter    Influenza virus vaccine (FLUZONE HIGH-DOSE) 65 years and older    CBC W/O DIFF    METABOLIC PANEL, COMPREHENSIVE    AMB POC LIPID PROFILE     lose weight, increase physical activity, follow low fat diet, follow low salt diet, continue present plan  Patient Instructions   ThoughtBuzzharTicket Evolution Activation    Thank you for requesting access to Syncano. Please follow the instructions below to securely access and download your online medical record. Syncano allows you to send messages to your doctor, view your test results, renew your prescriptions, schedule appointments, and more. How Do I Sign Up? 1. In your internet browser, go to [email or web address]  2. Click on the First Time User? Click Here link in the Sign In box. You will be redirect to the New Member Sign Up page. 3. Enter your Syncano Access Code exactly as it appears below. You will not need to use this code after youve completed the sign-up process. If you do not sign up before the expiration date, you must request a new code. Syncano Access Code: Activation code not generated  Current Syncano Status: Patient Declined (This is the date your Syncano access code will )    4. Enter the last four digits of your Social Security Number (xxxx) and Date of Birth (mm/dd/yyyy) as indicated and click Submit.  You will be taken to the next sign-up page. 5. Create a Playcezt ID. This will be your Inside Jobs login ID and cannot be changed, so think of one that is secure and easy to remember. 6. Create a Playcezt password. You can change your password at any time. 7. Enter your Password Reset Question and Answer. This can be used at a later time if you forget your password. 8. Enter your e-mail address. You will receive e-mail notification when new information is available in [address] 9. Click Sign Up. You can now view and download portions of your medical record. 10. Click the Download Summary menu link to download a portable copy of your medical information. Additional Information    If you have questions, please visit the Frequently Asked Questions section of the Inside Jobs website at [email or web address] Remember, Inside Jobs is NOT to be used for urgent needs. For medical emergencies, dial 911. Follow-up Disposition:  Return in about 3 months (around 12/6/2018), or if symptoms worsen or fail to improve. I have reviewed with the patient details of the assessment and plan and all questions were answered. Relevent patient education was performed    An After Visit Summary was printed and given to the patient.

## 2018-09-06 NOTE — PATIENT INSTRUCTIONS
CASTTharXAware Activation    Thank you for requesting access to Zoomabet. Please follow the instructions below to securely access and download your online medical record. Zoomabet allows you to send messages to your doctor, view your test results, renew your prescriptions, schedule appointments, and more. How Do I Sign Up? 1. In your internet browser, go to [email or web address]  2. Click on the First Time User? Click Here link in the Sign In box. You will be redirect to the New Member Sign Up page. 3. Enter your Zoomabet Access Code exactly as it appears below. You will not need to use this code after youve completed the sign-up process. If you do not sign up before the expiration date, you must request a new code. Zoomabet Access Code: Activation code not generated  Current Zoomabet Status: Patient Declined (This is the date your Zoomabet access code will )    4. Enter the last four digits of your Social Security Number (xxxx) and Date of Birth (mm/dd/yyyy) as indicated and click Submit. You will be taken to the next sign-up page. 5. Create a Zoomabet ID. This will be your Zoomabet login ID and cannot be changed, so think of one that is secure and easy to remember. 6. Create a Zoomabet password. You can change your password at any time. 7. Enter your Password Reset Question and Answer. This can be used at a later time if you forget your password. 8. Enter your e-mail address. You will receive e-mail notification when new information is available in [address] 9. Click Sign Up. You can now view and download portions of your medical record. 10. Click the Download Summary menu link to download a portable copy of your medical information. Additional Information    If you have questions, please visit the Frequently Asked Questions section of the Zoomabet website at [email or web address] Remember, Zoomabet is NOT to be used for urgent needs. For medical emergencies, dial 911.

## 2018-09-06 NOTE — MR AVS SNAPSHOT
[address] [facility] [address] [address] 
[phone] Patient: Melanie Lr MRN: [id number] [facility]:[birth year] Visit Information Date & Time Provider Department Dept. Phone Encounter #  
 9/6/2018  9:45 AM MD Elicia Resendiz[address] [phone] [id number] Follow-up Instructions Return in about 3 months (around 12/6/2018), or if symptoms worsen or fail to improve. Your Appointments 11/27/2018  9:00 AM  
PACEMAKER with PACEMAKER3 [facility] ([facility] SCHEDULING) Appt Note: med threshold/rc/Valdez annual b CL; med interrogation/rc   b 8-21-18 thresh   no [address] [place] [address]  
[address] [address]  
  
    
 2/26/2019 11:00 AM  
PACEMAKER with Yanna Livingston [facility] ([facility] SCHEDULING) Appt Note: pacemaker ck 11:00 Dr. Mark Trent 11:20 per [address]  
 [address] [address]  
[phone]  
  
    
 2/26/2019 11:20 AM  
ESTABLISHED PATIENT with Veda Christopher MD  
[facility] ([facility]) Appt Note: pacemaker ck 11:00 Dr. Mark Trent 11:20 per [address]  
 [address] [place] [address]  
[address] [facility] [address] [address] Upcoming Health Maintenance Date Due DTaP/Tdap/Td series (1 - Tdap) 12/21/1961 Pneumococcal 65+ Low/Medium Risk (2 of 2 - PCV13) 9/11/2015 GLAUCOMA SCREENING Q2Y 3/8/2018 Influenza Age 5 to Adult 8/1/2018 MEDICARE YEARLY EXAM 5/11/2019 Allergies as of 9/6/2018  Review Complete On: 9/6/2018 By: Domingo Patton LPN Severity Noted Reaction Type Reactions Latex  10/10/2011    Rash Bactrim [Sulfamethoprim Ds] High 06/06/2017    Hives Aspirin  10/10/2011   Side Effect Other (comments) Anything higher than 81mg makes pt jittery Codeine  10/10/2011    Hives, Itching Iodinated Contrast- Oral And Iv Dye  06/23/2011   Side Effect Itching Pcn [Penicillins]  10/10/2011    Hives, Itching Tape [Adhesive]  05/08/2014    Rash Current Immunizations  Reviewed on 12/12/2016 Name Date Influenza High Dose Vaccine PF  Incomplete, 10/15/2016 Influenza Vaccine 9/11/2014 Influenza Vaccine Split 11/8/2012 Pneumococcal Polysaccharide (PPSV-23) 9/11/2014 Not reviewed this visit You Were Diagnosed With   
  
 Codes Comments Essential hypertension    -  Primary ICD-10-CM: I10 
ICD-9-CM: 401.9 S/P cardiac pacemaker procedure     ICD-10-CM: Z95.0 ICD-9-CM: V45.01   
 Pure hypercholesterolemia     ICD-10-CM: E78.00 ICD-9-CM: 272.0 Coronary artery disease involving native coronary artery of native heart without angina pectoris     ICD-10-CM: I25.10 ICD-9-CM: 414.01 Encounter for immunization     ICD-10-CM: L31 ICD-9-CM: V03.89 Vitals BP Pulse Temp Resp Height(growth percentile) Weight(growth percentile) 130/80 (BP 1 Location: Right arm, BP Patient Position: Sitting) 70 98.1 °F (36.7 °C) (Oral) 18 5' 2\" (1.575 m) 168 lb (76.2 kg) SpO2 BMI OB Status Smoking Status 100% 30.73 kg/m2 Hysterectomy Never Smoker Vitals History BMI and BSA Data Body Mass Index Body Surface Area 30.73 kg/m 2 1.83 m 2 Preferred Pharmacy Pharmacy Name Phone [facility] [address] [place] [address] [facility] [phone] Your Updated Medication List  
  
   
This list is accurate as of 9/6/18 12:35 PM.  Always use your most recent med list. amLODIPine 10 mg tablet Commonly known as:  Griffin Fanti Take 0.5 Tabs by mouth daily. aspirin delayed-release 81 mg tablet Take  by mouth daily. atorvastatin 40 mg tablet Commonly known as:  LIPITOR Take 1 Tab by mouth daily. fluticasone 50 mcg/actuation nasal spray Commonly known as:  Andrea Cyphers Administer to right and left nostril. furosemide 20 mg tablet Commonly known as:  LASIX TAKE 1 TABLET BY MOUTH DAILY  
  
 gabapentin 300 mg capsule Commonly known as:  NEURONTIN Take 1 Cap by mouth three (3) times daily. metoprolol succinate 100 mg tablet Commonly known as:  TOPROL-XL Take 1 Tab by mouth daily. omeprazole 40 mg capsule Commonly known as:  PriLOSEC Take 1 Cap by mouth daily. potassium chloride 10 mEq tablet Commonly known as:  KLOR-CON Take 1 Tab by mouth daily. raNITIdine 150 mg tablet Commonly known as:  ZANTAC Take 1 Tab by mouth two (2) times a day. TYLENOL 325 mg tablet Generic drug:  acetaminophen Take 325 mg by mouth every four (4) hours as needed for Pain. WOMEN'S MULTIPLE VITAMINS PO Take  by mouth daily. We Performed the Following AMB POC LIPID PROFILE [72712 CPT(R)] CBC W/O DIFF [91626 CPT(R)] INFLUENZA VIRUS VACCINE, HIGH DOSE SEASONAL, PRESERVATIVE FREE [29266 CPT(R)] METABOLIC PANEL, COMPREHENSIVE [08141 CPT(R)] Follow-up Instructions Return in about 3 months (around 12/6/2018), or if symptoms worsen or fail to improve. Patient Instructions Panasas Activation Thank you for requesting access to Panasas. Please follow the instructions below to securely access and download your online medical record. Panasas allows you to send messages to your doctor, view your test results, renew your prescriptions, schedule appointments, and more. How Do I Sign Up? 1. In your internet browser, go to [email or web address] 
2. Click on the First Time User? Click Here link in the Sign In box. You will be redirect to the New Member Sign Up page. 3. Enter your SAMHI Hotelst Access Code exactly as it appears below. You will not need to use this code after youve completed the sign-up process. If you do not sign up before the expiration date, you must request a new code. MyChart Access Code: Activation code not generated Current Wing-Wheel Angel [employer] Communication Status: Patient Declined (This is the date your MyChart access code will ) 4. Enter the last four digits of your Social Security Number (xxxx) and Date of Birth (mm/dd/yyyy) as indicated and click Submit. You will be taken to the next sign-up page. 5. Create a SAMHI Hotelst ID. This will be your Wing-Wheel Angel [employer] Communication login ID and cannot be changed, so think of one that is secure and easy to remember. 6. Create a SAMHI Hotelst password. You can change your password at any time. 7. Enter your Password Reset Question and Answer. This can be used at a later time if you forget your password. 8. Enter your e-mail address. You will receive e-mail notification when new information is available in [address] 9. Click Sign Up. You can now view and download portions of your medical record. 10. Click the Download Summary menu link to download a portable copy of your medical information. Additional Information If you have questions, please visit the Frequently Asked Questions section of the Wing-Wheel Angel [employer] Communication website at [email or web address] Remember, Wing-Wheel Angel [employer] Communication is NOT to be used for urgent needs. For medical emergencies, dial 911. Please provide this summary of care documentation to your next provider. Your primary care clinician is listed as Slava Carballo. If you have any questions after today's visit, please call [phone].

## 2018-10-10 ENCOUNTER — OFFICE VISIT (OUTPATIENT)
Dept: INTERNAL MEDICINE CLINIC | Age: 78
End: 2018-10-10

## 2018-10-10 VITALS
DIASTOLIC BLOOD PRESSURE: 70 MMHG | TEMPERATURE: 98 F | BODY MASS INDEX: 31.83 KG/M2 | WEIGHT: 173 LBS | OXYGEN SATURATION: 96 % | HEART RATE: 90 BPM | RESPIRATION RATE: 14 BRPM | HEIGHT: 62 IN | SYSTOLIC BLOOD PRESSURE: 130 MMHG

## 2018-10-10 DIAGNOSIS — M17.0 PRIMARY OSTEOARTHRITIS OF BOTH KNEES: ICD-10-CM

## 2018-10-10 DIAGNOSIS — I10 HTN (HYPERTENSION), MALIGNANT: Primary | ICD-10-CM

## 2018-10-10 DIAGNOSIS — I25.10 CORONARY ARTERY DISEASE INVOLVING NATIVE CORONARY ARTERY OF NATIVE HEART WITHOUT ANGINA PECTORIS: ICD-10-CM

## 2018-10-10 DIAGNOSIS — L03.116 CELLULITIS OF LEFT LOWER EXTREMITY: ICD-10-CM

## 2018-10-10 RX ORDER — FUROSEMIDE 20 MG/1
TABLET ORAL
Qty: 90 TAB | Refills: 1 | Status: ON HOLD | OUTPATIENT
Start: 2018-10-10 | End: 2020-07-05

## 2018-10-10 NOTE — PATIENT INSTRUCTIONS
Room n HouseharAnxa Activation    Thank you for requesting access to Everypoint. Please follow the instructions below to securely access and download your online medical record. Everypoint allows you to send messages to your doctor, view your test results, renew your prescriptions, schedule appointments, and more. How Do I Sign Up? 1. In your internet browser, go to [email or web address]  2. Click on the First Time User? Click Here link in the Sign In box. You will be redirect to the New Member Sign Up page. 3. Enter your Everypoint Access Code exactly as it appears below. You will not need to use this code after youve completed the sign-up process. If you do not sign up before the expiration date, you must request a new code. Everypoint Access Code: Activation code not generated  Current Everypoint Status: Patient Declined (This is the date your Everypoint access code will )    4. Enter the last four digits of your Social Security Number (xxxx) and Date of Birth (mm/dd/yyyy) as indicated and click Submit. You will be taken to the next sign-up page. 5. Create a Everypoint ID. This will be your Everypoint login ID and cannot be changed, so think of one that is secure and easy to remember. 6. Create a Everypoint password. You can change your password at any time. 7. Enter your Password Reset Question and Answer. This can be used at a later time if you forget your password. 8. Enter your e-mail address. You will receive e-mail notification when new information is available in [address] 9. Click Sign Up. You can now view and download portions of your medical record. 10. Click the Download Summary menu link to download a portable copy of your medical information. Additional Information    If you have questions, please visit the Frequently Asked Questions section of the Everypoint website at [email or web address] Remember, Everypoint is NOT to be used for urgent needs. For medical emergencies, dial 911.

## 2018-10-10 NOTE — MR AVS SNAPSHOT
[address] [address] [facility],6Th Floor [facility] [address] [address] 
[phone] Patient: Juan Pablo Fraser MRN: [id number] Visit Information Date & Time Provider Department Dept. Phone Encounter #  
 10/10/2018  3:00 PM Melissa Dorantes  [facility] [phone] [id number] Follow-up Instructions Return in about 3 months (around 1/10/2019), or if symptoms worsen or fail to improve. Follow-up and Disposition History Your Appointments 11/27/2018  9:00 AM  
PACEMAKER with PACEMAKER3, [facility] ([facility] SCHEDULING) Appt Note: med threshold/rc/Valdez annual b CL; med interrogation/rc   b 8-21-18 [facility]   [address] [place] [address]  
[address] [facility] [address]  
  
    
 2/26/2019 11:00 AM  
PACEMAKER with Trey Nicole [facility] ([facility]ES SCHEDULING) Appt Note: pacemaker ck 11:00 Dr. Caio Garcia 11:20 per [address]  
 [address] [address]  
[phone]  
  
    
 2/26/2019 11:20 AM  
ESTABLISHED PATIENT with Nery Martinez MD  
[facility] ([address]) Appt Note: pacemaker ck 11:00 Dr. Caio Garcia 11:20 per [address]  
 [address] [place] [address]  
[address] [address] Upcoming Health Maintenance Date Due DTaP/Tdap/Td series (1 - Tdap) 12/21/1959 Shingrix Vaccine Age 50> (1 of 2) 12/21/1988 Pneumococcal 65+ Low/Medium Risk (2 of 2 - PCV13) 9/11/2015 GLAUCOMA SCREENING Q2Y 3/8/2018 MEDICARE YEARLY EXAM 5/11/2019 Allergies as of 10/10/2018  Review Complete On: 10/10/2018 By: April Mago Peter LPN Severity Noted Reaction Type Reactions Latex  10/10/2011    Rash Bactrim [Sulfamethoprim Ds] High 06/06/2017    Hives Aspirin  10/10/2011   Side Effect Other (comments) Anything higher than 81mg makes pt jittery Codeine  10/10/2011    Hives, Itching Iodinated Contrast- Oral And Iv Dye  06/23/2011   Side Effect Itching Pcn [Penicillins]  10/10/2011    Hives, Itching Tape [Adhesive]  05/08/2014    Rash Current Immunizations  Reviewed on 9/6/2018 Name Date Influenza High Dose Vaccine PF 9/6/2018, 10/15/2016 Influenza Vaccine 9/11/2014 Influenza Vaccine Split 11/8/2012 Pneumococcal Polysaccharide (PPSV-23) 9/11/2014 Not reviewed this visit You Were Diagnosed With   
  
 Codes Comments HTN (hypertension), malignant    -  Primary ICD-10-CM: I10 
ICD-9-CM: 401.0 Cellulitis of left lower extremity     ICD-10-CM: L03.116 ICD-9-CM: 682.6 Primary osteoarthritis of both knees     ICD-10-CM: M17.0 ICD-9-CM: 715.16 Coronary artery disease involving native coronary artery of native heart without angina pectoris     ICD-10-CM: I25.10 ICD-9-CM: 414.01 Vitals BP Pulse Temp Resp Height(growth percentile) Weight(growth percentile) 130/70 90 98 °F (36.7 °C) (Oral) 14 5' 2\" (1.575 m) 173 lb (78.5 kg) SpO2 BMI OB Status Smoking Status 96% 31.64 kg/m2 Hysterectomy Never Smoker Vitals History BMI and BSA Data Body Mass Index Body Surface Area  
 31.64 kg/m 2 1.85 m 2 Preferred Pharmacy Pharmacy Name Phone [facility] [address] [address] [phone] Your Updated Medication List  
  
   
This list is accurate as of 10/10/18  4:08 PM.  Always use your most recent med list. amLODIPine 10 mg tablet Commonly known as:  Lissette Cisneros Take 0.5 Tabs by mouth daily. aspirin delayed-release 81 mg tablet Take  by mouth daily. atorvastatin 40 mg tablet Commonly known as:  LIPITOR Take 1 Tab by mouth daily. fluticasone 50 mcg/actuation nasal spray Commonly known as:  Benny Bores Administer to right and left nostril. furosemide 20 mg tablet Commonly known as:  LASIX TAKE 1 TABLET BY MOUTH DAILY  
  
 gabapentin 300 mg capsule Commonly known as:  NEURONTIN Take 1 Cap by mouth three (3) times daily. metoprolol succinate 100 mg tablet Commonly known as:  TOPROL-XL Take 1 Tab by mouth daily. omeprazole 40 mg capsule Commonly known as:  PriLOSEC Take 1 Cap by mouth daily. potassium chloride 10 mEq tablet Commonly known as:  KLOR-CON Take 1 Tab by mouth daily. raNITIdine 150 mg tablet Commonly known as:  ZANTAC Take 1 Tab by mouth two (2) times a day. TYLENOL 325 mg tablet Generic drug:  acetaminophen Take 325 mg by mouth every four (4) hours as needed for Pain. WOMEN'S MULTIPLE VITAMINS PO Take  by mouth daily. Prescriptions Sent to Pharmacy Refills  
 furosemide (LASIX) 20 mg tablet 1 Sig: TAKE 1 TABLET BY MOUTH DAILY Class: Normal  
 Pharmacy: [facility] [address] #: [phone] Follow-up Instructions Return in about 3 months (around 1/10/2019), or if symptoms worsen or fail to improve. Patient Instructions Weave Activation Thank you for requesting access to Weave. Please follow the instructions below to securely access and download your online medical record. Weave allows you to send messages to your doctor, view your test results, renew your prescriptions, schedule appointments, and more. How Do I Sign Up? 1. In your internet browser, go to [email or web address] 
2. Click on the First Time User? Click Here link in the Sign In box. You will be redirect to the New Member Sign Up page. 3. Enter your Weave Access Code exactly as it appears below.  You will not need to use this code after youve completed the sign-up process. If you do not sign up before the expiration date, you must request a new code. Traffix Systemshart Access Code: Activation code not generated Current [employer] Status: Patient Declined (This is the date your Stylehivet access code will ) 4. Enter the last four digits of your Social Security Number (xxxx) and Date of Birth (mm/dd/yyyy) as indicated and click Submit. You will be taken to the next sign-up page. 5. Create a Stylehivet ID. This will be your [employer] login ID and cannot be changed, so think of one that is secure and easy to remember. 6. Create a [employer] password. You can change your password at any time. 7. Enter your Password Reset Question and Answer. This can be used at a later time if you forget your password. 8. Enter your e-mail address. You will receive e-mail notification when new information is available in [address]. 9. Click Sign Up. You can now view and download portions of your medical record. 10. Click the Download Summary menu link to download a portable copy of your medical information. Additional Information If you have questions, please visit the Frequently Asked Questions section of the [employer] website at [email or web address] Remember, [employer] is NOT to be used for urgent needs. For medical emergencies, dial 911. Please provide this summary of care documentation to your next provider. Your primary care clinician is listed as Cathy Rubio. If you have any questions after today's visit, please call [phone].

## 2018-10-10 NOTE — PROGRESS NOTES
Judith Becerra is a 76 y.o. female and presents with Joint Pain (bones popping)  . Subjective:    Hypertension Review:  The patient has essential hypertension  Diet and Lifestyle: generally follows a  low sodium diet, exercises sporadically  Home BP Monitoring: is not measured at home. Pertinent ROS: taking medications as instructed, no medication side effects noted, no TIA's, no chest pain on exertion, no dyspnea on exertion, no swelling of ankles. Dyslipidemia Review:  Patient presents for evaluation of lipids. Compliance with treatment thus far has been excellent. A repeat fasting lipid profile was done. The patient does not use medications that may worsen dyslipidemias (corticosteroids, progestins, anabolic steroids, diuretics, beta-blockers, amiodarone, cyclosporine, olanzapine). The patient exercises some    GERD Review:   Patient has a history of gastroesophageal reflux with heartburn. Symptoms have been present for a few months. She denies dysphagia. She  has not lost weight. She denies melena, hematochezia, hematemesis, and coffee ground emesis. This has been associated with fullness after meals. She denies abdominal bloating and none. Medical therapy in the past has included proton pump inhibitor    Knee pain Review:  Patient complains of bilaterally knee pain. Onset of the symptoms was months  ago. Inciting event: longstanding problem which has been getting worse. Current symptoms include pain location: both: medial and swelling. none,  Aggravating symptoms: going up and down stairs, walking, lateral movements, any weight bearing. Patient's overall course: gradually worsening Patient has had prior knee problems. Evaluation to date: none.   Treatment to date:NSAIDS            Review of Systems  Constitutional: negative for fevers, chills, anorexia and weight loss  Eyes:   negative for visual disturbance and irritation  ENT:   negative for tinnitus,sore throat,nasal congestion,ear pains.hoarseness  Respiratory:  negative for cough, hemoptysis, dyspnea,wheezing  CV:   negative for chest pain, palpitations, lower extremity edema  GI:   negative for nausea, vomiting, diarrhea, abdominal pain,melena  Endo:               negative for polyuria,polydipsia,polyphagia,heat intolerance  Genitourinary: negative for frequency, dysuria and hematuria  Integument:  negative for rash and pruritus  Hematologic:  negative for easy bruising and gum/nose bleeding  Musculoskel: myalgias, arthralgias,joint pain  Neurological:  negative for headaches, dizziness, vertigo, memory problems and gait   Behavl/Psych: negative for feelings of anxiety, depression, mood changes    Past Medical History:   Diagnosis Date    Acute pharyngitis 2/8/2011    Allergic rhinitis, cause unspecified 2/8/2011    Arrhythmia     PVC    Asymptomatic varicose veins 2/8/2011    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     Carpal tunnel syndrome 2/8/2011    Degenerative Joint Disese ( improved/resolving) 2/8/2011    Degenetrative Dis Disease, 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    Diverticulosis of colon (without mention of hemorrhage) 2/8/2011    Dysphagia 2/8/2011    Edema, peripheral 2/8/2011    GERD (gastroesophageal reflux disease)     GERD Gastro- Esophageal Reflux Disease 2/8/2011    Herniated nucleus pulposus ( slipped disc) 2/8/2011    Menopausal 2/8/2011    PUD (peptic ulcer disease) 2012    Pure hypercholesterolemia 2/8/2011    Recurrent ear pain 2/8/2011    S/P radiation therapy     Scalp lesion 10/23/2014    Sebaceous cyst 4/22/2014    Unspecified cataract 2/8/2011    Unspecified essential hypertension 2/8/2011    Unspecified sleep apnea      Past Surgical History:   Procedure Laterality Date    HX HYSTERECTOMY      HX ORTHOPAEDIC  neck/back 2006    HX OTHER SURGICAL  5/8/2014     Excise recurrent scalp lesion and application of ACell    HX OTHER SURGICAL  5/8/2014    Excise keloid, anterior chest wall, and application of ACell    HX OTHER SURGICAL  5/8/2014     Excise sebaceous cyst, anterior chest wall    HX OTHER SURGICAL  5/8/2014    Punch biopsy, skin lesions, right forearm x2, right calf x1    RI INS NEW/RPLCMT PRM PM W/TRANSV ELTRD ATRIAL&VENT  9/30/2017         RI RMVL IMPLANTABLE PT-ACTIVATED CAR EVENT RECORDER  9/30/2017          Social History     Social History    Marital status:      Spouse name: N/A    Number of children: N/A    Years of education: N/A     Social History Main Topics    Smoking status: Never Smoker    Smokeless tobacco: Never Used    Alcohol use No    Drug use: No    Sexual activity: Yes     Partners: Male     Birth control/ protection: None     Other Topics Concern    None     Social History Narrative     Family History   Problem Relation Age of Onset    Hypertension Mother     Stroke Mother     Hypertension Father     Cancer Father      PROSTATE, MELANOMA    Anesth Problems Neg Hx      Current Outpatient Prescriptions   Medication Sig Dispense Refill    furosemide (LASIX) 20 mg tablet TAKE 1 TABLET BY MOUTH DAILY 90 Tab 6    gabapentin (NEURONTIN) 300 mg capsule Take 1 Cap by mouth three (3) times daily. 270 Cap 3    amLODIPine (NORVASC) 10 mg tablet Take 0.5 Tabs by mouth daily. 30 Tab 11    metoprolol succinate (TOPROL-XL) 100 mg tablet Take 1 Tab by mouth daily. 30 Tab 5    raNITIdine (ZANTAC) 150 mg tablet Take 1 Tab by mouth two (2) times a day. 180 Tab 3    fluticasone (FLONASE) 50 mcg/actuation nasal spray Administer to right and left nostril. 1 Bottle 12    omeprazole (PRILOSEC) 40 mg capsule Take 1 Cap by mouth daily. 90 Cap 3    atorvastatin (LIPITOR) 40 mg tablet Take 1 Tab by mouth daily. 90 Tab 3    potassium chloride (KLOR-CON) 10 mEq tablet Take 1 Tab by mouth daily. 90 Tab 3    acetaminophen (TYLENOL) 325 mg tablet Take 325 mg by mouth every four (4) hours as needed for Pain.       MULTIVIT WITH CALCIUM,IRON,MIN [facility] MULTIPLE VITAMINS PO) Take  by mouth daily.  aspirin delayed-release 81 mg tablet Take  by mouth daily. Allergies   Allergen Reactions    Latex Rash    Bactrim [Sulfamethoprim Ds] Hives    Aspirin Other (comments)     Anything higher than 81mg makes pt jittery    Codeine Hives and Itching    Iodinated Contrast- Oral And Iv Dye Itching    Pcn [Penicillins] Hives and Itching    Tape [Adhesive] Rash       Objective:  Visit Vitals    /70    Pulse 90    Temp 98 °F (36.7 °C) (Oral)    Resp 14    Ht 5' 2\" (1.575 m)    Wt 173 lb (78.5 kg)    SpO2 96%    BMI 31.64 kg/m2     Physical Exam:   General appearance - alert, well appearing, and in no distress  Mental status - alert, oriented to person, place, and time  EYE-DARLENE, EOMI, corneas normal, no foreign bodies  ENT-ENT exam normal, no neck nodes or sinus tenderness  Nose - normal and patent, no erythema, discharge or polyps  Mouth - mucous membranes moist, pharynx normal without lesions  Neck - supple, no significant adenopathy   Chest - clear to auscultation, no wheezes, rales or rhonchi, symmetric air entry   Heart - normal rate, regular rhythm, normal S1, S2, no murmurs, rubs, clicks or gallops   Abdomen - soft, nontender, nondistended, no masses or organomegaly  Lymph- no adenopathy palpable  Ext-peripheral pulses normal, no pedal edema, no clubbing or cyanosis  Skin-Warm and dry.  no hyperpigmentation, vitiligo, or suspicious lesions  Neuro -alert, oriented, normal speech, no focal findings or movement disorder noted  Neck-normal C-spine, no tenderness, full ROM without pain  Feet-no nail deformities or callus formation with good pulses noted      Results for orders placed or performed in visit on 09/06/18   AMB POC LIPID PROFILE   Result Value Ref Range    Cholesterol (POC) 168     Triglycerides (POC) 89     HDL Cholesterol (POC) 66     LDL Cholesterol (POC) 84 MG/DL    Non-HDL Goal (POC) 102     TChol/HDL Ratio (POC) 2.5        Assessment/Plan:    ICD-10-CM ICD-9-CM 1. HTN (hypertension), malignant I10 401.0    2. Cellulitis of left lower extremity L03.116 682.6 furosemide (LASIX) 20 mg tablet   3. Primary osteoarthritis of both knees M17.0 715.16    4. Coronary artery disease involving native coronary artery of native heart without angina pectoris I25.10 414.01      No orders of the defined types were placed in this encounter. lose weight, increase physical activity, follow low fat diet, follow low salt diet, continue present plan  Patient Instructions   Rover Appshart Activation    Thank you for requesting access to EVRYTHNG. Please follow the instructions below to securely access and download your online medical record. EVRYTHNG allows you to send messages to your doctor, view your test results, renew your prescriptions, schedule appointments, and more. How Do I Sign Up? 1. In your internet browser, go to [email or web address]  2. Click on the First Time User? Click Here link in the Sign In box. You will be redirect to the New Member Sign Up page. 3. Enter your EVRYTHNG Access Code exactly as it appears below. You will not need to use this code after youve completed the sign-up process. If you do not sign up before the expiration date, you must request a new code. EVRYTHNG Access Code: Activation code not generated  Current EVRYTHNG Status: Patient Declined (This is the date your EVRYTHNG access code will )    4. Enter the last four digits of your Social Security Number (xxxx) and Date of Birth (mm/dd/yyyy) as indicated and click Submit. You will be taken to the next sign-up page. 5. Create a EVRYTHNG ID. This will be your EVRYTHNG login ID and cannot be changed, so think of one that is secure and easy to remember. 6. Create a EVRYTHNG password. You can change your password at any time. 7. Enter your Password Reset Question and Answer. This can be used at a later time if you forget your password. 8. Enter your e-mail address.  You will receive e-mail notification when new information is available in [email or web address]. 9. Click Sign Up. You can now view and download portions of your medical record. 10. Click the Download Summary menu link to download a portable copy of your medical information. Additional Information    If you have questions, please visit the Frequently Asked Questions section of the [employer] website at [email or web address] Remember, [employer] is NOT to be used for urgent needs. For medical emergencies, dial 911. Follow-up Disposition:  Return in about 3 months (around 1/10/2019), or if symptoms worsen or fail to improve. I have reviewed with the patient details of the assessment and plan and all questions were answered. Relevent patient education was performed    An After Visit Summary was printed and given to the patient.

## 2018-10-30 RX ORDER — ATORVASTATIN CALCIUM 40 MG/1
TABLET, FILM COATED ORAL
Qty: 90 TAB | Refills: 3 | Status: SHIPPED | OUTPATIENT
Start: 2018-10-30 | End: 2019-06-29 | Stop reason: DRUGHIGH

## 2018-11-29 ENCOUNTER — OFFICE VISIT (OUTPATIENT)
Dept: INTERNAL MEDICINE CLINIC | Age: 78
End: 2018-11-29

## 2018-11-29 VITALS
SYSTOLIC BLOOD PRESSURE: 160 MMHG | BODY MASS INDEX: 30.55 KG/M2 | HEART RATE: 78 BPM | DIASTOLIC BLOOD PRESSURE: 80 MMHG | TEMPERATURE: 97.5 F | OXYGEN SATURATION: 98 % | RESPIRATION RATE: 20 BRPM | HEIGHT: 62 IN | WEIGHT: 166 LBS

## 2018-11-29 DIAGNOSIS — R35.0 FREQUENCY OF URINATION AND POLYURIA: ICD-10-CM

## 2018-11-29 DIAGNOSIS — N30.00 ACUTE CYSTITIS WITHOUT HEMATURIA: Primary | ICD-10-CM

## 2018-11-29 DIAGNOSIS — I25.10 CORONARY ARTERY DISEASE INVOLVING NATIVE CORONARY ARTERY OF NATIVE HEART WITHOUT ANGINA PECTORIS: ICD-10-CM

## 2018-11-29 DIAGNOSIS — I10 ESSENTIAL HYPERTENSION: ICD-10-CM

## 2018-11-29 DIAGNOSIS — R35.89 FREQUENCY OF URINATION AND POLYURIA: ICD-10-CM

## 2018-11-29 LAB
ALBUMIN UR QL STRIP: 80 MG/L
BILIRUB UR QL STRIP: NEGATIVE
CREATININE, URINE POC: 200 MG/DL
GLUCOSE POC: 126 MG/DL
GLUCOSE UR-MCNC: NEGATIVE MG/DL
KETONES P FAST UR STRIP-MCNC: NEGATIVE MG/DL
MICROALBUMIN/CREAT RATIO POC: ABNORMAL MG/G
PH UR STRIP: 7 [PH] (ref 4.6–8)
PROT UR QL STRIP: NEGATIVE
SP GR UR STRIP: 1.02 (ref 1–1.03)
UA UROBILINOGEN AMB POC: NORMAL (ref 0.2–1)
URINALYSIS CLARITY POC: CLEAR
URINALYSIS COLOR POC: YELLOW
URINE BLOOD POC: NORMAL
URINE LEUKOCYTES POC: NORMAL
URINE NITRITES POC: NEGATIVE

## 2018-11-29 RX ORDER — LEVOFLOXACIN 500 MG/1
500 TABLET, FILM COATED ORAL DAILY
Qty: 7 TAB | Refills: 0 | Status: SHIPPED | OUTPATIENT
Start: 2018-11-29 | End: 2018-12-05 | Stop reason: SDUPTHER

## 2018-11-29 NOTE — PROGRESS NOTES
1. Have you been to the ER, urgent care clinic since your last visit? Hospitalized since your last visit? No      2. Have you seen or consulted any other health care providers outside of the [address] [place] [address] since your last visit? Include any pap smears or colon screening.  No     PHQ over the last two weeks 9/6/2018   PHQ Not Done -   Little interest or pleasure in doing things Not at all   Feeling down, depressed, irritable, or hopeless Not at all   Total Score PHQ 2 0

## 2018-11-29 NOTE — PROGRESS NOTES
Brock Childress is a 76 y.o. female and presents with Fall and Urinary Frequency  . Subjective:  Urinary Tract Infection:  Patient complains of dysuria, frequency, urgency, . Onset was a few days ago, gradually worsening since that time. Patient complains of no  lower abdominal ache. There is not any concern of sexual abuse. There is not a history of trauma to the genital area. Patient does not have a history of recurrent UTI. Patient does not have a history of pyelonephritis. Hypertension Review:  The patient has essential hypertension  Diet and Lifestyle: generally follows a  low sodium diet, exercises sporadically  Home BP Monitoring: is not measured at home. Pertinent ROS: taking medications as instructed, no medication side effects noted, no TIA's, no chest pain on exertion, no dyspnea on exertion, no swelling of ankles. Dyslipidemia Review:  Patient presents for evaluation of lipids. Compliance with treatment thus far has been excellent. A repeat fasting lipid profile was done. The patient does not use medications that may worsen dyslipidemias (corticosteroids, progestins, anabolic steroids, diuretics, beta-blockers, amiodarone, cyclosporine, olanzapine). The patient exercises some        Review of Systems  Constitutional: negative for fevers, chills, anorexia and weight loss  Eyes:   negative for visual disturbance and irritation  ENT:   negative for tinnitus,sore throat,nasal congestion,ear pains. hoarseness  Respiratory:  negative for cough, hemoptysis, dyspnea,wheezing  CV:   negative for chest pain, palpitations, lower extremity edema  GI:   negative for nausea, vomiting, diarrhea, abdominal pain,melena  Endo:               negative for polyuria,polydipsia,polyphagia,heat intolerance  Genitourinary:  frequency, dysuria   Integument:  negative for rash and pruritus  Hematologic:  negative for easy bruising and gum/nose bleeding  Musculoskel: negative for myalgias, arthralgias, back pain, muscle weakness, joint pain  Neurological:  negative for headaches, dizziness, vertigo, memory problems and gait   Behavl/Psych: negative for feelings of anxiety, depression, mood changes    Past Medical History:   Diagnosis Date    Acute pharyngitis 2/8/2011    Allergic rhinitis, cause unspecified 2/8/2011    Arrhythmia     PVC    Asymptomatic varicose veins 2/8/2011    Cancer ([facility] [address]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     Carpal tunnel syndrome 2/8/2011    Degenerative Joint Disese ( improved/resolving) 2/8/2011    Degenetrative Dis Disease, 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    Diverticulosis of colon (without mention of hemorrhage) 2/8/2011    Dysphagia 2/8/2011    Edema, peripheral 2/8/2011    GERD (gastroesophageal reflux disease)     GERD Gastro- Esophageal Reflux Disease 2/8/2011    Herniated nucleus pulposus ( slipped disc) 2/8/2011    Menopausal 2/8/2011    PUD (peptic ulcer disease) 2012    Pure hypercholesterolemia 2/8/2011    Recurrent ear pain 2/8/2011    S/P radiation therapy     Scalp lesion 10/23/2014    Sebaceous cyst 4/22/2014    Unspecified cataract 2/8/2011    Unspecified essential hypertension 2/8/2011    Unspecified sleep apnea      Past Surgical History:   Procedure Laterality Date    HX HYSTERECTOMY      HX ORTHOPAEDIC  neck/back 2006    HX OTHER SURGICAL  5/8/2014     Excise recurrent scalp lesion and application of ACell    HX OTHER SURGICAL  5/8/2014    Excise keloid, anterior chest wall, and application of ACell    HX OTHER SURGICAL  5/8/2014     Excise sebaceous cyst, anterior chest wall    HX OTHER SURGICAL  5/8/2014    Punch biopsy, skin lesions, right forearm x2, right calf x1    GA INS NEW/RPLCMT PRM PM W/TRANSV ELTRD ATRIAL&VENT  9/30/2017         GA RMVL IMPLANTABLE PT-ACTIVATED CAR EVENT RECORDER  9/30/2017          Social History     Socioeconomic History    Marital status:      Spouse name: Not on file    Number of children: Not on file    Years of education: Not on file  Highest education level: Not on file   Tobacco Use    Smoking status: Never Smoker    Smokeless tobacco: Never Used   Substance and Sexual Activity    Alcohol use: No    Drug use: No    Sexual activity: Yes     Partners: Male     Birth control/protection: None     Family History   Problem Relation Age of Onset    Hypertension Mother     Stroke Mother     Hypertension Father     Cancer Father         PROSTATE, MELANOMA    Anesth Problems Neg Hx      Current Outpatient Medications   Medication Sig Dispense Refill    atorvastatin (LIPITOR) 40 mg tablet TAKE 1 TABLET EVERY DAY 90 Tab 3    gabapentin (NEURONTIN) 300 mg capsule Take 1 Cap by mouth three (3) times daily. 270 Cap 3    amLODIPine (NORVASC) 10 mg tablet Take 0.5 Tabs by mouth daily. 30 Tab 11    metoprolol succinate (TOPROL-XL) 100 mg tablet Take 1 Tab by mouth daily. 30 Tab 5    raNITIdine (ZANTAC) 150 mg tablet Take 1 Tab by mouth two (2) times a day. 180 Tab 3    fluticasone (FLONASE) 50 mcg/actuation nasal spray Administer to right and left nostril. 1 Bottle 12    omeprazole (PRILOSEC) 40 mg capsule Take 1 Cap by mouth daily. 90 Cap 3    potassium chloride (KLOR-CON) 10 mEq tablet Take 1 Tab by mouth daily. 90 Tab 3    acetaminophen (TYLENOL) 325 mg tablet Take 325 mg by mouth every four (4) hours as needed for Pain.  MULTIVIT WITH CALCIUM,IRON,MIN [facility] MULTIPLE VITAMINS PO) Take  by mouth daily.  aspirin delayed-release 81 mg tablet Take  by mouth daily.       furosemide (LASIX) 20 mg tablet TAKE 1 TABLET BY MOUTH DAILY 90 Tab 1     Allergies   Allergen Reactions    Latex Rash    Bactrim [Sulfamethoprim Ds] Hives    Aspirin Other (comments)     Anything higher than 81mg makes pt jittery    Codeine Hives and Itching    Iodinated Contrast- Oral And Iv Dye Itching    Pcn [Penicillins] Hives and Itching    Tape [Adhesive] Rash       Objective:  Visit Vitals  /80 (BP 1 Location: Right arm, BP Patient Position: Sitting)   Pulse 78   Temp 97.5 °F (36.4 °C) (Oral)   Resp 20   Ht 5' 2\" (1.575 m)   Wt 166 lb (75.3 kg)   SpO2 98%   BMI 30.36 kg/m²     Physical Exam:   General appearance - alert, well appearing, and in no distress  Mental status - alert, oriented to person, place, and time  EYE-DARLENE, EOMI, corneas normal, no foreign bodies  ENT-ENT exam normal, no neck nodes or sinus tenderness  Nose - normal and patent, no erythema, discharge or polyps  Mouth - mucous membranes moist, pharynx normal without lesions  Neck - supple, no significant adenopathy   Chest - clear to auscultation, no wheezes, rales or rhonchi, symmetric air entry   Heart - normal rate, regular rhythm, normal S1, S2, no murmurs, rubs, clicks or gallops   Abdomen - soft, nontender, nondistended, no masses or organomegaly  Lymph- no adenopathy palpable  Ext-peripheral pulses normal, no pedal edema, no clubbing or cyanosis  Skin-Warm and dry. no hyperpigmentation, vitiligo, or suspicious lesions  Neuro -alert, oriented, normal speech, no focal findings or movement disorder noted  Neck-normal C-spine, no tenderness, full ROM without pain  Feet-no nail deformities or callus formation with good pulses noted      Results for orders placed or performed in visit on 09/06/18   AMB POC LIPID PROFILE   Result Value Ref Range    Cholesterol (POC) 168     Triglycerides (POC) 89     HDL Cholesterol (POC) 66     LDL Cholesterol (POC) 84 MG/DL    Non-HDL Goal (POC) 102     TChol/HDL Ratio (POC) 2.5        Assessment/Plan:    ICD-10-CM ICD-9-CM    1. Acute cystitis without hematuria N30.00 595.0    2. Frequency of urination and polyuria R35.0 788.41 AMB POC URINE, MICROALBUMIN, SEMIQUANT (3 RESULTS)    R35.8 788.42 AMB POC URINALYSIS DIP STICK AUTO W/O MICRO      AMB POC GLUCOSE BLOOD, BY GLUCOSE MONITORING DEVICE   3. Essential hypertension I10 401.9    4.  Coronary artery disease involving native coronary artery of native heart without angina pectoris I25.10 414.01 Orders Placed This Encounter    AMB POC URINE, MICROALBUMIN, SEMIQUANT (3 RESULTS)    AMB POC URINALYSIS DIP STICK AUTO W/O MICRO    AMB POC GLUCOSE BLOOD, BY GLUCOSE MONITORING DEVICE     routine labs ordered,Take 81mg aspirin daily  Patient Instructions   MyChart Activation    Thank you for requesting access to [employer]. Please follow the instructions below to securely access and download your online medical record. [employer] allows you to send messages to your doctor, view your test results, renew your prescriptions, schedule appointments, and more. How Do I Sign Up? 1. In your internet browser, go to [email or web address]  2. Click on the First Time User? Click Here link in the Sign In box. You will be redirect to the New Member Sign Up page. 3. Enter your [employer] Access Code exactly as it appears below. You will not need to use this code after youve completed the sign-up process. If you do not sign up before the expiration date, you must request a new code. [employer] Access Code: Activation code not generated  Current [employer] Status: Patient Declined (This is the date your [employer] access code will )    4. Enter the last four digits of your Social Security Number (xxxx) and Date of Birth (mm/dd/yyyy) as indicated and click Submit. You will be taken to the next sign-up page. 5. Create a [employer] ID. This will be your [employer] login ID and cannot be changed, so think of one that is secure and easy to remember. 6. Create a [employer] password. You can change your password at any time. 7. Enter your Password Reset Question and Answer. This can be used at a later time if you forget your password. 8. Enter your e-mail address. You will receive e-mail notification when new information is available in [address] 9. Click Sign Up. You can now view and download portions of your medical record. 10. Click the Download Summary menu link to download a portable copy of your medical information.     Additional Information    If you have questions, please visit the Frequently Asked Questions section of the MyChart website at [email or web address] Remember, MyChart is NOT to be used for urgent needs. For medical emergencies, dial 911. Follow-up Disposition:  Return in about 3 months (around 2/28/2019), or if symptoms worsen or fail to improve. I have reviewed with the patient details of the assessment and plan and all questions were answered. Relevent patient education was performed. The most recent lab findings were reviewed with the patient. An After Visit Summary was printed and given to the patient.

## 2018-11-29 NOTE — PATIENT INSTRUCTIONS
[email or web address] Activation    Thank you for requesting access to [employer]. Please follow the instructions below to securely access and download your online medical record. [facility] allows you to send messages to your doctor, view your test results, renew your prescriptions, schedule appointments, and more. How Do I Sign Up? 1. In your internet browser, go to [email or web address].[email or web address]  2. Click on the First Time User? Click Here link in the Sign In box. You will be redirect to the New Member Sign Up page. 3. Enter your [employer] Access Code exactly as it appears below. You will not need to use this code after youve completed the sign-up process. If you do not sign up before the expiration date, you must request a new code. [facility] Access Code: Activation code not generated  Current [facility] Status: Patient Declined (This is the date your [employer] access code will )    4. Enter the last four digits of your Social Security Number (xxxx) and Date of Birth (mm/dd/yyyy) as indicated and click Submit. You will be taken to the next sign-up page. 5. Create a [employer]Rang ID. This will be your [employer] login ID and cannot be changed, so think of one that is secure and easy to remember. 6. Create a [employer]Rang password. You can change your password at any time. 7. Enter your Password Reset Question and Answer. This can be used at a later time if you forget your password. 8. Enter your e-mail address. You will receive e-mail notification when new information is available in [address] 9. Click Sign Up. You can now view and download portions of your medical record. 10. Click the Download Summary menu link to download a portable copy of your medical information. Additional Information    If you have questions, please visit the Frequently Asked Questions section of the [employer] website at [email or web address] Remember, [employer] is NOT to be used for urgent needs. For medical emergencies, dial 911.

## 2018-12-05 DIAGNOSIS — K21.9 GASTROESOPHAGEAL REFLUX DISEASE WITHOUT ESOPHAGITIS: ICD-10-CM

## 2018-12-05 DIAGNOSIS — G44.319 ACUTE POST-TRAUMATIC HEADACHE, NOT INTRACTABLE: ICD-10-CM

## 2018-12-05 RX ORDER — LEVOFLOXACIN 500 MG/1
TABLET, FILM COATED ORAL
Qty: 7 TAB | Refills: 0 | Status: SHIPPED | OUTPATIENT
Start: 2018-12-05 | End: 2019-01-09 | Stop reason: ALTCHOICE

## 2018-12-06 RX ORDER — POTASSIUM CHLORIDE 750 MG/1
TABLET, FILM COATED, EXTENDED RELEASE ORAL
Qty: 90 TAB | Refills: 3 | Status: SHIPPED | OUTPATIENT
Start: 2018-12-06 | End: 2020-03-12 | Stop reason: SDUPTHER

## 2018-12-06 RX ORDER — CLONIDINE HYDROCHLORIDE 0.2 MG/1
TABLET ORAL
Qty: 180 TAB | Refills: 1 | Status: SHIPPED | OUTPATIENT
Start: 2018-12-06 | End: 2019-04-23

## 2018-12-06 RX ORDER — METOPROLOL SUCCINATE 25 MG/1
TABLET, EXTENDED RELEASE ORAL
Qty: 90 TAB | Refills: 3 | Status: SHIPPED | OUTPATIENT
Start: 2018-12-06 | End: 2019-04-23

## 2018-12-06 RX ORDER — MECLIZINE HYDROCHLORIDE 25 MG/1
TABLET ORAL
Qty: 270 TAB | Refills: 3 | Status: SHIPPED | OUTPATIENT
Start: 2018-12-06 | End: 2019-06-29 | Stop reason: DRUGHIGH

## 2018-12-06 RX ORDER — RANITIDINE 150 MG/1
TABLET, FILM COATED ORAL
Qty: 180 TAB | Refills: 3 | Status: SHIPPED | OUTPATIENT
Start: 2018-12-06 | End: 2020-02-14 | Stop reason: SDUPTHER

## 2018-12-06 RX ORDER — FLUTICASONE PROPIONATE 50 MCG
SPRAY, SUSPENSION (ML) NASAL
Qty: 48 G | Refills: 12 | Status: SHIPPED | OUTPATIENT
Start: 2018-12-06 | End: 2019-06-29 | Stop reason: DRUGHIGH

## 2018-12-06 RX ORDER — OMEPRAZOLE 40 MG/1
CAPSULE, DELAYED RELEASE ORAL
Qty: 90 CAP | Refills: 3 | Status: SHIPPED | OUTPATIENT
Start: 2018-12-06 | End: 2020-03-12 | Stop reason: SDUPTHER

## 2018-12-31 ENCOUNTER — OFFICE VISIT (OUTPATIENT)
Dept: INTERNAL MEDICINE CLINIC | Age: 78
End: 2018-12-31

## 2018-12-31 VITALS
SYSTOLIC BLOOD PRESSURE: 150 MMHG | TEMPERATURE: 97.7 F | HEIGHT: 62 IN | RESPIRATION RATE: 16 BRPM | OXYGEN SATURATION: 92 % | WEIGHT: 174 LBS | DIASTOLIC BLOOD PRESSURE: 76 MMHG | BODY MASS INDEX: 32.02 KG/M2 | HEART RATE: 68 BPM

## 2018-12-31 DIAGNOSIS — M25.462 KNEE EFFUSION, LEFT: Primary | ICD-10-CM

## 2018-12-31 RX ORDER — LEVOFLOXACIN 500 MG/1
500 TABLET, FILM COATED ORAL DAILY
Qty: 7 TAB | Refills: 0 | Status: SHIPPED | OUTPATIENT
Start: 2018-12-31 | End: 2019-01-09 | Stop reason: ALTCHOICE

## 2018-12-31 NOTE — PROGRESS NOTES
1. Have you been to the ER, urgent care clinic since your last visit? Hospitalized since your last visit?no    2. Have you seen or consulted any other health care providers outside of the [address] [place] [address] since your last visit? Include any pap smears or colon screening.  No    PHQ over the last two weeks 11/29/2018   PHQ Not Done -   Little interest or pleasure in doing things Not at all   Feeling down, depressed, irritable, or hopeless Not at all   Total Score PHQ 2 0

## 2018-12-31 NOTE — PATIENT INSTRUCTIONS
[employer] Activation    Thank you for requesting access to "[employer]. Please follow the instructions below to securely access and download your online medical record. "[employer] allows you to send messages to your doctor, view your test results, renew your prescriptions, schedule appointments, and more. How Do I Sign Up? 1. In your internet browser, go to [email or web address]  2. Click on the First Time User? Click Here link in the Sign In box. You will be redirect to the New Member Sign Up page. 3. Enter your "[employer] Access Code exactly as it appears below. You will not need to use this code after youve completed the sign-up process. If you do not sign up before the expiration date, you must request a new code. "[employer] Access Code: Activation code not generated  Current "[employer] Status: Patient Declined (This is the date your "[employer] access code will )    4. Enter the last four digits of your Social Security Number (xxxx) and Date of Birth (mm/dd/yyyy) as indicated and click Submit. You will be taken to the next sign-up page. 5. Create a "[employer] ID. This will be your "[employer] login ID and cannot be changed, so think of one that is secure and easy to remember. 6. Create a "[employer] password. You can change your password at any time. 7. Enter your Password Reset Question and Answer. This can be used at a later time if you forget your password. 8. Enter your e-mail address. You will receive e-mail notification when new information is available in [address] 9. Click Sign Up. You can now view and download portions of your medical record. 10. Click the Download Summary menu link to download a portable copy of your medical information. Additional Information    If you have questions, please visit the Frequently Asked Questions section of the "[employer] website at [email or web address] Remember, "[employer] is NOT to be used for urgent needs. For medical emergencies, dial 911.

## 2018-12-31 NOTE — PROGRESS NOTES
Dasie Lundborg is a [de-identified] y.o. female and presents with Knee Pain (left)  . Subjective:  Knee pain Review:  Patient complains of left knee pain. Onset of the symptoms was months  ago. Inciting event: longstanding problem which has been getting worse. Current symptoms include pain location: both: medial and swelling. none,  Aggravating symptoms: going up and down stairs, walking, lateral movements, any weight bearing. Patient's overall course: gradually worsening Patient has had prior knee problems. Evaluation to date: noted. Treatment to date:NSAIDS        Hypertension Review:  The patient has hypertension . Diet and Lifestyle: generally follows a low sodium diet, exercises sporadically  Home BP Monitoring: is not measured at home. Pertinent ROS: taking medications as instructed, no medication side effects noted, no TIA's, no chest pain on exertion, no dyspnea on exertion, no swelling of ankles. Dyslipidemia Review:  Patient presents for evaluation of lipids. Compliance with treatment thus far has been excellent. A repeat fasting lipid profile was done. The patient does not use medications that may worsen dyslipidemias . The patient exercises sporadically. Review of Systems  Constitutional: negative for fevers, chills, anorexia and weight loss  Eyes:   negative for visual disturbance and irritation  ENT:   negative for tinnitus,sore throat,nasal congestion,ear pains. hoarseness  Respiratory:  negative for cough, hemoptysis, dyspnea,wheezing  CV:   negative for chest pain, palpitations, lower extremity edema  GI:   negative for nausea, vomiting, diarrhea, abdominal pain,melena  Endo:               negative for polyuria,polydipsia,polyphagia,heat intolerance  Genitourinary: negative for frequency, dysuria and hematuria  Integument:  negative for rash and pruritus  Hematologic:  negative for easy bruising and gum/nose bleeding  Musculoskel: myalgias, arthralgias,, joint pain  Neurological:  negative for headaches, dizziness, vertigo, memory problems and gait   Behavl/Psych: negative for feelings of anxiety, depression, mood changes    Past Medical History:   Diagnosis Date    Acute pharyngitis 2/8/2011    Allergic rhinitis, cause unspecified 2/8/2011    Arrhythmia     PVC    Asymptomatic varicose veins 2/8/2011    Cancer ([facility] [address]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     Carpal tunnel syndrome 2/8/2011    Degenerative Joint Disese ( improved/resolving) 2/8/2011    Degenetrative Dis Disease, 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    Diverticulosis of colon (without mention of hemorrhage) 2/8/2011    Dysphagia 2/8/2011    Edema, peripheral 2/8/2011    GERD (gastroesophageal reflux disease)     GERD Gastro- Esophageal Reflux Disease 2/8/2011    Herniated nucleus pulposus ( slipped disc) 2/8/2011    Menopausal 2/8/2011    PUD (peptic ulcer disease) 2012    Pure hypercholesterolemia 2/8/2011    Recurrent ear pain 2/8/2011    S/P radiation therapy     Scalp lesion 10/23/2014    Sebaceous cyst 4/22/2014    Unspecified cataract 2/8/2011    Unspecified essential hypertension 2/8/2011    Unspecified sleep apnea      Past Surgical History:   Procedure Laterality Date    HX HYSTERECTOMY      HX ORTHOPAEDIC  neck/back 2006    HX OTHER SURGICAL  5/8/2014     Excise recurrent scalp lesion and application of ACell    HX OTHER SURGICAL  5/8/2014    Excise keloid, anterior chest wall, and application of ACell    HX OTHER SURGICAL  5/8/2014     Excise sebaceous cyst, anterior chest wall    HX OTHER SURGICAL  5/8/2014    Punch biopsy, skin lesions, right forearm x2, right calf x1    SC INS NEW/RPLCMT PRM PM W/TRANSV ELTRD ATRIAL&VENT  9/30/2017         SC RMVL IMPLANTABLE PT-ACTIVATED CAR EVENT RECORDER  9/30/2017          Social History     Socioeconomic History    Marital status:      Spouse name: Not on file    Number of children: Not on file    Years of education: Not on file    Highest education level: Not on file   Tobacco Use    Smoking status: Never Smoker    Smokeless tobacco: Never Used   Substance and Sexual Activity    Alcohol use: No    Drug use: No    Sexual activity: Yes     Partners: Male     Birth control/protection: None     Family History   Problem Relation Age of Onset    Hypertension Mother     Stroke Mother     Hypertension Father     Cancer Father         PROSTATE, MELANOMA    Anesth Problems Neg Hx      Current Outpatient Medications   Medication Sig Dispense Refill    meclizine (ANTIVERT) 25 mg tablet TAKE 1 TABLET THREE TIMES DAILY AS NEEDED 270 Tab 3    cloNIDine HCl (CATAPRES) 0.2 mg tablet TAKE 1 TABLET TWICE DAILY 180 Tab 1    metoprolol succinate (TOPROL-XL) 25 mg XL tablet TAKE 1 TABLET EVERY DAY 90 Tab 3    fluticasone (FLONASE) 50 mcg/actuation nasal spray USE 2 SPRAYS IN EACH NOSTRIL EVERY DAY 48 g 12    omeprazole (PRILOSEC) 40 mg capsule TAKE 1 CAPSULE EVERY DAY 90 Cap 3    potassium chloride SR (KLOR-CON 10) 10 mEq tablet TAKE 1 TABLET EVERY DAY 90 Tab 3    raNITIdine (ZANTAC) 150 mg tablet TAKE 1 TABLET TWICE DAILY 180 Tab 3    levoFLOXacin (LEVAQUIN) 500 mg tablet TAKE 1 TABLET BY MOUTH DAILY 7 Tab 0    atorvastatin (LIPITOR) 40 mg tablet TAKE 1 TABLET EVERY DAY 90 Tab 3    furosemide (LASIX) 20 mg tablet TAKE 1 TABLET BY MOUTH DAILY 90 Tab 1    gabapentin (NEURONTIN) 300 mg capsule Take 1 Cap by mouth three (3) times daily. 270 Cap 3    amLODIPine (NORVASC) 10 mg tablet Take 0.5 Tabs by mouth daily. 30 Tab 11    metoprolol succinate (TOPROL-XL) 100 mg tablet Take 1 Tab by mouth daily. 30 Tab 5    potassium chloride (KLOR-CON) 10 mEq tablet Take 1 Tab by mouth daily. 90 Tab 3    acetaminophen (TYLENOL) 325 mg tablet Take 325 mg by mouth every four (4) hours as needed for Pain.  MULTIVIT WITH CALCIUM,IRON,MIN [facility] MULTIPLE VITAMINS PO) Take  by mouth daily.  aspirin delayed-release 81 mg tablet Take  by mouth daily.        Allergies   Allergen Reactions    Latex Rash    Bactrim [Sulfamethoprim Ds] Hives    Aspirin Other (comments)     Anything higher than 81mg makes pt jittery    Codeine Hives and Itching    Iodinated Contrast- Oral And Iv Dye Itching    Pcn [Penicillins] Hives and Itching    Tape [Adhesive] Rash       Objective:  Visit Vitals  /76   Pulse 68   Temp 97.7 °F (36.5 °C) (Oral)   Resp 16   Ht 5' 2\" (1.575 m)   Wt 174 lb (78.9 kg)   SpO2 92%   BMI 31.83 kg/m²     Physical Exam:   General appearance - alert, well appearing, and in no distress  Mental status - alert, oriented to person, place, and time  EYE-DARLENE, EOMI, corneas normal, no foreign bodies  ENT-ENT exam normal, no neck nodes or sinus tenderness  Nose - normal and patent, no erythema, discharge or polyps  Mouth - mucous membranes moist, pharynx normal without lesions  Neck - supple, no significant adenopathy   Chest - clear to auscultation, no wheezes, rales or rhonchi, symmetric air entry   Heart - normal rate, regular rhythm, normal S1, S2, no murmurs, rubs, clicks or gallops   Abdomen - soft, nontender, nondistended, no masses or organomegaly  Lymph- no adenopathy palpable  Ext-peripheral pulses normal, no pedal edema, no clubbing or cyanosis  Skin-Warm and dry.  no hyperpigmentation, vitiligo, or suspicious lesions  Neuro -alert, oriented, normal speech, no focal findings or movement disorder noted  Neck-normal C-spine, no tenderness, full ROM without pain  Feet-no nail deformities or callus formation with good pulses noted  Lt.knee-effusion noted    Results for orders placed or performed in visit on 11/29/18   AMB POC URINE, MICROALBUMIN, SEMIQUANT (3 RESULTS)   Result Value Ref Range    ALBUMIN, URINE POC 80 Negative mg/L    CREATININE, URINE  mg/dL    Microalbumin/creat ratio (POC)  <30 MG/G   AMB POC URINALYSIS DIP STICK AUTO W/O MICRO   Result Value Ref Range    Color (UA POC) Yellow     Clarity (UA POC) Clear     Glucose (UA POC) Negative Negative    Bilirubin (UA POC) Negative Negative    Ketones (UA POC) Negative Negative    Specific gravity (UA POC) 1.020 1.001 - 1.035    Blood (UA POC) Trace Negative    pH (UA POC) 7.0 4.6 - 8.0    Protein (UA POC) Negative Negative    Urobilinogen (UA POC) 0.2 mg/dL 0.2 - 1    Nitrites (UA POC) Negative Negative    Leukocyte esterase (UA POC) Trace Negative   AMB POC GLUCOSE BLOOD, BY GLUCOSE MONITORING DEVICE   Result Value Ref Range    Glucose  mg/dL       Assessment/Plan:    ICD-10-CM ICD-9-CM    1. Knee effusion, left M25.462 719.06 MA DRAIN/INJECT LARGE JOINT/BURSA     Orders Placed This Encounter    MA DRAIN/INJECT LARGE JOINT/BURSA     lose weight, increase physical activity, follow low fat diet, follow low salt diet, continue present plan,Take 81mg aspirin daily    Indications:   Symptomatic relief of large effusion/pain    Procedure:  After consent was obtained, using sterile technique the left knee joint was prepped using alcohol. Local anesthetic used: 1% lidocaine. . The joint was entered and 13 ml's of straw colored/clear fluid was withdrawn and discarded. Kenalog 40 mg was mixed with 1% lidocaine 3 ml  and injected into the joint and the needle withdrawn. The procedure was well tolerated. The patient is asked to continue to rest the joint for a few more days before resuming regular activities. It may be more painful for the first 1-2 days. Watch for fever, or increased swelling or persistent pain in the joint. Call or return to clinic prn if such symptoms occur or there is failure to improve as anticipated.     [facility]  OFFICE PROCEDURE PROGRESS NOTE        Chart reviewed for the following:   Luis Estrella MD, have reviewed the History, Physical and updated the Allergic reactions for Yudelka Sill     TIME OUT performed immediately prior to start of procedure:   Luis Estrella MD, have performed the following reviews on Yudelka Sill prior to the start of the procedure:            * Patient was identified by name and date of birth   * Agreement on procedure being performed was verified  * Risks and Benefits explained to the patient  * Procedure site verified and marked as necessary  * Patient was positioned for comfort       Time: 3:30pm      Date of procedure: 2018    Procedure performed by:  Kareen Cain MD    Patient assisted by: nursing attendant    How tolerated by patient: tolerated the procedure well with no complications    Comments: none            Patient Instructions   MyChart Activation    Thank you for requesting access to [employer]. Please follow the instructions below to securely access and download your online medical record. [employer] allows you to send messages to your doctor, view your test results, renew your prescriptions, schedule appointments, and more. How Do I Sign Up? 1. In your internet browser, go to [email or web address]  2. Click on the First Time User? Click Here link in the Sign In box. You will be redirect to the New Member Sign Up page. 3. Enter your [employer] Access Code exactly as it appears below. You will not need to use this code after youve completed the sign-up process. If you do not sign up before the expiration date, you must request a new code. [employer] Access Code: Activation code not generated  Current [employer] Status: Patient Declined (This is the date your [employer] access code will )    4. Enter the last four digits of your Social Security Number (xxxx) and Date of Birth (mm/dd/yyyy) as indicated and click Submit. You will be taken to the next sign-up page. 5. Create a [employer] ID. This will be your [employer] login ID and cannot be changed, so think of one that is secure and easy to remember. 6. Create a [employer] password. You can change your password at any time. 7. Enter your Password Reset Question and Answer. This can be used at a later time if you forget your password.    8. Enter your e-mail address. You will receive e-mail notification when new information is available in [address] 9. Click Sign Up. You can now view and download portions of your medical record. 10. Click the Download Summary menu link to download a portable copy of your medical information. Additional Information    If you have questions, please visit the Frequently Asked Questions section of the [employer] website at [email or web address] Remember, [employer] is NOT to be used for urgent needs. For medical emergencies, dial 911. Follow-up Disposition:  Return in about 4 weeks (around 1/28/2019), or if symptoms worsen or fail to improve. I have reviewed with the patient details of the assessment and plan and all questions were answered. Relevent patient education was performed. The most recent lab findings were reviewed with the patient. An After Visit Summary was printed and given to the patient.

## 2019-01-09 ENCOUNTER — OFFICE VISIT (OUTPATIENT)
Dept: INTERNAL MEDICINE CLINIC | Age: 79
End: 2019-01-09

## 2019-01-09 VITALS
BODY MASS INDEX: 31.28 KG/M2 | HEIGHT: 62 IN | SYSTOLIC BLOOD PRESSURE: 110 MMHG | DIASTOLIC BLOOD PRESSURE: 70 MMHG | OXYGEN SATURATION: 98 % | WEIGHT: 170 LBS | RESPIRATION RATE: 19 BRPM | HEART RATE: 70 BPM

## 2019-01-09 DIAGNOSIS — L85.3 XEROSIS OF SKIN: ICD-10-CM

## 2019-01-09 DIAGNOSIS — I25.10 CORONARY ARTERY DISEASE INVOLVING NATIVE CORONARY ARTERY OF NATIVE HEART WITHOUT ANGINA PECTORIS: ICD-10-CM

## 2019-01-09 DIAGNOSIS — E78.00 HYPERCHOLESTEREMIA: Primary | ICD-10-CM

## 2019-01-09 DIAGNOSIS — L03.116 CELLULITIS OF LEFT LOWER EXTREMITY: ICD-10-CM

## 2019-01-09 DIAGNOSIS — I10 ESSENTIAL HYPERTENSION: ICD-10-CM

## 2019-01-09 DIAGNOSIS — M17.0 PRIMARY OSTEOARTHRITIS OF BOTH KNEES: ICD-10-CM

## 2019-01-09 LAB
CHOLEST SERPL-MCNC: 156 MG/DL
HDLC SERPL-MCNC: 69 MG/DL
LDL CHOLESTEROL POC: 46 MG/DL
NON-HDL GOAL (POC): 86
TCHOL/HDL RATIO (POC): 2.2
TRIGL SERPL-MCNC: 203 MG/DL

## 2019-01-09 NOTE — PATIENT INSTRUCTIONS
[email or web address] Activation    Thank you for requesting access to [employer]. Please follow the instructions below to securely access and download your online medical record. [employer] allows you to send messages to your doctor, view your test results, renew your prescriptions, schedule appointments, and more. How Do I Sign Up? 1. In your internet browser, go to [email or web address]  2. Click on the First Time User? Click Here link in the Sign In box. You will be redirect to the New Member Sign Up page. 3. Enter your [employer] Access Code exactly as it appears below. You will not need to use this code after youve completed the sign-up process. If you do not sign up before the expiration date, you must request a new code. [employer] Access Code: Activation code not generated  Current [employer] Status: Patient Declined (This is the date your [employer] access code will )    4. Enter the last four digits of your Social Security Number (xxxx) and Date of Birth (mm/dd/yyyy) as indicated and click Submit. You will be taken to the next sign-up page. 5. Create a [employer] ID. This will be your [employer] login ID and cannot be changed, so think of one that is secure and easy to remember. 6. Create a [employer] password. You can change your password at any time. 7. Enter your Password Reset Question and Answer. This can be used at a later time if you forget your password. 8. Enter your e-mail address. You will receive e-mail notification when new information is available in [address] 9. Click Sign Up. You can now view and download portions of your medical record. 10. Click the Download Summary menu link to download a portable copy of your medical information. Additional Information    If you have questions, please visit the Frequently Asked Questions section of the [employer] website at [email or web address] Remember, [employer] is NOT to be used for urgent needs. For medical emergencies, dial 911.

## 2019-01-09 NOTE — PROGRESS NOTES
Gareth Jaimes is a [de-identified] y.o. female and presents with Hypertension; Coronary Artery Disease; and Cholesterol Problem  . Subjective:  Knee pain Review:  Patient complaints of left knee pains have resolved with the injection. Hypertension Review:  The patient has hypertension . Diet and Lifestyle: generally follows a low sodium diet, exercises sporadically  Home BP Monitoring: is not measured at home. Pertinent ROS: taking medications as instructed, no medication side effects noted, no TIA's, no chest pain on exertion, no dyspnea on exertion, no swelling of ankles. Dyslipidemia Review:  Patient presents for evaluation of lipids. Compliance with treatment thus far has been excellent. A repeat fasting lipid profile was done. The patient does not use medications that may worsen dyslipidemias . The patient exercises sporadically. She has dry skin on both legs with an occasion drainage noted    Review of Systems  Constitutional: negative for fevers, chills, anorexia and weight loss  Eyes:   negative for visual disturbance and irritation  ENT:   negative for tinnitus,sore throat,nasal congestion,ear pains. hoarseness  Respiratory:  negative for cough, hemoptysis, dyspnea,wheezing  CV:   negative for chest pain, palpitations, lower extremity edema  GI:   negative for nausea, vomiting, diarrhea, abdominal pain,melena  Endo:               negative for polyuria,polydipsia,polyphagia,heat intolerance  Genitourinary: negative for frequency, dysuria and hematuria  Integument:  negative for rash and pruritus  Hematologic:  negative for easy bruising and gum/nose bleeding  Musculoskel: myalgias, arthralgias,, joint pain  Neurological:  negative for headaches, dizziness, vertigo, memory problems and gait   Behavl/Psych: negative for feelings of anxiety, depression, mood changes    Past Medical History:   Diagnosis Date    Acute pharyngitis 2/8/2011    Allergic rhinitis, cause unspecified 2/8/2011    Arrhythmia PVC    Asymptomatic varicose veins 2/8/2011    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     Carpal tunnel syndrome 2/8/2011    Degenerative Joint Disese ( improved/resolving) 2/8/2011    Degenetrative Dis Disease, 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    Diverticulosis of colon (without mention of hemorrhage) 2/8/2011    Dysphagia 2/8/2011    Edema, peripheral 2/8/2011    GERD (gastroesophageal reflux disease)     GERD Gastro- Esophageal Reflux Disease 2/8/2011    Herniated nucleus pulposus ( slipped disc) 2/8/2011    Menopausal 2/8/2011    PUD (peptic ulcer disease) 2012    Pure hypercholesterolemia 2/8/2011    Recurrent ear pain 2/8/2011    S/P radiation therapy     Scalp lesion 10/23/2014    Sebaceous cyst 4/22/2014    Unspecified cataract 2/8/2011    Unspecified essential hypertension 2/8/2011    Unspecified sleep apnea      Past Surgical History:   Procedure Laterality Date    HX HYSTERECTOMY      HX ORTHOPAEDIC  neck/back 2006    HX OTHER SURGICAL  5/8/2014     Excise recurrent scalp lesion and application of ACell    HX OTHER SURGICAL  5/8/2014    Excise keloid, anterior chest wall, and application of ACell    HX OTHER SURGICAL  5/8/2014     Excise sebaceous cyst, anterior chest wall    HX OTHER SURGICAL  5/8/2014    Punch biopsy, skin lesions, right forearm x2, right calf x1    ND INS NEW/RPLCMT PRM PM W/TRANSV ELTRD ATRIAL&VENT  9/30/2017         ND RMVL IMPLANTABLE PT-ACTIVATED CAR EVENT RECORDER  9/30/2017          Social History     Socioeconomic History    Marital status:      Spouse name: Not on file    Number of children: Not on file    Years of education: Not on file    Highest education level: Not on file   Tobacco Use    Smoking status: Never Smoker    Smokeless tobacco: Never Used   Substance and Sexual Activity    Alcohol use: No    Drug use: No    Sexual activity: Yes     Partners: Male     Birth control/protection: None     Family History   Problem Relation Age of Onset  Hypertension Mother     Stroke Mother     Hypertension Father     Cancer Father         PROSTATE, MELANOMA    Anesth Problems Neg Hx      Current Outpatient Medications   Medication Sig Dispense Refill    meclizine (ANTIVERT) 25 mg tablet TAKE 1 TABLET THREE TIMES DAILY AS NEEDED 270 Tab 3    cloNIDine HCl (CATAPRES) 0.2 mg tablet TAKE 1 TABLET TWICE DAILY 180 Tab 1    metoprolol succinate (TOPROL-XL) 25 mg XL tablet TAKE 1 TABLET EVERY DAY 90 Tab 3    fluticasone (FLONASE) 50 mcg/actuation nasal spray USE 2 SPRAYS IN EACH NOSTRIL EVERY DAY 48 g 12    omeprazole (PRILOSEC) 40 mg capsule TAKE 1 CAPSULE EVERY DAY 90 Cap 3    potassium chloride SR (KLOR-CON 10) 10 mEq tablet TAKE 1 TABLET EVERY DAY 90 Tab 3    raNITIdine (ZANTAC) 150 mg tablet TAKE 1 TABLET TWICE DAILY 180 Tab 3    atorvastatin (LIPITOR) 40 mg tablet TAKE 1 TABLET EVERY DAY 90 Tab 3    furosemide (LASIX) 20 mg tablet TAKE 1 TABLET BY MOUTH DAILY 90 Tab 1    gabapentin (NEURONTIN) 300 mg capsule Take 1 Cap by mouth three (3) times daily. 270 Cap 3    amLODIPine (NORVASC) 10 mg tablet Take 0.5 Tabs by mouth daily. 30 Tab 11    metoprolol succinate (TOPROL-XL) 100 mg tablet Take 1 Tab by mouth daily. 30 Tab 5    potassium chloride (KLOR-CON) 10 mEq tablet Take 1 Tab by mouth daily. 90 Tab 3    acetaminophen (TYLENOL) 325 mg tablet Take 325 mg by mouth every four (4) hours as needed for Pain.  MULTIVIT WITH CALCIUM,IRON,MIN [facility] MULTIPLE VITAMINS PO) Take  by mouth daily.  aspirin delayed-release 81 mg tablet Take  by mouth daily.        Allergies   Allergen Reactions    Latex Rash    Bactrim [Sulfamethoprim Ds] Hives    Aspirin Other (comments)     Anything higher than 81mg makes pt jittery    Codeine Hives and Itching    Iodinated Contrast- Oral And Iv Dye Itching    Pcn [Penicillins] Hives and Itching    Tape [Adhesive] Rash       Objective:  Visit Vitals  /70 (BP 1 Location: Right arm, BP Patient Position: Sitting)   Pulse 70   Resp 19   Ht 5' 2\" (1.575 m)   Wt 170 lb (77.1 kg)   SpO2 98%   BMI 31.09 kg/m²     Physical Exam:   General appearance - alert, well appearing, and in no distress  Mental status - alert, oriented to person, place, and time  EYE-DARLENE, EOMI, corneas normal, no foreign bodies  ENT-ENT exam normal, no neck nodes or sinus tenderness  Nose - normal and patent, no erythema, discharge or polyps  Mouth - mucous membranes moist, pharynx normal without lesions  Neck - supple, no significant adenopathy   Chest - clear to auscultation, no wheezes, rales or rhonchi, symmetric air entry   Heart - normal rate, regular rhythm, normal S1, S2, no murmurs, rubs, clicks or gallops   Abdomen - soft, nontender, nondistended, no masses or organomegaly  Lymph- no adenopathy palpable  Ext-peripheral pulses normal, no pedal edema, no clubbing or cyanosis  Skin-Warm and dry.  no hyperpigmentation, vitiligo, or suspicious lesions  Neuro -alert, oriented, normal speech, no focal findings or movement disorder noted  Neck-normal C-spine, no tenderness, full ROM without pain  Feet-no nail deformities or callus formation with good pulses noted  Lt.knee-effusion noted    Results for orders placed or performed in visit on 11/29/18   AMB POC URINE, MICROALBUMIN, SEMIQUANT (3 RESULTS)   Result Value Ref Range    ALBUMIN, URINE POC 80 Negative mg/L    CREATININE, URINE  mg/dL    Microalbumin/creat ratio (POC)  <30 MG/G   AMB POC URINALYSIS DIP STICK AUTO W/O MICRO   Result Value Ref Range    Color (UA POC) Yellow     Clarity (UA POC) Clear     Glucose (UA POC) Negative Negative    Bilirubin (UA POC) Negative Negative    Ketones (UA POC) Negative Negative    Specific gravity (UA POC) 1.020 1.001 - 1.035    Blood (UA POC) Trace Negative    pH (UA POC) 7.0 4.6 - 8.0    Protein (UA POC) Negative Negative    Urobilinogen (UA POC) 0.2 mg/dL 0.2 - 1    Nitrites (UA POC) Negative Negative    Leukocyte esterase (UA POC) Trace Negative   AMB POC GLUCOSE BLOOD, BY GLUCOSE MONITORING DEVICE   Result Value Ref Range    Glucose  mg/dL       Assessment/Plan:    ICD-10-CM ICD-9-CM    1. Hypercholesteremia E78.00 272.0 AMB POC LIPID PROFILE   2. Coronary artery disease involving native coronary artery of native heart without angina pectoris I25.10 414.01    3. Cellulitis of left lower extremity L03.116 682.6    4. Essential hypertension I10 401.9    5. Xerosis of skin L85.3 706.8    6. Primary osteoarthritis of both knees M17.0 715.16      Orders Placed This Encounter    AMB POC LIPID PROFILE     lose weight, increase physical activity, follow low fat diet, follow low salt diet, continue present plan,Take 81mg aspirin daily                  Patient Instructions   [employer] Activation    Thank you for requesting access to Ac[employer]. Please follow the instructions below to securely access and download your online medical record. Acision allows you to send messages to your doctor, view your test results, renew your prescriptions, schedule appointments, and more. How Do I Sign Up? 1. In your internet browser, go to [email or web address]  2. Click on the First Time User? Click Here link in the Sign In box. You will be redirect to the New Member Sign Up page. 3. Enter your Acision Access Code exactly as it appears below. You will not need to use this code after youve completed the sign-up process. If you do not sign up before the expiration date, you must request a new code. Acision Access Code: Activation code not generated  Current Acision Status: Patient Declined (This is the date your Acision access code will )    4. Enter the last four digits of your Social Security Number (xxxx) and Date of Birth (mm/dd/yyyy) as indicated and click Submit. You will be taken to the next sign-up page. 5. Create a Acision ID.  This will be your Acision login ID and cannot be changed, so think of one that is secure and easy to remember. 6. Create a [employer] password. You can change your password at any time. 7. Enter your Password Reset Question and Answer. This can be used at a later time if you forget your password. 8. Enter your e-mail address. You will receive e-mail notification when new information is available in [address] 9. Click Sign Up. You can now view and download portions of your medical record. 10. Click the Download Summary menu link to download a portable copy of your medical information. Additional Information    If you have questions, please visit the Frequently Asked Questions section of the [employer] website at [email or web address]: [email or web address] Remember, [employer] is NOT to be used for urgent needs. For medical emergencies, dial 911. Follow-up Disposition:  Return in about 4 weeks (around 2/6/2019), or if symptoms worsen or fail to improve. I have reviewed with the patient details of the assessment and plan and all questions were answered. Relevent patient education was performed. The most recent lab findings were reviewed with the patient. An After Visit Summary was printed and given to the patient.

## 2019-01-09 NOTE — PROGRESS NOTES
1. Have you been to the ER, urgent care clinic since your last visit? Hospitalized since your last visit? NO    2. Have you seen or consulted any other health care providers outside of the [address] [place] [address] since your last visit? Include any pap smears or colon screening.  NO  PHQ over the last two weeks 1/9/2019   PHQ Not Done -   Little interest or pleasure in doing things Not at all   Feeling down, depressed, irritable, or hopeless Not at all   Total Score PHQ 2 0

## 2019-02-15 RX ORDER — TRIAMCINOLONE ACETONIDE 40 MG/ML
40 INJECTION, SUSPENSION INTRA-ARTICULAR; INTRAMUSCULAR ONCE
Qty: 1 ML | Refills: 0
Start: 2019-02-15 | End: 2019-02-15

## 2019-02-15 RX ORDER — LIDOCAINE HYDROCHLORIDE 20 MG/ML
1 INJECTION, SOLUTION EPIDURAL; INFILTRATION; INTRACAUDAL; PERINEURAL ONCE
Qty: 1 ML | Refills: 0
Start: 2019-02-15 | End: 2019-02-15

## 2019-03-06 ENCOUNTER — OFFICE VISIT (OUTPATIENT)
Dept: INTERNAL MEDICINE CLINIC | Age: 79
End: 2019-03-06

## 2019-03-06 VITALS
RESPIRATION RATE: 16 BRPM | HEART RATE: 71 BPM | DIASTOLIC BLOOD PRESSURE: 80 MMHG | TEMPERATURE: 97.9 F | OXYGEN SATURATION: 98 % | WEIGHT: 171 LBS | SYSTOLIC BLOOD PRESSURE: 110 MMHG | BODY MASS INDEX: 31.47 KG/M2 | HEIGHT: 62 IN

## 2019-03-06 DIAGNOSIS — I10 ESSENTIAL HYPERTENSION: ICD-10-CM

## 2019-03-06 DIAGNOSIS — M25.462 KNEE EFFUSION, LEFT: ICD-10-CM

## 2019-03-06 DIAGNOSIS — M25.562 KNEE MENISCUS PAIN, LEFT: Primary | ICD-10-CM

## 2019-03-06 DIAGNOSIS — I25.10 CORONARY ARTERY DISEASE INVOLVING NATIVE CORONARY ARTERY OF NATIVE HEART WITHOUT ANGINA PECTORIS: ICD-10-CM

## 2019-03-06 DIAGNOSIS — E78.00 HYPERCHOLESTEREMIA: ICD-10-CM

## 2019-03-06 NOTE — PATIENT INSTRUCTIONS
[facility] Activation    Thank you for requesting access to [employer]. Please follow the instructions below to securely access and download your online medical record. [employer]ium allows you to send messages to your doctor, view your test results, renew your prescriptions, schedule appointments, and more. How Do I Sign Up? 1. In your internet browser, go to [email or web address]  2. Click on the First Time User? Click Here link in the Sign In box. You will be redirect to the New Member Sign Up page. 3. Enter your Te[employer]ium Access Code exactly as it appears below. You will not need to use this code after youve completed the sign-up process. If you do not sign up before the expiration date, you must request a new code. Te[employer]ium Access Code: Activation code not generated  Current [employer]ium Status: Patient Declined (This is the date your [employer]ium access code will )    4. Enter the last four digits of your Social Security Number (xxxx) and Date of Birth (mm/dd/yyyy) as indicated and click Submit. You will be taken to the next sign-up page. 5. Create a Techtium ID. This will be your Techtium login ID and cannot be changed, so think of one that is secure and easy to remember. 6. Create a Te[employer]ium password. You can change your password at any time. 7. Enter your Password Reset Question and Answer. This can be used at a later time if you forget your password. 8. Enter your e-mail address. You will receive e-mail notification when new information is available in [address] 9. Click Sign Up. You can now view and download portions of your medical record. 10. Click the Download Summary menu link to download a portable copy of your medical information. Additional Information    If you have questions, please visit the Frequently Asked Questions section of the [employer]ium website at [email or web address] [email or web address] Remember, [employer]ium is NOT to be used for urgent needs. For medical emergencies, dial 911.

## 2019-03-06 NOTE — PROGRESS NOTES
Iwona King is a [de-identified] y.o. female and presents with Leg Pain (left)  . Subjective:  Knee pain Review:  Patient complains of left knee pain. Onset of the symptoms was months  ago. Inciting event: longstanding problem which has been getting worse. Current symptoms include pain location: both: medial and swelling. Noted and a clicking sensation,  Aggravating symptoms: going up and down stairs, walking, lateral movements, any weight bearing. Patient's overall course: gradually worsening Patient has had prior knee problems. Evaluation to date:notede. Treatment to date:NSAIDS/steroid injection            Hypertension Review:  The patient has hypertension . Diet and Lifestyle: generally follows a low sodium diet, exercises sporadically  Home BP Monitoring: is not measured at home. Pertinent ROS: taking medications as instructed, no medication side effects noted, no TIA's, no chest pain on exertion, no dyspnea on exertion, no swelling of ankles. Dyslipidemia Review:  Patient presents for evaluation of lipids. Compliance with treatment thus far has been excellent. A repeat fasting lipid profile was done. The patient does not use medications that may worsen dyslipidemias . The patient exercises sporadically. Review of Systems  Constitutional: negative for fevers, chills, anorexia and weight loss  Eyes:   negative for visual disturbance and irritation  ENT:   negative for tinnitus,sore throat,nasal congestion,ear pains. hoarseness  Respiratory:  negative for cough, hemoptysis, dyspnea,wheezing  CV:   negative for chest pain, palpitations, lower extremity edema  GI:   negative for nausea, vomiting, diarrhea, abdominal pain,melena  Endo:               negative for polyuria,polydipsia,polyphagia,heat intolerance  Genitourinary: negative for frequency, dysuria and hematuria  Integument:  negative for rash and pruritus  Hematologic:  negative for easy bruising and gum/nose bleeding  Musculoskel: myalgias, arthralgias,, joint pain  Neurological:  negative for headaches, dizziness, vertigo, memory problems and gait   Behavl/Psych: negative for feelings of anxiety, depression, mood changes    Past Medical History:   Diagnosis Date    Acute pharyngitis 2/8/2011    Allergic rhinitis, cause unspecified 2/8/2011    Arrhythmia     PVC    Asymptomatic varicose veins 2/8/2011    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    Diverticulosis of colon (without mention of hemorrhage) 2/8/2011    Dysphagia 2/8/2011    Edema, peripheral 2/8/2011    GERD (gastroesophageal reflux disease)     GERD Gastro- Esophageal Reflux Disease 2/8/2011    Herniated nucleus pulposus ( slipped disc) 2/8/2011    Menopausal 2/8/2011    PUD (peptic ulcer disease) 2012    Pure hypercholesterolemia 2/8/2011    Recurrent ear pain 2/8/2011    S/P radiation therapy     Scalp lesion 10/23/2014    Sebaceous cyst 4/22/2014    Unspecified cataract 2/8/2011    Unspecified essential hypertension 2/8/2011    Unspecified sleep apnea      Past Surgical History:   Procedure Laterality Date    HX HYSTERECTOMY      HX ORTHOPAEDIC  neck/back 2006    HX OTHER SURGICAL  5/8/2014     Excise recurrent scalp lesion and application of ACell    HX OTHER SURGICAL  5/8/2014    Excise keloid, anterior chest wall, and application of ACell    HX OTHER SURGICAL  5/8/2014     Excise sebaceous cyst, anterior chest wall    HX OTHER SURGICAL  5/8/2014    Punch biopsy, skin lesions, right forearm x2, right calf x1    IN INS NEW/RPLCMT PRM PM W/TRANSV ELTRD ATRIAL&VENT  9/30/2017         IN RMVL IMPLANTABLE PT-ACTIVATED CAR EVENT RECORDER  9/30/2017          Social History     Socioeconomic History    Marital status:      Spouse name: Not on file    Number of children: Not on file    Years of education: Not on file    Highest education level: Not on file   Tobacco Use    Smoking status: Never Smoker    Smokeless tobacco: Never Used   Substance and Sexual Activity    Alcohol use: No    Drug use: No    Sexual activity: Yes     Partners: Male     Birth control/protection: None     Family History   Problem Relation Age of Onset    Hypertension Mother     Stroke Mother     Hypertension Father     Cancer Father         PROSTATE, MELANOMA    Anesth Problems Neg Hx      Current Outpatient Medications   Medication Sig Dispense Refill    meclizine (ANTIVERT) 25 mg tablet TAKE 1 TABLET THREE TIMES DAILY AS NEEDED 270 Tab 3    cloNIDine HCl (CATAPRES) 0.2 mg tablet TAKE 1 TABLET TWICE DAILY 180 Tab 1    metoprolol succinate (TOPROL-XL) 25 mg XL tablet TAKE 1 TABLET EVERY DAY 90 Tab 3    fluticasone (FLONASE) 50 mcg/actuation nasal spray USE 2 SPRAYS IN EACH NOSTRIL EVERY DAY 48 g 12    omeprazole (PRILOSEC) 40 mg capsule TAKE 1 CAPSULE EVERY DAY 90 Cap 3    potassium chloride SR (KLOR-CON 10) 10 mEq tablet TAKE 1 TABLET EVERY DAY 90 Tab 3    raNITIdine (ZANTAC) 150 mg tablet TAKE 1 TABLET TWICE DAILY 180 Tab 3    atorvastatin (LIPITOR) 40 mg tablet TAKE 1 TABLET EVERY DAY 90 Tab 3    furosemide (LASIX) 20 mg tablet TAKE 1 TABLET BY MOUTH DAILY 90 Tab 1    gabapentin (NEURONTIN) 300 mg capsule Take 1 Cap by mouth three (3) times daily. 270 Cap 3    metoprolol succinate (TOPROL-XL) 100 mg tablet Take 1 Tab by mouth daily. 30 Tab 5    potassium chloride (KLOR-CON) 10 mEq tablet Take 1 Tab by mouth daily. 90 Tab 3    acetaminophen (TYLENOL) 325 mg tablet Take 325 mg by mouth every four (4) hours as needed for Pain.  MULTIVIT WITH CALCIUM,IRON,MIN [facility] MULTIPLE VITAMINS PO) Take  by mouth daily.  aspirin delayed-release 81 mg tablet Take  by mouth daily.        Allergies   Allergen Reactions    Latex Rash    Bactrim [Sulfamethoprim Ds] Hives    Aspirin Other (comments)     Anything higher than 81mg makes pt jittery    Codeine Hives and Itching    Iodinated Contrast- Oral And Iv Dye Itching    Pcn [Penicillins] Hives and Itching    Tape [Adhesive] Rash       Objective:  Visit Vitals  /80   Pulse 71   Temp 97.9 °F (36.6 °C) (Oral)   Resp 16   Ht 5' 2\" (1.575 m)   Wt 171 lb (77.6 kg)   SpO2 98%   BMI 31.28 kg/m²     Physical Exam:   General appearance - alert, well appearing, and in no distress  Mental status - alert, oriented to person, place, and time  EYE-DARLENE, EOMI, corneas normal, no foreign bodies  ENT-ENT exam normal, no neck nodes or sinus tenderness  Nose - normal and patent, no erythema, discharge or polyps  Mouth - mucous membranes moist, pharynx normal without lesions  Neck - supple, no significant adenopathy   Chest - clear to auscultation, no wheezes, rales or rhonchi, symmetric air entry   Heart - normal rate, regular rhythm, normal S1, S2, no murmurs, rubs, clicks or gallops   Abdomen - soft, nontender, nondistended, no masses or organomegaly  Lymph- no adenopathy palpable  Ext-peripheral pulses normal, no pedal edema, no clubbing or cyanosis  Skin-Warm and dry. no hyperpigmentation, vitiligo, or suspicious lesions  Neuro -alert, oriented, normal speech, no focal findings or movement disorder noted  Neck-normal C-spine, no tenderness, full ROM without pain  Feet-no nail deformities or callus formation with good pulses noted  Lt.knee-effusion noted. medial joint line tenderness noted        Results for orders placed or performed in visit on 01/09/19   AMB POC LIPID PROFILE   Result Value Ref Range    Cholesterol (POC) 156     Triglycerides (POC) 203     HDL Cholesterol (POC) 69     LDL Cholesterol (POC) 46 MG/DL    Non-HDL Goal (POC) 86     TChol/HDL Ratio (POC) 2.2        Assessment/Plan:    ICD-10-CM ICD-9-CM    1.  Knee meniscus pain, left M25.562 719.46 XR KNEE LT MAX 2 VWS     Orders Placed This Encounter    XR KNEE LT MAX 2 VWS     Standing Status:   Future     Standing Expiration Date:   9/5/2019     Order Specific Question:   Reason for Exam     Answer:   knee pains     Order Specific Question:   Is Patient Allergic to Contrast Dye? Answer:   No     lose weight, increase physical activity, follow low fat diet, follow low salt diet, continue present plan,Take 81mg aspirin daily                  There are no Patient Instructions on file for this visit. Follow-up Disposition: Not on File      I have reviewed with the patient details of the assessment and plan and all questions were answered. Relevent patient education was performed. The most recent lab findings were reviewed with the patient. An After Visit Summary was printed and given to the patient.

## 2019-03-06 NOTE — PROGRESS NOTES
1. Have you been to the ER, urgent care clinic since your last visit? Hospitalized since your last visit?no    2. Have you seen or consulted any other health care providers outside of the [address] [place] [address] since your last visit? Include any pap smears or colon screening.  No    3 most recent PHQ Screens 1/9/2019   PHQ Not Done -   Little interest or pleasure in doing things Not at all   Feeling down, depressed, irritable, or hopeless Not at all   Total Score PHQ 2 0     Chief Complaint   Patient presents with    Leg Pain

## 2019-03-09 ENCOUNTER — HOSPITAL ENCOUNTER (EMERGENCY)
Age: 79
Discharge: HOME | End: 2019-03-09
Attending: EMERGENCY MEDICINE
Payer: MEDICARE

## 2019-03-09 ENCOUNTER — APPOINTMENT (OUTPATIENT)
Dept: CT IMAGING | Age: 79
End: 2019-03-09
Attending: NURSE PRACTITIONER
Payer: MEDICARE

## 2019-03-09 ENCOUNTER — APPOINTMENT (OUTPATIENT)
Dept: GENERAL RADIOLOGY | Age: 79
End: 2019-03-09
Attending: NURSE PRACTITIONER
Payer: MEDICARE

## 2019-03-09 VITALS
OXYGEN SATURATION: 100 % | BODY MASS INDEX: 32.02 KG/M2 | HEIGHT: 62 IN | WEIGHT: 174 LBS | RESPIRATION RATE: 16 BRPM | TEMPERATURE: 97.8 F | HEART RATE: 64 BPM | DIASTOLIC BLOOD PRESSURE: 60 MMHG | SYSTOLIC BLOOD PRESSURE: 148 MMHG

## 2019-03-09 DIAGNOSIS — M19.012 OSTEOARTHRITIS OF LEFT SHOULDER, UNSPECIFIED OSTEOARTHRITIS TYPE: ICD-10-CM

## 2019-03-09 DIAGNOSIS — M17.12 OSTEOARTHRITIS OF LEFT KNEE, UNSPECIFIED OSTEOARTHRITIS TYPE: Primary | ICD-10-CM

## 2019-03-09 LAB
ALBUMIN SERPL-MCNC: 3 G/DL (ref 3.5–5)
ALBUMIN/GLOB SERPL: 0.9 {RATIO} (ref 1.1–2.2)
ALP SERPL-CCNC: 86 U/L (ref 45–117)
ALT SERPL-CCNC: 24 U/L (ref 12–78)
ANION GAP SERPL CALC-SCNC: 7 MMOL/L (ref 5–15)
AST SERPL-CCNC: 40 U/L (ref 15–37)
BASOPHILS # BLD: 0 K/UL (ref 0–0.1)
BASOPHILS NFR BLD: 1 % (ref 0–1)
BILIRUB SERPL-MCNC: 0.4 MG/DL (ref 0.2–1)
BUN SERPL-MCNC: 30 MG/DL (ref 6–20)
BUN/CREAT SERPL: 27 (ref 12–20)
CALCIUM SERPL-MCNC: 8.3 MG/DL (ref 8.5–10.1)
CHLORIDE SERPL-SCNC: 109 MMOL/L (ref 97–108)
CK MB CFR SERPL CALC: 0.8 % (ref 0–2.5)
CK MB SERPL-MCNC: 2.4 NG/ML (ref 5–25)
CK SERPL-CCNC: 288 U/L (ref 26–192)
CO2 SERPL-SCNC: 31 MMOL/L (ref 21–32)
CREAT SERPL-MCNC: 1.12 MG/DL (ref 0.55–1.02)
DIFFERENTIAL METHOD BLD: ABNORMAL
EOSINOPHIL # BLD: 0.2 K/UL (ref 0–0.4)
EOSINOPHIL NFR BLD: 4 % (ref 0–7)
ERYTHROCYTE [DISTWIDTH] IN BLOOD BY AUTOMATED COUNT: 18.4 % (ref 11.5–14.5)
GLOBULIN SER CALC-MCNC: 3.4 G/DL (ref 2–4)
GLUCOSE SERPL-MCNC: 74 MG/DL (ref 65–100)
HCT VFR BLD AUTO: 33.9 % (ref 35–47)
HGB BLD-MCNC: 10.7 G/DL (ref 11.5–16)
IMM GRANULOCYTES # BLD AUTO: 0 K/UL (ref 0–0.04)
IMM GRANULOCYTES NFR BLD AUTO: 0 % (ref 0–0.5)
LYMPHOCYTES # BLD: 1 K/UL (ref 0.8–3.5)
LYMPHOCYTES NFR BLD: 19 % (ref 12–49)
MCH RBC QN AUTO: 24.6 PG (ref 26–34)
MCHC RBC AUTO-ENTMCNC: 31.6 G/DL (ref 30–36.5)
MCV RBC AUTO: 77.9 FL (ref 80–99)
MONOCYTES # BLD: 0.5 K/UL (ref 0–1)
MONOCYTES NFR BLD: 9 % (ref 5–13)
NEUTS SEG # BLD: 3.5 K/UL (ref 1.8–8)
NEUTS SEG NFR BLD: 67 % (ref 32–75)
NRBC # BLD: 0 K/UL (ref 0–0.01)
NRBC BLD-RTO: 0 PER 100 WBC
PLATELET # BLD AUTO: 275 K/UL (ref 150–400)
PMV BLD AUTO: 11.1 FL (ref 8.9–12.9)
POTASSIUM SERPL-SCNC: 4.9 MMOL/L (ref 3.5–5.1)
PROT SERPL-MCNC: 6.4 G/DL (ref 6.4–8.2)
RBC # BLD AUTO: 4.35 M/UL (ref 3.8–5.2)
SODIUM SERPL-SCNC: 147 MMOL/L (ref 136–145)
TROPONIN I SERPL-MCNC: <0.05 NG/ML
WBC # BLD AUTO: 5.1 K/UL (ref 3.6–11)

## 2019-03-09 PROCEDURE — 70450 CT HEAD/BRAIN W/O DYE: CPT

## 2019-03-09 PROCEDURE — 93005 ELECTROCARDIOGRAM TRACING: CPT

## 2019-03-09 PROCEDURE — 84484 ASSAY OF TROPONIN QUANT: CPT

## 2019-03-09 PROCEDURE — 73030 X-RAY EXAM OF SHOULDER: CPT

## 2019-03-09 PROCEDURE — 71045 X-RAY EXAM CHEST 1 VIEW: CPT

## 2019-03-09 PROCEDURE — 36415 COLL VENOUS BLD VENIPUNCTURE: CPT

## 2019-03-09 PROCEDURE — 82550 ASSAY OF CK (CPK): CPT

## 2019-03-09 PROCEDURE — 73562 X-RAY EXAM OF KNEE 3: CPT

## 2019-03-09 PROCEDURE — 99284 EMERGENCY DEPT VISIT MOD MDM: CPT

## 2019-03-09 PROCEDURE — 80053 COMPREHEN METABOLIC PANEL: CPT

## 2019-03-09 PROCEDURE — 85025 COMPLETE CBC W/AUTO DIFF WBC: CPT

## 2019-03-09 NOTE — ED PROVIDER NOTES
EMERGENCY DEPARTMENT HISTORY AND PHYSICAL EXAM    Date: 3/9/2019  Patient Name: Marc Gibbons    History of Presenting Illness     Chief Complaint   Patient presents with    Ankle Pain     swelling, chronic since pacemaker placed    Arm Pain     woke up left side, arm, painful, states decreased arm movement this morning from pain         History Provided By: Patient    Chief Complaint: shoulder pain  Duration: onset this am   Timing:  Acute  Location: left shoulder  Quality: Aching  Severity: 7 out of 10  Modifying Factors: moving shoulder worsens pain  Associated Symptoms: left upper chest pain      HPI: Marc Gibbons is a [de-identified] y.o. female with a PMH of pacemaker GERD  who presents with left shoulder pain onset this am. Reports pain is \"different \" points to upper left chest. . Patient also reports Left knee pain . Patient specifically denies fever fatigue shortness of breath abdominal pain nausea vomiitng diarrhea dysuria numbness headache    PCP: Barbie Werner MD    Current Outpatient Medications   Medication Sig Dispense Refill    cloNIDine HCl (CATAPRES) 0.2 mg tablet TAKE 1 TABLET TWICE DAILY 180 Tab 1    metoprolol succinate (TOPROL-XL) 25 mg XL tablet TAKE 1 TABLET EVERY DAY 90 Tab 3    fluticasone (FLONASE) 50 mcg/actuation nasal spray USE 2 SPRAYS IN EACH NOSTRIL EVERY DAY 48 g 12    omeprazole (PRILOSEC) 40 mg capsule TAKE 1 CAPSULE EVERY DAY 90 Cap 3    potassium chloride SR (KLOR-CON 10) 10 mEq tablet TAKE 1 TABLET EVERY DAY 90 Tab 3    raNITIdine (ZANTAC) 150 mg tablet TAKE 1 TABLET TWICE DAILY 180 Tab 3    atorvastatin (LIPITOR) 40 mg tablet TAKE 1 TABLET EVERY DAY 90 Tab 3    furosemide (LASIX) 20 mg tablet TAKE 1 TABLET BY MOUTH DAILY 90 Tab 1    gabapentin (NEURONTIN) 300 mg capsule Take 1 Cap by mouth three (3) times daily. 270 Cap 3    metoprolol succinate (TOPROL-XL) 100 mg tablet Take 1 Tab by mouth daily.  30 Tab 5    potassium chloride (KLOR-CON) 10 mEq tablet Take 1 Tab by mouth daily. 90 Tab 3    acetaminophen (TYLENOL) 325 mg tablet Take 325 mg by mouth every four (4) hours as needed for Pain.  MULTIVIT WITH CALCIUM,IRON,MIN [facility] MULTIPLE VITAMINS PO) Take  by mouth daily.  aspirin delayed-release 81 mg tablet Take  by mouth daily.      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    Diverticulosis of colon (without mention of hemorrhage) 2/8/2011    Dysphagia 2/8/2011    Edema, peripheral 2/8/2011    GERD (gastroesophageal reflux disease)     GERD Gastro- Esophageal Reflux Disease 2/8/2011    Herniated nucleus pulposus ( slipped disc) 2/8/2011    Menopausal 2/8/2011    PUD (peptic ulcer disease) 2012    Pure hypercholesterolemia 2/8/2011    Recurrent ear pain 2/8/2011    S/P radiation therapy     Scalp lesion 10/23/2014    Sebaceous cyst 4/22/2014    Unspecified cataract 2/8/2011    Unspecified essential hypertension 2/8/2011    Unspecified sleep apnea        Past Surgical History:  Past Surgical History:   Procedure Laterality Date    HX HYSTERECTOMY      HX ORTHOPAEDIC  neck/back 2006    HX OTHER SURGICAL  5/8/2014     Excise recurrent scalp lesion and application of ACell    HX OTHER SURGICAL  5/8/2014    Excise keloid, anterior chest wall, and application of ACell    HX OTHER SURGICAL  5/8/2014     Excise sebaceous cyst, anterior chest wall    HX OTHER SURGICAL  5/8/2014    Punch biopsy, skin lesions, right forearm x2, right calf x1    WA INS NEW/RPLCMT PRM PM W/TRANSV ELTRD ATRIAL&VENT 9/30/2017         MD RMVL IMPLANTABLE PT-ACTIVATED CAR EVENT RECORDER  9/30/2017            Family History:  Family History   Problem Relation Age of Onset    Hypertension Mother     Stroke Mother     Hypertension Father     Cancer Father         PROSTATE, MELANOMA    Anesth Problems Neg Hx        Social History:  Social History     Tobacco Use    Smoking status: Never Smoker    Smokeless tobacco: Never Used   Substance Use Topics    Alcohol use: No    Drug use: No       Allergies: Allergies   Allergen Reactions    Latex Rash    Bactrim [Sulfamethoprim Ds] Hives    Aspirin Other (comments)     Anything higher than 81mg makes pt jittery    Codeine Hives and Itching    Iodinated Contrast- Oral And Iv Dye Itching    Pcn [Penicillins] Hives and Itching    Tape [Adhesive] Rash         Review of Systems   Review of Systems   Constitutional: Negative for fatigue and fever. Respiratory: Negative for shortness of breath and wheezing. Cardiovascular: Negative for chest pain and palpitations. Gastrointestinal: Negative for abdominal pain. Musculoskeletal: Negative for arthralgias (left shoulder left knee), myalgias, neck pain and neck stiffness. Skin: Negative for pallor and rash. Neurological: Negative for dizziness, tremors, weakness and headaches. Hematological: Negative for adenopathy. All other systems reviewed and are negative. Physical Exam     Vitals:    03/09/19 1404 03/09/19 1657   BP: 158/57 148/60   Pulse: 62 64   Resp: 16    Temp: 97.8 °F (36.6 °C)    SpO2: 100% 100%   Weight: 78.9 kg (174 lb)    Height: 5' 2\" (1.575 m)      Physical Exam   Constitutional: She is oriented to person, place, and time. She appears well-developed and well-nourished. No distress. HENT:   Head: Normocephalic and atraumatic.    Right Ear: External ear normal.   Left Ear: External ear normal.   Nose: Nose normal.   Mouth/Throat: Oropharynx is clear and moist.   Eyes: Conjunctivae are normal. Neck: Normal range of motion. Neck supple. Cardiovascular: Normal rate and regular rhythm. Pulmonary/Chest: Effort normal and breath sounds normal. No respiratory distress. She has no wheezes. Abdominal: Soft. Bowel sounds are normal. There is no tenderness. Musculoskeletal: Normal range of motion. She exhibits tenderness. Swelling left patella +TTP pain in flexion small effusion DNV intact  Left shoulder pain on extension mild swelling DNV intact   Lymphadenopathy:     She has no cervical adenopathy. Neurological: She is alert and oriented to person, place, and time. No cranial nerve deficit. Coordination normal.   Skin: Skin is warm and dry. No rash noted. Psychiatric: She has a normal mood and affect. Her behavior is normal. Judgment and thought content normal.   Nursing note and vitals reviewed.         Diagnostic Study Results     Labs -     Recent Results (from the past 12 hour(s))   EKG, 12 LEAD, INITIAL    Collection Time: 03/09/19  2:34 PM   Result Value Ref Range    Ventricular Rate 65 BPM    Atrial Rate 65 BPM    P-R Interval 390 ms    QRS Duration 80 ms    Q-T Interval 436 ms    QTC Calculation (Bezet) 453 ms    Calculated P Axis 38 degrees    Calculated R Axis 2 degrees    Calculated T Axis -45 degrees    Diagnosis       Sinus rhythm with 1st degree AV block  Moderate voltage criteria for LVH, may be normal variant  T wave abnormality, consider inferior ischemia  Abnormal ECG  When compared with ECG of 18-NOV-2014 17:10,  QT has lengthened     CBC WITH AUTOMATED DIFF    Collection Time: 03/09/19  2:43 PM   Result Value Ref Range    WBC 5.1 3.6 - 11.0 K/uL    RBC 4.35 3.80 - 5.20 M/uL    HGB 10.7 (L) 11.5 - 16.0 g/dL    HCT 33.9 (L) 35.0 - 47.0 %    MCV 77.9 (L) 80.0 - 99.0 FL    MCH 24.6 (L) 26.0 - 34.0 PG    MCHC 31.6 30.0 - 36.5 g/dL    RDW 18.4 (H) 11.5 - 14.5 %    PLATELET 073 949 - 351 K/uL    MPV 11.1 8.9 - 12.9 FL    NRBC 0.0 0  WBC    ABSOLUTE NRBC 0.00 0.00 - 0.01 K/uL NEUTROPHILS 67 32 - 75 %    LYMPHOCYTES 19 12 - 49 %    MONOCYTES 9 5 - 13 %    EOSINOPHILS 4 0 - 7 %    BASOPHILS 1 0 - 1 %    IMMATURE GRANULOCYTES 0 0.0 - 0.5 %    ABS. NEUTROPHILS 3.5 1.8 - 8.0 K/UL    ABS. LYMPHOCYTES 1.0 0.8 - 3.5 K/UL    ABS. MONOCYTES 0.5 0.0 - 1.0 K/UL    ABS. EOSINOPHILS 0.2 0.0 - 0.4 K/UL    ABS. BASOPHILS 0.0 0.0 - 0.1 K/UL    ABS. IMM. GRANS. 0.0 0.00 - 0.04 K/UL    DF AUTOMATED     METABOLIC PANEL, COMPREHENSIVE    Collection Time: 03/09/19  2:43 PM   Result Value Ref Range    Sodium 147 (H) 136 - 145 mmol/L    Potassium 4.9 3.5 - 5.1 mmol/L    Chloride 109 (H) 97 - 108 mmol/L    CO2 31 21 - 32 mmol/L    Anion gap 7 5 - 15 mmol/L    Glucose 74 65 - 100 mg/dL    BUN 30 (H) 6 - 20 MG/DL    Creatinine 1.12 (H) 0.55 - 1.02 MG/DL    BUN/Creatinine ratio 27 (H) 12 - 20      GFR est AA 57 (L) >60 ml/min/1.73m2    GFR est non-AA 47 (L) >60 ml/min/1.73m2    Calcium 8.3 (L) 8.5 - 10.1 MG/DL    Bilirubin, total 0.4 0.2 - 1.0 MG/DL    ALT (SGPT) 24 12 - 78 U/L    AST (SGOT) 40 (H) 15 - 37 U/L    Alk. phosphatase 86 45 - 117 U/L    Protein, total 6.4 6.4 - 8.2 g/dL    Albumin 3.0 (L) 3.5 - 5.0 g/dL    Globulin 3.4 2.0 - 4.0 g/dL    A-G Ratio 0.9 (L) 1.1 - 2.2     TROPONIN I    Collection Time: 03/09/19  2:43 PM   Result Value Ref Range    Troponin-I, Qt. <0.05 <0.05 ng/mL   CK W/ CKMB & INDEX    Collection Time: 03/09/19  2:43 PM   Result Value Ref Range     (H) 26 - 192 U/L    CK - MB 2.4 <3.6 NG/ML    CK-MB Index 0.8 0.0 - 2.5         Radiologic Studies -   XR KNEE LT 3 V   Final Result   IMPRESSION:      Mild to moderate osteoarthritis. Probable small joint effusion. Soft tissue   swelling anterior to the patellar tendon. No acute fracture                XR CHEST PORT   Final Result   IMPRESSION:      No acute pulmonary process.           XR SHOULDER LT AP/LAT MIN 2 V   Final Result   IMPRESSION:      Moderate osteoarthritis in the left AC and glenohumeral joints                 CT HEAD WO CONT   Final Result   IMPRESSION:      No evidence for acute intracranial abnormality                  CT Results  (Last 48 hours)               03/09/19 1459  CT HEAD WO CONT Final result    Impression:  IMPRESSION:       No evidence for acute intracranial abnormality                   Narrative:  INDICATION:  Sensation loss        EXAM:  HEAD CT WITHOUT CONTRAST       COMPARISON:  None       TECHNIQUE:  Routine noncontrast axial head CT was performed. Coronal and   sagittal multiplanar reconstructions were obtained. CT dose reduction was   achieved through use of a standardized protocol tailored for this examination   and automatic exposure control for dose modulation. FINDINGS:       Mild generalized cerebral atrophy with associated prominence of the ventricles       No evidence for acute intracranial hemorrhage, midline shift, or mass effect. Mild bilateral subcortical and periventricular areas of patchy low attenuation   is nonspecific but likely related to changes of chronic small vessel ischemic   disease. The basal cisterns are patent. The visualized paranasal sinuses and mastoid air cells are clear. No calvarial abnormality. CXR Results  (Last 48 hours)               03/09/19 1513  XR CHEST PORT Final result    Impression:  IMPRESSION:       No acute pulmonary process. Narrative:  history: Acute chest pain       COMPARISON: 9/29/2017       FINDINGS:       Frontal chest radiograph submitted for review. Left chest pacing device is again seen with unchanged moderate cardiomegaly. No   acute pulmonary process. No pleural effusion or pneumothorax                   Medical Decision Making   I am the first provider for this patient. I reviewed the vital signs, available nursing notes, past medical history, past surgical history, family history and social history. Vital Signs-Reviewed the patient's vital signs.     Records Reviewed: Nursing Notes and Old Medical Records old EKG            Disposition:  home    DISCHARGE NOTE:         Care plan outlined and precautions discussed. Patient has no new complaints, changes, or physical findings. Results of tests were reviewed with the patient. All medications were reviewed with the patient; will d/c home with tylenol for pain. All of pt's questions and concerns were addressed. Patient was instructed and agrees to follow up with PCP and cardiologist, as well as to return to the ED upon further deterioration. Patient is ready to go home. Follow-up Information     Follow up With Specialties Details Why Contact Info    Stephanie Yeung MD Internal Medicine In 2 days  [facility]  [phone]      Faisal Balderrama MD Cardiology In 2 days  [facility] [address]  [address] [address]  [place] [address] [phone]            Discharge Medication List as of 3/9/2019  4:58 PM          Provider Notes (Medical Decision Making):   DDX ACS arthritis of shoulder knee  Procedures:  Procedures        Diagnosis     Clinical Impression:   1. Osteoarthritis of left knee, unspecified osteoarthritis type    2.  Osteoarthritis of left shoulder, unspecified osteoarthritis type

## 2019-03-09 NOTE — ED NOTES
Pt c/o lt shoulder and lt leg,ankle,knee pain since yesterday,denies recent injury,fall,n/v/d,fever,chills. Emergency Department Nursing Plan of Care       The Nursing Plan of Care is developed from the Nursing assessment and Emergency Department Attending provider initial evaluation. The plan of care may be reviewed in the ED Provider note.     The Plan of Care was developed with the following considerations:   Patient / Family readiness to learn indicated by:verbalized understanding  Persons(s) to be included in education: patient and family  Barriers to Learning/Limitations:No    Signed     Kamari Mosquera RN    3/9/2019   2:16 PM

## 2019-03-09 NOTE — DISCHARGE INSTRUCTIONS
Patient Education        Arthritis: Care Instructions  Your Care Instructions  Arthritis, also called osteoarthritis, is a breakdown of the cartilage that cushions your joints. When the cartilage wears down, your bones rub against each other. This causes pain and stiffness. Many people have some arthritis as they age. Arthritis most often affects the joints of the spine, hands, hips, knees, or feet. You can take simple measures to protect your joints, ease your pain, and help you stay active. Follow-up care is a key part of your treatment and safety. Be sure to make and go to all appointments, and call your doctor if you are having problems. It's also a good idea to know your test results and keep a list of the medicines you take. How can you care for yourself at home? · Stay at a healthy weight. Being overweight puts extra strain on your joints. · Talk to your doctor or physical therapist about exercises that will help ease joint pain. ? Stretch. You may enjoy gentle forms of yoga to help keep your joints and muscles flexible. ? Walk instead of jog. Other types of exercise that are less stressful on the joints include riding a bicycle, swimming, ashley chi, or water exercise. ? Lift weights. Strong muscles help reduce stress on your joints. Stronger thigh muscles, for example, take some of the stress off of the knees and hips. Learn the right way to lift weights so you do not make joint pain worse. · Take your medicines exactly as prescribed. Call your doctor if you think you are having a problem with your medicine. · Take pain medicines exactly as directed. ? If the doctor gave you a prescription medicine for pain, take it as prescribed. ? If you are not taking a prescription pain medicine, ask your doctor if you can take an over-the-counter medicine. · Use a cane, crutch, walker, or another device if you need help to get around. These can help rest your joints.  You also can use other things to make life easier, such as a higher toilet seat and padded handles on kitchen utensils. · Do not sit in low chairs, which can make it hard to get up. · Put heat or cold on your sore joints as needed. Use whichever helps you most. You also can take turns with hot and cold packs. ? Apply heat 2 or 3 times a day for 20 to 30 minutes--using a heating pad, hot shower, or hot pack--to relieve pain and stiffness. ? Put ice or a cold pack on your sore joint for 10 to 20 minutes at a time. Put a thin cloth between the ice and your skin. When should you call for help? Call your doctor now or seek immediate medical care if:    · You have sudden swelling, warmth, or pain in any joint.     · You have joint pain and a fever or rash.     · You have such bad pain that you cannot use a joint.    Watch closely for changes in your health, and be sure to contact your doctor if:    · You have mild joint symptoms that continue even with more than 6 weeks of care at home.     · You have stomach pain or other problems with your medicine. Where can you learn more? Go to http://benjamín-priscilla.info/. Enter J563 in the search box to learn more about \"Arthritis: Care Instructions. \"  Current as of: Karen 10, 2018  Content Version: 11.9  © 3576-0015 [employer]. Care instructions adapted under license by [employer] (which disclaims liability or warranty for this information). If you have questions about a medical condition or this instruction, always ask your healthcare professional. [place] [address] any warranty or liability for your use of this information. Patient Education        Knee Arthritis: Care Instructions  Your Care Instructions    Knee arthritis is a breakdown of the cartilage that cushions your knee joint. When the cartilage wears down, your bones rub against each other. This causes pain and stiffness. Knee arthritis tends to get worse with time.   Treatment for knee arthritis involves reducing pain, making the leg muscles stronger, and staying at a healthy body weight. The treatment usually does not improve the health of the cartilage, but it can reduce pain and improve how well your knee works. You can take simple measures to protect your knee joints, ease your pain, and help you stay active. Follow-up care is a key part of your treatment and safety. Be sure to make and go to all appointments, and call your doctor if you are having problems. It's also a good idea to know your test results and keep a list of the medicines you take. How can you care for yourself at home? · Know that knee arthritis will cause more pain on some days than on others. · Stay at a healthy weight. Lose weight if you are overweight. When you stand up, the pressure on your knees from every pound of body weight is multiplied four times. So if you lose 10 pounds, you will reduce the pressure on your knees by 40 pounds. · Talk to your doctor or physical therapist about exercises that will help ease joint pain. ? Stretch to help prevent stiffness and to prevent injury before you exercise. You may enjoy gentle forms of yoga to help keep your knee joints and muscles flexible. ? Walk instead of jog.  ? Ride a bike. This makes your thigh muscles stronger and takes pressure off your knee. ? Wear well-fitting and comfortable shoes. ? Exercise in chest-deep water. This can help you exercise longer with less pain. ? Avoid exercises that include squatting or kneeling. They can put a lot of strain on your knees. ? Talk to your doctor to make sure that the exercise you do is not making the arthritis worse. · Do not sit for long periods of time. Try to walk once in a while to keep your knee from getting stiff. · Ask your doctor or physical therapist whether shoe inserts may reduce your arthritis pain. · If you can afford it, get new athletic shoes at least every year.  This can help reduce the strain on your knees. · Use a device to help you do everyday activities. ? A cane or walking stick can help you keep your balance when you walk. Hold the cane or walking stick in the hand opposite the painful knee. ? If you feel like you may fall when you walk, try using crutches or a front-wheeled walker. These can prevent falls that could cause more damage to your knee. ? A knee brace may help keep your knee stable and prevent pain. ? You also can use other things to make life easier, such as a higher toilet seat and handrails in the bathtub or shower. · Take pain medicines exactly as directed. ? Do not wait until you are in severe pain. You will get better results if you take it sooner. ? If you are not taking a prescription pain medicine, take an over-the-counter medicine such as acetaminophen (Tylenol), ibuprofen (Advil, Motrin), or naproxen (Aleve). Read and follow all instructions on the label. ? Do not take two or more pain medicines at the same time unless the doctor told you to. Many pain medicines have acetaminophen, which is Tylenol. Too much acetaminophen (Tylenol) can be harmful. ? Tell your doctor if you take a blood thinner, have diabetes, or have allergies to shellfish. · Ask your doctor if you might benefit from a shot of steroid medicine into your knee. This may provide pain relief for several months. · Many people take the supplements glucosamine and chondroitin for osteoarthritis. Some people feel they help, but the medical research does not show that they work. Talk to your doctor before you take these supplements. When should you call for help? Call your doctor now or seek immediate medical care if:    · You have sudden swelling, warmth, or pain in your knee.     · You have knee pain and a fever or rash.     · You have such bad pain that you cannot use your knee.    Watch closely for changes in your health, and be sure to contact your doctor if you have any problems.   Where can you learn more?  Go to http://benjamín-priscilla.info/. Enter Y768 in the search box to learn more about \"Knee Arthritis: Care Instructions. \"  Current as of: Karen 10, 2018  Content Version: 11.9  © 5414-2794 [employer]. Care instructions adapted under license by [employer] (which disclaims liability or warranty for this information). If you have questions about a medical condition or this instruction, always ask your healthcare professional. [place] [address] any warranty or liability for your use of this information.

## 2019-03-10 LAB
ATRIAL RATE: 65 BPM
CALCULATED P AXIS, ECG09: 38 DEGREES
CALCULATED R AXIS, ECG10: 2 DEGREES
CALCULATED T AXIS, ECG11: -45 DEGREES
DIAGNOSIS, 93000: NORMAL
P-R INTERVAL, ECG05: 390 MS
Q-T INTERVAL, ECG07: 436 MS
QRS DURATION, ECG06: 80 MS
QTC CALCULATION (BEZET), ECG08: 453 MS
VENTRICULAR RATE, ECG03: 65 BPM

## 2019-03-28 ENCOUNTER — CLINICAL SUPPORT (OUTPATIENT)
Dept: CARDIOLOGY CLINIC | Age: 79
End: 2019-03-28

## 2019-03-28 ENCOUNTER — OFFICE VISIT (OUTPATIENT)
Dept: CARDIOLOGY CLINIC | Age: 79
End: 2019-03-28

## 2019-03-28 VITALS
HEART RATE: 76 BPM | WEIGHT: 173 LBS | OXYGEN SATURATION: 98 % | DIASTOLIC BLOOD PRESSURE: 80 MMHG | SYSTOLIC BLOOD PRESSURE: 142 MMHG | HEIGHT: 62 IN | BODY MASS INDEX: 31.83 KG/M2 | RESPIRATION RATE: 14 BRPM

## 2019-03-28 DIAGNOSIS — Z95.0 PACEMAKER: Primary | ICD-10-CM

## 2019-03-28 DIAGNOSIS — I47.1 PAT (PAROXYSMAL ATRIAL TACHYCARDIA) (HCC): ICD-10-CM

## 2019-03-28 DIAGNOSIS — I49.5 SICK SINUS SYNDROME DUE TO SA NODE DYSFUNCTION (HCC): ICD-10-CM

## 2019-03-28 DIAGNOSIS — I49.5 SSS (SICK SINUS SYNDROME) (HCC): ICD-10-CM

## 2019-03-28 DIAGNOSIS — I49.5 TACHY-BRADY SYNDROME (HCC): ICD-10-CM

## 2019-03-28 DIAGNOSIS — I10 ESSENTIAL HYPERTENSION: ICD-10-CM

## 2019-03-28 DIAGNOSIS — I25.10 CORONARY ARTERY DISEASE INVOLVING NATIVE CORONARY ARTERY OF NATIVE HEART WITHOUT ANGINA PECTORIS: ICD-10-CM

## 2019-03-28 DIAGNOSIS — Z95.0 CARDIAC PACEMAKER IN SITU: Primary | ICD-10-CM

## 2019-03-28 DIAGNOSIS — I48.0 PAF (PAROXYSMAL ATRIAL FIBRILLATION) (HCC): ICD-10-CM

## 2019-03-28 DIAGNOSIS — I48.92 ATRIAL FLUTTER, UNSPECIFIED TYPE (HCC): ICD-10-CM

## 2019-03-28 RX ORDER — PROPAFENONE HYDROCHLORIDE 150 MG/1
150 TABLET, FILM COATED ORAL EVERY 8 HOURS
COMMUNITY
End: 2019-03-28 | Stop reason: SDUPTHER

## 2019-03-28 RX ORDER — PROPAFENONE HYDROCHLORIDE 150 MG/1
150 TABLET, FILM COATED ORAL EVERY 8 HOURS
Qty: 270 TAB | Refills: 3 | Status: SHIPPED | OUTPATIENT
Start: 2019-03-28 | End: 2019-04-23

## 2019-03-28 NOTE — PROGRESS NOTES
Cardiac Electrophysiology Office Note     Subjective: Cyrus León is an [de-identified] y.o. 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 She had skin cancer removed from her scalp at [facility] and said she will have radiation therapy. She said at night she felt palpitations and she can use toprol low dose to avoid syncope with bradycardia  I had stopped the Rythmol because it could have been contributing to the black outs, I was concerned about intermittent bradycardia. Last time she was seen she felt faint and sat down then she broke out in a sweat. She was not taking the Rythmol at this time. Echo 5/2016 LVEF 60 % to 65 %. No RWMA. Mild concentric hypertrophy. Mild TR. External loop recorder 2/2017 showed no AFIB but mild sinus bradycardia and NSR    Past Hx:  The last event was New Years 2016.    holter 6/23/2014 sinus rhythm with minimum 39 bpm at 5 pm PVC's , mean HR 50 bpm while she is on medication  She was last seen 8/2014 and has missed several follow up appointments. She was seen last for follow up for syncope, she has passed out twice December and Jan 1. She went the ED at [facility] both times. The metoprolol was discontinued and she was started on lisinopril and clonidine for high BP. First episode 12/31/15 occurred while she was sitting on the stool in her kitchen, she started to feel tighten around her head then she LOC and fell off the stool. The next day she had a similar episode, she was doing things around the house and felt dizzy she sat down and put a cold compress on her head. No LOC.   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 Jia Lemos and had venous duplex without DVT. Echo with normal LVEF and inferior wall hypokinesis in 2014     Mother with stroke and HBP and heart disease death at age 77  Father with cancer at age 68  Sister with cancer  at age of 76  Current Outpatient Medications   Medication Sig Dispense Refill    propafenone (RYTHMOL) 150 mg tablet Take 1 Tab by mouth every eight (8) hours. Indications: Prevention of Recurrent Atrial Fibrillation 270 Tab 3    meclizine (ANTIVERT) 25 mg tablet TAKE 1 TABLET THREE TIMES DAILY AS NEEDED 270 Tab 3    cloNIDine HCl (CATAPRES) 0.2 mg tablet TAKE 1 TABLET TWICE DAILY 180 Tab 1    fluticasone (FLONASE) 50 mcg/actuation nasal spray USE 2 SPRAYS IN EACH NOSTRIL EVERY DAY 48 g 12    omeprazole (PRILOSEC) 40 mg capsule TAKE 1 CAPSULE EVERY DAY 90 Cap 3    potassium chloride SR (KLOR-CON 10) 10 mEq tablet TAKE 1 TABLET EVERY DAY 90 Tab 3    raNITIdine (ZANTAC) 150 mg tablet TAKE 1 TABLET TWICE DAILY 180 Tab 3    atorvastatin (LIPITOR) 40 mg tablet TAKE 1 TABLET EVERY DAY 90 Tab 3    furosemide (LASIX) 20 mg tablet TAKE 1 TABLET BY MOUTH DAILY 90 Tab 1    gabapentin (NEURONTIN) 300 mg capsule Take 1 Cap by mouth three (3) times daily. 270 Cap 3    metoprolol succinate (TOPROL-XL) 100 mg tablet Take 1 Tab by mouth daily. 30 Tab 5    potassium chloride (KLOR-CON) 10 mEq tablet Take 1 Tab by mouth daily.  80 Tab 3    acetaminophen (TYLENOL) 325 mg tablet Take 325 mg by mouth every four (4) hours as needed for Pain.  MULTIVIT WITH CALCIUM,IRON,MIN [facility] MULTIPLE VITAMINS PO) Take  by mouth daily.  aspirin delayed-release 81 mg tablet Take  by mouth daily.       metoprolol succinate (TOPROL-XL) 25 mg XL tablet TAKE 1 TABLET EVERY DAY 90 Tab 3     Allergies   Allergen Reactions    Latex Rash    Bactrim [Sulfamethoprim Ds] Hives    Aspirin Other (comments)     Anything higher than 81mg makes pt jittery    Codeine Hives and Itching    Iodinated Contrast- Oral And Iv Dye Itching    Pcn [Penicillins] Hives and Itching    Tape [Adhesive] Rash     Past Medical History:   Diagnosis Date    Acute pharyngitis 2/8/2011    Allergic rhinitis, cause unspecified 2/8/2011    Arrhythmia     PVC    Asymptomatic varicose veins 2/8/2011    Cancer ([facility] [address]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     Carpal tunnel syndrome 2/8/2011    Degenerative Joint Disese ( improved/resolving) 2/8/2011    Degenetrative Dis Disease, 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    Diverticulosis of colon (without mention of hemorrhage) 2/8/2011    Dysphagia 2/8/2011    Edema, peripheral 2/8/2011    GERD (gastroesophageal reflux disease)     GERD Gastro- Esophageal Reflux Disease 2/8/2011    Herniated nucleus pulposus ( slipped disc) 2/8/2011    Menopausal 2/8/2011    PUD (peptic ulcer disease) 2012    Pure hypercholesterolemia 2/8/2011    Recurrent ear pain 2/8/2011    S/P radiation therapy     Scalp lesion 10/23/2014    Sebaceous cyst 4/22/2014    Unspecified cataract 2/8/2011    Unspecified essential hypertension 2/8/2011    Unspecified sleep apnea      Past Surgical History:   Procedure Laterality Date    HX HYSTERECTOMY      HX ORTHOPAEDIC  neck/back 2006    HX OTHER SURGICAL  5/8/2014     Excise recurrent scalp lesion and application of ACell    HX OTHER SURGICAL  5/8/2014    Excise keloid, anterior chest wall, and application of ACell    HX OTHER SURGICAL  5/8/2014     Excise sebaceous cyst, anterior chest wall    HX OTHER SURGICAL  5/8/2014    Punch biopsy, skin lesions, right forearm x2, right calf x1    ND INS NEW/RPLCMT PRM PM W/TRANSV ELTRD ATRIAL&VENT  9/30/2017         ND RMVL IMPLANTABLE PT-ACTIVATED CAR EVENT RECORDER  9/30/2017          Family History   Problem Relation Age of Onset    Hypertension Mother     Stroke Mother     Hypertension Father     Cancer Father         PROSTATE, MELANOMA    Anesth Problems Neg Hx      Social History     Tobacco Use    Smoking status: Never Smoker    Smokeless tobacco: Never Used   Substance Use Topics    Alcohol use: No        Review of Systems:   Constitutional: Negative for fever, chills, weight loss   HEENT: Negative for nosebleeds, vision changes. Respiratory: Negative for cough, hemoptysis, sputum production, and wheezing. Cardiovascular: Negative for orthopnea, claudication,  and PND. Gastrointestinal: Negative for constipation, blood in stool and melena. Genitourinary: Negative for dysuria, and hematuria. Musculoskeletal: Negative for myalgias, + arthralgia. Skin: + skin discoloration or cancer on scalp  Heme: Does not bleed or bruise easily. Neurological: Negative for speech change and focal weakness      Objective:     Visit Vitals  /80 (BP 1 Location: Left arm, BP Patient Position: Sitting)   Pulse 76   Resp 14   Ht 5' 2\" (1.575 m)   Wt 173 lb (78.5 kg)   SpO2 98%   BMI 31.64 kg/m²      Physical Exam:   Constitutional: Well-nourished. No distress  Head: Normocephalic and atraumatic. Eyes: Pupils are equal, round  Neck: supple. No JVD present. Cardiovascular: normal rate, regular rhythm and normal heart sounds. Exam reveals no gallop and no friction rub. No murmur heard. Pulmonary/Chest: Effort normal and breath sounds normal.   Abdominal: Soft, mild obesity  Musculoskeletal: LLE wrapped in clean and dry bandage   Neurological: alert, oriented.    Skin: Skin is warm and dry. Left-sided pacemaker site is clean no drainage or redness. Psychiatric: normal mood and affect. Behavior is normal. Judgment and thought content normal.         Assessment/Plan:       ICD-10-CM ICD-9-CM    1. Pacemaker Z95.0 V45.01    2. 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 SSS (sick sinus syndrome) ([facility]) I49.5 427.81    4. Coronary artery disease involving native coronary artery of native heart without angina pectoris I25.10 414.01    5. Essential hypertension I10 401.9    6. PAT (paroxysmal atrial tachycardia) ([facility]) I47.1 427.0    7. Sick sinus syndrome due to SA node dysfunction ([facility]) I49.5 427.81    8. Atrial flutter, unspecified type ([facility]) I48.92 427.32    9. PAF (paroxysmal atrial fibrillation) ([facility]) I48.0 427.31       The patient has done well after the pacemaker implantation. She has sick sinus and therefore pacing in the atrium most of the time (65%). The patient has episodes of atrial tachycardia, atrial flutter and fibrillation more often because she did not know that she is supposed to take propafenone tid and so I refilled it   She takes toprol  She has CAD so she needs aspirin but need stroke prevention if she can afford eliquis  She wants to try 5 mg bid. I discussed with her about pacemaker check today      Follow-up and Dispositions    · Return in about 6 months (around 9/28/2019). Thank you for involving me in this patient's care and please call with further concerns or questions. Jacque Chapman M.D.   Electrophysiology/Cardiology  [facility] [place]  [address] [facility], [facility] [address]  [address]  [phone]

## 2019-04-03 ENCOUNTER — OFFICE VISIT (OUTPATIENT)
Dept: INTERNAL MEDICINE CLINIC | Age: 79
End: 2019-04-03

## 2019-04-03 VITALS
OXYGEN SATURATION: 99 % | TEMPERATURE: 97.5 F | RESPIRATION RATE: 20 BRPM | HEIGHT: 62 IN | HEART RATE: 83 BPM | SYSTOLIC BLOOD PRESSURE: 140 MMHG | BODY MASS INDEX: 31.65 KG/M2 | WEIGHT: 172 LBS | DIASTOLIC BLOOD PRESSURE: 82 MMHG

## 2019-04-03 DIAGNOSIS — I25.10 CORONARY ARTERY DISEASE INVOLVING NATIVE CORONARY ARTERY OF NATIVE HEART WITHOUT ANGINA PECTORIS: ICD-10-CM

## 2019-04-03 DIAGNOSIS — R42 VERTIGO: ICD-10-CM

## 2019-04-03 DIAGNOSIS — E78.00 HYPERCHOLESTEREMIA: Primary | ICD-10-CM

## 2019-04-03 DIAGNOSIS — I49.5 SSS (SICK SINUS SYNDROME) (HCC): ICD-10-CM

## 2019-04-03 DIAGNOSIS — I10 ESSENTIAL HYPERTENSION: ICD-10-CM

## 2019-04-03 DIAGNOSIS — L85.3 XEROSIS OF SKIN: ICD-10-CM

## 2019-04-03 DIAGNOSIS — I49.5 TACHY-BRADY SYNDROME (HCC): ICD-10-CM

## 2019-04-03 DIAGNOSIS — K21.9 GASTROESOPHAGEAL REFLUX DISEASE WITHOUT ESOPHAGITIS: ICD-10-CM

## 2019-04-03 NOTE — PATIENT INSTRUCTIONS
[employer] Activation    Thank you for requesting access to [employer]. Please follow the instructions below to securely access and download your online medical record. Trusteer allows you to send messages to your doctor, view your test results, renew your prescriptions, schedule appointments, and more. How Do I Sign Up? 1. In your internet browser, go to [email or web address]  2. Click on the First Time User? Click Here link in the Sign In box. You will be redirect to the New Member Sign Up page. 3. Enter your Trusteer Access Code exactly as it appears below. You will not need to use this code after youve completed the sign-up process. If you do not sign up before the expiration date, you must request a new code. Trusteer Access Code: Activation code not generated  Current Trusteer Status: Patient Declined (This is the date your Trusteer access code will )    4. Enter the last four digits of your Social Security Number (xxxx) and Date of Birth (mm/dd/yyyy) as indicated and click Submit. You will be taken to the next sign-up page. 5. Create a Trusteer ID. This will be your Trusteer login ID and cannot be changed, so think of one that is secure and easy to remember. 6. Create a Trusteer password. You can change your password at any time. 7. Enter your Password Reset Question and Answer. This can be used at a later time if you forget your password. 8. Enter your e-mail address. You will receive e-mail notification when new information is available in [address] 9. Click Sign Up. You can now view and download portions of your medical record. 10. Click the Download Summary menu link to download a portable copy of your medical information. Additional Information    If you have questions, please visit the Frequently Asked Questions section of the Trusteer website at [email or web address] Remember, Trusteer is NOT to be used for urgent needs. For medical emergencies, dial 911.

## 2019-04-03 NOTE — PROGRESS NOTES
Chief Complaint   Patient presents with    Hypertension    Heart Problem     1. Have you been to the ER, urgent care clinic since your last visit? Hospitalized since your last visit? No    2. Have you seen or consulted any other health care providers outside of the [address] [place] [address] since your last visit? Include any pap smears or colon screening.  No

## 2019-04-03 NOTE — PROGRESS NOTES
Erick Faulkner is a [de-identified] y.o. female and presents with Hypertension and Heart Problem  . Subjective:  Hypertension Review:  The patient has essential hypertension  Diet and Lifestyle: generally follows a  low sodium diet, exercises sporadically  Home BP Monitoring: is not measured at home. Pertinent ROS: taking medications as instructed, no medication side effects noted, no TIA's, no chest pain on exertion, no dyspnea on exertion, no swelling of ankles. She has a history of sick sinus syndrome,she has a pacemaker in place  CAD and is followed by cardiology. Dyslipidemia Review:  Patient presents for evaluation of lipids. Compliance with treatment thus far has been excellent. A repeat fasting lipid profile was done. The patient does not use medications that may worsen dyslipidemias (corticosteroids, progestins, anabolic steroids, diuretics, beta-blockers, amiodarone, cyclosporine, olanzapine). The patient exercises some    She is also followed for vertigo and is an meclizine. .    She has been placed on Eliquis. GERD Review:   Patient has a history of gastroesophageal reflux with heartburn. Symptoms have been present for a few months. She denies dysphagia. She  has not lost weight. She denies melena, hematochezia, hematemesis, and coffee ground emesis. This has been associated with fullness after meals. She denies abdominal bloating and none. Medical therapy in the past has included proton pump inhibitor      She has dry skin    Review of Systems  Constitutional: negative for fevers, chills, anorexia and weight loss  Eyes:   negative for visual disturbance and irritation  ENT:   negative for tinnitus,sore throat,nasal congestion,ear pains. hoarseness  Respiratory:  negative for cough, hemoptysis, dyspnea,wheezing  CV:   negative for chest pain, palpitations, lower extremity edema  GI:   negative for nausea, vomiting, diarrhea, abdominal pain,melena  Endo:               negative for polyuria,polydipsia,polyphagia,heat intolerance  Genitourinary: negative for frequency, dysuria and hematuria  Integument:  negative for rash and pruritus  Hematologic:  negative for easy bruising and gum/nose bleeding  Musculoskel: negative for myalgias, arthralgias, back pain, muscle weakness, joint pain  Neurological:  negative for headaches, dizziness, vertigo, memory problems and gait   Behavl/Psych: negative for feelings of anxiety, depression, mood changes    Past Medical History:   Diagnosis Date    Acute pharyngitis 2/8/2011    Allergic rhinitis, cause unspecified 2/8/2011    Arrhythmia     PVC    Asymptomatic varicose veins 2/8/2011    Cancer ([facility] [address]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     Carpal tunnel syndrome 2/8/2011    Degenerative Joint Disese ( improved/resolving) 2/8/2011    Degenetrative Dis Disease, 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    Diverticulosis of colon (without mention of hemorrhage) 2/8/2011    Dysphagia 2/8/2011    Edema, peripheral 2/8/2011    GERD (gastroesophageal reflux disease)     GERD Gastro- Esophageal Reflux Disease 2/8/2011    Herniated nucleus pulposus ( slipped disc) 2/8/2011    Menopausal 2/8/2011    PUD (peptic ulcer disease) 2012    Pure hypercholesterolemia 2/8/2011    Recurrent ear pain 2/8/2011    S/P radiation therapy     Scalp lesion 10/23/2014    Sebaceous cyst 4/22/2014    Unspecified cataract 2/8/2011    Unspecified essential hypertension 2/8/2011    Unspecified sleep apnea      Past Surgical History:   Procedure Laterality Date    HX HYSTERECTOMY      HX ORTHOPAEDIC  neck/back 2006    HX OTHER SURGICAL  5/8/2014     Excise recurrent scalp lesion and application of ACell    HX OTHER SURGICAL  5/8/2014    Excise keloid, anterior chest wall, and application of ACell    HX OTHER SURGICAL  5/8/2014     Excise sebaceous cyst, anterior chest wall    HX OTHER SURGICAL  5/8/2014    Punch biopsy, skin lesions, right forearm x2, right calf x1    [place] INS NEW/RPLCMT PRM PM W/TRANSV ELTRD ATRIAL&VENT  9/30/2017         SD RMVL IMPLANTABLE PT-ACTIVATED CAR EVENT RECORDER  9/30/2017          Social History     Socioeconomic History    Marital status:      Spouse name: Not on file    Number of children: Not on file    Years of education: Not on file    Highest education level: Not on file   Tobacco Use    Smoking status: Never Smoker    Smokeless tobacco: Never Used   Substance and Sexual Activity    Alcohol use: No    Drug use: No    Sexual activity: Yes     Partners: Male     Birth control/protection: None     Family History   Problem Relation Age of Onset    Hypertension Mother     Stroke Mother     Hypertension Father     Cancer Father         PROSTATE, MELANOMA    Anesth Problems Neg Hx      Current Outpatient Medications   Medication Sig Dispense Refill    propafenone (RYTHMOL) 150 mg tablet Take 1 Tab by mouth every eight (8) hours. Indications: Prevention of Recurrent Atrial Fibrillation 270 Tab 3    meclizine (ANTIVERT) 25 mg tablet TAKE 1 TABLET THREE TIMES DAILY AS NEEDED 270 Tab 3    cloNIDine HCl (CATAPRES) 0.2 mg tablet TAKE 1 TABLET TWICE DAILY 180 Tab 1    metoprolol succinate (TOPROL-XL) 25 mg XL tablet TAKE 1 TABLET EVERY DAY 90 Tab 3    fluticasone (FLONASE) 50 mcg/actuation nasal spray USE 2 SPRAYS IN EACH NOSTRIL EVERY DAY 48 g 12    omeprazole (PRILOSEC) 40 mg capsule TAKE 1 CAPSULE EVERY DAY 90 Cap 3    raNITIdine (ZANTAC) 150 mg tablet TAKE 1 TABLET TWICE DAILY 180 Tab 3    atorvastatin (LIPITOR) 40 mg tablet TAKE 1 TABLET EVERY DAY 90 Tab 3    furosemide (LASIX) 20 mg tablet TAKE 1 TABLET BY MOUTH DAILY 90 Tab 1    gabapentin (NEURONTIN) 300 mg capsule Take 1 Cap by mouth three (3) times daily. 270 Cap 3    metoprolol succinate (TOPROL-XL) 100 mg tablet Take 1 Tab by mouth daily. 30 Tab 5    acetaminophen (TYLENOL) 325 mg tablet Take 325 mg by mouth every four (4) hours as needed for Pain.      [address] [facility] MULTIPLE VITAMINS PO) Take  by mouth daily.  aspirin delayed-release 81 mg tablet Take  by mouth daily.  apixaban (ELIQUIS) 5 mg tablet Take 1 Tab by mouth two (2) times a day. 28 Tab 0    potassium chloride SR (KLOR-CON 10) 10 mEq tablet TAKE 1 TABLET EVERY DAY 90 Tab 3     Allergies   Allergen Reactions    Latex Rash    Bactrim [Sulfamethoprim Ds] Hives    Aspirin Other (comments)     Anything higher than 81mg makes pt jittery    Codeine Hives and Itching    Iodinated Contrast- Oral And Iv Dye Itching    Pcn [Penicillins] Hives and Itching    Tape [Adhesive] Rash       Objective:  Visit Vitals  /82 (BP 1 Location: Left arm, BP Patient Position: Sitting)   Pulse 83   Temp 97.5 °F (36.4 °C)   Resp 20   Ht 5' 2\" (1.575 m)   Wt 172 lb (78 kg)   SpO2 99%   BMI 31.46 kg/m²     Physical Exam:   General appearance - alert, well appearing, and in no distress  Mental status - alert, oriented to person, place, and time  EYE-DARLENE, EOMI, corneas normal, no foreign bodies  ENT-ENT exam normal, no neck nodes or sinus tenderness  Nose - normal and patent, no erythema, discharge or polyps  Mouth - mucous membranes moist, pharynx normal without lesions  Neck - supple, no significant adenopathy   Chest - clear to auscultation, no wheezes, rales or rhonchi, symmetric air entry   Heart - irregular irregular rhythmr rhythm, , no murmurs, rubs, clicks or gallops   Abdomen - soft, nontender, nondistended, no masses or organomegaly  Lymph- no adenopathy palpable  Ext-peripheral pulses normal, no pedal edema, no clubbing or cyanosis  Skin-Warm and dry.  no hyperpigmentation, vitiligo, or suspicious lesions  Neuro -alert, oriented, normal speech, no focal findings or movement disorder noted  Neck-normal C-spine, no tenderness, full ROM without pain  Feet-no nail deformities or callus formation with good pulses noted      Results for orders placed or performed during the hospital encounter of 03/09/19   CBC WITH AUTOMATED DIFF   Result Value Ref Range    WBC 5.1 3.6 - 11.0 K/uL    RBC 4.35 3.80 - 5.20 M/uL    HGB 10.7 (L) 11.5 - 16.0 g/dL    HCT 33.9 (L) 35.0 - 47.0 %    MCV 77.9 (L) 80.0 - 99.0 FL    MCH 24.6 (L) 26.0 - 34.0 PG    MCHC 31.6 30.0 - 36.5 g/dL    RDW 18.4 (H) 11.5 - 14.5 %    PLATELET 638 373 - 103 K/uL    MPV 11.1 8.9 - 12.9 FL    NRBC 0.0 0  WBC    ABSOLUTE NRBC 0.00 0.00 - 0.01 K/uL    NEUTROPHILS 67 32 - 75 %    LYMPHOCYTES 19 12 - 49 %    MONOCYTES 9 5 - 13 %    EOSINOPHILS 4 0 - 7 %    BASOPHILS 1 0 - 1 %    IMMATURE GRANULOCYTES 0 0.0 - 0.5 %    ABS. NEUTROPHILS 3.5 1.8 - 8.0 K/UL    ABS. LYMPHOCYTES 1.0 0.8 - 3.5 K/UL    ABS. MONOCYTES 0.5 0.0 - 1.0 K/UL    ABS. EOSINOPHILS 0.2 0.0 - 0.4 K/UL    ABS. BASOPHILS 0.0 0.0 - 0.1 K/UL    ABS. IMM. GRANS. 0.0 0.00 - 0.04 K/UL    DF AUTOMATED     METABOLIC PANEL, COMPREHENSIVE   Result Value Ref Range    Sodium 147 (H) 136 - 145 mmol/L    Potassium 4.9 3.5 - 5.1 mmol/L    Chloride 109 (H) 97 - 108 mmol/L    CO2 31 21 - 32 mmol/L    Anion gap 7 5 - 15 mmol/L    Glucose 74 65 - 100 mg/dL    BUN 30 (H) 6 - 20 MG/DL    Creatinine 1.12 (H) 0.55 - 1.02 MG/DL    BUN/Creatinine ratio 27 (H) 12 - 20      GFR est AA 57 (L) >60 ml/min/1.73m2    GFR est non-AA 47 (L) >60 ml/min/1.73m2    Calcium 8.3 (L) 8.5 - 10.1 MG/DL    Bilirubin, total 0.4 0.2 - 1.0 MG/DL    ALT (SGPT) 24 12 - 78 U/L    AST (SGOT) 40 (H) 15 - 37 U/L    Alk.  phosphatase 86 45 - 117 U/L    Protein, total 6.4 6.4 - 8.2 g/dL    Albumin 3.0 (L) 3.5 - 5.0 g/dL    Globulin 3.4 2.0 - 4.0 g/dL    A-G Ratio 0.9 (L) 1.1 - 2.2     TROPONIN I   Result Value Ref Range    Troponin-I, Qt. <0.05 <0.05 ng/mL   CK W/ CKMB & INDEX   Result Value Ref Range     (H) 26 - 192 U/L    CK - MB 2.4 <3.6 NG/ML    CK-MB Index 0.8 0.0 - 2.5     EKG, 12 LEAD, INITIAL   Result Value Ref Range    Ventricular Rate 65 BPM    Atrial Rate 65 BPM    P-R Interval 390 ms    QRS Duration 80 ms    Q-T Interval 436 ms    QTC Calculation (Bezet) 453 ms    Calculated P Axis 38 degrees    Calculated R Axis 2 degrees    Calculated T Axis -45 degrees    Diagnosis       Sinus rhythm with 1st degree AV block  Moderate voltage criteria for LVH, may be normal variant  Nonspecific T wave abnormality Inferolateral leads  When compared with ECG of 18-NOV-2014 17:10,  QT has lengthened  Confirmed by Dena Guerrero ([id number]) on 3/10/2019 7:05:00 PM         Assessment/Plan:    ICD-10-CM ICD-9-CM    1. Hypercholesteremia E78.00 272.0    2. Coronary artery disease involving native coronary artery of native heart without angina pectoris I25.10 414.01    3. Essential hypertension I10 401.9    4. Gastroesophageal reflux disease without esophagitis K21.9 530.81    5. 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 SSS (sick sinus syndrome) (HCC) I49.5 427.81    7. Vertigo R42 780.4    8. Xerosis of skin L85.3 706.8      No orders of the defined types were placed in this encounter. lose weight, increase physical activity, follow low fat diet, have labs drawn prior to ROV, ,Take 81mg aspirin daily  Patient Instructions   [employer] Activation    Thank you for requesting access to [employer]. Please follow the instructions below to securely access and download your online medical record. [employer] allows you to send messages to your doctor, view your test results, renew your prescriptions, schedule appointments, and more. How Do I Sign Up? 1. In your internet browser, go to [email or web address]  2. Click on the First Time User? Click Here link in the Sign In box. You will be redirect to the New Member Sign Up page. 3. Enter your [employer] Access Code exactly as it appears below. You will not need to use this code after youve completed the sign-up process. If you do not sign up before the expiration date, you must request a new code. [employer] Access Code:  Activation code not generated  Current [employer] Status: Patient Declined (This is the date your [employer] access code will )    4. Enter the last four digits of your Social Security Number (xxxx) and Date of Birth (mm/dd/yyyy) as indicated and click Submit. You will be taken to the next sign-up page. 5. Create a [employer] ID. This will be your [employer] login ID and cannot be changed, so think of one that is secure and easy to remember. 6. Create a [employer] password. You can change your password at any time. 7. Enter your Password Reset Question and Answer. This can be used at a later time if you forget your password. 8. Enter your e-mail address. You will receive e-mail notification when new information is available in [address] 9. Click Sign Up. You can now view and download portions of your medical record. 10. Click the Download Summary menu link to download a portable copy of your medical information. Additional Information    If you have questions, please visit the Frequently Asked Questions section of the [employer] website at [email or web address] Remember, [employer] is NOT to be used for urgent needs. For medical emergencies, dial 911. Follow-up and Dispositions    · Return in about 1 month (around 2019), or if symptoms worsen or fail to improve. I have reviewed with the patient details of the assessment and plan and all questions were answered. Relevent patient education was performed. The most recent lab findings were reviewed with the patient. An After Visit Summary was printed and given to the patient.

## 2019-04-08 ENCOUNTER — TELEPHONE (OUTPATIENT)
Dept: CARDIOLOGY CLINIC | Age: 79
End: 2019-04-08

## 2019-04-08 NOTE — TELEPHONE ENCOUNTER
Verified patient with two types of identifiers. Patient states that she has been having swelling in her left hand for a few days. Patient states that she thinks its from her Eliquis. Patient states she stopped taking her Eliquis on Friday night. Patient states that she spoke with the on call doctor over the weekend. Explained to patient that the Eliquis would not be causing swelling in her left hand and that we may need to get a doppler to further assess. Patient now states that she is having swelling in both hands and wanted to know if she could just change medications. Explained to patient again that further evaluation may be needed. Will notify MD/NP for possible doppler.

## 2019-04-09 NOTE — TELEPHONE ENCOUNTER
Per Dr. Olsen [place] is covered, may try 15 mg q dinner\"    Attempted to reach patient by telephone. A message was left for return call.

## 2019-04-09 NOTE — TELEPHONE ENCOUNTER
Verified patient with two types of identifiers. Notified patient that we will switch her to Xarelto 15 mg at dinner. Verified pharmacy. Patient states that her hands are both still swollen and tight. Patient states she has not taken her lasix yet today but that she has been taking it every day. Patient denies SOB. Patient has not been weighing her self daily however does not think she has had any weight gain. Instructed patient to take Lasix as ordered but will notify MD/NP of swelling in hands for any further recommendations.

## 2019-04-10 NOTE — TELEPHONE ENCOUNTER
Please advise patient to call if swelling in hands worsens or does not decrease with medication change & regular use of Lasix. Please encourage daily weights, keep log.

## 2019-04-10 NOTE — TELEPHONE ENCOUNTER
Pt returned call. Two patient indentifiers verified. Pt was informed of message from NP. Pt stated that she has been to the bathroom 10 times today. Pt to call if she has SOB or increased swelling to call back. Pt verbalized understanding and denies any further questions at this time.

## 2019-04-10 NOTE — TELEPHONE ENCOUNTER
Since it's bilateral hands now, it's unlikely that it's a DVT. Chart shows current Lasix 20 mg po daily. Cr borderline. She can take an extra Lasix tablet for the next 2 days & monitor for response. Also avoid sodium. If edema isn't improved after 2 days of increased Lasix, she should call, would then schedule for clinic visit. If she develops SOB, notes weight gain, or increase in edema, she should call in the interim.

## 2019-04-23 ENCOUNTER — OFFICE VISIT (OUTPATIENT)
Dept: CARDIOLOGY CLINIC | Age: 79
End: 2019-04-23

## 2019-04-23 ENCOUNTER — CLINICAL SUPPORT (OUTPATIENT)
Dept: CARDIOLOGY CLINIC | Age: 79
End: 2019-04-23

## 2019-04-23 ENCOUNTER — TELEPHONE (OUTPATIENT)
Dept: CARDIOLOGY CLINIC | Age: 79
End: 2019-04-23

## 2019-04-23 VITALS
HEIGHT: 62 IN | SYSTOLIC BLOOD PRESSURE: 120 MMHG | WEIGHT: 170 LBS | BODY MASS INDEX: 31.28 KG/M2 | DIASTOLIC BLOOD PRESSURE: 70 MMHG | HEART RATE: 69 BPM

## 2019-04-23 DIAGNOSIS — M25.512 PAIN, JOINT, SHOULDER, LEFT: ICD-10-CM

## 2019-04-23 DIAGNOSIS — I48.0 PAF (PAROXYSMAL ATRIAL FIBRILLATION) (HCC): ICD-10-CM

## 2019-04-23 DIAGNOSIS — I48.91 ATRIAL FIBRILLATION WITH RAPID VENTRICULAR RESPONSE (HCC): ICD-10-CM

## 2019-04-23 DIAGNOSIS — I10 ESSENTIAL HYPERTENSION: ICD-10-CM

## 2019-04-23 DIAGNOSIS — I48.92 ATRIAL FLUTTER WITH RAPID VENTRICULAR RESPONSE (HCC): ICD-10-CM

## 2019-04-23 DIAGNOSIS — Z95.0 CARDIAC PACEMAKER IN SITU: Primary | ICD-10-CM

## 2019-04-23 DIAGNOSIS — I48.92 ATRIAL FLUTTER, UNSPECIFIED TYPE (HCC): ICD-10-CM

## 2019-04-23 DIAGNOSIS — R55 SYNCOPE AND COLLAPSE: ICD-10-CM

## 2019-04-23 DIAGNOSIS — I49.5 TACHY-BRADY SYNDROME (HCC): ICD-10-CM

## 2019-04-23 DIAGNOSIS — I49.5 SICK SINUS SYNDROME DUE TO SA NODE DYSFUNCTION (HCC): ICD-10-CM

## 2019-04-23 DIAGNOSIS — Z95.0 PACEMAKER: Primary | ICD-10-CM

## 2019-04-23 RX ORDER — PROPAFENONE HYDROCHLORIDE 225 MG/1
225 TABLET, FILM COATED ORAL EVERY 8 HOURS
Qty: 270 TAB | Refills: 1 | Status: SHIPPED | OUTPATIENT
Start: 2019-04-23 | End: 2019-05-06

## 2019-04-23 RX ORDER — CLONIDINE HYDROCHLORIDE 0.1 MG/1
0.1 TABLET ORAL DAILY
Qty: 90 TAB | Refills: 1 | Status: SHIPPED | OUTPATIENT
Start: 2019-04-23 | End: 2019-05-02 | Stop reason: CLARIF

## 2019-04-23 NOTE — PROGRESS NOTES
Cardiac Electrophysiology Office Note     Subjective: Lucy Moreno is an [de-identified] y.o. 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 She had skin cancer removed from her scalp at [facility] and said she will have radiation therapy. She said at night she felt palpitations and she can use toprol low dose to avoid syncope with bradycardia  I had stopped the Rythmol because it could have been contributing to the black outs, I was concerned about intermittent bradycardia. Last time she was seen she felt faint and sat down then she broke out in a sweat. She was not taking the Rythmol at this time. Echo 5/2016 LVEF 60 % to 65 %. No RWMA. Mild concentric hypertrophy. Mild TR. External loop recorder 2/2017 showed no AFIB but mild sinus bradycardia and NSR    Past Hx:  The last event was New Years 2016.    holter 6/23/2014 sinus rhythm with minimum 39 bpm at 5 pm PVC's , mean HR 50 bpm while she is on medication  She was last seen 8/2014 and has missed several follow up appointments. She was seen last for follow up for syncope, she has passed out twice December and Jan 1. She went the ED at [facility] both times. The metoprolol was discontinued and she was started on lisinopril and clonidine for high BP. First episode 12/31/15 occurred while she was sitting on the stool in her kitchen, she started to feel tighten around her head then she LOC and fell off the stool. The next day she had a similar episode, she was doing things around the house and felt dizzy she sat down and put a cold compress on her head.  No LOC.  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 Courtney Eason and had venous duplex without DVT. Echo with normal LVEF and inferior wall hypokinesis in 2014     Mother with stroke and HBP and heart disease death at age 77  Father with cancer at age 68  Sister with cancer  at age of 76  Current Outpatient Medications   Medication Sig Dispense Refill    cloNIDine HCl (CATAPRES) 0.1 mg tablet Take 1 Tab by mouth daily. 90 Tab 1    propafenone (RYTHMOL) 225 mg tablet Take 1 Tab by mouth every eight (8) hours. Indications: Prevention of Recurrent Atrial Fibrillation 270 Tab 1    rivaroxaban (XARELTO) 15 mg tab tablet Take 1 Tab by mouth daily (with dinner). 90 Tab 1    meclizine (ANTIVERT) 25 mg tablet TAKE 1 TABLET THREE TIMES DAILY AS NEEDED 270 Tab 3    fluticasone (FLONASE) 50 mcg/actuation nasal spray USE 2 SPRAYS IN EACH NOSTRIL EVERY DAY 48 g 12    omeprazole (PRILOSEC) 40 mg capsule TAKE 1 CAPSULE EVERY DAY 90 Cap 3    potassium chloride SR (KLOR-CON 10) 10 mEq tablet TAKE 1 TABLET EVERY DAY 90 Tab 3    raNITIdine (ZANTAC) 150 mg tablet TAKE 1 TABLET TWICE DAILY 180 Tab 3    atorvastatin (LIPITOR) 40 mg tablet TAKE 1 TABLET EVERY DAY 90 Tab 3    furosemide (LASIX) 20 mg tablet TAKE 1 TABLET BY MOUTH DAILY 90 Tab 1    metoprolol succinate (TOPROL-XL) 100 mg tablet Take 1 Tab by mouth daily.  30 Tab 5    acetaminophen (TYLENOL) 325 mg tablet Take 325 mg by mouth every four (4) hours as needed for Pain.  MULTIVIT WITH CALCIUM,IRON,MIN [facility] MULTIPLE VITAMINS PO) Take  by mouth daily.  aspirin delayed-release 81 mg tablet Take  by mouth daily.  gabapentin (NEURONTIN) 300 mg capsule Take 1 Cap by mouth three (3) times daily.  270 Cap 3     Allergies   Allergen Reactions    Latex Rash    Bactrim [Sulfamethoprim Ds] Hives    Aspirin Other (comments)     Anything higher than 81mg makes pt jittery    Codeine Hives and Itching    Iodinated Contrast- Oral And Iv Dye Itching    Pcn [Penicillins] Hives and Itching    Tape [Adhesive] Rash     Past Medical History:   Diagnosis Date    Acute pharyngitis 2/8/2011    Allergic rhinitis, cause unspecified 2/8/2011    Arrhythmia     PVC    Asymptomatic varicose veins 2/8/2011    Cancer ([facility] [address]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     Carpal tunnel syndrome 2/8/2011    Degenerative Joint Disese ( improved/resolving) 2/8/2011    Degenetrative Dis Disease, 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    Diverticulosis of colon (without mention of hemorrhage) 2/8/2011    Dysphagia 2/8/2011    Edema, peripheral 2/8/2011    GERD (gastroesophageal reflux disease)     GERD Gastro- Esophageal Reflux Disease 2/8/2011    Herniated nucleus pulposus ( slipped disc) 2/8/2011    Menopausal 2/8/2011    PUD (peptic ulcer disease) 2012    Pure hypercholesterolemia 2/8/2011    Recurrent ear pain 2/8/2011    S/P radiation therapy     Scalp lesion 10/23/2014    Sebaceous cyst 4/22/2014    Unspecified cataract 2/8/2011    Unspecified essential hypertension 2/8/2011    Unspecified sleep apnea      Past Surgical History:   Procedure Laterality Date    HX HYSTERECTOMY      HX ORTHOPAEDIC  neck/back 2006    HX OTHER SURGICAL  5/8/2014     Excise recurrent scalp lesion and application of ACell    HX OTHER SURGICAL  5/8/2014    Excise keloid, anterior chest wall, and application of ACell    HX OTHER SURGICAL  5/8/2014     Excise sebaceous cyst, anterior chest wall    HX OTHER SURGICAL  5/8/2014    Punch biopsy, skin lesions, right forearm x2, right calf x1    CO INS NEW/RPLCMT PRM PM W/TRANSV ELTRD ATRIAL&VENT  9/30/2017         CO RMVL IMPLANTABLE PT-ACTIVATED CAR EVENT RECORDER  9/30/2017          Family History   Problem Relation Age of Onset    Hypertension Mother     Stroke Mother     Hypertension Father     Cancer Father         PROSTATE, MELANOMA    Anesth Problems Neg Hx      Social History     Tobacco Use    Smoking status: Never Smoker    Smokeless tobacco: Never Used   Substance Use Topics    Alcohol use: No        Review of Systems:   Constitutional: Negative for fever, chills, weight loss   HEENT: Negative for nosebleeds, vision changes. Respiratory: Negative for cough, hemoptysis, sputum production, and wheezing. Cardiovascular: Negative for orthopnea, claudication,  and PND.  + syncope and palpitation  Gastrointestinal: Negative for constipation, blood in stool and melena. Genitourinary: Negative for dysuria, and hematuria. Musculoskeletal: Negative for myalgias, + arthralgia. Walk with a cane  Skin: + skin discoloration or cancer on scalp  Heme: Does not bleed or bruise easily. Neurological: Negative for speech change and focal weakness      Objective:     Visit Vitals  /70 (BP 1 Location: Left arm, BP Patient Position: Sitting)   Pulse 69   Ht 5' 2\" (1.575 m)   Wt 170 lb (77.1 kg)   BMI 31.09 kg/m²      Physical Exam:   Constitutional: Well-nourished. No distress  Head: Normocephalic and atraumatic. Eyes: Pupils are equal, round  Neck: supple. No JVD present. Cardiovascular: normal rate, regular rhythm and normal heart sounds. Exam reveals no gallop and no friction rub. No murmur heard. Pulmonary/Chest: Effort normal and breath sounds normal.   Abdominal: Soft, mild obesity  Musculoskeletal: LLE wrapped in clean and dry bandage   Neurological: alert, oriented. Skin: Skin is warm and dry. Left-sided pacemaker site is clean no drainage or redness. Psychiatric: normal mood and affect. Behavior is normal. Judgment and thought content normal.       ECG artifacts  Voltage criteria for LVH    Nonspecific T-abnormality  Assessment/Plan:       ICD-10-CM ICD-9-CM    1. Pacemaker Z95.0 V45.01    2. Pain, joint, shoulder, left M25.512 719.41 AMB POC EKG ROUTINE W/ 12 LEADS, INTER & REP   3. 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 Essential hypertension I10 401.9    5. Atrial flutter, unspecified type ([facility]) I48.92 427.32    6. PAF (paroxysmal atrial fibrillation) ([facility]) I48.0 427.31    7. Atrial fibrillation with rapid ventricular response ([facility]) I48.91 427.31    8. Sick sinus syndrome due to SA node dysfunction ([facility]) I49.5 427.81    9. Syncope and collapse R55 780. 2       The patient has episodes of atrial tachycardia, atrial flutter and fibrillation with RVR despite taking propafenone tid and toprol  Before I recommend av node ablation I will increase propafenone to 225 mg tid  She can afford eliquis  I will reduce clonidine to 0.2 mg every day and she will check her BP  I am also concerned that she has syncope due to sudden drop in BP   I discussed with her about pacemaker check today and it shows proper function      Follow-up and Dispositions    · Return in about 6 months (around 10/23/2019). Thank you for involving me in this patient's care and please call with further concerns or questions. Iraida Fam M.D.   Electrophysiology/Cardiology  [facility] [place]  [phone] , [facility] [address]  [address]  [phone]

## 2019-04-23 NOTE — H&P (VIEW-ONLY)
Cardiac Electrophysiology Office Note Subjective: Reno Munroe is an [de-identified] y.o. 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 She had skin cancer removed from her scalp at [facility] and said she will have radiation therapy. She said at night she felt palpitations and she can use toprol low dose to avoid syncope with bradycardia I had stopped the Rythmol because it could have been contributing to the black outs, I was concerned about intermittent bradycardia. Last time she was seen she felt faint and sat down then she broke out in a sweat. She was not taking the Rythmol at this time. Echo 5/2016 LVEF 60 % to 65 %. No RWMA. Mild concentric hypertrophy. Mild TR. External loop recorder 2/2017 showed no AFIB but mild sinus bradycardia and NSR Past Hx: The last event was New Years 2016.   
holter 6/23/2014 sinus rhythm with minimum 39 bpm at 5 pm PVC's , mean HR 50 bpm while she is on medication She was last seen 8/2014 and has missed several follow up appointments. She was seen last for follow up for syncope, she has passed out twice December and Jan 1. She went the ED at [facility] both times. The metoprolol was discontinued and she was started on lisinopril and clonidine for high BP. First episode 12/31/15 occurred while she was sitting on the stool in her kitchen, she started to feel tighten around her head then she LOC and fell off the stool. The next day she had a similar episode, she was doing things around the house and felt dizzy she sat down and put a cold compress on her head.  No LOC. 
Associated symptoms include lightheadedness, dizziness, throbbing pain on the right side of neck and behind the right ear She mentions she had a squamous cell cancer removed from the back of her last year I told her to stop and get stress test which she did and could do only 3 min exercise, cardiolite stress test  with basal to mid inferior wall ischemia LVEF 66% Poor exercise capacity, HR did increase I referred her to Dr Jaylin Wells to see for cardiac cath She had cardiac cath  and it showed 50% LAD, 60% LCx and 100% RCA with left to right collateral so Dr Jaylin Wells only recommended medical therapies She had seen Dr. Priya Edwards and had venous duplex without DVT. Echo with normal LVEF and inferior wall hypokinesis in 2014 Mother with stroke and HBP and heart disease death at age 77 Father with cancer at age 68 Sister with cancer  at age of 76 Current Outpatient Medications Medication Sig Dispense Refill  cloNIDine HCl (CATAPRES) 0.1 mg tablet Take 1 Tab by mouth daily. 90 Tab 1  propafenone (RYTHMOL) 225 mg tablet Take 1 Tab by mouth every eight (8) hours. Indications: Prevention of Recurrent Atrial Fibrillation 270 Tab 1  rivaroxaban (XARELTO) 15 mg tab tablet Take 1 Tab by mouth daily (with dinner). 90 Tab 1  
 meclizine (ANTIVERT) 25 mg tablet TAKE 1 TABLET THREE TIMES DAILY AS NEEDED 270 Tab 3  
 fluticasone (FLONASE) 50 mcg/actuation nasal spray USE 2 SPRAYS IN EACH NOSTRIL EVERY DAY 48 g 12  
 omeprazole (PRILOSEC) 40 mg capsule TAKE 1 CAPSULE EVERY DAY 90 Cap 3  potassium chloride SR (KLOR-CON 10) 10 mEq tablet TAKE 1 TABLET EVERY DAY 90 Tab 3  
 raNITIdine (ZANTAC) 150 mg tablet TAKE 1 TABLET TWICE DAILY 180 Tab 3  
 atorvastatin (LIPITOR) 40 mg tablet TAKE 1 TABLET EVERY DAY 90 Tab 3  furosemide (LASIX) 20 mg tablet TAKE 1 TABLET BY MOUTH DAILY 90 Tab 1  
 metoprolol succinate (TOPROL-XL) 100 mg tablet Take 1 Tab by mouth daily. 30 Tab 5  acetaminophen (TYLENOL) 325 mg tablet Take 325 mg by mouth every four (4) hours as needed for Pain.  MULTIVIT WITH CALCIUM,IRON,MIN [facility] MULTIPLE VITAMINS PO) Take  by mouth daily.  aspirin delayed-release 81 mg tablet Take  by mouth daily.  gabapentin (NEURONTIN) 300 mg capsule Take 1 Cap by mouth three (3) times daily. [address] Allergies Allergen Reactions  Latex Rash  Bactrim [Sulfamethoprim Ds] Hives  Aspirin Other (comments) Anything higher than 81mg makes pt jittery  Codeine Hives and Itching  Iodinated Contrast- Oral And Iv Dye Itching  Pcn [Penicillins] Hives and Itching  Tape [Adhesive] Rash Past Medical History:  
Diagnosis Date  Acute pharyngitis 2/8/2011  Allergic rhinitis, cause unspecified 2/8/2011  Arrhythmia PVC  Asymptomatic varicose veins 2/8/2011  Cancer ([facility] [address] 2009  
 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  
Procedure Laterality Date  HX HYSTERECTOMY  HX ORTHOPAEDIC  neck/back 2006  HX OTHER SURGICAL  5/8/2014 Excise recurrent scalp lesion and application of ACell  HX OTHER SURGICAL  5/8/2014 Excise keloid, anterior chest wall, and application of ACell  HX OTHER SURGICAL  5/8/2014 Excise sebaceous cyst, anterior chest wall  HX OTHER SURGICAL  5/8/2014 Punch biopsy, skin lesions, right forearm x2, right calf x1  
 RI INS NEW/RPLCMT PRM PM W/TRANSV ELTRD ATRIAL&VENT  9/30/2017  RI RMVL IMPLANTABLE PT-ACTIVATED CAR EVENT RECORDER  9/30/2017 Family History Problem Relation Age of Onset  Hypertension Mother  Stroke Mother  Hypertension Father  Cancer Father PROSTATE, MELANOMA  Anesth Problems Neg Hx Social History Tobacco Use  Smoking status: Never Smoker  Smokeless tobacco: Never Used Substance Use Topics  Alcohol use: No  
  
 
Review of Systems:  
Constitutional: Negative for fever, chills, weight loss HEENT: Negative for nosebleeds, vision changes. Respiratory: Negative for cough, hemoptysis, sputum production, and wheezing. Cardiovascular: Negative for orthopnea, claudication,  and PND.  + syncope and palpitation Gastrointestinal: Negative for constipation, blood in stool and melena. Genitourinary: Negative for dysuria, and hematuria. Musculoskeletal: Negative for myalgias, + arthralgia. Walk with a cane Skin: + skin discoloration or cancer on scalp Heme: Does not bleed or bruise easily. Neurological: Negative for speech change and focal weakness Objective:  
 
Visit Vitals /70 (BP 1 Location: Left arm, BP Patient Position: Sitting) Pulse 69 Ht 5' 2\" (1.575 m) Wt 170 lb (77.1 kg) BMI 31.09 kg/m² Physical Exam:  
Constitutional: Well-nourished. No distress Head: Normocephalic and atraumatic. Eyes: Pupils are equal, round Neck: supple. No JVD present. Cardiovascular: normal rate, regular rhythm and normal heart sounds. Exam reveals no gallop and no friction rub. No murmur heard. Pulmonary/Chest: Effort normal and breath sounds normal.  
Abdominal: Soft, mild obesity Musculoskeletal: LLE wrapped in clean and dry bandage Neurological: alert, oriented. Skin: Skin is warm and dry. Left-sided pacemaker site is clean no drainage or redness. Psychiatric: normal mood and affect. Behavior is normal. Judgment and thought content normal.   
  
ECG artifacts Voltage criteria for LVH Nonspecific T-abnormality Assessment/Plan: ICD-10-CM ICD-9-CM 1. Pacemaker Z95.0 V45.01   
2. Pain, joint, shoulder, left M25.512 719.41 AMB POC EKG ROUTINE W/ 12 LEADS, INTER & REP 3. 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   
4. Essential hypertension I10 401.9 5. Atrial flutter, unspecified type ([facility]) I48.92 427.32   
6. PAF (paroxysmal atrial fibrillation) ([facility]) I48.0 427.31   
7. Atrial fibrillation with rapid ventricular response (HCC) I48.91 427.31   
8. Sick sinus syndrome due to SA node dysfunction ([facility]) I49.5 427.81   
9. Syncope and collapse R55 780. 2 The patient has episodes of atrial tachycardia, atrial flutter and fibrillation with RVR despite taking propafenone tid and toprol Before I recommend av node ablation I will increase propafenone to 225 mg tid She can afford eliquis I will reduce clonidine to 0.2 mg every day and she will check her BP I am also concerned that she has syncope due to sudden drop in BP I discussed with her about pacemaker check today and it shows proper function Follow-up and Dispositions · Return in about 6 months (around 10/23/2019). Thank you for involving me in this patient's care and please call with further concerns or questions. Khloe Wong M.D. Electrophysiology/Cardiology [address] [facility] [place] [address], [facility] [address] [place] [address], [address] [place] [address] 
[phone]

## 2019-04-23 NOTE — TELEPHONE ENCOUNTER
Pt called in and stated that she has blacked out x 3 times. Pt stated that if she is able to sit down she doesn't black out. Pt unable to send a remote transmission she only does in clinic check. Pt was scheduled to come in today for the syncope.      Future Appointments   Date Time Provider Nasim Cerrato   4/23/2019  2:15  [facility], [address] [facility]   4/23/2019  3:00 PM Betzaida Hernandez  [address]   7/16/2019  1:30  [facility], [address] [facility]   9/26/2019  4:00 PM Betzaida Hernandez  [address]

## 2019-04-23 NOTE — PROGRESS NOTES
Chief Complaint   Patient presents with    Dizziness     Verified patient with two types of identifiers. Verified medications with the patient. Verified patient's pharmacy     Per Dr Hayley Edmondson discontinued all medications not taken.

## 2019-04-29 ENCOUNTER — TELEPHONE (OUTPATIENT)
Dept: CARDIOLOGY CLINIC | Age: 79
End: 2019-04-29

## 2019-04-29 DIAGNOSIS — I48.91 ATRIAL FIBRILLATION WITH RAPID VENTRICULAR RESPONSE (HCC): ICD-10-CM

## 2019-04-29 DIAGNOSIS — I48.92 ATRIAL FLUTTER, UNSPECIFIED TYPE (HCC): ICD-10-CM

## 2019-04-29 DIAGNOSIS — I49.5 TACHY-BRADY SYNDROME (HCC): Primary | ICD-10-CM

## 2019-04-29 DIAGNOSIS — I48.0 PAF (PAROXYSMAL ATRIAL FIBRILLATION) (HCC): ICD-10-CM

## 2019-04-29 DIAGNOSIS — I47.1 PAT (PAROXYSMAL ATRIAL TACHYCARDIA) (HCC): ICD-10-CM

## 2019-04-29 NOTE — TELEPHONE ENCOUNTER
Per Dr. Ace Rubio, please schedule patient for AV node ablation. The intent is to be able to stop Toprol XL afterward, as he suspects that she has orthostatic hypotension. She can stop propafenone now, but still needs the Toprol XL for rate control until the procedure can be done.

## 2019-04-29 NOTE — TELEPHONE ENCOUNTER
Called patient. Two patient indentifiers verified. Pt stated that she had an episode on Saturday. She stated that she was on the phone with her sister and just black out. She stated that she didn't have a warning at all and just passed out. Pt stated that she has made the changes to medications that Dr. Mynor Simmons changed last week. Pt stated that she feels fine today. Pt was asked to send a remote transmission but unable to because she doesn't have a remote box at home. Pt denies having any dizziness or light headedness. Pt was informed that a message will be sent to MD/NP. Pt verbalized understanding and denies any further questions at this time.

## 2019-04-29 NOTE — TELEPHONE ENCOUNTER
Returned call to patient. Informed her of message. Pt verbalized understanding. Pt has been scheduled for AV node ablation for 7/10/19. Instructions were given to patient over the phone but will also mail out lab slip and instructions to patient. Address verified with patient.

## 2019-04-29 NOTE — LETTER
4/29/2019 2:03 PM 
 
Ms. Ky Malik [address] [facility] [id number] Your AV node ablation procedure has been scheduled for 7/10/19 at 8:00 am, at [facility]. 
 
Please report to Admitting Department by 6:30 am, or 2 hours prior to your scheduled procedure. Please bring a list of your current medications and medication bottles, if able, to the hospital on this day. You will be unable to drive after your procedure so please make sure to bring someone with you to your procedure. You will need to have nothing to eat or drink after midnight, the night prior to your procedure. You may have small sips of water, if needed, to take with your medication. You will need labs drawn prior to your procedure. Please go to [facility] to have this done no sooner than 6/10/19 and no later than 7/6/19. You should not stop your medication days prior to your scheduled procedure. After your procedure, you will need to follow up with Dr. Abby Pinto.  Your follow-up appointment has been scheduled for 8/6/19 at 1:30 pm.  
 
 
 
 
 
Sincerely, 
 
 
Donavan Stokes MD

## 2019-04-29 NOTE — TELEPHONE ENCOUNTER
Pt son, Franklyn Rodriguez called requesting a nurse check on his mother since her last black out, since her last black out this last Saturday. Pt son states his mother has a way of covering up her symptoms and he is afraid she may loose her  license.    Pt son can be reached at for further details Phone # [phone]  Thanks

## 2019-05-02 ENCOUNTER — OFFICE VISIT (OUTPATIENT)
Dept: INTERNAL MEDICINE CLINIC | Age: 79
End: 2019-05-02

## 2019-05-02 VITALS
RESPIRATION RATE: 18 BRPM | OXYGEN SATURATION: 98 % | DIASTOLIC BLOOD PRESSURE: 60 MMHG | BODY MASS INDEX: 31.28 KG/M2 | TEMPERATURE: 97.8 F | HEIGHT: 62 IN | HEART RATE: 65 BPM | WEIGHT: 170 LBS | SYSTOLIC BLOOD PRESSURE: 100 MMHG

## 2019-05-02 DIAGNOSIS — E78.00 HYPERCHOLESTEREMIA: ICD-10-CM

## 2019-05-02 DIAGNOSIS — I49.5 TACHY-BRADY SYNDROME (HCC): ICD-10-CM

## 2019-05-02 DIAGNOSIS — I10 ESSENTIAL HYPERTENSION: ICD-10-CM

## 2019-05-02 DIAGNOSIS — I49.5 SSS (SICK SINUS SYNDROME) (HCC): Primary | ICD-10-CM

## 2019-05-02 DIAGNOSIS — I25.10 CORONARY ARTERY DISEASE INVOLVING NATIVE CORONARY ARTERY OF NATIVE HEART WITHOUT ANGINA PECTORIS: ICD-10-CM

## 2019-05-02 DIAGNOSIS — R55 SYNCOPE, UNSPECIFIED SYNCOPE TYPE: ICD-10-CM

## 2019-05-02 NOTE — PATIENT INSTRUCTIONS
[employer] Activation    Thank you for requesting access to [employer]. Please follow the instructions below to securely access and download your online medical record. Zopa allows you to send messages to your doctor, view your test results, renew your prescriptions, schedule appointments, and more. How Do I Sign Up? 1. In your internet browser, go to [email or web address]  2. Click on the First Time User? Click Here link in the Sign In box. You will be redirect to the New Member Sign Up page. 3. Enter your Zopa Access Code exactly as it appears below. You will not need to use this code after youve completed the sign-up process. If you do not sign up before the expiration date, you must request a new code. Zopa Access Code: Activation code not generated  Current Zopa Status: Patient Declined (This is the date your Zopa access code will )    4. Enter the last four digits of your Social Security Number (xxxx) and Date of Birth (mm/dd/yyyy) as indicated and click Submit. You will be taken to the next sign-up page. 5. Create a Zopa ID. This will be your Zopa login ID and cannot be changed, so think of one that is secure and easy to remember. 6. Create a Zopa password. You can change your password at any time. 7. Enter your Password Reset Question and Answer. This can be used at a later time if you forget your password. 8. Enter your e-mail address. You will receive e-mail notification when new information is available in [address] 9. Click Sign Up. You can now view and download portions of your medical record. 10. Click the Download Summary menu link to download a portable copy of your medical information. Additional Information    If you have questions, please visit the Frequently Asked Questions section of the Zopa website at [email or web address] Remember, Zopa is NOT to be used for urgent needs. For medical emergencies, dial 911.

## 2019-05-02 NOTE — PROGRESS NOTES
1. Have you been to the ER, urgent care clinic since your last visit? Hospitalized since your last visit?no    2. Have you seen or consulted any other health care providers outside of the [address] [place] [address] since your last visit? Include any pap smears or colon screening.  No    3 most recent PHQ Screens 5/2/2019   PHQ Not Done -   Little interest or pleasure in doing things Not at all   Feeling down, depressed, irritable, or hopeless Not at all   Total Score PHQ 2 0       Chief Complaint   Patient presents with    Fall    Loss of Consciousness

## 2019-05-02 NOTE — ACP (ADVANCE CARE PLANNING)
Chief Complaint   Patient presents with    Fall    Loss of Consciousness     3 most recent PHQ Screens 5/2/2019   PHQ Not Done -   Little interest or pleasure in doing things Not at all   Feeling down, depressed, irritable, or hopeless Not at all   Total Score PHQ 2 0     1. Have you been to the ER, urgent care clinic since your last visit? Hospitalized since your last visit?no    2. Have you seen or consulted any other health care providers outside of the [address] [place] [address] since your last visit? Include any pap smears or colon screening.  no

## 2019-05-02 NOTE — PROGRESS NOTES
Ky Malik is a [de-identified] y.o. female and presents with Fall; Loss of Consciousness; and Urinary Frequency  . Subjective:    She on multiple occasions has had a blackout hitting the floor without any warning.her past hstory has been reviewed      Hypertension Review:  The patient has essential hypertension  Diet and Lifestyle: generally follows a  low sodium diet, exercises sporadically  Home BP Monitoring: is not measured at home. Pertinent ROS: taking medications as instructed, no medication side effects noted, no TIA's, no chest pain on exertion, no dyspnea on exertion, no swelling of ankles. She has a history of sick sinus syndrome,she has a pacemaker in place  CAD and is followed by cardiology. Dyslipidemia Review:  Patient presents for evaluation of lipids. Compliance with treatment thus far has been excellent. A repeat fasting lipid profile was done. The patient does not use medications that may worsen dyslipidemias (corticosteroids, progestins, anabolic steroids, diuretics, beta-blockers, amiodarone, cyclosporine, olanzapine). The patient exercises some    She has been placed on Eliquis. GERD Review:   Patient has a history of gastroesophageal reflux with heartburn. Symptoms have been present for a few months. She denies dysphagia. She  has not lost weight. She denies melena, hematochezia, hematemesis, and coffee ground emesis. This has been associated with fullness after meals. She denies abdominal bloating and none. Medical therapy in the past has included proton pump inhibitor    Review of Systems  Constitutional: negative for fevers, chills, anorexia and weight loss  Eyes:   negative for visual disturbance and irritation  ENT:   negative for tinnitus,sore throat,nasal congestion,ear pains. hoarseness  Respiratory:  negative for cough, hemoptysis, dyspnea,wheezing  CV:   negative for chest pain, palpitations, lower extremity edema  GI:   negative for nausea, vomiting, diarrhea, abdominal pain,melena  Endo:               negative for polyuria,polydipsia,polyphagia,heat intolerance  Genitourinary: negative for frequency, dysuria and hematuria  Integument:  negative for rash and pruritus  Hematologic:  negative for easy bruising and gum/nose bleeding  Musculoskel: negative for myalgias, arthralgias, back pain, muscle weakness, joint pain  Neurological:  negative for headaches, dizziness, vertigo, memory problems and gait   Behavl/Psych: negative for feelings of anxiety, depression, mood changes    Past Medical History:   Diagnosis Date    Acute pharyngitis 2/8/2011    Allergic rhinitis, cause unspecified 2/8/2011    Arrhythmia     PVC    Asymptomatic varicose veins 2/8/2011    Cancer ([facility] [address]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     Carpal tunnel syndrome 2/8/2011    Degenerative Joint Disese ( improved/resolving) 2/8/2011    Degenetrative Dis Disease, 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    Diverticulosis of colon (without mention of hemorrhage) 2/8/2011    Dysphagia 2/8/2011    Edema, peripheral 2/8/2011    GERD (gastroesophageal reflux disease)     GERD Gastro- Esophageal Reflux Disease 2/8/2011    Herniated nucleus pulposus ( slipped disc) 2/8/2011    Menopausal 2/8/2011    PUD (peptic ulcer disease) 2012    Pure hypercholesterolemia 2/8/2011    Recurrent ear pain 2/8/2011    S/P radiation therapy     Scalp lesion 10/23/2014    Sebaceous cyst 4/22/2014    Unspecified cataract 2/8/2011    Unspecified essential hypertension 2/8/2011    Unspecified sleep apnea      Past Surgical History:   Procedure Laterality Date    HX HYSTERECTOMY      HX ORTHOPAEDIC  neck/back 2006    HX OTHER SURGICAL  5/8/2014     Excise recurrent scalp lesion and application of ACell    HX OTHER SURGICAL  5/8/2014    Excise keloid, anterior chest wall, and application of ACell    HX OTHER SURGICAL  5/8/2014     Excise sebaceous cyst, anterior chest wall    HX OTHER SURGICAL  5/8/2014    Punch biopsy, skin lesions, right forearm x2, right calf x1    UT INS NEW/RPLCMT PRM PM W/TRANSV ELTRD ATRIAL&VENT  9/30/2017         UT RMVL IMPLANTABLE PT-ACTIVATED CAR EVENT RECORDER  9/30/2017          Social History     Socioeconomic History    Marital status:      Spouse name: Not on file    Number of children: Not on file    Years of education: Not on file    Highest education level: Not on file   Tobacco Use    Smoking status: Never Smoker    Smokeless tobacco: Never Used   Substance and Sexual Activity    Alcohol use: No    Drug use: No    Sexual activity: Yes     Partners: Male     Birth control/protection: None     Family History   Problem Relation Age of Onset    Hypertension Mother     Stroke Mother     Hypertension Father     Cancer Father         PROSTATE, MELANOMA    Anesth Problems Neg Hx      Current Outpatient Medications   Medication Sig Dispense Refill    propafenone (RYTHMOL) 225 mg tablet Take 1 Tab by mouth every eight (8) hours. Indications: Prevention of Recurrent Atrial Fibrillation 270 Tab 1    rivaroxaban (XARELTO) 15 mg tab tablet Take 1 Tab by mouth daily (with dinner). 90 Tab 1    omeprazole (PRILOSEC) 40 mg capsule TAKE 1 CAPSULE EVERY DAY 90 Cap 3    potassium chloride SR (KLOR-CON 10) 10 mEq tablet TAKE 1 TABLET EVERY DAY 90 Tab 3    atorvastatin (LIPITOR) 40 mg tablet TAKE 1 TABLET EVERY DAY 90 Tab 3    gabapentin (NEURONTIN) 300 mg capsule Take 1 Cap by mouth three (3) times daily. 270 Cap 3    acetaminophen (TYLENOL) 325 mg tablet Take 325 mg by mouth every four (4) hours as needed for Pain.  MULTIVIT WITH CALCIUM,IRON,MIN [facility] MULTIPLE VITAMINS PO) Take  by mouth daily.  aspirin delayed-release 81 mg tablet Take  by mouth daily.       meclizine (ANTIVERT) 25 mg tablet TAKE 1 TABLET THREE TIMES DAILY AS NEEDED 270 Tab 3    fluticasone (FLONASE) 50 mcg/actuation nasal spray USE 2 SPRAYS IN EACH NOSTRIL EVERY DAY 48 g 12    raNITIdine (ZANTAC) 150 mg tablet TAKE 1 TABLET TWICE DAILY 180 Tab 3    furosemide (LASIX) 20 mg tablet TAKE 1 TABLET BY MOUTH DAILY 90 Tab 1     Allergies   Allergen Reactions    Latex Rash    Bactrim [Sulfamethoprim Ds] Hives    Aspirin Other (comments)     Anything higher than 81mg makes pt jittery    Codeine Hives and Itching    Iodinated Contrast- Oral And Iv Dye Itching    Pcn [Penicillins] Hives and Itching    Tape [Adhesive] Rash       Objective:  Visit Vitals  /60 (BP 1 Location: Left arm, BP Patient Position: Sitting)   Pulse 65   Temp 97.8 °F (36.6 °C) (Oral)   Resp 18   Ht 5' 2\" (1.575 m)   Wt 170 lb (77.1 kg)   SpO2 98%   BMI 31.09 kg/m²     Physical Exam:   General appearance - alert, well appearing, and in no distress  Mental status - alert, oriented to person, place, and time  EYE-DARLENE, EOMI, corneas normal, no foreign bodies  ENT-ENT exam normal, no neck nodes or sinus tenderness  Nose - normal and patent, no erythema, discharge or polyps  Mouth - mucous membranes moist, pharynx normal without lesions  Neck - supple, no significant adenopathy   Chest - clear to auscultation, no wheezes, rales or rhonchi, symmetric air entry   Heart - regular rhythm rhythm, , no murmurs, rubs, clicks or gallops   Abdomen - soft, nontender, nondistended, no masses or organomegaly  Lymph- no adenopathy palpable  Ext-peripheral pulses normal, no pedal edema, no clubbing or cyanosis  Skin-Warm and dry.  no hyperpigmentation, vitiligo, or suspicious lesions  Neuro -alert, oriented, normal speech, no focal findings or movement disorder noted  Neck-normal C-spine, no tenderness, full ROM without pain  Feet-no nail deformities or callus formation with good pulses noted      Results for orders placed or performed during the hospital encounter of 03/09/19   CBC WITH AUTOMATED DIFF   Result Value Ref Range    WBC 5.1 3.6 - 11.0 K/uL    RBC 4.35 3.80 - 5.20 M/uL    HGB 10.7 (L) 11.5 - 16.0 g/dL    HCT 33.9 (L) 35.0 - 47.0 %    MCV 77.9 (L) 80.0 - 99.0 FL    MCH 24.6 (L) 26.0 - 34.0 PG    MCHC 31.6 30.0 - 36.5 g/dL    RDW 18.4 (H) 11.5 - 14.5 %    PLATELET 808 800 - 755 K/uL    MPV 11.1 8.9 - 12.9 FL    NRBC 0.0 0  WBC    ABSOLUTE NRBC 0.00 0.00 - 0.01 K/uL    NEUTROPHILS 67 32 - 75 %    LYMPHOCYTES 19 12 - 49 %    MONOCYTES 9 5 - 13 %    EOSINOPHILS 4 0 - 7 %    BASOPHILS 1 0 - 1 %    IMMATURE GRANULOCYTES 0 0.0 - 0.5 %    ABS. NEUTROPHILS 3.5 1.8 - 8.0 K/UL    ABS. LYMPHOCYTES 1.0 0.8 - 3.5 K/UL    ABS. MONOCYTES 0.5 0.0 - 1.0 K/UL    ABS. EOSINOPHILS 0.2 0.0 - 0.4 K/UL    ABS. BASOPHILS 0.0 0.0 - 0.1 K/UL    ABS. IMM. GRANS. 0.0 0.00 - 0.04 K/UL    DF AUTOMATED     METABOLIC PANEL, COMPREHENSIVE   Result Value Ref Range    Sodium 147 (H) 136 - 145 mmol/L    Potassium 4.9 3.5 - 5.1 mmol/L    Chloride 109 (H) 97 - 108 mmol/L    CO2 31 21 - 32 mmol/L    Anion gap 7 5 - 15 mmol/L    Glucose 74 65 - 100 mg/dL    BUN 30 (H) 6 - 20 MG/DL    Creatinine 1.12 (H) 0.55 - 1.02 MG/DL    BUN/Creatinine ratio 27 (H) 12 - 20      GFR est AA 57 (L) >60 ml/min/1.73m2    GFR est non-AA 47 (L) >60 ml/min/1.73m2    Calcium 8.3 (L) 8.5 - 10.1 MG/DL    Bilirubin, total 0.4 0.2 - 1.0 MG/DL    ALT (SGPT) 24 12 - 78 U/L    AST (SGOT) 40 (H) 15 - 37 U/L    Alk.  phosphatase 86 45 - 117 U/L    Protein, total 6.4 6.4 - 8.2 g/dL    Albumin 3.0 (L) 3.5 - 5.0 g/dL    Globulin 3.4 2.0 - 4.0 g/dL    A-G Ratio 0.9 (L) 1.1 - 2.2     TROPONIN I   Result Value Ref Range    Troponin-I, Qt. <0.05 <0.05 ng/mL   CK W/ CKMB & INDEX   Result Value Ref Range     (H) 26 - 192 U/L    CK - MB 2.4 <3.6 NG/ML    CK-MB Index 0.8 0.0 - 2.5     EKG, 12 LEAD, INITIAL   Result Value Ref Range    Ventricular Rate 65 BPM    Atrial Rate 65 BPM    P-R Interval 390 ms    QRS Duration 80 ms    Q-T Interval 436 ms    QTC Calculation (Bezet) 453 ms    Calculated P Axis 38 degrees    Calculated R Axis 2 degrees    Calculated T Axis -45 degrees    Diagnosis       Sinus rhythm with 1st degree AV block  Moderate voltage criteria for LVH, may be normal variant  Nonspecific T wave abnormality Inferolateral leads  When compared with ECG of 18-NOV-2014 17:10,  QT has lengthened  Confirmed by Deidre Beasley ([id number]) on 3/10/2019 7:05:00 PM         Assessment/Plan:    ICD-10-CM ICD-9-CM    1. SSS (sick sinus syndrome) ([facility] [place]) I49.5 427.81    2. 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 Hypercholesteremia E78.00 272.0    4. Coronary artery disease involving native coronary artery of native heart without angina pectoris I25.10 414.01    5. Essential hypertension I10 401.9    6. Syncope, unspecified syncope type R55 780.2      No orders of the defined types were placed in this encounter. lose weight, increase physical activity, follow low fat diet, have labs drawn prior to ROV, ,Take 81mg aspirin daily  There are no Patient Instructions on file for this visit. Discussed situation with BENNETT. I have reviewed with the patient details of the assessment and plan and all questions were answered. Relevent patient education was performed. The most recent lab findings were reviewed with the patient. An After Visit Summary was printed and given to the patient.

## 2019-05-03 ENCOUNTER — TELEPHONE (OUTPATIENT)
Dept: CARDIOLOGY CLINIC | Age: 79
End: 2019-05-03

## 2019-05-03 RX ORDER — SODIUM CHLORIDE 0.9 % (FLUSH) 0.9 %
5-40 SYRINGE (ML) INJECTION EVERY 8 HOURS
Status: CANCELLED | OUTPATIENT
Start: 2019-05-03

## 2019-05-03 RX ORDER — SODIUM CHLORIDE 0.9 % (FLUSH) 0.9 %
5-40 SYRINGE (ML) INJECTION AS NEEDED
Status: CANCELLED | OUTPATIENT
Start: 2019-05-03

## 2019-05-03 NOTE — TELEPHONE ENCOUNTER
9:30 am procedure arrive 730-8 am.     Left message for patient. Call son Brittni Gomes. Informed him of being able to move procedure up to be done on Monday. He was given instructions over the phone. He stated that he would make sure she is at [facility] on Monday for the procedure. Son verbalized understanding and denies any further questions at this time.

## 2019-05-03 NOTE — TELEPHONE ENCOUNTER
Verified patient with two types of identifiers. Notified patient that AV node ablation has been scheduled for 5/6/19 at 9:30 am. Reviewed pre-procedure instructions with patient. Notified patient [facility], RN also spoke with patient's son and gave him all the information as well.  Patient verbalized understanding and will call with any other questions.    ]

## 2019-05-06 ENCOUNTER — HOSPITAL ENCOUNTER (OUTPATIENT)
Age: 79
Setting detail: OUTPATIENT SURGERY
Discharge: HOME | End: 2019-05-06
Attending: INTERNAL MEDICINE | Admitting: INTERNAL MEDICINE
Payer: MEDICARE

## 2019-05-06 VITALS
RESPIRATION RATE: 20 BRPM | HEART RATE: 70 BPM | TEMPERATURE: 98.4 F | BODY MASS INDEX: 31.28 KG/M2 | SYSTOLIC BLOOD PRESSURE: 178 MMHG | DIASTOLIC BLOOD PRESSURE: 92 MMHG | OXYGEN SATURATION: 96 % | WEIGHT: 170 LBS | HEIGHT: 62 IN

## 2019-05-06 DIAGNOSIS — Z95.0 CARDIAC PACEMAKER IN SITU: ICD-10-CM

## 2019-05-06 PROBLEM — I44.2 COMPLETE AV BLOCK DUE TO AV NODAL ABLATION (HCC): Status: ACTIVE | Noted: 2019-05-06

## 2019-05-06 PROBLEM — I97.190 COMPLETE AV BLOCK DUE TO AV NODAL ABLATION (HCC): Status: ACTIVE | Noted: 2019-05-06

## 2019-05-06 PROBLEM — I48.0 PAF (PAROXYSMAL ATRIAL FIBRILLATION) (HCC): Status: ACTIVE | Noted: 2019-05-06

## 2019-05-06 PROBLEM — I48.91 ATRIAL FIBRILLATION WITH RAPID VENTRICULAR RESPONSE (HCC): Status: ACTIVE | Noted: 2019-05-06

## 2019-05-06 LAB
ANION GAP SERPL CALC-SCNC: 3 MMOL/L (ref 5–15)
BASOPHILS # BLD: 0.1 K/UL (ref 0–0.1)
BASOPHILS NFR BLD: 1 % (ref 0–1)
BUN SERPL-MCNC: 26 MG/DL (ref 6–20)
BUN/CREAT SERPL: 22 (ref 12–20)
CALCIUM SERPL-MCNC: 9 MG/DL (ref 8.5–10.1)
CHLORIDE SERPL-SCNC: 112 MMOL/L (ref 97–108)
CO2 SERPL-SCNC: 29 MMOL/L (ref 21–32)
CREAT SERPL-MCNC: 1.2 MG/DL (ref 0.55–1.02)
DIFFERENTIAL METHOD BLD: ABNORMAL
EOSINOPHIL # BLD: 0.2 K/UL (ref 0–0.4)
EOSINOPHIL NFR BLD: 4 % (ref 0–7)
ERYTHROCYTE [DISTWIDTH] IN BLOOD BY AUTOMATED COUNT: 18.6 % (ref 11.5–14.5)
GLUCOSE SERPL-MCNC: 82 MG/DL (ref 65–100)
HCT VFR BLD AUTO: 38.6 % (ref 35–47)
HGB BLD-MCNC: 11.9 G/DL (ref 11.5–16)
IMM GRANULOCYTES # BLD AUTO: 0 K/UL (ref 0–0.04)
IMM GRANULOCYTES NFR BLD AUTO: 0 % (ref 0–0.5)
LYMPHOCYTES # BLD: 1.1 K/UL (ref 0.8–3.5)
LYMPHOCYTES NFR BLD: 21 % (ref 12–49)
MCH RBC QN AUTO: 24.8 PG (ref 26–34)
MCHC RBC AUTO-ENTMCNC: 30.8 G/DL (ref 30–36.5)
MCV RBC AUTO: 80.4 FL (ref 80–99)
MONOCYTES # BLD: 0.5 K/UL (ref 0–1)
MONOCYTES NFR BLD: 10 % (ref 5–13)
NEUTS SEG # BLD: 3.4 K/UL (ref 1.8–8)
NEUTS SEG NFR BLD: 64 % (ref 32–75)
NRBC # BLD: 0 K/UL (ref 0–0.01)
NRBC BLD-RTO: 0 PER 100 WBC
PLATELET # BLD AUTO: 274 K/UL (ref 150–400)
PMV BLD AUTO: 10.6 FL (ref 8.9–12.9)
POTASSIUM SERPL-SCNC: 4.8 MMOL/L (ref 3.5–5.1)
RBC # BLD AUTO: 4.8 M/UL (ref 3.8–5.2)
SODIUM SERPL-SCNC: 144 MMOL/L (ref 136–145)
WBC # BLD AUTO: 5.3 K/UL (ref 3.6–11)

## 2019-05-06 PROCEDURE — 74011250636 HC RX REV CODE- 250/636

## 2019-05-06 PROCEDURE — 99152 MOD SED SAME PHYS/QHP 5/>YRS: CPT | Performed by: INTERNAL MEDICINE

## 2019-05-06 PROCEDURE — 36415 COLL VENOUS BLD VENIPUNCTURE: CPT

## 2019-05-06 PROCEDURE — 74011250636 HC RX REV CODE- 250/636: Performed by: INTERNAL MEDICINE

## 2019-05-06 PROCEDURE — C1733 CATH, EP, OTHR THAN COOL-TIP: HCPCS | Performed by: INTERNAL MEDICINE

## 2019-05-06 PROCEDURE — 77030039046 HC PAD DEFIB RADIOTRNSPNT CNMD -B: Performed by: INTERNAL MEDICINE

## 2019-05-06 PROCEDURE — 93650 ICAR CATH ABLTJ AV NODE FUNC: CPT | Performed by: INTERNAL MEDICINE

## 2019-05-06 PROCEDURE — 85025 COMPLETE CBC W/AUTO DIFF WBC: CPT

## 2019-05-06 PROCEDURE — 77030003390 HC NDL ANGI MRTM -A: Performed by: INTERNAL MEDICINE

## 2019-05-06 PROCEDURE — 99153 MOD SED SAME PHYS/QHP EA: CPT | Performed by: INTERNAL MEDICINE

## 2019-05-06 PROCEDURE — 77030010872 HC CBL EP BSC -C: Performed by: INTERNAL MEDICINE

## 2019-05-06 PROCEDURE — C1893 INTRO/SHEATH, FIXED,NON-PEEL: HCPCS | Performed by: INTERNAL MEDICINE

## 2019-05-06 PROCEDURE — 80048 BASIC METABOLIC PNL TOTAL CA: CPT

## 2019-05-06 RX ORDER — SODIUM CHLORIDE 9 MG/ML
25 INJECTION, SOLUTION INTRAVENOUS CONTINUOUS
Status: DISCONTINUED | OUTPATIENT
Start: 2019-05-06 | End: 2019-05-06 | Stop reason: HOSPADM

## 2019-05-06 RX ORDER — ACETAMINOPHEN 325 MG/1
650 TABLET ORAL
Status: DISCONTINUED | OUTPATIENT
Start: 2019-05-06 | End: 2019-05-06 | Stop reason: HOSPADM

## 2019-05-06 RX ORDER — SODIUM CHLORIDE 0.9 % (FLUSH) 0.9 %
5-40 SYRINGE (ML) INJECTION EVERY 8 HOURS
Status: DISCONTINUED | OUTPATIENT
Start: 2019-05-06 | End: 2019-05-06 | Stop reason: HOSPADM

## 2019-05-06 RX ORDER — MIDAZOLAM HYDROCHLORIDE 1 MG/ML
INJECTION, SOLUTION INTRAMUSCULAR; INTRAVENOUS AS NEEDED
Status: DISCONTINUED | OUTPATIENT
Start: 2019-05-06 | End: 2019-05-06 | Stop reason: HOSPADM

## 2019-05-06 RX ORDER — METOPROLOL SUCCINATE 100 MG/1
100 TABLET, EXTENDED RELEASE ORAL DAILY
Status: ON HOLD | COMMUNITY
End: 2020-07-05

## 2019-05-06 RX ORDER — LABETALOL HYDROCHLORIDE 5 MG/ML
20 INJECTION, SOLUTION INTRAVENOUS ONCE
Status: COMPLETED | OUTPATIENT
Start: 2019-05-06 | End: 2019-05-06

## 2019-05-06 RX ORDER — FENTANYL CITRATE 50 UG/ML
INJECTION, SOLUTION INTRAMUSCULAR; INTRAVENOUS AS NEEDED
Status: DISCONTINUED | OUTPATIENT
Start: 2019-05-06 | End: 2019-05-06 | Stop reason: HOSPADM

## 2019-05-06 RX ORDER — SODIUM CHLORIDE 0.9 % (FLUSH) 0.9 %
5-40 SYRINGE (ML) INJECTION AS NEEDED
Status: DISCONTINUED | OUTPATIENT
Start: 2019-05-06 | End: 2019-05-06 | Stop reason: HOSPADM

## 2019-05-06 RX ORDER — ONDANSETRON 2 MG/ML
4 INJECTION INTRAMUSCULAR; INTRAVENOUS
Status: DISCONTINUED | OUTPATIENT
Start: 2019-05-06 | End: 2019-05-06 | Stop reason: HOSPADM

## 2019-05-06 RX ADMIN — LABETALOL 20 MG/4 ML (5 MG/ML) INTRAVENOUS SYRINGE 20 MG: at 10:00

## 2019-05-06 RX ADMIN — SODIUM CHLORIDE 25 ML/HR: 900 INJECTION, SOLUTION INTRAVENOUS at 08:45

## 2019-05-06 NOTE — INTERVAL H&P NOTE
H&P Update: Raphael John was seen and examined. History and physical has been reviewed. The patient has been examined.  There have been no significant clinical changes since the completion of the originally dated History and Physical.    She felt dizzy and slumped over in the chair according to son  We talked about BP and AFIB AFL RVR  She agrees to AV node ablation

## 2019-05-06 NOTE — PROGRESS NOTES
Ambulated 100 ft, gait steady with cane. Voided, back to stretcher right groin site D&I  Assisted with dressing. 1400 discharged via w/c with son, instructons and belongings.

## 2019-05-06 NOTE — PROCEDURES
Cardiac Procedure Note   Patient: Belem Toro  MRN: [id number]  SSN: [id number]   YOB: 1940 Age: [de-identified] y.o. Sex: female    Date of Procedure: 5/6/2019   Pre-procedure Diagnosis: PAF and atrial flutter RVR and near syncope/syncope, refractory to propafenone  Sick sinus syndrome hypertension with dual chamber Medtronic pacer  Post-procedure Diagnosis: same  Procedure:  1. AV node ablation  2.  Reprogrammed pacemaker  :  Dr. Marimar Cedillo MD    Assistant(s):  None  Anesthesia: Moderate Sedation   Estimated Blood Loss: Less than 10 mL   Specimens Removed: None  Findings: HV 41 ms  Junction escaped rhythm with complete av block post AV node ablation  Complications: None   Implants:  None  Signed by:  Marimar Cedillo MD  5/6/2019  10:34 AM

## 2019-05-06 NOTE — INTERVAL H&P NOTE
H&P Update: Simone aEgle was seen and examined. History and physical has been reviewed. The patient has been examined.  There have been no significant clinical changes since the completion of the originally dated History and Physical.  She felt dizzy and slumped over in the chair according to son  We talked about BP and AFIB AFL RVR  She agrees to AV node ablation

## 2019-05-06 NOTE — H&P
Cardiac Electrophysiology H&P Note     Subjective: Belem Toro is a [de-identified] y.o. patient who presents today for planned AV node ablation. She was last seen in clinic on 2019. Clonidine was reduced to 0.2 mg po daily & propafenone was increased to 225 mg po tid. Since then, she has had further syncopal episodes, discussed with PCP Dr. Luis Louise. Recent pacemaker reports show atrial flutter, PAF with RVR (50 HVR), 1.2% atrial burden. Currently taking propafenone tid & Toprol XL. Reports that syncopal episodes tend to coincide with palpitations. Palpitations are more prominent at night. Anticoagulated with Xarelto, denies bleeding issues. 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 External loop recorder 2017 showed no AFIB but mild sinus bradycardia and NSR. Echo (2016): LVEF 60-65%, no RWMA, mild concentric LVH, grade 1 diastolic dysfunction. Mild TR, PASP 25 mmHg. Lexiscan cardiolite stress (2016): Inferior wall with small to moderate size, mild to mod taw-tz-mgwuz intensity, reversible defect. LVEF 66%. Toprol & propafenone stopped at one point, but were restarted. Cath 2014 showed 50% LAD, 60% LCx and 100% RCA with left to right collateral so Dr Sam León only recommended medical therapies. Scalp skin cancer removed at [facility], will have radiation therapy.         Problem List  Date Reviewed: 2019          Codes Class Noted    S/P cardiac pacemaker procedure ICD-10-CM: Z95.0  ICD-9-CM: V45.01  2017    Overview Signed 2017 10:35 AM by ANAMARIA Savage Dr.  17  Removal of ILR             Bradycardia ICD-10-CM: R00.1  ICD-9-CM: 427.89  2017    Overview Signed 2017 10:36 AM by Adwoa Viveros NP     ILR recorder recent showed many episodes of recurrent sinus bradycardia to 20-30 bpm  No syncope, but this confirmed sick sinus syndrome related to her syncope near syncope before. SSS (sick sinus syndrome) ([facility]) ICD-10-CM: I49.5  ICD-9-CM: 427.81  9/29/2017        CAD (coronary artery disease) ICD-10-CM: I25.10  ICD-9-CM: 414.00  2/17/2016    Overview Signed 2/17/2016 12:20 PM by Matheus Fields MD     2/16 cardiac cath,  50% mid lad, 60% mid diag 1, lcx minor irregs, diffuse 100% mid rca with well develped left to right collaterals. Normal lvef. She will be evaluated further for bradycardia and syncope with implantable loop recorder per dr michael. Abnormal nuclear stress test ICD-10-CM: R94.39  ICD-9-CM: 794.39  2/11/2016    Overview Addendum 2/11/2016  7:49 AM by Matheus Fields MD     1/14 echo normal lvef with ?  Mild inf basal hypokinesis  1/16 exercise cardiolyte, infero basal ischemia             Syncope ICD-10-CM: R55  ICD-9-CM: 780.2  2/11/2016    Overview Signed 2/16/2016  9:26 AM by Randolph Weems NP     [facility] Doctor's    1/1/16    MRI brain wwo contrast  No acute findings or abnormal enhancement    Mild periventricular white matter signal abnormalities which are non specific, but likely related to small vessel ischemia disease    3 separate remote micro hemorrhages, the largest of which is in the left cerebellar hemisphere and consistent with cavernomas               Tachy-chino syndrome ([address] ICD-10-CM: I49.5  ICD-9-CM: 427.81  2/11/2016    Overview Signed 2/16/2016  9:19 AM by Randolph Weems NP     [facility]  LVEF 55-65%, no RWMA  Trivial regurgitation              Scalp lesion ICD-10-CM: L98.9  ICD-9-CM: 709.9  10/23/2014        Sebaceous cyst ICD-10-CM: L72.3  ICD-9-CM: 706.2  4/22/2014        Keloid ICD-10-CM: L91.0  ICD-9-CM: 701.4  4/22/2014        Dysphagia ICD-10-CM: R13.10  ICD-9-CM: 787.20  2/8/2011        Recurrent ear pain ICD-10-CM: H92.09  ICD-9-CM: 388.70  2/8/2011        Acute pharyngitis ICD-10-CM: J02.9  ICD-9-CM: 799  2/8/2011        Essential hypertension ICD-10-CM: I10  ICD-9-CM: 401.9  2/8/2011 Edema, peripheral ICD-10-CM: R60.9  ICD-9-CM: 782.3  2/8/2011        Pure hypercholesterolemia ICD-10-CM: E78.00  ICD-9-CM: 272.0  2/8/2011        Allergic rhinitis, cause unspecified ICD-10-CM: J30.9  ICD-9-CM: 477.9  2/8/2011        Diverticulosis of colon (without mention of hemorrhage) ICD-10-CM: K57.30  ICD-9-CM: 562.10  2/8/2011        Herniated nucleus pulposus ( slipped disc) ICD-10-CM: MNZ3281  ICD-9-CM: 722.2  2/8/2011        Degenetrative Dis Disease, Cervical ICD-10-CM: M50.30  ICD-9-CM: 722.4  2/8/2011        Carpal tunnel syndrome ICD-10-CM: G56.00  ICD-9-CM: 354.0  2/8/2011        Unspecified cataract ICD-10-CM: H26.9  ICD-9-CM: 366.9  2/8/2011        GERD Gastro- Esophageal Reflux Disease ICD-10-CM: K21.9  ICD-9-CM: 530.81  2/8/2011        Degenerative Joint Disese ( improved/resolving) ICD-10-CM: M19.90  ICD-9-CM: 715.90  2/8/2011        Menopausal ICD-10-CM: N95.1  ICD-9-CM: 627.2  2/8/2011        Asymptomatic varicose veins ICD-10-CM: I83.90  ICD-9-CM: 454.9  2/8/2011              Current Facility-Administered Medications   Medication Dose Route Frequency Provider Last Rate Last Dose    sodium chloride (NS) flush 5-40 mL  5-40 mL IntraVENous Q8H Carlos Neri MD        sodium chloride (NS) flush 5-40 mL  5-40 mL IntraVENous PRN Carlos Neri MD         Allergies   Allergen Reactions    Latex Rash    Bactrim [Sulfamethoprim Ds] Hives    Aspirin Other (comments)     Anything higher than 81mg makes pt jittery    Codeine Hives and Itching    Iodinated Contrast- Oral And Iv Dye Itching    Pcn [Penicillins] Hives and Itching    Tape [Adhesive] Rash     Past Medical History:   Diagnosis Date    Acute pharyngitis 2/8/2011    Allergic rhinitis, cause unspecified 2/8/2011    Arrhythmia     PVC    Asymptomatic varicose veins 2/8/2011    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     Carpal tunnel syndrome 2/8/2011    Degenerative Joint Disese ( improved/resolving) 2/8/2011    Degenetrative Dis Disease, 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    Diverticulosis of colon (without mention of hemorrhage) 2/8/2011    Dysphagia 2/8/2011    Edema, peripheral 2/8/2011    GERD (gastroesophageal reflux disease)     GERD Gastro- Esophageal Reflux Disease 2/8/2011    Herniated nucleus pulposus ( slipped disc) 2/8/2011    Menopausal 2/8/2011    PUD (peptic ulcer disease) 2012    Pure hypercholesterolemia 2/8/2011    Recurrent ear pain 2/8/2011    S/P radiation therapy     Scalp lesion 10/23/2014    Sebaceous cyst 4/22/2014    Unspecified cataract 2/8/2011    Unspecified essential hypertension 2/8/2011    Unspecified sleep apnea      Past Surgical History:   Procedure Laterality Date    HX HYSTERECTOMY      HX ORTHOPAEDIC  neck/back 2006    HX OTHER SURGICAL  5/8/2014     Excise recurrent scalp lesion and application of ACell    HX OTHER SURGICAL  5/8/2014    Excise keloid, anterior chest wall, and application of ACell    HX OTHER SURGICAL  5/8/2014     Excise sebaceous cyst, anterior chest wall    HX OTHER SURGICAL  5/8/2014    Punch biopsy, skin lesions, right forearm x2, right calf x1    SD INS NEW/RPLCMT PRM PM W/TRANSV ELTRD ATRIAL&VENT  9/30/2017         SD RMVL IMPLANTABLE PT-ACTIVATED CAR EVENT RECORDER  9/30/2017          Family History   Problem Relation Age of Onset    Hypertension Mother     Stroke Mother     Hypertension Father     Cancer Father         PROSTATE, MELANOMA    Anesth Problems Neg Hx      Social History     Tobacco Use    Smoking status: Never Smoker    Smokeless tobacco: Never Used   Substance Use Topics    Alcohol use: No        Review of Systems:   Constitutional: Negative for fever, chills, weight loss, malaise/fatigue. HEENT: Negative for nosebleeds, vision changes. Respiratory: Negative for cough, hemoptysis  Cardiovascular: Negative for chest pain, + palpitations, no orthopnea, claudication, leg swelling, + syncope, and PND.    Gastrointestinal: Negative for nausea, vomiting, diarrhea, blood in stool and melena. Genitourinary: Negative for dysuria, and hematuria. Musculoskeletal: Negative for myalgias, + arthralgia. Skin: Negative for rash. + scalp discoloration  Heme: Does not bleed or bruise easily. Neurological: Negative for speech change and focal weakness     Objective:     Visit Vitals  Resp 20   Ht 5' 2\" (1.575 m)   Wt 170 lb (77.1 kg)   BMI 31.09 kg/m²      Physical Exam:   Constitutional: well-developed and well-nourished. No respiratory distress. Head: Normocephalic and atraumatic. Eyes: Pupils are equal, round  ENT: hearing normal  Neck: supple. No JVD present. Cardiovascular: Normal rate, regular rhythm. Exam reveals no gallop and no friction rub. No murmur heard. Pulmonary/Chest: Effort normal and breath sounds normal. No wheezes. Abdominal: Soft, no tenderness. Musculoskeletal: no edema. Neurological: alert,oriented. Skin: Skin is warm and dry. Left side pacemaker site clean, well healed. Psychiatric: normal mood and affect. Behavior is normal. Judgment and thought content normal.        Assessment/Plan:   Ms. Troncoso Cousin has had multiple syncopal episodes, which typically correlate with palpitations. Pacemaker interrogation has shown AFL & AF with RVR on several occasions. Concerned that syncope may potentially be due in part to sudden drop in BP. Risks/benefits of AV node ablation reviewed with patient, & she would like to proceed. Plan to discontinue Toprol XL & propafenone post procedure.     ZION Michele  [facility] [place]  05/06/19     Addendum from EP attending:  I  have seen, examined patient, and discussed with nurse practitioner, registered nurse, reviewed, updated note and agree with the assessment and plan    I have talked to her and her son this am.   Vital signs are stable  BP is high  Exam shows regular rhythm and no rub     Assessment and Plan:  She felt dizzy and slumped over in the chair according to son  We talked about BP and AFIB AFL RVR  She agrees to AV node ablation       Thank you for involving me in this patient's care and please call with further concerns or questions. Apolinar Leslie M.D.   Electrophysiology/Cardiology  [facility] [place]  [facility] [address], [address] [place] [address]  [phone]

## 2019-05-06 NOTE — PROGRESS NOTES
TRANSFER - OUT REPORT:    Verbal report given to Barbara Hemphill RN on Deanna Lucas being transferred to cath lab recovery for routine progression of care       Report consisted of patients Situation, Background, Assessment and   Recommendations(SBAR). Information from the following report(s) Procedure Summary was reviewed with the receiving nurse. Opportunity for questions and clarification was provided. Cardiac Cath Lab Procedure Area Arrival Note:    Deanna Lucas arrived to Cardiac Cath Lab, Procedure Area. Patient identifiers verified with NAME and DATE OF BIRTH. Procedure verified with patient. Consent forms verified. Allergies verified. Patient informed of procedure and plan of care. Questions answered with review. Patient voiced understanding of procedure and plan of care. Patient on cardiac monitor, non-invasive blood pressure, SPO2 monitor. On  or O2 @ 2 lpm via nasalcanula. IV of 0.9% NS on pump at 25 ml/hr. Patient status doing well with some problems : patient states back pain of 8/10 from doing housework yesterday. Patient is A&Ox 4. Patient reports back pain. Patient medicated during procedure with orders obtained and verified by Dr. Michael Paris. Refer to patients Cardiac Cath Lab PROCEDURE REPORT for vital signs, assessment, status, and response during procedure, printed at end of case. Printed report on chart or scanned into chart.

## 2019-05-06 NOTE — PROGRESS NOTES
Cardiac Cath Lab Recovery Arrival Note:      Malathi Soliz arrived to Cardiac Cath Lab, Recovery Area. Staff introduced to patient. Patient identifiers verified with NAME and DATE OF BIRTH. Procedure verified with patient. Consent forms reviewed and signed by patient or authorized representative and verified. Allergies verified. Patient and family oriented to department. Patient and family informed of procedure and plan of care. Questions answered with review. Patient prepped for procedure, per orders from physician, prior to arrival.    Patient on cardiac monitor, non-invasive blood pressure, SPO2 monitor. On room air. Patient is A&Ox 4. Patient reports no complaints. Patient in stretcher, in low position, with side rails up, call bell within reach, patient instructed to call if assistance as needed. Patient prep in: [address], [place].    Patient family has pager # [phone]  Family in: son in hospital.   Prep by: Femi Solis RN    Pre AV node ablation teaching completed    5  Dr Timmy Vance in to talk with pt and son

## 2019-05-06 NOTE — DISCHARGE INSTRUCTIONS
Patient Instructions Post-EP study and Ablation    1. No heavy lifting or exercises for 1 week. This includes the following:  Do not push or move furniture, jog or run    2. Do not drive for 3 days. 3.  Call Dr. Yesenia Jackson at [phone] if you experience any of the following symptoms:  Dizziness, lightheadedness, fainting spells  Lack of energy  Shortness of breath  Rapid heart rate  Chest or muscle twitches  Blurry vision, double vision, weakness, numbness  Nausea, vomiting  Fever  Bleeding in the stool, black stool  Leg swelling, pain    4. Follow-up with Dr. Yesenia Jackson as noted below. Future Appointments   Date Time Provider Nasim Cerrato   5/9/2019  2:45 PM [facility]   6/11/2019  9:15  [place], [address]   6/11/2019  9:20 AM Betzaida Hernandez  [address]   7/16/2019  1:30  [place], [address]   8/6/2019  1:30  [place], [address]   8/6/2019  2:00 PM Betzaida Hernandez  [address]   9/26/2019  4:00 PM MD Puneet Monroe M.D.   Electrophysiology/Cardiology  [facility] [place]  [address], [facility] [address]  [address]  [phone]

## 2019-05-06 NOTE — PROGRESS NOTES
TRANSFER - IN REPORT:    Verbal report received from Gaetano on Vero Francisco  being received from procedure for routine progression of care. Report consisted of patients Situation, Background, Assessment and Recommendations(SBAR). Information from the following report(s) Procedure Summary, MAR, Recent Results and Med Rec Status was reviewed with the receiving clinician. Opportunity for questions and clarification was provided. Assessment completed upon patients arrival to [address] [place] [address] and care assumed. Cardiac Cath Lab Recovery Arrival Note:    Vero Francisco arrived to [facility] recovery area. Patient procedure= AV node ablation   Pacer DDD . Patient on cardiac monitor, non-invasive blood pressure, SPO2 monitor. On room air. IV  of nacl on pump at 25 ml/hr. Patient status doing well without problems. Patient is sleeping. Patient reports no complaints. PROCEDURE SITE CHECK:    Procedure site:without any bleeding and or hematoma, no pain/discomfort reported at procedure site. No change in patient status. Continue to monitor patient and status.

## 2019-05-08 NOTE — TELEPHONE ENCOUNTER
Verified patient with two types of identifiers. Notified patient that a 6 month supply was sent into her pharmacy at the beginning of April. Patient states that she knows she just wanted to know if there was something more affordable or a generic. Notified patient that there was not a generic for Xarelto. Patient to call [employer] to see if she can get her medication at a better rate through mail order and will call back if medication needs to be changed. Patient verbalized understanding and will call with any other questions.

## 2019-05-08 NOTE — TELEPHONE ENCOUNTER
Pharmacy confirmed. Pt is out of medication. Pt is asking to be called once this has been done so she can pick this up.   Phone #[phone]  Thanks

## 2019-05-09 ENCOUNTER — OFFICE VISIT (OUTPATIENT)
Dept: INTERNAL MEDICINE CLINIC | Age: 79
End: 2019-05-09

## 2019-05-09 VITALS
DIASTOLIC BLOOD PRESSURE: 76 MMHG | WEIGHT: 170 LBS | HEART RATE: 74 BPM | OXYGEN SATURATION: 98 % | RESPIRATION RATE: 18 BRPM | SYSTOLIC BLOOD PRESSURE: 144 MMHG | HEIGHT: 62 IN | BODY MASS INDEX: 31.28 KG/M2 | TEMPERATURE: 97.6 F

## 2019-05-09 DIAGNOSIS — E78.00 HYPERCHOLESTEREMIA: ICD-10-CM

## 2019-05-09 DIAGNOSIS — I49.5 SSS (SICK SINUS SYNDROME) (HCC): Primary | ICD-10-CM

## 2019-05-09 DIAGNOSIS — I49.5 TACHY-BRADY SYNDROME (HCC): ICD-10-CM

## 2019-05-09 DIAGNOSIS — I25.10 CORONARY ARTERY DISEASE INVOLVING NATIVE CORONARY ARTERY OF NATIVE HEART WITHOUT ANGINA PECTORIS: ICD-10-CM

## 2019-05-09 NOTE — PROGRESS NOTES
Marisela Houser is a [de-identified] y.o. female and presents with Irregular Heart Beat and Loss of Consciousness  . Subjective:    She has recently had an AV NODE ABLATION and a reprogramed pacemaker. She has not ad any recent falling reported    Hypertension Review:  The patient has essential hypertension  Diet and Lifestyle: generally follows a  low sodium diet, exercises sporadically  Home BP Monitoring: is not measured at home. Pertinent ROS: taking medications as instructed, no medication side effects noted, no TIA's, no chest pain on exertion, no dyspnea on exertion, no swelling of ankles. She has a history of sick sinus syndrome,she has a pacemaker in place  CAD and is followed by cardiology. Dyslipidemia Review:  Patient presents for evaluation of lipids. Compliance with treatment thus far has been excellent. A repeat fasting lipid profile was done. The patient does not use medications that may worsen dyslipidemias (corticosteroids, progestins, anabolic steroids, diuretics, beta-blockers, amiodarone, cyclosporine, olanzapine). The patient exercises some    She still has shootting pains in her lower back. GERD Review:   Patient has a history of gastroesophageal reflux with heartburn. Symptoms have been present for a few months. She denies dysphagia. She  has not lost weight. She denies melena, hematochezia, hematemesis, and coffee ground emesis. This has been associated with fullness after meals. She denies abdominal bloating and none. Medical therapy in the past has included proton pump inhibitor    Review of Systems  Constitutional: negative for fevers, chills, anorexia and weight loss  Eyes:   negative for visual disturbance and irritation  ENT:   negative for tinnitus,sore throat,nasal congestion,ear pains. hoarseness  Respiratory:  negative for cough, hemoptysis, dyspnea,wheezing  CV:   negative for chest pain, palpitations, lower extremity edema  GI:   negative for nausea, vomiting, diarrhea, abdominal pain,melena  Endo:               negative for polyuria,polydipsia,polyphagia,heat intolerance  Genitourinary: negative for frequency, dysuria and hematuria  Integument:  negative for rash and pruritus  Hematologic:  negative for easy bruising and gum/nose bleeding  Musculoskel: negative for myalgias, arthralgias, back pain, muscle weakness, joint pain  Neurological:  negative for headaches, dizziness, vertigo, memory problems and gait   Behavl/Psych: negative for feelings of anxiety, depression, mood changes    Past Medical History:   Diagnosis Date    Acute pharyngitis 2/8/2011    Allergic rhinitis, cause unspecified 2/8/2011    Arrhythmia     PVC    Asymptomatic varicose veins 2/8/2011    Cancer ([facility] [address]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     Carpal tunnel syndrome 2/8/2011    Degenerative Joint Disese ( improved/resolving) 2/8/2011    Degenetrative Dis Disease, 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    Diverticulosis of colon (without mention of hemorrhage) 2/8/2011    Dysphagia 2/8/2011    Edema, peripheral 2/8/2011    GERD (gastroesophageal reflux disease)     GERD Gastro- Esophageal Reflux Disease 2/8/2011    Herniated nucleus pulposus ( slipped disc) 2/8/2011    Menopausal 2/8/2011    PUD (peptic ulcer disease) 2012    Pure hypercholesterolemia 2/8/2011    Recurrent ear pain 2/8/2011    S/P radiation therapy     Scalp lesion 10/23/2014    Sebaceous cyst 4/22/2014    Unspecified cataract 2/8/2011    Unspecified essential hypertension 2/8/2011    Unspecified sleep apnea      Past Surgical History:   Procedure Laterality Date    HX HYSTERECTOMY      HX ORTHOPAEDIC  neck/back 2006    HX OTHER SURGICAL  5/8/2014     Excise recurrent scalp lesion and application of ACell    HX OTHER SURGICAL  5/8/2014    Excise keloid, anterior chest wall, and application of ACell    HX OTHER SURGICAL  5/8/2014     Excise sebaceous cyst, anterior chest wall    HX OTHER SURGICAL  5/8/2014    Punch biopsy, skin lesions, right forearm x2, right calf x1    RI ICAR CATHETER ABLATION ATRIOVENTR NODE FUNCTION N/A 5/6/2019    ABLATION AV NODE performed by Lore Mata MD at [address] CATH LAB    RI INS NEW/RPLCMT PRM PM W/TRANSV ELTRD ATRIAL&VENT  9/30/2017         RI RMVL IMPLANTABLE PT-ACTIVATED CAR EVENT RECORDER  9/30/2017          Social History     Socioeconomic History    Marital status:      Spouse name: Not on file    Number of children: Not on file    Years of education: Not on file    Highest education level: Not on file   Tobacco Use    Smoking status: Never Smoker    Smokeless tobacco: Never Used   Substance and Sexual Activity    Alcohol use: No    Drug use: No    Sexual activity: Yes     Partners: Male     Birth control/protection: None     Family History   Problem Relation Age of Onset    Hypertension Mother     Stroke Mother     Hypertension Father     Cancer Father         PROSTATE, MELANOMA    Anesth Problems Neg Hx      Current Outpatient Medications   Medication Sig Dispense Refill    metoprolol succinate (TOPROL-XL) 100 mg tablet Take 100 mg by mouth daily.  rivaroxaban (XARELTO) 15 mg tab tablet Take 1 Tab by mouth daily (with dinner). 90 Tab 1    meclizine (ANTIVERT) 25 mg tablet TAKE 1 TABLET THREE TIMES DAILY AS NEEDED 270 Tab 3    fluticasone (FLONASE) 50 mcg/actuation nasal spray USE 2 SPRAYS IN EACH NOSTRIL EVERY DAY 48 g 12    omeprazole (PRILOSEC) 40 mg capsule TAKE 1 CAPSULE EVERY DAY 90 Cap 3    potassium chloride SR (KLOR-CON 10) 10 mEq tablet TAKE 1 TABLET EVERY DAY 90 Tab 3    raNITIdine (ZANTAC) 150 mg tablet TAKE 1 TABLET TWICE DAILY 180 Tab 3    atorvastatin (LIPITOR) 40 mg tablet TAKE 1 TABLET EVERY DAY 90 Tab 3    furosemide (LASIX) 20 mg tablet TAKE 1 TABLET BY MOUTH DAILY 90 Tab 1    gabapentin (NEURONTIN) 300 mg capsule Take 1 Cap by mouth three (3) times daily.  270 Cap 3    acetaminophen (TYLENOL) 325 mg tablet Take 325 mg by mouth every four (4) hours as needed for Pain.  MULTIVIT WITH CALCIUM,IRON,MIN [facility] MULTIPLE VITAMINS PO) Take  by mouth daily.  aspirin delayed-release 81 mg tablet Take  by mouth daily. Allergies   Allergen Reactions    Latex Rash    Bactrim [Sulfamethoprim Ds] Hives    Aspirin Other (comments)     Anything higher than 81mg makes pt jittery    Codeine Hives and Itching    Iodinated Contrast- Oral And Iv Dye Itching    Pcn [Penicillins] Hives and Itching    Tape [Adhesive] Rash       Objective:  Visit Vitals  /76 (BP 1 Location: Left arm, BP Patient Position: Sitting)   Pulse 74   Temp 97.6 °F (36.4 °C) (Oral)   Resp 18   Ht 5' 2\" (1.575 m)   Wt 170 lb (77.1 kg)   SpO2 98%   BMI 31.09 kg/m²     Physical Exam:   General appearance - alert, well appearing, and in no distress  Mental status - alert, oriented to person, place, and time  EYE-DARLENE, EOMI, corneas normal, no foreign bodies  ENT-ENT exam normal, no neck nodes or sinus tenderness  Nose - normal and patent, no erythema, discharge or polyps  Mouth - mucous membranes moist, pharynx normal without lesions  Neck - supple, no significant adenopathy   Chest - clear to auscultation, no wheezes, rales or rhonchi, symmetric air entry   Heart - regular rhythm rhythm, , no murmurs, rubs, clicks or gallops   Abdomen - soft, nontender, nondistended, no masses or organomegaly  Lymph- no adenopathy palpable  Ext-peripheral pulses normal, no pedal edema, no clubbing or cyanosis  Skin-Warm and dry.  no hyperpigmentation, vitiligo, or suspicious lesions  Neuro -alert, oriented, normal speech, no focal findings or movement disorder noted  Neck-normal C-spine, no tenderness, full ROM without pain  Feet-no nail deformities or callus formation with good pulses noted  Back-tenderness lower lumbar spine and sacral spine noted,forward flexion,hyperextension impaired,negative straight leg raise      Results for orders placed or performed during the hospital encounter of 05/06/19 CBC WITH AUTOMATED DIFF   Result Value Ref Range    WBC 5.3 3.6 - 11.0 K/uL    RBC 4.80 3.80 - 5.20 M/uL    HGB 11.9 11.5 - 16.0 g/dL    HCT 38.6 35.0 - 47.0 %    MCV 80.4 80.0 - 99.0 FL    MCH 24.8 (L) 26.0 - 34.0 PG    MCHC 30.8 30.0 - 36.5 g/dL    RDW 18.6 (H) 11.5 - 14.5 %    PLATELET 477 078 - 719 K/uL    MPV 10.6 8.9 - 12.9 FL    NRBC 0.0 0  WBC    ABSOLUTE NRBC 0.00 0.00 - 0.01 K/uL    NEUTROPHILS 64 32 - 75 %    LYMPHOCYTES 21 12 - 49 %    MONOCYTES 10 5 - 13 %    EOSINOPHILS 4 0 - 7 %    BASOPHILS 1 0 - 1 %    IMMATURE GRANULOCYTES 0 0.0 - 0.5 %    ABS. NEUTROPHILS 3.4 1.8 - 8.0 K/UL    ABS. LYMPHOCYTES 1.1 0.8 - 3.5 K/UL    ABS. MONOCYTES 0.5 0.0 - 1.0 K/UL    ABS. EOSINOPHILS 0.2 0.0 - 0.4 K/UL    ABS. BASOPHILS 0.1 0.0 - 0.1 K/UL    ABS. IMM. GRANS. 0.0 0.00 - 0.04 K/UL    DF AUTOMATED     METABOLIC PANEL, BASIC   Result Value Ref Range    Sodium 144 136 - 145 mmol/L    Potassium 4.8 3.5 - 5.1 mmol/L    Chloride 112 (H) 97 - 108 mmol/L    CO2 29 21 - 32 mmol/L    Anion gap 3 (L) 5 - 15 mmol/L    Glucose 82 65 - 100 mg/dL    BUN 26 (H) 6 - 20 MG/DL    Creatinine 1.20 (H) 0.55 - 1.02 MG/DL    BUN/Creatinine ratio 22 (H) 12 - 20      GFR est AA 52 (L) >60 ml/min/1.73m2    GFR est non-AA 43 (L) >60 ml/min/1.73m2    Calcium 9.0 8.5 - 10.1 MG/DL       Assessment/Plan:    ICD-10-CM ICD-9-CM    1. SSS (sick sinus syndrome) ([facility]) I49.5 427.81    2. 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 Hypercholesteremia E78.00 272.0    4. Coronary artery disease involving native coronary artery of native heart without angina pectoris I25.10 414.01      No orders of the defined types were placed in this encounter. lose weight, increase physical activity, follow low fat diet, have labs drawn prior to ROV, ,Take 81mg aspirin daily  Patient Instructions   [employer] Activation    Thank you for requesting access to [employer].  Please follow the instructions below to securely access and download your online medical record. [employer] allows you to send messages to your doctor, view your test results, renew your prescriptions, schedule appointments, and more. How Do I Sign Up? 1. In your internet browser, go to [email or web address]  2. Click on the First Time User? Click Here link in the Sign In box. You will be redirect to the New Member Sign Up page. 3. Enter your [employer] Access Code exactly as it appears below. You will not need to use this code after youve completed the sign-up process. If you do not sign up before the expiration date, you must request a new code. [employer] Access Code: Activation code not generated  Current [employer] Status: Patient Declined (This is the date your [employer] access code will )    4. Enter the last four digits of your Social Security Number (xxxx) and Date of Birth (mm/dd/yyyy) as indicated and click Submit. You will be taken to the next sign-up page. 5. Create a [employer] ID. This will be your [employer] login ID and cannot be changed, so think of one that is secure and easy to remember. 6. Create a [employer] password. You can change your password at any time. 7. Enter your Password Reset Question and Answer. This can be used at a later time if you forget your password. 8. Enter your e-mail address. You will receive e-mail notification when new information is available in [address] 9. Click Sign Up. You can now view and download portions of your medical record. 10. Click the Download Summary menu link to download a portable copy of your medical information. Additional Information    If you have questions, please visit the Frequently Asked Questions section of the [employer] website at [email or web address] Remember, [employer] is NOT to be used for urgent needs. For medical emergencies, dial 911. Follow-up and Dispositions    · Return in about 3 months (around 2019), or if symptoms worsen or fail to improve. Discussed situation with BENNETT.     I have reviewed with the patient details of the assessment and plan and all questions were answered. Relevent patient education was performed. The most recent lab findings were reviewed with the patient. An After Visit Summary was printed and given to the patient.

## 2019-05-09 NOTE — PATIENT INSTRUCTIONS
[email or web address] Activation    Thank you for requesting access to [employer]. Please follow the instructions below to securely access and download your online medical record. [employer] allows you to send messages to your doctor, view your test results, renew your prescriptions, schedule appointments, and more. How Do I Sign Up? 1. In your internet browser, go to [email or web address]  2. Click on the First Time User? Click Here link in the Sign In box. You will be redirect to the New Member Sign Up page. 3. Enter your [employer] Access Code exactly as it appears below. You will not need to use this code after youve completed the sign-up process. If you do not sign up before the expiration date, you must request a new code. [employer] Access Code: Activation code not generated  Current [employer] Status: Patient Declined (This is the date your [employer] access code will )    4. Enter the last four digits of your Social Security Number (xxxx) and Date of Birth (mm/dd/yyyy) as indicated and click Submit. You will be taken to the next sign-up page. 5. Create a [employer] ID. This will be your [employer] login ID and cannot be changed, so think of one that is secure and easy to remember. 6. Create a [employer] password. You can change your password at any time. 7. Enter your Password Reset Question and Answer. This can be used at a later time if you forget your password. 8. Enter your e-mail address. You will receive e-mail notification when new information is available in [address] 9. Click Sign Up. You can now view and download portions of your medical record. 10. Click the Download Summary menu link to download a portable copy of your medical information. Additional Information    If you have questions, please visit the Frequently Asked Questions section of the [employer] website at [email or web address] Remember, [employer] is NOT to be used for urgent needs. For medical emergencies, dial 911.

## 2019-05-09 NOTE — PROGRESS NOTES
Chief Complaint   Patient presents with    Irregular Heart Beat    Loss of Consciousness     1. Have you been to the ER, urgent care clinic since your last visit? Hospitalized since your last visit? No    2. Have you seen or consulted any other health care providers outside of the [address] [place] [address] since your last visit? Include any pap smears or colon screening.  No

## 2019-05-20 ENCOUNTER — OFFICE VISIT (OUTPATIENT)
Dept: INTERNAL MEDICINE CLINIC | Age: 79
End: 2019-05-20

## 2019-05-20 VITALS
SYSTOLIC BLOOD PRESSURE: 96 MMHG | RESPIRATION RATE: 16 BRPM | BODY MASS INDEX: 31.28 KG/M2 | HEIGHT: 62 IN | WEIGHT: 170 LBS | DIASTOLIC BLOOD PRESSURE: 66 MMHG | HEART RATE: 83 BPM | TEMPERATURE: 98.2 F | OXYGEN SATURATION: 99 %

## 2019-05-20 DIAGNOSIS — S00.93XA CONTUSION OF HEAD, UNSPECIFIED PART OF HEAD, INITIAL ENCOUNTER: Primary | ICD-10-CM

## 2019-05-20 DIAGNOSIS — S80.02XA CONTUSION OF LEFT KNEE, INITIAL ENCOUNTER: ICD-10-CM

## 2019-05-20 DIAGNOSIS — M25.462 KNEE EFFUSION, LEFT: ICD-10-CM

## 2019-05-20 NOTE — PROGRESS NOTES
Luigi Proctor is a [de-identified] y.o. female and presents with Fall (several falls)    Subjective:    She had a recent fall hitting her head. She states she had a blackout and was on the floor all night. Knee pain Review:  Patient complains of left knee pain. Onset of the symptoms was months  ago. Inciting event: longstanding problem which has been getting worse. Current symptoms include pain location: both: medial and swelling. none,  Aggravating symptoms: going up and down stairs, walking, lateral movements, any weight bearing. Patient's overall course: gradually worsening Patient has had prior knee problems. Evaluation to date: none. Treatment to date:NSAIDS      Review of Systems  Constitutional: negative for fevers, chills, anorexia and weight loss  Eyes:   negative for visual disturbance and irritation  ENT:   negative for tinnitus,sore throat,nasal congestion,ear pains. hoarseness  Respiratory:  negative for cough, hemoptysis, dyspnea,wheezing  CV:   negative for chest pain, palpitations, lower extremity edema  GI:   negative for nausea, vomiting, diarrhea, abdominal pain,melena  Endo:               negative for polyuria,polydipsia,polyphagia,heat intolerance  Genitourinary: negative for frequency, dysuria and hematuria  Integument:  negative for rash and pruritus  Hematologic:  negative for easy bruising and gum/nose bleeding  Musculoskel:  myalgias, arthralgias, back pain, muscle weakness, joint pain  Neurological:  negative for headaches, dizziness, vertigo, memory problems and gait   Behavl/Psych: negative for feelings of anxiety, depression, mood changes    Past Medical History:   Diagnosis Date    Acute pharyngitis 2/8/2011    Allergic rhinitis, cause unspecified 2/8/2011    Arrhythmia     PVC    Asymptomatic varicose veins 2/8/2011    Cancer (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    Diverticulosis of colon (without mention of hemorrhage) 2/8/2011    Dysphagia 2/8/2011    Edema, peripheral 2/8/2011    GERD (gastroesophageal reflux disease)     GERD Gastro- Esophageal Reflux Disease 2/8/2011    Herniated nucleus pulposus ( slipped disc) 2/8/2011    Menopausal 2/8/2011    PUD (peptic ulcer disease) 2012    Pure hypercholesterolemia 2/8/2011    Recurrent ear pain 2/8/2011    S/P radiation therapy     Scalp lesion 10/23/2014    Sebaceous cyst 4/22/2014    Unspecified cataract 2/8/2011    Unspecified essential hypertension 2/8/2011    Unspecified sleep apnea      Past Surgical History:   Procedure Laterality Date    HX HYSTERECTOMY      HX ORTHOPAEDIC  neck/back 2006    HX OTHER SURGICAL  5/8/2014     Excise recurrent scalp lesion and application of ACell    HX OTHER SURGICAL  5/8/2014    Excise keloid, anterior chest wall, and application of ACell    HX OTHER SURGICAL  5/8/2014     Excise sebaceous cyst, anterior chest wall    HX OTHER SURGICAL  5/8/2014    Punch biopsy, skin lesions, right forearm x2, right calf x1    WA ICAR CATHETER ABLATION ATRIOVENTR NODE FUNCTION N/A 5/6/2019    ABLATION AV NODE performed by Eri Montgomery MD at [address] CATH LAB    [place] INS NEW/RPLCMT PRM PM W/TRANSV ELTRD ATRIAL&VENT  9/30/2017         [place] RMVL IMPLANTABLE PT-ACTIVATED CAR EVENT RECORDER  9/30/2017          Social History     Socioeconomic History    Marital status:      Spouse name: Not on file    Number of children: Not on file    Years of education: Not on file    Highest education level: Not on file   Tobacco Use    Smoking status: Never Smoker    Smokeless tobacco: Never Used   Substance and Sexual Activity    Alcohol use: No    Drug use: No    Sexual activity: Yes     Partners: Male     Birth control/protection: None     Family History   Problem Relation Age of Onset    Hypertension Mother     Stroke Mother     Hypertension Father    [address] [facility] Cancer Father         PROSTATE, MELANOMA    Anesth Problems Neg Hx      Current Outpatient Medications   Medication Sig Dispense Refill    metoprolol succinate (TOPROL-XL) 100 mg tablet Take 100 mg by mouth daily.  rivaroxaban (XARELTO) 15 mg tab tablet Take 1 Tab by mouth daily (with dinner). 90 Tab 1    meclizine (ANTIVERT) 25 mg tablet TAKE 1 TABLET THREE TIMES DAILY AS NEEDED 270 Tab 3    fluticasone (FLONASE) 50 mcg/actuation nasal spray USE 2 SPRAYS IN EACH NOSTRIL EVERY DAY 48 g 12    omeprazole (PRILOSEC) 40 mg capsule TAKE 1 CAPSULE EVERY DAY 90 Cap 3    potassium chloride SR (KLOR-CON 10) 10 mEq tablet TAKE 1 TABLET EVERY DAY 90 Tab 3    raNITIdine (ZANTAC) 150 mg tablet TAKE 1 TABLET TWICE DAILY 180 Tab 3    atorvastatin (LIPITOR) 40 mg tablet TAKE 1 TABLET EVERY DAY 90 Tab 3    furosemide (LASIX) 20 mg tablet TAKE 1 TABLET BY MOUTH DAILY 90 Tab 1    gabapentin (NEURONTIN) 300 mg capsule Take 1 Cap by mouth three (3) times daily. 270 Cap 3    acetaminophen (TYLENOL) 325 mg tablet Take 325 mg by mouth every four (4) hours as needed for Pain.  MULTIVIT WITH CALCIUM,IRON,MIN [facility] MULTIPLE VITAMINS PO) Take  by mouth daily.  aspirin delayed-release 81 mg tablet Take  by mouth daily.        Allergies   Allergen Reactions    Latex Rash    Bactrim [Sulfamethoprim Ds] Hives    Aspirin Other (comments)     Anything higher than 81mg makes pt jittery    Codeine Hives and Itching    Iodinated Contrast- Oral And Iv Dye Itching    Pcn [Penicillins] Hives and Itching    Tape [Adhesive] Rash       Objective:  Visit Vitals  BP 96/66   Pulse 83   Temp 98.2 °F (36.8 °C) (Oral)   Resp 16   Ht 5' 2\" (1.575 m)   Wt 170 lb (77.1 kg)   SpO2 99%   BMI 31.09 kg/m²     Physical Exam:   General appearance - alert, well appearing, and in no distress  Mental status - alert, oriented to person, place, and time  EYE-DARLENE, EOMI, corneas normal, no foreign bodies  ENT-ENT exam normal, no neck nodes or sinus tenderness  Nose - normal and patent, no erythema, discharge or polyps  Mouth - mucous membranes moist, pharynx normal without lesions  Neck - supple, no significant adenopathy   Chest - clear to auscultation, no wheezes, rales or rhonchi, symmetric air entry   Heart - normal rate, regular rhythm, normal S1, S2, no murmurs, rubs, clicks or gallops   Abdomen - soft, nontender, nondistended, no masses or organomegaly  Lymph- no adenopathy palpable  Ext-peripheral pulses normal, no pedal edema, no clubbing or cyanosis  Skin-Warm and dry. no hyperpigmentation, vitiligo, or suspicious lesions  Neuro -alert, oriented, normal speech, no focal findings or movement disorder noted  Neck-normal C-spine, no tenderness, full ROM without pain  Feet-no nail deformities or callus formation with good pulses noted  Head-erythema top of skull  Lt.knee-effusion noted    Results for orders placed or performed during the hospital encounter of 05/06/19   CBC WITH AUTOMATED DIFF   Result Value Ref Range    WBC 5.3 3.6 - 11.0 K/uL    RBC 4.80 3.80 - 5.20 M/uL    HGB 11.9 11.5 - 16.0 g/dL    HCT 38.6 35.0 - 47.0 %    MCV 80.4 80.0 - 99.0 FL    MCH 24.8 (L) 26.0 - 34.0 PG    MCHC 30.8 30.0 - 36.5 g/dL    RDW 18.6 (H) 11.5 - 14.5 %    PLATELET 048 294 - 947 K/uL    MPV 10.6 8.9 - 12.9 FL    NRBC 0.0 0  WBC    ABSOLUTE NRBC 0.00 0.00 - 0.01 K/uL    NEUTROPHILS 64 32 - 75 %    LYMPHOCYTES 21 12 - 49 %    MONOCYTES 10 5 - 13 %    EOSINOPHILS 4 0 - 7 %    BASOPHILS 1 0 - 1 %    IMMATURE GRANULOCYTES 0 0.0 - 0.5 %    ABS. NEUTROPHILS 3.4 1.8 - 8.0 K/UL    ABS. LYMPHOCYTES 1.1 0.8 - 3.5 K/UL    ABS. MONOCYTES 0.5 0.0 - 1.0 K/UL    ABS. EOSINOPHILS 0.2 0.0 - 0.4 K/UL    ABS. BASOPHILS 0.1 0.0 - 0.1 K/UL    ABS. IMM.  GRANS. 0.0 0.00 - 0.04 K/UL    DF AUTOMATED     METABOLIC PANEL, BASIC   Result Value Ref Range    Sodium 144 136 - 145 mmol/L    Potassium 4.8 3.5 - 5.1 mmol/L    Chloride 112 (H) 97 - 108 mmol/L    CO2 29 21 - 32 mmol/L Anion gap 3 (L) 5 - 15 mmol/L    Glucose 82 65 - 100 mg/dL    BUN 26 (H) 6 - 20 MG/DL    Creatinine 1.20 (H) 0.55 - 1.02 MG/DL    BUN/Creatinine ratio 22 (H) 12 - 20      GFR est AA 52 (L) >60 ml/min/1.73m2    GFR est non-AA 43 (L) >60 ml/min/1.73m2    Calcium 9.0 8.5 - 10.1 MG/DL       Assessment/Plan:    ICD-10-CM ICD-9-CM    1. Contusion of head, unspecified part of head, initial encounter S00.93XA 920    2. Knee effusion, left M25.462 719.06    3. Contusion of left knee, initial encounter S80. 02XA 924.11      No orders of the defined types were placed in this encounter. lose weight, follow low fat diet, follow low salt diet,Take 81mg aspirin daily  Patient Instructions   [employer] Activation    Thank you for requesting access to [employer]. Please follow the instructions below to securely access and download your online medical record. [employer] allows you to send messages to your doctor, view your test results, renew your prescriptions, schedule appointments, and more. How Do I Sign Up? 1. In your internet browser, go to [email or web address]  2. Click on the First Time User? Click Here link in the Sign In box. You will be redirect to the New Member Sign Up page. 3. Enter your [employer] Access Code exactly as it appears below. You will not need to use this code after youve completed the sign-up process. If you do not sign up before the expiration date, you must request a new code. [employer] Access Code: Activation code not generated  Current [employer] Status: Patient Declined (This is the date your [employer] access code will )    4. Enter the last four digits of your Social Security Number (xxxx) and Date of Birth (mm/dd/yyyy) as indicated and click Submit. You will be taken to the next sign-up page. 5. Create a [employer] ID. This will be your [employer] login ID and cannot be changed, so think of one that is secure and easy to remember. 6. Create a [employer] password.  You can change your password at any time.  7. Enter your Password Reset Question and Answer. This can be used at a later time if you forget your password. 8. Enter your e-mail address. You will receive e-mail notification when new information is available in [address] 9. Click Sign Up. You can now view and download portions of your medical record. 10. Click the Download Summary menu link to download a portable copy of your medical information. Additional Information    If you have questions, please visit the Frequently Asked Questions section of the [employer] website at [email or web address] Remember, [employer] is NOT to be used for urgent needs. For medical emergencies, dial 911. Follow-up and Dispositions    · Return in about 1 week (around 5/27/2019), or if symptoms worsen or fail to improve. I have reviewed with the patient details of the assessment and plan and all questions were answered. [employer] patient education was performed. The most recent lab findings were reviewed with the patient. An After Visit Summary was printed and given to the patient.

## 2019-05-20 NOTE — PROGRESS NOTES
1. Have you been to the ER, urgent care clinic since your last visit? Hospitalized since your last visit?no    2. Have you seen or consulted any other health care providers outside of the [facility] since your last visit? Include any pap smears or colon screening. No    3 most recent PHQ Screens 5/2/2019   PHQ Not Done -   Little interest or pleasure in doing things Not at all   Feeling down, depressed, irritable, or hopeless Not at all   Total Score PHQ 2 0     Chief Complaint   Patient presents with   Sada Vazquez     several falls     Per Dr. Conway Cassette.,  verbal order given for needed amb poc labs.

## 2019-05-20 NOTE — PATIENT INSTRUCTIONS
[employer] Activation    Thank you for requesting access to [employer]. Please follow the instructions below to securely access and download your online medical record. [employer] allows you to send messages to your doctor, view your test results, renew your prescriptions, schedule appointments, and more. How Do I Sign Up? 1. In your internet browser, go to [email or web address]  2. Click on the First Time User? Click Here link in the Sign In box. You will be redirect to the New Member Sign Up page. 3. Enter your [employer] Access Code exactly as it appears below. You will not need to use this code after youve completed the sign-up process. If you do not sign up before the expiration date, you must request a new code. [employer] Access Code: Activation code not generated  Current [employer] Status: Patient Declined (This is the date your [employer] access code will )    4. Enter the last four digits of your Social Security Number (xxxx) and Date of Birth (mm/dd/yyyy) as indicated and click Submit. You will be taken to the next sign-up page. 5. Create a [employer] ID. This will be your [employer] login ID and cannot be changed, so think of one that is secure and easy to remember. 6. Create a [employer] password. You can change your password at any time. 7. Enter your Password Reset Question and Answer. This can be used at a later time if you forget your password. 8. Enter your e-mail address. You will receive e-mail notification when new information is available in [address] 9. Click Sign Up. You can now view and download portions of your medical record. 10. Click the Download Summary menu link to download a portable copy of your medical information. Additional Information    If you have questions, please visit the Frequently Asked Questions section of the [employer] website at [email or web address] Remember, [employer] is NOT to be used for urgent needs. For medical emergencies, dial 911.

## 2019-05-28 ENCOUNTER — OFFICE VISIT (OUTPATIENT)
Dept: INTERNAL MEDICINE CLINIC | Age: 79
End: 2019-05-28

## 2019-05-28 VITALS
BODY MASS INDEX: 31.09 KG/M2 | TEMPERATURE: 97.9 F | SYSTOLIC BLOOD PRESSURE: 126 MMHG | OXYGEN SATURATION: 98 % | RESPIRATION RATE: 16 BRPM | DIASTOLIC BLOOD PRESSURE: 86 MMHG | HEART RATE: 72 BPM | HEIGHT: 62 IN

## 2019-05-28 DIAGNOSIS — M25.562 KNEE MENISCUS PAIN, LEFT: ICD-10-CM

## 2019-05-28 DIAGNOSIS — M75.42 ROTATOR CUFF IMPINGEMENT SYNDROME OF LEFT SHOULDER: ICD-10-CM

## 2019-05-28 DIAGNOSIS — M25.462 KNEE EFFUSION, LEFT: Primary | ICD-10-CM

## 2019-05-28 DIAGNOSIS — Z91.81 AT HIGH RISK FOR INJURY RELATED TO FALL: ICD-10-CM

## 2019-05-28 RX ORDER — TRAMADOL HYDROCHLORIDE 50 MG/1
50 TABLET ORAL
Qty: 30 TAB | Refills: 0 | Status: SHIPPED | OUTPATIENT
Start: 2019-05-28 | End: 2019-06-12 | Stop reason: SDUPTHER

## 2019-05-28 NOTE — PROGRESS NOTES
1. Have you been to the ER, urgent care clinic since your last visit? Hospitalized since your last visit?no    2. Have you seen or consulted any other health care providers outside of the [address] [place] [address] since your last visit? Include any pap smears or colon screening.  No      3 most recent PHQ Screens 5/2/2019   PHQ Not Done -   Little interest or pleasure in doing things Not at all   Feeling down, depressed, irritable, or hopeless Not at all   Total Score PHQ 2 0     Chief Complaint   Patient presents with    Head Swelling     contussion/follow up

## 2019-05-28 NOTE — PROGRESS NOTES
Familia Carlin is a [de-identified] y.o. female and presents with Head Swelling (contussion/follow up) and Fall    Subjective:    She had a recent fall hitting her head. She states has not had a blackout . Knee pain Review:  Patient complaints of left knee pains continue. Onset of the symptoms was months  ago. Inciting event: longstanding problem which has been getting worse. Current symptoms include pain location: both: medial and swelling. none,  Aggravating symptoms: going up and down stairs, walking, lateral movements, any weight bearing. Patient's overall course: gradually worsening Patient has had prior knee problems. Evaluation to date:x-ray: IMPRESSION:     Mild to moderate osteoarthritis. Probable small joint effusion. Soft tissue  swelling anterior to the patellar tendon. No acute fracture Treatment to date:NSAIDS      Shoulder Pain Review:  Patient complains of left side shoulder pain. The symptoms began  weeks ago Course of symptoms since onset has been gradually worsening. Pain is described as overall severity = moderate. Symptoms were incited by no known event. Patient denies N/A. Therapy to date includes OTC analgesics: effective. Review of Systems  Constitutional: negative for fevers, chills, anorexia and weight loss  Eyes:   negative for visual disturbance and irritation  ENT:   negative for tinnitus,sore throat,nasal congestion,ear pains. hoarseness  Respiratory:  negative for cough, hemoptysis, dyspnea,wheezing  CV:   negative for chest pain, palpitations, lower extremity edema  GI:   negative for nausea, vomiting, diarrhea, abdominal pain,melena  Endo:               negative for polyuria,polydipsia,polyphagia,heat intolerance  Genitourinary: negative for frequency, dysuria and hematuria  Integument:  negative for rash and pruritus  Hematologic:  negative for easy bruising and gum/nose bleeding  Musculoskel:  myalgias, arthralgias, back pain, muscle weakness, joint pain  Neurological:  negative for headaches, dizziness, vertigo, memory problems and gait   Behavl/Psych: negative for feelings of anxiety, depression, mood changes    Past Medical History:   Diagnosis Date    Acute pharyngitis 2/8/2011    Allergic rhinitis, cause unspecified 2/8/2011    Arrhythmia     PVC    Asymptomatic varicose veins 2/8/2011    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     Carpal tunnel syndrome 2/8/2011    Degenerative Joint Disese ( improved/resolving) 2/8/2011    Degenetrative Dis Disease, 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    Diverticulosis of colon (without mention of hemorrhage) 2/8/2011    Dysphagia 2/8/2011    Edema, peripheral 2/8/2011    GERD (gastroesophageal reflux disease)     GERD Gastro- Esophageal Reflux Disease 2/8/2011    Herniated nucleus pulposus ( slipped disc) 2/8/2011    Menopausal 2/8/2011    PUD (peptic ulcer disease) 2012    Pure hypercholesterolemia 2/8/2011    Recurrent ear pain 2/8/2011    S/P radiation therapy     Scalp lesion 10/23/2014    Sebaceous cyst 4/22/2014    Unspecified cataract 2/8/2011    Unspecified essential hypertension 2/8/2011    Unspecified sleep apnea      Past Surgical History:   Procedure Laterality Date    HX HYSTERECTOMY      HX ORTHOPAEDIC  neck/back 2006    HX OTHER SURGICAL  5/8/2014     Excise recurrent scalp lesion and application of ACell    HX OTHER SURGICAL  5/8/2014    Excise keloid, anterior chest wall, and application of ACell    HX OTHER SURGICAL  5/8/2014     Excise sebaceous cyst, anterior chest wall    HX OTHER SURGICAL  5/8/2014    Punch biopsy, skin lesions, right forearm x2, right calf x1    CA ICAR CATHETER ABLATION ATRIOVENTR NODE FUNCTION N/A 5/6/2019    ABLATION AV NODE performed [place] Cruz [place] [address] CATH LAB    CA INS NEW/RPLCMT PRM PM W/TRANSV ELTRD ATRIAL&VENT  9/30/2017         CA RMVL IMPLANTABLE PT-ACTIVATED CAR EVENT RECORDER  9/30/2017          Social History     Socioeconomic History    Marital status:      Spouse name: Not on file    Number of children: Not on file    Years of education: Not on file    Highest education level: Not on file   Tobacco Use    Smoking status: Never Smoker    Smokeless tobacco: Never Used   Substance and Sexual Activity    Alcohol use: No    Drug use: No    Sexual activity: Yes     Partners: Male     Birth control/protection: None     Family History   Problem Relation Age of Onset    Hypertension Mother     Stroke Mother     Hypertension Father     Cancer Father         PROSTATE, MELANOMA    Anesth Problems Neg Hx      Current Outpatient Medications   Medication Sig Dispense Refill    rivaroxaban (XARELTO) 15 mg tab tablet Take 1 Tab by mouth daily (with dinner). 90 Tab 1    traMADol (ULTRAM) 50 mg tablet Take 1 Tab by mouth every six (6) hours as needed for Pain for up to 3 days. Max Daily Amount: 200 mg. 30 Tab 0    metoprolol succinate (TOPROL-XL) 100 mg tablet Take 100 mg by mouth daily.  meclizine (ANTIVERT) 25 mg tablet TAKE 1 TABLET THREE TIMES DAILY AS NEEDED 270 Tab 3    fluticasone (FLONASE) 50 mcg/actuation nasal spray USE 2 SPRAYS IN EACH NOSTRIL EVERY DAY 48 g 12    omeprazole (PRILOSEC) 40 mg capsule TAKE 1 CAPSULE EVERY DAY 90 Cap 3    potassium chloride SR (KLOR-CON 10) 10 mEq tablet TAKE 1 TABLET EVERY DAY 90 Tab 3    raNITIdine (ZANTAC) 150 mg tablet TAKE 1 TABLET TWICE DAILY 180 Tab 3    atorvastatin (LIPITOR) 40 mg tablet TAKE 1 TABLET EVERY DAY 90 Tab 3    furosemide (LASIX) 20 mg tablet TAKE 1 TABLET BY MOUTH DAILY 90 Tab 1    gabapentin (NEURONTIN) 300 mg capsule Take 1 Cap by mouth three (3) times daily. 270 Cap 3    acetaminophen (TYLENOL) 325 mg tablet Take 325 mg by mouth every four (4) hours as needed for Pain.  MULTIVIT WITH CALCIUM,IRON,MIN [facility] MULTIPLE VITAMINS PO) Take  by mouth daily.  aspirin delayed-release 81 mg tablet Take  by mouth daily.        Allergies   Allergen Reactions    Latex Rash    Bactrim [Sulfamethoprim Ds] Hives    Aspirin Other (comments)     Anything higher than 81mg makes pt jittery    Codeine Hives and Itching    Iodinated Contrast- Oral And Iv Dye Itching    Pcn [Penicillins] Hives and Itching    Tape [Adhesive] Rash       Objective:  Visit Vitals  /86   Pulse 72   Temp 97.9 °F (36.6 °C) (Oral)   Resp 16   Ht 5' 2\" (1.575 m)   SpO2 98%   BMI 31.09 kg/m²     Physical Exam:   General appearance - alert, well appearing, and in no distress  Mental status - alert, oriented to person, place, and time  EYE-DARLENE, EOMI, corneas normal, no foreign bodies  ENT-ENT exam normal, no neck nodes or sinus tenderness  Nose - normal and patent, no erythema, discharge or polyps  Mouth - mucous membranes moist, pharynx normal without lesions  Neck - supple, no significant adenopathy   Chest - clear to auscultation, no wheezes, rales or rhonchi, symmetric air entry   Heart - normal rate, regular rhythm, normal S1, S2, no murmurs, rubs, clicks or gallops   Abdomen - soft, nontender, nondistended, no masses or organomegaly  Lymph- no adenopathy palpable  Ext-peripheral pulses normal, no pedal edema, no clubbing or cyanosis  Skin-Warm and dry.  no hyperpigmentation, vitiligo, or suspicious lesions  Neuro -alert, oriented, normal speech, no focal findings or movement disorder noted  Neck-normal C-spine, no tenderness, full ROM without pain  Feet-no nail deformities or callus formation with good pulses noted  Head-erythema top of skull  Lt.knee-effusion noted  Lt.knee-reduced range of motion of lt., positive impingement signs    Results for orders placed or performed during the hospital encounter of 05/06/19   CBC WITH AUTOMATED DIFF   Result Value Ref Range    WBC 5.3 3.6 - 11.0 K/uL    RBC 4.80 3.80 - 5.20 M/uL    HGB 11.9 11.5 - 16.0 g/dL    HCT 38.6 35.0 - 47.0 %    MCV 80.4 80.0 - 99.0 FL    MCH 24.8 (L) 26.0 - 34.0 PG    MCHC 30.8 30.0 - 36.5 g/dL    RDW 18.6 (H) 11.5 - 14.5 %    PLATELET 389 818 - 270 K/uL    MPV 10.6 8.9 - 12.9 FL    NRBC 0.0 0  WBC    ABSOLUTE NRBC 0.00 0.00 - 0.01 K/uL    NEUTROPHILS 64 32 - 75 %    LYMPHOCYTES 21 12 - 49 %    MONOCYTES 10 5 - 13 %    EOSINOPHILS 4 0 - 7 %    BASOPHILS 1 0 - 1 %    IMMATURE GRANULOCYTES 0 0.0 - 0.5 %    ABS. NEUTROPHILS 3.4 1.8 - 8.0 K/UL    ABS. LYMPHOCYTES 1.1 0.8 - 3.5 K/UL    ABS. MONOCYTES 0.5 0.0 - 1.0 K/UL    ABS. EOSINOPHILS 0.2 0.0 - 0.4 K/UL    ABS. BASOPHILS 0.1 0.0 - 0.1 K/UL    ABS. IMM. GRANS. 0.0 0.00 - 0.04 K/UL    DF AUTOMATED     METABOLIC PANEL, BASIC   Result Value Ref Range    Sodium 144 136 - 145 mmol/L    Potassium 4.8 3.5 - 5.1 mmol/L    Chloride 112 (H) 97 - 108 mmol/L    CO2 29 21 - 32 mmol/L    Anion gap 3 (L) 5 - 15 mmol/L    Glucose 82 65 - 100 mg/dL    BUN 26 (H) 6 - 20 MG/DL    Creatinine 1.20 (H) 0.55 - 1.02 MG/DL    BUN/Creatinine ratio 22 (H) 12 - 20      GFR est AA 52 (L) >60 ml/min/1.73m2    GFR est non-AA 43 (L) >60 ml/min/1.73m2    Calcium 9.0 8.5 - 10.1 MG/DL       Assessment/Plan:    ICD-10-CM ICD-9-CM    1. Knee effusion, left M25.462 719.06 traMADol (ULTRAM) 50 mg tablet      MRI KNEE LT WO CONT   2. Knee meniscus pain, left M25.562 719.46 traMADol (ULTRAM) 50 mg tablet      MRI KNEE LT WO CONT   3. Rotator cuff impingement syndrome of left shoulder M75.42 726.10      Orders Placed This Encounter    MRI KNEE LT WO CONT     Standing Status:   Future     Standing Expiration Date:   6/28/2020     Order Specific Question:   Is Patient Allergic to Contrast Dye? Answer:   Yes     Order Specific Question:   Arthrogram study     Answer:   No    rivaroxaban (XARELTO) 15 mg tab tablet     Sig: Take 1 Tab by mouth daily (with dinner). Dispense:  90 Tab     Refill:  1    traMADol (ULTRAM) 50 mg tablet     Sig: Take 1 Tab by mouth every six (6) hours as needed for Pain for up to 3 days. Max Daily Amount: 200 mg.      Dispense:  30 Tab     Refill:  0     lose weight, follow low fat diet, follow low salt diet,Take 81mg aspirin daily  Patient Instructions   MyChart Activation    Thank you for requesting access to [facility]. Please follow the instructions below to securely access and download your online medical record. [facility] allows you to send messages to your doctor, view your test results, renew your prescriptions, schedule appointments, and more. How Do I Sign Up? 1. In your internet browser, go to [email or web address]  2. Click on the First Time User? Click Here link in the Sign In box. You will be redirect to the New Member Sign Up page. 3. Enter your [facility] Access Code exactly as it appears below. You will not need to use this code after youve completed the sign-up process. If you do not sign up before the expiration date, you must request a new code. [facility] Access Code: Activation code not generated  Current [facility] Status: Patient Declined (This is the date your [facility] access code will )    4. Enter the last four digits of your Social Security Number (xxxx) and Date of Birth (mm/dd/yyyy) as indicated and click Submit. You will be taken to the next sign-up page. 5. Create a [facility] ID. This will be your [facility] login ID and cannot be changed, so think of one that is secure and easy to remember. 6. Create a [facility] password. You can change your password at any time. 7. Enter your Password Reset Question and Answer. This can be used at a later time if you forget your password. 8. Enter your e-mail address. You will receive e-mail notification when new information is available in [address] 9. Click Sign Up. You can now view and download portions of your medical record. 10. Click the Download Summary menu link to download a portable copy of your medical information. Additional Information    If you have questions, please visit the Frequently Asked Questions section of the [facility] website at [email or web address] Remember, [facility] is NOT to be used for urgent needs.  For medical emergencies, dial 911. Follow-up and Dispositions    · Return in about 1 week (around 6/4/2019). I have reviewed with the patient details of the assessment and plan and all questions were answered. Relevent patient education was performed. The most recent lab findings were reviewed with the patient. An After Visit Summary was printed and given to the patient.

## 2019-05-28 NOTE — PATIENT INSTRUCTIONS
[employer] Activation    Thank you for requesting access to [facility]. Please follow the instructions below to securely access and download your online medical record. [facility] allows you to send messages to your doctor, view your test results, renew your prescriptions, schedule appointments, and more. How Do I Sign Up? 1. In your internet browser, go to [email or web address]  2. Click on the First Time User? Click Here link in the Sign In box. You will be redirect to the New Member Sign Up page. 3. Enter your [facility] Access Code exactly as it appears below. You will not need to use this code after youve completed the sign-up process. If you do not sign up before the expiration date, you must request a new code. [facility] Access Code: Activation code not generated  Current [facility] Status: Patient Declined (This is the date your [facility] access code will )    4. Enter the last four digits of your Social Security Number (xxxx) and Date of Birth (mm/dd/yyyy) as indicated and click Submit. You will be taken to the next sign-up page. 5. Create a [facility] ID. This will be your [facility] login ID and cannot be changed, so think of one that is secure and easy to remember. 6. Create a [facility] password. You can change your password at any time. 7. Enter your Password Reset Question and Answer. This can be used at a later time if you forget your password. 8. Enter your e-mail address. You will receive e-mail notification when new information is available in [address] 9. Click Sign Up. You can now view and download portions of your medical record. 10. Click the Download Summary menu link to download a portable copy of your medical information. Additional Information    If you have questions, please visit the Frequently Asked Questions section of the [facility] website at [email or web address] Remember, [facility] is NOT to be used for urgent needs. For medical emergencies, dial 911.

## 2019-06-10 ENCOUNTER — TELEPHONE (OUTPATIENT)
Dept: CARDIOLOGY CLINIC | Age: 79
End: 2019-06-10

## 2019-06-10 DIAGNOSIS — I47.1 PAT (PAROXYSMAL ATRIAL TACHYCARDIA) (HCC): ICD-10-CM

## 2019-06-10 DIAGNOSIS — I48.92 ATRIAL FLUTTER, UNSPECIFIED TYPE (HCC): ICD-10-CM

## 2019-06-10 DIAGNOSIS — I48.91 ATRIAL FIBRILLATION WITH RAPID VENTRICULAR RESPONSE (HCC): ICD-10-CM

## 2019-06-10 DIAGNOSIS — I48.0 PAF (PAROXYSMAL ATRIAL FIBRILLATION) (HCC): ICD-10-CM

## 2019-06-10 DIAGNOSIS — I49.5 TACHY-BRADY SYNDROME (HCC): ICD-10-CM

## 2019-06-10 NOTE — TELEPHONE ENCOUNTER
Verified patient with two types of identifiers. Patient son states that she has been having a very difficult time with her knee to the point where it is very difficult to move. Patient is scheduled for an MRI on 6/17/19 at 12:30 pm at [address] [place] [address] for an MRI. Patient has also been working with her PCP who thinks she may need rehab at [facility]. Notified patient per Dr. Viola Castillo rep needed at MRI to reprogram the device prior to the MRI. Will notify [facility] rep of patient's date and time of MRI.

## 2019-06-10 NOTE — TELEPHONE ENCOUNTER
Patient's son, Adri Painter, stated that the patient is having an MRI for her knee on 6/17 and then she will be going to rehab so he is unsure when he can reschedule the device check and ablation f/u. He stated that he knows that she needs to see the doctor but he would like your advice on what he should do. Please advise.     Phone #: [phone]  Thanks

## 2019-06-12 ENCOUNTER — TELEPHONE (OUTPATIENT)
Dept: CARDIOLOGY CLINIC | Age: 79
End: 2019-06-12

## 2019-06-12 DIAGNOSIS — M25.562 KNEE MENISCUS PAIN, LEFT: ICD-10-CM

## 2019-06-12 DIAGNOSIS — M25.462 KNEE EFFUSION, LEFT: ICD-10-CM

## 2019-06-12 RX ORDER — TRAMADOL HYDROCHLORIDE 50 MG/1
50 TABLET ORAL
Qty: 30 TAB | Refills: 0 | Status: SHIPPED | OUTPATIENT
Start: 2019-06-12 | End: 2019-06-15

## 2019-06-12 NOTE — TELEPHONE ENCOUNTER
Faxed device MRI form to [facility] at fax number [phone]. Fax confirmation received.

## 2019-06-17 ENCOUNTER — HOSPITAL ENCOUNTER (OUTPATIENT)
Dept: MRI IMAGING | Age: 79
Discharge: HOME | End: 2019-06-17
Attending: INTERNAL MEDICINE
Payer: MEDICARE

## 2019-06-17 DIAGNOSIS — M25.562 KNEE MENISCUS PAIN, LEFT: ICD-10-CM

## 2019-06-17 DIAGNOSIS — M25.462 KNEE EFFUSION, LEFT: ICD-10-CM

## 2019-06-17 PROCEDURE — 73721 MRI JNT OF LWR EXTRE W/O DYE: CPT

## 2019-06-19 ENCOUNTER — OFFICE VISIT (OUTPATIENT)
Dept: INTERNAL MEDICINE CLINIC | Age: 79
End: 2019-06-19

## 2019-06-19 VITALS
TEMPERATURE: 98 F | DIASTOLIC BLOOD PRESSURE: 80 MMHG | SYSTOLIC BLOOD PRESSURE: 140 MMHG | HEIGHT: 62 IN | BODY MASS INDEX: 31.09 KG/M2 | HEART RATE: 71 BPM | OXYGEN SATURATION: 98 % | RESPIRATION RATE: 16 BRPM

## 2019-06-19 DIAGNOSIS — E78.2 MIXED HYPERLIPIDEMIA: ICD-10-CM

## 2019-06-19 DIAGNOSIS — M25.462 KNEE EFFUSION, LEFT: ICD-10-CM

## 2019-06-19 DIAGNOSIS — I10 ESSENTIAL HYPERTENSION: Primary | ICD-10-CM

## 2019-06-19 LAB
CHOLEST SERPL-MCNC: 131 MG/DL
HDLC SERPL-MCNC: 56 MG/DL
LDL CHOLESTEROL POC: 52 MG/DL
NON-HDL CHOLESTEROL, 011976: 75
TCHOL/HDL RATIO (POC): 2.3
TRIGL SERPL-MCNC: 116 MG/DL

## 2019-06-19 NOTE — PATIENT INSTRUCTIONS
[employer]Class[employer]angled Media Activation    Thank you for requesting access to [facility]. Please follow the instructions below to securely access and download your online medical record. [facility] allows you to send messages to your doctor, view your test results, renew your prescriptions, schedule appointments, and more. How Do I Sign Up? 1. In your internet browser, go to [email or web address]  2. Click on the First Time User? Click Here link in the Sign In box. You will be redirect to the New Member Sign Up page. 3. Enter your [facility] Access Code exactly as it appears below. You will not need to use this code after youve completed the sign-up process. If you do not sign up before the expiration date, you must request a new code. [facility] Access Code: Activation code not generated  Current [facility] Status: Patient Declined (This is the date your [facility] access code will )    4. Enter the last four digits of your Social Security Number (xxxx) and Date of Birth (mm/dd/yyyy) as indicated and click Submit. You will be taken to the next sign-up page. 5. Create a [facility] ID. This will be your [facility] login ID and cannot be changed, so think of one that is secure and easy to remember. 6. Create a [facility] password. You can change your password at any time. 7. Enter your Password Reset Question and Answer. This can be used at a later time if you forget your password. 8. Enter your e-mail address. You will receive e-mail notification when new information is available in [address] 9. Click Sign Up. You can now view and download portions of your medical record. 10. Click the Download Summary menu link to download a portable copy of your medical information. Additional Information    If you have questions, please visit the Frequently Asked Questions section of the [facility] website at [email or web address] Remember, [facility] is NOT to be used for urgent needs. For medical emergencies, dial 911.

## 2019-06-19 NOTE — PROGRESS NOTES
Tara Ascencio is a [de-identified] y.o. female and presents with Annual Wellness Visit; Results (MRI); and Cholesterol Problem    Subjective:    She had a recent fall hitting her head. She states has not had a blackout . Knee pain Review:  Patient complaints of left knee pains continue. Onset of the symptoms was months  ago. Inciting event: longstanding problem which has been getting worse. Current symptoms include pain location: both: medial and swelling. none,  Aggravating symptoms: going up and down stairs, walking, lateral movements, any weight bearing. Patient's overall course: gradually worsening Patient has had prior knee problems. Evaluation:IMPRESSION:  1. 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 Degenerative bone marrow edema likely contributes to pain. 2. Mild maceration and extrusion of the lateral meniscus. 3. Horizontal tibial surface tear of the medial meniscus. 4. Joint effusion, synovitis, and small Baker's cyst.  5. Intact cruciates and collaterals. Shoulder Pain Review:  Patient complains of left side shoulder pain. The symptoms began  weeks ago Course of symptoms since onset has been gradually worsening. Pain is described as overall severity = moderate. Symptoms were incited by no known event. Patient denies N/A. Therapy to date includes OTC analgesics: effective. Review of Systems  Constitutional: negative for fevers, chills, anorexia and weight loss  Eyes:   negative for visual disturbance and irritation  ENT:   negative for tinnitus,sore throat,nasal congestion,ear pains. hoarseness  Respiratory:  negative for cough, hemoptysis, dyspnea,wheezing  CV:   negative for chest pain, palpitations, lower extremity edema  GI:   negative for nausea, vomiting, diarrhea, abdominal pain,melena  Endo:               negative for polyuria,polydipsia,polyphagia,heat intolerance  Genitourinary: negative for frequency, dysuria and hematuria  Integument:  negative for rash and pruritus  Hematologic:  negative for easy bruising and gum/nose bleeding  Musculoskel:  myalgias, arthralgias, back pain, muscle weakness, joint pain  Neurological:  negative for headaches, dizziness, vertigo, memory problems and gait   Behavl/Psych: negative for feelings of anxiety, depression, mood changes    Past Medical History:   Diagnosis Date    Acute pharyngitis 2/8/2011    Allergic rhinitis, cause unspecified 2/8/2011    Arrhythmia     PVC    Asymptomatic varicose veins 2/8/2011    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     Carpal tunnel syndrome 2/8/2011    Degenerative Joint Disese ( improved/resolving) 2/8/2011    Degenetrative Dis Disease, 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    Diverticulosis of colon (without mention of hemorrhage) 2/8/2011    Dysphagia 2/8/2011    Edema, peripheral 2/8/2011    GERD (gastroesophageal reflux disease)     GERD Gastro- Esophageal Reflux Disease 2/8/2011    Herniated nucleus pulposus ( slipped disc) 2/8/2011    Menopausal 2/8/2011    PUD (peptic ulcer disease) 2012    Pure hypercholesterolemia 2/8/2011    Recurrent ear pain 2/8/2011    S/P radiation therapy     Scalp lesion 10/23/2014    Sebaceous cyst 4/22/2014    Unspecified cataract 2/8/2011    Unspecified essential hypertension 2/8/2011    Unspecified sleep apnea      Past Surgical History:   Procedure Laterality Date    HX HYSTERECTOMY      HX ORTHOPAEDIC  neck/back 2006    HX OTHER SURGICAL  5/8/2014     Excise recurrent scalp lesion and application of ACell    HX OTHER SURGICAL  5/8/2014    Excise keloid, anterior chest wall, and application of ACell    HX OTHER SURGICAL  5/8/2014     Excise sebaceous cyst, anterior chest wall    HX OTHER SURGICAL  5/8/2014    Punch biopsy, skin lesions, right forearm x2, right calf x1    IN ICAR CATHETER ABLATION ATRIOVENTR NODE FUNCTION N/A 5/6/2019    ABLATION AV NODE performed by Eri Montgomery MD at [address]  CATH LAB    IN INS NEW/RPLCMT PRM PM W/TRANSV ELTRD ATRIAL&VENT  9/30/2017         CO RMVL IMPLANTABLE PT-ACTIVATED CAR EVENT RECORDER  9/30/2017          Social History     Socioeconomic History    Marital status:      Spouse name: Not on file    Number of children: Not on file    Years of education: Not on file    Highest education level: Not on file   Tobacco Use    Smoking status: Never Smoker    Smokeless tobacco: Never Used   Substance and Sexual Activity    Alcohol use: No    Drug use: No    Sexual activity: Yes     Partners: Male     Birth control/protection: None     Family History   Problem Relation Age of Onset    Hypertension Mother     Stroke Mother     Hypertension Father     Cancer Father         PROSTATE, MELANOMA    Anesth Problems Neg Hx      Current Outpatient Medications   Medication Sig Dispense Refill    rivaroxaban (XARELTO) 15 mg tab tablet Take 1 Tab by mouth daily (with dinner). 90 Tab 1    metoprolol succinate (TOPROL-XL) 100 mg tablet Take 100 mg by mouth daily.  meclizine (ANTIVERT) 25 mg tablet TAKE 1 TABLET THREE TIMES DAILY AS NEEDED 270 Tab 3    fluticasone (FLONASE) 50 mcg/actuation nasal spray USE 2 SPRAYS IN EACH NOSTRIL EVERY DAY 48 g 12    omeprazole (PRILOSEC) 40 mg capsule TAKE 1 CAPSULE EVERY DAY 90 Cap 3    potassium chloride SR (KLOR-CON 10) 10 mEq tablet TAKE 1 TABLET EVERY DAY 90 Tab 3    raNITIdine (ZANTAC) 150 mg tablet TAKE 1 TABLET TWICE DAILY 180 Tab 3    atorvastatin (LIPITOR) 40 mg tablet TAKE 1 TABLET EVERY DAY 90 Tab 3    furosemide (LASIX) 20 mg tablet TAKE 1 TABLET BY MOUTH DAILY 90 Tab 1    gabapentin (NEURONTIN) 300 mg capsule Take 1 Cap by mouth three (3) times daily. 270 Cap 3    acetaminophen (TYLENOL) 325 mg tablet Take 325 mg by mouth every four (4) hours as needed for Pain.  MULTIVIT WITH CALCIUM,IRON,MIN [facility] MULTIPLE VITAMINS PO) Take  by mouth daily.  aspirin delayed-release 81 mg tablet Take  by mouth daily.        Allergies   Allergen Reactions    Latex Rash    Bactrim [Sulfamethoprim Ds] Hives    Aspirin Other (comments)     Anything higher than 81mg makes pt jittery    Codeine Hives and Itching    Iodinated Contrast- Oral And Iv Dye Itching    Pcn [Penicillins] Hives and Itching    Tape [Adhesive] Rash       Objective:  Visit Vitals  /80   Pulse 71   Temp 98 °F (36.7 °C) (Oral)   Resp 16   Ht 5' 2\" (1.575 m)   SpO2 98%   BMI 31.09 kg/m²     Physical Exam:   General appearance - alert, well appearing, and in no distress  Mental status - alert, oriented to person, place, and time  EYE-DARLENE, EOMI, corneas normal, no foreign bodies  ENT-ENT exam normal, no neck nodes or sinus tenderness  Nose - normal and patent, no erythema, discharge or polyps  Mouth - mucous membranes moist, pharynx normal without lesions  Neck - supple, no significant adenopathy   Chest - clear to auscultation, no wheezes, rales or rhonchi, symmetric air entry   Heart - normal rate, regular rhythm, normal S1, S2, no murmurs, rubs, clicks or gallops   Abdomen - soft, nontender, nondistended, no masses or organomegaly  Lymph- no adenopathy palpable  Ext-peripheral pulses normal, no pedal edema, no clubbing or cyanosis  Skin-Warm and dry. no hyperpigmentation, vitiligo, or suspicious lesions  Neuro -alert, oriented, normal speech, no focal findings or movement disorder noted  Neck-normal C-spine, no tenderness, full ROM without pain  Feet-no nail deformities or callus formation with good pulses noted  Head-erythema top of skull  Lt.knee-effusion noted  Lt.knee-reduced range of motion of lt., positive impingement signs    Results for orders placed or performed in visit on 06/19/19   AMB POC LIPID PROFILE   Result Value Ref Range    Cholesterol (POC) 131     Triglycerides (POC) 116     HDL Cholesterol (POC) 56     Non-HDL Cholesterol 75     LDL Cholesterol (POC) 52 MG/DL    TChol/HDL Ratio (POC) 2.3        Assessment/Plan:    ICD-10-CM ICD-9-CM    1.  Essential hypertension I10 401.9 AMB POC LIPID PROFILE   2. Mixed hyperlipidemia E78.2 272.2 AMB POC LIPID PROFILE     Orders Placed This Encounter    AMB POC LIPID PROFILE     lose weight, follow low fat diet, follow low salt diet,Take 81mg aspirin daily  Patient Instructions   [employer] Activation    Thank you for requesting access to [employer]jo. Please follow the instructions below to securely access and download your online medical record. Banjo allows you to send messages to your doctor, view your test results, renew your prescriptions, schedule appointments, and more. How Do I Sign Up? 1. In your internet browser, go to [email or web address]  2. Click on the First Time User? Click Here link in the Sign In box. You will be redirect to the New Member Sign Up page. 3. Enter your Banjo Access Code exactly as it appears below. You will not need to use this code after youve completed the sign-up process. If you do not sign up before the expiration date, you must request a new code. Banjo Access Code: Activation code not generated  Current Banjo Status: Patient Declined (This is the date your Banjo access code will )    4. Enter the last four digits of your Social Security Number (xxxx) and Date of Birth (mm/dd/yyyy) as indicated and click Submit. You will be taken to the next sign-up page. 5. Create a Banjo ID. This will be your Banjo login ID and cannot be changed, so think of one that is secure and easy to remember. 6. Create a Banjo password. You can change your password at any time. 7. Enter your Password Reset Question and Answer. This can be used at a later time if you forget your password. 8. Enter your e-mail address. You will receive e-mail notification when new information is available in [address] 9. Click Sign Up. You can now view and download portions of your medical record. 10. Click the Download Summary menu link to download a portable copy of your medical information.     Additional Information    If you have questions, please visit the Frequently Asked Questions section of the [email or web address] website at [email or web address] Remember, [email or web address] is NOT to be used for urgent needs. For medical emergencies, dial 911. Follow-up and Dispositions    · Return in about 1 month (around 7/17/2019). I have reviewed with the patient details of the assessment and plan and all questions were answered. Relevent patient education was performed. The most recent lab findings were reviewed with the patient. An After Visit Summary was printed and given to the patient.

## 2019-06-19 NOTE — PROGRESS NOTES
1. Have you been to the ER, urgent care clinic since your last visit? Hospitalized since your last visit?no    2. Have you seen or consulted any other health care providers outside of the [address] [place] [address] since your last visit? Include any pap smears or colon screening.  No    Chief Complaint   Patient presents with    Annual Wellness Visit    Results     MRI       3 most recent PHQ Screens 5/2/2019   PHQ Not Done -   Little interest or pleasure in doing things Not at all   Feeling down, depressed, irritable, or hopeless Not at all   Total Score PHQ 2 0

## 2019-06-29 ENCOUNTER — HOSPITAL ENCOUNTER (EMERGENCY)
Age: 79
Discharge: HOME | End: 2019-06-29
Attending: EMERGENCY MEDICINE
Payer: MEDICARE

## 2019-06-29 ENCOUNTER — APPOINTMENT (OUTPATIENT)
Dept: CT IMAGING | Age: 79
End: 2019-06-29
Attending: EMERGENCY MEDICINE
Payer: MEDICARE

## 2019-06-29 VITALS
BODY MASS INDEX: 31.28 KG/M2 | TEMPERATURE: 98 F | SYSTOLIC BLOOD PRESSURE: 166 MMHG | RESPIRATION RATE: 17 BRPM | WEIGHT: 170 LBS | DIASTOLIC BLOOD PRESSURE: 84 MMHG | HEART RATE: 72 BPM | HEIGHT: 62 IN | OXYGEN SATURATION: 97 %

## 2019-06-29 DIAGNOSIS — K92.2 LOWER GI BLEEDING: Primary | ICD-10-CM

## 2019-06-29 LAB
ABO + RH BLD: NORMAL
ALBUMIN SERPL-MCNC: 3.4 G/DL (ref 3.5–5)
ALBUMIN/GLOB SERPL: 0.9 {RATIO} (ref 1.1–2.2)
ALP SERPL-CCNC: 100 U/L (ref 45–117)
ALT SERPL-CCNC: 17 U/L (ref 12–78)
ANION GAP SERPL CALC-SCNC: 5 MMOL/L (ref 5–15)
AST SERPL-CCNC: 16 U/L (ref 15–37)
BASOPHILS # BLD: 0 K/UL (ref 0–0.1)
BASOPHILS NFR BLD: 1 % (ref 0–1)
BILIRUB SERPL-MCNC: 0.5 MG/DL (ref 0.2–1)
BLOOD GROUP ANTIBODIES SERPL: NORMAL
BUN SERPL-MCNC: 20 MG/DL (ref 6–20)
BUN/CREAT SERPL: 18 (ref 12–20)
CALCIUM SERPL-MCNC: 8.8 MG/DL (ref 8.5–10.1)
CHLORIDE SERPL-SCNC: 110 MMOL/L (ref 97–108)
CO2 SERPL-SCNC: 30 MMOL/L (ref 21–32)
COMMENT, HOLDF: NORMAL
CREAT SERPL-MCNC: 1.09 MG/DL (ref 0.55–1.02)
DIFFERENTIAL METHOD BLD: ABNORMAL
EOSINOPHIL # BLD: 0.2 K/UL (ref 0–0.4)
EOSINOPHIL NFR BLD: 3 % (ref 0–7)
ERYTHROCYTE [DISTWIDTH] IN BLOOD BY AUTOMATED COUNT: 17.9 % (ref 11.5–14.5)
GLOBULIN SER CALC-MCNC: 3.6 G/DL (ref 2–4)
GLUCOSE SERPL-MCNC: 78 MG/DL (ref 65–100)
HCT VFR BLD AUTO: 37.1 % (ref 35–47)
HGB BLD-MCNC: 11.6 G/DL (ref 11.5–16)
IMM GRANULOCYTES # BLD AUTO: 0 K/UL (ref 0–0.04)
IMM GRANULOCYTES NFR BLD AUTO: 0 % (ref 0–0.5)
LIPASE SERPL-CCNC: 103 U/L (ref 73–393)
LYMPHOCYTES # BLD: 0.9 K/UL (ref 0.8–3.5)
LYMPHOCYTES NFR BLD: 18 % (ref 12–49)
MCH RBC QN AUTO: 24.5 PG (ref 26–34)
MCHC RBC AUTO-ENTMCNC: 31.3 G/DL (ref 30–36.5)
MCV RBC AUTO: 78.4 FL (ref 80–99)
MONOCYTES # BLD: 0.4 K/UL (ref 0–1)
MONOCYTES NFR BLD: 7 % (ref 5–13)
NEUTS SEG # BLD: 3.5 K/UL (ref 1.8–8)
NEUTS SEG NFR BLD: 71 % (ref 32–75)
NRBC # BLD: 0 K/UL (ref 0–0.01)
NRBC BLD-RTO: 0 PER 100 WBC
PLATELET # BLD AUTO: 299 K/UL (ref 150–400)
PMV BLD AUTO: 11.1 FL (ref 8.9–12.9)
POTASSIUM SERPL-SCNC: 3.5 MMOL/L (ref 3.5–5.1)
PROT SERPL-MCNC: 7 G/DL (ref 6.4–8.2)
RBC # BLD AUTO: 4.73 M/UL (ref 3.8–5.2)
SAMPLES BEING HELD,HOLD: NORMAL
SODIUM SERPL-SCNC: 145 MMOL/L (ref 136–145)
SPECIMEN EXP DATE BLD: NORMAL
WBC # BLD AUTO: 4.9 K/UL (ref 3.6–11)

## 2019-06-29 PROCEDURE — 36415 COLL VENOUS BLD VENIPUNCTURE: CPT

## 2019-06-29 PROCEDURE — 83690 ASSAY OF LIPASE: CPT

## 2019-06-29 PROCEDURE — 86900 BLOOD TYPING SEROLOGIC ABO: CPT

## 2019-06-29 PROCEDURE — 74176 CT ABD & PELVIS W/O CONTRAST: CPT

## 2019-06-29 PROCEDURE — 85025 COMPLETE CBC W/AUTO DIFF WBC: CPT

## 2019-06-29 PROCEDURE — 99284 EMERGENCY DEPT VISIT MOD MDM: CPT

## 2019-06-29 PROCEDURE — 80053 COMPREHEN METABOLIC PANEL: CPT

## 2019-06-29 RX ORDER — MECLIZINE HYDROCHLORIDE 25 MG/1
25 TABLET ORAL 2 TIMES DAILY
COMMUNITY
End: 2020-03-22

## 2019-06-29 RX ORDER — ATORVASTATIN CALCIUM 20 MG/1
20 TABLET, FILM COATED ORAL DAILY
COMMUNITY
End: 2019-08-08 | Stop reason: ALTCHOICE

## 2019-06-29 RX ORDER — FLUTICASONE PROPIONATE 50 MCG
2 SPRAY, SUSPENSION (ML) NASAL
COMMUNITY
End: 2022-09-22 | Stop reason: ALTCHOICE

## 2019-06-29 NOTE — PROGRESS NOTES
Pharmacy Clarification of Prior to Admission Medication Regimen     The patient was interviewed regarding clarification of the prior to admission medication regimen. Patient's family was present in room and obtained permission from patient to discuss drug regimen with visitor(s) present. Patient was questioned regarding use of any other inhalers, topical products, over the counter medications, herbal medications, vitamin products or ophthalmic/nasal/otic medication use. Patient was uncertain of her medication strengths. MHT called the outpatient pharmacy, [facility], [phone], and spoke with Ingris Jerez, technician, who was able to verify the patient's medications and strengths. Information Obtained From: Patient, Outpatient pharmacy, Rx query    Pertinent Pharmacy Findings:  Updated patient?s preferred outpatient pharmacy to: [facility] [id number] [place] [address]  Identified High Alert Medication Information  Current Anticoagulants:  Name: rivaroxaban (XARELTO) 15 mg tab tablet    PTA medication list was corrected to the following:     Prior to Admission Medications   Prescriptions Last Dose Informant Patient Reported? Taking? MULTIVIT WITH CALCIUM,IRON,MIN [facility] MULTIPLE VITAMINS PO) 2019 at Unknown time Self Yes Yes   Sig: Take 1 Tab by mouth daily. acetaminophen (TYLENOL) 325 mg tablet 2019 at Unknown time Self Yes Yes   Sig: Take 650 mg by mouth nightly. aspirin delayed-release 81 mg tablet 2019 at Unknown time Self Yes Yes   Sig: Take 81 mg by mouth daily. atorvastatin (LIPITOR) 20 mg tablet 2019 at Unknown time Self Yes Yes   Sig: Take 20 mg by mouth daily. fluticasone propionate (FLONASE) 50 mcg/actuation nasal spray 2019 at Unknown time Self Yes Yes   Si Sprays by Both Nostrils route daily as needed for Rhinitis.    furosemide (LASIX) 20 mg tablet 2019 at Unknown time Self No Yes   Sig: TAKE 1 TABLET BY MOUTH DAILY   gabapentin (NEURONTIN) 300 mg capsule 6/28/2019 at Unknown time Self No Yes   Sig: Take 1 Cap by mouth three (3) times daily. meclizine (ANTIVERT) 25 mg tablet 6/28/2019 at Unknown time Self Yes Yes   Sig: Take 25 mg by mouth two (2) times a day. metoprolol succinate (TOPROL-XL) 100 mg tablet 6/28/2019 at Unknown time Self Yes Yes   Sig: Take 100 mg by mouth daily. omeprazole (PRILOSEC) 40 mg capsule 6/28/2019 at Unknown time Self No Yes   Sig: TAKE 1 CAPSULE EVERY DAY   potassium chloride SR (KLOR-CON 10) 10 mEq tablet 6/28/2019 at Unknown time Self No Yes   Sig: TAKE 1 TABLET EVERY DAY   raNITIdine (ZANTAC) 150 mg tablet 6/28/2019 at Unknown time Self No Yes   Sig: TAKE 1 TABLET TWICE DAILY   rivaroxaban (XARELTO) 15 mg tab tablet 6/28/2019 at 2100 Self No Yes   Sig: Take 1 Tab by mouth daily (with dinner).       Facility-Administered Medications: None          Thank you,  Radha Albarran  Medication History Pharmacy Technician

## 2019-06-29 NOTE — ED PROVIDER NOTES
EMERGENCY DEPARTMENT HISTORY AND PHYSICAL EXAM      Date: 6/29/2019  Patient Name: Jesus Powers    History of Presenting Illness     Chief Complaint   Patient presents with    Rectal Bleeding     pt reports rectal bleeding dark red since this morning. pt currently on xarelto \"for my heart\". A/Ox4. pt to triage by [facility]    Abdominal Pain     reports mid abdominal pain that started this morning but has since subsided. History Provided By: Patient    HPI: Jesus Powers, [de-identified] y.o. female presents to the ED with bright red blood per rectum this morning. She had one large BM, this was mixed with bright red blood and blood clots. Following that, she had a smaller BM, mostly containing blood. Denies dizziness, lightheadedness, chest pain, syncope or shortness of breath. Reports having mild lower abdominal cramps that are intermittent but is currently asymptomatic. H/o diverticular disease, last colonoscopy was 5 years ago and positive for a few benign polyps, but otherwise normal. No h/o gi bleeds. There are no other complaints, changes, or physical findings at this time. PCP: Tracy Amaral MD    No current facility-administered medications on file prior to encounter. Current Outpatient Medications on File Prior to Encounter   Medication Sig Dispense Refill    rivaroxaban (XARELTO) 15 mg tab tablet Take 1 Tab by mouth daily (with dinner). 90 Tab 1    metoprolol succinate (TOPROL-XL) 100 mg tablet Take 100 mg by mouth daily.       meclizine (ANTIVERT) 25 mg tablet TAKE 1 TABLET THREE TIMES DAILY AS NEEDED 270 Tab 3    fluticasone (FLONASE) 50 mcg/actuation nasal spray USE 2 SPRAYS IN EACH NOSTRIL EVERY DAY 48 g 12    omeprazole (PRILOSEC) 40 mg capsule TAKE 1 CAPSULE EVERY DAY 90 Cap 3    potassium chloride SR (KLOR-CON 10) 10 mEq tablet TAKE 1 TABLET EVERY DAY 90 Tab 3    raNITIdine (ZANTAC) 150 mg tablet TAKE 1 TABLET TWICE DAILY 180 Tab 3    atorvastatin (LIPITOR) 40 mg tablet TAKE 1 TABLET EVERY DAY 90 Tab 3    furosemide (LASIX) 20 mg tablet TAKE 1 TABLET BY MOUTH DAILY 90 Tab 1    gabapentin (NEURONTIN) 300 mg capsule Take 1 Cap by mouth three (3) times daily. 270 Cap 3    acetaminophen (TYLENOL) 325 mg tablet Take 325 mg by mouth every four (4) hours as needed for Pain.  MULTIVIT WITH CALCIUM,IRON,MIN [facility] MULTIPLE VITAMINS PO) Take  by mouth daily.  aspirin delayed-release 81 mg tablet Take  by mouth daily.          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    Diverticulosis of colon (without mention of hemorrhage) 2/8/2011    Dysphagia 2/8/2011    Edema, peripheral 2/8/2011    GERD (gastroesophageal reflux disease)     GERD Gastro- Esophageal Reflux Disease 2/8/2011    Herniated nucleus pulposus ( slipped disc) 2/8/2011    Menopausal 2/8/2011    PUD (peptic ulcer disease) 2012    Pure hypercholesterolemia 2/8/2011    Recurrent ear pain 2/8/2011    S/P radiation therapy     Scalp lesion 10/23/2014    Sebaceous cyst 4/22/2014    Unspecified cataract 2/8/2011    Unspecified essential hypertension 2/8/2011    Unspecified sleep apnea        Past Surgical History:  Past Surgical History:   Procedure Laterality Date    HX HYSTERECTOMY      HX ORTHOPAEDIC  neck/back 2006    HX OTHER SURGICAL  5/8/2014     Excise recurrent scalp lesion and application of ACell    HX OTHER SURGICAL  5/8/2014    Excise keloid, anterior chest wall, and application of ACell    HX OTHER SURGICAL  5/8/2014     Excise sebaceous cyst, anterior chest wall    HX OTHER SURGICAL  5/8/2014    Punch biopsy, skin lesions, right forearm x2, right calf x1    CA ICAR CATHETER ABLATION ATRIOVENTR NODE FUNCTION N/A 5/6/2019    ABLATION AV NODE performed by Clifford Adair MD at [address] CATH LAB    CA INS NEW/RPLCMT PRM PM W/TRANSV ELTRD ATRIAL&VENT  9/30/2017         CA RMVL IMPLANTABLE PT-ACTIVATED CAR EVENT RECORDER  9/30/2017            Family History:  Family History   Problem Relation Age of Onset    Hypertension Mother     Stroke Mother     Hypertension Father     Cancer Father         PROSTATE, MELANOMA    Anesth Problems Neg Hx        Social History:  Social History     Tobacco Use    Smoking status: Never Smoker    Smokeless tobacco: Never Used   Substance Use Topics    Alcohol use: No    Drug use: No       Allergies: Allergies   Allergen Reactions    Latex Rash    Bactrim [Sulfamethoprim Ds] Hives    Aspirin Other (comments)     Anything higher than 81mg makes pt jittery    Codeine Hives and Itching    Iodinated Contrast- Oral And Iv Dye Itching    Pcn [Penicillins] Hives and Itching    Tape [Adhesive] Rash         Review of Systems   Review of Systems   Constitutional: Negative. Negative for appetite change, chills and fever. HENT: Negative for congestion, ear pain, rhinorrhea, sinus pain, trouble swallowing and voice change. Respiratory: Negative for cough, chest tightness, shortness of breath, wheezing and stridor. Cardiovascular: Negative for chest pain, palpitations and leg swelling. Gastrointestinal: Positive for abdominal pain and blood in stool. Negative for constipation, diarrhea, nausea and vomiting. Genitourinary: Negative for difficulty urinating, dysuria, flank pain, frequency and hematuria. Musculoskeletal: Negative for arthralgias and joint swelling. Skin: Negative. Neurological: Negative for dizziness, syncope, weakness, numbness and headaches. All other systems reviewed and are negative. Physical Exam   Physical Exam   Constitutional: She is oriented to person, place, and time. She appears well-developed and well-nourished. No distress. HENT:   Head: Atraumatic. Mouth/Throat: Oropharynx is clear and moist.   Eyes: Pupils are equal, round, and reactive to light. Conjunctivae and EOM are normal. No scleral icterus. Neck: Normal range of motion. Neck supple. No JVD present. Cardiovascular: Normal rate, regular rhythm, normal heart sounds and intact distal pulses. Pulmonary/Chest: Effort normal and breath sounds normal. She exhibits no tenderness. Abdominal: Soft. Bowel sounds are normal. She exhibits no distension. There is no tenderness. Genitourinary:   Genitourinary Comments: No external hemorrhoids are noted. Scant amount of stool in the vaginal vault, light brown and not bloody   Musculoskeletal: Normal range of motion. She exhibits no edema. Neurological: She is alert and oriented to person, place, and time. No cranial nerve deficit. Skin: Skin is warm and dry. She is not diaphoretic. Nursing note and vitals reviewed. Diagnostic Study Results     Labs -   No results found for this or any previous visit (from the past 24 hour(s)). Radiologic Studies -   No orders to display     CT Results  (Last 48 hours)    None        CXR Results  (Last 48 hours)    None            Medical Decision Making   I am the first provider for this patient. I reviewed the vital signs, available nursing notes, past medical history, past surgical history, family history and social history. Vital Signs-Reviewed the patient's vital signs. Patient Vitals for the past 24 hrs:   Temp Pulse Resp BP SpO2   06/29/19 1145 98 °F (36.7 °C) 75 17 153/89 100 %       Records Reviewed: Nursing Notes and Old Medical Records    Provider Notes (Medical Decision Making):     Patient presents with lower GI bleeding. Currently with stable vitals and benign abdominal exam.  DDx: AVM, diverticulosis, diverticulitis, IBD, IBS, colitis, hemorrhoids.   Will obtain two large bore IV's, provide IVF hydration, check labs, rectal exam and CT abdomen. 2:26 PM  Ct abdomen does not show any acute abnormalities. No additional bowel movements in the emergency department. Patient feels well and is asking to eat.    5:22 PM  Patient remained hemodynamically stable. No additional bowel movements in the ED. Feels well. Shared decision-making with her and family - would prefer to go home and has means to return should she have recurrence of symptoms. Holding [place] for today. ED Course:     Initial assessment performed. The patients presenting problems have been discussed, and they are in agreement with the care plan formulated and outlined with them. I have encouraged them to ask questions as they arise throughout their visit. I reviewed our electronic medical record system for any past medical records that were available that may contribute to the patients current condition, the nursing notes and and vital signs from today's visit    Nursing notes will be reviewed as they become available in realtime while the pt has been in the ED. Caprice Garrett MD      Disposition:dc      Diagnosis     Clinical Impression:   1.  Lower GI bleeding

## 2019-06-29 NOTE — ED NOTES
Pt c/o episode of dark red BM x this am Pt denies any abd pain but reports \"rumbling\" in abdomen prior to BM. Pt takes Xalreto for pacemaker placement.      Pt alert oriented x 4     Pt placed on monitor x 3 Updated on plan with pending evaluation by MD

## 2019-06-29 NOTE — DISCHARGE INSTRUCTIONS
Patient Education        Lower Gastrointestinal Bleeding: Care Instructions  Your Care Instructions    The digestive or gastrointestinal tract goes from the mouth to the anus. It is often called the GI tract. Bleeding in the lower GI tract can happen anywhere in your small or large intestine. It can also happen in your rectum or anus. In some cases, it is caused by an infection, cancer, or inflammatory bowel disease. Or it may be caused by hemorrhoids, diverticulitis, or clotting problems. Light bleeding may not cause any symptoms at first. But if you continue to bleed for a while, you may feel very weak or tired. Sudden, heavy bleeding means you need to see a doctor right away. This kind of bleeding can be very dangerous. But it can usually be cured or controlled. The doctor may do some tests to find the cause of your bleeding. Follow-up care is a key part of your treatment and safety. Be sure to make and go to all appointments, and call your doctor if you are having problems. It's also a good idea to know your test results and keep a list of the medicines you take. How can you care for yourself at home? · Be safe with medicines. Take your medicines exactly as prescribed. Call your doctor if you think you are having a problem with your medicine. You will get more details on the specific medicines your doctor prescribes. · Do not take aspirin or other anti-inflammatory medicines, such as naproxen (Aleve) or ibuprofen (Advil, Motrin), without talking to your doctor first. Ask your doctor if it is okay to use acetaminophen (Tylenol). · Do not drink alcohol. · The bleeding may make you lose iron. So it's important to eat foods that have a lot of iron. These include red meat, shellfish, poultry, and eggs. They also include beans, raisins, whole-grain breads, and leafy green vegetables. If you want help planning meals, you can meet with a dietitian. When should you call for help?   Call 911 anytime you think you may need emergency care. For example, call if:    · You have sudden, severe belly pain.     · You vomit blood or what looks like coffee grounds.     · You passed out (lost consciousness).     · Your stools are maroon or very bloody.    Call your doctor now or seek immediate medical care if:    · You are dizzy or lightheaded, or you feel like you may faint.     · Your stools are black and look like tar, or they have streaks of blood.     · You have belly pain.     · You vomit or have nausea.    Watch closely for changes in your health, and be sure to contact your doctor if you do not get better as expected. Where can you learn more? Go to http://benjamín-priscilla.info/. Enter B371 in the search box to learn more about \"Lower Gastrointestinal Bleeding: Care Instructions. \"  Current as of: September 23, 2018  Content Version: 11.9  © 3114-4647 [employer]. Care instructions adapted under license by [employer] (which disclaims liability or warranty for this information). If you have questions about a medical condition or this instruction, always ask your healthcare professional. [place] [address] any warranty or liability for your use of this information.

## 2019-07-16 ENCOUNTER — OFFICE VISIT (OUTPATIENT)
Dept: INTERNAL MEDICINE CLINIC | Age: 79
End: 2019-07-16

## 2019-07-16 VITALS
SYSTOLIC BLOOD PRESSURE: 100 MMHG | HEART RATE: 72 BPM | TEMPERATURE: 98 F | HEIGHT: 62 IN | OXYGEN SATURATION: 98 % | DIASTOLIC BLOOD PRESSURE: 80 MMHG | BODY MASS INDEX: 31.09 KG/M2 | RESPIRATION RATE: 16 BRPM

## 2019-07-16 DIAGNOSIS — E78.2 MIXED HYPERLIPIDEMIA: ICD-10-CM

## 2019-07-16 DIAGNOSIS — E78.00 HYPERCHOLESTEREMIA: ICD-10-CM

## 2019-07-16 DIAGNOSIS — M25.562 KNEE MENISCUS PAIN, LEFT: ICD-10-CM

## 2019-07-16 DIAGNOSIS — Z00.00 MEDICARE ANNUAL WELLNESS VISIT, SUBSEQUENT: ICD-10-CM

## 2019-07-16 DIAGNOSIS — M75.42 ROTATOR CUFF IMPINGEMENT SYNDROME OF LEFT SHOULDER: Primary | ICD-10-CM

## 2019-07-16 DIAGNOSIS — I10 ESSENTIAL HYPERTENSION: ICD-10-CM

## 2019-07-16 NOTE — PROGRESS NOTES
1. Have you been to the ER, urgent care clinic since your last visit? Hospitalized since your last visit?no    2. Have you seen or consulted any other health care providers outside of the [address] [place] [address] since your last visit? Include any pap smears or colon screening.  No    3 most recent PHQ Screens 5/2/2019   PHQ Not Done -   Little interest or pleasure in doing things Not at all   Feeling down, depressed, irritable, or hopeless Not at all   Total Score PHQ 2 0       Chief Complaint   Patient presents with   [facility] Annual Wellness Visit

## 2019-07-16 NOTE — PROGRESS NOTES
Booker Lundy is a [de-identified] y.o. female and presents with Annual Wellness Visit  . Subjective:    Knee pain Review:  Patient complaints of left knee pains continue. Onset of the symptoms was months  ago. Inciting event: longstanding problem which has been getting worse. Current symptoms include pain location: both: medial and swelling. none,  Aggravating symptoms: going up and down stairs, walking, lateral movements, any weight bearing. Patient's overall course: gradually worsening Patient has had prior knee problems. Evaluation:IMPRESSION:  1. 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 Degenerative bone marrow edema likely contributes to pain. 2. Mild maceration and extrusion of the lateral meniscus. 3. Horizontal tibial surface tear of the medial meniscus. 4. Joint effusion, synovitis, and small Baker's cyst.  5. Intact cruciates and collaterals. She has seen orthopedic doctor and placed on a knee brace with relief.        Shoulder Pain Review:  Patient complaints of left side shoulder pains continue. The symptoms began  weeks ago Course of symptoms since onset has been gradually worsening. Pain is described as overall severity = moderate. Symptoms were incited by no known event. Patient denies N/A. Therapy to date includes OTC analgesics: effective.     Dyslipidemia Review:  Patient presents for evaluation of lipids. Compliance with treatment thus far has been excellent. A repeat fasting lipid profile was done. The patient does not use medications that may worsen dyslipidemias (corticosteroids, progestins, anabolic steroids, diuretics, beta-blockers, amiodarone, cyclosporine, olanzapine). The patient exercises some    Hypertension Review:  The patient has essential hypertension  Diet and Lifestyle: generally follows a  low sodium diet, exercises sporadically  Home BP Monitoring: is not measured at home.   Pertinent ROS: taking medications as instructed, no medication side effects noted, no TIA's, no chest pain on exertion, no dyspnea on exertion, no swelling of ankles. Penelope Esquivel is a [de-identified] y.o. female and presents for annual Medicare Wellness Visit. Problem List: Reviewed with patient and discussed risk factors.     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    Carpal tunnel syndrome G56.00    Unspecified cataract H26.9    GERD Gastro- Esophageal Reflux Disease K21.9    Degenerative Joint Disese ( improved/resolving) M19.90    Menopausal N95.1    Asymptomatic varicose veins I83.90    Sebaceous cyst L72.3    Keloid L91.0    Scalp lesion L98.9    Abnormal nuclear stress test R94.39    Syncope R55    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    CAD (coronary artery disease) I25.10    S/P cardiac pacemaker procedure Z95.0    Bradycardia R00.1    SSS (sick sinus syndrome) ([facility]) I49.5    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, 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    PAF (paroxysmal atrial fibrillation) ([facility]) I48.0    Atrial fibrillation with rapid ventricular response ([facility]) I48.91       Current medical providers:  Patient Care Team:  Maggie Witt MD as PCP - General (Internal Medicine)  Chalo Montez MD (Cardiology)  Caroline Thomas RN as Ambulatory Care Navigator    PSH: Reviewed with patient  Past Surgical History:   Procedure Laterality Date    HX HYSTERECTOMY      HX ORTHOPAEDIC  neck/back 2006    HX OTHER SURGICAL  5/8/2014     Excise recurrent scalp lesion and application of ACell    HX OTHER SURGICAL  5/8/2014    Excise keloid, anterior chest wall, and application of ACell    HX OTHER SURGICAL  5/8/2014     Excise sebaceous cyst, anterior chest wall    HX OTHER SURGICAL  5/8/2014    Punch biopsy, skin lesions, right forearm x2, right calf x1    IN ICAR CATHETER ABLATION ATRIOVENTR NODE FUNCTION N/A 5/6/2019    ABLATION AV NODE performed by Katheen Pallas, MD at [address], [facility] CATH LAB    IN INS NEW/RPLCMT PRM PM W/TRANSV ELTRD ATRIAL&VENT  9/30/2017         IN RMVL IMPLANTABLE PT-ACTIVATED CAR EVENT RECORDER  9/30/2017             SH: Reviewed with patient  Social History     Tobacco Use    Smoking status: Never Smoker    Smokeless tobacco: Never Used   Substance Use Topics    Alcohol use: No    Drug use: No       FH: Reviewed with patient  Family History   Problem Relation Age of Onset    Hypertension Mother     Stroke Mother     Hypertension Father     Cancer Father         PROSTATE, MELANOMA    Anesth Problems Neg Hx        Medications/Allergies: Reviewed with patient  Current Outpatient Medications on File Prior to Visit   Medication Sig Dispense Refill    fluticasone propionate (FLONASE) 50 mcg/actuation nasal spray 2 Sprays by Both Nostrils route daily as needed for Rhinitis.  atorvastatin (LIPITOR) 20 mg tablet Take 20 mg by mouth daily.  meclizine (ANTIVERT) 25 mg tablet Take 25 mg by mouth two (2) times a day.  rivaroxaban (XARELTO) 15 mg tab tablet Take 1 Tab by mouth daily (with dinner). 90 Tab 1    metoprolol succinate (TOPROL-XL) 100 mg tablet Take 100 mg by mouth daily.  omeprazole (PRILOSEC) 40 mg capsule TAKE 1 CAPSULE EVERY DAY 90 Cap 3    potassium chloride SR (KLOR-CON 10) 10 mEq tablet TAKE 1 TABLET EVERY DAY 90 Tab 3    raNITIdine (ZANTAC) 150 mg tablet TAKE 1 TABLET TWICE DAILY 180 Tab 3    furosemide (LASIX) 20 mg tablet TAKE 1 TABLET BY MOUTH DAILY 90 Tab 1    gabapentin (NEURONTIN) 300 mg capsule Take 1 Cap by mouth three (3) times daily. 270 Cap 3    acetaminophen (TYLENOL) 325 mg tablet Take 650 mg by mouth nightly.       MULTIVIT WITH CALCIUM,IRON,MIN [facility] MULTIPLE VITAMINS PO) Take 1 Tab by mouth daily.  aspirin delayed-release 81 mg tablet Take 81 mg by mouth daily. No current facility-administered medications on file prior to visit. Allergies   Allergen Reactions    Latex Rash    Bactrim [Sulfamethoprim Ds] Hives    Aspirin Other (comments)     Anything higher than 81mg makes pt jittery    Codeine Hives and Itching    Iodinated Contrast- Oral And Iv Dye Itching    Pcn [Penicillins] Hives and Itching    Tape [Adhesive] Rash       Objective:  Visit Vitals  /80   Pulse 72   Temp 98 °F (36.7 °C) (Oral)   Resp 16   Ht 5' 2\" (1.575 m)   SpO2 98%   BMI 31.09 kg/m²    Body mass index is 31.09 kg/m². Assessment of cognitive impairment: Alert and oriented x 3    Depression Screen:   3 most recent PHQ Screens 5/2/2019   PHQ Not Done -   Little interest or pleasure in doing things Not at all   Feeling down, depressed, irritable, or hopeless Not at all   Total Score PHQ 2 0     Depression Review:  Patient is seen for screen of depression,denies anhedonia, weight gain, insomnia, hypersomnia, psychomotor agitation, psychomotor retardation, fatigue, feelings of worthlessness/guilt, difficulty concentrating, hopelessness, impaired memory and recurrent thoughts of death Treatment includes no medication   She denies recurrent thoughts of death and suicidal thoughts without plan. Fall Risk Assessment:    Fall Risk Assessment, last 12 mths 7/16/2019   Able to walk? Yes   Fall in past 12 months? No   Fall with injury? -   Number of falls in past 12 months -   Fall Risk Score -       Functional Ability:   Does the patient exhibit a steady gait? yes   How long did it take the patient to get up and walk from a sitting position? seconds   Is the patient self reliant?  (ie can do own laundry, meals, household chores)  yes     Does the patient handle his/her own medications? yes     Does the patient handle his/her own money? yes     Is the patients home safe (ie good lighting, handrails on stairs and bath, etc.)? yes     Did you notice or did patient express any hearing difficulties? no     Did you notice or did patient express any vision difficulties?   no     Were distance and reading eye charts used? no       Advance Care Planning:   Patient was offered the opportunity to discuss advance care planning:  yes     Does patient have an Advance Directive:  no   If no, did you provide information on Caring Connections? yes       Plan:      No orders of the defined types were placed in this encounter. Health Maintenance   Topic Date Due    DTaP/Tdap/Td series (1 - Tdap) 12/21/1961    Pneumococcal 65+ years (2 of 2 - PCV13) 09/11/2015    GLAUCOMA SCREENING Q2Y  03/08/2018    Shingrix Vaccine Age 50> (1 of 2) 09/24/2019 (Originally 12/21/1988)    Influenza Age 5 to Adult  08/01/2019    MEDICARE YEARLY EXAM  07/16/2020    Bone Densitometry (Dexa) Screening  Completed       *Patient verbalized understanding and agreement with the plan. A copy of the After Visit Summary with personalized health plan was given to the patient today. Review of Systems  Constitutional: negative for fevers, chills, anorexia and weight loss  Eyes:   negative for visual disturbance and irritation  ENT:   negative for tinnitus,sore throat,nasal congestion,ear pains. hoarseness  Respiratory:  negative for cough, hemoptysis, dyspnea,wheezing  CV:   negative for chest pain, palpitations, lower extremity edema  GI:   negative for nausea, vomiting, diarrhea, abdominal pain,melena  Endo:               negative for polyuria,polydipsia,polyphagia,heat intolerance  Genitourinary: negative for frequency, dysuria and hematuria  Integument:  negative for rash and pruritus  Hematologic:  negative for easy bruising and gum/nose bleeding  Musculoskel: negative for myalgias, arthralgias, back pain, muscle weakness, joint pain  Neurological:  negative for headaches, dizziness, vertigo, memory problems and gait   Behavl/Psych: negative for feelings of anxiety, depression, mood changes    Past Medical History:   Diagnosis Date    Acute pharyngitis 2/8/2011    Allergic rhinitis, cause unspecified 2/8/2011    Arrhythmia     PVC    Asymptomatic varicose veins 2/8/2011    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     Carpal tunnel syndrome 2/8/2011    Degenerative Joint Disese ( improved/resolving) 2/8/2011    Degenetrative Dis Disease, 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    Diverticulosis of colon (without mention of hemorrhage) 2/8/2011    Dysphagia 2/8/2011    Edema, peripheral 2/8/2011    GERD (gastroesophageal reflux disease)     GERD Gastro- Esophageal Reflux Disease 2/8/2011    Herniated nucleus pulposus ( slipped disc) 2/8/2011    Menopausal 2/8/2011    PUD (peptic ulcer disease) 2012    Pure hypercholesterolemia 2/8/2011    Recurrent ear pain 2/8/2011    S/P radiation therapy     Scalp lesion 10/23/2014    Sebaceous cyst 4/22/2014    Unspecified cataract 2/8/2011    Unspecified essential hypertension 2/8/2011    Unspecified sleep apnea      Past Surgical History:   Procedure Laterality Date    HX HYSTERECTOMY      HX ORTHOPAEDIC  neck/back 2006    HX OTHER SURGICAL  5/8/2014     Excise recurrent scalp lesion and application of ACell    HX OTHER SURGICAL  5/8/2014    Excise keloid, anterior chest wall, and application of ACell    HX OTHER SURGICAL  5/8/2014     Excise sebaceous cyst, anterior chest wall    HX OTHER SURGICAL  5/8/2014    Punch biopsy, skin lesions, right forearm x2, right calf x1    [place] ICAR CATHETER ABLATION ATRIOVENTR NODE FUNCTION N/A 5/6/2019    ABLATION AV NODE performed by Ayana Rolle MD at [place] [address], [facility] CATH LAB    [place] INS NEW/RPLCMT PRM PM W/TRANSV ELTRD ATRIAL&VENT  9/30/2017         [place] RMVL IMPLANTABLE PT-ACTIVATED CAR EVENT RECORDER  9/30/2017          Social History     Socioeconomic History    Marital status:      Spouse name: Not on file    Number of children: Not on file    Years of education: Not on file    Highest education level: Not on file   Tobacco Use    Smoking status: Never Smoker    Smokeless tobacco: Never Used   Substance and Sexual Activity    Alcohol use: No    Drug use: No    Sexual activity: Yes     Partners: Male     Birth control/protection: None     Family History   Problem Relation Age of Onset    Hypertension Mother     Stroke Mother     Hypertension Father     Cancer Father         PROSTATE, MELANOMA    Anesth Problems Neg Hx      Current Outpatient Medications   Medication Sig Dispense Refill    fluticasone propionate (FLONASE) 50 mcg/actuation nasal spray 2 Sprays by Both Nostrils route daily as needed for Rhinitis.  atorvastatin (LIPITOR) 20 mg tablet Take 20 mg by mouth daily.  meclizine (ANTIVERT) 25 mg tablet Take 25 mg by mouth two (2) times a day.  rivaroxaban (XARELTO) 15 mg tab tablet Take 1 Tab by mouth daily (with dinner). 90 Tab 1    metoprolol succinate (TOPROL-XL) 100 mg tablet Take 100 mg by mouth daily.  omeprazole (PRILOSEC) 40 mg capsule TAKE 1 CAPSULE EVERY DAY 90 Cap 3    potassium chloride SR (KLOR-CON 10) 10 mEq tablet TAKE 1 TABLET EVERY DAY 90 Tab 3    raNITIdine (ZANTAC) 150 mg tablet TAKE 1 TABLET TWICE DAILY 180 Tab 3    furosemide (LASIX) 20 mg tablet TAKE 1 TABLET BY MOUTH DAILY 90 Tab 1    gabapentin (NEURONTIN) 300 mg capsule Take 1 Cap by mouth three (3) times daily. 270 Cap 3    acetaminophen (TYLENOL) 325 mg tablet Take 650 mg by mouth nightly.  MULTIVIT WITH CALCIUM,IRON,MIN [facility] MULTIPLE VITAMINS PO) Take 1 Tab by mouth daily.  aspirin delayed-release 81 mg tablet Take 81 mg by mouth daily.        Allergies   Allergen Reactions    Latex Rash    Bactrim [Sulfamethoprim Ds] Hives    Aspirin Other (comments)     Anything higher than 81mg makes pt jittery    Codeine Hives and Itching    Iodinated Contrast- Oral And Iv Dye Itching    Pcn [Penicillins] Hives and Itching    Tape [Adhesive] Rash       Objective:  Visit Vitals  /80   Pulse 72   Temp 98 °F (36.7 °C) (Oral)   Resp 16   Ht 5' 2\" (1.575 m)   SpO2 98%   BMI 31.09 kg/m²     Physical Exam:   General appearance - alert, well appearing, and in no distress  Mental status - alert, oriented to person, place, and time  EYE-DARLENE, EOMI, corneas normal, no foreign bodies  ENT-ENT exam normal, no neck nodes or sinus tenderness  Nose - normal and patent, no erythema, discharge or polyps  Mouth - mucous membranes moist, pharynx normal without lesions  Neck - supple, no significant adenopathy   Chest - clear to auscultation, no wheezes, rales or rhonchi, symmetric air entry   Heart - normal rate, regular rhythm, normal S1, S2, no murmurs, rubs, clicks or gallops   Abdomen - soft, nontender, nondistended, no masses or organomegaly  Lymph- no adenopathy palpable  Ext-peripheral pulses normal, no pedal edema, no clubbing or cyanosis  Skin-Warm and dry. no hyperpigmentation, vitiligo, or suspicious lesions  Neuro -alert, oriented, normal speech, no focal findings or movement disorder noted  Neck-normal C-spine, no tenderness, full ROM without pain  Feet-no nail deformities or callus formation with good pulses noted      Results for orders placed or performed during the hospital encounter of 06/29/19   CBC WITH AUTOMATED DIFF   Result Value Ref Range    WBC 4.9 3.6 - 11.0 K/uL    RBC 4.73 3.80 - 5.20 M/uL    HGB 11.6 11.5 - 16.0 g/dL    HCT 37.1 35.0 - 47.0 %    MCV 78.4 (L) 80.0 - 99.0 FL    MCH 24.5 (L) 26.0 - 34.0 PG    MCHC 31.3 30.0 - 36.5 g/dL    RDW 17.9 (H) 11.5 - 14.5 %    PLATELET 783 957 - 626 K/uL    MPV 11.1 8.9 - 12.9 FL    NRBC 0.0 0  WBC    ABSOLUTE NRBC 0.00 0.00 - 0.01 K/uL    NEUTROPHILS 71 32 - 75 %    LYMPHOCYTES 18 12 - 49 %    MONOCYTES 7 5 - 13 %    EOSINOPHILS 3 0 - 7 %    BASOPHILS 1 0 - 1 %    IMMATURE GRANULOCYTES 0 0.0 - 0.5 %    ABS.  NEUTROPHILS 3. 5 1.8 - 8.0 K/UL    ABS. LYMPHOCYTES 0.9 0.8 - 3.5 K/UL    ABS. MONOCYTES 0.4 0.0 - 1.0 K/UL    ABS. EOSINOPHILS 0.2 0.0 - 0.4 K/UL    ABS. BASOPHILS 0.0 0.0 - 0.1 K/UL    ABS. IMM. GRANS. 0.0 0.00 - 0.04 K/UL    DF AUTOMATED     METABOLIC PANEL, COMPREHENSIVE   Result Value Ref Range    Sodium 145 136 - 145 mmol/L    Potassium 3.5 3.5 - 5.1 mmol/L    Chloride 110 (H) 97 - 108 mmol/L    CO2 30 21 - 32 mmol/L    Anion gap 5 5 - 15 mmol/L    Glucose 78 65 - 100 mg/dL    BUN 20 6 - 20 MG/DL    Creatinine 1.09 (H) 0.55 - 1.02 MG/DL    BUN/Creatinine ratio 18 12 - 20      GFR est AA 59 (L) >60 ml/min/1.73m2    GFR est non-AA 48 (L) >60 ml/min/1.73m2    Calcium 8.8 8.5 - 10.1 MG/DL    Bilirubin, total 0.5 0.2 - 1.0 MG/DL    ALT (SGPT) 17 12 - 78 U/L    AST (SGOT) 16 15 - 37 U/L    Alk. phosphatase 100 45 - 117 U/L    Protein, total 7.0 6.4 - 8.2 g/dL    Albumin 3.4 (L) 3.5 - 5.0 g/dL    Globulin 3.6 2.0 - 4.0 g/dL    A-G Ratio 0.9 (L) 1.1 - 2.2     LIPASE   Result Value Ref Range    Lipase 103 73 - 393 U/L   SAMPLES BEING HELD   Result Value Ref Range    SAMPLES BEING HELD 1BLUE     COMMENT        Add-on orders for these samples will be processed based on acceptable specimen integrity and analyte stability, which may vary by analyte. TYPE & SCREEN   Result Value Ref Range    Crossmatch Expiration 07/02/2019     ABO/Rh(D) AB POSITIVE     Antibody screen NEG        Assessment/Plan:    ICD-10-CM ICD-9-CM    1. Rotator cuff impingement syndrome of left shoulder M75.42 726.10    2. Essential hypertension I10 401.9    3. Mixed hyperlipidemia E78.2 272.2    4. Knee meniscus pain, left M25.562 719.46    5. Hypercholesteremia E78.00 272.0    6. Medicare annual wellness visit, subsequent Z00.00 V70.0      No orders of the defined types were placed in this encounter.     lose weight, increase physical activity, follow low fat diet, follow low salt diet  Patient Instructions   MyChart Activation    Thank you for requesting access to [employer]. Please follow the instructions below to securely access and download your online medical record. [employer] allows you to send messages to your doctor, view your test results, renew your prescriptions, schedule appointments, and more. How Do I Sign Up? 1. In your internet browser, go to [email or web address]  2. Click on the First Time User? Click Here link in the Sign In box. You will be redirect to the New Member Sign Up page. 3. Enter your [employer] Access Code exactly as it appears below. You will not need to use this code after youve completed the sign-up process. If you do not sign up before the expiration date, you must request a new code. [employer] Access Code: Activation code not generated  Current [employer] Status: Patient Declined (This is the date your [employer] access code will )    4. Enter the last four digits of your Social Security Number (xxxx) and Date of Birth (mm/dd/yyyy) as indicated and click Submit. You will be taken to the next sign-up page. 5. Create a [employer] ID. This will be your [employer] login ID and cannot be changed, so think of one that is secure and easy to remember. 6. Create a [employer] password. You can change your password at any time. 7. Enter your Password Reset Question and Answer. This can be used at a later time if you forget your password. 8. Enter your e-mail address. You will receive e-mail notification when new information is available in [address] 9. Click Sign Up. You can now view and download portions of your medical record. 10. Click the Download Summary menu link to download a portable copy of your medical information. Additional Information    If you have questions, please visit the Frequently Asked Questions section of the [employer] website at [email or web address]. [employer]. [email or web address] Remember, [employer] is NOT to be used for urgent needs. For medical emergencies, dial 911.            Follow-up and Dispositions    · Return in about 1 month (around 8/13/2019), or if symptoms worsen or fail to improve. I have reviewed with the patient details of the assessment and plan and all questions were answered. Relevent patient education was performed    An After Visit Summary was printed and given to the patient.

## 2019-07-16 NOTE — PATIENT INSTRUCTIONS
[place] Activation    Thank you for requesting access to [employer]. Please follow the instructions below to securely access and download your online medical record. [employer] allows you to send messages to your doctor, view your test results, renew your prescriptions, schedule appointments, and more. How Do I Sign Up? 1. In your internet browser, go to [email or web address]  2. Click on the First Time User? Click Here link in the Sign In box. You will be redirect to the New Member Sign Up page. 3. Enter your [employer] Access Code exactly as it appears below. You will not need to use this code after youve completed the sign-up process. If you do not sign up before the expiration date, you must request a new code. [employer] Access Code: Activation code not generated  Current [employer] Status: Patient Declined (This is the date your [employer] access code will )    4. Enter the last four digits of your Social Security Number (xxxx) and Date of Birth (mm/dd/yyyy) as indicated and click Submit. You will be taken to the next sign-up page. 5. Create a [employer] ID. This will be your [employer] login ID and cannot be changed, so think of one that is secure and easy to remember. 6. Create a [employer] password. You can change your password at any time. 7. Enter your Password Reset Question and Answer. This can be used at a later time if you forget your password. 8. Enter your e-mail address. You will receive e-mail notification when new information is available in [address] 9. Click Sign Up. You can now view and download portions of your medical record. 10. Click the Download Summary menu link to download a portable copy of your medical information. Additional Information    If you have questions, please visit the Frequently Asked Questions section of the [employer] website at [email or web address] Remember, [employer] is NOT to be used for urgent needs. For medical emergencies, dial 911.

## 2019-08-06 ENCOUNTER — HOSPITAL ENCOUNTER (EMERGENCY)
Age: 79
Discharge: HOME | End: 2019-08-07
Attending: EMERGENCY MEDICINE
Payer: MEDICARE

## 2019-08-06 VITALS
OXYGEN SATURATION: 100 % | TEMPERATURE: 97.9 F | BODY MASS INDEX: 30.31 KG/M2 | HEIGHT: 62 IN | DIASTOLIC BLOOD PRESSURE: 83 MMHG | SYSTOLIC BLOOD PRESSURE: 163 MMHG | WEIGHT: 164.68 LBS | HEART RATE: 72 BPM | RESPIRATION RATE: 18 BRPM

## 2019-08-06 DIAGNOSIS — E86.0 DEHYDRATION: Primary | ICD-10-CM

## 2019-08-06 DIAGNOSIS — M79.609 PARESTHESIA AND PAIN OF LEFT EXTREMITY: ICD-10-CM

## 2019-08-06 DIAGNOSIS — R20.2 PARESTHESIA AND PAIN OF LEFT EXTREMITY: ICD-10-CM

## 2019-08-06 PROCEDURE — 96360 HYDRATION IV INFUSION INIT: CPT

## 2019-08-06 PROCEDURE — 96361 HYDRATE IV INFUSION ADD-ON: CPT

## 2019-08-06 PROCEDURE — 99284 EMERGENCY DEPT VISIT MOD MDM: CPT

## 2019-08-07 ENCOUNTER — APPOINTMENT (OUTPATIENT)
Dept: CT IMAGING | Age: 79
End: 2019-08-07
Attending: EMERGENCY MEDICINE
Payer: MEDICARE

## 2019-08-07 LAB
ANION GAP SERPL CALC-SCNC: 4 MMOL/L (ref 5–15)
ATRIAL RATE: 71 BPM
BASOPHILS # BLD: 0 K/UL (ref 0–0.1)
BASOPHILS NFR BLD: 1 % (ref 0–1)
BUN SERPL-MCNC: 27 MG/DL (ref 6–20)
BUN/CREAT SERPL: 19 (ref 12–20)
CALCIUM SERPL-MCNC: 8.5 MG/DL (ref 8.5–10.1)
CALCULATED R AXIS, ECG10: -60 DEGREES
CALCULATED T AXIS, ECG11: 74 DEGREES
CHLORIDE SERPL-SCNC: 109 MMOL/L (ref 97–108)
CO2 SERPL-SCNC: 30 MMOL/L (ref 21–32)
CREAT SERPL-MCNC: 1.42 MG/DL (ref 0.55–1.02)
DIAGNOSIS, 93000: NORMAL
DIFFERENTIAL METHOD BLD: ABNORMAL
EOSINOPHIL # BLD: 0.2 K/UL (ref 0–0.4)
EOSINOPHIL NFR BLD: 4 % (ref 0–7)
ERYTHROCYTE [DISTWIDTH] IN BLOOD BY AUTOMATED COUNT: 18.6 % (ref 11.5–14.5)
GLUCOSE BLD STRIP.AUTO-MCNC: 97 MG/DL (ref 65–100)
GLUCOSE SERPL-MCNC: 95 MG/DL (ref 65–100)
HCT VFR BLD AUTO: 36 % (ref 35–47)
HGB BLD-MCNC: 11 G/DL (ref 11.5–16)
IMM GRANULOCYTES # BLD AUTO: 0 K/UL (ref 0–0.04)
IMM GRANULOCYTES NFR BLD AUTO: 0 % (ref 0–0.5)
INR PPP: 1.2 (ref 0.9–1.1)
LYMPHOCYTES # BLD: 0.9 K/UL (ref 0.8–3.5)
LYMPHOCYTES NFR BLD: 17 % (ref 12–49)
MCH RBC QN AUTO: 24.3 PG (ref 26–34)
MCHC RBC AUTO-ENTMCNC: 30.6 G/DL (ref 30–36.5)
MCV RBC AUTO: 79.6 FL (ref 80–99)
MONOCYTES # BLD: 0.4 K/UL (ref 0–1)
MONOCYTES NFR BLD: 7 % (ref 5–13)
NEUTS SEG # BLD: 3.9 K/UL (ref 1.8–8)
NEUTS SEG NFR BLD: 71 % (ref 32–75)
NRBC # BLD: 0 K/UL (ref 0–0.01)
NRBC BLD-RTO: 0 PER 100 WBC
P-R INTERVAL, ECG05: 188 MS
PLATELET # BLD AUTO: 267 K/UL (ref 150–400)
PMV BLD AUTO: 11.2 FL (ref 8.9–12.9)
POTASSIUM SERPL-SCNC: 3.8 MMOL/L (ref 3.5–5.1)
PROTHROMBIN TIME: 11.9 SEC (ref 9–11.1)
Q-T INTERVAL, ECG07: 468 MS
QRS DURATION, ECG06: 152 MS
QTC CALCULATION (BEZET), ECG08: 508 MS
RBC # BLD AUTO: 4.52 M/UL (ref 3.8–5.2)
SERVICE CMNT-IMP: NORMAL
SODIUM SERPL-SCNC: 143 MMOL/L (ref 136–145)
VENTRICULAR RATE, ECG03: 71 BPM
WBC # BLD AUTO: 5.5 K/UL (ref 3.6–11)

## 2019-08-07 PROCEDURE — 70450 CT HEAD/BRAIN W/O DYE: CPT

## 2019-08-07 PROCEDURE — 85025 COMPLETE CBC W/AUTO DIFF WBC: CPT

## 2019-08-07 PROCEDURE — 74011250636 HC RX REV CODE- 250/636: Performed by: EMERGENCY MEDICINE

## 2019-08-07 PROCEDURE — 85610 PROTHROMBIN TIME: CPT

## 2019-08-07 PROCEDURE — 80048 BASIC METABOLIC PNL TOTAL CA: CPT

## 2019-08-07 PROCEDURE — 36415 COLL VENOUS BLD VENIPUNCTURE: CPT

## 2019-08-07 PROCEDURE — 93005 ELECTROCARDIOGRAM TRACING: CPT

## 2019-08-07 PROCEDURE — 82962 GLUCOSE BLOOD TEST: CPT

## 2019-08-07 RX ADMIN — SODIUM CHLORIDE 1000 ML: 900 INJECTION, SOLUTION INTRAVENOUS at 02:17

## 2019-08-07 NOTE — ED NOTES
Patient reports weakness suddenly in left arm while cooking which has since resolved. Denies any numbness/ tingling/ dizziness. Patient is alert and oriented with equal  strength. No changes in gait, and clear speech. Daughter at bedside.

## 2019-08-07 NOTE — DISCHARGE INSTRUCTIONS
Patient Education        Dehydration: Care Instructions  Your Care Instructions  Dehydration happens when your body loses too much fluid. This might happen when you do not drink enough water or you lose large amounts of fluids from your body because of diarrhea, vomiting, or sweating. Severe dehydration can be life-threatening. Water and minerals called electrolytes help put your body fluids back in balance. Learn the early signs of fluid loss, and drink more fluids to prevent dehydration. Follow-up care is a key part of your treatment and safety. Be sure to make and go to all appointments, and call your doctor if you are having problems. It's also a good idea to know your test results and keep a list of the medicines you take. How can you care for yourself at home? · To prevent dehydration, drink plenty of fluids, enough so that your urine is light yellow or clear like water. Choose water and other caffeine-free clear liquids until you feel better. If you have kidney, heart, or liver disease and have to limit fluids, talk with your doctor before you increase the amount of fluids you drink. · If you do not feel like eating or drinking, try taking small sips of water, sports drinks, or other rehydration drinks. · Get plenty of rest.  To prevent dehydration  · Add more fluids to your diet and daily routine, unless your doctor has told you not to. · During hot weather, drink more fluids. Drink even more fluids if you exercise a lot. Stay away from drinks with alcohol or caffeine. · Watch for the symptoms of dehydration. These include:  ? A dry, sticky mouth. ? Dark yellow urine, and not much of it. ? Dry and sunken eyes. ? Feeling very tired. · Learn what problems can lead to dehydration. These include:  ? Diarrhea, fever, and vomiting. ? Any illness with a fever, such as pneumonia or the flu. ?  Activities that cause heavy sweating, such as endurance races and heavy outdoor work in hot or humid weather. ? Alcohol or drug use or problems caused by quitting their use (withdrawal). ? Certain medicines, such as cold and allergy pills (antihistamines), diet pills (diuretics), and laxatives. ? Certain diseases, such as diabetes, cancer, and heart or kidney disease. When should you call for help? Call 911 anytime you think you may need emergency care. For example, call if:    · You passed out (lost consciousness).    Call your doctor now or seek immediate medical care if:    · You are confused and cannot think clearly.     · You are dizzy or lightheaded, or you feel like you may faint.     · You have signs of needing more fluids. You have sunken eyes and a dry mouth, and you pass only a little dark urine.     · You cannot keep fluids down.    Watch closely for changes in your health, and be sure to contact your doctor if:    · You are not making tears.     · Your skin is very dry and sags slowly back into place after you pinch it.     · Your mouth and eyes are very dry. Where can you learn more? Go to http://benjamín-priscilla.info/. Enter Z841 in the search box to learn more about \"Dehydration: Care Instructions. \"  Current as of: September 23, 2018  Content Version: 12.1  © 3712-0505 [employer]. Care instructions adapted under license by [employer] (which disclaims liability or warranty for this information). If you have questions about a medical condition or this instruction, always ask your healthcare professional. [place] [address] any warranty or liability for your use of this information. Patient Education        Numbness and Tingling: Care Instructions  Your Care Instructions    Many things can cause numbness or tingling. Swelling may put pressure on a nerve. This could cause you to lose feeling or have a pins-and-needles sensation on part of your body.  Nerves may be damaged from trauma, toxins, or diseases, such as diabetes or multiple sclerosis (MS). Sometimes, though, the cause is not clear. If there is no clear reason for your symptoms, and you are not having any other symptoms, your doctor may suggest watching and waiting for a while to see if the numbness or tingling goes away on its own. Your doctor may want you to have blood or nerve tests to find the cause of your symptoms. Follow-up care is a key part of your treatment and safety. Be sure to make and go to all appointments, and call your doctor if you are having problems. It's also a good idea to know your test results and keep a list of the medicines you take. How can you care for yourself at home? · If your doctor prescribes medicine, take it exactly as directed. Call your doctor if you think you are having a problem with your medicine. · If you have any swelling, put ice or a cold pack on the area for 10 to 20 minutes at a time. Put a thin cloth between the ice and your skin. When should you call for help? Call 911 anytime you think you may need emergency care. For example, call if:    · You have weakness, numbness, or tingling in both legs.     · You lose bowel or bladder control.     · You have symptoms of a stroke. These may include:  ? Sudden numbness, tingling, weakness, or loss of movement in your face, arm, or leg, especially on only one side of your body. ? Sudden vision changes. ? Sudden trouble speaking. ? Sudden confusion or trouble understanding simple statements. ? Sudden problems with walking or balance. ? A sudden, severe headache that is different from past headaches.    Watch closely for changes in your health, and be sure to contact your doctor if you have any problems, or if:    · You do not get better as expected. Where can you learn more? Go to http://benjamín-priscilla.info/. Enter Y915 in the search box to learn more about \"Numbness and Tingling: Care Instructions. \"  Current as of: March 28, 2019  Content Version: 12.1  © 4473-2479 [employer]. Care instructions adapted under license by [employer] (which disclaims liability or warranty for this information). If you have questions about a medical condition or this instruction, always ask your healthcare professional. [address] any warranty or liability for your use of this information.

## 2019-08-07 NOTE — ED PROVIDER NOTES
EMERGENCY DEPARTMENT HISTORY AND PHYSICAL EXAM          Date: 8/6/2019  Patient Name: Ella Higuera    History of Presenting Illness     Chief Complaint   Patient presents with    Numbness     Pt wheeled to triage with c/o L arm numbness, which has \"mostly resolved\" x 1 hour PTA; denies other weakness or deficits; no dysarthria;  strengths equal bilaterally, no drift noted       History Provided By: Patient and Patient's Daughter    HPI: Ella Higuera is a [de-identified] y.o. female, pmhx hypertension, high cholesterol, DJD with herniated disc, PUD, who presents ambulatory with family to the ED c/o hand numbness    Patient states that earlier this evening around 8pm she was heating up her dinner and had sudden onset numbness and of her left hand. She notes when the symptoms started her hand felt weak and she thought she was good to be able to hold items in her left hand. Numbness continues a little but has greatly improved and the weakness has resolved. Patient denies any headache, vision changes, neck pain, paresthesias or weakness of the left lower extremity and any abnormal sensation or weakness of the right side or face. Patient specifically denies any recent fevers, chills, nausea, vomiting, diarrhea, abd pain, CP, SOB, urinary sxs, changes in BM, or headache but complains of leg swelling and knee swelling x2 weeks. PCP: Nupur Gomez MD    Allergies: multiple  Social Hx: -tobacco, -EtOH, -Illicit Drugs    There are no other complaints, changes, or physical findings at this time. Current Outpatient Medications   Medication Sig Dispense Refill    fluticasone propionate (FLONASE) 50 mcg/actuation nasal spray 2 Sprays by Both Nostrils route daily as needed for Rhinitis.  atorvastatin (LIPITOR) 20 mg tablet Take 20 mg by mouth daily.  meclizine (ANTIVERT) 25 mg tablet Take 25 mg by mouth two (2) times a day.  rivaroxaban (XARELTO) 15 mg tab tablet Take 1 Tab by mouth daily (with dinner). 90 Tab 1    metoprolol succinate (TOPROL-XL) 100 mg tablet Take 100 mg by mouth daily.  omeprazole (PRILOSEC) 40 mg capsule TAKE 1 CAPSULE EVERY DAY 90 Cap 3    potassium chloride SR (KLOR-CON 10) 10 mEq tablet TAKE 1 TABLET EVERY DAY 90 Tab 3    raNITIdine (ZANTAC) 150 mg tablet TAKE 1 TABLET TWICE DAILY 180 Tab 3    furosemide (LASIX) 20 mg tablet TAKE 1 TABLET BY MOUTH DAILY 90 Tab 1    gabapentin (NEURONTIN) 300 mg capsule Take 1 Cap by mouth three (3) times daily. 270 Cap 3    acetaminophen (TYLENOL) 325 mg tablet Take 650 mg by mouth nightly.  MULTIVIT WITH CALCIUM,IRON,MIN [facility] MULTIPLE VITAMINS PO) Take 1 Tab by mouth daily.  aspirin delayed-release 81 mg tablet Take 81 mg by mouth daily.          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    Diverticulosis of colon (without mention of hemorrhage) 2/8/2011    Dysphagia 2/8/2011    Edema, peripheral 2/8/2011    GERD (gastroesophageal reflux disease)     GERD Gastro- Esophageal Reflux Disease 2/8/2011    Herniated nucleus pulposus ( slipped disc) 2/8/2011    Menopausal 2/8/2011    PUD (peptic ulcer disease) 2012    Pure hypercholesterolemia 2/8/2011    Recurrent ear pain 2/8/2011    S/P radiation therapy     Scalp lesion 10/23/2014    Sebaceous cyst 4/22/2014    Unspecified cataract 2/8/2011    Unspecified essential hypertension 2/8/2011    Unspecified sleep apnea        Past Surgical History:  Past Surgical History:   Procedure Laterality Date    HX HYSTERECTOMY      HX ORTHOPAEDIC  neck/back 2006    HX OTHER SURGICAL  5/8/2014     Excise recurrent scalp lesion and application of ACell    HX OTHER SURGICAL  5/8/2014    Excise keloid, anterior chest wall, and application of ACell    HX OTHER SURGICAL  5/8/2014     Excise sebaceous cyst, anterior chest wall    HX OTHER SURGICAL  5/8/2014    Punch biopsy, skin lesions, right forearm x2, right calf x1    RI ICAR CATHETER ABLATION ATRIOVENTR NODE FUNCTION N/A 5/6/2019    ABLATION AV NODE performed by Gavi Huber MD at [address] CATH LAB    RI INS NEW/RPLCMT PRM PM W/TRANSV ELTRD ATRIAL&VENT  9/30/2017         RI RMVL IMPLANTABLE PT-ACTIVATED CAR EVENT RECORDER  9/30/2017            Family History:  Family History   Problem Relation Age of Onset    Hypertension Mother     Stroke Mother     Hypertension Father     Cancer Father         PROSTATE, MELANOMA    Anesth Problems Neg Hx        Social History:  Social History     Tobacco Use    Smoking status: Never Smoker    Smokeless tobacco: Never Used   Substance Use Topics    Alcohol use: No    Drug use: No       Allergies: Allergies   Allergen Reactions    Latex Rash    Bactrim [Sulfamethoprim Ds] Hives    Aspirin Other (comments)     Anything higher than 81mg makes pt jittery    Codeine Hives and Itching    Iodinated Contrast Media Itching    Pcn [Penicillins] Hives and Itching    Tape [Adhesive] Rash         Review of Systems   Review of Systems   Constitutional: Negative for activity change, appetite change, chills, fever and unexpected weight change. HENT: Negative for congestion. Eyes: Negative for pain and visual disturbance. Respiratory: Negative for cough and shortness of breath. Cardiovascular: Negative for chest pain. Gastrointestinal: Negative for abdominal pain, diarrhea, nausea and vomiting. Genitourinary: Negative for dysuria. Musculoskeletal: Negative for back pain. Skin: Negative for rash. Neurological: Positive for weakness and numbness.  Negative for dizziness, tremors, seizures, syncope, facial asymmetry, speech difficulty, light-headedness and headaches. Physical Exam   Physical Exam   Constitutional: She is oriented to person, place, and time. She appears well-developed and well-nourished. Elderly female currently in minimal distress   HENT:   Head: Normocephalic and atraumatic. Mouth/Throat: Oropharynx is clear and moist.   Eyes: Pupils are equal, round, and reactive to light. Conjunctivae and EOM are normal. Right eye exhibits no discharge. Left eye exhibits no discharge. Neck: Normal range of motion. Neck supple. Cardiovascular: Normal rate, regular rhythm and normal heart sounds. No murmur heard. Pulmonary/Chest: Effort normal and breath sounds normal. No respiratory distress. She has no wheezes. She has no rales. Abdominal: Soft. Bowel sounds are normal. She exhibits no distension. There is no tenderness. Musculoskeletal: Normal range of motion. She exhibits no edema. Neurological: She is alert and oriented to person, place, and time. No cranial nerve deficit. She exhibits normal muscle tone. Nice strong handgrip bilateral upper extremities without any evidence of proximal muscle weakness or gross sensory deficits   Skin: Skin is warm and dry. No rash noted. She is not diaphoretic. Nursing note and vitals reviewed.       Diagnostic Study Results     Labs -     Recent Results (from the past 12 hour(s))   EKG, 12 LEAD, INITIAL    Collection Time: 08/07/19 12:59 AM   Result Value Ref Range    Ventricular Rate 71 BPM    Atrial Rate 71 BPM    P-R Interval 188 ms    QRS Duration 152 ms    Q-T Interval 468 ms    QTC Calculation (Bezet) 508 ms    Calculated R Axis -60 degrees    Calculated T Axis 74 degrees    Diagnosis       AV dual-paced rhythm  When compared with ECG of 09-MAR-2019 14:34,  Electronic ventricular pacemaker has replaced Sinus rhythm     GLUCOSE, POC    Collection Time: 08/07/19  1:05 AM   Result Value Ref Range    Glucose (POC) 97 65 - 100 mg/dL    Performed by Celeste Dawson (RN)    CBC WITH AUTOMATED DIFF    Collection Time: 08/07/19  1:13 AM   Result Value Ref Range    WBC 5.5 3.6 - 11.0 K/uL    RBC 4.52 3.80 - 5.20 M/uL    HGB 11.0 (L) 11.5 - 16.0 g/dL    HCT 36.0 35.0 - 47.0 %    MCV 79.6 (L) 80.0 - 99.0 FL    MCH 24.3 (L) 26.0 - 34.0 PG    MCHC 30.6 30.0 - 36.5 g/dL    RDW 18.6 (H) 11.5 - 14.5 %    PLATELET 757 444 - 497 K/uL    MPV 11.2 8.9 - 12.9 FL    NRBC 0.0 0  WBC    ABSOLUTE NRBC 0.00 0.00 - 0.01 K/uL    NEUTROPHILS 71 32 - 75 %    LYMPHOCYTES 17 12 - 49 %    MONOCYTES 7 5 - 13 %    EOSINOPHILS 4 0 - 7 %    BASOPHILS 1 0 - 1 %    IMMATURE GRANULOCYTES 0 0.0 - 0.5 %    ABS. NEUTROPHILS 3.9 1.8 - 8.0 K/UL    ABS. LYMPHOCYTES 0.9 0.8 - 3.5 K/UL    ABS. MONOCYTES 0.4 0.0 - 1.0 K/UL    ABS. EOSINOPHILS 0.2 0.0 - 0.4 K/UL    ABS. BASOPHILS 0.0 0.0 - 0.1 K/UL    ABS. IMM. GRANS. 0.0 0.00 - 0.04 K/UL    DF AUTOMATED     METABOLIC PANEL, BASIC    Collection Time: 08/07/19  1:13 AM   Result Value Ref Range    Sodium 143 136 - 145 mmol/L    Potassium 3.8 3.5 - 5.1 mmol/L    Chloride 109 (H) 97 - 108 mmol/L    CO2 30 21 - 32 mmol/L    Anion gap 4 (L) 5 - 15 mmol/L    Glucose 95 65 - 100 mg/dL    BUN 27 (H) 6 - 20 MG/DL    Creatinine 1.42 (H) 0.55 - 1.02 MG/DL    BUN/Creatinine ratio 19 12 - 20      GFR est AA 43 (L) >60 ml/min/1.73m2    GFR est non-AA 36 (L) >60 ml/min/1.73m2    Calcium 8.5 8.5 - 10.1 MG/DL   PROTHROMBIN TIME + INR    Collection Time: 08/07/19  1:13 AM   Result Value Ref Range    INR 1.2 (H) 0.9 - 1.1      Prothrombin time 11.9 (H) 9.0 - 11.1 sec       Radiologic Studies -   CT HEAD WO CONT   Final Result   IMPRESSION: No acute changes. CT Results  (Last 48 hours)               08/07/19 0208  CT HEAD WO CONT Final result    Impression:  IMPRESSION: No acute changes. Narrative:  EXAM: CT HEAD WO CONT       INDICATION: left arm numbness       COMPARISON: March 9, 2019. CONTRAST: None.        TECHNIQUE: Unenhanced CT of the head was performed using 5 mm images. Brain and   bone windows were generated. CT dose reduction was achieved through use of a   standardized protocol tailored for this examination and automatic exposure   control for dose modulation. FINDINGS:   No extra-axial fluid collection hemorrhage or shift. Prominent sized ventricles   appear stable. CXR Results  (Last 48 hours)    None            Medical Decision Making   I am the first provider for this patient. I reviewed the vital signs, available nursing notes, past medical history, past surgical history, family history and social history. Vital Signs-Reviewed the patient's vital signs. Patient Vitals for the past 12 hrs:   Temp Pulse Resp BP SpO2   08/06/19 2356 97.9 °F (36.6 °C) 72 18 163/83 100 %       Pulse Oximetry Analysis - 100% on RA    Cardiac Monitor:   Rate: 72 bpm  Rhythm: Normal Sinus Rhythm      Records Reviewed: Nursing Notes and Old Medical Records    Provider Notes (Medical Decision Making):   MDM: Elderly female presenting with left hand paresthesias that appears to have resolved and a normal neurologic exam.  Unable to elicit any abnormality at this time. Will initiate stroke evaluation but hold anticoagulation as patient is already on Xarelto at home. ED Course:   Initial assessment performed. The patients presenting problems have been discussed, and they are in agreement with the care plan formulated and outlined with them. I have encouraged them to ask questions as they arise throughout their visit. EKG interpretation: (Preliminary)  Rhythm: paced; and regular . Rate (approx.): 71; Axis: left axis deviation;     PROGRESS NOTE:  2:10 AM  Pt continues to do well without any further symptoms or changes. Discussed results with the patient and her family member. Given her slight ALISON from baseline recommend IV fluids in the emergency room prior to discharge.   Patient already has an appointment with her primary care physician Thursday morning. Discussed with her and her son if symptoms change or she has any other problems to return to the emergency room. Discharge note:  2:15 AM  Pt re-evaluated and noted to be feeling better, ready for discharge. Updated pt and family on all final lab and CT findings. Will follow up as instructed. All questions have been answered, pt voiced understanding and agreement with plan. Specific return precautions provided as well as instructions to return to the ED should sx worsen at any time. Vital signs stable for discharge. Critical Care Time:   0      Diagnosis     Clinical Impression:   1. Dehydration    2. Paresthesia and pain of left extremity        PLAN:  1. Current Discharge Medication List        2. Follow-up Information     Follow up With Specialties Details Why Contact Info    \[facility]\"" EMERGENCY DEPT Emergency Medicine  If symptoms worsen [address] [place] [address]  [phone]    Larry Garay MD Internal Medicine Schedule an appointment as soon as possible for a visit for symptom recheck in 1-2 days [address] [facility] [address] [phone]          Return to ED if worse     Disposition:  home      Please note, this dictation was completed with [employer], the [employer] voice recognition software. Quite often unanticipated grammatical, syntax, homophones, and other interpretive errors are inadvertently transcribed by the computer software. Please disregard these errors. Please excuse any errors that have escaped final proof reading.

## 2019-08-08 ENCOUNTER — OFFICE VISIT (OUTPATIENT)
Dept: INTERNAL MEDICINE CLINIC | Age: 79
End: 2019-08-08

## 2019-08-08 VITALS
HEART RATE: 73 BPM | TEMPERATURE: 98 F | RESPIRATION RATE: 16 BRPM | OXYGEN SATURATION: 98 % | DIASTOLIC BLOOD PRESSURE: 60 MMHG | HEIGHT: 62 IN | BODY MASS INDEX: 30.12 KG/M2 | SYSTOLIC BLOOD PRESSURE: 112 MMHG

## 2019-08-08 DIAGNOSIS — I10 ESSENTIAL HYPERTENSION: ICD-10-CM

## 2019-08-08 DIAGNOSIS — E78.2 MIXED HYPERLIPIDEMIA: ICD-10-CM

## 2019-08-08 DIAGNOSIS — T46.6X5A STATIN MYOPATHY: ICD-10-CM

## 2019-08-08 DIAGNOSIS — E78.00 HYPERCHOLESTEREMIA: Primary | ICD-10-CM

## 2019-08-08 DIAGNOSIS — G72.0 STATIN MYOPATHY: ICD-10-CM

## 2019-08-08 LAB
CHOLEST SERPL-MCNC: 135 MG/DL
HDLC SERPL-MCNC: 65 MG/DL
LDL CHOLESTEROL POC: 39 MG/DL
NON-HDL CHOLESTEROL, 011976: 79
TCHOL/HDL RATIO (POC): 2.1
TRIGL SERPL-MCNC: 155 MG/DL

## 2019-08-08 NOTE — PROGRESS NOTES
1. Have you been to the ER, urgent care clinic since your last visit? Hospitalized since your last visit?no    2. Have you seen or consulted any other health care providers outside of the [address] [place] [address]    3 most recent [facility] 36 Screens 5/2/2019   PHQ Not Done -   Little interest or pleasure in doing things Not at all   Feeling down, depressed, irritable, or hopeless Not at all   Total Score PHQ 2 0     Chief Complaint   Patient presents with    Cholesterol Problem    Hypertension    ED Follow-up    Arm Pain     left     Per Dr. Roshni Little.,  verbal order given for needed amb poc labs.

## 2019-08-08 NOTE — PROGRESS NOTES
Ligia Saavedra is a [de-identified] y.o. female and presents with Cholesterol Problem; Hypertension; ED Follow-up; and Arm Pain (left)  . Subjective:  She has weakness involving the lt. arm with decreased strength  She was seen in the emergency treated and released. She had a negative ct scan for a CVA. Hypertension Review:  The patient has essential hypertension  Diet and Lifestyle: generally follows a  low sodium diet, exercises sporadically  Home BP Monitoring: is not measured at home. Pertinent ROS: taking medications as instructed, no medication side effects noted, no TIA's, no chest pain on exertion, no dyspnea on exertion, no swelling of ankles. Review of Systems  Constitutional: negative for fevers, chills, anorexia and weight loss  Eyes:   negative for visual disturbance and irritation  ENT:   negative for tinnitus,sore throat,nasal congestion,ear pains. hoarseness  Respiratory:  negative for cough, hemoptysis, dyspnea,wheezing  CV:   negative for chest pain, palpitations, lower extremity edema  GI:   negative for nausea, vomiting, diarrhea, abdominal pain,melena  Endo:               negative for polyuria,polydipsia,polyphagia,heat intolerance  Genitourinary: negative for frequency, dysuria and hematuria  Integument:  negative for rash and pruritus  Hematologic:  negative for easy bruising and gum/nose bleeding  Musculoskel: negative for myalgias, arthralgias, back pain, muscle weakness, joint pain  Neurological:  negative for headaches, dizziness, vertigo, memory problems and gait   Behavl/Psych: negative for feelings of anxiety, depression, mood changes    Past Medical History:   Diagnosis Date    Acute pharyngitis 2/8/2011    Allergic rhinitis, cause unspecified 2/8/2011    Arrhythmia     PVC    Asymptomatic varicose veins 2/8/2011    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     Carpal tunnel syndrome 2/8/2011    Degenerative Joint Disese ( improved/resolving) 2/8/2011    Degenetrative Dis Disease, 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    Diverticulosis of colon (without mention of hemorrhage) 2/8/2011    Dysphagia 2/8/2011    Edema, peripheral 2/8/2011    GERD (gastroesophageal reflux disease)     GERD Gastro- Esophageal Reflux Disease 2/8/2011    Herniated nucleus pulposus ( slipped disc) 2/8/2011    Menopausal 2/8/2011    PUD (peptic ulcer disease) 2012    Pure hypercholesterolemia 2/8/2011    Recurrent ear pain 2/8/2011    S/P radiation therapy     Scalp lesion 10/23/2014    Sebaceous cyst 4/22/2014    Unspecified cataract 2/8/2011    Unspecified essential hypertension 2/8/2011    Unspecified sleep apnea      Past Surgical History:   Procedure Laterality Date    HX HYSTERECTOMY      HX ORTHOPAEDIC  neck/back 2006    HX OTHER SURGICAL  5/8/2014     Excise recurrent scalp lesion and application of ACell    HX OTHER SURGICAL  5/8/2014    Excise keloid, anterior chest wall, and application of ACell    HX OTHER SURGICAL  5/8/2014     Excise sebaceous cyst, anterior chest wall    HX OTHER SURGICAL  5/8/2014    Punch biopsy, skin lesions, right forearm x2, right calf x1    CO ICAR CATHETER ABLATION ATRIOVENTR NODE FUNCTION N/A 5/6/2019    ABLATION AV NODE performed by Sveta Boateng MD at [address] CATH LAB    CO INS NEW/RPLCMT PRM PM W/TRANSV ELTRD ATRIAL&VENT  9/30/2017         CO RMVL IMPLANTABLE PT-ACTIVATED CAR EVENT RECORDER  9/30/2017          Social History     Socioeconomic History    Marital status:      Spouse name: Not on file    Number of children: Not on file    Years of education: Not on file    Highest education level: Not on file   Tobacco Use    Smoking status: Never Smoker    Smokeless tobacco: Never Used   Substance and Sexual Activity    Alcohol use: No    Drug use: No    Sexual activity: Yes     Partners: Male     Birth control/protection: None     Family History   Problem Relation Age of Onset    Hypertension Mother     Stroke Mother     Hypertension Father    [facility] Father         PROSTATE, MELANOMA    Anesth Problems Neg Hx      Current Outpatient Medications   Medication Sig Dispense Refill    fluticasone propionate (FLONASE) 50 mcg/actuation nasal spray 2 Sprays by Both Nostrils route daily as needed for Rhinitis.  meclizine (ANTIVERT) 25 mg tablet Take 25 mg by mouth two (2) times a day.  rivaroxaban (XARELTO) 15 mg tab tablet Take 1 Tab by mouth daily (with dinner). 90 Tab 1    metoprolol succinate (TOPROL-XL) 100 mg tablet Take 100 mg by mouth daily.  omeprazole (PRILOSEC) 40 mg capsule TAKE 1 CAPSULE EVERY DAY 90 Cap 3    potassium chloride SR (KLOR-CON 10) 10 mEq tablet TAKE 1 TABLET EVERY DAY 90 Tab 3    raNITIdine (ZANTAC) 150 mg tablet TAKE 1 TABLET TWICE DAILY 180 Tab 3    furosemide (LASIX) 20 mg tablet TAKE 1 TABLET BY MOUTH DAILY 90 Tab 1    gabapentin (NEURONTIN) 300 mg capsule Take 1 Cap by mouth three (3) times daily. 270 Cap 3    acetaminophen (TYLENOL) 325 mg tablet Take 650 mg by mouth nightly.  MULTIVIT WITH CALCIUM,IRON,MIN [facility] MULTIPLE VITAMINS PO) Take 1 Tab by mouth daily.  aspirin delayed-release 81 mg tablet Take 81 mg by mouth daily.        Allergies   Allergen Reactions    Latex Rash    Bactrim [Sulfamethoprim Ds] Hives    Aspirin Other (comments)     Anything higher than 81mg makes pt jittery    Codeine Hives and Itching    Iodinated Contrast Media Itching    Pcn [Penicillins] Hives and Itching    Tape [Adhesive] Rash       Objective:  Visit Vitals  /60   Pulse 73   Temp 98 °F (36.7 °C) (Oral)   Resp 16   Ht 5' 2\" (1.575 m)   SpO2 98%   BMI 30.12 kg/m²     Physical Exam:   General appearance - alert, well appearing, and in no distress  Mental status - alert, oriented to person, place, and time  EYE-DARLENE, EOMI, corneas normal, no foreign bodies  ENT-ENT exam normal, no neck nodes or sinus tenderness  Nose - normal and patent, no erythema, discharge or polyps  Mouth - mucous membranes moist, pharynx normal without lesions  Neck - supple, no significant adenopathy   Chest - clear to auscultation, no wheezes, rales or rhonchi, symmetric air entry   Heart - normal rate, regular rhythm, normal S1, S2, no murmurs, rubs, clicks or gallops   Abdomen - soft, nontender, nondistended, no masses or organomegaly  Lymph- no adenopathy palpable  Ext-peripheral pulses normal, no pedal edema, no clubbing or cyanosis  Skin-Warm and dry. no hyperpigmentation, vitiligo, or suspicious lesions  Neuro -alert, oriented, normal speech, no focal findings or movement disorder noted  Neck-normal C-spine, no tenderness, full ROM without pain  Feet-no nail deformities or callus formation with good pulses noted      Results for orders placed or performed in visit on 08/08/19   AMB POC LIPID PROFILE   Result Value Ref Range    Cholesterol (POC) 135     Triglycerides (POC) 155     HDL Cholesterol (POC) 65     Non-HDL Cholesterol 79     LDL Cholesterol (POC) 39 MG/DL    TChol/HDL Ratio (POC) 2.1        Assessment/Plan:    ICD-10-CM ICD-9-CM    1. Hypercholesteremia E78.00 272.0 AMB POC LIPID PROFILE   2. Mixed hyperlipidemia E78.2 272.2 AMB POC LIPID PROFILE   3. Essential hypertension I10 401.9 AMB POC LIPID PROFILE   4. Statin myopathy G72.0 359.4 CK    T46.6X5A E942.2      Orders Placed This Encounter    CK    AMB POC LIPID PROFILE     call if any problems,Take 81mg aspirin daily  Patient Instructions   Rdiohart Activation    Thank you for requesting access to Client Outlook. Please follow the instructions below to securely access and download your online medical record. Client Outlook allows you to send messages to your doctor, view your test results, renew your prescriptions, schedule appointments, and more. How Do I Sign Up? 1. In your internet browser, go to [email or web address]  2. Click on the First Time User? Click Here link in the Sign In box. You will be redirect to the New Member Sign Up page.   3. Enter your Client Outlook Access Code exactly as it appears below. You will not need to use this code after youve completed the sign-up process. If you do not sign up before the expiration date, you must request a new code. avoxhart Access Code: Activation code not generated  Current Complix Status: Patient Declined (This is the date your Carmat access code will )    4. Enter the last four digits of your Social Security Number (xxxx) and Date of Birth (mm/dd/yyyy) as indicated and click Submit. You will be taken to the next sign-up page. 5. Create a Carmat ID. This will be your Complix login ID and cannot be changed, so think of one that is secure and easy to remember. 6. Create a Carmat password. You can change your password at any time. 7. Enter your Password Reset Question and Answer. This can be used at a later time if you forget your password. 8. Enter your e-mail address. You will receive e-mail notification when new information is available in [address] 9. Click Sign Up. You can now view and download portions of your medical record. 10. Click the Download Summary menu link to download a portable copy of your medical information. Additional Information    If you have questions, please visit the Frequently Asked Questions section of the Complix website at [email or web address] [email or web address] Remember, Complix is NOT to be used for urgent needs. For medical emergencies, dial 911. Follow-up and Dispositions    · Return in about 1 week (around 8/15/2019), or if symptoms worsen or fail to improve. I have reviewed with the patient details of the assessment and plan and all questions were answered. Relevent patient education was performed. The most recent lab findings were reviewed with the patient. An After Visit Summary was printed and given to the patient.

## 2019-08-08 NOTE — PATIENT INSTRUCTIONS
SpotlightharGreen Phosphor Activation    Thank you for requesting access to Elo7. Please follow the instructions below to securely access and download your online medical record. Elo7 allows you to send messages to your doctor, view your test results, renew your prescriptions, schedule appointments, and more. How Do I Sign Up? 1. In your internet browser, go to [email or web address]  2. Click on the First Time User? Click Here link in the Sign In box. You will be redirect to the New Member Sign Up page. 3. Enter your Elo7 Access Code exactly as it appears below. You will not need to use this code after youve completed the sign-up process. If you do not sign up before the expiration date, you must request a new code. Elo7 Access Code: Activation code not generated  Current Elo7 Status: Patient Declined (This is the date your Elo7 access code will )    4. Enter the last four digits of your Social Security Number (xxxx) and Date of Birth (mm/dd/yyyy) as indicated and click Submit. You will be taken to the next sign-up page. 5. Create a Elo7 ID. This will be your Elo7 login ID and cannot be changed, so think of one that is secure and easy to remember. 6. Create a Elo7 password. You can change your password at any time. 7. Enter your Password Reset Question and Answer. This can be used at a later time if you forget your password. 8. Enter your e-mail address. You will receive e-mail notification when new information is available in [address] 9. Click Sign Up. You can now view and download portions of your medical record. 10. Click the Download Summary menu link to download a portable copy of your medical information. Additional Information    If you have questions, please visit the Frequently Asked Questions section of the Elo7 website at [email or web address] Remember, Elo7 is NOT to be used for urgent needs. For medical emergencies, dial 911.

## 2019-08-09 LAB — CK SERPL-CCNC: 113 U/L (ref 24–173)

## 2019-08-12 ENCOUNTER — OFFICE VISIT (OUTPATIENT)
Dept: INTERNAL MEDICINE CLINIC | Age: 79
End: 2019-08-12

## 2019-08-12 VITALS
SYSTOLIC BLOOD PRESSURE: 170 MMHG | HEART RATE: 79 BPM | TEMPERATURE: 97.9 F | RESPIRATION RATE: 20 BRPM | BODY MASS INDEX: 30.12 KG/M2 | DIASTOLIC BLOOD PRESSURE: 90 MMHG | HEIGHT: 62 IN | OXYGEN SATURATION: 100 %

## 2019-08-12 DIAGNOSIS — M75.42 ROTATOR CUFF IMPINGEMENT SYNDROME OF LEFT SHOULDER: Primary | ICD-10-CM

## 2019-08-12 RX ORDER — TRIAMCINOLONE ACETONIDE 40 MG/ML
40 INJECTION, SUSPENSION INTRA-ARTICULAR; INTRAMUSCULAR ONCE
Qty: 1 ML | Refills: 0
Start: 2019-08-12 | End: 2019-08-12

## 2019-08-12 RX ORDER — LIDOCAINE HYDROCHLORIDE 20 MG/ML
2 INJECTION, SOLUTION EPIDURAL; INFILTRATION; INTRACAUDAL; PERINEURAL ONCE
Qty: 2 ML | Refills: 0
Start: 2019-08-12 | End: 2019-08-12

## 2019-08-12 NOTE — PROGRESS NOTES
Chief Complaint   Patient presents with    Shoulder Pain     3 most recent PHQ Screens 8/12/2019   PHQ Not Done -   Little interest or pleasure in doing things Not at all   Feeling down, depressed, irritable, or hopeless Not at all   Total Score PHQ 2 0     1. Have you been to the ER, urgent care clinic since your last visit? Hospitalized since your last visit?no    2. Have you seen or consulted any other health care providers outside of the [address] [place] [address] since your last visit? Include any pap smears or colon screening.  no

## 2019-08-12 NOTE — PROGRESS NOTES
Tahmina Mcmahan is a [de-identified] y.o. female and presents with Shoulder Pain    Subjective:  Shoulder Pain Review:  Patient complains of left side shoulder pain. The symptoms began several weeks ago Course of symptoms since onset has been gradually worsening. Pain is described as overall severity = moderate. Symptoms were incited by no known event. Patient denies N/A. Therapy to date includes OTC analgesics: effective. Hypertension Review:  The patient has essential hypertension  Diet and Lifestyle: generally follows a  low sodium diet, exercises sporadically  Home BP Monitoring: is not measured at home. Pertinent ROS: taking medications as instructed, no medication side effects noted, no TIA's, no chest pain on exertion, no dyspnea on exertion, no swelling of ankles. Review of Systems  Constitutional: negative for fevers, chills, anorexia and weight loss  Eyes:   negative for visual disturbance and irritation  ENT:   negative for tinnitus,sore throat,nasal congestion,ear pains. hoarseness  Respiratory:  negative for cough, hemoptysis, dyspnea,wheezing  CV:   negative for chest pain, palpitations, lower extremity edema  GI:   negative for nausea, vomiting, diarrhea, abdominal pain,melena  Endo:               negative for polyuria,polydipsia,polyphagia,heat intolerance  Genitourinary: negative for frequency, dysuria and hematuria  Integument:  negative for rash and pruritus  Hematologic:  negative for easy bruising and gum/nose bleeding  Musculoskel:  myalgias, arthralgias joint pain  Neurological:  negative for headaches, dizziness, vertigo, memory problems and gait   Behavl/Psych: negative for feelings of anxiety, depression, mood changes    Past Medical History:   Diagnosis Date    Acute pharyngitis 2/8/2011    Allergic rhinitis, cause unspecified 2/8/2011    Arrhythmia     PVC    Asymptomatic varicose veins 2/8/2011    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     Carpal tunnel syndrome 2/8/2011    Degenerative Joint Disese ( improved/resolving) 2/8/2011    Degenetrative Dis Disease, 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    Diverticulosis of colon (without mention of hemorrhage) 2/8/2011    Dysphagia 2/8/2011    Edema, peripheral 2/8/2011    GERD (gastroesophageal reflux disease)     GERD Gastro- Esophageal Reflux Disease 2/8/2011    Herniated nucleus pulposus ( slipped disc) 2/8/2011    Menopausal 2/8/2011    PUD (peptic ulcer disease) 2012    Pure hypercholesterolemia 2/8/2011    Recurrent ear pain 2/8/2011    S/P radiation therapy     Scalp lesion 10/23/2014    Sebaceous cyst 4/22/2014    Unspecified cataract 2/8/2011    Unspecified essential hypertension 2/8/2011    Unspecified sleep apnea      Past Surgical History:   Procedure Laterality Date    HX HYSTERECTOMY      HX ORTHOPAEDIC  neck/back 2006    HX OTHER SURGICAL  5/8/2014     Excise recurrent scalp lesion and application of ACell    HX OTHER SURGICAL  5/8/2014    Excise keloid, anterior chest wall, and application of ACell    HX OTHER SURGICAL  5/8/2014     Excise sebaceous cyst, anterior chest wall    HX OTHER SURGICAL  5/8/2014    Punch biopsy, skin lesions, right forearm x2, right calf x1    MS ICAR CATHETER ABLATION ATRIOVENTR NODE FUNCTION N/A 5/6/2019    ABLATION AV NODE performed by Blanco Elliott MD at [address] [address] CATH LAB    MS INS NEW/RPLCMT PRM PM W/TRANSV ELTRD ATRIAL&VENT  9/30/2017         MS RMVL IMPLANTABLE PT-ACTIVATED CAR EVENT RECORDER  9/30/2017          Social History     Socioeconomic History    Marital status:      Spouse name: Not on file    Number of children: Not on file    Years of education: Not on file    Highest education level: Not on file   Tobacco Use    Smoking status: Never Smoker    Smokeless tobacco: Never Used   Substance and Sexual Activity    Alcohol use: No    Drug use: No    Sexual activity: Yes     Partners: Male     Birth control/protection: None     Family History   Problem Relation Age of Onset    Hypertension Mother     Stroke Mother     Hypertension Father     Cancer Father         PROSTATE, MELANOMA    Anesth Problems Neg Hx      Current Outpatient Medications   Medication Sig Dispense Refill    triamcinolone acetonide (KENALOG) 40 mg/mL injection 1 mL by Intra artICUlar route once for 1 dose. 1 mL 0    lidocaine, PF, (XYLOCAINE) 20 mg/mL (2 %) injection 2 mL by Intra artICUlar route once for 1 dose. Indications: administration of local anesthetic drug 2 mL 0    fluticasone propionate (FLONASE) 50 mcg/actuation nasal spray 2 Sprays by Both Nostrils route daily as needed for Rhinitis.  meclizine (ANTIVERT) 25 mg tablet Take 25 mg by mouth two (2) times a day.  rivaroxaban (XARELTO) 15 mg tab tablet Take 1 Tab by mouth daily (with dinner). 90 Tab 1    metoprolol succinate (TOPROL-XL) 100 mg tablet Take 100 mg by mouth daily.  omeprazole (PRILOSEC) 40 mg capsule TAKE 1 CAPSULE EVERY DAY 90 Cap 3    potassium chloride SR (KLOR-CON 10) 10 mEq tablet TAKE 1 TABLET EVERY DAY 90 Tab 3    raNITIdine (ZANTAC) 150 mg tablet TAKE 1 TABLET TWICE DAILY 180 Tab 3    furosemide (LASIX) 20 mg tablet TAKE 1 TABLET BY MOUTH DAILY 90 Tab 1    gabapentin (NEURONTIN) 300 mg capsule Take 1 Cap by mouth three (3) times daily. 270 Cap 3    acetaminophen (TYLENOL) 325 mg tablet Take 650 mg by mouth nightly.  MULTIVIT WITH CALCIUM,IRON,MIN [facility] MULTIPLE VITAMINS PO) Take 1 Tab by mouth daily.  aspirin delayed-release 81 mg tablet Take 81 mg by mouth daily.        Allergies   Allergen Reactions    Latex Rash    Bactrim [Sulfamethoprim Ds] Hives    Aspirin Other (comments)     Anything higher than 81mg makes pt jittery    Codeine Hives and Itching    Iodinated Contrast Media Itching    Pcn [Penicillins] Hives and Itching    Tape [Adhesive] Rash       Objective:  Visit Vitals  /90 (BP 1 Location: Left arm, BP Patient Position: Sitting)   Pulse 79   Temp 97.9 °F (36.6 °C) (Oral)   Resp 20   Ht 5' 2\" (1.575 m)   SpO2 100%   BMI 30.12 kg/m²     Physical Exam:   General appearance - alert, well appearing, and in no distress  Mental status - alert, oriented to person, place, and time  EYE-DARLENE, EOMI, corneas normal, no foreign bodies  ENT-ENT exam normal, no neck nodes or sinus tenderness  Nose - normal and patent, no erythema, discharge or polyps  Mouth - mucous membranes moist, pharynx normal without lesions  Neck - supple, no significant adenopathy   Chest - clear to auscultation, no wheezes, rales or rhonchi, symmetric air entry   Heart - normal rate, regular rhythm, normal S1, S2, no murmurs, rubs, clicks or gallops   Abdomen - soft, nontender, nondistended, no masses or organomegaly  Lymph- no adenopathy palpable  Ext-peripheral pulses normal, no pedal edema, no clubbing or cyanosis  Skin-Warm and dry. no hyperpigmentation, vitiligo, or suspicious lesions  Neuro -alert, oriented, normal speech, no focal findings or movement disorder noted  Neck-normal C-spine, no tenderness, full ROM without pain  Feet-no nail deformities or callus formation with good pulses noted  Lt.shoulder-reduced range of motion of lt. Results for orders placed or performed in visit on 08/08/19   CK   Result Value Ref Range    Creatine Kinase,Total 113 24 - 173 U/L   AMB POC LIPID PROFILE   Result Value Ref Range    Cholesterol (POC) 135     Triglycerides (POC) 155     HDL Cholesterol (POC) 65     Non-HDL Cholesterol 79     LDL Cholesterol (POC) 39 MG/DL    TChol/HDL Ratio (POC) 2.1        Assessment/Plan:    ICD-10-CM ICD-9-CM    1.  Rotator cuff impingement syndrome of left shoulder M75.42 726.10 TRIAMCINOLONE ACETONIDE INJ      triamcinolone acetonide (KENALOG) 40 mg/mL injection      MD THER/PROPH/DIAG INJECTION, SUBCUT/IM      LIDOCAINE INJECTION      lidocaine, PF, (XYLOCAINE) 20 mg/mL (2 %) injection     Orders Placed This Encounter    TRIAMCINOLONE ACETONIDE INJ     Order Specific Question:   Charge Quantity? Answer:   4    CO THER/PROPH/DIAG INJECTION, SUBCUT/IM    LIDOCAINE INJECTION    triamcinolone acetonide (KENALOG) 40 mg/mL injection     Si mL by Intra artICUlar route once for 1 dose. Dispense:  1 mL     Refill:  0    lidocaine, PF, (XYLOCAINE) 20 mg/mL (2 %) injection     Si mL by Intra artICUlar route once for 1 dose. Indications: administration of local anesthetic drug     Dispense:  2 mL     Refill:  0     lose weight, follow low fat diet, follow low salt diet, continue present plan,Take 81mg aspirin daily  Patient Instructions   Cucinialehart Activation    Thank you for requesting access to MinuteBuzz. Please follow the instructions below to securely access and download your online medical record. MinuteBuzz allows you to send messages to your doctor, view your test results, renew your prescriptions, schedule appointments, and more. How Do I Sign Up? 1. In your internet browser, go to [email or web address]  2. Click on the First Time User? Click Here link in the Sign In box. You will be redirect to the New Member Sign Up page. 3. Enter your MinuteBuzz Access Code exactly as it appears below. You will not need to use this code after youve completed the sign-up process. If you do not sign up before the expiration date, you must request a new code. MinuteBuzz Access Code: Activation code not generated  Current MinuteBuzz Status: Patient Declined (This is the date your MinuteBuzz access code will )    4. Enter the last four digits of your Social Security Number (xxxx) and Date of Birth (mm/dd/yyyy) as indicated and click Submit. You will be taken to the next sign-up page. 5. Create a MinuteBuzz ID. This will be your MinuteBuzz login ID and cannot be changed, so think of one that is secure and easy to remember. 6. Create a MinuteBuzz password. You can change your password at any time. 7. Enter your Password Reset Question and Answer.  This can be used at a later time if you forget your password. 8. Enter your e-mail address. You will receive e-mail notification when new information is available in [address] 9. Click Sign Up. You can now view and download portions of your medical record. 10. Click the Download Summary menu link to download a portable copy of your medical information. Additional Information    If you have questions, please visit the Frequently Asked Questions section of the ConnectYard website at [email or web address] Remember, ConnectYard is NOT to be used for urgent needs. For medical emergencies, dial 911. Follow-up and Dispositions    · Return in about 3 months (around 11/12/2019), or if symptoms worsen or fail to improve. I have reviewed with the patient details of the assessment and plan and all questions were answered. Relevent patient education was performed. The most recent lab findings were reviewed with the patient. An After Visit Summary was printed and given to the patient.

## 2019-08-12 NOTE — PATIENT INSTRUCTIONS
[email or web address] Activation    Thank you for requesting access to [employer]. Please follow the instructions below to securely access and download your online medical record. [employer] allows you to send messages to your doctor, view your test results, renew your prescriptions, schedule appointments, and more. How Do I Sign Up? 1. In your internet browser, go to [email or web address]  2. Click on the First Time User? Click Here link in the Sign In box. You will be redirect to the New Member Sign Up page. 3. Enter your [employer] Access Code exactly as it appears below. You will not need to use this code after youve completed the sign-up process. If you do not sign up before the expiration date, you must request a new code. [employer] Access Code: Activation code not generated  Current [employer] Status: Patient Declined (This is the date your [employer] access code will )    4. Enter the last four digits of your Social Security Number (xxxx) and Date of Birth (mm/dd/yyyy) as indicated and click Submit. You will be taken to the next sign-up page. 5. Create a S[employer] ID. This will be your [employer] login ID and cannot be changed, so think of one that is secure and easy to remember. 6. Create a [employer] password. You can change your password at any time. 7. Enter your Password Reset Question and Answer. This can be used at a later time if you forget your password. 8. Enter your e-mail address. You will receive e-mail notification when new information is available in [address] 9. Click Sign Up. You can now view and download portions of your medical record. 10. Click the Download Summary menu link to download a portable copy of your medical information. Additional Information    If you have questions, please visit the Frequently Asked Questions section of the [employer] website at [email or web address] Remember, [employer] is NOT to be used for urgent needs. For medical emergencies, dial 911.

## 2019-08-15 DIAGNOSIS — G56.00 CARPAL TUNNEL SYNDROME, UNSPECIFIED LATERALITY: Primary | ICD-10-CM

## 2019-08-15 RX ORDER — GABAPENTIN 300 MG/1
300 CAPSULE ORAL 3 TIMES DAILY
Qty: 270 CAP | Refills: 3 | Status: SHIPPED | OUTPATIENT
Start: 2019-08-15 | End: 2019-09-18 | Stop reason: SDUPTHER

## 2019-09-18 ENCOUNTER — OFFICE VISIT (OUTPATIENT)
Dept: INTERNAL MEDICINE CLINIC | Age: 79
End: 2019-09-18

## 2019-09-18 VITALS
HEART RATE: 82 BPM | HEIGHT: 62 IN | RESPIRATION RATE: 20 BRPM | OXYGEN SATURATION: 98 % | SYSTOLIC BLOOD PRESSURE: 154 MMHG | BODY MASS INDEX: 30.12 KG/M2 | TEMPERATURE: 98.5 F | DIASTOLIC BLOOD PRESSURE: 82 MMHG

## 2019-09-18 DIAGNOSIS — G56.00 CARPAL TUNNEL SYNDROME, UNSPECIFIED LATERALITY: ICD-10-CM

## 2019-09-18 RX ORDER — AMLODIPINE BESYLATE 5 MG/1
5 TABLET ORAL DAILY
Qty: 90 TAB | Refills: 3 | Status: SHIPPED | OUTPATIENT
Start: 2019-09-18 | End: 2020-03-12 | Stop reason: SDUPTHER

## 2019-09-18 RX ORDER — GABAPENTIN 300 MG/1
300 CAPSULE ORAL 3 TIMES DAILY
Qty: 270 CAP | Refills: 3 | Status: SHIPPED | OUTPATIENT
Start: 2019-09-18 | End: 2020-03-12 | Stop reason: SDUPTHER

## 2019-09-18 NOTE — PATIENT INSTRUCTIONS
[employer] Activation    Thank you for requesting access to [employer]. Please follow the instructions below to securely access and download your online medical record. [employer] allows you to send messages to your doctor, view your test results, renew your prescriptions, schedule appointments, and more. How Do I Sign Up? 1. In your internet browser, go to [email or web address]  2. Click on the First Time User? Click Here link in the Sign In box. You will be redirect to the New Member Sign Up page. 3. Enter your [employer] Access Code exactly as it appears below. You will not need to use this code after youve completed the sign-up process. If you do not sign up before the expiration date, you must request a new code. [employer] Access Code: [email or web address]  Expires: 2019  3:56 PM (This is the date your [employer] access code will )    4. Enter the last four digits of your Social Security Number (xxxx) and Date of Birth (mm/dd/yyyy) as indicated and click Submit. You will be taken to the next sign-up page. 5. Create a [employer] ID. This will be your [employer] login ID and cannot be changed, so think of one that is secure and easy to remember. 6. Create a [employer] password. You can change your password at any time. 7. Enter your Password Reset Question and Answer. This can be used at a later time if you forget your password. 8. Enter your e-mail address. You will receive e-mail notification when new information is available in [address] 9. Click Sign Up. You can now view and download portions of your medical record. 10. Click the Download Summary menu link to download a portable copy of your medical information. Additional Information    If you have questions, please visit the Frequently Asked Questions section of the [employer] website at [email or web address] Remember, [employer] is NOT to be used for urgent needs. For medical emergencies, dial 911.

## 2019-09-18 NOTE — PROGRESS NOTES
Chief Complaint   Patient presents with   [facility] Follow Up     3 most recent PHQ Screens 9/18/2019   PHQ Not Done -   Little interest or pleasure in doing things Not at all   Feeling down, depressed, irritable, or hopeless Not at all   Total Score PHQ 2 0     1. Have you been to the ER, urgent care clinic since your last visit? Hospitalized since your last visit? YES    2. Have you seen or consulted any other health care providers outside of the [address] [place] [address] since your last visit? Include any pap smears or colon screening. NO

## 2019-09-18 NOTE — PROGRESS NOTES
Jimmy Meraz is a [de-identified] y.o. female and presents with Hospital Follow Up    Subjective:  Shoulder Pain Review:  Patient complains of left side shoulder pains. are improved      Hypertension Review:  The patient has essential hypertension  Diet and Lifestyle: generally follows a  low sodium diet, exercises sporadically  Home BP Monitoring: is not measured at home. Pertinent ROS: taking medications as instructed, no medication side effects noted, no TIA's, no chest pain on exertion, no dyspnea on exertion, no swelling of ankles. She has a rash on the lt.upperarm    She has atrial fib    Review of Systems  Constitutional: negative for fevers, chills, anorexia and weight loss  Eyes:   negative for visual disturbance and irritation  ENT:   negative for tinnitus,sore throat,nasal congestion,ear pains. hoarseness  Respiratory:  negative for cough, hemoptysis, dyspnea,wheezing  CV:   negative for chest pain, palpitations, lower extremity edema  GI:   negative for nausea, vomiting, diarrhea, abdominal pain,melena  Endo:               negative for polyuria,polydipsia,polyphagia,heat intolerance  Genitourinary: negative for frequency, dysuria and hematuria  Integument:  negative for rash and pruritus  Hematologic:  negative for easy bruising and gum/nose bleeding  Musculoskel:  myalgias, arthralgias joint pain  Neurological:  negative for headaches, dizziness, vertigo, memory problems and gait   Behavl/Psych: negative for feelings of anxiety, depression, mood changes    Past Medical History:   Diagnosis Date    Acute pharyngitis 2/8/2011    Allergic rhinitis, cause unspecified 2/8/2011    Arrhythmia     PVC    Asymptomatic varicose veins 2/8/2011    Cancer ([facility] [address]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     Carpal tunnel syndrome 2/8/2011    Degenerative Joint Disese ( improved/resolving) 2/8/2011    Degenetrative Dis Disease, 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    Diverticulosis of colon (without mention of hemorrhage) 2/8/2011    Dysphagia 2/8/2011  Edema, peripheral 2/8/2011    GERD (gastroesophageal reflux disease)     GERD Gastro- Esophageal Reflux Disease 2/8/2011    Herniated nucleus pulposus ( slipped disc) 2/8/2011    Menopausal 2/8/2011    PUD (peptic ulcer disease) 2012    Pure hypercholesterolemia 2/8/2011    Recurrent ear pain 2/8/2011    S/P radiation therapy     Scalp lesion 10/23/2014    Sebaceous cyst 4/22/2014    Unspecified cataract 2/8/2011    Unspecified essential hypertension 2/8/2011    Unspecified sleep apnea      Past Surgical History:   Procedure Laterality Date    HX HYSTERECTOMY      HX ORTHOPAEDIC  neck/back 2006    HX OTHER SURGICAL  5/8/2014     Excise recurrent scalp lesion and application of ACell    HX OTHER SURGICAL  5/8/2014    Excise keloid, anterior chest wall, and application of ACell    HX OTHER SURGICAL  5/8/2014     Excise sebaceous cyst, anterior chest wall    HX OTHER SURGICAL  5/8/2014    Punch biopsy, skin lesions, right forearm x2, right calf x1    [place] ICAR CATHETER ABLATION ATRIOVENTR NODE FUNCTION N/A 5/6/2019    ABLATION AV NODE performed by Blanco Elliott MD at [facility]ATH [facility]    [place] INS NEW/RPLCMT PRM PM W/TRANSV ELTRD ATRIAL&VENT  9/30/2017         [place] RMVL IMPLANTABLE PT-ACTIVATED CAR EVENT RECORDER  9/30/2017          Social History     Socioeconomic History    Marital status:      Spouse name: Not on file    Number of children: Not on file    Years of education: Not on file    Highest education level: Not on file   Tobacco Use    Smoking status: Never Smoker    Smokeless tobacco: Never Used   Substance and Sexual Activity    Alcohol use: No    Drug use: No    Sexual activity: Yes     Partners: Male     Birth control/protection: None     Family History   Problem Relation Age of Onset    Hypertension Mother     Stroke Mother     Hypertension Father     Cancer Father         PROSTATE, MELANOMA    Anesth Problems Neg Hx      Current Outpatient Medications Medication Sig Dispense Refill    gabapentin (NEURONTIN) 300 mg capsule Take 1 Cap by mouth three (3) times daily. 270 Cap 3    amLODIPine (NORVASC) 5 mg tablet Take 1 Tab by mouth daily. 90 Tab 3    fluticasone propionate (FLONASE) 50 mcg/actuation nasal spray 2 Sprays by Both Nostrils route daily as needed for Rhinitis.  meclizine (ANTIVERT) 25 mg tablet Take 25 mg by mouth two (2) times a day.  rivaroxaban (XARELTO) 15 mg tab tablet Take 1 Tab by mouth daily (with dinner). 90 Tab 1    metoprolol succinate (TOPROL-XL) 100 mg tablet Take 100 mg by mouth daily.  omeprazole (PRILOSEC) 40 mg capsule TAKE 1 CAPSULE EVERY DAY 90 Cap 3    potassium chloride SR (KLOR-CON 10) 10 mEq tablet TAKE 1 TABLET EVERY DAY 90 Tab 3    raNITIdine (ZANTAC) 150 mg tablet TAKE 1 TABLET TWICE DAILY 180 Tab 3    furosemide (LASIX) 20 mg tablet TAKE 1 TABLET BY MOUTH DAILY 90 Tab 1    acetaminophen (TYLENOL) 325 mg tablet Take 650 mg by mouth nightly.  MULTIVIT WITH CALCIUM,IRON,MIN [facility] MULTIPLE VITAMINS PO) Take 1 Tab by mouth daily.  aspirin delayed-release 81 mg tablet Take 81 mg by mouth daily.        Allergies   Allergen Reactions    Latex Rash    Bactrim [Sulfamethoprim Ds] Hives    Aspirin Other (comments)     Anything higher than 81mg makes pt jittery    Codeine Hives and Itching    Iodinated Contrast Media Itching    Pcn [Penicillins] Hives and Itching    Tape [Adhesive] Rash       Objective:  Visit Vitals  /82 (BP 1 Location: Right arm, BP Patient Position: Sitting)   Pulse 82   Temp 98.5 °F (36.9 °C) (Oral)   Resp 20   Ht 5' 2\" (1.575 m)   SpO2 98%   BMI 30.12 kg/m²     Physical Exam:   General appearance - alert, well appearing, and in no distress  Mental status - alert, oriented to person, place, and time  EYE-DARLENE, EOMI, corneas normal, no foreign bodies  ENT-ENT exam normal, no neck nodes or sinus tenderness  Nose - normal and patent, no erythema, discharge or polyps  Mouth - mucous membranes moist, pharynx normal without lesions  Neck - supple, no significant adenopathy   Chest - clear to auscultation, no wheezes, rales or rhonchi, symmetric air entry   Heart - normal rate, regular rhythm, normal S1, S2, no murmurs, rubs, clicks or gallops   Abdomen - soft, nontender, nondistended, no masses or organomegaly  Lymph- no adenopathy palpable  Ext-peripheral pulses normal, no pedal edema, no clubbing or cyanosis  Skin-Warm and dry. no hyperpigmentation, vitiligo, or suspicious lesions  Neuro -alert, oriented, normal speech, no focal findings or movement disorder noted  Neck-normal C-spine, no tenderness, full ROM without pain  Feet-no nail deformities or callus formation with good pulses noted  Lt.shoulder-reduced range of motion of lt. Results for orders placed or performed in visit on 08/08/19   CK   Result Value Ref Range    Creatine Kinase,Total 113 24 - 173 U/L   AMB POC LIPID PROFILE   Result Value Ref Range    Cholesterol (POC) 135     Triglycerides (POC) 155     HDL Cholesterol (POC) 65     Non-HDL Cholesterol 79     LDL Cholesterol (POC) 39 MG/DL    TChol/HDL Ratio (POC) 2.1        Assessment/Plan:    ICD-10-CM ICD-9-CM    1. Carpal tunnel syndrome, unspecified laterality G56.00 354.0 gabapentin (NEURONTIN) 300 mg capsule     Orders Placed This Encounter    gabapentin (NEURONTIN) 300 mg capsule     Sig: Take 1 Cap by mouth three (3) times daily. Dispense:  270 Cap     Refill:  3    amLODIPine (NORVASC) 5 mg tablet     Sig: Take 1 Tab by mouth daily. Dispense:  90 Tab     Refill:  3     lose weight, follow low fat diet, follow low salt diet, continue present plan,Take 81mg aspirin daily  Patient Instructions   [employer]System Activation    Thank you for requesting access to [employer]. Please follow the instructions below to securely access and download your online medical record.  [employer]System allows you to send messages to your doctor, view your test results, renew your prescriptions, schedule appointments, and more. How Do I Sign Up? 1. In your internet browser, go to [email or web address]. [employer]  2. Click on the First Time User? Click Here link in the Sign In box. You will be redirect to the New Member Sign Up page. 3. Enter your [employer] Access Code exactly as it appears below. You will not need to use this code after youve completed the sign-up process. If you do not sign up before the expiration date, you must request a new code. [employer] Access Code: [email or web address]  Expires: 2019  3:56 PM (This is the date your [employer] access code will )    4. Enter the last four digits of your Social Security Number (xxxx) and Date of Birth (mm/dd/yyyy) as indicated and click Submit. You will be taken to the next sign-up page. 5. Create a [employer] ID. This will be your [employer] login ID and cannot be changed, so think of one that is secure and easy to remember. 6. Create a [employer] password. You can change your password at any time. 7. Enter your Password Reset Question and Answer. This can be used at a later time if you forget your password. 8. Enter your e-mail address. You will receive e-mail notification when new information is available in [address] 9. Click Sign Up. You can now view and download portions of your medical record. 10. Click the Download Summary menu link to download a portable copy of your medical information. Additional Information    If you have questions, please visit the Frequently Asked Questions section of the [employer] website at [email or web address] Remember, [employer] is NOT to be used for urgent needs. For medical emergencies, dial 911. Follow-up and Dispositions    · Return in about 3 months (around 2019), or if symptoms worsen or fail to improve. I have reviewed with the patient details of the assessment and plan and all questions were answered. Relevent patient education was performed. The most recent lab findings were reviewed with the patient. An After Visit Summary was printed and given to the patient.

## 2019-09-25 DIAGNOSIS — I25.10 CORONARY ARTERY DISEASE INVOLVING NATIVE CORONARY ARTERY OF NATIVE HEART WITHOUT ANGINA PECTORIS: Primary | ICD-10-CM

## 2019-09-26 ENCOUNTER — OFFICE VISIT (OUTPATIENT)
Dept: CARDIOLOGY CLINIC | Age: 79
End: 2019-09-26

## 2019-09-26 ENCOUNTER — CLINICAL SUPPORT (OUTPATIENT)
Dept: CARDIOLOGY CLINIC | Age: 79
End: 2019-09-26

## 2019-09-26 VITALS
WEIGHT: 164 LBS | HEART RATE: 75 BPM | SYSTOLIC BLOOD PRESSURE: 168 MMHG | DIASTOLIC BLOOD PRESSURE: 100 MMHG | BODY MASS INDEX: 30.18 KG/M2 | RESPIRATION RATE: 18 BRPM | HEIGHT: 62 IN

## 2019-09-26 DIAGNOSIS — I97.190 COMPLETE AV BLOCK DUE TO AV NODAL ABLATION (HCC): ICD-10-CM

## 2019-09-26 DIAGNOSIS — Z95.0 CARDIAC PACEMAKER IN SITU: Primary | ICD-10-CM

## 2019-09-26 DIAGNOSIS — I48.0 PAF (PAROXYSMAL ATRIAL FIBRILLATION) (HCC): ICD-10-CM

## 2019-09-26 DIAGNOSIS — I48.92 ATRIAL FLUTTER, UNSPECIFIED TYPE (HCC): ICD-10-CM

## 2019-09-26 DIAGNOSIS — I10 ESSENTIAL HYPERTENSION: ICD-10-CM

## 2019-09-26 DIAGNOSIS — I44.2 COMPLETE AV BLOCK DUE TO AV NODAL ABLATION (HCC): ICD-10-CM

## 2019-09-26 DIAGNOSIS — I34.0 MITRAL VALVE INSUFFICIENCY, UNSPECIFIED ETIOLOGY: ICD-10-CM

## 2019-09-26 DIAGNOSIS — I49.5 SSS (SICK SINUS SYNDROME) (HCC): ICD-10-CM

## 2019-09-26 DIAGNOSIS — I48.91 ATRIAL FIBRILLATION WITH RAPID VENTRICULAR RESPONSE (HCC): ICD-10-CM

## 2019-09-26 NOTE — PROGRESS NOTES
Cardiac Electrophysiology Office Note     Subjective: Familia Carlin is a [de-identified] y.o. patient who presents today for follow up of pacemaker and AV node ablation. She had av node ablation in May 2019  PCP Dr. Dov Coleman. She used to pass out with blood pressure problem but since av node ablation and RV pacing she has not had this  She does not think bp is high as it is today  She thought there is rash on her left arm  No edema  No orthopnea    Anticoagulated with Xarelto, denies bleeding issues. 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 External loop recorder 2/2017 showed no AFIB but mild sinus bradycardia and NSR. Echo (05/19/2016): LVEF 60-65%, no RWMA, mild concentric LVH, grade 1 diastolic dysfunction. Mild TR, PASP 25 mmHg. Lexiscan cardiolite stress (02/2016): Inferior wall with small to moderate size, mild to mod [id number] intensity, reversible defect. LVEF 66%. Toprol & propafenone stopped at one point, but were restarted. Cath 2014 showed 50% LAD, 60% LCx and 100% RCA with left to right collateral so Dr Tonny Pineda only recommended medical therapies. Scalp skin cancer removed at [address] [place] [address], will have radiation therapy.         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 [facility]) ICD-10-CM: I97.190, I44.2  ICD-9-CM: 997.1, 426.0  5/6/2019    Overview Signed 5/6/2019 10:33 AM by Gavi Huber MD     5/6/2019 junction rhythm 40 bpm post ablation with complete av block             PAF (paroxysmal atrial fibrillation) ([facility]) ICD-10-CM: I48.0  ICD-9-CM: 427.31  5/6/2019        Atrial fibrillation with rapid ventricular response [facility]) ICD-10-CM: I48.91  ICD-9-CM: 427.31  5/6/2019        S/P cardiac pacemaker procedure ICD-10-CM: Z95.0  ICD-9-CM: V45.01  9/29/2017    Overview Signed 9/29/2017 10:35 AM by ANAMARIA Gordillo Dr.ter  9/29/17  Removal of ILR Bradycardia ICD-10-CM: R00.1  ICD-9-CM: 427.89  9/29/2017    Overview Signed 9/29/2017 10:36 AM by Melisa Morse NP     ILR recorder recent showed many episodes of recurrent sinus bradycardia to 20-30 bpm  No syncope, but this confirmed sick sinus syndrome related to her syncope near syncope before. SSS (sick sinus syndrome) ([facility]) ICD-10-CM: I49.5  ICD-9-CM: 427.81  9/29/2017        CAD (coronary artery disease) ICD-10-CM: I25.10  ICD-9-CM: 414.00  2/17/2016    Overview Signed 2/17/2016 12:20 PM by Sobia Gill MD     2/16 cardiac cath,  50% mid lad, 60% mid diag 1, lcx minor irregs, diffuse 100% mid rca with well develped left to right collaterals. Normal lvef. She will be evaluated further for bradycardia and syncope with implantable loop recorder per dr michael. Abnormal nuclear stress test ICD-10-CM: R94.39  ICD-9-CM: 794.39  2/11/2016    Overview Addendum 2/11/2016  7:49 AM by Sobia Gill MD     1/14 echo normal lvef with ?  Mild inf basal hypokinesis  1/16 exercise cardiolyte, infero basal ischemia             Syncope ICD-10-CM: R55  ICD-9-CM: 780.2  2/11/2016    Overview Signed 2/16/2016  9:26 AM [facility]    1/1/16    MRI brain wwo contrast  No acute findings or abnormal enhancement    Mild periventricular white matter signal abnormalities which are non specific, but likely related to small vessel ischemia disease    3 separate remote micro hemorrhages, the largest of which is in the left cerebellar hemisphere and consistent with cavernomas               Tachy-chino syndrome ([address] ICD-10-CM: I49.5  ICD-9-CM: 427.81  2/11/2016    Overview Signed 2/16/2016  9:19 AM by Melisa Morse NP     Echo at [facility] 12/31/15  LVEF 55-65%, no RWMA  Trivial regurgitation              Scalp lesion ICD-10-CM: L98.9  ICD-9-CM: 709.9  10/23/2014        Sebaceous cyst ICD-10-CM: L72.3  ICD-9-CM: 706.2  4/22/2014        Keloid ICD-10-CM: L91.0  ICD-9-CM: 701.4  4/22/2014        Dysphagia ICD-10-CM: R13.10  ICD-9-CM: 787.20  2/8/2011        Recurrent ear pain ICD-10-CM: H92.09  ICD-9-CM: 388.70  2/8/2011        Acute pharyngitis ICD-10-CM: J02.9  ICD-9-CM: 012  2/8/2011        Essential hypertension ICD-10-CM: I10  ICD-9-CM: 401.9  2/8/2011        Edema, peripheral ICD-10-CM: R60.9  ICD-9-CM: 782.3  2/8/2011        Pure hypercholesterolemia ICD-10-CM: E78.00  ICD-9-CM: 272.0  2/8/2011        Allergic rhinitis, cause unspecified ICD-10-CM: J30.9  ICD-9-CM: 477.9  2/8/2011        Diverticulosis of colon (without mention of hemorrhage) ICD-10-CM: K57.30  ICD-9-CM: 562.10  2/8/2011        Herniated nucleus pulposus ( slipped disc) ICD-10-CM: [id number]  ICD-9-CM: 722.2  2/8/2011        Degenetrative Dis Disease, Cervical ICD-10-CM: M50.30  ICD-9-CM: 722.4  2/8/2011        Carpal tunnel syndrome ICD-10-CM: G56.00  ICD-9-CM: 354.0  2/8/2011        Unspecified cataract ICD-10-CM: H26.9  ICD-9-CM: 366.9  2/8/2011        GERD Gastro- Esophageal Reflux Disease ICD-10-CM: K21.9  ICD-9-CM: 530.81  2/8/2011        Degenerative Joint Disese ( improved/resolving) ICD-10-CM: M19.90  ICD-9-CM: 715.90  2/8/2011        Menopausal ICD-10-CM: N95.1  ICD-9-CM: 627.2  2/8/2011        Asymptomatic varicose veins ICD-10-CM: I83.90  ICD-9-CM: 454.9  2/8/2011              Current Outpatient Medications   Medication Sig Dispense Refill    gabapentin (NEURONTIN) 300 mg capsule Take 1 Cap by mouth three (3) times daily. 270 Cap 3    amLODIPine (NORVASC) 5 mg tablet Take 1 Tab by mouth daily. 90 Tab 3    fluticasone propionate (FLONASE) 50 mcg/actuation nasal spray 2 Sprays by Both Nostrils route daily as needed for Rhinitis.  meclizine (ANTIVERT) 25 mg tablet Take 25 mg by mouth two (2) times a day.  rivaroxaban (XARELTO) 15 mg tab tablet Take 1 Tab by mouth daily (with dinner). 90 Tab 1    metoprolol succinate (TOPROL-XL) 100 mg tablet Take 100 mg by mouth daily.       omeprazole (PRILOSEC) 40 mg capsule TAKE 1 CAPSULE EVERY DAY 90 Cap 3    potassium chloride SR (KLOR-CON 10) 10 mEq tablet TAKE 1 TABLET EVERY DAY 90 Tab 3    raNITIdine (ZANTAC) 150 mg tablet TAKE 1 TABLET TWICE DAILY 180 Tab 3    furosemide (LASIX) 20 mg tablet TAKE 1 TABLET BY MOUTH DAILY 90 Tab 1    acetaminophen (TYLENOL) 325 mg tablet Take 650 mg by mouth nightly.  MULTIVIT WITH CALCIUM,IRON,MIN [facility] MULTIPLE VITAMINS PO) Take 1 Tab by mouth daily.  aspirin delayed-release 81 mg tablet Take 81 mg by mouth daily.        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    Diverticulosis of colon (without mention of hemorrhage) 2/8/2011    Dysphagia 2/8/2011    Edema, peripheral 2/8/2011    GERD (gastroesophageal reflux disease)     GERD Gastro- Esophageal Reflux Disease 2/8/2011    Herniated nucleus pulposus ( slipped disc) 2/8/2011    Menopausal 2/8/2011    PUD (peptic ulcer disease) 2012    Pure hypercholesterolemia 2/8/2011    Recurrent ear pain 2/8/2011    S/P radiation therapy     Scalp lesion 10/23/2014    Sebaceous cyst 4/22/2014    Unspecified cataract 2/8/2011    Unspecified essential hypertension 2/8/2011    Unspecified sleep apnea      Past Surgical History:   Procedure Laterality Date    HX HYSTERECTOMY      HX ORTHOPAEDIC  neck/back 2006    HX OTHER SURGICAL  5/8/2014 Excise recurrent scalp lesion and application of ACell    HX OTHER SURGICAL  5/8/2014    Excise keloid, anterior chest wall, and application of ACell    HX OTHER SURGICAL  5/8/2014     Excise sebaceous cyst, anterior chest wall    HX OTHER SURGICAL  5/8/2014    Punch biopsy, skin lesions, right forearm x2, right calf x1    IA ICAR CATHETER ABLATION ATRIOVENTR NODE FUNCTION N/A 5/6/2019    ABLATION AV NODE performed by Jag Grant MD at [address], [facility] CATH LAB    IA INS NEW/RPLCMT PRM PM W/TRANSV ELTRD ATRIAL&VENT  9/30/2017         IA RMVL IMPLANTABLE PT-ACTIVATED CAR EVENT RECORDER  9/30/2017          Family History   Problem Relation Age of Onset    Hypertension Mother     Stroke Mother     Hypertension Father     Cancer Father         PROSTATE, MELANOMA    Anesth Problems Neg Hx      Social History     Tobacco Use    Smoking status: Never Smoker    Smokeless tobacco: Never Used   Substance Use Topics    Alcohol use: No        Review of Systems:   Constitutional: Negative for fever, chills, weight loss, malaise/fatigue. HEENT: Negative for nosebleeds, vision changes. Respiratory: Negative for cough, hemoptysis  Cardiovascular: Negative for chest pain,  palpitations, no orthopnea, claudication, leg swelling, syncope, and PND. Gastrointestinal: Negative for nausea, vomiting, diarrhea, blood in stool and melena. Genitourinary: Negative for dysuria, and hematuria. Musculoskeletal: Negative for myalgias, + arthralgia. Skin: + for rash. + scalp discoloration  Heme: Does not bleed or bruise easily. Neurological: Negative for speech change and focal weakness     Objective:     Visit Vitals  BP (!) 168/100 (BP 1 Location: Left arm, BP Patient Position: Sitting)   Pulse 75   Resp 18   Ht 5' 2\" (1.575 m)   Wt 164 lb (74.4 kg)   BMI 30.00 kg/m²      Physical Exam:   Constitutional: well-developed and well-nourished. No respiratory distress. Head: Normocephalic and atraumatic.    Eyes: Pupils are equal, round  ENT: hearing normal  Neck: supple. No JVD present. Cardiovascular: Normal rate, regular rhythm. Exam reveals no gallop and no friction rub. 2/6 systolic murmur heard. Pulmonary/Chest: Effort normal and breath sounds normal. No wheezes. Abdominal: Soft, no tenderness. Musculoskeletal: no edema. Neurological: alert,oriented. Skin: Skin is warm and dry. Left side pacemaker site clean, well healed. No rash seen  Psychiatric: normal mood and affect. Behavior is normal. Judgment and thought content normal.        Assessment/Plan:       ICD-10-CM ICD-9-CM    1. Cardiac pacemaker in situ Z95.0 V45.01    2. Mitral valve insufficiency, unspecified etiology I34.0 424.0 ECHO ADULT COMPLETE   3. Atrial flutter, unspecified type ([facility] I48.92 427.32    4. PAF (paroxysmal atrial fibrillation) ([facility]) I48.0 427.31    5. Atrial fibrillation with rapid ventricular response ([facility]) I48.91 427.31    6. SSS (sick sinus syndrome) ([facility]) I49.5 427.81    7. 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 Essential hypertension I10 401.9      Ms. Mitch Mortensen has had no more syncopal episodes, palpitations after AV node ablation and now ventricular paced from her pacemaker '  Her pacemaker check shows proper function   She has PAF and atrial flutter  BP is high and she agrees to recheck when seeing Dr Karine English could be related to TR on echo 3.5 years ago or MR  I recommend her recheck echo   Continue with xarelto 15 mg every day because of GFR 36    Thank you for involving me in this patient's care and please call with further concerns or questions. Johann Mayer M.D.   Electrophysiology/Cardiology  [facility] [place]  [address], [place] [address], [address]  [address]  [phone]

## 2019-10-03 ENCOUNTER — OFFICE VISIT (OUTPATIENT)
Dept: INTERNAL MEDICINE CLINIC | Age: 79
End: 2019-10-03

## 2019-10-03 VITALS
TEMPERATURE: 98.7 F | HEIGHT: 62 IN | OXYGEN SATURATION: 97 % | BODY MASS INDEX: 30 KG/M2 | HEART RATE: 60 BPM | SYSTOLIC BLOOD PRESSURE: 150 MMHG | RESPIRATION RATE: 14 BRPM | DIASTOLIC BLOOD PRESSURE: 86 MMHG

## 2019-10-03 DIAGNOSIS — Z23 ENCOUNTER FOR IMMUNIZATION: Primary | ICD-10-CM

## 2019-10-03 DIAGNOSIS — E78.00 HYPERCHOLESTEREMIA: ICD-10-CM

## 2019-10-03 DIAGNOSIS — L03.116 CELLULITIS OF LEFT LOWER EXTREMITY: ICD-10-CM

## 2019-10-03 DIAGNOSIS — R60.0 EDEMA LEG: ICD-10-CM

## 2019-10-03 DIAGNOSIS — I10 ESSENTIAL HYPERTENSION: ICD-10-CM

## 2019-10-03 NOTE — PROGRESS NOTES
Jacqueline Brooke is a [de-identified] y.o. female and presents with Physical Therapy; Leg Swelling (left); and Immunization/Injection    Subjective:  Shoulder Pain Review:  Patient complains of left side shoulder pains. are improved      She has edema on the tl.leg    Hypertension Review:  The patient has essential hypertension  Diet and Lifestyle: generally follows a  low sodium diet, exercises sporadically  Home BP Monitoring: is not measured at home. Pertinent ROS: taking medications as instructed, no medication side effects noted, no TIA's, no chest pain on exertion, no dyspnea on exertion, no swelling of ankles. She has atrial fib    Review of Systems  Constitutional: negative for fevers, chills, anorexia and weight loss  Eyes:   negative for visual disturbance and irritation  ENT:   negative for tinnitus,sore throat,nasal congestion,ear pains. hoarseness  Respiratory:  negative for cough, hemoptysis, dyspnea,wheezing  CV:   negative for chest pain, palpitations, lower extremity edema  GI:   negative for nausea, vomiting, diarrhea, abdominal pain,melena  Endo:               negative for polyuria,polydipsia,polyphagia,heat intolerance  Genitourinary: negative for frequency, dysuria and hematuria  Integument:  negative for rash and pruritus  Hematologic:  negative for easy bruising and gum/nose bleeding  Musculoskel:  myalgias, arthralgias joint pain  Neurological:  negative for headaches, dizziness, vertigo, memory problems and gait   Behavl/Psych: negative for feelings of anxiety, depression, mood changes    Past Medical History:   Diagnosis Date    Acute pharyngitis 2/8/2011    Allergic rhinitis, cause unspecified 2/8/2011    Arrhythmia     PVC    Asymptomatic varicose veins 2/8/2011    Cancer ([facility] [address]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     Carpal tunnel syndrome 2/8/2011    Degenerative Joint Disese ( improved/resolving) 2/8/2011    Degenetrative Dis Disease, 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    Diverticulosis of colon (without mention of hemorrhage) 2/8/2011    Dysphagia 2/8/2011    Edema, peripheral 2/8/2011    GERD (gastroesophageal reflux disease)     GERD Gastro- Esophageal Reflux Disease 2/8/2011    Herniated nucleus pulposus ( slipped disc) 2/8/2011    Menopausal 2/8/2011    PUD (peptic ulcer disease) 2012    Pure hypercholesterolemia 2/8/2011    Recurrent ear pain 2/8/2011    S/P radiation therapy     Scalp lesion 10/23/2014    Sebaceous cyst 4/22/2014    Unspecified cataract 2/8/2011    Unspecified essential hypertension 2/8/2011    Unspecified sleep apnea      Past Surgical History:   Procedure Laterality Date    HX HYSTERECTOMY      HX ORTHOPAEDIC  neck/back 2006    HX OTHER SURGICAL  5/8/2014     Excise recurrent scalp lesion and application of ACell    HX OTHER SURGICAL  5/8/2014    Excise keloid, anterior chest wall, and application of ACell    HX OTHER SURGICAL  5/8/2014     Excise sebaceous cyst, anterior chest wall    HX OTHER SURGICAL  5/8/2014    Punch biopsy, skin lesions, right forearm x2, right calf x1    AK ICAR CATHETER ABLATION ATRIOVENTR NODE FUNCTION N/A 5/6/2019    ABLATION AV NODE performed by Tommy Anderson MD at [address], [facility] CATH LAB    AK INS NEW/RPLCMT PRM PM W/TRANSV ELTRD ATRIAL&VENT  9/30/2017         AK RMVL IMPLANTABLE PT-ACTIVATED CAR EVENT RECORDER  9/30/2017          Social History     Socioeconomic History    Marital status:      Spouse name: Not on file    Number of children: Not on file    Years of education: Not on file    Highest education level: Not on file   Tobacco Use    Smoking status: Never Smoker    Smokeless tobacco: Never Used   Substance and Sexual Activity    Alcohol use: No    Drug use: No    Sexual activity: Yes     Partners: Male     Birth control/protection: None     Family History   Problem Relation Age of Onset    Hypertension Mother     Stroke Mother     Hypertension Father     Cancer Father         PROSTATE, MELANOMA    Anesth Problems Neg Hx Current Outpatient Medications   Medication Sig Dispense Refill    gabapentin (NEURONTIN) 300 mg capsule Take 1 Cap by mouth three (3) times daily. 270 Cap 3    amLODIPine (NORVASC) 5 mg tablet Take 1 Tab by mouth daily. 90 Tab 3    fluticasone propionate (FLONASE) 50 mcg/actuation nasal spray 2 Sprays by Both Nostrils route daily as needed for Rhinitis.  meclizine (ANTIVERT) 25 mg tablet Take 25 mg by mouth two (2) times a day.  rivaroxaban (XARELTO) 15 mg tab tablet Take 1 Tab by mouth daily (with dinner). 90 Tab 1    metoprolol succinate (TOPROL-XL) 100 mg tablet Take 100 mg by mouth daily.  omeprazole (PRILOSEC) 40 mg capsule TAKE 1 CAPSULE EVERY DAY 90 Cap 3    potassium chloride SR (KLOR-CON 10) 10 mEq tablet TAKE 1 TABLET EVERY DAY 90 Tab 3    raNITIdine (ZANTAC) 150 mg tablet TAKE 1 TABLET TWICE DAILY 180 Tab 3    furosemide (LASIX) 20 mg tablet TAKE 1 TABLET BY MOUTH DAILY 90 Tab 1    acetaminophen (TYLENOL) 325 mg tablet Take 650 mg by mouth nightly.  MULTIVIT WITH CALCIUM,IRON,MIN [facility] MULTIPLE VITAMINS PO) Take 1 Tab by mouth daily.  aspirin delayed-release 81 mg tablet Take 81 mg by mouth daily.        Allergies   Allergen Reactions    Latex Rash    Bactrim [Sulfamethoprim Ds] Hives    Aspirin Other (comments)     Anything higher than 81mg makes pt jittery    Codeine Hives and Itching    Iodinated Contrast Media Itching    Pcn [Penicillins] Hives and Itching    Tape [Adhesive] Rash       Objective:  Visit Vitals  /86   Pulse 60   Temp 98.7 °F (37.1 °C) (Oral)   Resp 14   Ht 5' 2\" (1.575 m)   SpO2 97%   BMI 30.00 kg/m²     Physical Exam:   General appearance - alert, well appearing, and in no distress  Mental status - alert, oriented to person, place, and time  EYE-DARLENE, EOMI, corneas normal, no foreign bodies  ENT-ENT exam normal, no neck nodes or sinus tenderness  Nose - normal and patent, no erythema, discharge or polyps  Mouth - mucous membranes moist, pharynx normal without lesions  Neck - supple, no significant adenopathy   Chest - clear to auscultation, no wheezes, rales or rhonchi, symmetric air entry   Heart - normal rate, regular rhythm, normal S1, S2, no murmurs, rubs, clicks or gallops   Abdomen - soft, nontender, nondistended, no masses or organomegaly  Lymph- no adenopathy palpable  Ext-peripheral pulses normal, no pedal edema, no clubbing or cyanosis  Skin-Warm and dry. no hyperpigmentation, vitiligo, or suspicious lesions  Neuro -alert, oriented, normal speech, no focal findings or movement disorder noted  Neck-normal C-spine, no tenderness, full ROM without pain  Feet-no nail deformities or callus formation with good pulses noted  Lt.shoulder-reduced range of motion of lt. Lt.lower leg 2+edema    Results for orders placed or performed in visit on 08/08/19   CK   Result Value Ref Range    Creatine Kinase,Total 113 24 - 173 U/L   AMB POC LIPID PROFILE   Result Value Ref Range    Cholesterol (POC) 135     Triglycerides (POC) 155     HDL Cholesterol (POC) 65     Non-HDL Cholesterol 79     LDL Cholesterol (POC) 39 MG/DL    TChol/HDL Ratio (POC) 2.1        Assessment/Plan:    ICD-10-CM ICD-9-CM    1. Encounter for immunization Z23 V03.89 INFLUENZA VIRUS VACCINE, HIGH DOSE SEASONAL, PRESERVATIVE FREE   2. Essential hypertension I10 401.9    3. Hypercholesteremia E78.00 272.0    4. Cellulitis of left lower extremity L03.116 682.6    5. Edema leg R60.0 782.3      Orders Placed This Encounter    Influenza virus vaccine ([address]) 65 years and older     lose weight, follow low fat diet, follow low salt diet, continue present plan,Take 81mg aspirin daily  Patient Instructions   [employer] Activation    Thank you for requesting access to [employer]. Please follow the instructions below to securely access and download your online medical record.  [employer] allows you to send messages to your doctor, view your test results, renew your prescriptions, schedule appointments, and more. How Do I Sign Up? 1. In your internet browser, go to [email or web address]  2. Click on the First Time User? Click Here link in the Sign In box. You will be redirect to the New Member Sign Up page. 3. Enter your [employer] Access Code exactly as it appears below. You will not need to use this code after youve completed the sign-up process. If you do not sign up before the expiration date, you must request a new code. [employer] Access Code: [email or web address]  Expires: 2019  3:56 PM (This is the date your [employer] access code will )    4. Enter the last four digits of your Social Security Number (xxxx) and Date of Birth (mm/dd/yyyy) as indicated and click Submit. You will be taken to the next sign-up page. 5. Create a [employer] ID. This will be your [employer] login ID and cannot be changed, so think of one that is secure and easy to remember. 6. Create a [employer] password. You can change your password at any time. 7. Enter your Password Reset Question and Answer. This can be used at a later time if you forget your password. 8. Enter your e-mail address. You will receive e-mail notification when new information is available in [address] 9. Click Sign Up. You can now view and download portions of your medical record. 10. Click the Download Summary menu link to download a portable copy of your medical information. Additional Information    If you have questions, please visit the Frequently Asked Questions section of the [employer] website at [email or web address] Remember, [employer] is NOT to be used for urgent needs. For medical emergencies, dial 911. Follow-up and Dispositions    · Return in about 3 months (around 1/3/2020), or if symptoms worsen or fail to improve. I have reviewed with the patient details of the assessment and plan and all questions were answered. Relevent patient education was performed. The most recent lab findings were reviewed with the patient. An After Visit Summary was printed and given to the patient.

## 2019-10-03 NOTE — PROGRESS NOTES
1. Have you been to the ER, urgent care clinic since your last visit? Hospitalized since your last visit?no    2. Have you seen or consulted any other health care providers outside of the [address] [place] [address] since your last visit? Include any pap smears or colon screening.  No    3 most recent PHQ Screens 9/18/2019   PHQ Not Done -   Little interest or pleasure in doing things Not at all   Feeling down, depressed, irritable, or hopeless Not at all   Total Score PHQ 2 0     Chief Complaint   Patient presents with    Physical Therapy    Leg Swelling     left

## 2019-10-03 NOTE — PATIENT INSTRUCTIONS
[employer] Activation    Thank you for requesting access to [employer]. Please follow the instructions below to securely access and download your online medical record. [employer] allows you to send messages to your doctor, view your test results, renew your prescriptions, schedule appointments, and more. How Do I Sign Up? 1. In your internet browser, go to [email or web address]  2. Click on the First Time User? Click Here link in the Sign In box. You will be redirect to the New Member Sign Up page. 3. Enter your [employer] Access Code exactly as it appears below. You will not need to use this code after youve completed the sign-up process. If you do not sign up before the expiration date, you must request a new code. [employer] Access Code: [email or web address]  Expires: 2019  3:56 PM (This is the date your [employer] access code will )    4. Enter the last four digits of your Social Security Number (xxxx) and Date of Birth (mm/dd/yyyy) as indicated and click Submit. You will be taken to the next sign-up page. 5. Create a [employer] ID. This will be your [employer] login ID and cannot be changed, so think of one that is secure and easy to remember. 6. Create a [employer] password. You can change your password at any time. 7. Enter your Password Reset Question and Answer. This can be used at a later time if you forget your password. 8. Enter your e-mail address. You will receive e-mail notification when new information is available in [address] 9. Click Sign Up. You can now view and download portions of your medical record. 10. Click the Download Summary menu link to download a portable copy of your medical information. Additional Information    If you have questions, please visit the Frequently Asked Questions section of the [employer] website at [email or web address] Remember, [employer] is NOT to be used for urgent needs. For medical emergencies, dial 911.

## 2019-10-31 ENCOUNTER — OFFICE VISIT (OUTPATIENT)
Dept: INTERNAL MEDICINE CLINIC | Age: 79
End: 2019-10-31

## 2019-10-31 VITALS
HEIGHT: 62 IN | BODY MASS INDEX: 29.44 KG/M2 | WEIGHT: 160 LBS | DIASTOLIC BLOOD PRESSURE: 84 MMHG | OXYGEN SATURATION: 98 % | TEMPERATURE: 98 F | SYSTOLIC BLOOD PRESSURE: 136 MMHG | RESPIRATION RATE: 16 BRPM | HEART RATE: 91 BPM

## 2019-10-31 DIAGNOSIS — L03.116 CELLULITIS OF LEFT LOWER EXTREMITY: ICD-10-CM

## 2019-10-31 DIAGNOSIS — L08.9 INFECTION OF SCALP: Primary | ICD-10-CM

## 2019-10-31 RX ORDER — CLINDAMYCIN HYDROCHLORIDE 150 MG/1
150 CAPSULE ORAL 3 TIMES DAILY
Qty: 21 CAP | Refills: 0 | Status: SHIPPED | OUTPATIENT
Start: 2019-10-31 | End: 2019-11-07

## 2019-10-31 NOTE — PROGRESS NOTES
1. Have you been to the ER, urgent care clinic since your last visit? Hospitalized since your last visit?no    2. Have you seen or consulted any other health care providers outside of the [address] [place] [address] since your last visit? Include any pap smears or colon screening.  No    3 most recent PHQ Screens 9/18/2019   PHQ Not Done -   Little interest or pleasure in doing things Not at all   Feeling down, depressed, irritable, or hopeless Not at all   Total Score PHQ 2 0       Chief Complaint   Patient presents with    Skin Infection     extremities

## 2019-10-31 NOTE — PATIENT INSTRUCTIONS
[employer] Activation    Thank you for requesting access to [employer]. Please follow the instructions below to securely access and download your online medical record. [employer] allows you to send messages to your doctor, view your test results, renew your prescriptions, schedule appointments, and more. How Do I Sign Up? 1. In your internet browser, go to [email or web address]  2. Click on the First Time User? Click Here link in the Sign In box. You will be redirect to the New Member Sign Up page. 3. Enter your [employer] Access Code exactly as it appears below. You will not need to use this code after youve completed the sign-up process. If you do not sign up before the expiration date, you must request a new code. [employer] Access Code: [email or web address]  Expires: 2019  3:56 PM (This is the date your [employer] access code will )    4. Enter the last four digits of your Social Security Number (xxxx) and Date of Birth (mm/dd/yyyy) as indicated and click Submit. You will be taken to the next sign-up page. 5. Create a [employer] ID. This will be your [employer] login ID and cannot be changed, so think of one that is secure and easy to remember. 6. Create a [employer] password. You can change your password at any time. 7. Enter your Password Reset Question and Answer. This can be used at a later time if you forget your password. 8. Enter your e-mail address. You will receive e-mail notification when new information is available in [address] 9. Click Sign Up. You can now view and download portions of your medical record. 10. Click the Download Summary menu link to download a portable copy of your medical information. Additional Information    If you have questions, please visit the Frequently Asked Questions section of the [employer] website at [email or web address] Remember, [employer] is NOT to be used for urgent needs. For medical emergencies, dial 911.

## 2019-10-31 NOTE — PROGRESS NOTES
Justice Olivares is a [de-identified] y.o. female and presents with Skin Infection (extremities)    Subjective:  She states she needs a dermatology referral for the lesion on her scalp    She has a recurrent cellulitis of her legs. Hypertension Review:  The patient has essential hypertension  Diet and Lifestyle: generally follows a  low sodium diet, exercises sporadically  Home BP Monitoring: is not measured at home. Pertinent ROS: taking medications as instructed, no medication side effects noted, no TIA's, no chest pain on exertion, no dyspnea on exertion, no swelling of ankles. Review of Systems  Constitutional: negative for fevers, chills, anorexia and weight loss  Eyes:   negative for visual disturbance and irritation  ENT:   negative for tinnitus,sore throat,nasal congestion,ear pains. hoarseness  Respiratory:  negative for cough, hemoptysis, dyspnea,wheezing  CV:   negative for chest pain, palpitations, lower extremity edema  GI:   negative for nausea, vomiting, diarrhea, abdominal pain,melena  Endo:               negative for polyuria,polydipsia,polyphagia,heat intolerance  Genitourinary: negative for frequency, dysuria and hematuria  Integument:  negative for rash and pruritus  Hematologic:  negative for easy bruising and gum/nose bleeding  Musculoskel: negative for myalgias, arthralgias, back pain, muscle weakness, joint pain  Neurological:  negative for headaches, dizziness, vertigo, memory problems and gait   Behavl/Psych: negative for feelings of anxiety, depression, mood changes    Past Medical History:   Diagnosis Date    Acute pharyngitis 2/8/2011    Allergic rhinitis, cause unspecified 2/8/2011    Arrhythmia     PVC    Asymptomatic varicose veins 2/8/2011    Cancer (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    Diverticulosis of colon (without mention of hemorrhage) 2/8/2011    Dysphagia 2/8/2011    Edema, peripheral 2/8/2011    GERD (gastroesophageal reflux disease)     GERD Gastro- Esophageal Reflux Disease 2/8/2011    Herniated nucleus pulposus ( slipped disc) 2/8/2011    Menopausal 2/8/2011    PUD (peptic ulcer disease) 2012    Pure hypercholesterolemia 2/8/2011    Recurrent ear pain 2/8/2011    S/P radiation therapy     Scalp lesion 10/23/2014    Sebaceous cyst 4/22/2014    Unspecified cataract 2/8/2011    Unspecified essential hypertension 2/8/2011    Unspecified sleep apnea      Past Surgical History:   Procedure Laterality Date    HX HYSTERECTOMY      HX ORTHOPAEDIC  neck/back 2006    HX OTHER SURGICAL  5/8/2014     Excise recurrent scalp lesion and application of ACell    HX OTHER SURGICAL  5/8/2014    Excise keloid, anterior chest wall, and application of ACell    HX OTHER SURGICAL  5/8/2014     Excise sebaceous cyst, anterior chest wall    HX OTHER SURGICAL  5/8/2014    Punch biopsy, skin lesions, right forearm x2, right calf x1    SC ICAR CATHETER ABLATION ATRIOVENTR NODE FUNCTION N/A 5/6/2019    ABLATION AV NODE performed by Shila Eason MD at [address], [facility] CATH LAB    SC INS NEW/RPLCMT PRM PM W/TRANSV ELTRD ATRIAL&VENT  9/30/2017         SC RMVL IMPLANTABLE PT-ACTIVATED CAR EVENT RECORDER  9/30/2017          Social History     Socioeconomic History    Marital status:      Spouse name: Not on file    Number of children: Not on file    Years of education: Not on file    Highest education level: Not on file   Tobacco Use    Smoking status: Never Smoker    Smokeless tobacco: Never Used   Substance and Sexual Activity    Alcohol use: No    Drug use: No    Sexual activity: Yes     Partners: Male     Birth control/protection: None     Family History   Problem Relation Age of Onset    Hypertension Mother     Stroke Mother     Hypertension Father     Cancer Father         PROSTATE, MELANOMA    Anesth Problems Neg Hx      Current Outpatient Medications   Medication Sig Dispense Refill    clindamycin (CLEOCIN) 150 mg capsule Take 1 Cap by mouth three (3) times daily for 7 days. 21 Cap 0    gabapentin (NEURONTIN) 300 mg capsule Take 1 Cap by mouth three (3) times daily. 270 Cap 3    amLODIPine (NORVASC) 5 mg tablet Take 1 Tab by mouth daily. 90 Tab 3    fluticasone propionate (FLONASE) 50 mcg/actuation nasal spray 2 Sprays by Both Nostrils route daily as needed for Rhinitis.  meclizine (ANTIVERT) 25 mg tablet Take 25 mg by mouth two (2) times a day.  rivaroxaban (XARELTO) 15 mg tab tablet Take 1 Tab by mouth daily (with dinner). 90 Tab 1    metoprolol succinate (TOPROL-XL) 100 mg tablet Take 100 mg by mouth daily.  omeprazole (PRILOSEC) 40 mg capsule TAKE 1 CAPSULE EVERY DAY 90 Cap 3    potassium chloride SR (KLOR-CON 10) 10 mEq tablet TAKE 1 TABLET EVERY DAY 90 Tab 3    raNITIdine (ZANTAC) 150 mg tablet TAKE 1 TABLET TWICE DAILY 180 Tab 3    furosemide (LASIX) 20 mg tablet TAKE 1 TABLET BY MOUTH DAILY 90 Tab 1    acetaminophen (TYLENOL) 325 mg tablet Take 650 mg by mouth nightly.  MULTIVIT WITH CALCIUM,IRON,MIN [facility] MULTIPLE VITAMINS PO) Take 1 Tab by mouth daily.  aspirin delayed-release 81 mg tablet Take 81 mg by mouth daily.        Allergies   Allergen Reactions    Latex Rash    Bactrim [Sulfamethoprim Ds] Hives    Aspirin Other (comments)     Anything higher than 81mg makes pt jittery    Codeine Hives and Itching    Iodinated Contrast Media Itching    Pcn [Penicillins] Hives and Itching    Tape [Adhesive] Rash       Objective:  Visit Vitals  /84   Pulse 91   Temp 98 °F (36.7 °C) (Oral)   Resp 16   Ht 5' 2\" (1.575 m)   Wt 160 lb (72.6 kg)   SpO2 98%   BMI 29.26 kg/m²     Physical Exam:   General appearance - alert, well appearing, and in no distress  Mental status - alert, oriented to person, place, and time  EYE-DARLENE, EOMI, corneas normal, no foreign bodies  ENT-ENT exam normal, no neck nodes or sinus tenderness  Nose - normal and patent, no erythema, discharge or polyps  Mouth - mucous membranes moist, pharynx normal without lesions  Neck - supple, no significant adenopathy   Chest - clear to auscultation, no wheezes, rales or rhonchi, symmetric air entry   Heart - normal rate, regular rhythm, normal S1, S2, no murmurs, rubs, clicks or gallops   Abdomen - soft, nontender, nondistended, no masses or organomegaly  Lymph- no adenopathy palpable  Ext-peripheral pulses normal, no pedal edema, no clubbing or cyanosis  Skin-Warm and dry. no hyperpigmentation, vitiligo, or suspicious lesions  Neuro -alert, oriented, normal speech, no focal findings or movement disorder noted  Neck-normal C-spine, no tenderness, full ROM without pain  Feet-no nail deformities or callus formation with good pulses noted  Lt. lower leg erythema    Results for orders placed or performed in visit on 08/08/19   CK   Result Value Ref Range    Creatine Kinase,Total 113 24 - 173 U/L   AMB POC LIPID PROFILE   Result Value Ref Range    Cholesterol (POC) 135     Triglycerides (POC) 155     HDL Cholesterol (POC) 65     Non-HDL Cholesterol 79     LDL Cholesterol (POC) 39 MG/DL    TChol/HDL Ratio (POC) 2.1        Assessment/Plan:    ICD-10-CM ICD-9-CM    1. Infection of scalp L08.9 686.9 REFERRAL TO DERMATOLOGY   2. Cellulitis of left lower extremity L03.116 682.6      Orders Placed This Encounter    REFERRAL TO DERMATOLOGY     Referral Priority:   Routine     Referral Type:   Consultation     Referral Reason:   Specialty Services Required     Referred to Provider:   Maikol Esparza MD     Requested Specialty:   Dermatology     Number of Visits Requested:   1    clindamycin (CLEOCIN) 150 mg capsule     Sig: Take 1 Cap by mouth three (3) times daily for 7 days.      Dispense:  21 Cap     Refill:  0     call if any problems,Take 81mg aspirin daily  Patient Instructions   MyChart Activation    Thank you for requesting access to [employer]. Please follow the instructions below to securely access and download your online medical record. [employer] allows you to send messages to your doctor, view your test results, renew your prescriptions, schedule appointments, and more. How Do I Sign Up? 1. In your internet browser, go to [email or web address]  2. Click on the First Time User? Click Here link in the Sign In box. You will be redirect to the New Member Sign Up page. 3. Enter your [employer] Access Code exactly as it appears below. You will not need to use this code after youve completed the sign-up process. If you do not sign up before the expiration date, you must request a new code. [employer] Access Code: [email or web address]  Expires: 2019  3:56 PM (This is the date your [employer] access code will )    4. Enter the last four digits of your Social Security Number (xxxx) and Date of Birth (mm/dd/yyyy) as indicated and click Submit. You will be taken to the next sign-up page. 5. Create a [employer] ID. This will be your [employer] login ID and cannot be changed, so think of one that is secure and easy to remember. 6. Create a [employer] password. You can change your password at any time. 7. Enter your Password Reset Question and Answer. This can be used at a later time if you forget your password. 8. Enter your e-mail address. You will receive e-mail notification when new information is available in [address] 9. Click Sign Up. You can now view and download portions of your medical record. 10. Click the Download Summary menu link to download a portable copy of your medical information. Additional Information    If you have questions, please visit the Frequently Asked Questions section of the [employer] website at [email or web address] Remember, [employer] is NOT to be used for urgent needs. For medical emergencies, dial 911. Follow-up and Dispositions    · Return in about 3 months (around 2020).            I have reviewed with the patient details of the assessment and plan and all questions were answered. Relevent patient education was performed. The most recent lab findings were reviewed with the patient. An After Visit Summary was printed and given to the patient.

## 2020-01-02 ENCOUNTER — CLINICAL SUPPORT (OUTPATIENT)
Dept: CARDIOLOGY CLINIC | Age: 80
End: 2020-01-02

## 2020-01-02 DIAGNOSIS — F78.A9 X-LINKED INTELLECTUAL DISABILITY TYPE 95: Primary | ICD-10-CM

## 2020-02-03 ENCOUNTER — TELEPHONE (OUTPATIENT)
Dept: INTERNAL MEDICINE CLINIC | Age: 80
End: 2020-02-03

## 2020-02-03 NOTE — TELEPHONE ENCOUNTER
Tried to [facility] patient and tried both numbers and could not get an answerer on any line because of being full or line not taking calls. Will try tomorrow to give patient number for Xarelto to see if she can get medication from them.

## 2020-02-05 ENCOUNTER — TELEPHONE (OUTPATIENT)
Dept: INTERNAL MEDICINE CLINIC | Age: 80
End: 2020-02-05

## 2020-02-05 ENCOUNTER — TELEPHONE (OUTPATIENT)
Dept: CARDIOLOGY CLINIC | Age: 80
End: 2020-02-05

## 2020-02-05 NOTE — TELEPHONE ENCOUNTER
Called Pt. Two patient identifiers confirmed. Pt stated that her Medicare was not renewed when it when she sent her forms in to have it done and they are now working on having everything fixed. Pt stated she only needs some samples to get her through until her medicare insurance is fixed. Notified pt that I have 3 bottles that she can have. Pt verbalized understanding of information discussed w/ no further questions at this time. Cardiologist: Dr. Timmy Vance    Last appt: 1/2/2020  Future Appointments   Date Time Provider Nasim Cerrato   4/7/2020  1:30  [place], [address]   7/14/2020  1:00  [place], [address]   10/20/2020 11:00 AM [facility], [address]   10/20/2020  1:00 PM [facility], [address]   10/20/2020  1:20 PM Angeles Herrera  [address]       Requested Prescriptions     Signed Prescriptions Disp Refills    rivaroxaban (XARELTO) 15 mg tab tablet 21 Tab 0     Sig: Take 1 Tab by mouth daily (with dinner). Authorizing Provider: OTF BENNETT     Ordering User: ARLENE SIDDIQI         Refills VO per Dr. Timmy Vance.

## 2020-02-05 NOTE — TELEPHONE ENCOUNTER
Patient aware to try Metoprolol wit Rx card to get cheaper and [phone] see if they can help her.  Patient verbalized understanding

## 2020-02-17 RX ORDER — RANITIDINE 150 MG/1
TABLET, FILM COATED ORAL
Qty: 180 TAB | Refills: 3 | Status: SHIPPED | OUTPATIENT
Start: 2020-02-17 | End: 2020-06-16 | Stop reason: ALTCHOICE

## 2020-02-24 RX ORDER — METOPROLOL SUCCINATE 25 MG/1
TABLET, EXTENDED RELEASE ORAL
Qty: 90 TAB | Refills: 3 | Status: SHIPPED | OUTPATIENT
Start: 2020-02-24 | End: 2020-06-26 | Stop reason: SDUPTHER

## 2020-03-12 ENCOUNTER — OFFICE VISIT (OUTPATIENT)
Dept: INTERNAL MEDICINE CLINIC | Age: 80
End: 2020-03-12

## 2020-03-12 VITALS
OXYGEN SATURATION: 99 % | HEIGHT: 62 IN | RESPIRATION RATE: 19 BRPM | HEART RATE: 91 BPM | TEMPERATURE: 98.3 F | WEIGHT: 166 LBS | SYSTOLIC BLOOD PRESSURE: 110 MMHG | BODY MASS INDEX: 30.55 KG/M2 | DIASTOLIC BLOOD PRESSURE: 70 MMHG

## 2020-03-12 DIAGNOSIS — G56.00 CARPAL TUNNEL SYNDROME, UNSPECIFIED LATERALITY: ICD-10-CM

## 2020-03-12 DIAGNOSIS — I10 ESSENTIAL HYPERTENSION: Primary | ICD-10-CM

## 2020-03-12 DIAGNOSIS — G72.0 STATIN MYOPATHY: ICD-10-CM

## 2020-03-12 DIAGNOSIS — K21.9 GASTROESOPHAGEAL REFLUX DISEASE WITHOUT ESOPHAGITIS: ICD-10-CM

## 2020-03-12 DIAGNOSIS — E78.2 MIXED HYPERLIPIDEMIA: ICD-10-CM

## 2020-03-12 DIAGNOSIS — T46.6X5A STATIN MYOPATHY: ICD-10-CM

## 2020-03-12 LAB
CHOLEST SERPL-MCNC: 168 MG/DL
HDLC SERPL-MCNC: 57 MG/DL
LDL CHOLESTEROL POC: 93 MG/DL
NON-HDL GOAL (POC): 112
TCHOL/HDL RATIO (POC): 3
TRIGL SERPL-MCNC: 96 MG/DL

## 2020-03-12 RX ORDER — OMEPRAZOLE 40 MG/1
CAPSULE, DELAYED RELEASE ORAL
Qty: 90 CAP | Refills: 3 | Status: SHIPPED | OUTPATIENT
Start: 2020-03-12 | End: 2020-09-04 | Stop reason: ALTCHOICE

## 2020-03-12 RX ORDER — AMLODIPINE BESYLATE 5 MG/1
5 TABLET ORAL DAILY
Qty: 90 TAB | Refills: 3 | Status: SHIPPED | OUTPATIENT
Start: 2020-03-12 | End: 2021-03-12

## 2020-03-12 RX ORDER — POTASSIUM CHLORIDE 750 MG/1
TABLET, FILM COATED, EXTENDED RELEASE ORAL
Qty: 90 TAB | Refills: 3 | Status: SHIPPED | OUTPATIENT
Start: 2020-03-12 | End: 2020-09-04 | Stop reason: ALTCHOICE

## 2020-03-12 RX ORDER — GABAPENTIN 300 MG/1
300 CAPSULE ORAL 3 TIMES DAILY
Qty: 270 CAP | Refills: 3 | Status: SHIPPED | OUTPATIENT
Start: 2020-03-12 | End: 2022-09-22 | Stop reason: ALTCHOICE

## 2020-03-12 RX ORDER — RANITIDINE 150 MG/1
TABLET, FILM COATED ORAL
Qty: 180 TAB | Refills: 3 | Status: CANCELLED | OUTPATIENT
Start: 2020-03-12

## 2020-03-12 NOTE — PROGRESS NOTES
Chief Complaint   Patient presents with    Coronary Artery Disease    Cholesterol Problem     3 most recent PHQ Screens 3/12/2020   PHQ Not Done -   Little interest or pleasure in doing things Not at all   Feeling down, depressed, irritable, or hopeless Not at all   Total Score PHQ 2 0     1. Have you been to the ER, urgent care clinic since your last visit? Hospitalized since your last visit?no    2. Have you seen or consulted any other health care providers outside of the [address] [place] [address] since your last visit? Include any pap smears or colon screening.  no

## 2020-03-12 NOTE — PROGRESS NOTES
Ira Sheriff is a 78 y.o. female and presents with Coronary Artery Disease and Cholesterol Problem    Subjective:  Coronary Disease Review:  Patient complains of no chest pain today. There has not been the need to use NTG. Previous cardiac testing has included: Electrocardiogram (EKG), Echocardiogram, CABG/Stent placement. Hypertension Review:  The patient has essential hypertension  Diet and Lifestyle: generally follows a  low sodium diet, exercises sporadically  Home BP Monitoring: is not measured at home. Pertinent ROS: taking medications as instructed, no medication side effects noted, no TIA's, no chest pain on exertion, no dyspnea on exertion, no swelling of ankles. She has a history of a statin myopathy. Carpal Tunnel Syndrome Review:  Patient presents for evaluation of pain in hand(s), hand paresthesias. Onset of the symptoms was several months ago. Current symptoms include tingling involving the medial aspect of the bilateral hand(s): severity: moderate. Patient's overall course: gradually worsening. Patient has had no prior elbow problems. Previous visits for this problem: none. Evaluation to date: none. Treatment to date: none. She has a numbness in the neck region with relief with neurontin. GERD Review:   Patient has a history of gastroesophageal reflux with heartburn. Symptoms have been present for a few months. She denies dysphagia. She  has not lost weight. She denies melena, hematochezia, hematemesis, and coffee ground emesis. This has been associated with fullness after meals. She denies abdominal bloating and none. Medical therapy in the past has included proton pump inhibitor      Review of Systems  Constitutional: negative for fevers, chills, anorexia and weight loss  Eyes:   negative for visual disturbance and irritation  ENT:   negative for tinnitus,sore throat,nasal congestion,ear pains. hoarseness  Respiratory:  negative for cough, hemoptysis, dyspnea,wheezing  CV:   negative for chest pain, palpitations, lower extremity edema  GI:   negative for nausea, vomiting, diarrhea, abdominal pain,melena  Endo:               negative for polyuria,polydipsia,polyphagia,heat intolerance  Genitourinary: negative for frequency, dysuria and hematuria  Integument:  negative for rash and pruritus  Hematologic:  negative for easy bruising and gum/nose bleeding  Musculoskel: negative for myalgias, arthralgias, back pain, muscle weakness, joint pain  Neurological:  negative for headaches, dizziness, vertigo, memory problems and gait   Behavl/Psych: negative for feelings of anxiety, depression, mood changes    Past Medical History:   Diagnosis Date    Acute pharyngitis 2/8/2011    Allergic rhinitis, cause unspecified 2/8/2011    Arrhythmia     PVC    Asymptomatic varicose veins 2/8/2011    Cancer ([facility] [address]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     Carpal tunnel syndrome 2/8/2011    Degenerative Joint Disese ( improved/resolving) 2/8/2011    Degenetrative Dis Disease, 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    Diverticulosis of colon (without mention of hemorrhage) 2/8/2011    Dysphagia 2/8/2011    Edema, peripheral 2/8/2011    GERD (gastroesophageal reflux disease)     GERD Gastro- Esophageal Reflux Disease 2/8/2011    Herniated nucleus pulposus ( slipped disc) 2/8/2011    Menopausal 2/8/2011    PUD (peptic ulcer disease) 2012    Pure hypercholesterolemia 2/8/2011    Recurrent ear pain 2/8/2011    S/P radiation therapy     Scalp lesion 10/23/2014    Sebaceous cyst 4/22/2014    Unspecified cataract 2/8/2011    Unspecified essential hypertension 2/8/2011    Unspecified sleep apnea      Past Surgical History:   Procedure Laterality Date    HX HYSTERECTOMY      HX ORTHOPAEDIC  neck/back 2006    HX OTHER SURGICAL  5/8/2014     Excise recurrent scalp lesion and application of ACell    HX OTHER SURGICAL  5/8/2014    Excise keloid, anterior chest wall, and application of ACell    HX OTHER SURGICAL  5/8/2014     Excise sebaceous cyst, anterior chest wall    HX OTHER SURGICAL  5/8/2014    Punch biopsy, skin lesions, right forearm x2, right calf x1    MA ICAR CATHETER ABLATION ATRIOVENTR NODE FUNCTION N/A 5/6/2019    ABLATION AV NODE performed by Dayton Young MD at [address], [facility] CATH LAB    MA INS NEW/RPLCMT PRM PM W/TRANSV ELTRD ATRIAL&VENT  9/30/2017         MA RMVL IMPLANTABLE PT-ACTIVATED CAR EVENT RECORDER  9/30/2017          Social History     Socioeconomic History    Marital status:      Spouse name: Not on file    Number of children: Not on file    Years of education: Not on file    Highest education level: Not on file   Tobacco Use    Smoking status: Never Smoker    Smokeless tobacco: Never Used   Substance and Sexual Activity    Alcohol use: No    Drug use: No    Sexual activity: Yes     Partners: Male     Birth control/protection: None     Family History   Problem Relation Age of Onset    Hypertension Mother     Stroke Mother     Hypertension Father     Cancer Father         PROSTATE, MELANOMA    Anesth Problems Neg Hx      Current Outpatient Medications   Medication Sig Dispense Refill    metoprolol succinate (TOPROL-XL) 25 mg XL tablet TAKE 1 TABLET BY MOUTH EVERY DAY 90 Tab 3    raNITIdine (ZANTAC) 150 mg tablet TAKE 1 TABLET TWICE DAILY 180 Tab 3    rivaroxaban (XARELTO) 15 mg tab tablet Take 1 Tab by mouth daily (with dinner). 21 Tab 0    gabapentin (NEURONTIN) 300 mg capsule Take 1 Cap by mouth three (3) times daily. 270 Cap 3    amLODIPine (NORVASC) 5 mg tablet Take 1 Tab by mouth daily. 90 Tab 3    fluticasone propionate (FLONASE) 50 mcg/actuation nasal spray 2 Sprays by Both Nostrils route daily as needed for Rhinitis.  meclizine (ANTIVERT) 25 mg tablet Take 25 mg by mouth two (2) times a day.  metoprolol succinate (TOPROL-XL) 100 mg tablet Take 100 mg by mouth daily.       omeprazole (PRILOSEC) 40 mg capsule TAKE 1 CAPSULE EVERY DAY 90 Cap 3  potassium chloride SR (KLOR-CON 10) 10 mEq tablet TAKE 1 TABLET EVERY DAY 90 Tab 3    furosemide (LASIX) 20 mg tablet TAKE 1 TABLET BY MOUTH DAILY 90 Tab 1    acetaminophen (TYLENOL) 325 mg tablet Take 650 mg by mouth nightly.  MULTIVIT WITH CALCIUM,IRON,MIN [facility] MULTIPLE VITAMINS PO) Take 1 Tab by mouth daily.  aspirin delayed-release 81 mg tablet Take 81 mg by mouth daily. Allergies   Allergen Reactions    Latex Rash    Bactrim [Sulfamethoprim Ds] Hives    Aspirin Other (comments)     Anything higher than 81mg makes pt jittery    Codeine Hives and Itching    Iodinated Contrast Media Itching    Pcn [Penicillins] Hives and Itching    Tape [Adhesive] Rash       Objective:  Visit Vitals  /70 (BP 1 Location: Right arm, BP Patient Position: Sitting)   Pulse 91   Temp 98.3 °F (36.8 °C) (Oral)   Resp 19   Ht 5' 2\" (1.575 m)   Wt 166 lb (75.3 kg)   SpO2 99%   BMI 30.36 kg/m²     Physical Exam:   General appearance - alert, well appearing, and in no distress  Mental status - alert, oriented to person, place, and time  EYE-DARLENE, EOMI, corneas normal, no foreign bodies  ENT-ENT exam normal, no neck nodes or sinus tenderness  Nose - normal and patent, no erythema, discharge or polyps  Mouth - mucous membranes moist, pharynx normal without lesions  Neck - supple, no significant adenopathy   Chest - clear to auscultation, no wheezes, rales or rhonchi, symmetric air entry   Heart - normal rate, regular rhythm, normal S1, S2, no murmurs, rubs, clicks or gallops   Abdomen - soft, nontender, nondistended, no masses or organomegaly  Lymph- no adenopathy palpable  Ext-peripheral pulses normal, no pedal edema, no clubbing or cyanosis  Skin-Warm and dry.  no hyperpigmentation, vitiligo, or suspicious lesions  Neuro -alert, oriented, normal speech, no focal findings or movement disorder noted  Neck-normal C-spine, no tenderness, full ROM without pain  Feet-no nail deformities or callus formation with good pulses noted  Lt. lower leg erythema    Results for orders placed or performed in visit on 19   CK   Result Value Ref Range    Creatine Kinase,Total 113 24 - 173 U/L   AMB POC LIPID PROFILE   Result Value Ref Range    Cholesterol (POC) 135     Triglycerides (POC) 155     HDL Cholesterol (POC) 65     Non-HDL Cholesterol 79     LDL Cholesterol (POC) 39 MG/DL    TChol/HDL Ratio (POC) 2.1        Assessment/Plan:    ICD-10-CM ICD-9-CM    1. Essential hypertension I10 401.9    2. Carpal tunnel syndrome, unspecified laterality G56.00 354.0 gabapentin (NEURONTIN) 300 mg capsule   3. Gastroesophageal reflux disease without esophagitis K21.9 530.81 omeprazole (PRILOSEC) 40 mg capsule   4. Mixed hyperlipidemia E78.2 272.2    5. Statin myopathy G72.0 359.4     T46.6X5A E942.2      No orders of the defined types were placed in this encounter. call if any problems,Take 81mg aspirin daily  Patient Instructions   [employer] Activation    Thank you for requesting access to [employer]. Please follow the instructions below to securely access and download your online medical record. [employer] allows you to send messages to your doctor, view your test results, renew your prescriptions, schedule appointments, and more. How Do I Sign Up? 1. In your internet browser, go to [email or web address]  2. Click on the First Time User? Click Here link in the Sign In box. You will be redirect to the New Member Sign Up page. 3. Enter your [employer] Access Code exactly as it appears below. You will not need to use this code after youve completed the sign-up process. If you do not sign up before the expiration date, you must request a new code. [employer] Access Code: [email or web address]  Expires: 2020 12:46 PM (This is the date your [employer] access code will )    4. Enter the last four digits of your Social Security Number (xxxx) and Date of Birth (mm/dd/yyyy) as indicated and click Submit.  You will be taken to the next sign-up page. 5. Create a [employer] ID. This will be your [employer] login ID and cannot be changed, so think of one that is secure and easy to remember. 6. Create a [employer] password. You can change your password at any time. 7. Enter your Password Reset Question and Answer. This can be used at a later time if you forget your password. 8. Enter your e-mail address. You will receive e-mail notification when new information is available in [address] 9. Click Sign Up. You can now view and download portions of your medical record. 10. Click the Download Summary menu link to download a portable copy of your medical information. Additional Information    If you have questions, please visit the Frequently Asked Questions section of the [employer] website at [email or web address] Remember, [employer] is NOT to be used for urgent needs. For medical emergencies, dial 911. Follow-up and Dispositions    · Return in about 3 months (around 6/12/2020), or if symptoms worsen or fail to improve. I have reviewed with the patient details of the assessment and plan and all questions were answered. Relevent patient education was performed. The most recent lab findings were reviewed with the patient. An After Visit Summary was printed and given to the patient.

## 2020-03-12 NOTE — PATIENT INSTRUCTIONS
[employer] Activation    Thank you for requesting access to [employer]. Please follow the instructions below to securely access and download your online medical record. [employer] allows you to send messages to your doctor, view your test results, renew your prescriptions, schedule appointments, and more. How Do I Sign Up? 1. In your internet browser, go to [email or web address]  2. Click on the First Time User? Click Here link in the Sign In box. You will be redirect to the New Member Sign Up page. 3. Enter your [employer] Access Code exactly as it appears below. You will not need to use this code after youve completed the sign-up process. If you do not sign up before the expiration date, you must request a new code. [employer] Access Code: [email or web address]  Expires: 2020 12:46 PM (This is the date your [employer] access code will )    4. Enter the last four digits of your Social Security Number (xxxx) and Date of Birth (mm/dd/yyyy) as indicated and click Submit. You will be taken to the next sign-up page. 5. Create a [employer] ID. This will be your [employer] login ID and cannot be changed, so think of one that is secure and easy to remember. 6. Create a [employer] password. You can change your password at any time. 7. Enter your Password Reset Question and Answer. This can be used at a later time if you forget your password. 8. Enter your e-mail address. You will receive e-mail notification when new information is available in [address] 9. Click Sign Up. You can now view and download portions of your medical record. 10. Click the Download Summary menu link to download a portable copy of your medical information. Additional Information    If you have questions, please visit the Frequently Asked Questions section of the [employer] website at [email or web address] Remember, [employer] is NOT to be used for urgent needs. For medical emergencies, dial 911.

## 2020-03-22 RX ORDER — MECLIZINE HYDROCHLORIDE 25 MG/1
TABLET ORAL
Qty: 270 TAB | Refills: 3 | Status: SHIPPED | OUTPATIENT
Start: 2020-03-22 | End: 2022-09-22

## 2020-04-07 ENCOUNTER — CLINICAL SUPPORT (OUTPATIENT)
Dept: CARDIOLOGY CLINIC | Age: 80
End: 2020-04-07

## 2020-04-07 DIAGNOSIS — Z95.0 CARDIAC PACEMAKER IN SITU: Primary | ICD-10-CM

## 2020-05-12 NOTE — TELEPHONE ENCOUNTER
Per DR Lluvia Tomas, VERBAL ORDER. CYCLOBENZAPRINE 10 MG  TAKE ONE TABLET BY MOUTH  3 TIMES A DAY AS NEEDED FOR MUSCLE SPASMS. DISP 90 TABLETS   ZERO REFILLS. PHONED IN TO Monica.Laly [phone].

## 2020-05-13 RX ORDER — CYCLOBENZAPRINE HCL 10 MG
10 TABLET ORAL
Qty: 90 TAB | Refills: 0 | OUTPATIENT
Start: 2020-05-13 | End: 2022-09-22 | Stop reason: ALTCHOICE

## 2020-06-16 ENCOUNTER — OFFICE VISIT (OUTPATIENT)
Dept: INTERNAL MEDICINE CLINIC | Age: 80
End: 2020-06-16

## 2020-06-16 VITALS
TEMPERATURE: 97.7 F | HEART RATE: 87 BPM | OXYGEN SATURATION: 97 % | WEIGHT: 156 LBS | DIASTOLIC BLOOD PRESSURE: 84 MMHG | SYSTOLIC BLOOD PRESSURE: 124 MMHG | RESPIRATION RATE: 16 BRPM | BODY MASS INDEX: 28.53 KG/M2

## 2020-06-16 DIAGNOSIS — L03.116 CELLULITIS OF LEFT LOWER EXTREMITY: ICD-10-CM

## 2020-06-16 DIAGNOSIS — L72.9 CYST OF SKIN: Primary | ICD-10-CM

## 2020-06-16 DIAGNOSIS — I10 ESSENTIAL HYPERTENSION: ICD-10-CM

## 2020-06-16 DIAGNOSIS — E78.00 HYPERCHOLESTEREMIA: ICD-10-CM

## 2020-06-16 NOTE — PROGRESS NOTES
1. Have you been to the ER, urgent care clinic since your last visit? Hospitalized since your last visit?no    2. Have you seen or consulted any other health care providers outside of the [address] [place] [address] since your last visit? Include any pap smears or colon screening.  No    3 most recent PHQ Screens 3/12/2020   PHQ Not Done Medical Reason (indicate in comments)   Little interest or pleasure in doing things Not at all   Feeling down, depressed, irritable, or hopeless Not at all   Total Score PHQ 2 0     Chief Complaint   Patient presents with    Breast Problem     left

## 2020-06-16 NOTE — PROGRESS NOTES
Dc King is a 78 y.o. female and presents with Breast Problem (left)    Subjective:  She has been concerned about a small nodule on her upper chest wall. The area is painless    She has a lt.leg chronic cellulitis treated      Coronary Disease Review:  Patient complains of no chest pain today. There has not been the need to use NTG. Previous cardiac testing has included: Electrocardiogram (EKG), Echocardiogram, CABG/Stent placement. Hypertension Review:  The patient has essential hypertension  Diet and Lifestyle: generally follows a  low sodium diet, exercises sporadically  Home BP Monitoring: is not measured at home. Pertinent ROS: taking medications as instructed, no medication side effects noted, no TIA's, no chest pain on exertion, no dyspnea on exertion, no swelling of ankles. Carpal Tunnel Syndrome Review:  Patient presents for evaluation of pain in hand(s), hand paresthesias. Onset of the symptoms was several months ago. Current symptoms include tingling involving the medial aspect of the bilateral hand(s): severity: moderate. Patient's overall course: gradually worsening. Patient has had no prior elbow problems. Previous visits for this problem: none. Evaluation to date: none. Treatment to date: none. She  Still has a numbness in the neck region with relief with neurontin. GERD Review:   Patient has a history of gastroesophageal reflux with heartburn. Symptoms have been present for a few months. She denies dysphagia. She  has not lost weight. She denies melena, hematochezia, hematemesis, and coffee ground emesis. This has been associated with fullness after meals. She denies abdominal bloating and none.   Medical therapy in the past has included proton pump inhibitor      Review of Systems  Constitutional: negative for fevers, chills, anorexia and weight loss  Eyes:   negative for visual disturbance and irritation  ENT:   negative for tinnitus,sore throat,nasal congestion,ear pains. hoarseness  Respiratory:  negative for cough, hemoptysis, dyspnea,wheezing  CV:   negative for chest pain, palpitations, lower extremity edema  GI:   negative for nausea, vomiting, diarrhea, abdominal pain,melena  Endo:               negative for polyuria,polydipsia,polyphagia,heat intolerance  Genitourinary: negative for frequency, dysuria and hematuria  Integument:  negative for rash and pruritus  Hematologic:  negative for easy bruising and gum/nose bleeding  Musculoskel: negative for myalgias, arthralgias, back pain, muscle weakness, joint pain  Neurological:  negative for headaches, dizziness, vertigo, memory problems and gait   Behavl/Psych: negative for feelings of anxiety, depression, mood changes    Past Medical History:   Diagnosis Date    Acute pharyngitis 2/8/2011    Allergic rhinitis, cause unspecified 2/8/2011    Arrhythmia     PVC    Asymptomatic varicose veins 2/8/2011    Cancer ([facility] [address]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     Carpal tunnel syndrome 2/8/2011    Degenerative Joint Disese ( improved/resolving) 2/8/2011    Degenetrative Dis Disease, 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    Diverticulosis of colon (without mention of hemorrhage) 2/8/2011    Dysphagia 2/8/2011    Edema, peripheral 2/8/2011    GERD (gastroesophageal reflux disease)     GERD Gastro- Esophageal Reflux Disease 2/8/2011    Herniated nucleus pulposus ( slipped disc) 2/8/2011    Menopausal 2/8/2011    PUD (peptic ulcer disease) 2012    Pure hypercholesterolemia 2/8/2011    Recurrent ear pain 2/8/2011    S/P radiation therapy     Scalp lesion 10/23/2014    Sebaceous cyst 4/22/2014    Unspecified cataract 2/8/2011    Unspecified essential hypertension 2/8/2011    Unspecified sleep apnea      Past Surgical History:   Procedure Laterality Date    HX HYSTERECTOMY      HX ORTHOPAEDIC  neck/back 2006    HX OTHER SURGICAL  5/8/2014     Excise recurrent scalp lesion and application of ACell    HX OTHER SURGICAL  5/8/2014 Excise keloid, anterior chest wall, and application of ACell    HX OTHER SURGICAL  5/8/2014     Excise sebaceous cyst, anterior chest wall    HX OTHER SURGICAL  5/8/2014    Punch biopsy, skin lesions, right forearm x2, right calf x1    ID ICAR CATHETER ABLATION ATRIOVENTR NODE FUNCTION N/A 5/6/2019    ABLATION AV NODE performed by Rafaela Parra MD at [address] CATH LAB    ID INS NEW/RPLCMT PRM PM W/TRANSV ELTRD ATRIAL&VENT  9/30/2017         ID RMVL IMPLANTABLE PT-ACTIVATED CAR EVENT RECORDER  9/30/2017          Social History     Socioeconomic History    Marital status:      Spouse name: Not on file    Number of children: Not on file    Years of education: Not on file    Highest education level: Not on file   Tobacco Use    Smoking status: Never Smoker    Smokeless tobacco: Never Used   Substance and Sexual Activity    Alcohol use: No    Drug use: No    Sexual activity: Yes     Partners: Male     Birth control/protection: None     Family History   Problem Relation Age of Onset    Hypertension Mother     Stroke Mother     Hypertension Father     Cancer Father         PROSTATE, MELANOMA    Anesth Problems Neg Hx      Current Outpatient Medications   Medication Sig Dispense Refill    cyclobenzaprine (FLEXERIL) 10 mg tablet Take 1 Tab by mouth three (3) times daily as needed for Muscle Spasm(s). 90 Tab 0    meclizine (ANTIVERT) 25 mg tablet TAKE 1 TABLET THREE TIMES DAILY AS NEEDED 270 Tab 3    rivaroxaban (Xarelto) 15 mg tab tablet Take 1 Tab by mouth daily (with dinner). 90 Tab 3    gabapentin (NEURONTIN) 300 mg capsule Take 1 Cap by mouth three (3) times daily. 270 Cap 3    amLODIPine (NORVASC) 5 mg tablet Take 1 Tab by mouth daily.  90 Tab 3    omeprazole (PRILOSEC) 40 mg capsule TAKE 1 CAPSULE EVERY DAY 90 Cap 3    metoprolol succinate (TOPROL-XL) 25 mg XL tablet TAKE 1 TABLET BY MOUTH EVERY DAY 90 Tab 3    fluticasone propionate (FLONASE) 50 mcg/actuation nasal spray 2 Sprays by Both Nostrils route daily as needed for Rhinitis.  metoprolol succinate (TOPROL-XL) 100 mg tablet Take 100 mg by mouth daily.  furosemide (LASIX) 20 mg tablet TAKE 1 TABLET BY MOUTH DAILY 90 Tab 1    acetaminophen (TYLENOL) 325 mg tablet Take 650 mg by mouth nightly.  MULTIVIT WITH CALCIUM,IRON,MIN [facility] MULTIPLE VITAMINS PO) Take 1 Tab by mouth daily.  aspirin delayed-release 81 mg tablet Take 81 mg by mouth daily.  potassium chloride SR (KLOR-CON 10) 10 mEq tablet TAKE 1 TABLET EVERY DAY 90 Tab 3     Allergies   Allergen Reactions    Latex Rash    Bactrim [Sulfamethoprim Ds] Hives    Aspirin Other (comments)     Anything higher than 81mg makes pt jittery    Codeine Hives and Itching    Iodinated Contrast Media Itching    Pcn [Penicillins] Hives and Itching    Tape [Adhesive] Rash       Objective:  Visit Vitals  /84   Pulse 87   Temp 97.7 °F (36.5 °C) (Oral)   Resp 16   Wt 156 lb (70.8 kg)   SpO2 97%   BMI 28.53 kg/m²     Physical Exam:   General appearance - alert, well appearing, and in no distress  Mental status - alert, oriented to person, place, and time  EYE-DARLENE, EOMI, corneas normal, no foreign bodies  ENT-ENT exam normal, no neck nodes or sinus tenderness  Nose - normal and patent, no erythema, discharge or polyps  Mouth - mucous membranes moist, pharynx normal without lesions  Neck - supple, no significant adenopathy   Chest - clear to auscultation, no wheezes, rales or rhonchi, symmetric air entry   Heart - normal rate, regular rhythm, normal S1, S2, no murmurs, rubs, clicks or gallops   Abdomen - soft, nontender, nondistended, no masses or organomegaly  Lymph- no adenopathy palpable  Ext-peripheral pulses normal, no pedal edema, no clubbing or cyanosis  Skin-Warm and dry.  no hyperpigmentation, vitiligo, or suspicious lesions  Neuro -alert, oriented, normal speech, no focal findings or movement disorder noted  Neck-normal C-spine, no tenderness, full ROM without pain  Feet-no nail deformities or callus formation with good pulses noted  Lt. lower leg erythema  Upper sternal cyst    Results for orders placed or performed in visit on 20   AMB POC LIPID PROFILE   Result Value Ref Range    Cholesterol (POC) 168     Triglycerides (POC) 96     HDL Cholesterol (POC) 57     LDL Cholesterol (POC) 93 MG/DL    Non-HDL Goal (POC) 112     TChol/HDL Ratio (POC) 3.0        Assessment/Plan:    ICD-10-CM ICD-9-CM    1. Cyst of skin L72.9 706.2    2. Essential hypertension X22 283.7 METABOLIC PANEL, COMPREHENSIVE      CBC W/O DIFF   3. Hypercholesteremia E78.00 272.0    4. Cellulitis of left lower extremity L03.116 682.6      Orders Placed This Encounter    METABOLIC PANEL, COMPREHENSIVE    CBC W/O DIFF     call if any problems,Take 81mg aspirin daily  Need an unna boot  Patient Instructions   MyChart Activation    Thank you for requesting access to [employer]. Please follow the instructions below to securely access and download your online medical record. [employer]ledade allows you to send messages to your doctor, view your test results, renew your prescriptions, schedule appointments, and more. How Do I Sign Up? 1. In your internet browser, go to [email or web address]  2. Click on the First Time User? Click Here link in the Sign In box. You will be redirect to the New Member Sign Up page. 3. Enter your Aledade Access Code exactly as it appears below. You will not need to use this code after youve completed the sign-up process. If you do not sign up before the expiration date, you must request a new code. Aledade Access Code: [email or web address]  Expires: 2020 11:40 AM (This is the date your Aledade access code will )    4. Enter the last four digits of your Social Security Number (xxxx) and Date of Birth (mm/dd/yyyy) as indicated and click Submit. You will be taken to the next sign-up page. 5. Create a Aledade ID.  This will be your Aledade login ID and cannot be changed, so think of one that is secure and easy to remember. 6. Create a Flytivity password. You can change your password at any time. 7. Enter your Password Reset Question and Answer. This can be used at a later time if you forget your password. 8. Enter your e-mail address. You will receive e-mail notification when new information is available in [address] 9. Click Sign Up. You can now view and download portions of your medical record. 10. Click the Download Summary menu link to download a portable copy of your medical information. Additional Information    If you have questions, please visit the Frequently Asked Questions section of the Flytivity website at [email or web address] Remember, [employer]tivity is NOT to be used for urgent needs. For medical emergencies, dial 911. Follow-up and Dispositions    · Return in about 3 months (around 9/16/2020), or if symptoms worsen or fail to improve. I have reviewed with the patient details of the assessment and plan and all questions were answered. Relevent patient education was performed. The most recent lab findings were reviewed with the patient. An After Visit Summary was printed and given to the patient.

## 2020-06-16 NOTE — PATIENT INSTRUCTIONS
[facility] Activation    Thank you for requesting access to [facility]. Please follow the instructions below to securely access and download your online medical record. [employer] allows you to send messages to your doctor, view your test results, renew your prescriptions, schedule appointments, and more. How Do I Sign Up? 1. In your internet browser, go to [email or web address]  2. Click on the First Time User? Click Here link in the Sign In box. You will be redirect to the New Member Sign Up page. 3. Enter your [employer] Access Code exactly as it appears below. You will not need to use this code after youve completed the sign-up process. If you do not sign up before the expiration date, you must request a new code. [employer] Access Code: [email or web address]  Expires: 2020 11:40 AM (This is the date your [employer] access code will )    4. Enter the last four digits of your Social Security Number (xxxx) and Date of Birth (mm/dd/yyyy) as indicated and click Submit. You will be taken to the next sign-up page. 5. Create a [employer] ID. This will be your [employer] login ID and cannot be changed, so think of one that is secure and easy to remember. 6. Create a [employer] password. You can change your password at any time. 7. Enter your Password Reset Question and Answer. This can be used at a later time if you forget your password. 8. Enter your e-mail address. You will receive e-mail notification when new information is available in [address] 9. Click Sign Up. You can now view and download portions of your medical record. 10. Click the Download Summary menu link to download a portable copy of your medical information. Additional Information    If you have questions, please visit the Frequently Asked Questions section of the [employer] website at [email or web address] Remember, [employer] is NOT to be used for urgent needs. For medical emergencies, dial 911.

## 2020-06-17 LAB
ALBUMIN SERPL-MCNC: 4 G/DL (ref 3.6–4.6)
ALBUMIN/GLOB SERPL: 1.4 {RATIO} (ref 1.2–2.2)
ALP SERPL-CCNC: 95 IU/L (ref 39–117)
ALT SERPL-CCNC: 16 IU/L (ref 0–32)
AST SERPL-CCNC: 22 IU/L (ref 0–40)
BILIRUB SERPL-MCNC: 0.3 MG/DL (ref 0–1.2)
BUN SERPL-MCNC: 34 MG/DL (ref 8–27)
BUN/CREAT SERPL: 31 (ref 12–28)
CALCIUM SERPL-MCNC: 9.5 MG/DL (ref 8.7–10.3)
CHLORIDE SERPL-SCNC: 103 MMOL/L (ref 96–106)
CO2 SERPL-SCNC: 25 MMOL/L (ref 20–29)
CREAT SERPL-MCNC: 1.11 MG/DL (ref 0.57–1)
ERYTHROCYTE [DISTWIDTH] IN BLOOD BY AUTOMATED COUNT: 20.6 % (ref 11.7–15.4)
GLOBULIN SER CALC-MCNC: 2.9 G/DL (ref 1.5–4.5)
GLUCOSE SERPL-MCNC: 88 MG/DL (ref 65–99)
HCT VFR BLD AUTO: 37.4 % (ref 34–46.6)
HGB BLD-MCNC: 11.9 G/DL (ref 11.1–15.9)
INTERPRETATION: NORMAL
MCH RBC QN AUTO: 23.3 PG (ref 26.6–33)
MCHC RBC AUTO-ENTMCNC: 31.8 G/DL (ref 31.5–35.7)
MCV RBC AUTO: 73 FL (ref 79–97)
PLATELET # BLD AUTO: 363 X10E3/UL (ref 150–450)
POTASSIUM SERPL-SCNC: 3.8 MMOL/L (ref 3.5–5.2)
PROT SERPL-MCNC: 6.9 G/DL (ref 6–8.5)
RBC # BLD AUTO: 5.11 X10E6/UL (ref 3.77–5.28)
SODIUM SERPL-SCNC: 142 MMOL/L (ref 134–144)
WBC # BLD AUTO: 4.9 X10E3/UL (ref 3.4–10.8)

## 2020-06-29 RX ORDER — METOPROLOL SUCCINATE 25 MG/1
TABLET, EXTENDED RELEASE ORAL
Qty: 90 TAB | Refills: 3 | Status: SHIPPED | OUTPATIENT
Start: 2020-06-29 | End: 2020-08-25 | Stop reason: SDUPTHER

## 2020-07-03 ENCOUNTER — APPOINTMENT (OUTPATIENT)
Dept: CT IMAGING | Age: 80
DRG: 064 | End: 2020-07-03
Attending: EMERGENCY MEDICINE
Payer: MEDICARE

## 2020-07-03 ENCOUNTER — HOSPITAL ENCOUNTER (INPATIENT)
Age: 80
LOS: 12 days | Discharge: NURSING, REHAB OR LONG-TERM CARE | DRG: 064 | End: 2020-07-15
Attending: EMERGENCY MEDICINE | Admitting: INTERNAL MEDICINE
Payer: MEDICARE

## 2020-07-03 DIAGNOSIS — R53.81 DEBILITY: ICD-10-CM

## 2020-07-03 DIAGNOSIS — Z71.89 DNR (DO NOT RESUSCITATE) DISCUSSION: ICD-10-CM

## 2020-07-03 DIAGNOSIS — Z71.89 GOALS OF CARE, COUNSELING/DISCUSSION: ICD-10-CM

## 2020-07-03 DIAGNOSIS — G81.94 LEFT HEMIPARESIS (HCC): ICD-10-CM

## 2020-07-03 DIAGNOSIS — R53.83 LETHARGY: ICD-10-CM

## 2020-07-03 DIAGNOSIS — I63.511 ACUTE ISCHEMIC RIGHT MCA STROKE (HCC): Primary | ICD-10-CM

## 2020-07-03 DIAGNOSIS — I48.91 ATRIAL FIBRILLATION WITH RAPID VENTRICULAR RESPONSE (HCC): ICD-10-CM

## 2020-07-03 DIAGNOSIS — E78.5 HYPERLIPOPROTEINEMIA: ICD-10-CM

## 2020-07-03 LAB
ALBUMIN SERPL-MCNC: 3.4 G/DL (ref 3.5–5)
ALBUMIN/GLOB SERPL: 0.7 {RATIO} (ref 1.1–2.2)
ALP SERPL-CCNC: 106 U/L (ref 45–117)
ALT SERPL-CCNC: 22 U/L (ref 12–78)
ANION GAP SERPL CALC-SCNC: 8 MMOL/L (ref 5–15)
ARTERIAL PATENCY WRIST A: YES
AST SERPL-CCNC: 25 U/L (ref 15–37)
BASE EXCESS BLD CALC-SCNC: 8 MMOL/L
BASOPHILS # BLD: 0.1 K/UL (ref 0–0.1)
BASOPHILS NFR BLD: 1 % (ref 0–1)
BDY SITE: ABNORMAL
BILIRUB SERPL-MCNC: 0.5 MG/DL (ref 0.2–1)
BUN SERPL-MCNC: 19 MG/DL (ref 6–20)
BUN/CREAT SERPL: 25 (ref 12–20)
CA-I BLD-SCNC: 1.11 MMOL/L (ref 1.12–1.32)
CALCIUM SERPL-MCNC: 9 MG/DL (ref 8.5–10.1)
CHLORIDE SERPL-SCNC: 103 MMOL/L (ref 97–108)
CO2 SERPL-SCNC: 27 MMOL/L (ref 21–32)
CREAT SERPL-MCNC: 0.75 MG/DL (ref 0.55–1.02)
DIFFERENTIAL METHOD BLD: ABNORMAL
EOSINOPHIL # BLD: 0.1 K/UL (ref 0–0.4)
EOSINOPHIL NFR BLD: 1 % (ref 0–7)
ERYTHROCYTE [DISTWIDTH] IN BLOOD BY AUTOMATED COUNT: 21.1 % (ref 11.5–14.5)
GAS FLOW.O2 O2 DELIVERY SYS: ABNORMAL L/MIN
GLOBULIN SER CALC-MCNC: 4.6 G/DL (ref 2–4)
GLUCOSE SERPL-MCNC: 126 MG/DL (ref 65–100)
HCO3 BLD-SCNC: 31 MMOL/L (ref 22–26)
HCT VFR BLD AUTO: 39.8 % (ref 35–47)
HGB BLD-MCNC: 12.8 G/DL (ref 11.5–16)
IMM GRANULOCYTES # BLD AUTO: 0 K/UL (ref 0–0.04)
IMM GRANULOCYTES NFR BLD AUTO: 0 % (ref 0–0.5)
INR PPP: 1.1 (ref 0.9–1.1)
LYMPHOCYTES # BLD: 0.8 K/UL (ref 0.8–3.5)
LYMPHOCYTES NFR BLD: 12 % (ref 12–49)
MCH RBC QN AUTO: 23.9 PG (ref 26–34)
MCHC RBC AUTO-ENTMCNC: 32.2 G/DL (ref 30–36.5)
MCV RBC AUTO: 74.3 FL (ref 80–99)
MONOCYTES # BLD: 0.5 K/UL (ref 0–1)
MONOCYTES NFR BLD: 7 % (ref 5–13)
NEUTS SEG # BLD: 5.2 K/UL (ref 1.8–8)
NEUTS SEG NFR BLD: 79 % (ref 32–75)
NRBC # BLD: 0 K/UL (ref 0–0.01)
NRBC BLD-RTO: 0 PER 100 WBC
O2/TOTAL GAS SETTING VFR VENT: 21 %
PCO2 BLD: 37.4 MMHG (ref 35–45)
PH BLD: 7.53 [PH] (ref 7.35–7.45)
PLATELET # BLD AUTO: 315 K/UL (ref 150–400)
PMV BLD AUTO: 10.7 FL (ref 8.9–12.9)
PO2 BLD: 77 MMHG (ref 80–100)
POTASSIUM SERPL-SCNC: 3 MMOL/L (ref 3.5–5.1)
PROT SERPL-MCNC: 8 G/DL (ref 6.4–8.2)
PROTHROMBIN TIME: 11.5 SEC (ref 9–11.1)
RBC # BLD AUTO: 5.36 M/UL (ref 3.8–5.2)
RBC MORPH BLD: ABNORMAL
SAO2 % BLD: 97 % (ref 92–97)
SODIUM SERPL-SCNC: 138 MMOL/L (ref 136–145)
SPECIMEN TYPE: ABNORMAL
TOTAL RESP. RATE, ITRR: 16
TROPONIN I SERPL-MCNC: <0.05 NG/ML
WBC # BLD AUTO: 6.7 K/UL (ref 3.6–11)

## 2020-07-03 PROCEDURE — 74011250637 HC RX REV CODE- 250/637: Performed by: INTERNAL MEDICINE

## 2020-07-03 PROCEDURE — 80053 COMPREHEN METABOLIC PANEL: CPT

## 2020-07-03 PROCEDURE — 36415 COLL VENOUS BLD VENIPUNCTURE: CPT

## 2020-07-03 PROCEDURE — 85025 COMPLETE CBC W/AUTO DIFF WBC: CPT

## 2020-07-03 PROCEDURE — 84484 ASSAY OF TROPONIN QUANT: CPT

## 2020-07-03 PROCEDURE — 85610 PROTHROMBIN TIME: CPT

## 2020-07-03 PROCEDURE — 65660000000 HC RM CCU STEPDOWN

## 2020-07-03 PROCEDURE — 74011636320 HC RX REV CODE- 636/320: Performed by: EMERGENCY MEDICINE

## 2020-07-03 PROCEDURE — 70496 CT ANGIOGRAPHY HEAD: CPT

## 2020-07-03 PROCEDURE — 0042T CT CODE NEURO PERF W CBF: CPT

## 2020-07-03 PROCEDURE — 93005 ELECTROCARDIOGRAM TRACING: CPT

## 2020-07-03 PROCEDURE — 36600 WITHDRAWAL OF ARTERIAL BLOOD: CPT

## 2020-07-03 PROCEDURE — 94762 N-INVAS EAR/PLS OXIMTRY CONT: CPT

## 2020-07-03 PROCEDURE — 70450 CT HEAD/BRAIN W/O DYE: CPT

## 2020-07-03 PROCEDURE — 82803 BLOOD GASES ANY COMBINATION: CPT

## 2020-07-03 PROCEDURE — 99285 EMERGENCY DEPT VISIT HI MDM: CPT

## 2020-07-03 RX ORDER — HEPARIN SODIUM 5000 [USP'U]/ML
5000 INJECTION, SOLUTION INTRAVENOUS; SUBCUTANEOUS EVERY 8 HOURS
Status: DISCONTINUED | OUTPATIENT
Start: 2020-07-03 | End: 2020-07-15 | Stop reason: HOSPADM

## 2020-07-03 RX ORDER — ATORVASTATIN CALCIUM 40 MG/1
40 TABLET, FILM COATED ORAL
Status: DISCONTINUED | OUTPATIENT
Start: 2020-07-03 | End: 2020-07-15 | Stop reason: HOSPADM

## 2020-07-03 RX ORDER — LABETALOL HYDROCHLORIDE 5 MG/ML
5 INJECTION, SOLUTION INTRAVENOUS
Status: DISCONTINUED | OUTPATIENT
Start: 2020-07-03 | End: 2020-07-13

## 2020-07-03 RX ORDER — ASPIRIN 300 MG/1
300 SUPPOSITORY RECTAL DAILY
Status: DISCONTINUED | OUTPATIENT
Start: 2020-07-04 | End: 2020-07-09

## 2020-07-03 RX ORDER — DEXTROSE MONOHYDRATE AND SODIUM CHLORIDE 5; .45 G/100ML; G/100ML
75 INJECTION, SOLUTION INTRAVENOUS CONTINUOUS
Status: DISCONTINUED | OUTPATIENT
Start: 2020-07-03 | End: 2020-07-04

## 2020-07-03 RX ORDER — ONDANSETRON 2 MG/ML
4 INJECTION INTRAMUSCULAR; INTRAVENOUS
Status: DISCONTINUED | OUTPATIENT
Start: 2020-07-03 | End: 2020-07-15 | Stop reason: HOSPADM

## 2020-07-03 RX ORDER — ACETAMINOPHEN 325 MG/1
650 TABLET ORAL
Status: DISCONTINUED | OUTPATIENT
Start: 2020-07-03 | End: 2020-07-15 | Stop reason: HOSPADM

## 2020-07-03 RX ORDER — ACETAMINOPHEN 650 MG/1
650 SUPPOSITORY RECTAL
Status: DISCONTINUED | OUTPATIENT
Start: 2020-07-03 | End: 2020-07-15 | Stop reason: HOSPADM

## 2020-07-03 RX ORDER — SODIUM CHLORIDE 0.9 % (FLUSH) 0.9 %
10 SYRINGE (ML) INJECTION
Status: COMPLETED | OUTPATIENT
Start: 2020-07-03 | End: 2020-07-03

## 2020-07-03 RX ORDER — ALBUMIN HUMAN 250 G/1000ML
25 SOLUTION INTRAVENOUS ONCE
Status: DISCONTINUED | OUTPATIENT
Start: 2020-07-03 | End: 2020-07-03

## 2020-07-03 RX ORDER — ASPIRIN 300 MG/1
300 SUPPOSITORY RECTAL
Status: COMPLETED | OUTPATIENT
Start: 2020-07-03 | End: 2020-07-03

## 2020-07-03 RX ADMIN — ASPIRIN 300 MG: 300 SUPPOSITORY RECTAL at 18:16

## 2020-07-03 RX ADMIN — Medication 10 ML: at 15:24

## 2020-07-03 RX ADMIN — IOPAMIDOL 100 ML: 755 INJECTION, SOLUTION INTRAVENOUS at 15:23

## 2020-07-03 NOTE — ED NOTES
Pt. Presents to ED today after complaints of L sided weakness and slurred speech that started at 0730 today. Per family patient normally up ad divya and talking normally.

## 2020-07-03 NOTE — ED NOTES
TRANSFER - OUT REPORT:    Verbal report given to Radha (name) on Darling Santamaria  being transferred to Neuro (unit) for routine progression of care       Report consisted of patients Situation, Background, Assessment and   Recommendations(SBAR). Information from the following report(s) SBAR, Kardex, ED Summary, [facility] and Recent Results was reviewed with the receiving nurse. Lines:   Peripheral IV 07/03/20 Left Antecubital (Active)   Site Assessment Clean, dry, & intact 7/3/2020  3:35 PM   Phlebitis Assessment 0 7/3/2020  3:35 PM   Infiltration Assessment 0 7/3/2020  3:35 PM   Dressing Status Clean, dry, & intact 7/3/2020  3:35 PM   Dressing Type Tape;Transparent 7/3/2020  3:35 PM   Hub Color/Line Status Pink 7/3/2020  3:35 PM        Opportunity for questions and clarification was provided.

## 2020-07-03 NOTE — ED NOTES
Pt. With orders for rectal ASA. Listed as a patient allergy. Per Dr. Alyce Chase OK for patient to have.

## 2020-07-03 NOTE — ED NOTES
Patient with allergy to contrast.  Per Dr. Jacqueline Lechuga study to be completed with contrast dye.

## 2020-07-03 NOTE — ED NOTES
Pt. Resting comfortably in bed at this time with call bell in reach. Pt. Family updated on plan of care.

## 2020-07-03 NOTE — PROGRESS NOTES
Spiritual Care Assessment/Progress Note  [facility]      NAME: Laly Akins      MRN: [id number]  AGE: 78 y.o. SEX: female  Latter-day Affiliation: Sikh   Language: English     7/3/2020     Total Time (in minutes): 21     Spiritual Assessment begun in [facility] EMERGENCY DEPT through conversation with:         []Patient        [] Family    [] Friend(s)        Reason for Consult: Crisis(L2 Code Stroke)     Spiritual beliefs: (Please include comment if needed)     [] Identifies with a philippe tradition:         [] Supported by a philippe community:            [] Claims no spiritual orientation:           [] Seeking spiritual identity:                [] Adheres to an individual form of spirituality:           [x] Not able to assess:                           Identified resources for coping:      [] Prayer                               [] Music                  [] Guided Imagery     [] Family/friends                 [] Pet visits     [] Devotional reading                         [x] Unknown     [] Other:                                              Interventions offered during this visit: (See comments for more details)                Plan of Care:     [] Support spiritual and/or cultural needs    [] Support AMD and/or advance care planning process      [] Support grieving process   [] Coordinate Rites and/or Rituals    [] Coordination with community clergy   [x] No spiritual needs identified at this time   [] Detailed Plan of Care below (See Comments)  [] Make referral to Music Therapy  [] Make referral to Pet Therapy     [] Make referral to Addiction services  [] Make referral to [facility]  [] Make referral to Spiritual Care Partner  [] No future visits requested        [] Follow up visits as needed     Comments:  Attempted Initial Spiritual Assessment in ER 9 . Unable to assess patients needs. Patient unavailable at the present time. No family present at this time. Consulted with nurse. Chaplains will follow as able and/or as needed. Rev.  Dixon Padilla, Yoan QUACHiv  [phone]

## 2020-07-03 NOTE — H&P
Hospitalist Admission Note    NAME: Darling Santamaria   :  1940   MRN:  [id number]     Date/Time:  7/3/2020 6:37 PM    Patient PCP: Sukhdeep Wilde MD  ______________________________________________________________________  Given the patient's current clinical presentation, I have a high level of concern for decompensation if discharged from the emergency department. Complex decision making was performed, which includes reviewing the patient's available past medical records, laboratory results, and x-ray films. My assessment of this patient's clinical condition and my plan of care is as follows. 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 keep n.p.o. until assessed with speech by speech  Allow permissive hypertension 24 to 48 hours, only treat with PRN labetalol if systolic blood pressure above 425 or diastolic blood pressure above 120  Hold Xarelto for concern for hemorrhagic conversion of this large stroke   Neuro consulted  Palliative care consulted, daughter at bedside updated about patient having a large stroke and  will unlikely be able to talk or swallow. Family wants the patient to remain full code    CT brain   Large acute right MCA infarct with suggestion of intraluminal thrombus in the  MCA. No hemorrhage is identified  CTA  brain   . Occluded right internal carotid artery from the carotid bifurcation to the  [place] and further intraluminal thrombus at the right MCA bifurcation.   2.  Large core infarct in the right MCA distribution with a 55 cc ischemic  penumbra. Ischemic changes on delayed postcontrast head CT without evidence of hemorrhage. Hypertension  Hold all BP meds, allow permissive hypertension  Arrhythmia  Xarelto on her PTA list, hold it , not  sure if she is taking it   med rec for pharmacy  Code Status: Full code confirmed with the daughter and the son who is a power of   Surrogate Decision Maker:  Kimmy Marrero [phone]       DVT Prophylaxis: Heparin  GI Prophylaxis: not indicated    Baseline: Walk with a walker      Subjective:   CHIEF COMPLAINT: Aphasia    HISTORY OF PRESENT ILLNESS:     Luke Tyson is a 78 y.o.  female past medical history hypertension, arrhythmia , GERD who went to the ED dysphasia and facial paralysis concerning for stroke. Most of the HPI obtained from the daughter who was at bedside,  daughter reported that her mom could not get up this morning and  found to have facial droop and inability to [facility]. She also mentioned left-sided weakness that started this morning around 7 AM. At  baseline, patient ambulates with a walker, lives by herself. In the ED, code S level 2was called. A stat CT scan and CTA brain were done which showed Large acute right MCA infarct with suggestion of intraluminal thrombus in the MCA. CT of the brain showed Occluded right internal carotid artery from the carotid bifurcation to the [place] and further intraluminal thrombus at the right MCA bifurcation. neuro  Interventionalist was called by the ED physician who there did not retain any indication for intervention. transfer to [facility] was therefore canceled . patient was not a TPA candidate either. We were asked to admit for work up and evaluation of the above problems.      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    Diverticulosis of colon (without mention of hemorrhage) 2/8/2011    Dysphagia 2/8/2011    Edema, peripheral 2/8/2011    GERD (gastroesophageal reflux disease)     GERD Gastro- Esophageal Reflux Disease 2/8/2011    Herniated nucleus pulposus ( slipped disc) 2/8/2011    Menopausal 2/8/2011    PUD (peptic ulcer disease) 2012    Pure hypercholesterolemia 2/8/2011    Recurrent ear pain 2/8/2011    S/P radiation therapy     Scalp lesion 10/23/2014    Sebaceous cyst 4/22/2014    Unspecified cataract 2/8/2011    Unspecified essential hypertension 2/8/2011    Unspecified sleep apnea         Past Surgical History:   Procedure Laterality Date    HX HYSTERECTOMY      HX ORTHOPAEDIC  neck/back 2006    HX OTHER SURGICAL  5/8/2014     Excise recurrent scalp lesion and application of ACell    HX OTHER SURGICAL  5/8/2014    Excise keloid, anterior chest wall, and application of ACell    HX OTHER SURGICAL  5/8/2014     Excise sebaceous cyst, anterior chest wall    HX OTHER SURGICAL  5/8/2014    Punch biopsy, skin lesions, right forearm x2, right calf x1    [place] ICAR CATHETER ABLATION ATRIOVENTR NODE FUNCTION N/A 5/6/2019    ABLATION AV NODE performed by Caroline Davis MD at [address], [facility] CATH LAB    MO INS NEW/RPLCMT PRM PM W/TRANSV ELTRD ATRIAL&VENT  9/30/2017         [place] RMVL IMPLANTABLE PT-ACTIVATED CAR EVENT RECORDER  9/30/2017            Social History     Tobacco Use    Smoking status: Never Smoker    Smokeless tobacco: Never Used   Substance Use Topics    Alcohol use: No        Family History   Problem Relation Age of Onset    Hypertension Mother     Stroke Mother     Hypertension Father     Cancer Father         PROSTATE, MELANOMA    Anesth Problems Neg Hx      Allergies   Allergen Reactions    Latex Rash    Bactrim [Sulfamethoprim Ds] Hives    Aspirin Other (comments)     Anything higher than 81mg makes pt jittery    Codeine Hives and Itching    Iodinated Contrast Media Itching    Pcn [Penicillins] Hives and Itching    Tape [Adhesive] Rash        Prior to Admission medications    Medication Sig Start Date End Date Taking? Authorizing Provider   metoprolol succinate (TOPROL-XL) 25 mg XL tablet TAKE 1 TABLET BY MOUTH EVERY DAY 6/29/20   Karen All., MD   cyclobenzaprine (FLEXERIL) 10 mg tablet Take 1 Tab by mouth three (3) times daily as needed for Muscle Spasm(s). 5/13/20   Karen All., MD   meclizine (ANTIVERT) 25 mg tablet TAKE 1 TABLET THREE TIMES DAILY AS NEEDED 3/22/20   Karen All., MD   rivaroxaban (Xarelto) 15 mg tab tablet Take 1 Tab by mouth daily (with dinner). 3/12/20   Karen All., MD   gabapentin (NEURONTIN) 300 mg capsule Take 1 Cap by mouth three (3) times daily. 3/12/20   Karen All., MD   amLODIPine (NORVASC) 5 mg tablet Take 1 Tab by mouth daily. 3/12/20 3/12/21  Karen All., MD   potassium chloride SR (KLOR-CON 10) 10 mEq tablet TAKE 1 TABLET EVERY DAY 3/12/20   Karen All., MD   omeprazole (PRILOSEC) 40 mg capsule TAKE 1 CAPSULE EVERY DAY 3/12/20   Karen All., MD   fluticasone propionate (FLONASE) 50 mcg/actuation nasal spray 2 Sprays by Both Nostrils route daily as needed for Rhinitis. Provider, Historical   metoprolol succinate (TOPROL-XL) 100 mg tablet Take 100 mg by mouth daily. Provider, Historical   furosemide (LASIX) 20 mg tablet TAKE 1 TABLET BY MOUTH DAILY 10/10/18   Karen All., MD   acetaminophen (TYLENOL) 325 mg tablet Take 650 mg by mouth nightly. Provider, Historical   MULTIVIT WITH CALCIUM,IRON,MIN [facility] MULTIPLE VITAMINS PO) Take 1 Tab by mouth daily. Provider, Historical   aspirin delayed-release 81 mg tablet Take 81 mg by mouth daily.     Provider, Historical       REVIEW OF SYSTEMS:     I am not able to complete the review of systems because: The patient is intubated and sedated   x The patient has altered mental status due to his acute medical problems    The patient has baseline aphasia from prior stroke(s)    The patient has baseline dementia and is not reliable historian    The patient is in acute medical distress and unable to provide information             Objective:   VITALS:    Visit Vitals  /57   Pulse 73   Temp 97.5 °F (36.4 °C)   Resp 12   Ht 5' 2\" (1.575 m)   Wt 69.1 kg (152 lb 5.4 oz)   SpO2 98%   BMI 27.86 kg/m²       PHYSICAL EXAM:    General:    Alert, cooperative, no distress, appears stated age. HEENT: Atraumatic, anicteric sclerae, pink conjunctivae     No oral ulcers, mucosa moist, throat clear, dentition fair  Neck:  Supple, symmetrical,  thyroid: non tender  Lungs:   Clear to auscultation bilaterally. No Wheezing or Rhonchi. No rales. Chest wall:  No tenderness  No Accessory muscle use. Heart:   Regular  rhythm,  No  murmur   No edema  Abdomen:   Soft, non-tender. Not distended. Bowel sounds normal  Extremities: No cyanosis. No clubbing,      Skin turgor normal, Capillary refill normal, Radial dial pulse 2+  Skin:     Not pale. Not Jaundiced  No rashes   Psych:  Unable to assess,   neurologic: Aphasia, left-sided facial paralysis left-sided weakness, patient eyes are turned to the right  Was unable to lift both her lower extremities.   Seems to follow some commands, was most of the time sleepy    _______________________________________________________________________  Care Plan discussed with:    Comments   Patient x    Family  x  daughter at the bedside   RN x    Care Manager                    Consultant:      _______________________________________________________________________  Expected  Disposition:   Home with Family x   HH/PT/OT/RN    SNF/LTC    ADRIANA    ________________________________________________________________________  TOTAL TIME: 65Minutes    Critical Care Provided     Minutes non procedure based      Comments    x Reviewed previous records   >50% of visit spent in counseling and coordination of care x Discussion with patient and/or family and questions answered       ________________________________________________________________________  Signed: Mary Jo Winkler MD    Procedures: see electronic medical records for all procedures/Xrays and details which were not copied into this note but were reviewed prior to creation of Plan. LAB DATA REVIEWED:    Recent Results (from the past 24 hour(s))   CBC WITH AUTOMATED DIFF    Collection Time: 07/03/20  3:43 PM   Result Value Ref Range    WBC 6.7 3.6 - 11.0 K/uL    RBC 5.36 (H) 3.80 - 5.20 M/uL    HGB 12.8 11.5 - 16.0 g/dL    HCT 39.8 35.0 - 47.0 %    MCV 74.3 (L) 80.0 - 99.0 FL    MCH 23.9 (L) 26.0 - 34.0 PG    MCHC 32.2 30.0 - 36.5 g/dL    RDW 21.1 (H) 11.5 - 14.5 %    PLATELET 032 661 - 944 K/uL    MPV 10.7 8.9 - 12.9 FL    NRBC 0.0 0  WBC    ABSOLUTE NRBC 0.00 0.00 - 0.01 K/uL    NEUTROPHILS 79 (H) 32 - 75 %    LYMPHOCYTES 12 12 - 49 %    MONOCYTES 7 5 - 13 %    EOSINOPHILS 1 0 - 7 %    BASOPHILS 1 0 - 1 %    IMMATURE GRANULOCYTES 0 0.0 - 0.5 %    ABS. NEUTROPHILS 5.2 1.8 - 8.0 K/UL    ABS. LYMPHOCYTES 0.8 0.8 - 3.5 K/UL    ABS. MONOCYTES 0.5 0.0 - 1.0 K/UL    ABS. EOSINOPHILS 0.1 0.0 - 0.4 K/UL    ABS. BASOPHILS 0.1 0.0 - 0.1 K/UL    ABS. IMM.  GRANS. 0.0 0.00 - 0.04 K/UL    DF SMEAR SCANNED      RBC COMMENTS MICROCYTOSIS  PRESENT       METABOLIC PANEL, COMPREHENSIVE    Collection Time: 07/03/20  3:43 PM   Result Value Ref Range    Sodium 138 136 - 145 mmol/L    Potassium 3.0 (L) 3.5 - 5.1 mmol/L    Chloride 103 97 - 108 mmol/L    CO2 27 21 - 32 mmol/L    Anion gap 8 5 - 15 mmol/L    Glucose 126 (H) 65 - 100 mg/dL    BUN 19 6 - 20 MG/DL    Creatinine 0.75 0.55 - 1.02 MG/DL    BUN/Creatinine ratio 25 (H) 12 - 20      GFR est AA >60 >60 ml/min/1.73m2    GFR est non-AA >60 >60 ml/min/1.73m2    Calcium 9.0 8.5 - 10.1 MG/DL    Bilirubin, total 0.5 0.2 - 1.0 MG/DL    ALT (SGPT) 22 12 - 78 U/L    AST (SGOT) 25 15 - 37 U/L    Alk. phosphatase 106 45 - 117 U/L    Protein, total 8.0 6.4 - 8.2 g/dL    Albumin 3.4 (L) 3.5 - 5.0 g/dL    Globulin 4.6 (H) 2.0 - 4.0 g/dL    A-G Ratio 0.7 (L) 1.1 - 2.2     PROTHROMBIN TIME + INR    Collection Time: 07/03/20  3:43 PM   Result Value Ref Range    INR 1.1 0.9 - 1.1      Prothrombin time 11.5 (H) 9.0 - 11.1 sec   TROPONIN I    Collection Time: 07/03/20  3:43 PM   Result Value Ref Range    Troponin-I, Qt. <0.05 <0.05 ng/mL   EKG, 12 LEAD, INITIAL    Collection Time: 07/03/20  4:17 PM   Result Value Ref Range    Ventricular Rate 76 BPM    Atrial Rate 76 BPM    P-R Interval 182 ms    QRS Duration 160 ms    Q-T Interval 464 ms    QTC Calculation (Bezet) 522 ms    Calculated P Axis 83 degrees    Calculated R Axis -68 degrees    Calculated T Axis 72 degrees    Diagnosis       AV dual-paced rhythm  When compared with ECG of 07-AUG-2019 00:59,  Vent.  rate has increased BY   5 BPM

## 2020-07-04 ENCOUNTER — APPOINTMENT (OUTPATIENT)
Dept: NON INVASIVE DIAGNOSTICS | Age: 80
DRG: 064 | End: 2020-07-04
Attending: INTERNAL MEDICINE
Payer: MEDICARE

## 2020-07-04 LAB
ANION GAP SERPL CALC-SCNC: 8 MMOL/L (ref 5–15)
ATRIAL RATE: 76 BPM
BASOPHILS # BLD: 0 K/UL (ref 0–0.1)
BASOPHILS NFR BLD: 0 % (ref 0–1)
BUN SERPL-MCNC: 14 MG/DL (ref 6–20)
BUN/CREAT SERPL: 18 (ref 12–20)
CALCIUM SERPL-MCNC: 9 MG/DL (ref 8.5–10.1)
CALCULATED P AXIS, ECG09: 83 DEGREES
CALCULATED R AXIS, ECG10: -68 DEGREES
CALCULATED T AXIS, ECG11: 72 DEGREES
CHLORIDE SERPL-SCNC: 102 MMOL/L (ref 97–108)
CHOLEST SERPL-MCNC: 185 MG/DL
CO2 SERPL-SCNC: 27 MMOL/L (ref 21–32)
CREAT SERPL-MCNC: 0.8 MG/DL (ref 0.55–1.02)
DIAGNOSIS, 93000: NORMAL
DIFFERENTIAL METHOD BLD: ABNORMAL
EOSINOPHIL # BLD: 0 K/UL (ref 0–0.4)
EOSINOPHIL NFR BLD: 0 % (ref 0–7)
ERYTHROCYTE [DISTWIDTH] IN BLOOD BY AUTOMATED COUNT: 21.2 % (ref 11.5–14.5)
EST. AVERAGE GLUCOSE BLD GHB EST-MCNC: 117 MG/DL
GLUCOSE SERPL-MCNC: 127 MG/DL (ref 65–100)
HBA1C MFR BLD: 5.7 % (ref 4–5.6)
HCT VFR BLD AUTO: 40.7 % (ref 35–47)
HDLC SERPL-MCNC: 73 MG/DL
HDLC SERPL: 2.5 {RATIO} (ref 0–5)
HGB BLD-MCNC: 12.8 G/DL (ref 11.5–16)
IMM GRANULOCYTES # BLD AUTO: 0 K/UL (ref 0–0.04)
IMM GRANULOCYTES NFR BLD AUTO: 0 % (ref 0–0.5)
LDLC SERPL CALC-MCNC: 93 MG/DL (ref 0–100)
LIPID PROFILE,FLP: NORMAL
LYMPHOCYTES # BLD: 1 K/UL (ref 0.8–3.5)
LYMPHOCYTES NFR BLD: 11 % (ref 12–49)
MAGNESIUM SERPL-MCNC: 2.1 MG/DL (ref 1.6–2.4)
MCH RBC QN AUTO: 23.4 PG (ref 26–34)
MCHC RBC AUTO-ENTMCNC: 31.4 G/DL (ref 30–36.5)
MCV RBC AUTO: 74.3 FL (ref 80–99)
MONOCYTES # BLD: 0.8 K/UL (ref 0–1)
MONOCYTES NFR BLD: 9 % (ref 5–13)
NEUTS SEG # BLD: 7.2 K/UL (ref 1.8–8)
NEUTS SEG NFR BLD: 80 % (ref 32–75)
NRBC # BLD: 0 K/UL (ref 0–0.01)
NRBC BLD-RTO: 0 PER 100 WBC
P-R INTERVAL, ECG05: 182 MS
PHOSPHATE SERPL-MCNC: 2.8 MG/DL (ref 2.6–4.7)
PLATELET # BLD AUTO: 313 K/UL (ref 150–400)
PMV BLD AUTO: 10.7 FL (ref 8.9–12.9)
POTASSIUM SERPL-SCNC: 3 MMOL/L (ref 3.5–5.1)
Q-T INTERVAL, ECG07: 464 MS
QRS DURATION, ECG06: 160 MS
QTC CALCULATION (BEZET), ECG08: 522 MS
RBC # BLD AUTO: 5.48 M/UL (ref 3.8–5.2)
RBC MORPH BLD: ABNORMAL
RBC MORPH BLD: ABNORMAL
SODIUM SERPL-SCNC: 137 MMOL/L (ref 136–145)
TRIGL SERPL-MCNC: 95 MG/DL (ref ?–150)
TSH SERPL DL<=0.05 MIU/L-ACNC: 0.65 UIU/ML (ref 0.36–3.74)
VENTRICULAR RATE, ECG03: 76 BPM
VLDLC SERPL CALC-MCNC: 19 MG/DL
WBC # BLD AUTO: 9 K/UL (ref 3.6–11)

## 2020-07-04 PROCEDURE — 74011250636 HC RX REV CODE- 250/636: Performed by: INTERNAL MEDICINE

## 2020-07-04 PROCEDURE — 92610 EVALUATE SWALLOWING FUNCTION: CPT | Performed by: SPEECH-LANGUAGE PATHOLOGIST

## 2020-07-04 PROCEDURE — 84443 ASSAY THYROID STIM HORMONE: CPT

## 2020-07-04 PROCEDURE — 83036 HEMOGLOBIN GLYCOSYLATED A1C: CPT

## 2020-07-04 PROCEDURE — 84100 ASSAY OF PHOSPHORUS: CPT

## 2020-07-04 PROCEDURE — 80061 LIPID PANEL: CPT

## 2020-07-04 PROCEDURE — 74011000258 HC RX REV CODE- 258: Performed by: INTERNAL MEDICINE

## 2020-07-04 PROCEDURE — 80048 BASIC METABOLIC PNL TOTAL CA: CPT

## 2020-07-04 PROCEDURE — 74011250637 HC RX REV CODE- 250/637: Performed by: INTERNAL MEDICINE

## 2020-07-04 PROCEDURE — 77030038269 HC DRN EXT URIN PURWCK BARD -A

## 2020-07-04 PROCEDURE — 94760 N-INVAS EAR/PLS OXIMETRY 1: CPT

## 2020-07-04 PROCEDURE — 83735 ASSAY OF MAGNESIUM: CPT

## 2020-07-04 PROCEDURE — 85025 COMPLETE CBC W/AUTO DIFF WBC: CPT

## 2020-07-04 PROCEDURE — 65660000000 HC RM CCU STEPDOWN

## 2020-07-04 PROCEDURE — 36415 COLL VENOUS BLD VENIPUNCTURE: CPT

## 2020-07-04 PROCEDURE — 77030041247 HC PROTECTOR HEEL HEELMEDIX MDII -B

## 2020-07-04 RX ORDER — DEXTROSE, SODIUM CHLORIDE, AND POTASSIUM CHLORIDE 5; .45; .075 G/100ML; G/100ML; G/100ML
75 INJECTION INTRAVENOUS CONTINUOUS
Status: DISCONTINUED | OUTPATIENT
Start: 2020-07-04 | End: 2020-07-15 | Stop reason: HOSPADM

## 2020-07-04 RX ADMIN — ASPIRIN 300 MG: 300 SUPPOSITORY RECTAL at 12:47

## 2020-07-04 RX ADMIN — DEXTROSE MONOHYDRATE, SODIUM CHLORIDE, AND POTASSIUM CHLORIDE 75 ML/HR: 50; 4.5; .745 INJECTION, SOLUTION INTRAVENOUS at 09:47

## 2020-07-04 RX ADMIN — HEPARIN SODIUM 5000 UNITS: 5000 INJECTION INTRAVENOUS; SUBCUTANEOUS at 06:04

## 2020-07-04 RX ADMIN — HEPARIN SODIUM 5000 UNITS: 5000 INJECTION INTRAVENOUS; SUBCUTANEOUS at 22:17

## 2020-07-04 RX ADMIN — HEPARIN SODIUM 5000 UNITS: 5000 INJECTION INTRAVENOUS; SUBCUTANEOUS at 00:35

## 2020-07-04 RX ADMIN — DEXTROSE MONOHYDRATE AND SODIUM CHLORIDE 75 ML/HR: 5; .45 INJECTION, SOLUTION INTRAVENOUS at 00:36

## 2020-07-04 RX ADMIN — HEPARIN SODIUM 5000 UNITS: 5000 INJECTION INTRAVENOUS; SUBCUTANEOUS at 14:08

## 2020-07-04 NOTE — PROGRESS NOTES
Problem: Dysphagia (Adult)  Goal: *Acute Goals and Plan of Care (Insert Text)  Description: Speech Therapy Goals  Initiated 7/4/2020  1. Patient will participate in re-evaluation of swallow function within 7 days. Outcome: Progressing Towards Goal     SPEECH LANGUAGE PATHOLOGY BEDSIDE SWALLOW EVALUATION  Patient: Anita Bernabe (78 y.o. female)  Date: 7/4/2020  Primary Diagnosis: CVA (cerebral vascular accident) [facility]) [I63.9]        Precautions: Aspiration       ASSESSMENT :  Based on the objective data described below, the patient presents with severe oropharyngeal dysphagia characterized by impaired bolus acceptance/retrieval from spoon, delayed oral manipulation, absent oral transit and absent pharyngeal swallow. Risk of aspiration is elevated given dysphagia in setting of CVA, poor alertness and h/o head and neck cancer. Given bedside presentation feel safest course is NPO. Patient will benefit from skilled intervention to address the above impairments. Patients rehabilitation potential is considered to be Fair     PLAN :  Recommendations and Planned Interventions:  NPO  Oral care  Frequency/Duration: Patient will be followed by speech-language pathology 3 times a week to address goals. Discharge Recommendations: To Be Determined     SUBJECTIVE:   Patient stated her name and \"hospital.\"  RN reports patient poorly alert.     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    Diverticulosis of colon (without mention of hemorrhage) 2/8/2011    Dysphagia 2/8/2011    Edema, peripheral 2/8/2011    GERD (gastroesophageal reflux disease)     GERD Gastro- Esophageal Reflux Disease 2/8/2011    Herniated nucleus pulposus ( slipped disc) 2/8/2011    Menopausal 2/8/2011    PUD (peptic ulcer disease) 2012    Pure hypercholesterolemia 2/8/2011    Recurrent ear pain 2/8/2011    S/P radiation therapy     Scalp lesion 10/23/2014    Sebaceous cyst 4/22/2014    Unspecified cataract 2/8/2011    Unspecified essential hypertension 2/8/2011    Unspecified sleep apnea      Past Surgical History:   Procedure Laterality Date    HX HYSTERECTOMY      HX ORTHOPAEDIC  neck/back 2006    HX OTHER SURGICAL  5/8/2014     Excise recurrent scalp lesion and application of ACell    HX OTHER SURGICAL  5/8/2014    Excise keloid, anterior chest wall, and application of ACell    HX OTHER SURGICAL  5/8/2014     Excise sebaceous cyst, anterior chest wall    HX OTHER SURGICAL  5/8/2014    Punch biopsy, skin lesions, right forearm x2, right calf x1    [place] ICAR CATHETER ABLATION ATRIOVENTR NODE FUNCTION N/A 5/6/2019    ABLATION AV NODE performed [place]ess[place]o [place] [id number] [address] CATH LAB    [place] INS NEW/RPLCMT PRM PM W/TRANSV ELTRD ATRIAL&VENT  9/30/2017         [place] RMVL IMPLANTABLE PT-ACTIVATED CAR EVENT RECORDER  9/30/2017          Prior Level of Function/Home Situation:   Home Situation  Home Environment: Private residence  One/Two Story Residence: Other (Comment)  Living Alone: Yes  Support Systems: Child(jeniffer)  Patient Expects to be Discharged to[de-identified] Unknown  Current DME Used/Available at Home: None  Diet prior to admission: Unknown  Current Diet:  NPO   Cognitive and Communication Status:  Neurologic State: Eyes do not open to any stimulus  Orientation Level: Oriented to person, Oriented to place(Oriented to 'Hospital\")  Cognition: No command following           Oral Assessment:  Oral Assessment  Labial: (Unable to assess given no command following)  Dentition: Edentulous  P.O.  Trials:  Patient Position: Upright in bed  Vocal quality prior to P.O.: Low volume  Consistency Presented: Ice chips  How Presented: SLP-fed/presented;Spoon     Bolus Acceptance: Impaired  Bolus Formation/Control: Impaired  Type of Impairment: Delayed; Incomplete;Poor;Mastication  Propulsion: Delayed (# of seconds); Absent     Initiation of Swallow: Absent                      Oral Phase Severity: Severe  Pharyngeal Phase Severity : Severe    NOMS:   The NOMS functional outcome measure was used to quantify this patient's level of swallowing impairment. Based on the NOMS, the patient was determined to be at level 1 for swallow function       NOMS Swallowing Levels:  Level 1 (CN): NPO  Level 2 (CM): NPO but takes consistency in therapy  Level 3 (CL): Takes less than 50% of nutrition p.o. and continues with nonoral feedings; and/or safe with mod cues; and/or max diet restriction  Level 4 (CK): Safe swallow but needs mod cues; and/or mod diet restriction; and/or still requires some nonoral feeding/supplements  Level 5 (CJ): Safe swallow with min diet restriction; and/or needs min cues  Level 6 (CI): Independent with p.o.; rare cues; usually self cues; may need to avoid some foods or needs extra time  Level 7 ([address] [place] [address]): Independent for all p.o.  RESHMA. (2003). National Outcomes Measurement System (NOMS): Adult Speech-Language Pathology User's Guide. After treatment:   Patient left in no apparent distress in bed, Call bell within reach, and Nursing notified    COMMUNICATION/EDUCATION:   Patient was educated regarding role of SLP. Patient did not respond. Further education warranted. The patient's plan of care including recommendations, planned interventions, and recommended diet changes were discussed with: Registered nurse. Patient is unable to participate in goal setting and plan of care.     Thank you for this referral.  NIKKI Alonzo  Time Calculation: 15 mins

## 2020-07-04 NOTE — ED PROVIDER NOTES
EMERGENCY DEPARTMENT HISTORY AND PHYSICAL EXAM      Date: 7/3/2020  Patient Name: Nancie Henriquez  Patient Age and Sex: 78 y.o. female    History of Presenting Illness     No chief complaint on file. History Provided By: Patient and EMS    Ability to gather history was limited by: Patient nonverbal.  Acuity of condition. HPI: Nancie Henriquez, 78 y.o. female with remote history of throat cancer, brought to the emergency department by EMS for concern for acute CVA. She was reportedly last seen normal at 7 AM this morning, at least 7 hours prior to ED arrival.  1 hour ago she was found by her son to be minimally responsive, not moving her left side, acutely nonverbal.  She is not anticoagulated. The patient is unable to provide any history. Location:    Quality:      Severity:    Duration:   Timing:      Context:    Modifying factors:   Associated symptoms:       The patient's medical, surgical, family, and social history on file were reviewed by me today.       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    Diverticulosis of colon (without mention of hemorrhage) 2/8/2011    Dysphagia 2/8/2011    Edema, peripheral 2/8/2011    GERD (gastroesophageal reflux disease)     GERD Gastro- Esophageal Reflux Disease 2/8/2011    Herniated nucleus pulposus ( slipped disc) 2/8/2011    Menopausal 2/8/2011    PUD (peptic ulcer disease) 2012    Pure hypercholesterolemia 2/8/2011    Recurrent ear pain 2/8/2011    S/P radiation therapy     Scalp lesion 10/23/2014    Sebaceous cyst 4/22/2014    Unspecified cataract 2/8/2011    Unspecified essential hypertension 2/8/2011    Unspecified sleep apnea      Past Surgical History:   Procedure Laterality Date    HX HYSTERECTOMY      HX ORTHOPAEDIC  neck/back 2006    HX OTHER SURGICAL  5/8/2014     Excise recurrent scalp lesion and application of ACell    HX OTHER SURGICAL  5/8/2014    Excise keloid, anterior chest wall, and application of ACell    HX OTHER SURGICAL  5/8/2014     Excise sebaceous cyst, anterior chest wall    HX OTHER SURGICAL  5/8/2014    Punch biopsy, skin lesions, right forearm x2, right calf x1    CA ICAR CATHETER ABLATION ATRIOVENTR NODE FUNCTION N/A 5/6/2019    ABLATION AV NODE performed by Dafne Batista [place] at [place] [address], [facility] CATH LAB    CA INS NEW/RPLCMT PRM PM W/TRANSV ELTRD ATRIAL&VENT  9/30/2017         CA RMVL IMPLANTABLE PT-ACTIVATED CAR EVENT RECORDER  9/30/2017            PCP: Carlos Gann MD    Past History     Past Medical History:  Past Medical History:   Diagnosis Date    Acute pharyngitis 2/8/2011    Allergic rhinitis, cause unspecified 2/8/2011    Arrhythmia     PVC    Asymptomatic varicose veins 2/8/2011    Cancer ([facility] [address]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     Carpal tunnel syndrome 2/8/2011    Degenerative Joint Disese ( improved/resolving) 2/8/2011    Degenetrative Dis Disease, 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    Diverticulosis of colon (without mention of hemorrhage) 2/8/2011    Dysphagia 2/8/2011    Edema, peripheral 2/8/2011    GERD (gastroesophageal reflux disease)     GERD Gastro- Esophageal Reflux Disease 2/8/2011    Herniated nucleus pulposus ( slipped disc) 2/8/2011    Menopausal 2/8/2011    PUD (peptic ulcer disease) 2012    Pure hypercholesterolemia 2/8/2011    Recurrent ear pain 2/8/2011    S/P radiation therapy     Scalp lesion 10/23/2014    Sebaceous cyst 4/22/2014    Unspecified cataract 2/8/2011    Unspecified essential hypertension 2/8/2011    Unspecified sleep apnea        Past Surgical History:  Past Surgical History:   Procedure Laterality Date    HX HYSTERECTOMY      HX ORTHOPAEDIC  neck/back 2006    HX OTHER SURGICAL  5/8/2014     Excise recurrent scalp lesion and application of ACell    HX OTHER SURGICAL  5/8/2014    Excise keloid, anterior chest wall, and application of ACell    HX OTHER SURGICAL  5/8/2014     Excise sebaceous cyst, anterior chest wall    HX OTHER SURGICAL  5/8/2014    Punch biopsy, skin lesions, right forearm x2, right calf x1    VT ICAR CATHETER ABLATION ATRIOVENTR NODE FUNCTION N/A 5/6/2019    ABLATION AV NODE [facility] CATH LAB    VT INS NEW/RPLCMT PRM PM W/TRANSV ELTRD ATRIAL&VENT  9/30/2017         VT RMVL IMPLANTABLE PT-ACTIVATED CAR EVENT RECORDER  9/30/2017            Family History:  Family History   Problem Relation Age of Onset    Hypertension Mother     Stroke Mother     Hypertension Father     Cancer Father         PROSTATE, MELANOMA    Anesth Problems Neg Hx        Social History:  Social History     Tobacco Use    Smoking status: Never Smoker    Smokeless tobacco: Never Used   Substance Use Topics    Alcohol use: No    Drug use: No       Allergies: Allergies   Allergen Reactions    Latex Rash    Bactrim [Sulfamethoprim Ds] Hives    Aspirin Other (comments)     Anything higher than 81mg makes pt jittery    Codeine Hives and Itching    Iodinated Contrast Media Itching    Pcn [Penicillins] Hives and Itching    Tape [Adhesive] Rash       Current Medications:  No current facility-administered medications on file prior to encounter. Current Outpatient Medications on File Prior to Encounter   Medication Sig Dispense Refill    metoprolol succinate (TOPROL-XL) 25 mg XL tablet TAKE 1 TABLET BY MOUTH EVERY DAY 90 Tab 3    cyclobenzaprine (FLEXERIL) 10 mg tablet Take 1 Tab by mouth three (3) times daily as needed for Muscle Spasm(s).  90 Tab 0    meclizine (ANTIVERT) 25 mg tablet TAKE 1 TABLET THREE TIMES DAILY AS NEEDED 270 Tab 3    rivaroxaban (Xarelto) 15 mg tab tablet Take 1 Tab by mouth daily (with dinner). 90 Tab 3    gabapentin (NEURONTIN) 300 mg capsule Take 1 Cap by mouth three (3) times daily. 270 Cap 3    amLODIPine (NORVASC) 5 mg tablet Take 1 Tab by mouth daily. 90 Tab 3    potassium chloride SR (KLOR-CON 10) 10 mEq tablet TAKE 1 TABLET EVERY DAY 90 Tab 3    omeprazole (PRILOSEC) 40 mg capsule TAKE 1 CAPSULE EVERY DAY 90 Cap 3    fluticasone propionate (FLONASE) 50 mcg/actuation nasal spray 2 Sprays by Both Nostrils route daily as needed for Rhinitis.  metoprolol succinate (TOPROL-XL) 100 mg tablet Take 100 mg by mouth daily.  furosemide (LASIX) 20 mg tablet TAKE 1 TABLET BY MOUTH DAILY 90 Tab 1    acetaminophen (TYLENOL) 325 mg tablet Take 650 mg by mouth nightly.  MULTIVIT WITH CALCIUM,IRON,MIN [facility] MULTIPLE VITAMINS PO) Take 1 Tab by mouth daily.  aspirin delayed-release 81 mg tablet Take 81 mg by mouth daily. Review of Systems   Review of Systems   Unable to perform ROS: Acuity of condition   All other systems reviewed and are negative. Physical Exam   Vital Signs  Patient Vitals for the past 8 hrs:   Temp Pulse Resp BP SpO2   07/03/20 2019 (!) 100.7 °F (38.2 °C) 83 16 150/47 100 %   07/03/20 1800 -- 73 12 147/57 98 %   07/03/20 1730 -- 77 13 138/75 96 %   07/03/20 1700 -- 90 17 153/83 98 %   07/03/20 1533 97.5 °F (36.4 °C) 79 16 (!) 168/95 97 %   07/03/20 1527 -- -- -- (!) 168/95 --          Physical Exam  Vitals signs and nursing note reviewed. Constitutional:       General: She is not in acute distress. Appearance: Normal appearance. She is well-developed. She is not ill-appearing. HENT:      Head: Normocephalic and atraumatic. Mouth/Throat:      Mouth: Mucous membranes are moist.   Eyes:      General:         Right eye: No discharge. Left eye: No discharge. Conjunctiva/sclera: Conjunctivae normal.   Neck:      Musculoskeletal: Normal range of motion and neck supple.    Cardiovascular:      Rate and Rhythm: Normal rate and regular rhythm. Heart sounds: Normal heart sounds. No murmur. Pulmonary:      Effort: Pulmonary effort is normal. No respiratory distress. Breath sounds: Normal breath sounds. No wheezing. Abdominal:      General: There is no distension. Palpations: Abdomen is soft. Tenderness: There is no abdominal tenderness. Musculoskeletal: Normal range of motion. General: No deformity. Skin:     General: Skin is warm and dry. Findings: No rash. Neurological:      General: No focal deficit present. Mental Status: She is alert and oriented to person, place, and time. GCS: GCS eye subscore is 3. GCS verbal subscore is 2. GCS motor subscore is 6. Cranial Nerves: Cranial nerve deficit and facial asymmetry present. Motor: Weakness present. Comments: Dense left-sided deficits. Initial NIH stroke scale of 18. Psychiatric:         Speech: Speech normal.         Behavior: Behavior normal.         Cognition and Memory: Cognition normal.         Diagnostic Study Results   Labs  Recent Results (from the past 24 hour(s))   CBC WITH AUTOMATED DIFF    Collection Time: 07/03/20  3:43 PM   Result Value Ref Range    WBC 6.7 3.6 - 11.0 K/uL    RBC 5.36 (H) 3.80 - 5.20 M/uL    HGB 12.8 11.5 - 16.0 g/dL    HCT 39.8 35.0 - 47.0 %    MCV 74.3 (L) 80.0 - 99.0 FL    MCH 23.9 (L) 26.0 - 34.0 PG    MCHC 32.2 30.0 - 36.5 g/dL    RDW 21.1 (H) 11.5 - 14.5 %    PLATELET 732 886 - 897 K/uL    MPV 10.7 8.9 - 12.9 FL    NRBC 0.0 0  WBC    ABSOLUTE NRBC 0.00 0.00 - 0.01 K/uL    NEUTROPHILS 79 (H) 32 - 75 %    LYMPHOCYTES 12 12 - 49 %    MONOCYTES 7 5 - 13 %    EOSINOPHILS 1 0 - 7 %    BASOPHILS 1 0 - 1 %    IMMATURE GRANULOCYTES 0 0.0 - 0.5 %    ABS. NEUTROPHILS 5.2 1.8 - 8.0 K/UL    ABS. LYMPHOCYTES 0.8 0.8 - 3.5 K/UL    ABS. MONOCYTES 0.5 0.0 - 1.0 K/UL    ABS. EOSINOPHILS 0.1 0.0 - 0.4 K/UL    ABS. BASOPHILS 0.1 0.0 - 0.1 K/UL    ABS. IMM.  GRANS. 0.0 0.00 - 0.04 K/UL    DF SMEAR SCANNED      RBC COMMENTS MICROCYTOSIS  PRESENT       METABOLIC PANEL, COMPREHENSIVE    Collection Time: 07/03/20  3:43 PM   Result Value Ref Range    Sodium 138 136 - 145 mmol/L    Potassium 3.0 (L) 3.5 - 5.1 mmol/L    Chloride 103 97 - 108 mmol/L    CO2 27 21 - 32 mmol/L    Anion gap 8 5 - 15 mmol/L    Glucose 126 (H) 65 - 100 mg/dL    BUN 19 6 - 20 MG/DL    Creatinine 0.75 0.55 - 1.02 MG/DL    BUN/Creatinine ratio 25 (H) 12 - 20      GFR est AA >60 >60 ml/min/1.73m2    GFR est non-AA >60 >60 ml/min/1.73m2    Calcium 9.0 8.5 - 10.1 MG/DL    Bilirubin, total 0.5 0.2 - 1.0 MG/DL    ALT (SGPT) 22 12 - 78 U/L    AST (SGOT) 25 15 - 37 U/L    Alk. phosphatase 106 45 - 117 U/L    Protein, total 8.0 6.4 - 8.2 g/dL    Albumin 3.4 (L) 3.5 - 5.0 g/dL    Globulin 4.6 (H) 2.0 - 4.0 g/dL    A-G Ratio 0.7 (L) 1.1 - 2.2     PROTHROMBIN TIME + INR    Collection Time: 07/03/20  3:43 PM   Result Value Ref Range    INR 1.1 0.9 - 1.1      Prothrombin time 11.5 (H) 9.0 - 11.1 sec   TROPONIN I    Collection Time: 07/03/20  3:43 PM   Result Value Ref Range    Troponin-I, Qt. <0.05 <0.05 ng/mL   EKG, 12 LEAD, INITIAL    Collection Time: 07/03/20  4:17 PM   Result Value Ref Range    Ventricular Rate 76 BPM    Atrial Rate 76 BPM    P-R Interval 182 ms    QRS Duration 160 ms    Q-T Interval 464 ms    QTC Calculation (Bezet) 522 ms    Calculated P Axis 83 degrees    Calculated R Axis -68 degrees    Calculated T Axis 72 degrees    Diagnosis       AV dual-paced rhythm  When compared with ECG of 07-AUG-2019 00:59,  Vent.  rate has increased BY   5 BPM     POC EG7    Collection Time: 07/03/20  9:51 PM   Result Value Ref Range    Calcium, ionized (POC) 1.11 (L) 1.12 - 1.32 mmol/L    FIO2 (POC) 21 %    pH (POC) 7.53 (H) 7.35 - 7.45      pCO2 (POC) 37.4 35.0 - 45.0 MMHG    pO2 (POC) 77 (L) 80 - 100 MMHG    HCO3 (POC) 31.0 (H) 22 - 26 MMOL/L    Base excess (POC) 8 mmol/L    sO2 (POC) 97 92 - 97 %    Site LEFT RADIAL      Device: ROOM AIR Allens test (POC) YES      Specimen type (POC) ARTERIAL      Total resp. rate 16         Radiologic Studies  CTA CODE NEURO HEAD AND NECK W CONT   Final Result   IMPRESSION:   1. 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 2.  Large core infarct in the right MCA distribution with a 55 cc ischemic   penumbra. 3.  Ischemic changes on delayed postcontrast head CT without evidence of   hemorrhage. CT CODE NEURO PERF W CBF   Final Result   IMPRESSION:   1. 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 2.  Large core infarct in the right MCA distribution with a 55 cc ischemic   penumbra. Ischemic changes on delayed postcontrast head CT without evidence of hemorrhage. CT CODE NEURO HEAD WO CONTRAST   Final Result   IMPRESSION:    Large acute right MCA infarct with suggestion of intraluminal thrombus in the   MCA. No hemorrhage is identified. The findings were called to Dr. Corazon Edouard on 7/3/2020 at 3:11 PM by myself.   789            MRI BRAIN WO CONT    (Results Pending)       CXR Results  (Last 48 hours)    None          Procedures   CRITICAL CARE (ASAP ONLY)  Performed by: Rivas Urbano MD  Authorized by: Rivas Urbano MD     Critical care provider statement:     Critical care time (minutes):  50    Critical care time was exclusive of:  Separately billable procedures and treating other patients    Critical care was necessary to treat or prevent imminent or life-threatening deterioration of the following conditions:  CNS failure or compromise    Critical care was time spent personally by me on the following activities:  Ordering and review of laboratory studies, ordering and review of radiographic studies, pulse oximetry, re-evaluation of patient's condition, examination of patient, evaluation of patient's response to treatment, ordering and performing treatments and interventions, review of old charts and discussions with consultants        Medical Decision Making     I reviewed the patient's most recent Emergency Dept notes and diagnostic tests  in formulating my MDM on today's visit. Provider Notes (Medical Decision Making):   18-year-old female presenting with acute left-sided deficits, nonverbal, facial droop. Is unclear to me whether or not she takes anticoagulation such as Xarelto. Patient unable to provide any history. Initial NIH stroke scale of approximately 18. Challenging examination as patient was unable to comply with the examination and perform commands. CT/CTA demonstrates large acute right MCA infarct. She is not a candidate for TPA as she is outside the window for thrombolysis. I emergently consulted neuro interventional service and we evaluated the patient CT imaging together. Neuro interventional attending did not recommend thrombectomy or transfer. Plan admit to medical service for medical management. Permissive hypertension. Aspirin suppository was administered. Palliative care consult was also placed. At this time family strongly prefers patient to remain full code.     Tarah Chaney MD  10:44 PM        Social History     Tobacco Use    Smoking status: Never Smoker    Smokeless tobacco: Never Used   Substance Use Topics    Alcohol use: No    Drug use: No     Patient Vitals for the past 4 hrs:   Temp Pulse Resp BP SpO2   07/03/20 2019 (!) 100.7 °F (38.2 °C) 83 16 150/47 100 %            Consults: Neuro interventional.  Also stroke telemetry neurologist.      Medications Administered during ED course:  Medications   acetaminophen (TYLENOL) tablet 650 mg (has no administration in time range)     Or   acetaminophen (TYLENOL) solution 650 mg (has no administration in time range)     Or   acetaminophen (TYLENOL) suppository 650 mg (has no administration in time range)   dextrose 5 % - 0.45% NaCl infusion (has no administration in time range)   ondansetron (ZOFRAN) injection 4 mg (has no administration in time range)   atorvastatin (LIPITOR) tablet 40 mg (has no administration in time range)   labetaloL (NORMODYNE;TRANDATE) injection 5 mg (has no administration in time range)   heparin (porcine) injection 5,000 Units (has no administration in time range)   aspirin (ASA) suppository 300 mg (has no administration in time range)   iopamidoL (ISOVUE-370) 76 % injection 100 mL (100 mL IntraVENous Given 7/3/20 1523)   sodium chloride (NS) flush 10 mL (10 mL IntraVENous Given 7/3/20 1524)   aspirin (ASA) suppository 300 mg (300 mg Rectal Given 7/3/20 1816)          Current Discharge Medication List             Diagnosis and Disposition     Disposition:  Admitted    Clinical Impression:   1. Acute ischemic right MCA stroke [facility])        Attestation:  I personally performed the services described in this documentation on this date 7/3/2020 for patient Layton Covington. Nadir Rivera MD        I was the first provider for this patient on this visit. To the best of my ability I reviewed relevant prior medical records, electrocardiograms, laboratories, and radiologic studies. The patient's presenting problems were discussed, and the patient was in agreement with the care plan formulated and outlined with them. Nadir Rivera MD    Please note that this dictation was completed with Dragon voice recognition software. Quite often unanticipated grammatical, syntax, homophones, and other interpretive errors are inadvertently transcribed by the computer software. Please disregard these errors and excuse any errors that have escaped final proofreading.

## 2020-07-04 NOTE — PROGRESS NOTES
Consult received, Chart reviewed. Attempted to see the patient today with OT but patient was unarousable , even with sternal rubbing. Patient will stay on schedule and will continue to attempt to see patient for evaluation. Thanks.

## 2020-07-04 NOTE — PROGRESS NOTES
Patient has pacemaker; awaiting pacemaker info/card from patient. MRI to be done during the week once cardiologist approval has been sent. MRI will follow up with floor as soon as everything is complete.  luis alfredog

## 2020-07-04 NOTE — PROGRESS NOTES
Primary Nurse Dara Hale and Bertha RN performed a dual skin assessment on this patient Impairment noted- see wound doc flow sheet  Howard score is 10

## 2020-07-04 NOTE — PROGRESS NOTES
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 No hemorrhage is identified  CTA  H&N:  1. Occluded right internal carotid artery from the carotid bifurcation to the  [place] and further intraluminal thrombus at the right MCA bifurcation. 2.  Large core infarct in the right MCA distribution with a 55 cc ischemic  penumbra. Ischemic changes on delayed postcontrast head CT without evidence of hemorrhage. LDL= 93  A1c= 5.7  Trop neg    Patient was not a TPA candidate and neuro interventionalist did not recc any interventions  Complete the stroke work-up of the brain without contrast when able- pt has compatible PPM?  Check 2D echo  Cont rectal aspirin, and statin when able to take p.o.  Keep Strict n.p.o. as per SLP eval this AM  IV labetalol prn  Holding Xarelto for concern for hemorrhagic conversion of this large stroke   Neuro consulted  IP Palliative care consulted - catastrophic large CVA with severe Dysphagia          Hypertension  Hold all BP meds for now, resume as needed now    Arrhythmia  Xarelto on her PTA list, hold it , not  sure if she is taking it   med rec for pharmacy      Code Status: Full code confirmed in ER with the daughter and the son who is a power of   Surrogate Decision Maker:  Dona Treviño [phone]         DVT Prophylaxis: Heparin  GI Prophylaxis: not indicated     Baseline: Walk with a walker    Recommended Disposition: SNF/LTC +/- hospice? ?      Subjective:     Chief Complaint / Reason for Physician Visit :F/u Large R MCA CVA, dysphagia,   \"Sleepy\".   Discussed with RN events overnight. Review of Systems:  Symptom Y/N Comments  Symptom Y/N Comments   Fever/Chills    Chest Pain     Poor Appetite    Edema     Cough    Abdominal Pain     Sputum    Joint Pain     SOB/FRANK    Pruritis/Rash     Nausea/vomit    Tolerating PT/OT     Diarrhea    Tolerating Diet     Constipation    Other       Could NOT obtain due to: Aphasia/sleepy     Objective:     VITALS:   Last 24hrs VS reviewed since prior progress note. Most recent are:  Patient Vitals for the past 24 hrs:   Temp Pulse Resp BP SpO2   07/04/20 1138 99.9 °F (37.7 °C) 75 18 142/89 98 %   07/04/20 0734 99 °F (37.2 °C) 91 16 139/89 98 %   07/04/20 0403 99.8 °F (37.7 °C) 77 18 153/76 97 %   07/03/20 2322 99.8 °F (37.7 °C) 71 16 167/74 100 %   07/03/20 2019 (!) 100.7 °F (38.2 °C) 83 16 150/47 100 %   07/03/20 1800 -- 73 12 147/57 98 %   07/03/20 1730 -- 77 13 138/75 96 %   07/03/20 1700 -- 90 17 153/83 98 %   07/03/20 1533 97.5 °F (36.4 °C) 79 16 (!) 168/95 97 %   07/03/20 1527 -- -- -- (!) 168/95 --       Intake/Output Summary (Last 24 hours) at 7/4/2020 1152  Last data filed at 7/4/2020 0947  Gross per 24 hour   Intake 688.75 ml   Output --   Net 688.75 ml        PHYSICAL EXAM:  General: WD, WN.drowsy, non cooperative, no acute distress    EENT:  EOMI. Anicteric sclerae. MMM  Resp:  CTA bilaterally, no wheezing or rales. No accessory muscle use  CV:  Regular  rhythm,  No edema  GI:  Soft, Non distended, Non tender.  +Bowel sounds  Neurologic:  Sleepy, unable to follow commands, severe Oropharyngeal Dysphagia noted +  Psych:   Un able to assess  Skin:  No rashes.   No jaundice    Reviewed most current lab test results and cultures  YES  Reviewed most current radiology test results   YES  Review and summation of old records today    NO  Reviewed patient's current orders and MAR    YES  PMH/SH reviewed - no change compared to H&P  ________________________________________________________________________  Care Plan discussed with: Comments   Patient     Family  x    RN x    Care Manager     Consultant                        Multidiciplinary team rounds were held today with , nursing, pharmacist and clinical coordinator. Patient's plan of care was discussed; medications were reviewed and discharge planning was addressed. ________________________________________________________________________  Total NON critical care TIME:  36   Minutes    Total CRITICAL CARE TIME Spent:   Minutes non procedure based      Comments   >50% of visit spent in counseling and coordination of care     ________________________________________________________________________  Kristian Hussein MD     Procedures: see electronic medical records for all procedures/Xrays and details which were not copied into this note but were reviewed prior to creation of Plan. LABS:  I reviewed today's most current labs and imaging studies.   Pertinent labs include:  Recent Labs     07/04/20  0543 07/03/20  1543   WBC 9.0 6.7   HGB 12.8 12.8   HCT 40.7 39.8    315     Recent Labs     07/04/20  0543 07/03/20  1543    138   K 3.0* 3.0*    103   CO2 27 27   * 126*   BUN 14 19   CREA 0.80 0.75   CA 9.0 9.0   MG 2.1  --    PHOS 2.8  --    ALB  --  3.4*   TBILI  --  0.5   ALT  --  22   INR  --  1.1       Signed: Kristian Hussein MD

## 2020-07-04 NOTE — PROGRESS NOTES
Spoke with daughter Rufino Ambriz via phone. Updated on status of patient; vitals being stable, orientation level. No other questions at this time.

## 2020-07-04 NOTE — PROGRESS NOTES
Occupational Therapy  OT consult received, chart reviewed. Attempted to see patient for OT evaluation, but she was not responsive, even to noxious stimuli. Discussed with nursing. Will defer for now but continue to follow.

## 2020-07-04 NOTE — CONSULTS
IP CONSULT TO NEUROLOGY  Consult performed by: Estelle Quiñones MD  Consult ordered by: Bernabe Rodriguez MD            NEUROLOGY CONSULT    NAME Zay Camejo AGE 78 y.o. MRN [id number]  1940     REQUESTING PHYSICIAN: Leila Maxwell MD      CHIEF COMPLAINT:  stroke     This is a 78 y.o. female with a medical history of stage four esophageal cancer s/p radiation therapy. She was found by her son in an an obtunded state at home. She was not a tpa candidate because of time and history of chronic anticoagulation on xarelto. Her son speculates that she is not compliant with treatment. She has been somnolent since admission. ASSESSMENT AND PLAN     1. Acute ischemic right MCA stroke ([facility] [address]  MRI: Pending  Vascular studies: Pending  A1C : 5.7  TSH: Pending  LDL: 93  Echocardiogram: Pending  Repeat CT in am  CT: IMPRESSION:  1. Occluded right internal carotid artery from the carotid bifurcation to the  [place] and further intraluminal thrombus at the right MCA bifurcation. 2.  Large core infarct in the right MCA distribution with a 55 cc ischemic  penumbra. 3.  Ischemic changes on delayed postcontrast head CT without evidence of  hemorrhage. 2. Atrial fibrillation  Start eliquis after the swallow evaluation    3. Hyperlipidemia  Statins after speech eval    4. Altered mental status  EEG    5.left hemiparesis  PT    6. Aphasia  Speech therapy         ALLERGIES:  Latex; Bactrim [sulfamethoprim ds]; Aspirin; Codeine;  Iodinated contrast media; Pcn [penicillins]; and Tape [adhesive]     Current Facility-Administered Medications   Medication Dose Route Frequency    dextrose 5% - 0.45% NaCl with KCl 10 mEq/L infusion  75 mL/hr IntraVENous CONTINUOUS    acetaminophen (TYLENOL) tablet 650 mg  650 mg Oral Q4H PRN    Or    acetaminophen (TYLENOL) solution 650 mg  650 mg Per NG tube Q4H PRN    Or    acetaminophen (TYLENOL) suppository 650 mg  650 mg Rectal Q4H PRN    ondansetron (ZOFRAN) injection 4 mg  4 mg IntraVENous Q6H PRN    atorvastatin (LIPITOR) tablet 40 mg  40 mg Oral QHS    labetaloL (NORMODYNE;TRANDATE) injection 5 mg  5 mg IntraVENous Q10MIN PRN    heparin (porcine) injection 5,000 Units  5,000 Units SubCUTAneous Q8H    aspirin (ASA) suppository 300 mg  300 mg Rectal DAILY       Past Medical History:   Diagnosis Date    Acute pharyngitis 2/8/2011    Allergic rhinitis, cause unspecified 2/8/2011    Arrhythmia     PVC    Asymptomatic varicose veins 2/8/2011    Cancer ([facility] [address]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     Carpal tunnel syndrome 2/8/2011    Degenerative Joint Disese ( improved/resolving) 2/8/2011    Degenetrative Dis Disease, 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    Diverticulosis of colon (without mention of hemorrhage) 2/8/2011    Dysphagia 2/8/2011    Edema, peripheral 2/8/2011    GERD (gastroesophageal reflux disease)     GERD Gastro- Esophageal Reflux Disease 2/8/2011    Herniated nucleus pulposus ( slipped disc) 2/8/2011    Menopausal 2/8/2011    PUD (peptic ulcer disease) 2012    Pure hypercholesterolemia 2/8/2011    Recurrent ear pain 2/8/2011    S/P radiation therapy     Scalp lesion 10/23/2014    Sebaceous cyst 4/22/2014    Unspecified cataract 2/8/2011    Unspecified essential hypertension 2/8/2011    Unspecified sleep apnea        Social History     Tobacco Use    Smoking status: Never Smoker    Smokeless tobacco: Never Used   Substance Use Topics    Alcohol use: No       Family History   Problem Relation Age of Onset    Hypertension Mother     Stroke Mother     Hypertension Father     Cancer Father         PROSTATE, MELANOMA    Anesth Problems Neg Hx      Review of Systems   Unable to perform ROS: Patient nonverbal       Visit Vitals  BP (!) 149/96   Pulse 79   Temp (!) 100.7 °F (38.2 °C)   Resp 16   Ht 5' 2\" (1.575 m)   Wt 152 lb 5.4 oz (69.1 kg)   SpO2 97%   Breastfeeding No   BMI 27.86 kg/m²      General: Somnolent   Head: Normocephalic, atraumatic, scarring on head anicteric sclera   Neck Normal ROM,  No thyromegally   Lungs:  Clear to auscultation. Cardiac: Distant sound regular rate and rhythm with no murmurs. Abd: Bowel sounds were audible. No tenderness on palpation   Ext: No pedal edema   Skin: Supple no rash. Lichenification of skin on the left lower ext. NeurologicExam:  Mental Status: somnolent   Speech: Non verbal.   Cranial Nerves:  Opens Eyes Spontaneously  Respond to sound  + No dolls eyes  Right preferential  gaze  PERRLA  Corneal reflex intact  Left facial droop  +  gag reflex    Motor:  Little movement on the left  Good tone on the right Good withdrawal of upper and lower ext. Reflexes:   Deep tendon reflexes 2+/4 and symmetric. Sensory:   Appreciates tactile stimulation    Tremor:   No tremor noted. Neurovascular: No carotid bruits. No JVD     REVIEWED IMAGING:    MRI :  Results from Hospital Encounter encounter on 06/17/19   MRI KNEE LT WO CONT    Narrative EXAM: MRI KNEE LT WO CONT    INDICATION: Left knee pain and effusion. COMPARISON: Left knee views on 3/9/2019    TECHNIQUE: Axial T2 fat-saturated and proton density fat-saturated; coronal T1  and proton density fat-saturated; and sagittal T2 fat-saturated, proton density  fat-saturated, and gradient echo MRI of the left knee . CONTRAST: None. FINDINGS: Bone marrow: Moderate degenerative bone marrow edema is on both sides  of the lateral compartment. No acute fracture, dislocation, or marrow replacing  process. Joint fluid: Moderate joint effusion has increased since March given difference  in technique. Irregular synovial thickening is present and extends into the  popliteus tendon sheath. Small Baker's cyst measures 3 cm in craniocaudal  dimension. Collateral ligaments and posterior, lateral corner: Intact. Medial meniscus: Horizontal tibial surface tear of the posterior horn extends to  the posterior body. Minimal extrusion of the body. Anterior horn is intact. Lateral meniscus: Mild maceration of the posterior horn and free edge of the  mild extruded body. Anterior horn is intact. ACL and PCL: Intact. Tendons: Intact. Muscles: Moderate diffuse muscle atrophy. Patellofemoral alignment: Mild lateral patellar subluxation. Normal femoral  trochlear groove. TT-TG distance: 7 mm. Articular cartilage: Subtle softening of the articular cartilage in the patellar  apex. Full-thickness loss of the articular cartilage from the lateral femoral  trochlea measures 14 mm. Shallow partial-thickness loss of the articular  cartilage from the remainder of the femoral trochlea. Heterogeneous high-grade partial-thickness and full-thickness loss of the  articular cartilage throughout the lateral compartment. Shallow  partial-thickness loss of the articular cartilage from the central weightbearing  medial compartment. Soft tissue mass: None. Diffuse soft tissue swelling is more posterior than  anterior. No drainable fluid collection. Impression IMPRESSION:    1. 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 Degenerative bone marrow edema likely contributes to pain. 2. Mild maceration and extrusion of the lateral meniscus. 3. Horizontal tibial surface tear of the medial meniscus. 4. Joint effusion, synovitis, and small Baker's cyst.  5. Intact cruciates and collaterals. CT:  Results from Hospital Encounter encounter on 07/03/20   CT CODE NEURO PERF W CBF    Narrative *PRELIMINARY REPORT*    Intraluminal thrombus is noted in the right inferior M2 branch and to a lesser  extent in the superior M2 branch with acute right MCA ischemia. There is complete occlusion of the right internal carotid artery at its origin    The vertebral and basilar arteries are patent. There is no intracranial hemorrhage. Perfusion images demonstrate a large acute right MCA infarct.     Preliminary report was provided by Dr. Rocklin Meckel, the on-call radiologist, at 3:42  PM    Final report to follow. *END PRELIMINARY REPORT*    CLINICAL HISTORY: Left-sided weakness    EXAMINATION:  CT ANGIOGRAPHY HEAD AND NECK, CT PERFUSION    COMPARISON: CT head August 7, 2019    TECHNIQUE:  Following the uneventful administration of iodinated contrast  material, axial CT angiography of the head and neck was performed. Delayed axial  images through the head were also obtained. Coronal and sagittal reconstructions  were obtained. Manual postprocessing of images was performed. 3-D  Sagittal  maximal intensity projection images were obtained. 3-D Coronal maximal  intensity projections were obtained. CT brain perfusion was performed with  generation of hemodynamic maps of multiple parameters, including cerebral blood  flow, cerebral blood volume, mean transit time, and TMAX. CT dose reduction was  achieved through use of a standardized protocol tailored for this examination  and automatic exposure control for dose modulation. FINDINGS:    Delayed contrast-enhanced head CT:    There is a large area of diminished gray-white matter differentiation in the  right MCA territory. No evidence of hemorrhage, midline shift, mass effect, or  herniation. Basal cisterns are clear. No mass. Paranasal sinuses are clear. CTA NECK:    Great vessels: Normal arch anatomy with the origins patent. Right subclavian artery: Moderate stenosis of the mid right subclavian artery  (2:25). Left subclavian artery: Mild stenosis due to luminal irregularity of the  proximal and mid left subclavian artery. Right common carotid artery: Patent     Left common carotid artery: Patent     Cervical right internal carotid artery: Occluded right internal carotid artery  beginning just beyond the carotid bifurcation extending through the right  cavernous carotid artery. 100% stenosis by NASCET criteria.     Cervical left internal carotid artery: Mild calcified plaque with no significant  stenosis by NASCET criteria. Right vertebral artery: Patent    Left vertebral artery: Patent    Imaged lung apices are clear. The thyroid gland is normal. No cervical  lymphadenopathy. CTA HEAD:    Right cavernous internal carotid artery: Occluded right cavernous carotid  artery. Reconstitution of the carotid terminus by the [place]. Left cavernous internal carotid artery: Patent    Anterior cerebral arteries: Hypoplastic right A1 segment. Dominant left A1  segment. Anterior communicating artery: Patent    Right middle cerebral artery: M1 segment is patent. Irregularity of the proximal  M2 segment branches beyond the MCA bifurcation likely due to intraluminal  thrombus. Distal M2 and M3 branches demonstrate some perfusion. Left middle cerebral artery: Patent    Posterior communicating arteries: Patent    Posterior cerebral arteries: Patent    Basilar artery: Patent    Distal vertebral arteries: Patent    No evidence for intracranial aneurysm or hemodynamically significant stenosis. CT Perfusion:  There is a large core infarct in the right MCA distribution measuring 85 cc with  a 55 cc ischemic penumbra. Impression IMPRESSION:  1. Occluded right internal carotid artery from the carotid bifurcation to the  [place] and further intraluminal thrombus at the right MCA bifurcation. 2.  Large core infarct in the right MCA distribution with a 55 cc ischemic  penumbra. Ischemic changes on delayed postcontrast head CT without evidence of hemorrhage.        REVIEWED LABS:  Lab Results   Component Value Date/Time    WBC 9.0 07/04/2020 05:43 AM    HCT 40.7 07/04/2020 05:43 AM    HGB 12.8 07/04/2020 05:43 AM    PLATELET 141 80/87/0519 05:43 AM     Lab Results   Component Value Date/Time    Sodium 137 07/04/2020 05:43 AM    Potassium 3.0 (L) 07/04/2020 05:43 AM    Chloride 102 07/04/2020 05:43 AM    CO2 27 07/04/2020 05:43 AM    Glucose 127 (H) 07/04/2020 05:43 AM    BUN 14 07/04/2020 05:43 AM Creatinine 0.80 07/04/2020 05:43 AM    Calcium 9.0 07/04/2020 05:43 AM       Lab Results   Component Value Date/Time    LDL, calculated 93 07/04/2020 05:43 AM     Lab Results   Component Value Date/Time    Hemoglobin A1c 5.7 (H) 07/04/2020 05:43 AM    Hemoglobin A1c (POC) 5.7 08/08/2016 03:43 PM       Patient is in critical condition and is at risk for sudden deterioration. 30 minutes spent on floor examining patient and reviewing chart.

## 2020-07-05 ENCOUNTER — APPOINTMENT (OUTPATIENT)
Dept: CT IMAGING | Age: 80
DRG: 064 | End: 2020-07-05
Attending: INTERNAL MEDICINE
Payer: MEDICARE

## 2020-07-05 ENCOUNTER — APPOINTMENT (OUTPATIENT)
Dept: NON INVASIVE DIAGNOSTICS | Age: 80
DRG: 064 | End: 2020-07-05
Attending: INTERNAL MEDICINE
Payer: MEDICARE

## 2020-07-05 LAB
ANION GAP SERPL CALC-SCNC: 5 MMOL/L (ref 5–15)
BUN SERPL-MCNC: 12 MG/DL (ref 6–20)
BUN/CREAT SERPL: 13 (ref 12–20)
CALCIUM SERPL-MCNC: 8.5 MG/DL (ref 8.5–10.1)
CHLORIDE SERPL-SCNC: 103 MMOL/L (ref 97–108)
CO2 SERPL-SCNC: 30 MMOL/L (ref 21–32)
CREAT SERPL-MCNC: 0.94 MG/DL (ref 0.55–1.02)
ECHO AO ROOT DIAM: 2.93 CM
ECHO AR MAX VEL PISA: 184.31 CM/S
ECHO AR MAX VEL PISA: 83.52 CM/S
ECHO AV AREA PEAK VELOCITY: 2.85 CM2
ECHO AV AREA/BSA PEAK VELOCITY: 1.7 CM2/M2
ECHO AV CUSP MM: 2.4 CM
ECHO AV PEAK GRADIENT: 4.13 MMHG
ECHO AV PEAK VELOCITY: 101.57 CM/S
ECHO EST RA PRESSURE: 10 MMHG
ECHO LA TO AORTIC ROOT RATIO: 1.28
ECHO LA TO AORTIC ROOT RATIO: 1.28
ECHO LV E' SEPTAL VELOCITY: 6 CM/S
ECHO LV INTERNAL DIMENSION DIASTOLIC: 4.07 CM (ref 3.9–5.3)
ECHO LV INTERNAL DIMENSION SYSTOLIC: 3.49 CM
ECHO LV IVSD: 1.88 CM (ref 0.6–0.9)
ECHO LV IVSS: 2.81 CM
ECHO LV MASS 2D: 322.9 G (ref 67–162)
ECHO LV MASS INDEX 2D: 189.6 G/M2 (ref 43–95)
ECHO LV POSTERIOR WALL DIASTOLIC: 1.74 CM (ref 0.6–0.9)
ECHO LV POSTERIOR WALL SYSTOLIC: 2.16 CM
ECHO LVOT DIAM: 2.12 CM
ECHO LVOT PEAK GRADIENT: 2.7 MMHG
ECHO LVOT PEAK VELOCITY: 82.16 CM/S
ECHO MV A VELOCITY: 91.66 CM/S
ECHO MV E DECELERATION TIME (DT): 0.16 S
ECHO MV E VELOCITY: 46.5 CM/S
ECHO MV E/A RATIO: 0.51
ECHO MV E/E' SEPTAL: 7.75
ECHO PV PEAK INSTANTANEOUS GRADIENT SYSTOLIC: 2.8 MMHG
ECHO TRICUSPID ANNULAR PEAK SYSTOLIC VELOCITY: 16.7 CM/S
ERYTHROCYTE [DISTWIDTH] IN BLOOD BY AUTOMATED COUNT: 20.9 % (ref 11.5–14.5)
GLUCOSE BLD STRIP.AUTO-MCNC: 108 MG/DL (ref 65–100)
GLUCOSE SERPL-MCNC: 111 MG/DL (ref 65–100)
HCT VFR BLD AUTO: 42.2 % (ref 35–47)
HGB BLD-MCNC: 13.5 G/DL (ref 11.5–16)
MCH RBC QN AUTO: 23.9 PG (ref 26–34)
MCHC RBC AUTO-ENTMCNC: 32 G/DL (ref 30–36.5)
MCV RBC AUTO: 74.7 FL (ref 80–99)
NRBC # BLD: 0 K/UL (ref 0–0.01)
NRBC BLD-RTO: 0 PER 100 WBC
PLATELET # BLD AUTO: 285 K/UL (ref 150–400)
PMV BLD AUTO: 11 FL (ref 8.9–12.9)
POTASSIUM SERPL-SCNC: 3 MMOL/L (ref 3.5–5.1)
RBC # BLD AUTO: 5.65 M/UL (ref 3.8–5.2)
SERVICE CMNT-IMP: ABNORMAL
SODIUM SERPL-SCNC: 138 MMOL/L (ref 136–145)
WBC # BLD AUTO: 6.3 K/UL (ref 3.6–11)

## 2020-07-05 PROCEDURE — 94760 N-INVAS EAR/PLS OXIMETRY 1: CPT

## 2020-07-05 PROCEDURE — 82962 GLUCOSE BLOOD TEST: CPT

## 2020-07-05 PROCEDURE — 97161 PT EVAL LOW COMPLEX 20 MIN: CPT

## 2020-07-05 PROCEDURE — 93306 TTE W/DOPPLER COMPLETE: CPT

## 2020-07-05 PROCEDURE — 74011250637 HC RX REV CODE- 250/637: Performed by: INTERNAL MEDICINE

## 2020-07-05 PROCEDURE — 97165 OT EVAL LOW COMPLEX 30 MIN: CPT | Performed by: OCCUPATIONAL THERAPIST

## 2020-07-05 PROCEDURE — 80048 BASIC METABOLIC PNL TOTAL CA: CPT

## 2020-07-05 PROCEDURE — 74011250636 HC RX REV CODE- 250/636: Performed by: INTERNAL MEDICINE

## 2020-07-05 PROCEDURE — 85027 COMPLETE CBC AUTOMATED: CPT

## 2020-07-05 PROCEDURE — 97535 SELF CARE MNGMENT TRAINING: CPT | Performed by: OCCUPATIONAL THERAPIST

## 2020-07-05 PROCEDURE — 70450 CT HEAD/BRAIN W/O DYE: CPT

## 2020-07-05 PROCEDURE — 97530 THERAPEUTIC ACTIVITIES: CPT

## 2020-07-05 PROCEDURE — 65660000000 HC RM CCU STEPDOWN

## 2020-07-05 PROCEDURE — 36415 COLL VENOUS BLD VENIPUNCTURE: CPT

## 2020-07-05 RX ADMIN — ASPIRIN 300 MG: 300 SUPPOSITORY RECTAL at 13:27

## 2020-07-05 RX ADMIN — HEPARIN SODIUM 5000 UNITS: 5000 INJECTION INTRAVENOUS; SUBCUTANEOUS at 13:27

## 2020-07-05 RX ADMIN — DEXTROSE MONOHYDRATE, SODIUM CHLORIDE, AND POTASSIUM CHLORIDE 75 ML/HR: 50; 4.5; .745 INJECTION, SOLUTION INTRAVENOUS at 10:48

## 2020-07-05 RX ADMIN — HEPARIN SODIUM 5000 UNITS: 5000 INJECTION INTRAVENOUS; SUBCUTANEOUS at 06:11

## 2020-07-05 RX ADMIN — HEPARIN SODIUM 5000 UNITS: 5000 INJECTION INTRAVENOUS; SUBCUTANEOUS at 21:59

## 2020-07-05 NOTE — PROGRESS NOTES
Chief Complaint: stroke    Has no complaints. Laying in bed more alert and is actualy verbally interactive today. No events overnight. Assesment and Plan  1. Acute ischemic right MCA stroke ([facility] [address]  MRI: Pending  Vascular studies: Pending  A1C : 5.7  TSH: Pending  LDL: 93  Echocardiogram: Pending  Repeat CT - pending  CT: IMPRESSION:  1.  Occluded right internal carotid artery from the carotid bifurcation to the  [place] and further intraluminal thrombus at the right MCA bifurcation. 2.  Large core infarct in the right MCA distribution with a 55 cc ischemic  penumbra. 3.  Ischemic changes on delayed postcontrast head CT without evidence of  hemorrhage. 2. Atrial fibrillation with rapid ventricular response (HCC)  Continue rectal aspirin  Will assess suitability for  eliquis pending the MRI and swallow eval    3. Hyperlipoproteinemia  Continue atorvastatin    4. Left hemiparesis ([facility] [address]  With sensory neglect. Allergies  Latex; Bactrim [sulfamethoprim ds]; Aspirin; Codeine;  Iodinated contrast media; Pcn [penicillins]; and Tape [adhesive]     Medications  Current Facility-Administered Medications   Medication Dose Route Frequency    dextrose 5% - 0.45% NaCl with KCl 10 mEq/L infusion  75 mL/hr IntraVENous CONTINUOUS    acetaminophen (TYLENOL) tablet 650 mg  650 mg Oral Q4H PRN    Or    acetaminophen (TYLENOL) solution 650 mg  650 mg Per NG tube Q4H PRN    Or    acetaminophen (TYLENOL) suppository 650 mg  650 mg Rectal Q4H PRN    ondansetron (ZOFRAN) injection 4 mg  4 mg IntraVENous Q6H PRN    atorvastatin (LIPITOR) tablet 40 mg  40 mg Oral QHS    labetaloL (NORMODYNE;TRANDATE) injection 5 mg  5 mg IntraVENous Q10MIN PRN    heparin (porcine) injection 5,000 Units  5,000 Units SubCUTAneous Q8H    aspirin (ASA) suppository 300 mg  300 mg Rectal DAILY        Medical History  Past Medical History:   Diagnosis Date    Acute pharyngitis 2/8/2011    Allergic rhinitis, cause unspecified 2/8/2011    Arrhythmia     PVC    Asymptomatic varicose veins 2/8/2011    Cancer ([facility] [address]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     Carpal tunnel syndrome 2/8/2011    Degenerative Joint Disese ( improved/resolving) 2/8/2011    Degenetrative Dis Disease, 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    Diverticulosis of colon (without mention of hemorrhage) 2/8/2011    Dysphagia 2/8/2011    Edema, peripheral 2/8/2011    GERD (gastroesophageal reflux disease)     GERD Gastro- Esophageal Reflux Disease 2/8/2011    Herniated nucleus pulposus ( slipped disc) 2/8/2011    Menopausal 2/8/2011    PUD (peptic ulcer disease) 2012    Pure hypercholesterolemia 2/8/2011    Recurrent ear pain 2/8/2011    S/P radiation therapy     Scalp lesion 10/23/2014    Sebaceous cyst 4/22/2014    Unspecified cataract 2/8/2011    Unspecified essential hypertension 2/8/2011    Unspecified sleep apnea      Review of Systems   Unable to perform ROS: Mental acuity       Exam:    Visit Vitals  /89   Pulse 97   Temp 97.4 °F (36.3 °C)   Resp 18   Ht 5' 2\" (1.575 m)   Wt 152 lb 5.4 oz (69.1 kg)   SpO2 99%   Breastfeeding No   BMI 27.86 kg/m²      General: Confused   Head: Normocephalic, atraumatic, scarring on the scalp anicteric sclera   Neck Normal ROM,  No thyromegally   Lungs:  Clear to auscultatio. Cardiac: Regular rate and rhythm with no murmurs. Abd: Bowel sounds were audible. No tenderness on palpation   Ext: No pedal edema   Skin: Supple no rash     NeurologicExam:  Mental Status:  Confused but able to follow simple commands   Speech:  Hesitant, fluent. Cranial Nerves:  Opens Eyes Spontaneously  orients to sound  Dolls eyes intact  PERRLA  Corneal reflex intact  Left facial droop  Left visual neglect   Motor: Good strength on the right. Does not move the left. Reflexes:   Deep tendon reflexes 2+/4 on the left 1+ on the right   Sensory:   Left sensory neglect    Tremor:   No tremor noted. Cerebellar:  Coordination intact.  (right)   Neurovascular: No carotid bruits.  No JVD       Lab Review  Lab Results   Component Value Date/Time    WBC 9.0 07/04/2020 05:43 AM    HCT 40.7 07/04/2020 05:43 AM    HGB 12.8 07/04/2020 05:43 AM    PLATELET 626 33/19/9225 05:43 AM       Lab Results   Component Value Date/Time    Sodium 137 07/04/2020 05:43 AM    Potassium 3.0 (L) 07/04/2020 05:43 AM    Chloride 102 07/04/2020 05:43 AM    CO2 27 07/04/2020 05:43 AM    Glucose 127 (H) 07/04/2020 05:43 AM    BUN 14 07/04/2020 05:43 AM    Creatinine 0.80 07/04/2020 05:43 AM    Calcium 9.0 07/04/2020 05:43 AM       Lab Results   Component Value Date/Time    Hemoglobin A1c 5.7 (H) 07/04/2020 05:43 AM    Hemoglobin A1c (POC) 5.7 08/08/2016 03:43 PM          Lab Results   Component Value Date/Time    Cholesterol, total 185 07/04/2020 05:43 AM    Cholesterol (POC) 168 03/12/2020 12:50 PM    HDL Cholesterol 73 07/04/2020 05:43 AM    HDL Cholesterol (POC) 57 03/12/2020 12:50 PM    LDL Cholesterol (POC) 93 03/12/2020 12:50 PM    LDL, calculated 93 07/04/2020 05:43 AM    VLDL, calculated 19 07/04/2020 05:43 AM    Triglyceride 95 07/04/2020 05:43 AM    Triglycerides (POC) 96 03/12/2020 12:50 PM    CHOL/HDL Ratio 2.5 07/04/2020 05:43 AM

## 2020-07-05 NOTE — PROGRESS NOTES
Problem: Self Care Deficits Care Plan (Adult)  Goal: *Acute Goals and Plan of Care (Insert Text)  Description: FUNCTIONAL STATUS PRIOR TO ADMISSION: per chart pt lived alone, amb with RW, I with ADLs. HOME SUPPORT: The patient lived alone with family in area. Occupational Therapy Goals  Initiated 7/5/2020   1. Patient will perform grooming with supervision/set-up within 7 day(s). 2.  Patient will perform upper body dressing with min A using troy technique PRN within 7 day(s). 3.  Patient will locate and utilize 25% of ADL objects presented to L of midline with minimal assistance/contact guard and assist and cues within 7 day(s). 4.  Patient will perform sitting at EOB without UE support for 2 minutes with minimal assistance/contact guard assist within 7 day(s). 5.  Patient will follow 50% of simple one step commands for ADL within 7 day(s). 6.  Patient will participate in upper extremity therapeutic exercise/activities with minimal assistance/contact guard assist for 5 minutes within 7 day(s). 7.  Patient will utilize energy conservation techniques during functional activities with verbal, visual and tactile cues within 7 day(s). 8.  Patient will improve their Fugl Lo score by 5 points in prep for ADLs within 7 days. Outcome: Progressing Towards Goal    OCCUPATIONAL THERAPY EVALUATION  Patient: Car Nguyen (78 y.o. female)  Date: 7/5/2020  Primary Diagnosis: CVA (cerebral vascular accident) [facility]) [I63.9]        Precautions:   Aspiration, Bed Alarm, Fall    ASSESSMENT  Based on the objective data described below, the patient presents with decreased I with basic ADLs due to L UE hemiplegia, L sided neglect, decreased command following during ADLs, decreased static and dynamic sitting balance, and decreased arousal upon sitting s/p L MCA CVA with carotid occlusion.   Pt lethargic this date, Neurology present on arrival and approve OT eval.  Pt able to follow simple commands about 25% of time when OT presented on R side. Pt unable to track or locate ADL objects presented on L of midline, minimum focusing noted at midline. Unable to elicit any contraction on L UE, provided passive ROM in all planes and for all joints L UE. Pt able to complete simple grooming task with increased cues to attend to L side of face. Pt able to come to sitting with total A x 2, max A to mod A for sitting balance secondary to posterior pushing and L lateral lean. Noticed decreased arousal upon sitting. BP supine 152/82, sitting 100/71. Returned supine with L UE supported and RN aware, arousal improved to previous level with return supine. Current Level of Function Impacting Discharge (ADLs/self-care): total A for transfers, max A for most ADLs    Functional Outcome Measure: The patient scored Total A-D  Total A-D (Motor Function): 0/66 on the Fugl-Lo Assessment which is indicative of severe impairment in upper extremity functional status. Other factors to consider for discharge: pt was living alone prior to admit per chart. L neglect, orthostatic hypotension       Patient will benefit from skilled therapy intervention to address the above noted impairments. PLAN :  Recommendations and Planned Interventions: self care training, functional mobility training, therapeutic exercise, visual/perceptual training, therapeutic activities, cognitive retraining, endurance activities, neuromuscular re-education, patient education, and home safety training    Frequency/Duration: Patient will be followed by occupational therapy 5 times a week to address goals. Recommendation for discharge: (in order for the patient to meet his/her long term goals)  Therapy up to 5 days/week in SNF setting vs inpt rehab should arousal improve.       This discharge recommendation:  Has not yet been discussed the attending provider and/or case management    IF patient discharges home will need the following DME: TBD       SUBJECTIVE: Patient stated Tissue. 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    Diverticulosis of colon (without mention of hemorrhage) 2/8/2011    Dysphagia 2/8/2011    Edema, peripheral 2/8/2011    GERD (gastroesophageal reflux disease)     GERD Gastro- Esophageal Reflux Disease 2/8/2011    Herniated nucleus pulposus ( slipped disc) 2/8/2011    Menopausal 2/8/2011    PUD (peptic ulcer disease) 2012    Pure hypercholesterolemia 2/8/2011    Recurrent ear pain 2/8/2011    S/P radiation therapy     Scalp lesion 10/23/2014    Sebaceous cyst 4/22/2014    Unspecified cataract 2/8/2011    Unspecified essential hypertension 2/8/2011    Unspecified sleep apnea      Past Surgical History:   Procedure Laterality Date    HX HYSTERECTOMY      HX ORTHOPAEDIC  neck/back 2006    HX OTHER SURGICAL  5/8/2014     Excise recurrent scalp lesion and application of ACell    HX OTHER SURGICAL  5/8/2014    Excise keloid, anterior chest wall, and application of ACell    HX OTHER SURGICAL  5/8/2014     Excise sebaceous cyst, anterior chest wall    HX OTHER SURGICAL  5/8/2014    Punch biopsy, skin lesions, right forearm x2, right calf x1    CO ICAR CATHETER ABLATION ATRIOVENTR NODE FUNCTION N/A 5/6/2019    ABLATION AV NODE performed by Kwame Huizar MD at [address], [facility] CATH LAB    CO INS NEW/RPLCMT PRM PM W/TRANSV ELTRD ATRIAL&VENT  9/30/2017         CO RMVL IMPLANTABLE PT-ACTIVATED CAR EVENT RECORDER  9/30/2017          Expanded or extensive additional review of patient history:     Home Situation  Home Environment: Private residence  One/Two Story Residence: Other (Comment)  Living Alone: Yes(per chart)  Support Systems: Child(jeniffer)  Patient Expects to be Discharged to[de-identified] Private residence  Current DME Used/Available at Home: (unable to provide history)    Hand dominance: Right    EXAMINATION OF PERFORMANCE DEFICITS:  Cognitive/Behavioral Status:  Neurologic State: Lethargic;Eyes open to stimulus  Orientation Level: Oriented to person;Disoriented to place; Disoriented to time;Disoriented to situation(states name only)  Cognition: Decreased attention/concentration;Decreased command following  Perception: Cues to maintain midline in sitting; Tactile;Verbal;Visual;Cues to attend to left side of body;Cues to attend left visual field  Perseveration: Perseverates during conversation;Verbal cues provided  Safety/Judgement: Lack of insight into deficits    Skin: UE intact, wound to scalp and flaky skin B LE    Edema: UE intact    Hearing: Auditory  Auditory Impairment: None    Vision/Perceptual:                                     Range of Motion:    AROM: Grossly decreased, non-functional  PROM: Generally decreased, functional                      Strength:  LUE, moves R UE against gravity  Strength: Grossly decreased, non-functional                Coordination:  Coordination: Generally decreased, functional(R UE, L UE flaccid throughout session.  )  Fine Motor Skills-Upper: Left Impaired;Right Intact    Gross Motor Skills-Upper: Left Impaired;Right Intact    Tone & Sensation:    Tone: Abnormal(increased L side)  Sensation: (unable to assess )                      Balance:  Sitting: Impaired  Sitting - Static: None(heavy L lean )  Sitting - Dynamic: None(heavy L lean )    Functional Mobility and Transfers for ADLs:  Bed Mobility:  Rolling: Total assistance;Assist x2  Supine to Sit: Total assistance;Assist x2  Sit to Supine: Total assistance;Assist x2  Scooting: Total assistance;Assist x2    Transfers: Toilet Transfer :  Total assistance(Unsafe to attempt due to decreased arousal with sitting)    ADL Assessment:  Feeding: Other (comment)(NPO, not assessed, noted secretions collecting in mouth)    Oral Facial Hygiene/Grooming: Moderate assistance(for L side of face)    Bathing: Total assistance    Upper Body Dressing: Maximum assistance    Lower Body Dressing: Total assistance    Toileting: Total assistance                ADL Intervention and task modifications:       Grooming  Grooming Assistance: Moderate assistance  Position Performed: Seated edge of bed(and supine)  Washing Face: Moderate assistance; Compensatory technique training  Washing Hands: Moderate assistance; Compensatory technique training  Cues: Visual cues provided;Visual/perceptual training/retraining;Physical assistance                             Cognitive Retraining  Safety/Judgement: Lack of insight into deficits      Functional Measure:  Fugl-Lo Assessment of Motor Recovery after Stroke:   Reflex Activity  Flexors/Biceps/Fingers: None  Extensors/Triceps: None  Reflex Subtotal: 0    Volitional Movement Within Synergies  Shoulder Retraction: None  Shoulder Elevation: None  Shoulder Abduction (90 degrees): None  Shoulder External Rotation: None  Elbow Flexion: None  Forearm Supination: None  Shoulder Adduction/Internal Rotation: None  Elbow Extension: None  Forearm Pronation: None  Subtotal: 0    Volitional Movement Mixing Synergies  Hand to Lumbar Spine: None  Shoulder Flexion (0-90 degrees): None  Pronation-Supination: None  Subtotal: 0    Volitional Movement With Little or No Synergy  Shoulder Abduction (0-90 degrees): None  Shoulder Flexion ( degrees): None  Pronation/Supination: None  Subtotal : 0    Normal Reflex Activity  Biceps, Triceps, Finger Flexors: None  Subtotal : 0    Upper Extremity Total   Upper Extremity Total: 0    Wrist  Stability at 15 Degree Dorsiflexion: None  Repeated Dorsiflexion/ Volar Flexion: None  Stability at 15 Degree Dorsiflexion: None  Repeated Dorsiflexion/ Volar Flexion: None  Circumduction: None  Wrist Total: 0    Hand  Mass Flexion: None  Mass Extension: None  Grasp A: None  Grasp B: None  Grasp C: None  Grasp D: None  Grasp E: None  Hand Total: 0    Coordination/Speed  Tremor: Marked  Dysmetria: Marked  Time: >5s  Coordination/Speed Total : 0    Total A-D  Total A-D (Motor Function): 0/66     This is a reliable/valid measure of arm function after a neurological event. It has established value to characterize functional status and for measuring spontaneous and therapy-induced recovery; tests proximal and distal motor functions. Fugl-Lo Assessment - UE scores recorded between five and 30 days post neurologic event can be used to predict UE recovery at six months post neurologic event. Severe = 0-21 points   Moderately Severe = 22-33 points   Moderate = 34-47 points   Mild = 48-66 points  Lexy Goldberg, D., TRINIDAD Mcintyre, & ANGÉLICA Bautista (1992). Measurement of motor recovery after stroke: Outcome assessment and sample size requirements.  Stroke, 23, pp. 0640-5252.   ------------------------------------------------------------------------------------------------------------------------------------------------------------------  MCID:  Stroke:   Diogo Benítez et al, 2001; n = 171; mean age 79 (5) years; assessed within 16 (12) days of stroke, Acute Stroke)  FMA Motor Scores from Admission to Discharge    10 point increase in FMA Upper Extremity = 1.5 change in discharge FIM    10 point increase in FMA Lower Extremity = 1.9 change in discharge FIM  MDC:   Stroke:   Brissa Rhodes et al, 2008, n = 14, mean age = 59.9 (14.6) years, assessed on average 14 (6.5) months post stroke, Chronic Stroke)    FMA = 5.2 points for the Upper Extremity portion of the assessment     Occupational Therapy Evaluation Charge Determination   History Examination Decision-Making   LOW Complexity : Brief history review  MEDIUM Complexity : 3-5 performance deficits relating to physical, cognitive , or psychosocial skils that result in activity limitations and / or participation restrictions MEDIUM Complexity : Patient may present with comorbidities that affect occupational performnce. Miniml to moderate modification of tasks or assistance (eg, physical or verbal ) with assesment(s) is necessary to enable patient to complete evaluation       Based on the above components, the patient evaluation is determined to be of the following complexity level: LOW   Pain Rating:  No c.o pain during session. Activity Tolerance:   Fair and drop in BP with sitting. Decreased arousal, returned to baseline with supine. Please refer to the flowsheet for vital signs taken during this treatment. After treatment patient left in no apparent distress:    Supine in bed, Heels elevated for pressure relief, Call bell within reach, and L UE supported on pillows and L hand placed in functional arch. COMMUNICATION/EDUCATION:   The patients plan of care was discussed with: Registered nurse. And PT      This patients plan of care is appropriate for delegation to [facility] [place].     Thank you for this referral.  Andreas Aguilera OTR/L  Time Calculation: 27 mins

## 2020-07-05 NOTE — PROGRESS NOTES
Oncology End of Shift Note      Bedside shift change report given to Sadiq Mesa RN (incoming nurse) by Jcaque Garcia (outgoing nurse) on Aamir Curling. Report included the following information SBAR, Kardex, Intake/Output and MAR. Shift Summary: was very alert at shift change, became more legthargic during the night, wants her son to be kept informed about how she is doing, no compliants of pain, held PO meds due to NPO (failed speech eval today, probably due to legthergy), bunny boots placed on heels to protect, dual skin assessment done, turned, will need a representative from [employer] deactive her pacer before her MRI which is supposed to be Tuesday, there is concerns about her going home (she lives alone, which is not sufficient for her needs right now), family has several cameras in the home and checks on her but this may not be enough     Issues for Physician to Address:       Patient on Cardiac Monitoring?    [x] Yes  [] No    Rhythm: Paced         Shift Events        Jacque Garcia

## 2020-07-05 NOTE — PROGRESS NOTES
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 No hemorrhage is identified  CTA  H&N:  1. Occluded right internal carotid artery from the carotid bifurcation to the  [place] and further intraluminal thrombus at the right MCA bifurcation. 2.  Large core infarct in the right MCA distribution with a 55 cc ischemic  penumbra. Ischemic changes on delayed postcontrast head CT without evidence of hemorrhage.   LDL= 93  A1c= 5.7  Trop neg    Patient was not a TPA candidate and neuro interventionalist did not recc any interventions  Complete the stroke work-up of the brain without contrast when able- pt has compatible PPM?  Check 2D echo when available  Cont rectal aspirin, and statin when able to take p.o.  Keep Strict n.p.o. as per SLP eval  IV labetalol prn  Holding Xarelto for concern for hemorrhagic conversion of this large stroke   Neuro consult note- recommends repeat CT head today to r/o complications  Consider Eliquis when able to take PO & CT head neg for hemorrhagic complications  IP Palliative care consulted - catastrophic large CVA with severe Dysphagia          Hypertension  Hold all BP meds for now, resume as needed now    Arrhythmia  Xarelto on her PTA list, hold it , not  sure if she is taking it   med rec for pharmacy      Code Status: Full code confirmed in ER with the daughter and the son who is a power of   Surrogate Decision Maker:  Cal Arciniega [phone]         DVT Prophylaxis: Heparin  GI Prophylaxis: not indicated     Baseline: Walk with a walker    Recommended Disposition: SNF/LTC +/- hospice? ?  TBD     Subjective:     Chief Complaint / Reason for Physician Visit :F/u Large R MCA CVA, dysphagia,   \"Sleepy\". Discussed with RN events overnight. Review of Systems:  Symptom Y/N Comments  Symptom Y/N Comments   Fever/Chills    Chest Pain     Poor Appetite    Edema     Cough    Abdominal Pain     Sputum    Joint Pain     SOB/FRANK    Pruritis/Rash     Nausea/vomit    Tolerating PT/OT     Diarrhea    Tolerating Diet     Constipation    Other       Could NOT obtain due to: Aphasia/sleepy     Objective:     VITALS:   Last 24hrs VS reviewed since prior progress note. Most recent are:  Patient Vitals for the past 24 hrs:   Temp Pulse Resp BP SpO2   07/05/20 0648 97.4 °F (36.3 °C) 97 18 147/89 99 %   07/05/20 0404 98.5 °F (36.9 °C) 73 18 (!) 124/91 97 %   07/04/20 2249 98.9 °F (37.2 °C) 75 18 (!) 126/94 100 %   07/04/20 1853 99.4 °F (37.4 °C) 81 18 (!) 122/94 95 %   07/04/20 1549 (!) 100.7 °F (38.2 °C) 79 16 (!) 149/96 97 %   07/04/20 1138 99.9 °F (37.7 °C) 75 18 142/89 98 %       Intake/Output Summary (Last 24 hours) at 7/5/2020 0929  Last data filed at 7/4/2020 2100  Gross per 24 hour   Intake 1371.25 ml   Output 500 ml   Net 871.25 ml        PHYSICAL EXAM:  General: WD, WN.drowsy, non cooperative, no acute distress    EENT:  EOMI. Anicteric sclerae. MMM  Resp:  CTA bilaterally, no wheezing or rales. No accessory muscle use  CV:  Regular  rhythm,  No edema  GI:  Soft, Non distended, Non tender.  +Bowel sounds  Neurologic:  Sleepy, unable to follow commands, severe Oropharyngeal Dysphagia noted +  Psych:   Un able to assess  Skin:  No rashes.   No jaundice    Reviewed most current lab test results and cultures  YES  Reviewed most current radiology test results   YES  Review and summation of old records today    NO  Reviewed patient's current orders and MAR    YES  PMH/SH reviewed - no change compared to H&P  ________________________________________________________________________  Care Plan discussed with:    Comments   Patient     Family      RN x    Care Manager     Consultant                        Multidiciplinary team rounds were held today with , nursing, pharmacist and clinical coordinator. Patient's plan of care was discussed; medications were reviewed and discharge planning was addressed. ________________________________________________________________________  Total NON critical care TIME:  26   Minutes    Total CRITICAL CARE TIME Spent:   Minutes non procedure based      Comments   >50% of visit spent in counseling and coordination of care     ________________________________________________________________________  Levi Cade MD     Procedures: see electronic medical records for all procedures/Xrays and details which were not copied into this note but were reviewed prior to creation of Plan. LABS:  I reviewed today's most current labs and imaging studies.   Pertinent labs include:  Recent Labs     07/04/20  0543 07/03/20  1543   WBC 9.0 6.7   HGB 12.8 12.8   HCT 40.7 39.8    315     Recent Labs     07/04/20  0543 07/03/20  1543    138   K 3.0* 3.0*    103   CO2 27 27   * 126*   BUN 14 19   CREA 0.80 0.75   CA 9.0 9.0   MG 2.1  --    PHOS 2.8  --    ALB  --  3.4*   TBILI  --  0.5   ALT  --  22   INR  --  1.1       Signed: Levi Cade MD

## 2020-07-05 NOTE — PROGRESS NOTES
Nutrition Assessment:    RECOMMENDATIONS:   Advance diet as medically able per SLP     DIETITIANS INTERVENTIONS/PLAN:   Advance diet as tolerated    ASSESSMENT:   Pt admitted with CVA. PMH: CAD, HTN, dysphagia, GERD. Chart reviewed remotely for MST for wt loss and poor appetite. Unable to complete NFPE at this time given remote assessment, will plan to complete upon F/U. Per EMR it does appear pt has had 12lb wt loss since March, however ? Accuracy as there is no wt source available for current wt. Will need to discuss with pt upon F/U. SLP saw pt yesterday and recommended NPO for now. Labs reviewed, WNL. Will monitor need for supplements as diet advances. Unable to determine malnutrition status at this time. SUBJECTIVE/OBJECTIVE:     Diet Order: NPO  % Eaten:  No data found. Pertinent Medications:KCl; IVF(D5, Saadia@[employer])      Chemistries:  Lab Results   Component Value Date/Time    Sodium 137 07/04/2020 05:43 AM    Potassium 3.0 (L) 07/04/2020 05:43 AM    Chloride 102 07/04/2020 05:43 AM    CO2 27 07/04/2020 05:43 AM    Anion gap 8 07/04/2020 05:43 AM    Glucose 127 (H) 07/04/2020 05:43 AM    BUN 14 07/04/2020 05:43 AM    Creatinine 0.80 07/04/2020 05:43 AM    BUN/Creatinine ratio 18 07/04/2020 05:43 AM    GFR est AA >60 07/04/2020 05:43 AM    GFR est non-AA >60 07/04/2020 05:43 AM    Calcium 9.0 07/04/2020 05:43 AM    Alk. phosphatase 106 07/03/2020 03:43 PM    Protein, total 8.0 07/03/2020 03:43 PM    Albumin 3.4 (L) 07/03/2020 03:43 PM    Globulin 4.6 (H) 07/03/2020 03:43 PM    A-G Ratio 0.7 (L) 07/03/2020 03:43 PM    ALT (SGPT) 22 07/03/2020 03:43 PM      Anthropometrics: Height: 5' 2\" (157.5 cm) Weight: 69.1 kg (152 lb 5.4 oz)  [] standing scale     []bed scale    []stated   [x]unknown    IBW (%IBW):   ( ) UBW (%UBW):   (  %)    BMI: Body mass index is 27.86 kg/m².     This BMI is indicative of:  []Underweight   [x]Normal(for age)   []Overweight   [] Obesity   [] Extreme Obesity (BMI>40)  Estimated Nutrition Needs (Based on): 1450 Kcals/day(MSJ 1109 x 1.3) , 69 g(1gPro/kg) Protein  Carbohydrate: At Least 130 g/day  Fluids: 1500 mL/day    Last BM: PTA   [x]Active     []Hyperactive  []Hypoactive       [] Absent   BS  Skin:    [x] Intact   [] Incision  [] Breakdown   [] DTI   [] Tears/Excoriation/Abrasion  []Edema [] Other: Wt Readings from Last 30 Encounters:   07/03/20 69.1 kg (152 lb 5.4 oz)   06/16/20 70.8 kg (156 lb)   03/12/20 75.3 kg (166 lb)   10/31/19 72.6 kg (160 lb)   09/26/19 74.4 kg (164 lb)   08/06/19 74.7 kg (164 lb 10.9 oz)   06/29/19 77.1 kg (170 lb)   05/20/19 77.1 kg (170 lb)   05/09/19 77.1 kg (170 lb)   05/06/19 77.1 kg (170 lb)   05/02/19 77.1 kg (170 lb)   04/23/19 77.1 kg (170 lb)   04/03/19 78 kg (172 lb)   03/28/19 78.5 kg (173 lb)   03/09/19 78.9 kg (174 lb)   03/06/19 77.6 kg (171 lb)   01/09/19 77.1 kg (170 lb)   12/31/18 78.9 kg (174 lb)   11/29/18 75.3 kg (166 lb)   10/10/18 78.5 kg (173 lb)   09/06/18 76.2 kg (168 lb)   08/21/18 81.2 kg (179 lb)   06/19/18 78.5 kg (173 lb)   06/07/18 77.1 kg (170 lb)   05/10/18 78.5 kg (173 lb)   02/15/18 81 kg (178 lb 9.6 oz)   02/08/18 78.9 kg (174 lb)   01/24/18 78 kg (172 lb)   12/07/17 78 kg (172 lb)   09/29/17 78 kg (172 lb)      NUTRITION DIAGNOSES:   Problem:  Inadequate protein-energy intake      Etiology: related to pt unsafe to swallow and NPO     Signs/Symptoms: as evidenced by NPO meets 0% kcal and protein needs. NUTRITION INTERVENTIONS:                     GOAL:   Plan of care will be determined re: nutrition in 1-3 days.     Cultural, Worship, or Ethnic Dietary Needs: None     LEARNING NEEDS (Diet, Food/Nutrient-Drug Interaction):    [x] None Identified   [] Identified and Education Provided/Documented   [] Identified and Pt declined/was not appropriate      [x] Interdisciplinary Care Plan Reviewed/Documented    [x] Participated in Discharge Planning: TBD   [] Interdisciplinary Rounds     NUTRITION RISK: [x] High              [x] Moderate           []  Low  []  Minimal/Uncompromised    PT SEEN FOR:    []  MD Consult: []Calorie Count      []Diabetic Diet Education        []Diet Education     []Electrolyte Management     []General Nutrition Management and Supplements     []Management of Tube Feeding     []TPN Recommendations    [x]  RN Referral:  [x]MST score >=2     []Enteral/Parenteral Nutrition PTA     []Pregnant: Gestational DM or Multigestation   []  Low BMI  []  Re-Screen   []  LOS   []  NPO/clears x 5 days   []  New TF/TPN    Mary Singh, RD, [address]    Pager [phone]  Weekend Pager [phone]

## 2020-07-05 NOTE — PROGRESS NOTES
Pharmacy Medication Reconciliation     The patient was NOT interviewed regarding current PTA medication list, use and drug allergies. Patient per notes was unarousable yesterday and no answer today from the patient's RN to see if the patient is a good candidate to speak about medications or what family member would be best to speak with. Medication list is based on what was filled recently in Rx Query. Allergy Update: Latex; Bactrim [sulfamethoprim ds]; Aspirin; Codeine; Iodinated contrast media; Pcn [penicillins]; and Tape [adhesive]    Recommendations/Findings: The following amendments were made to the patient's active medication list on file at [facility]:   1) Additions: None    2) Deletions:        - Furosemide       - Toprol 100 mg    3) Changes: None      Pertinent Findings:        - Patient filled Xarelto 15 mg on  for a 90 day supply so based on this she is likely still taking the medication.        - Cannot clarify if she is still taking the OTC medications.    -Clarified PTA med list with Rx Query. PTA medication list was corrected to the following:     Prior to Admission Medications   Prescriptions Last Dose Informant Taking? MULTIVIT WITH CALCIUM,IRON,MIN [facility] MULTIPLE VITAMINS PO) Unknown at Unknown time Self No   Sig: Take 1 Tab by mouth daily. acetaminophen (TYLENOL) 325 mg tablet Unknown at Unknown time Self No   Sig: Take 650 mg by mouth nightly. amLODIPine (NORVASC) 5 mg tablet   No   Sig: Take 1 Tab by mouth daily. aspirin delayed-release 81 mg tablet Unknown at Unknown time Self No   Sig: Take 81 mg by mouth daily. cyclobenzaprine (FLEXERIL) 10 mg tablet   No   Sig: Take 1 Tab by mouth three (3) times daily as needed for Muscle Spasm(s).    fluticasone propionate (FLONASE) 50 mcg/actuation nasal spray Unknown at Unknown time Self No   Si Sprays by Both Nostrils route daily as needed for Rhinitis.   gabapentin (NEURONTIN) 300 mg capsule   No   Sig: Take 1 Cap by mouth three (3) times daily. meclizine (ANTIVERT) 25 mg tablet   No   Sig: TAKE 1 TABLET THREE TIMES DAILY AS NEEDED   metoprolol succinate (TOPROL-XL) 25 mg XL tablet   No   Sig: TAKE 1 TABLET BY MOUTH EVERY DAY   omeprazole (PRILOSEC) 40 mg capsule   No   Sig: TAKE 1 CAPSULE EVERY DAY   potassium chloride SR (KLOR-CON 10) 10 mEq tablet   No   Sig: TAKE 1 TABLET EVERY DAY   rivaroxaban (Xarelto) 15 mg tab tablet   No   Sig: Take 1 Tab by mouth daily (with dinner).       Facility-Administered Medications: None          Thank you,  Florian Woody, PHARMD

## 2020-07-05 NOTE — PROGRESS NOTES
Problem: Falls - Risk of  Goal: *Absence of Falls  Description: Document Neeraj Shiv Fall Risk and appropriate interventions in the flowsheet. Outcome: Progressing Towards Goal  Note: Fall Risk Interventions:       Mentation Interventions: Bed/chair exit alarm    Medication Interventions: Bed/chair exit alarm    Elimination Interventions: Bed/chair exit alarm, Call light in reach    History of Falls Interventions: Bed/chair exit alarm         Problem: Patient Education: Go to Patient Education Activity  Goal: Patient/Family Education  Outcome: Progressing Towards Goal     Problem: Pressure Injury - Risk of  Goal: *Prevention of pressure injury  Description: Document Howard Scale and appropriate interventions in the flowsheet.   Outcome: Progressing Towards Goal  Note: Pressure Injury Interventions:  Sensory Interventions: Assess changes in LOC    Moisture Interventions: Absorbent underpads, Apply protective barrier, creams and emollients    Activity Interventions: Assess need for specialty bed, Pressure redistribution bed/mattress(bed type)    Mobility Interventions: Float heels, Pressure redistribution bed/mattress (bed type)    Nutrition Interventions: Document food/fluid/supplement intake, Offer support with meals,snacks and hydration    Friction and Shear Interventions: Lift team/patient mobility team                Problem: Patient Education: Go to Patient Education Activity  Goal: Patient/Family Education  Outcome: Progressing Towards Goal     Problem: Patient Education: Go to Patient Education Activity  Goal: Patient/Family Education  Outcome: Progressing Towards Goal

## 2020-07-05 NOTE — PROGRESS NOTES
Okay to administer Aspirin suppository per Dr. Jose Daniel Alicea- Not a true allergy and Pt not currently on Eliquis.

## 2020-07-05 NOTE — PROGRESS NOTES
Bedside shift change report given to [facility] AT RODERICK BUCKNER RN (oncoming nurse) by Khloe Benjamin RN (offgoing nurse). Report included the following information SBAR, Kardex, Intake/Output, MAR and Recent Results.

## 2020-07-05 NOTE — PROGRESS NOTES
* No surgery found *  * No surgery found *  Bedside shift change report given to hal RN  (oncoming nurse) by Des London RN (offgoing nurse). Report included the following information SBAR, Kardex and Quality Measures.     Zone Phone:   [phone]      Significant changes during shift:  None        Patient Information    Jacqui Mahmood  78 y.o.  7/3/2020  2:59 PM by Zuri Masterson MD. 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[address] 05/06/2019    PAF (paroxysmal atrial fibrillation) ([address] 05/06/2019    Atrial fibrillation with rapid ventricular response ([address] 05/06/2019    S/P cardiac pacemaker procedure 09/29/2017    Bradycardia 09/29/2017    SSS (sick sinus syndrome) ([address] 09/29/2017    CAD (coronary artery disease) 02/17/2016    Abnormal nuclear stress test 02/11/2016    Syncope 02/11/2016    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    Scalp lesion 10/23/2014    Sebaceous cyst 04/22/2014    Keloid 04/22/2014    Dysphagia 02/08/2011    Recurrent ear pain 02/08/2011    Acute pharyngitis 02/08/2011    Essential hypertension 02/08/2011    Edema, peripheral 02/08/2011    Pure hypercholesterolemia 02/08/2011    Allergic rhinitis, cause unspecified 02/08/2011    Diverticulosis of colon (without mention of hemorrhage) 02/08/2011    Herniated nucleus pulposus ( slipped disc) 02/08/2011    Degenetrative Dis Disease, 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    Carpal tunnel syndrome 02/08/2011    Unspecified cataract 02/08/2011    GERD Gastro- Esophageal Reflux Disease 02/08/2011    Degenerative Joint Disese ( improved/resolving) 02/08/2011    Menopausal 02/08/2011    Asymptomatic varicose veins 02/08/2011     Past Medical History:   Diagnosis Date    Acute pharyngitis 2/8/2011    Allergic rhinitis, cause unspecified 2/8/2011    Arrhythmia     PVC    Asymptomatic varicose veins 2/8/2011    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     Carpal tunnel syndrome 2/8/2011    Degenerative Joint Disese ( improved/resolving) 2/8/2011    Degenetrative Dis Disease, 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    Diverticulosis of colon (without mention of hemorrhage) 2/8/2011    Dysphagia 2/8/2011    Edema, peripheral 2/8/2011    GERD (gastroesophageal reflux disease)     GERD Gastro- Esophageal Reflux Disease 2/8/2011    Herniated nucleus pulposus ( slipped disc) 2/8/2011    Menopausal 2/8/2011    PUD (peptic ulcer disease) 2012    Pure hypercholesterolemia 2/8/2011    Recurrent ear pain 2/8/2011    S/P radiation therapy     Scalp lesion 10/23/2014    Sebaceous cyst 4/22/2014    Unspecified cataract 2/8/2011    Unspecified essential hypertension 2/8/2011    Unspecified sleep apnea          Core Measures:    CVA: Yes Yes  CHF:No No  PNA:No No    Post Op Surgical (If Applicable):     Number times ambulated in hallway past shift:  0  Number of times OOB to chair past shift:   0  NG Tube: No  Incentive Spirometer: No  Drains: No   Volume  0  Dressing Present:  No  Flatus:  Yes    Activity Status:    OOB to Chair No  Ambulated this shift No   Bed Rest Yes    Supplemental O2: (If Applicable)    NC No  NRB No  Venti-mask No  On 0 Liters/min      LINES AND DRAINS:    Central Line? No Placement date 0 Reason Medically Necessary 0    PICC LINE? No Placement date 0Reason Medically Necessary 0    Urinary Catheter? No Placement Date 0 Reason Medically Necessary 0    DVT prophylaxis:    DVT prophylaxis Med- Yes  DVT prophylaxis SCD or MEY- No     Wounds: (If Applicable)    Wounds- Yes open areas    Location [place]anning upper groin area    Patient Safety:    Falls Score Total Score: 4  Safety Level_______  Bed Alarm On? Yes  Sitter?  No    Plan for upcoming shift: TBD        Discharge Plan: No TBD    Active Consults:  IP CONSULT TO NEUROLOGY  IP CONSULT TO PALLIATIVE CARE - PROVIDER

## 2020-07-05 NOTE — PROGRESS NOTES
Dual skin assessment with Walker:  ~ Open areas to upper alejandra groin areas  ~Right leg is leathery, dry, shrink   ~left leg is scaly, dry, shrink, katie, flaky  ~Pink patch to the back  ~Graft area with scabs to head  ~old scar to chest

## 2020-07-05 NOTE — PROGRESS NOTES
Problem: Mobility Impaired (Adult and Pediatric)  Goal: *Acute Goals and Plan of Care (Insert Text)  Description:   FUNCTIONAL STATUS PRIOR TO ADMISSION: unclear     HOME SUPPORT PRIOR TO ADMISSION: The patient lived at home alone? Family noted in documentation. Physical Therapy Goals  Initiated 7/5/2020  1. Patient will move from supine to sit and sit to supine , scoot up and down and roll side to side in bed with maximal assistance within 7 day(s). 2.  Patient will transfer from bed to chair and chair to bed with maximal assistance using the least restrictive device within 7 day(s). 3.  Patient will perform sit to stand with maximal assistance within 7 day(s). 4.  Patient will ambulate with maximal assistance for 3 feet with the least restrictive device within 7 day(s). 5. Patient will improve Arizmendi Balance score by 7 points within 7 days. Outcome: Progressing Towards Goal   PHYSICAL THERAPY EVALUATION- NEURO POPULATION  Patient: Jacqui Mahmood (78 y.o. female)  Date: 7/5/2020  Primary Diagnosis: CVA (cerebral vascular accident) [facility]) [I63.9]        Precautions:  Aspiration, Bed Alarm, Fall      ASSESSMENT  Based on the objective data described below, the patient presents with L sided hemiplegia and neglect impacting balance and functional mobility s/p CT + R MCA stroke. Pt further impacted by orthostatic hypotension with drop from 152/82 in supine to 100/71 with immediate decline in alertness noted. While briefly at EOB, noted lack of sitting balance with total A of 2 required for all mobility and pt pushing strongly to her L side even with occ use of RUE. Noted increased tone in LLE with pt volitionally moving her R side only as L side 0/5 MMT. Patient will need rehabilitation at [facility] pending planned palliative care discussion. Current Level of Function Impacting Discharge (mobility/balance): total A x2     Functional Outcome Measure:  The patient scored Total: 0/56 on the [facility] which is indicative of high fall risk. Other factors to consider for discharge: orthostatic hypotension     Patient will benefit from skilled therapy intervention to address the above noted impairments. PLAN :  Recommendations and Planned Interventions: bed mobility training, transfer training, gait training, therapeutic exercises, neuromuscular re-education, orthotic/prosthetic training, patient and family training/education, and therapeutic activities      Frequency/Duration: Patient will be followed by physical therapy:  5 times a week to address goals. Recommendation for discharge: (in order for the patient to meet his/her long term goals)  Therapy up to 5 days/week in SNF setting    This discharge recommendation:  Has not yet been discussed the attending provider and/or case management    IF patient discharges home will need the following DME: to be determined (TBD)         SUBJECTIVE:   Patient stated [facility].     OBJECTIVE DATA SUMMARY:   HISTORY:    Past Medical History:   Diagnosis Date    Acute pharyngitis 2/8/2011    Allergic rhinitis, cause unspecified 2/8/2011    Arrhythmia     PVC    Asymptomatic varicose veins 2/8/2011    Cancer ([facility]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     Carpal tunnel syndrome 2/8/2011    Degenerative Joint Disese ( improved/resolving) 2/8/2011    Degenetrative Dis Disease, 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    Diverticulosis of colon (without mention of hemorrhage) 2/8/2011    Dysphagia 2/8/2011    Edema, peripheral 2/8/2011    GERD (gastroesophageal reflux disease)     GERD Gastro- Esophageal Reflux Disease 2/8/2011    Herniated nucleus pulposus ( slipped disc) 2/8/2011    Menopausal 2/8/2011    PUD (peptic ulcer disease) 2012    Pure hypercholesterolemia 2/8/2011    Recurrent ear pain 2/8/2011    S/P radiation therapy     Scalp lesion 10/23/2014    Sebaceous cyst 4/22/2014    Unspecified cataract 2/8/2011    Unspecified essential hypertension 2/8/2011    Unspecified sleep apnea      Past Surgical History:   Procedure Laterality Date    HX HYSTERECTOMY      HX ORTHOPAEDIC  neck/back 2006    HX OTHER SURGICAL  5/8/2014     Excise recurrent scalp lesion and application of ACell    HX OTHER SURGICAL  5/8/2014    Excise keloid, anterior chest wall, and application of ACell    HX OTHER SURGICAL  5/8/2014     Excise sebaceous cyst, anterior chest wall    HX OTHER SURGICAL  5/8/2014    Punch biopsy, skin lesions, right forearm x2, right calf x1    MS ICAR CATHETER ABLATION ATRIOVENTR NODE FUNCTION N/A 5/6/2019    ABLATION AV NODE performed by Yoli Paul MD at [address] CATH LAB    MS INS NEW/RPLCMT PRM PM W/TRANSV ELTRD ATRIAL&VENT  9/30/2017         MS RMVL IMPLANTABLE PT-ACTIVATED CAR EVENT RECORDER  9/30/2017            Personal factors and/or comorbidities impacting plan of care: see above [address]: Private residence  One/Two Story Residence: Other (Comment)  Living Alone: Yes  Support Systems: Child(jeniffer)  Patient Expects to be Discharged to[de-identified] Unknown  Current DME Used/Available at Home: None    EXAMINATION/PRESENTATION/DECISION MAKING:   Critical Behavior:  Neurologic State: Lethargic, Eyes open to stimulus  Orientation Level: Oriented to person, Disoriented to place, Disoriented to time, Disoriented to situation(states name only)  Cognition: Decreased attention/concentration, Decreased command following  Safety/Judgement: Lack of insight into deficits  Hearing:   Auditory  Auditory Impairment: None  Skin:  multiple areas of change noted on scalp, BLE ankles distally, R scapula     Range Of Motion:  AROM: Grossly decreased, non-functional(L sided hemiplegia )           PROM: Generally decreased, functional(L sided tone )           Strength:    Strength: Grossly decreased, non-functional(L sided hemiplegia )                    Tone & Sensation:   Tone: Abnormal(increased L side)              Sensation: (unable to assess )               Coordination:  Coordination: Grossly decreased, non-functional  Vision:  did not fixate despite max cues with complete L sided neglect; unable to move past midline      Functional Mobility:  Bed Mobility:  Rolling: Total assistance;Assist x2  Supine to Sit: Total assistance;Assist x2  Sit to Supine: Total assistance;Assist x2  Scooting: Total assistance;Assist x2          Balance:   Sitting: Impaired  Sitting - Static: None(heavy L lean )  Sitting - Dynamic: None(heavy L lean )  Functional Measure  Roland Balance Test:    Sitting to Standin  Standing Unsupported: 0  Sitting with Back Unsupported: 0  Standing to Sittin  Transfers: 0  Standing Unsupported with Eyes Closed: 0  Standing Unsupported with Feet Together: 0  Reach Forward with Outstretched Arm: 0   Object: 0  Turn to Look Over Shoulders: 0  Turn 360 Degrees: 0  Alternate Foot on Step/Stool: 0  Standing Unsupported One Foot in Front: 0  Stand on One Le  Total: 0/56         56=Maximum possible score;   0-20=High fall risk  21-40=Moderate fall risk   41-56=Low fall risk        Physical Therapy Evaluation Charge Determination   History Examination Presentation Decision-Making   HIGH Complexity :3+ comorbidities / personal factors will impact the outcome/ POC  HIGH Complexity : 4+ Standardized tests and measures addressing body structure, function, activity limitation and / or participation in recreation  HIGH Complexity : Unstable and unpredictable characteristics  Other outcome measures roland  HIGH       Based on the above components, the patient evaluation is determined to be of the following complexity level: HIGH       Activity Tolerance:   Poor and signs and symptoms of orthostatic hypotension  Please refer to the flowsheet for vital signs taken during this treatment.     After treatment patient left in no apparent distress:   Supine in bed, Heels elevated for pressure relief, Call bell within reach, Bed / chair alarm activated, and Side rails x 3    COMMUNICATION/EDUCATION: The patients plan of care was discussed with: Occupational therapist and Registered nurse. Due to pts status, and lack of family presence, no education to include BEFAST provided. Patient is unable to participate in goal setting and plan of care.     Thank you for this referral.  Leisa Arzola, PT, DPT   Board-Certified Geriatric Clinical Specialist   Certified Exercise Expert for Aging Adults      Time Calculation: 25 mins

## 2020-07-06 LAB
ANION GAP SERPL CALC-SCNC: 5 MMOL/L (ref 5–15)
BUN SERPL-MCNC: 9 MG/DL (ref 6–20)
BUN/CREAT SERPL: 12 (ref 12–20)
CALCIUM SERPL-MCNC: 8.8 MG/DL (ref 8.5–10.1)
CHLORIDE SERPL-SCNC: 104 MMOL/L (ref 97–108)
CO2 SERPL-SCNC: 28 MMOL/L (ref 21–32)
CREAT SERPL-MCNC: 0.75 MG/DL (ref 0.55–1.02)
ERYTHROCYTE [DISTWIDTH] IN BLOOD BY AUTOMATED COUNT: 20.3 % (ref 11.5–14.5)
GLUCOSE SERPL-MCNC: 114 MG/DL (ref 65–100)
HCT VFR BLD AUTO: 40.4 % (ref 35–47)
HGB BLD-MCNC: 12.7 G/DL (ref 11.5–16)
MCH RBC QN AUTO: 24 PG (ref 26–34)
MCHC RBC AUTO-ENTMCNC: 31.4 G/DL (ref 30–36.5)
MCV RBC AUTO: 76.2 FL (ref 80–99)
NRBC # BLD: 0 K/UL (ref 0–0.01)
NRBC BLD-RTO: 0 PER 100 WBC
PLATELET # BLD AUTO: 272 K/UL (ref 150–400)
PMV BLD AUTO: 10.6 FL (ref 8.9–12.9)
POTASSIUM SERPL-SCNC: 3.3 MMOL/L (ref 3.5–5.1)
RBC # BLD AUTO: 5.3 M/UL (ref 3.8–5.2)
SODIUM SERPL-SCNC: 137 MMOL/L (ref 136–145)
WBC # BLD AUTO: 7.1 K/UL (ref 3.6–11)

## 2020-07-06 PROCEDURE — 65660000000 HC RM CCU STEPDOWN

## 2020-07-06 PROCEDURE — 74011250636 HC RX REV CODE- 250/636: Performed by: INTERNAL MEDICINE

## 2020-07-06 PROCEDURE — 85027 COMPLETE CBC AUTOMATED: CPT

## 2020-07-06 PROCEDURE — 77030038269 HC DRN EXT URIN PURWCK BARD -A

## 2020-07-06 PROCEDURE — 74011250637 HC RX REV CODE- 250/637: Performed by: INTERNAL MEDICINE

## 2020-07-06 PROCEDURE — 36415 COLL VENOUS BLD VENIPUNCTURE: CPT

## 2020-07-06 PROCEDURE — 97112 NEUROMUSCULAR REEDUCATION: CPT | Performed by: OCCUPATIONAL THERAPIST

## 2020-07-06 PROCEDURE — 97530 THERAPEUTIC ACTIVITIES: CPT | Performed by: OCCUPATIONAL THERAPIST

## 2020-07-06 PROCEDURE — 92526 ORAL FUNCTION THERAPY: CPT

## 2020-07-06 PROCEDURE — 80048 BASIC METABOLIC PNL TOTAL CA: CPT

## 2020-07-06 PROCEDURE — 97112 NEUROMUSCULAR REEDUCATION: CPT | Performed by: PHYSICAL THERAPIST

## 2020-07-06 PROCEDURE — 97530 THERAPEUTIC ACTIVITIES: CPT | Performed by: PHYSICAL THERAPIST

## 2020-07-06 RX ADMIN — HEPARIN SODIUM 5000 UNITS: 5000 INJECTION INTRAVENOUS; SUBCUTANEOUS at 15:58

## 2020-07-06 RX ADMIN — ASPIRIN 300 MG: 300 SUPPOSITORY RECTAL at 11:13

## 2020-07-06 RX ADMIN — DEXTROSE MONOHYDRATE, SODIUM CHLORIDE, AND POTASSIUM CHLORIDE 75 ML/HR: 50; 4.5; .745 INJECTION, SOLUTION INTRAVENOUS at 01:49

## 2020-07-06 RX ADMIN — DEXTROSE MONOHYDRATE, SODIUM CHLORIDE, AND POTASSIUM CHLORIDE 75 ML/HR: 50; 4.5; .745 INJECTION, SOLUTION INTRAVENOUS at 13:00

## 2020-07-06 RX ADMIN — HEPARIN SODIUM 5000 UNITS: 5000 INJECTION INTRAVENOUS; SUBCUTANEOUS at 22:59

## 2020-07-06 RX ADMIN — HEPARIN SODIUM 5000 UNITS: 5000 INJECTION INTRAVENOUS; SUBCUTANEOUS at 05:03

## 2020-07-06 NOTE — PROGRESS NOTES
Problem: Self Care Deficits Care Plan (Adult)  Goal: *Acute Goals and Plan of Care (Insert Text)  Description: FUNCTIONAL STATUS PRIOR TO ADMISSION: per chart pt lived alone, amb with RW, I with ADLs. HOME SUPPORT: The patient lived alone with family in area. Occupational Therapy Goals  Initiated 7/5/2020   1. Patient will perform grooming with supervision/set-up within 7 day(s). 2.  Patient will perform upper body dressing with min A using troy technique PRN within 7 day(s). 3.  Patient will locate and utilize 25% of ADL objects presented to L of midline with minimal assistance/contact guard and assist and cues within 7 day(s). 4.  Patient will perform sitting at EOB without UE support for 2 minutes with minimal assistance/contact guard assist within 7 day(s). 5.  Patient will follow 50% of simple one step commands for ADL within 7 day(s). 6.  Patient will participate in upper extremity therapeutic exercise/activities with minimal assistance/contact guard assist for 5 minutes within 7 day(s). 7.  Patient will utilize energy conservation techniques during functional activities with verbal, visual and tactile cues within 7 day(s). 8.  Patient will improve their Fugl Lo score by 5 points in prep for ADLs within 7 days. Outcome: Progressing Towards Goal   OCCUPATIONAL THERAPY TREATMENT  Patient: Shanon Jackson (78 y.o. female)  Date: 7/6/2020  Diagnosis: CVA (cerebral vascular accident) [facility]) [I63.9]   <principal problem not specified>       Precautions: Aspiration, Bed Alarm, Fall  Chart, occupational therapy assessment, plan of care, and goals were reviewed. ASSESSMENT  Patient continues with skilled OT services and is progressing towards goals. Pt demonstrates strong visual gaze to right with limited searching behaviors this date to find people and objects in the room.   Pt needs max A X2 for bed mobility and sitting EOB where she demonstrates a strong posterior lean with fear of falling (Pt stated, \"Do you guarantee it? \" -- re: that she won't fall while mobilizing). Significant vision and mobility impairments overall; her LUE remains flaccid and non functional.  Educated pt's son on appropriate positioning of LUE during tx session. Pt will need extensive rehab at discharge if that is what her family wishes. .      Current Level of Function Impacting Discharge (ADLs): maximal assistance X2    Other factors to consider for discharge: large stroke         PLAN :  Patient continues to benefit from skilled intervention to address the above impairments. Continue treatment per established plan of care. to address goals. Recommend with staff: continue to encourage vision to pt's L and vision searching behaviors. Recommend next OT session: simple self care/vision    Recommendation for discharge: (in order for the patient to meet his/her long term goals)  Therapy up to 5 days/week in SNF setting    This discharge recommendation:  Has not yet been discussed the attending provider and/or case management    IF patient discharges home will need the following DME: tbd--would need hosp bed, irma lift, 24 hour direct care       SUBJECTIVE:   Patient stated I can hear him, but I can't see him.   (her son positioned at pt's midline and then also positioned in her L vision field)    OBJECTIVE DATA SUMMARY:   Cognitive/Behavioral Status:  Neurologic State: Drowsy initially  Orientation Level: Oriented to person;Oriented to time;Disoriented to place; Disoriented to situation  Cognition: Decreased attention/concentration  Perception: Cues to maintain midline in sitting;Cues to attend left visual field;Cues to attend to left side of body  Perseveration: No perseveration noted       Functional Mobility and Transfers for ADLs:  Bed Mobility:  Rolling: Maximum assistance(resistant and fearful when rolling to L)  Supine to Sit: Maximum assistance;Assist x2  Sit to Supine: Maximum assistance;Assist x2  Scooting: Total assistance    Transfers:             Balance:  Sitting: Impaired  Sitting - Static: Poor (constant support)(strong posterior and L lateral lean/pushing)  Sitting - Dynamic: Not tested    ADL Intervention:   Educated son in LUE positioning at bed level--son verbalized understanding  Educated in importance of supporting the LUE and edema management. Mild to moderate edema LUE at this time. (may need isotoner glove)                                       Therapeutic Exercises:   Worked on trunk control and upright sitting, tends to lean laterally and posteriorly    Pain:  No complaints of pain    Activity Tolerance:   Fair  Please refer to the flowsheet for vital signs taken during this treatment :  dynamap with error message during BP attempts in sitting.       After treatment patient left in no apparent distress:   Supine in bed, Call bell within reach, Caregiver / family present, Side rails x 3, and SLP in to work with pt post OT    COMMUNICATION/COLLABORATION:   The patients plan of care was discussed with: Physical therapist, Speech therapist    Jia Womack OTR/L  Time Calculation: 26 mins

## 2020-07-06 NOTE — CONSULTS
Palliative Medicine Consult  Eusebio: [phone] ([phone])    Patient Name: Nancie Henriquez  YOB: 1940    Date of Initial Consult: 7/3/2020  Reason for Consult: Care Decisions  Requesting Provider: Chivo Grant MD  Primary Care Physician: Wayne Mtz MD     SUMMARY:   Nancie Henriquez is a 78 y.o. with a past history of HTN, GERD and stage 4 throat cancer s/p radiation, who was admitted on 7/3/2020 from home with a diagnosis of Acute large right MCA stroke, with an occluded right internal carotid artery from the carotid bifurcation to the [place] and further intraluminal thrombus at the Right MCA bifurcation. Current medical issues leading to Palliative Medicine involvement include: goals of care discussion in the setting of large stroke and inability to swallow    Social:  Ms Jose Livingston has three children, Carol Nur and Hari Corado. She is a . She lived alone in [facility], but is very close to her three kids     PALLIATIVE DIAGNOSES:   1. DNR Discussion  2. Goals of Care  3. Lethargy  4. Debility       PLAN:   1. Prior to meeting Ms Jose Livingston I did an extensive review of the chart, and spoke with Speech Therapy  2. Ms Jose Livingston was awake and alert when I saw her, and we chatted for about 5 min, but then she abruptly fell asleep. Nursing shared that this has been her norm today. She was able to tell me she has 3 kids, two sons and a daughter, she knew she was in the hospital, but did not remember why  3. I had a lengthy conversation with her son Juan Pablo Pedroza. He has a lot of apprehension about his mom being discharged, because they cannot visit her in the nursing home and they can in the hospital  4. He already knew that PEG was on the table, but shared that he and his sisters do not want to rush into this decision, as they know it will be a big impact on their moms quality of life.   Even so, he also knows that his mom has always wanted any and everything done to keep her alive, so if she does not recover her ability to swallow in the next day or two, he is pretty sure they do want to go ahead with PEG  5. He shared that Ms Benito Prado  was a DNR when he passed away, and this was talked about a lot in their family as she did not agree with this and always said she wanted to remain a full code no matter what  6. Marcy Horton was doing his best to ask as many questions as he could think of, but in the end we made the plan to have a conference call with his sister tomorrow so that they could hear the answer to all their questions first hand  7. Thank you to neurology for keeping this family so well informed- Marcy Horton shared so much gratitude to them for their information  8. Will follow up with the family tomorrow  5. Initial consult note routed to primary continuity provider and/or primary health care team members  10. Communicated plan of care with: Palliative [facility] Team     GOALS OF CARE / TREATMENT PREFERENCES:     GOALS OF CARE:  Patient/Health Care Proxy Stated Goals: Rehabilitation    TREATMENT PREFERENCES:   Code Status: Full Code    Advance Care Planning:  [x] [facility] Interdisciplinary Team has updated the ACP Navigator with Health Care Decision Maker and Patient Capacity      Primary Decision MakerSalvatorguero Jaimes - Son - [phone]    Primary Decision Maker: Eva Subramanian - Daughter - [phone]    Primary Decision Maker: Uriah Perez - Daughter - [phone]    Medical Interventions: Full interventions     Other Instructions: Artificially Administered Nutrition: (family discussing)     Other:    As far as possible, the palliative care team has discussed with patient / health care proxy about goals of care / treatment preferences for patient.      HISTORY:     History obtained from: chart, son Estrellita Norris: none    HPI/SUBJECTIVE:    The patient is:   [x] Verbal and participatory  [] Non-participatory due to:   Limited participation due to fatigue    7/6/2020:  Still unable to participate in speech therapy       Clinical Pain Assessment (nonverbal scale for severity on nonverbal patients):   Clinical Pain Assessment  Severity: 0     Activity (Movement): Lying quietly, normal position    Duration: for how long has pt been experiencing pain (e.g., 2 days, 1 month, years)  Frequency: how often pain is an issue (e.g., several times per day, once every few days, constant)     FUNCTIONAL ASSESSMENT:     Palliative Performance Scale (PPS):          PSYCHOSOCIAL/SPIRITUAL SCREENING:     Palliative IDT has assessed this patient for cultural preferences / practices and a referral made as appropriate to needs (Cultural Services, Patient Advocacy, Ethics, etc.)    Any spiritual / Alevism concerns:  [] Yes /  [x] No    Caregiver Burnout:  [] Yes /  [x] No /  [] No Caregiver Present      Anticipatory grief assessment:   [x] Normal  / [] Maladaptive       ESAS Anxiety: Anxiety: 0    ESAS Depression: Depression: 0        REVIEW OF SYSTEMS:     Positive and pertinent negative findings in ROS are noted above in HPI. The following systems were [x] reviewed / [] unable to be reviewed as noted in HPI  Other findings are noted below. Systems: constitutional, ears/nose/mouth/throat, respiratory, gastrointestinal, genitourinary, musculoskeletal, integumentary, neurologic, psychiatric, endocrine. Positive findings noted below. Modified ESAS Completed by: provider   Fatigue: 8     Depression: 0 Pain: 0   Anxiety: 0 Nausea: 0     Dyspnea: 0                    PHYSICAL EXAM:     From RN flowsheet:  Wt Readings from Last 3 Encounters:   07/05/20 152 lb 5.4 oz (69.1 kg)   06/16/20 156 lb (70.8 kg)   03/12/20 166 lb (75.3 kg)     Blood pressure 150/81, pulse 69, temperature 98.6 °F (37 °C), resp. rate 18, height 5' 2\" (1.575 m), weight 152 lb 5.4 oz (69.1 kg), SpO2 98 %, not currently breastfeeding.     Pain Scale 1: Numeric (0 - 10)  Pain Intensity 1: 0                 Last bowel movement, if known: Constitutional: awake, alert, conversational, but then fell fast asleep mid conversation  Eyes: pupils equal, anicteric  ENMT: no nasal discharge, moist mucous membranes  Respiratory: breathing not labored, symmetric  Skin: warm, dry  Other:       HISTORY:     Active Problems:    * No active hospital problems.  *    Past Medical History:   Diagnosis Date    Acute pharyngitis 2/8/2011    Allergic rhinitis, cause unspecified 2/8/2011    Arrhythmia     PVC    Asymptomatic varicose veins 2/8/2011    Cancer ([facility] [address]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     Carpal tunnel syndrome 2/8/2011    Degenerative Joint Disese ( improved/resolving) 2/8/2011    Degenetrative Dis Disease, 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    Diverticulosis of colon (without mention of hemorrhage) 2/8/2011    Dysphagia 2/8/2011    Edema, peripheral 2/8/2011    GERD (gastroesophageal reflux disease)     GERD Gastro- Esophageal Reflux Disease 2/8/2011    Herniated nucleus pulposus ( slipped disc) 2/8/2011    Menopausal 2/8/2011    PUD (peptic ulcer disease) 2012    Pure hypercholesterolemia 2/8/2011    Recurrent ear pain 2/8/2011    S/P radiation therapy     Scalp lesion 10/23/2014    Sebaceous cyst 4/22/2014    Unspecified cataract 2/8/2011    Unspecified essential hypertension 2/8/2011    Unspecified sleep apnea       Past Surgical History:   Procedure Laterality Date    HX HYSTERECTOMY      HX ORTHOPAEDIC  neck/back 2006    HX OTHER SURGICAL  5/8/2014     Excise recurrent scalp lesion and application of ACell    HX OTHER SURGICAL  5/8/2014    Excise keloid, anterior chest wall, and application of ACell    HX OTHER SURGICAL  5/8/2014     Excise sebaceous cyst, anterior chest wall    HX OTHER SURGICAL  5/8/2014    Punch biopsy, skin lesions, right forearm x2, right calf x1    AR ICAR CATHETER ABLATION ATRIOVENTR NODE FUNCTION N/A 5/6/2019    ABLATION AV NODE performed by Yessi Saucedo MD at [place] [address], [facility]  CATH LAB    AR INS NEW/RPLCMT PRM PM W/TRANSV ELTRD ATRIAL&VENT  9/30/2017         [place] RMVL IMPLANTABLE PT-ACTIVATED CAR EVENT RECORDER  9/30/2017           Family History   Problem Relation Age of Onset    Hypertension Mother     Stroke Mother     Hypertension Father     Cancer Father         PROSTATE, MELANOMA    Anesth Problems Neg Hx       History reviewed, no pertinent family history.   Social History     Tobacco Use    Smoking status: Never Smoker    Smokeless tobacco: Never Used   Substance Use Topics    Alcohol use: No     Allergies   Allergen Reactions    Latex Rash    Bactrim [Sulfamethoprim Ds] Hives    Aspirin Other (comments)     Anything higher than 81mg makes pt jittery    Codeine Hives and Itching    Iodinated Contrast Media Itching    Pcn [Penicillins] Hives and Itching    Tape [Adhesive] Rash      Current Facility-Administered Medications   Medication Dose Route Frequency    dextrose 5% - 0.45% NaCl with KCl 10 mEq/L infusion  75 mL/hr IntraVENous CONTINUOUS    acetaminophen (TYLENOL) tablet 650 mg  650 mg Oral Q4H PRN    Or    acetaminophen (TYLENOL) solution 650 mg  650 mg Per NG tube Q4H PRN    Or    acetaminophen (TYLENOL) suppository 650 mg  650 mg Rectal Q4H PRN    ondansetron (ZOFRAN) injection 4 mg  4 mg IntraVENous Q6H PRN    atorvastatin (LIPITOR) tablet 40 mg  40 mg Oral QHS    labetaloL (NORMODYNE;TRANDATE) injection 5 mg  5 mg IntraVENous Q10MIN PRN    heparin (porcine) injection 5,000 Units  5,000 Units SubCUTAneous Q8H    aspirin (ASA) suppository 300 mg  300 mg Rectal DAILY          LAB AND IMAGING FINDINGS:     Lab Results   Component Value Date/Time    WBC 7.1 07/06/2020 04:55 AM    HGB 12.7 07/06/2020 04:55 AM    PLATELET 548 83/15/7895 04:55 AM     Lab Results   Component Value Date/Time    Sodium 137 07/06/2020 04:55 AM    Potassium 3.3 (L) 07/06/2020 04:55 AM    Chloride 104 07/06/2020 04:55 AM    CO2 28 07/06/2020 04:55 AM    BUN 9 07/06/2020 04:55 AM    Creatinine 0.75 07/06/2020 04:55 AM Calcium 8.8 07/06/2020 04:55 AM    Magnesium 2.1 07/04/2020 05:43 AM    Phosphorus 2.8 07/04/2020 05:43 AM      Lab Results   Component Value Date/Time    Alk. phosphatase 106 07/03/2020 03:43 PM    Protein, total 8.0 07/03/2020 03:43 PM    Albumin 3.4 (L) 07/03/2020 03:43 PM    Globulin 4.6 (H) 07/03/2020 03:43 PM     Lab Results   Component Value Date/Time    INR 1.1 07/03/2020 03:43 PM    Prothrombin time 11.5 (H) 07/03/2020 03:43 PM    aPTT 27.4 10/10/2011 11:15 AM      No results found for: IRON, FE, TIBC, IBCT, PSAT, FERR   No results found for: PH, PCO2, PO2  No components found for: [place]   Lab Results   Component Value Date/Time     (H) 03/09/2019 02:43 PM    CK - MB 2.4 03/09/2019 02:43 PM                Total time:   Counseling / coordination time, spent as noted above:   > 50% counseling / coordination?:     Prolonged service was provided for  []30 min   []75 min in face to face time in the presence of the patient, spent as noted above. Time Start:   Time End:   Note: this can only be billed with 15256 (initial) or 30798 (follow up). If multiple start / stop times, list each separately.

## 2020-07-06 NOTE — PROGRESS NOTES
Bedside shift change report given to Edie Sommers RN  (oncoming nurse) by Warren Giron (offgoing nurse).  Report included the following information SBAR, Kardex and Quality Measures.     Zone Phone:   [phone]        Significant changes during shift: Awaiting MRI           Patient Information     Diamond Zuluaga  78 y.o.  7/3/2020  2:59 PM by Ajay Crenshaw MD. 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[address] 05/06/2019    PAF (paroxysmal atrial fibrillation) ([address] 05/06/2019    Atrial fibrillation with rapid ventricular response ([address] 05/06/2019    S/P cardiac pacemaker procedure 09/29/2017    Bradycardia 09/29/2017    SSS (sick sinus syndrome) ([address] 09/29/2017    CAD (coronary artery disease) 02/17/2016    Abnormal nuclear stress test 02/11/2016    Syncope 02/11/2016    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    Scalp lesion 10/23/2014    Sebaceous cyst 04/22/2014    Keloid 04/22/2014    Dysphagia 02/08/2011    Recurrent ear pain 02/08/2011    Acute pharyngitis 02/08/2011    Essential hypertension 02/08/2011    Edema, peripheral 02/08/2011    Pure hypercholesterolemia 02/08/2011    Allergic rhinitis, cause unspecified 02/08/2011    Diverticulosis of colon (without mention of hemorrhage) 02/08/2011    Herniated nucleus pulposus ( slipped disc) 02/08/2011    Degenetrative Dis Disease, 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    Carpal tunnel syndrome 02/08/2011    Unspecified cataract 02/08/2011    GERD Gastro- Esophageal Reflux Disease 02/08/2011    Degenerative Joint Disese ( improved/resolving) 02/08/2011    Menopausal 02/08/2011    Asymptomatic varicose veins 02/08/2011           Past Medical History:   Diagnosis Date    Acute pharyngitis 2/8/2011    Allergic rhinitis, cause unspecified 2/8/2011    Arrhythmia       PVC    Asymptomatic varicose veins 2/8/2011    Cancer ([address] 2009     stage 4 throat     Carpal tunnel syndrome 2/8/2011    Degenerative Joint Disese ( improved/resolving) 2/8/2011    Degenetrative Dis Disease, 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    Diverticulosis of colon (without mention of hemorrhage) 2/8/2011    Dysphagia 2/8/2011    Edema, peripheral 2/8/2011    GERD (gastroesophageal reflux disease)      GERD Gastro- Esophageal Reflux Disease 2/8/2011    Herniated nucleus pulposus ( slipped disc) 2/8/2011    Menopausal 2/8/2011    PUD (peptic ulcer disease) 2012    Pure hypercholesterolemia 2/8/2011    Recurrent ear pain 2/8/2011    S/P radiation therapy      Scalp lesion 10/23/2014    Sebaceous cyst 4/22/2014    Unspecified cataract 2/8/2011    Unspecified essential hypertension 2/8/2011    Unspecified sleep apnea              Core Measures:     CVA: Yes Yes  CHF:No No  PNA:No No     Post Op Surgical (If Applicable):      Number times ambulated in hallway past shift:  0  Number of times OOB to chair past shift:   0  NG Tube: No  Incentive Spirometer: No  Drains: No   Volume  0  Dressing Present:  No  Flatus: Yes     Activity Status:     OOB to Chair No  Ambulated this shift No   Bed Rest Yes     Supplemental O2: (If Applicable)     NC No  NRB No  Venti-mask No  On 0 Liters/min        LINES AND DRAINS:     Central Line? No Placement date 0 Reason Medically Necessary 0     PICC LINE? No Placement date 0Reason Medically Necessary 0     Urinary Catheter? No Placement Date 0 Reason Medically Necessary 0     DVT prophylaxis:     DVT prophylaxis Med- Yes  DVT prophylaxis SCD or MEY- No      Wounds: (If Applicable)     Wounds- Yes open areas     Location [place] upper groin area     Patient Safety:     Falls Score Total Score: 4  Safety Level_______  Bed Alarm On? Yes  Sitter?  No     Plan for upcoming shift: TBD           Discharge Plan: No TBD     Active Consults:  IP CONSULT TO NEUROLOGY  IP CONSULT TO PALLIATIVE CARE - PROVIDER

## 2020-07-06 NOTE — PROGRESS NOTES
Received bedside handoff report from [facility] AT [facility], [address] [place] [address] and assume patient care.

## 2020-07-06 NOTE — PROGRESS NOTES
Bedside shift change report given to [facility], [id number] [facility] nurse) by Irma Orantes RN (offgoing nurse).  Report included the following information SBAR, Kardex and Quality Measures.     Zone JYPLR:  [id number]        Significant changes during shift: none         Patient Information     Diamond Zuluaga  78 y.o.  7/3/2020  2:59 PM by Darlene Carrero MD. 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[address] 05/06/2019    PAF ([facility]oxysmal atrial fibrillation) ([facility] 05/06/2019    Atrial fibrillation with rapid ventricular response ([address] 05/06/2019    S/P cardiac pacemaker procedure 09/29/2017    Bradycardia 09/29/2017    SSS (sick sinus syndrome) ([address] 09/29/2017    CAD (coronary artery disease) 02/17/2016    Abnormal nuclear stress test 02/11/2016    Syncope 02/11/2016    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    Scalp lesion 10/23/2014    Sebaceous cyst 04/22/2014    Keloid 04/22/2014    Dysphagia 02/08/2011    Recurrent ear pain 02/08/2011    Acute pharyngitis 02/08/2011    Essential hypertension 02/08/2011    Edema, peripheral 02/08/2011    Pure hypercholesterolemia 02/08/2011    Allergic rhinitis, cause unspecified 02/08/2011    Diverticulosis of colon (without mention of hemorrhage) 02/08/2011    Herniated nucleus pulposus ( slipped disc) 02/08/2011    Degenetrative Dis Disease, 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    Carpal tunnel syndrome 02/08/2011    Unspecified cataract 02/08/2011    GERD Gastro- Esophageal Reflux Disease 02/08/2011    Degenerative Joint Disese ( improved/resolving) 02/08/2011    Menopausal 02/08/2011    Asymptomatic varicose veins 02/08/2011              Past Medical History:   Diagnosis Date    Acute pharyngitis 2/8/2011    Allergic rhinitis, cause unspecified 2/8/2011    Arrhythmia       PVC    Asymptomatic varicose veins 2/8/2011    Cancer ([address] 2009     stage 4 throat     Carpal tunnel syndrome 2/8/2011    Degenerative Joint Disese ( improved/resolving) 2/8/2011    Degenetrative Dis Disease, 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    Diverticulosis of colon (without mention of hemorrhage) 2/8/2011    Dysphagia 2/8/2011    Edema, peripheral 2/8/2011    GERD (gastroesophageal reflux disease)      GERD Gastro- Esophageal Reflux Disease 2/8/2011    Herniated nucleus pulposus ( slipped disc) 2/8/2011    Menopausal 2/8/2011    PUD (peptic ulcer disease) 2012    Pure hypercholesterolemia 2/8/2011    Recurrent ear pain 2/8/2011    S/P radiation therapy      Scalp lesion 10/23/2014    Sebaceous cyst 4/22/2014    Unspecified cataract 2/8/2011    Unspecified essential hypertension 2/8/2011    Unspecified sleep apnea              Core Measures:     CVA: Yes Yes  CHF:No No  PNA:No No     Post Op Surgical (If Applicable):      Number times ambulated in hallway past shift:  0  Number of times OOB to chair past shift:   0  NG Tube: No  Incentive Spirometer: No  Drains: No   Volume  0  Dressing Present:  No  Flatus:  Yes     Activity Status:     OOB to Chair No  Ambulated this shift No   Bed Rest Yes     Supplemental R5: (UN Applicable)     NC No  NRB No  Venti-mask No  On 0 Liters/min        LINES AND DRAINS:     Central Line? No Placement date 0 Reason Medically Necessary 0     PICC LINE? No Placement date 0Reason Medically Necessary 0     Urinary Catheter? No Placement Date 0 Reason Medically Necessary 0     DVT prophylaxis:     DVT prophylaxis Med- Yes  DVT prophylaxis SCD or MEY- No      Wounds: (If Applicable)     Wounds- Yes open areas     Location [place] upper groin area     Patient Safety:     Falls Score Total Score: 4  Safety Level_______  Bed Alarm On? Yes  Sitter? No     Plan for upcoming shift: palliative will speak with family           Discharge Plan: No TBD     Active Consults:  IP CONSULT TO NEUROLOGY  IP CONSULT TO PALLIATIVE CARE - PROVIDER

## 2020-07-06 NOTE — PROGRESS NOTES
Problem: Falls - Risk of  Goal: *Absence of Falls  Description: Document Daysi Wright Fall Risk and appropriate interventions in the flowsheet. Outcome: Progressing Towards Goal  Note: Fall Risk Interventions:       Mentation Interventions: Adequate sleep, hydration, pain control    Medication Interventions: Bed/chair exit alarm    Elimination Interventions: Call light in reach, Bed/chair exit alarm    History of Falls Interventions: Bed/chair exit alarm         Problem: Patient Education: Go to Patient Education Activity  Goal: Patient/Family Education  Outcome: Progressing Towards Goal     Problem: Pressure Injury - Risk of  Goal: *Prevention of pressure injury  Description: Document Howard Scale and appropriate interventions in the flowsheet.   Outcome: Progressing Towards Goal  Note: Pressure Injury Interventions:  Sensory Interventions: Minimize linen layers, Discuss PT/OT consult with provider    Moisture Interventions: Absorbent underpads, Apply protective barrier, creams and emollients, Internal/External urinary devices    Activity Interventions: Increase time out of bed    Mobility Interventions: HOB 30 degrees or less    Nutrition Interventions: Document food/fluid/supplement intake, Offer support with meals,snacks and hydration    Friction and Shear Interventions: Lift team/patient mobility team                Problem: Patient Education: Go to Patient Education Activity  Goal: Patient/Family Education  Outcome: Progressing Towards Goal     Problem: Patient Education: Go to Patient Education Activity  Goal: Patient/Family Education  Outcome: Progressing Towards Goal     Problem: Patient Education: Go to Patient Education Activity  Goal: Patient/Family Education  Outcome: Progressing Towards Goal     Problem: Patient Education: Go to Patient Education Activity  Goal: Patient/Family Education  Outcome: Progressing Towards Goal

## 2020-07-06 NOTE — PROGRESS NOTES
Hospitalist Progress Note    NAME: Tolu Pollard   :  1940   MRN:  [id number]       Assessment / Plan:  Acute right MCA stroke POA- large on initial CT (catastrophic), Evolving on repeat CT yesterday ()  Occluded right internal carotid artery from the carotid bifurcation to the [place] and further intraluminal thrombus at the right MCA bifurcation POA  Acute Encephalopathy POA- due to above  CT brain:  Large acute right MCA infarct with suggestion of intraluminal thrombus in the  MCA. No hemorrhage is identified  CTA  H&N:  1. Occluded right internal carotid artery from the carotid bifurcation to the  [place] and further intraluminal thrombus at the right MCA bifurcation. 2.  Large core infarct in the right MCA distribution with a 55 cc ischemic  penumbra. Ischemic changes on delayed postcontrast head CT without evidence of hemorrhage.   LDL= 93  A1c= 5.7  Trop neg    Patient was not a TPA candidate and neuro interventionalist did not recc any interventions  Complete the stroke work-up of the brain without contrast when able- pt has compatible PPM?  2D echo -EF 56-45%, severe Diastolic Dysfunction noted  Cont rectal aspirin, and statin when able to take p.o.- currently strict NPO per SLP eval on saturday  Keep Strict n.p.o. as per SLP eval  IV labetalol prn  Holding Xarelto for now till able to PO  Pt is at a risk for hemorrhagic conversion of this large stroke , repeat CT = no bleed noted  Neuro consult noted-  Consider Eliquis when able to take PO   IP Palliative care consulted - catastrophic large CVA with severe Dysphagia, awaited input as will likely need PEG tube for feeding long term if decision is to continue aggressive care       Hypertension  Hold all BP meds for now, resume as needed now    Arrhythmia  Xarelto on her PTA list, hold it , not  sure if she is taking it   med rec for pharmacy      Code Status: Full code confirmed in ER with the daughter and the son who is a power of   Surrogate Decision Maker:  Ron Disla [phone]         DVT Prophylaxis: Heparin  GI Prophylaxis: not indicated     Baseline: Walk with a walker    Recommended Disposition: SNF/LTC +/- hospice? ?  TBD- Palliative consult awaited     Subjective:     Chief Complaint / Reason for Physician Visit :F/u Large R MCA CVA, dysphagia,   \"Sleepy\". Discussed with RN events overnight. Review of Systems:  Symptom Y/N Comments  Symptom Y/N Comments   Fever/Chills    Chest Pain     Poor Appetite    Edema     Cough    Abdominal Pain     Sputum    Joint Pain     SOB/FRANK    Pruritis/Rash     Nausea/vomit    Tolerating PT/OT     Diarrhea    Tolerating Diet     Constipation    Other       Could NOT obtain due to: Aphasia/sleepy     Objective:     VITALS:   Last 24hrs VS reviewed since prior progress note. Most recent are:  Patient Vitals for the past 24 hrs:   Temp Pulse Resp BP SpO2   07/06/20 0637 99.2 °F (37.3 °C) 72 18 146/73 97 %   07/05/20 2330 98.2 °F (36.8 °C) 79 18 100/55 97 %   07/05/20 1916 98.7 °F (37.1 °C) 72 18 134/83 100 %   07/05/20 1617 97.5 °F (36.4 °C) 70 18 147/85 96 %   07/05/20 1125 98.6 °F (37 °C) 71 18 132/65 99 %       Intake/Output Summary (Last 24 hours) at 7/6/2020 0831  Last data filed at 7/6/2020 0549  Gross per 24 hour   Intake --   Output 1400 ml   Net -1400 ml        PHYSICAL EXAM:  General: WD, WN.drowsy, non cooperative, no acute distress    EENT:  EOMI. Anicteric sclerae. MMM  Resp:  CTA bilaterally, no wheezing or rales. No accessory muscle use  CV:  Regular  rhythm,  No edema  GI:  Soft, Non distended, Non tender.  +Bowel sounds  Neurologic:  Sleepy, unable to follow commands, severe Oropharyngeal Dysphagia noted +  Psych:   Un able to assess  Skin:  No rashes.   No jaundice    Reviewed most current lab test results and cultures  YES  Reviewed most current radiology test results   YES  Review and summation of old records today    NO  Reviewed patient's current orders and MAR    YES  PMH/SH reviewed - no change compared to H&P  ________________________________________________________________________  Care Plan discussed with:    Comments   Patient     Family      RN x    Care Manager x    Consultant                       x Multidiciplinary team rounds were held today with , nursing, pharmacist and clinical coordinator. Patient's plan of care was discussed; medications were reviewed and discharge planning was addressed. ________________________________________________________________________  Total NON critical care TIME:  16   Minutes    Total CRITICAL CARE TIME Spent:   Minutes non procedure based      Comments   >50% of visit spent in counseling and coordination of care     ________________________________________________________________________  Rachel Rodriguez MD     Procedures: see electronic medical records for all procedures/Xrays and details which were not copied into this note but were reviewed prior to creation of Plan. LABS:  I reviewed today's most current labs and imaging studies.   Pertinent labs include:  Recent Labs     07/06/20 0455 07/05/20  1040 07/04/20  0543   WBC 7.1 6.3 9.0   HGB 12.7 13.5 12.8   HCT 40.4 42.2 40.7    285 313     Recent Labs     07/06/20 0455 07/05/20  1040 07/04/20  0543 07/03/20  1543    138 137 138   K 3.3* 3.0* 3.0* 3.0*    103 102 103   CO2 28 30 27 27   * 111* 127* 126*   BUN 9 12 14 19   CREA 0.75 0.94 0.80 0.75   CA 8.8 8.5 9.0 9.0   MG  --   --  2.1  --    PHOS  --   --  2.8  --    ALB  --   --   --  3.4*   TBILI  --   --   --  0.5   ALT  --   --   --  22   INR  --   --   --  1.1       Signed: Rachel Rodriguez MD

## 2020-07-06 NOTE — PROGRESS NOTES
Dual skin assessment with Teela:  ~ Open areas to upper alejandra groin areas  ~Right leg is leathery, dry, shrink   ~left leg is scaly, dry, shrink, katie, flaky  ~Pink patch to the back  ~Graft area with scabs to head  ~old scar to chest

## 2020-07-06 NOTE — PROGRESS NOTES
Problem: Mobility Impaired (Adult and Pediatric)  Goal: *Acute Goals and Plan of Care (Insert Text)  Description:   FUNCTIONAL STATUS PRIOR TO ADMISSION: unclear     HOME SUPPORT PRIOR TO ADMISSION: The patient lived at home alone? Physical Therapy Goals  Initiated 7/5/2020  1. Patient will move from supine to sit and sit to supine , scoot up and down and roll side to side in bed with maximal assistance within 7 day(s). 2.  Patient will transfer from bed to chair and chair to bed with maximal assistance using the least restrictive device within 7 day(s). 3.  Patient will perform sit to stand with maximal assistance within 7 day(s). 4.  Patient will ambulate with maximal assistance for 3 feet with the least restrictive device within 7 day(s). 5. Patient will improve Arizmendi Balance score by 7 points within 7 days. Outcome: Not Met   PHYSICAL THERAPY TREATMENT  Patient: Nazario Jacobo (78 y.o. female)  Date: 7/6/2020  Diagnosis: CVA (cerebral vascular accident) [facility]) [I63.9]   <principal problem not specified>       Precautions: Aspiration, Bed Alarm, Fall  Chart, physical therapy assessment, plan of care and goals were reviewed. ASSESSMENT  Patient continues with skilled PT services and is progressing towards goals. Patient sleeping upon arrival and required increased time to alert. Patient noted to have facial droop, head rotated to R and eyes deviation to R. Patient requiring max A to attain head in midline and is not able to maintain without full support. Patient also unable to visually track consistently. L eye is able to briefly attain midline but R eye continuously demonstrates deviation to R. Patient demonstrating active movement in LLE today but no active movement is noted in R UE which is flaccid. Patient requiring max A of 1-2 for bed mobility and is resistive to movement to L reporting increased fear of falling.  Once sitting EOB, patient demonstrating strong posterior and L lateral lean/pushing requiring max A to remain upright. Pushing/lean decreased slightly following weight shift with weight bearing onto R forearm, but patient not able to maintain. Patient verbalizing more today and able to follow commands approx 75% of time but also requires tactile cues. Continue to recommend rehab following discharge    Current Level of Function Impacting Discharge (mobility/balance): max A of 2; poor sitting balance             PLAN :  Patient continues to benefit from skilled intervention to address the above impairments. Continue treatment per established plan of care. to address goals. Recommendation for discharge: (in order for the patient to meet his/her long term goals)  To be determined: SNF vs inpatient rehab     This discharge recommendation:  Has been made in collaboration with the attending provider and/or case management    IF patient discharges home will need the following DME: to be determined (TBD)       SUBJECTIVE:   Patient stated You promise you won't let me fall? Michael Rome    OBJECTIVE DATA SUMMARY:   Critical Behavior:  Neurologic State: Drowsy  Orientation Level: Oriented to person, Oriented to time, Disoriented to place, Disoriented to situation  Cognition: Decreased attention/concentration  Safety/Judgement: Lack of insight into deficits  Functional Mobility Training:  Bed Mobility:  Rolling: Maximum assistance(resistant and fearful when rolling to L)  Supine to Sit: Maximum assistance;Assist x2  Sit to Supine: Maximum assistance;Assist x2  Scooting:  Total assistance        Transfers:      Not able to safely assess today                             Balance:  Sitting: Impaired  Sitting - Static: Poor (constant support)(strong posterior and L lateral lean/pushing)  Sitting - Dynamic: Not tested             Therapeutic Exercises:   Patient working on midline orientation in sitting with head and trunk in center- patient requiring max A and noted to demonstrate strong posterior and L lateral lean      Activity Tolerance:   Fair  Please refer to the flowsheet for vital signs taken during this treatment. After treatment patient left in no apparent distress:   Supine in bed, Heels elevated for pressure relief, Call bell within reach, and Caregiver / family present    COMMUNICATION/COLLABORATION:   The patients plan of care was discussed with: Occupational therapist and Registered nurse.      Amina Oden, PT   Time Calculation: 34 mins

## 2020-07-06 NOTE — PROGRESS NOTES
Chief Complaint: stroke    Has no complaints. Laying in bed mumbling about food. Continues to neglect the left. Assesment and Plan  1. Acute ischemic right MCA stroke ([facility] [address]  MRI: Pending  Vascular studies: Pending  A1C : 5.7  TSH: Pending  LDL: 93  Echocardiogram:   Moderate concentric hypertrophy. Moderate-to-severe segmental systolic function. Estimated left ventricular ejection fraction is 25 - 30%. Visually measured ejection fraction. Severe (grade 3) left ventricular diastolic dysfunction. Repeat CT : shows evolving stroke no bleed  Initial CT: IMPRESSION:  1.  Occluded right internal carotid artery from the carotid bifurcation to the  [place] and further intraluminal thrombus at the right MCA bifurcation. 2.  Large core infarct in the right MCA distribution with a 55 cc ischemic  penumbra. 3.  Ischemic changes on delayed postcontrast head CT without evidence of  hemorrhage. dispo recommended  to inpatient rehabilitation     2. Atrial fibrillation with rapid ventricular response (HCC)  Continue rectal aspirin  Will assess suitability for  eliquis pending the MRI and swallow eval    3. Hyperlipoproteinemia  Continue atorvastatin    4. Left hemiparesis ([address]  With visual and sensory neglect. 5. Dysphagia  Not safe for swallow  PEG in discussion         Allergies  Latex; Bactrim [sulfamethoprim ds]; Aspirin; Codeine;  Iodinated contrast media; Pcn [penicillins]; and Tape [adhesive]     Medications  Current Facility-Administered Medications   Medication Dose Route Frequency    dextrose 5% - 0.45% NaCl with KCl 10 mEq/L infusion  75 mL/hr IntraVENous CONTINUOUS    acetaminophen (TYLENOL) tablet 650 mg  650 mg Oral Q4H PRN    Or    acetaminophen (TYLENOL) solution 650 mg  650 mg Per NG tube Q4H PRN    Or    acetaminophen (TYLENOL) suppository 650 mg  650 mg Rectal Q4H PRN    ondansetron (ZOFRAN) injection 4 mg  4 mg IntraVENous Q6H PRN    atorvastatin (LIPITOR) tablet 40 mg  40 mg Oral QHS    labetaloL (NORMODYNE;TRANDATE) injection 5 mg  5 mg IntraVENous Q10MIN PRN    heparin (porcine) injection 5,000 Units  5,000 Units SubCUTAneous Q8H    aspirin (ASA) suppository 300 mg  300 mg Rectal DAILY        Medical History  Past Medical History:   Diagnosis Date    Acute pharyngitis 2/8/2011    Allergic rhinitis, cause unspecified 2/8/2011    Arrhythmia     PVC    Asymptomatic varicose veins 2/8/2011    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     Carpal tunnel syndrome 2/8/2011    Degenerative Joint Disese ( improved/resolving) 2/8/2011    Degenetrative Dis Disease, 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    Diverticulosis of colon (without mention of hemorrhage) 2/8/2011    Dysphagia 2/8/2011    Edema, peripheral 2/8/2011    GERD (gastroesophageal reflux disease)     GERD Gastro- Esophageal Reflux Disease 2/8/2011    Herniated nucleus pulposus ( slipped disc) 2/8/2011    Menopausal 2/8/2011    PUD (peptic ulcer disease) 2012    Pure hypercholesterolemia 2/8/2011    Recurrent ear pain 2/8/2011    S/P radiation therapy     Scalp lesion 10/23/2014    Sebaceous cyst 4/22/2014    Unspecified cataract 2/8/2011    Unspecified essential hypertension 2/8/2011    Unspecified sleep apnea      Review of Systems   Unable to perform ROS: Mental acuity       Exam:    Visit Vitals  BP (P) 140/74 (BP 1 Location: Right arm)   Pulse (P) 69   Temp 98.6 °F (37 °C)   Resp 18   Ht 5' 2\" (1.575 m)   Wt 152 lb 5.4 oz (69.1 kg)   SpO2 (P) 95%   Breastfeeding No   BMI 27.86 kg/m²      General: Confused   Head: Normocephalic, atraumatic, scarring on the scalp anicteric sclera   Neck Normal ROM,  No thyromegally   Lungs:  Clear to auscultatio. Cardiac: Regular rate and rhythm with no murmurs. Abd: Bowel sounds were audible. No tenderness on palpation   Ext: No pedal edema   Skin: Supple no rash     NeurologicExam:  Mental Status:  Confused but able to follow simple commands   Speech:  Hesitant, fluent.    Cranial Nerves:  Opens Eyes Spontaneously  orients to sound  Dolls eyes intact  PERRLA  Corneal reflex intact  Left facial droop  Left visual neglect   Motor: Good strength on the right. Does not move the left. Reflexes:   Deep tendon reflexes 2+/4 on the left 1+ on the right   Sensory:   Left sensory neglect    Tremor:   No tremor noted. Cerebellar:  Coordination intact. (right)   Neurovascular: No carotid bruits.  No JVD       Lab Review  Lab Results   Component Value Date/Time    WBC 7.1 07/06/2020 04:55 AM    HCT 40.4 07/06/2020 04:55 AM    HGB 12.7 07/06/2020 04:55 AM    PLATELET 627 15/46/5659 04:55 AM       Lab Results   Component Value Date/Time    Sodium 137 07/06/2020 04:55 AM    Potassium 3.3 (L) 07/06/2020 04:55 AM    Chloride 104 07/06/2020 04:55 AM    CO2 28 07/06/2020 04:55 AM    Glucose 114 (H) 07/06/2020 04:55 AM    BUN 9 07/06/2020 04:55 AM    Creatinine 0.75 07/06/2020 04:55 AM    Calcium 8.8 07/06/2020 04:55 AM       Lab Results   Component Value Date/Time    Hemoglobin A1c 5.7 (H) 07/04/2020 05:43 AM    Hemoglobin A1c (POC) 5.7 08/08/2016 03:43 PM          Lab Results   Component Value Date/Time    Cholesterol, total 185 07/04/2020 05:43 AM    Cholesterol (POC) 168 03/12/2020 12:50 PM    HDL Cholesterol 73 07/04/2020 05:43 AM    HDL Cholesterol (POC) 57 03/12/2020 12:50 PM    LDL Cholesterol (POC) 93 03/12/2020 12:50 PM    LDL, calculated 93 07/04/2020 05:43 AM    VLDL, calculated 19 07/04/2020 05:43 AM    Triglyceride 95 07/04/2020 05:43 AM    Triglycerides (POC) 96 03/12/2020 12:50 PM    CHOL/HDL Ratio 2.5 07/04/2020 05:43 AM

## 2020-07-06 NOTE — PROGRESS NOTES
Problem: Dysphagia (Adult)  Goal: *Acute Goals and Plan of Care (Insert Text)  Description: Speech Therapy Goals  Initiated 7/4/2020  1. Patient will participate in re-evaluation of swallow function within 7 days. Outcome: Progressing Towards Goal   SPEECH LANGUAGE PATHOLOGY DYSPHAGIA TREATMENT  Patient: Shanon Jackson (78 y.o. female)  Date: 7/6/2020  Diagnosis: CVA (cerebral vascular accident) [facility]) [I63.9]   <principal problem not specified>       Precautions:  Aspiration, Bed Alarm, Fall    ASSESSMENT:  The patient was alert after PT/OT worked with her. She has a strong R gaze preference. Speech is dysarthric with low volume and imprecise artic. Oral phase is characterized by reduced closure on the spoon initially but after she completed it once she continued to do so appropriately. Slow oral prep with purees, suspect premature spillage with ice chips. Cough was strong after ice chips. Pharyngeal phase is characterized by mild swallow delay, one swallow per bolus and adequate hyolaryngeal excursion. No coughing on nectar thick liquids or purees. Due to her CVA, am concerned about silent aspiration. Recommend an MBS tomorrow if she is alert enough. Hopefully we can start a diet soon. She also has a history of head and neck cancer with radiation which could have impacted her swallowing negatively. Met with her son, Alan Veronica and we discussed all of the above. Hope is to avoid PEG. PLAN:  Recommendations and Planned Interventions:  MBS if she is alert enough tomorrow. NPO til cleared   Patient continues to benefit from skilled intervention to address the above impairments. Continue treatment per established plan of care. Discharge Recommendations:   To Be Determined     SUBJECTIVE:   Patient stated July, 2020    OBJECTIVE:   Cognitive and Communication Status:  Neurologic State: Drowsy  Orientation Level: Oriented to person, Oriented to time, Disoriented to place, Disoriented to situation  Cognition: Decreased attention/concentration  Perception: Cues to maintain midline in sitting, Cues to attend left visual field, Cues to attend to left side of body  Perseveration: No perseveration noted  Safety/Judgement: Lack of insight into deficits  Dysphagia Treatment:  Oral Assessment:  Oral Assessment  Labial: Left droop  Dentition: Edentulous  Lingual: Decreased rate;Decreased strength  Velum: Unable to visualize  Mandible: No impairment  P.O. Trials:  Patient Position: upright in bed  Vocal quality prior to P.O.: Low volume  Consistency Presented: Ice chips; Nectar thick liquid;Puree  How Presented: SLP-fed/presented;Cup/sip;Spoon     Bolus Acceptance: Impaired  Bolus Formation/Control: Impaired  Type of Impairment: Poor;Mastication; Incomplete  Propulsion: Delayed (# of seconds)  Oral Residue: None  Initiation of Swallow: Delayed (# of seconds)  Laryngeal Elevation: Decreased  Aspiration Signs/Symptoms: Strong cough(with ice chips; may be due to premature spillage)  Pharyngeal Phase Characteristics: Audible swallow  Effective Modifications: Small sips and bites          Oral Phase Severity: Moderate  Pharyngeal Phase Severity : Moderate-severe                  Pain:  Pain Scale 1: Numeric (0 - 10)  Pain Intensity 1: 0       After treatment:   Patient left in no apparent distress in bed    COMMUNICATION/EDUCATION:   Patient was educated regarding her deficit(s) of dysphagia  as this relates to her diagnosis of CVA. She demonstrated Fair understanding as evidenced by reduced memory. The patient's plan of care including recommendations, planned interventions, and recommended diet changes were discussed with: Registered nurse.      NIKKI Castrejon  Time Calculation: 20 mins

## 2020-07-07 ENCOUNTER — TELEPHONE (OUTPATIENT)
Dept: CARDIOLOGY CLINIC | Age: 80
End: 2020-07-07

## 2020-07-07 ENCOUNTER — APPOINTMENT (OUTPATIENT)
Dept: GENERAL RADIOLOGY | Age: 80
DRG: 064 | End: 2020-07-07
Attending: INTERNAL MEDICINE
Payer: MEDICARE

## 2020-07-07 LAB
ANION GAP SERPL CALC-SCNC: 7 MMOL/L (ref 5–15)
BUN SERPL-MCNC: 10 MG/DL (ref 6–20)
BUN/CREAT SERPL: 14 (ref 12–20)
CALCIUM SERPL-MCNC: 8.7 MG/DL (ref 8.5–10.1)
CHLORIDE SERPL-SCNC: 105 MMOL/L (ref 97–108)
CO2 SERPL-SCNC: 26 MMOL/L (ref 21–32)
CREAT SERPL-MCNC: 0.74 MG/DL (ref 0.55–1.02)
ERYTHROCYTE [DISTWIDTH] IN BLOOD BY AUTOMATED COUNT: 19.6 % (ref 11.5–14.5)
GLUCOSE SERPL-MCNC: 99 MG/DL (ref 65–100)
HCT VFR BLD AUTO: 38.4 % (ref 35–47)
HGB BLD-MCNC: 12.1 G/DL (ref 11.5–16)
MCH RBC QN AUTO: 23.7 PG (ref 26–34)
MCHC RBC AUTO-ENTMCNC: 31.5 G/DL (ref 30–36.5)
MCV RBC AUTO: 75.1 FL (ref 80–99)
NRBC # BLD: 0 K/UL (ref 0–0.01)
NRBC BLD-RTO: 0 PER 100 WBC
PLATELET # BLD AUTO: 280 K/UL (ref 150–400)
PMV BLD AUTO: 10.6 FL (ref 8.9–12.9)
POTASSIUM SERPL-SCNC: 3.2 MMOL/L (ref 3.5–5.1)
RBC # BLD AUTO: 5.11 M/UL (ref 3.8–5.2)
SODIUM SERPL-SCNC: 138 MMOL/L (ref 136–145)
WBC # BLD AUTO: 6.9 K/UL (ref 3.6–11)

## 2020-07-07 PROCEDURE — 65660000000 HC RM CCU STEPDOWN

## 2020-07-07 PROCEDURE — 77030038269 HC DRN EXT URIN PURWCK BARD -A

## 2020-07-07 PROCEDURE — 94760 N-INVAS EAR/PLS OXIMETRY 1: CPT

## 2020-07-07 PROCEDURE — 74230 X-RAY XM SWLNG FUNCJ C+: CPT

## 2020-07-07 PROCEDURE — 97112 NEUROMUSCULAR REEDUCATION: CPT | Performed by: PHYSICAL THERAPIST

## 2020-07-07 PROCEDURE — 80048 BASIC METABOLIC PNL TOTAL CA: CPT

## 2020-07-07 PROCEDURE — 85027 COMPLETE CBC AUTOMATED: CPT

## 2020-07-07 PROCEDURE — 74011250637 HC RX REV CODE- 250/637: Performed by: INTERNAL MEDICINE

## 2020-07-07 PROCEDURE — 97530 THERAPEUTIC ACTIVITIES: CPT | Performed by: PHYSICAL THERAPIST

## 2020-07-07 PROCEDURE — 97530 THERAPEUTIC ACTIVITIES: CPT | Performed by: OCCUPATIONAL THERAPIST

## 2020-07-07 PROCEDURE — 74011250636 HC RX REV CODE- 250/636: Performed by: INTERNAL MEDICINE

## 2020-07-07 PROCEDURE — 36415 COLL VENOUS BLD VENIPUNCTURE: CPT

## 2020-07-07 PROCEDURE — 92522 EVALUATE SPEECH PRODUCTION: CPT

## 2020-07-07 PROCEDURE — 92611 MOTION FLUOROSCOPY/SWALLOW: CPT

## 2020-07-07 RX ORDER — AMMONIUM LACTATE 12 G/100G
LOTION TOPICAL 2 TIMES DAILY
Status: DISCONTINUED | OUTPATIENT
Start: 2020-07-07 | End: 2020-07-15 | Stop reason: HOSPADM

## 2020-07-07 RX ADMIN — DEXTROSE MONOHYDRATE, SODIUM CHLORIDE, AND POTASSIUM CHLORIDE 75 ML/HR: 50; 4.5; .745 INJECTION, SOLUTION INTRAVENOUS at 17:25

## 2020-07-07 RX ADMIN — HEPARIN SODIUM 5000 UNITS: 5000 INJECTION INTRAVENOUS; SUBCUTANEOUS at 14:23

## 2020-07-07 RX ADMIN — HEPARIN SODIUM 5000 UNITS: 5000 INJECTION INTRAVENOUS; SUBCUTANEOUS at 06:22

## 2020-07-07 RX ADMIN — DEXTROSE MONOHYDRATE, SODIUM CHLORIDE, AND POTASSIUM CHLORIDE 75 ML/HR: 50; 4.5; .745 INJECTION, SOLUTION INTRAVENOUS at 06:23

## 2020-07-07 RX ADMIN — ASPIRIN 300 MG: 300 SUPPOSITORY RECTAL at 11:36

## 2020-07-07 RX ADMIN — HEPARIN SODIUM 5000 UNITS: 5000 INJECTION INTRAVENOUS; SUBCUTANEOUS at 21:09

## 2020-07-07 NOTE — PROGRESS NOTES
The patient's son, Domo Sal, requested that we talk to patient's primary care physician Dr. Alexsandra Amaya. I talked to Dr. Alexsandra Amaya on his cell phone and he was updated on the patient status. Permission was given from the son to talk to Dr. Alexsandra Amaya.

## 2020-07-07 NOTE — PROGRESS NOTES
JACOB:    snf placement  Insurance auth  COVID  2nd IM Medicare  Medical [employer]    CM spoke with pt' children, via conference call. CM informed children that pt is being recommended for snf placement. Family agreeable to snf placement and was asked by CM to pick 3 options in case facilities are unable to accept. CM was [facility] (pt's [facility]): [email or web address]      CM informed pt's children of COVID test that will need to be completed prior to d/c. Family agreeable to test.    CM currently waiting on snf options from pt's children. Pt will require [employer] auth at the time of d/c. Pt will require negative COVID at the time of d/c. CM will continue to follow.     MARTINA Estevez, [address] [place] [address]

## 2020-07-07 NOTE — PROGRESS NOTES
Bedside shift change report given to Felicitas Hassan, [id number] [facility] nurse) by GAMALIEL Hernandez (offgoing nurse).  Report included the following information SBAR, Kardex and Quality Measures.     Zone WQS:  5105        Significant changes during shift: none, patient bathed and rested.           Patient Information     Angi Mcfarland  78 y.o.  7/3/2020  2:59 PM by Darlene Mane MD. 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    Scalp lesion 10/23/2014    Sebaceous cyst 04/22/2014    Keloid 04/22/2014    Dysphagia 02/08/2011    Recurrent ear pain 02/08/2011    Acute pharyngitis 02/08/2011    Essential hypertension 02/08/2011    Edema, peripheral 02/08/2011    Pure hypercholesterolemia 02/08/2011    Allergic rhinitis, cause unspecified 02/08/2011    Diverticulosis of colon (without mention of hemorrhage) 02/08/2011    Herniated nucleus pulposus ( slipped disc) 02/08/2011    Degenetrative Dis Disease, 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    Carpal tunnel syndrome 02/08/2011    Unspecified cataract 02/08/2011    GERD Gastro- Esophageal Reflux Disease 02/08/2011    Degenerative Joint Disese ( improved/resolving) 02/08/2011    Menopausal 02/08/2011    Asymptomatic varicose veins 02/08/2011              Past Medical History:   Diagnosis Date    Acute pharyngitis 2/8/2011    Allergic rhinitis, cause unspecified 2/8/2011    Arrhythmia       PVC    Asymptomatic varicose veins 2/8/2011    Cancer ([address] 2009     stage 4 throat     Carpal tunnel syndrome 2/8/2011    Degenerative Joint Disese ( improved/resolving) 2/8/2011    Degenetrative Dis Disease, 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    Diverticulosis of colon (without mention of hemorrhage) 2/8/2011    Dysphagia 2/8/2011    Edema, peripheral 2/8/2011    GERD (gastroesophageal reflux disease)      GERD Gastro- Esophageal Reflux Disease 2/8/2011    Herniated nucleus pulposus ( slipped disc) 2/8/2011    Menopausal 2/8/2011    PUD (peptic ulcer disease) 2012    Pure hypercholesterolemia 2/8/2011    Recurrent ear pain 2/8/2011    S/P radiation therapy      Scalp lesion 10/23/2014    Sebaceous cyst 4/22/2014    Unspecified cataract 2/8/2011    Unspecified essential hypertension 2/8/2011    Unspecified sleep apnea              Core Measures:     CVA: Yes Yes  CHF:No No  PNA:No No     Post Op Surgical (If Applicable):      Number times ambulated in hallway past shift:  0  Number of times OOB to chair past shift:   0  NG Tube: No  Incentive Spirometer: No  Drains: No   Volume  0  Dressing Present:  No  Flatus:  Yes     Activity Status:     OOB to Chair No  Ambulated this shift No   Bed Rest Yes     Supplemental R0: (YA Applicable)     NC No  NRB No  Venti-mask No  On 0 Liters/min        LINES AND DRAINS:     Central Line? No Placement date 0 Reason Medically Necessary 0     PICC LINE? No Placement date 0Reason Medically Necessary 0     Urinary Catheter? No Placement Date 0 Reason Medically Necessary 0     DVT prophylaxis:     DVT prophylaxis Med- Yes  DVT prophylaxis SCD or MEY- No      Wounds: (If Applicable)     Wounds- Yes open areas     Location [place] upper groin area     Patient Safety:     Falls Score Total Score: 4  Safety Level_______  Bed Alarm On? Yes  Sitter? No     Plan for upcoming shift:   palliative will speak with family           Discharge Plan: yes- rehab     Active Consults:  IP CONSULT TO NEUROLOGY  IP CONSULT TO PALLIATIVE CARE - PROVIDER

## 2020-07-07 NOTE — PROGRESS NOTES
Palliative Medicine Consult  Eusebio: [phone] ([phone]    Patient Name: Anita Bernabe  YOB: 1940    Date of Initial Consult: 7/3/2020  Reason for Consult: Care Decisions  Requesting Provider: Lucio Devi MD  Primary Care Physician: Anjel Nelson MD     SUMMARY:   Anita Bernabe is a 78 y.o. with a past history of HTN, GERD and stage 4 throat cancer s/p radiation, who was admitted on 7/3/2020 from home with a diagnosis of Acute large right MCA stroke, with an occluded right internal carotid artery from the carotid bifurcation to the [place] and further intraluminal thrombus at the Right MCA bifurcation. Current medical issues leading to Palliative Medicine involvement include: goals of care discussion in the setting of large stroke and inability to swallow    Social:  Ms Ronnie Shore has three children, Demian Garcia and Hernandez Tirado. She is a . She lived alone in [place], but is very close to her three kids     PALLIATIVE DIAGNOSES:   1. DNR Discussion  2. Goals of Care  3. Lethargy  4. Debility       PLAN:   1. Prior to meeting Ms Ronnie Shore I did an extensive review of the chart, and spoke with Speech Therapy  2. I had a conference call with her son Gordon Dillard, and her two daughter Hernandez Tirado and YIMI. 3. We talked through her current situation, and what her wishes would be if she could tell us  4. We talked about PEG tube vs comfort feeds  5. In the end the family would like to proceed with PEG placement as a hopeful bridge through therapy. She has had a PEG before, so they are somewhat familiar with it, and they shared that their mom told them that she would never agree to anything that had the potential to shorten her life. 6. Gordon Dillard again shared his apprehension about discharge, since they will not be aloud to have a physical presence in the nursing home to ensure that her care is up to their standards.  They hope that we can refer her to [facility], but I did let them know that she likely will not qualify for that level of rehab.   7. Have asked CM to reach out to them to talk more about d/c planning, as they have a lot of questions that all revolve around this  8. Will remain peripherally involved in case the family has more questions, but for now goals are clear  9. Initial consult note routed to primary continuity provider and/or primary health care team members  10. Communicated plan of care with: Palliative [facility] Team     GOALS OF CARE / TREATMENT PREFERENCES:     GOALS OF CARE:  Patient/Health Care Proxy Stated Goals: Rehabilitation    TREATMENT PREFERENCES:   Code Status: Full Code    Advance Care Planning:  [x] [facility] Interdisciplinary Team has updated the ACP Navigator with Health Care Decision Maker and Patient Capacity      Primary Decision MakerGildardo Bailey - Son - [phone]    Primary Decision Maker: Aleksandra Rothman - Daughter - [phone]    Primary Decision Maker: Arin Whiting - Daughter - [phone]    Medical Interventions: Full interventions     Other Instructions: Artificially Administered Nutrition: (family discussing)     Other:    As far as possible, the palliative care team has discussed with patient / health care proxy about goals of care / treatment preferences for patient.      HISTORY:     History obtained from: chart, son Tonny Mess: none    HPI/SUBJECTIVE:    The patient is:   [x] Verbal and participatory  [] Non-participatory due to:   Limited participation due to fatigue    7/7/2020  Went for [facility] but unable to participate       Clinical Pain Assessment (nonverbal scale for severity on nonverbal patients):   Clinical Pain Assessment  Severity: 0     Activity (Movement): Lying quietly, normal position    Duration: for how long has pt been experiencing pain (e.g., 2 days, 1 month, years)  Frequency: how often pain is an issue (e.g., several times per day, once every few days, constant)     FUNCTIONAL ASSESSMENT:     Palliative Performance Scale (PPS):  PPS: 30       PSYCHOSOCIAL/SPIRITUAL SCREENING:     Palliative IDT has assessed this patient for cultural preferences / practices and a referral made as appropriate to needs (Cultural Services, Patient Advocacy, Ethics, etc.)    Any spiritual / Christianity concerns:  [] Yes /  [x] No    Caregiver Burnout:  [] Yes /  [x] No /  [] No Caregiver Present      Anticipatory grief assessment:   [x] Normal  / [] Maladaptive       ESAS Anxiety: Anxiety: 0    ESAS Depression: Depression: 0        REVIEW OF SYSTEMS:     Positive and pertinent negative findings in ROS are noted above in HPI. The following systems were [x] reviewed / [] unable to be reviewed as noted in HPI  Other findings are noted below. Systems: constitutional, ears/nose/mouth/throat, respiratory, gastrointestinal, genitourinary, musculoskeletal, integumentary, neurologic, psychiatric, endocrine. Positive findings noted below. Modified ESAS Completed by: provider   Fatigue: 8     Depression: 0 Pain: 0   Anxiety: 0 Nausea: 0   Anorexia: 8 Dyspnea: 0                    PHYSICAL EXAM:     From RN flowsheet:  Wt Readings from Last 3 Encounters:   07/05/20 152 lb 5.4 oz (69.1 kg)   06/16/20 156 lb (70.8 kg)   03/12/20 166 lb (75.3 kg)     Blood pressure 131/68, pulse 69, temperature 98.1 °F (36.7 °C), resp. rate 16, height 5' 2\" (1.575 m), weight 152 lb 5.4 oz (69.1 kg), SpO2 100 %, not currently breastfeeding. Pain Scale 1: Numeric (0 - 10)  Pain Intensity 1: 0                 Last bowel movement, if known:     Constitutional: awake, alert, conversational, but then fell fast asleep mid conversation  Eyes: pupils equal, anicteric  ENMT: no nasal discharge, moist mucous membranes  Respiratory: breathing not labored, symmetric  Skin: warm, dry  Other:       HISTORY:     Active Problems:    * No active hospital problems.  *    Past Medical History:   Diagnosis Date    Acute pharyngitis 2/8/2011    Allergic rhinitis, cause unspecified 2/8/2011    Arrhythmia     PVC    Asymptomatic varicose veins 2/8/2011    Cancer [address]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     Carpal tunnel syndrome 2/8/2011    Degenerative Joint Disese ( improved/resolving) 2/8/2011    Degenetrative Dis Disease, 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    Diverticulosis of colon (without mention of hemorrhage) 2/8/2011    Dysphagia 2/8/2011    Edema, peripheral 2/8/2011    GERD (gastroesophageal reflux disease)     GERD Gastro- Esophageal Reflux Disease 2/8/2011    Herniated nucleus pulposus ( slipped disc) 2/8/2011    Menopausal 2/8/2011    PUD (peptic ulcer disease) 2012    Pure hypercholesterolemia 2/8/2011    Recurrent ear pain 2/8/2011    S/P radiation therapy     Scalp lesion 10/23/2014    Sebaceous cyst 4/22/2014    Unspecified cataract 2/8/2011    Unspecified essential hypertension 2/8/2011    Unspecified sleep apnea       Past Surgical History:   Procedure Laterality Date    HX HYSTERECTOMY      HX ORTHOPAEDIC  neck/back 2006    HX OTHER SURGICAL  5/8/2014     Excise recurrent scalp lesion and application of ACell    HX OTHER SURGICAL  5/8/2014    Excise keloid, anterior chest wall, and application of ACell    HX OTHER SURGICAL  5/8/2014     Excise sebaceous cyst, anterior chest wall    HX OTHER SURGICAL  5/8/2014    Punch biopsy, skin lesions, right forearm x2, right calf x1    [place] ICAR CATHETER ABLATION ATRIOVENTR NODE FUNCTION N/A 5/6/2019    ABLATION AV NODE performed by Helen Putnam MD at [address] CATH LAB    [place] INS NEW/RPLCMT PRM PM W/TRANSV ELTRD ATRIAL&VENT  9/30/2017         [place] RMVL IMPLANTABLE PT-ACTIVATED CAR EVENT RECORDER  9/30/2017           Family History   Problem Relation Age of Onset    Hypertension Mother     Stroke Mother     Hypertension Father     Cancer Father         PROSTATE, MELANOMA    Anesth Problems Neg Hx       History reviewed, no pertinent family history.   Social History     Tobacco Use    Smoking status: Never Smoker    Smokeless tobacco: Never Used   Substance Use Topics    Alcohol use: No     Allergies   Allergen Reactions    Latex Rash    Bactrim [Sulfamethoprim Ds] Hives    Aspirin Other (comments)     Anything higher than 81mg makes pt jittery    Codeine Hives and Itching    Iodinated Contrast Media Itching    Pcn [Penicillins] Hives and Itching    Tape [Adhesive] Rash      Current Facility-Administered Medications   Medication Dose Route Frequency    dextrose 5% - 0.45% NaCl with KCl 10 mEq/L infusion  75 mL/hr IntraVENous CONTINUOUS    acetaminophen (TYLENOL) tablet 650 mg  650 mg Oral Q4H PRN    Or    acetaminophen (TYLENOL) solution 650 mg  650 mg Per NG tube Q4H PRN    Or    acetaminophen (TYLENOL) suppository 650 mg  650 mg Rectal Q4H PRN    ondansetron (ZOFRAN) injection 4 mg  4 mg IntraVENous Q6H PRN    atorvastatin (LIPITOR) tablet 40 mg  40 mg Oral QHS    labetaloL (NORMODYNE;TRANDATE) injection 5 mg  5 mg IntraVENous Q10MIN PRN    heparin (porcine) injection 5,000 Units  5,000 Units SubCUTAneous Q8H    aspirin (ASA) suppository 300 mg  300 mg Rectal DAILY          LAB AND IMAGING FINDINGS:     Lab Results   Component Value Date/Time    WBC 6.9 07/07/2020 02:30 AM    HGB 12.1 07/07/2020 02:30 AM    PLATELET 096 84/12/5486 02:30 AM     Lab Results   Component Value Date/Time    Sodium 138 07/07/2020 02:30 AM    Potassium 3.2 (L) 07/07/2020 02:30 AM    Chloride 105 07/07/2020 02:30 AM    CO2 26 07/07/2020 02:30 AM    BUN 10 07/07/2020 02:30 AM    Creatinine 0.74 07/07/2020 02:30 AM    Calcium 8.7 07/07/2020 02:30 AM    Magnesium 2.1 07/04/2020 05:43 AM    Phosphorus 2.8 07/04/2020 05:43 AM      Lab Results   Component Value Date/Time    Alk.  phosphatase 106 07/03/2020 03:43 PM    Protein, total 8.0 07/03/2020 03:43 PM    Albumin 3.4 (L) 07/03/2020 03:43 PM    Globulin 4.6 (H) 07/03/2020 03:43 PM     Lab Results   Component Value Date/Time    INR 1.1 07/03/2020 03:43 PM    Prothrombin time 11.5 (H) 07/03/2020 03:43 PM    aPTT 27.4 10/10/2011 11:15 AM      No results found for: IRON, FE, TIBC, IBCT, PSAT, FERR   No results found for: PH, PCO2, PO2  No components found for: [place]   Lab Results   Component Value Date/Time     (H) 03/09/2019 02:43 PM    CK - MB 2.4 03/09/2019 02:43 PM                Total time:   Counseling / coordination time, spent as noted above:   > 50% counseling / coordination?:     Prolonged service was provided for  []30 min   []75 min in face to face time in the presence of the patient, spent as noted above. Time Start:   Time End:   Note: this can only be billed with 14373 (initial) or 87766 (follow up). If multiple start / stop times, list each separately.

## 2020-07-07 NOTE — WOUND CARE
Wound care Nurse Consult: consult from staff nurse stating \" Howard score-10. Has been recieving wound care to left lower ext\". Patien tis a 77 y/o female admitted for CVA.    Past Medical History:   Diagnosis Date    Acute pharyngitis 2/8/2011    Allergic rhinitis, cause unspecified 2/8/2011    Arrhythmia     PVC    Asymptomatic varicose veins 2/8/2011    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     Carpal tunnel syndrome 2/8/2011    Degenerative Joint Disese ( improved/resolving) 2/8/2011    Degenetrative Dis Disease, 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    Diverticulosis of colon (without mention of hemorrhage) 2/8/2011    Dysphagia 2/8/2011    Edema, peripheral 2/8/2011    GERD (gastroesophageal reflux disease)     GERD Gastro- Esophageal Reflux Disease 2/8/2011    Herniated nucleus pulposus ( slipped disc) 2/8/2011    Menopausal 2/8/2011    PUD (peptic ulcer disease) 2012    Pure hypercholesterolemia 2/8/2011    Recurrent ear pain 2/8/2011    S/P radiation therapy     Scalp lesion 10/23/2014    Sebaceous cyst 4/22/2014    Unspecified cataract 2/8/2011    Unspecified essential hypertension 2/8/2011    Unspecified sleep apnea      Past Surgical History:   Procedure Laterality Date    HX HYSTERECTOMY      HX ORTHOPAEDIC  neck/back 2006    HX OTHER SURGICAL  5/8/2014     Excise recurrent scalp lesion and application of ACell    HX OTHER SURGICAL  5/8/2014    Excise keloid, anterior chest wall, and application of ACell    HX OTHER SURGICAL  5/8/2014     Excise sebaceous cyst, anterior chest wall    HX OTHER SURGICAL  5/8/2014    Punch biopsy, skin lesions, right forearm x2, right calf x1    [place] ICAR CATHETER ABLATION ATRIOVENTR NODE FUNCTION N/A 5/6/2019    ABLATION AV NODE performed by Keshawn Saenz MD at [address] [id number] CATH LAB    [place] INS NEW/RPLCMT PRM PM W/TRANSV ELTRD ATRIAL&VENT  9/30/2017         [place] RMVL IMPLANTABLE PT-ACTIVATED CAR EVENT RECORDER  9/30/2017          Patient has LLE dry, scaly skin from previous edema and Unna Boot compression wrap. No open wounds. Will benefit from Lac-Hydrin lotion to remove dry scaly skin.     Juaquin Burgess RN, [employer]

## 2020-07-07 NOTE — PROGRESS NOTES
Problem: Falls - Risk of  Goal: *Absence of Falls  Description: Document Tiffanie Ruts Fall Risk and appropriate interventions in the flowsheet. Outcome: Progressing Towards Goal  Note: Fall Risk Interventions:       Mentation Interventions: Adequate sleep, hydration, pain control, Bed/chair exit alarm, Door open when patient unattended, Evaluate medications/consider consulting pharmacy, More frequent rounding, Reorient patient, Toileting rounds    Medication Interventions: Bed/chair exit alarm    Elimination Interventions: Bed/chair exit alarm, Call light in reach, Patient to call for help with toileting needs    History of Falls Interventions: Bed/chair exit alarm, Door open when patient unattended         Problem: Patient Education: Go to Patient Education Activity  Goal: Patient/Family Education  Outcome: Progressing Towards Goal     Problem: Pressure Injury - Risk of  Goal: *Prevention of pressure injury  Description: Document Howard Scale and appropriate interventions in the flowsheet. Outcome: Progressing Towards Goal  Note: Pressure Injury Interventions:  Sensory Interventions: Assess changes in LOC, Assess need for specialty bed, Avoid rigorous massage over bony prominences, Chair cushion, Check visual cues for pain, Discuss PT/OT consult with provider, Float heels, Keep linens dry and wrinkle-free, Maintain/enhance activity level, Minimize linen layers, Monitor skin under medical devices, Pad between skin to skin, Pressure redistribution bed/mattress (bed type), Turn and reposition approx.  every two hours (pillows and wedges if needed)    Moisture Interventions: Absorbent underpads, Apply protective barrier, creams and emollients, Assess need for specialty bed, Check for incontinence Q2 hours and as needed, Internal/External urinary devices, Maintain skin hydration (lotion/cream), Minimize layers, Moisture barrier    Activity Interventions: Assess need for specialty bed, PT/OT evaluation, Pressure redistribution bed/mattress(bed type)    Mobility Interventions: Float heels, Assess need for specialty bed, HOB 30 degrees or less, Pressure redistribution bed/mattress (bed type), Turn and reposition approx.  every two hours(pillow and wedges)    Nutrition Interventions: Document food/fluid/supplement intake, Offer support with meals,snacks and hydration, Discuss nutritional consult with provider    Friction and Shear Interventions: Lift team/patient mobility team, Minimize layers                Problem: Patient Education: Go to Patient Education Activity  Goal: Patient/Family Education  Outcome: Progressing Towards Goal     Problem: Patient Education: Go to Patient Education Activity  Goal: Patient/Family Education  Outcome: Progressing Towards Goal     Problem: Patient Education: Go to Patient Education Activity  Goal: Patient/Family Education  Outcome: Progressing Towards Goal     Problem: Patient Education: Go to Patient Education Activity  Goal: Patient/Family Education  Outcome: Progressing Towards Goal     Problem: Patient Education: Go to Patient Education Activity  Goal: Patient/Family Education  Outcome: Progressing Towards Goal     Problem: TIA/CVA Stroke: 0-24 hours  Goal: Off Pathway (Use only if patient is Off Pathway)  Outcome: Progressing Towards Goal  Goal: Activity/Safety  Outcome: Progressing Towards Goal  Goal: Consults, if ordered  Outcome: Progressing Towards Goal  Goal: Diagnostic Test/Procedures  Outcome: Progressing Towards Goal  Goal: Nutrition/Diet  Outcome: Progressing Towards Goal  Goal: Discharge Planning  Outcome: Progressing Towards Goal  Goal: Medications  Outcome: Progressing Towards Goal  Goal: Respiratory  Outcome: Progressing Towards Goal  Goal: Treatments/Interventions/Procedures  Outcome: Progressing Towards Goal  Goal: Minimize risk of bleeding post-thrombolytic infusion  Outcome: Progressing Towards Goal  Goal: Monitor for complications post-thrombolytic infusion  Outcome: Progressing Towards Goal  Goal: Psychosocial  Outcome: Progressing Towards Goal  Goal: *Hemodynamically stable  Outcome: Progressing Towards Goal  Goal: *Neurologically stable  Description: Absence of additional neurological deficits    Outcome: Progressing Towards Goal  Goal: *Verbalizes anxiety and depression are reduced or absent  Outcome: Progressing Towards Goal  Goal: *Absence of Signs of Aspiration on Current Diet  Outcome: Progressing Towards Goal  Goal: *Absence of deep venous thrombosis signs and symptoms(Stroke Metric)  Outcome: Progressing Towards Goal  Goal: *Ability to perform ADLs and demonstrates progressive mobility and function  Outcome: Progressing Towards Goal  Goal: *Stroke education started(Stroke Metric)  Outcome: Progressing Towards Goal  Goal: *Dysphagia screen performed(Stroke Metric)  Outcome: Progressing Towards Goal  Goal: *Rehab consulted(Stroke Metric)  Outcome: Progressing Towards Goal     Problem: TIA/CVA Stroke: Day 2 Until Discharge  Goal: Off Pathway (Use only if patient is Off Pathway)  Outcome: Progressing Towards Goal  Goal: Activity/Safety  Outcome: Progressing Towards Goal  Goal: Diagnostic Test/Procedures  Outcome: Progressing Towards Goal  Goal: Nutrition/Diet  Outcome: Progressing Towards Goal  Goal: Discharge Planning  Outcome: Progressing Towards Goal  Goal: Medications  Outcome: Progressing Towards Goal  Goal: Respiratory  Outcome: Progressing Towards Goal  Goal: Treatments/Interventions/Procedures  Outcome: Progressing Towards Goal  Goal: Psychosocial  Outcome: Progressing Towards Goal  Goal: *Verbalizes anxiety and depression are reduced or absent  Outcome: Progressing Towards Goal  Goal: *Absence of aspiration  Outcome: Progressing Towards Goal  Goal: *Absence of deep venous thrombosis signs and symptoms(Stroke Metric)  Outcome: Progressing Towards Goal  Goal: *Optimal pain control at patient's stated goal  Outcome: Progressing Towards Goal  Goal: *Tolerating diet  Outcome: Progressing Towards Goal  Goal: *Ability to perform ADLs and demonstrates progressive mobility and function  Outcome: Progressing Towards Goal  Goal: *Stroke education continued(Stroke Metric)  Outcome: Progressing Towards Goal     Problem: Ischemic Stroke: Discharge Outcomes  Goal: *Verbalizes anxiety and depression are reduced or absent  Outcome: Progressing Towards Goal  Goal: *Verbalize understanding of risk factor modification(Stroke Metric)  Outcome: Progressing Towards Goal  Goal: *Hemodynamically stable  Outcome: Progressing Towards Goal  Goal: *Absence of aspiration pneumonia  Outcome: Progressing Towards Goal  Goal: *Aware of needed dietary changes  Outcome: Progressing Towards Goal  Goal: *Verbalize understanding of prescribed medications including anti-coagulants, anti-lipid, and/or anti-platelets(Stroke Metric)  Outcome: Progressing Towards Goal  Goal: *Tolerating diet  Outcome: Progressing Towards Goal  Goal: *Aware of follow-up diagnostics related to anticoagulants  Outcome: Progressing Towards Goal  Goal: *Ability to perform ADLs and demonstrates progressive mobility and function  Outcome: Progressing Towards Goal  Goal: *Absence of DVT(Stroke Metric)  Outcome: Progressing Towards Goal  Goal: *Absence of aspiration  Outcome: Progressing Towards Goal  Goal: *Optimal pain control at patient's stated goal  Outcome: Progressing Towards Goal  Goal: *Home safety concerns addressed  Outcome: Progressing Towards Goal  Goal: *Describes available resources and support systems  Outcome: Progressing Towards Goal  Goal: *Verbalizes understanding of activation of EMS(911) for stroke symptoms(Stroke Metric)  Outcome: Progressing Towards Goal  Goal: *Understands and describes signs and symptoms to report to providers(Stroke Metric)  Outcome: Progressing Towards Goal  Goal: *Neurolgocially stable (absence of additional neurological deficits)  Outcome: Progressing Towards Goal  Goal: *Verbalizes importance of follow-up with primary care physician(Stroke Metric)  Outcome: Progressing Towards Goal  Goal: *Smoking cessation discussed,if applicable(Stroke Metric)  Outcome: Progressing Towards Goal  Goal: *Depression screening completed(Stroke Metric)  Outcome: Progressing Towards Goal

## 2020-07-07 NOTE — TELEPHONE ENCOUNTER
[id number] fax number [phone]. Fax confirmation received.

## 2020-07-07 NOTE — PROGRESS NOTES
Reason for Admission:    CVA                   RUR Score:       10              Plan for utilizing home health:  Yes after rehab        PCP: First and Last name:  Nilda Stewart   Name of Practice:    Are you a current patient: Yes/No:  Yes   Approximate date of last visit: Less than a month ago   Can you participate in a virtual visit with your PCP: yes                    Current Advanced Directive/Advance Care Plan:  Not on file                         Transition of Care Plan:  SNF    Care Management Interventions  PCP Verified by CM: Yes  Mode of Transport at Discharge: [address] (CM Consult): SNF  Discharge Durable Medical Equipment: No(Pt owns RW and cane)  Physical Therapy Consult: Yes  Occupational Therapy Consult: Yes  Speech Therapy Consult: No  Current Support Network: Lives Alone(Single story home has 2 steps at entrance. House has video survilance to monitor. Daughter lives near)  Confirm Follow Up Transport: Family  Discharge Location  Discharge Placement: Skilled nursing facility    Spoke to pt's daughter and she informed they have a meeting scheduled with NP tomorrow to discuss transition plan. Pt was very independent before admission.     Pharmacy- [address] [facility] [place]  ED Case Manager   Ext [phone]

## 2020-07-07 NOTE — CONSULTS
GI CONSULTATION NOTE  Elia Meza, ANAMARIA  [phone] NP in-hospital cell phone M-F until 4:30  After 5pm or on weekends, please call  for physician on call    NAME: Norma Bonner   :  1940   MRN:  [id number]   Attending:  Dr Thaddeus Garcia  Primary GI:  Dr Tracy Salmon  Date/Time:  2020 4:10 PM  Assessment:   Dysphagia s/p large right MCA stroke  High risk aspiration  - Strict NPO order per speech therapy and unable to complete MBS  - Not on any anticoagulation therapy  - No abdominal pain  - Hx of PEG placement years ago secondary to hx of throat cancer in     MCA Stroke  HTN      Plan:   1. EGD with PEG placement tomorrow at 1200 with Dr Tracy Salmon  2. Maintain NPO  3. Continue to hold all blood thinner, ASA okay to continue  4. Appreciate speech therapy input. 5. Rest per primary team.       Thank you for  Plan discussed with Dr Tracy Salmon. Subjective:     HISTORY OF PRESENT ILLNESS:     Norma Bonner is an 78 y.o. AA female who we are asked to see for complaint of dysphagia and consult for PEG. Pt admitted on 7/3/20 for Large acute right MCA stroke with occluded right internal carotid artery. Pt was not given TPA and has not received any xarelto with concern for conversion to hemorrhagic stroke. Pt is now having dysphagia and with MBS by speech therapy recommended NPO with alternative source of nutrition such as PEG tube due to high risk for aspiration. Pt has a hx of throat cancer in  and states all treatment was completed over 5 years ago. Pt never had any surgery for therapy. Pt had PEG tube during that time and did well with it.     10/2009- EGD with Dr Kait Thao - Multiple antral ulcerations, erosion along the greater curvature associated gastritis.  No evidence of any esophageal disease at this time.  We will await biopsy. Pt, son, and daughter all are agreeable to PEG placement if this is her only option although she is hopeful it is not a long term treatment.      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    Diverticulosis of colon (without mention of hemorrhage) 2/8/2011    Dysphagia 2/8/2011    Edema, peripheral 2/8/2011    GERD (gastroesophageal reflux disease)     GERD Gastro- Esophageal Reflux Disease 2/8/2011    Herniated nucleus pulposus ( slipped disc) 2/8/2011    Menopausal 2/8/2011    PUD (peptic ulcer disease) 2012    Pure hypercholesterolemia 2/8/2011    Recurrent ear pain 2/8/2011    S/P radiation therapy     Scalp lesion 10/23/2014    Sebaceous cyst 4/22/2014    Unspecified cataract 2/8/2011    Unspecified essential hypertension 2/8/2011    Unspecified sleep apnea       Past Surgical History:   Procedure Laterality Date    HX HYSTERECTOMY      HX ORTHOPAEDIC  neck/back 2006    HX OTHER SURGICAL  5/8/2014     Excise recurrent scalp lesion and application of ACell    HX OTHER SURGICAL  5/8/2014    Excise keloid, anterior chest wall, and application of ACell    HX OTHER SURGICAL  5/8/2014     Excise sebaceous cyst, anterior chest wall    HX OTHER SURGICAL  5/8/2014    Punch biopsy, skin lesions, right forearm x2, right calf x1    VT ICAR CATHETER ABLATION ATRIOVENTR NODE FUNCTION N/A 5/6/2019    ABLATION AV NODE performed by Cindy Castro [facility]    VT INS NEW/RPLCMT PRM PM W/TRANSV ELTRD ATRIAL&VENT  9/30/2017         VT RMVL IMPLANTABLE PT-ACTIVATED CAR EVENT RECORDER  9/30/2017          Social History     Tobacco Use    Smoking status: Never Smoker    Smokeless tobacco: Never Used   Substance Use Topics    Alcohol use: No      Family History   Problem Relation Age of Onset    Hypertension Mother  Stroke Mother     Hypertension Father     Cancer Father         PROSTATE, MELANOMA    Anesth Problems Neg Hx       Allergies   Allergen Reactions    Latex Rash    Bactrim [Sulfamethoprim Ds] Hives    Aspirin Other (comments)     Anything higher than 81mg makes pt jittery    Codeine Hives and Itching    Iodinated Contrast Media Itching    Pcn [Penicillins] Hives and Itching    Tape [Adhesive] Rash      Prior to Admission medications    Medication Sig Start Date End Date Taking? Authorizing Provider   metoprolol succinate (TOPROL-XL) 25 mg XL tablet TAKE 1 TABLET BY MOUTH EVERY DAY 6/29/20   Heladio Bui MD   cyclobenzaprine (FLEXERIL) 10 mg tablet Take 1 Tab by mouth three (3) times daily as needed for Muscle Spasm(s). 5/13/20   Heladio Bui MD   meclizine (ANTIVERT) 25 mg tablet TAKE 1 TABLET THREE TIMES DAILY AS NEEDED 3/22/20   Heladio Bui MD   rivaroxaban (Xarelto) 15 mg tab tablet Take 1 Tab by mouth daily (with dinner). 3/12/20   Heladio Bui MD   gabapentin (NEURONTIN) 300 mg capsule Take 1 Cap by mouth three (3) times daily. 3/12/20   Heladio Bui MD   amLODIPine (NORVASC) 5 mg tablet Take 1 Tab by mouth daily. 3/12/20 3/12/21  Heladio Bui MD   potassium chloride SR (KLOR-CON 10) 10 mEq tablet TAKE 1 TABLET EVERY DAY 3/12/20   Heladio Bui MD   omeprazole (PRILOSEC) 40 mg capsule TAKE 1 CAPSULE EVERY DAY 3/12/20   Heladio Bui MD   fluticasone propionate (FLONASE) 50 mcg/actuation nasal spray 2 Sprays by Both Nostrils route daily as needed for Rhinitis. Provider, Historical   acetaminophen (TYLENOL) 325 mg tablet Take 650 mg by mouth nightly. Provider, Historical   MULTIVIT WITH CALCIUM,IRON,MIN [facility] MULTIPLE VITAMINS PO) Take 1 Tab by mouth daily. Provider, Historical   aspirin delayed-release 81 mg tablet Take 81 mg by mouth daily.     Provider, Historical       Patient Active Problem List   Diagnosis Code    Dysphagia R13.10    Recurrent ear pain H92.09    Acute pharyngitis J02.9    Essential hypertension I10    Edema, peripheral R60.9    Pure hypercholesterolemia E78.00    Allergic rhinitis, cause unspecified J30.9    Diverticulosis of colon (without mention of hemorrhage) K57.30    Herniated nucleus pulposus ( slipped disc) [id number]    Degenetrative Dis Disease, 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    Carpal tunnel syndrome G56.00    Unspecified cataract H26.9    GERD Gastro- Esophageal Reflux Disease K21.9    Degenerative Joint Disese ( improved/resolving) M19.90    Menopausal N95.1    Asymptomatic varicose veins I83.90    Sebaceous cyst L72.3    Keloid L91.0    Scalp lesion L98.9    Abnormal nuclear stress test R94.39    Syncope R55    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    CAD (coronary artery disease) I25.10    S/P cardiac pacemaker procedure Z95.0    Bradycardia R00.1    SSS (sick sinus syndrome) ([facility]) I49.5    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 Objective:   VITALS:    Visit Vitals  /78   Pulse 77   Temp 97.3 °F (36.3 °C)   Resp 18   Ht 5' 2\" (1.575 m)   Wt 69.1 kg (152 lb 5.4 oz)   SpO2 100%   Breastfeeding No   BMI 27.86 kg/m²       PHYSICAL EXAM:   General:          Lethargic, frail, appears older than stated age. Head:               Normocephalic   Eyes:               Conjunctivae clear and pale, anicteric sclerae  Nose:               Nares normal.  Throat:             Lips, mucosa, and tongue normal.   Neck:               Supple, symmetrical,  no adenopathy,  Back:               Symmetric,   Lungs:             CTA bilaterally. Chest wall:      No tenderness or deformity.   Heart: Regular rate and rhythm,  no murmur, rub or gallop  Abdomen:        Soft, non-tender. Not distended. Bowel sounds normal. Old healed peg site in epigastric area, hx of large emmanuel surgical site on right side of abdomen. Extremities:     Atraumatic, No cyanosis. +1 BLE edema. No clubbing  Skin:                Texture, turgor normal.   Lymph:            Cervical, supraclavicular normal.  Psych:             Fair insight. Neurologic:      facial asymmetry. Slurred speech, slow speech. Left arm flacid.      LAB DATA REVIEWED:    Recent Results (from the past 24 hour(s))   METABOLIC PANEL, BASIC    Collection Time: 07/07/20  2:30 AM   Result Value Ref Range    Sodium 138 136 - 145 mmol/L    Potassium 3.2 (L) 3.5 - 5.1 mmol/L    Chloride 105 97 - 108 mmol/L    CO2 26 21 - 32 mmol/L    Anion gap 7 5 - 15 mmol/L    Glucose 99 65 - 100 mg/dL    BUN 10 6 - 20 MG/DL    Creatinine 0.74 0.55 - 1.02 MG/DL    BUN/Creatinine ratio 14 12 - 20      GFR est AA >60 >60 ml/min/1.73m2    GFR est non-AA >60 >60 ml/min/1.73m2    Calcium 8.7 8.5 - 10.1 MG/DL   CBC W/O DIFF    Collection Time: 07/07/20  2:30 AM   Result Value Ref Range    WBC 6.9 3.6 - 11.0 K/uL    RBC 5.11 3.80 - 5.20 M/uL    HGB 12.1 11.5 - 16.0 g/dL    HCT 38.4 35.0 - 47.0 %    MCV 75.1 (L) 80.0 - 99.0 FL    MCH 23.7 (L) 26.0 - 34.0 PG    MCHC 31.5 30.0 - 36.5 g/dL    RDW 19.6 (H) 11.5 - 14.5 %    PLATELET 465 829 - 798 K/uL    MPV 10.6 8.9 - 12.9 FL    NRBC 0.0 0  WBC    ABSOLUTE NRBC 0.00 0.00 - 0.01 K/uL       IMAGING RESULTS:  I have personally reviewed the imaging reports      Total time spent with patient: 50 minutes ________________________________________________________________________  Care Plan discussed with:  Patient y   Family  y-daughter   RN y              Consultant:  Dr Maribell Guan spoke to Dr Weston Miranda 7/7/2020:  ________________________________________________________________________    ___________________________________________________  Consulting Provider:  Adolfo Schilling NP      7/7/2020  4:10 PM

## 2020-07-07 NOTE — PROGRESS NOTES
I talked with the patient's son, Costa Amor, and he was updated on the plan, all questions were answered. Discussed with the son, Costa Amor, about the possibility of big tube and he agreed on that.

## 2020-07-07 NOTE — PROGRESS NOTES
Problem: Dysphagia (Adult)  Goal: *Acute Goals and Plan of Care (Insert Text)  Description: Speech Therapy Goals  Initiated 7/4/2020  1. Patient will participate in re-evaluation of swallow function within 7 days. 2. Patient will complete MBS 7/7/20. Goal met    Outcome: Not Progressing Towards Goal   SPEECH PATHOLOGY MODIFIED BARIUM SWALLOW STUDY  Patient: Norma Bonner (78 y.o. female)  Date: 7/7/2020  Primary Diagnosis: CVA (cerebral vascular accident) [facility]) [I63.9]        Precautions:   Aspiration, Bed Alarm, Fall    ASSESSMENT :  Based on the objective data described below, the patient presents with mod to severe oral and mod pharyngeal dysphagia. Orally she held the thin liquids on the FOM with min manipulation/posterior propulsion. There was mod anterior spillage with thins on the L due to facial weakness. There was slow posterior propulsion of purees  with swallow of about half of the bolus and significant oral residue that she did not swallow. (cleaned out of her mouth by myself). She was unable to extract liquid from the straw even when placed on the R side of her mouth due to poor lip closure/seal.  Pharyngeal swallow was characterized by mod vallecular residue that she did not sense and swallow again to clear. Residue in the valleculae is due to weak swallow and reduced tongue base retraction. The patient swallowed only one bolus of 3. The barium had to be cleared by myself from her mouth. She is not ready for any po at this time. Yesterday she was taking po at bedside but today despite being alert, she was not. Patient will benefit from skilled intervention to address the above impairments. Patients rehabilitation potential is considered to be Fair     PLAN :  Recommendations and Planned Interventions:  Recommend NPO with alternative feeding at least for the short term. Proceed to PEG? Repeat MBS when she is able to participate more fully.    Frequency/Duration: Patient will be followed by speech-language pathology 4 times a week to address goals. Discharge Recommendations: Inpatient Rehab     SUBJECTIVE:   Patient stated \"I'm not that tired. .    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     Carpal tunnel syndrome 2/8/2011    Degenerative Joint Disese ( improved/resolving) 2/8/2011    Degenetrative Dis Disease, 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    Diverticulosis of colon (without mention of hemorrhage) 2/8/2011    Dysphagia 2/8/2011    Edema, peripheral 2/8/2011    GERD (gastroesophageal reflux disease)     GERD Gastro- Esophageal Reflux Disease 2/8/2011    Herniated nucleus pulposus ( slipped disc) 2/8/2011    Menopausal 2/8/2011    PUD (peptic ulcer disease) 2012    Pure hypercholesterolemia 2/8/2011    Recurrent ear pain 2/8/2011    S/P radiation therapy     Scalp lesion 10/23/2014    Sebaceous cyst 4/22/2014    Unspecified cataract 2/8/2011    Unspecified essential hypertension 2/8/2011    Unspecified sleep apnea      Past Surgical History:   Procedure Laterality Date    HX HYSTERECTOMY      HX ORTHOPAEDIC  neck/back 2006    HX OTHER SURGICAL  5/8/2014     Excise recurrent scalp lesion and application of ACell    HX OTHER SURGICAL  5/8/2014    Excise keloid, anterior chest wall, and application of ACell    HX OTHER SURGICAL  5/8/2014     Excise sebaceous cyst, anterior chest wall    HX OTHER SURGICAL  5/8/2014    Punch biopsy, skin lesions, right forearm x2, right calf x1    NH ICAR CATHETER ABLATION ATRIOVENTR NODE FUNCTION N/A 5/6/2019    ABLATION AV NODE performed by Sherri Arzola MD at [place] [address], [facility]  CATH LAB    NH INS NEW/RPLCMT PRM PM W/TRANSV ELTRD ATRIAL&VENT  9/30/2017         NH RMVL IMPLANTABLE PT-ACTIVATED CAR EVENT RECORDER  9/30/2017          Prior Level of Function/Home Situation:   Home Situation  Home Environment: Private residence  One/Two Story Residence: Other (Comment)  Living Alone: Yes(per chart)  Support Systems: Child(jeniffer)  Patient Expects to be Discharged to[de-identified] Private residence  Current DME Used/Available at Home: (unable to provide history)  Diet prior to admission: reg/thins  Current Diet:  NPO   Radiologist: Dr. Ashley David: Lateral;Fluoro  Patient Position: upright in transmotion chair    Trial 1:   Consistency Presented: Thin liquid;Puree   How Presented: Self-fed/presented;Spoon;Cup/sip(could not extract from the straw)       Bolus Acceptance: Impaired   Bolus Formation/Control: Impaired: Incomplete;Lip closure; Anterior;Spillage(on the L)   Propulsion: Discoordination   Oral Residue: Lingual   Initiation of Swallow: No impairment   Timing: No impairment   Penetration: None   Aspiration/Timing: No evidence of aspiration   Pharyngeal Clearance: Vallecular residue;10-50%                       Decreased Tongue Base Retraction?: Yes  Laryngeal Elevation: Incomplete laryngeal closure; Reduced excursion with laryngeal vestibule gap  Aspiration/Penetration Score: 1 (No penetration or aspiration-Contrast does not enter the airway)  Pharyngeal Symmetry: Not assessed  Pharyngeal-Esophageal Segment: No impairment       Oral Phase Severity: Moderate-severe  Pharyngeal Phase Severity: Moderate  NOMS:   The NOMS functional outcome measure was used to quantify this patient's level of swallowing impairment. Based on the NOMS, the patient was determined to be at level 2 for swallow function         NOMS Swallowing Levels:  Level 1 (CN): NPO  Level 2 (CM): NPO but takes consistency in therapy  Level 3 (CL): Takes less than 50% of nutrition p.o. and continues with nonoral feedings; and/or safe with mod cues; and/or max diet restriction  Level 4 (CK):  Safe swallow but needs mod cues; and/or mod diet restriction; and/or still requires some nonoral feeding/supplements  Level 5 (Johana Phoenix): Safe swallow with min diet restriction; and/or needs min cues  Level 6 (CI): Independent with p.o.; rare cues; usually self cues; may need to avoid some foods or needs extra time  Level 7 ([address] [place] [address]): Independent for all p.o.  RESHMA. (2003). National Outcomes Measurement System (NOMS): Adult Speech-Language Pathology User's Guide. COMMUNICATION/EDUCATION:   The patients plan of care including findings from MBS, recommendations, planned interventions, and recommended diet changes were discussed with: Registered nurse. Patient is unable to participate in goal setting and plan of care.     Thank you for this referral.  Fernanda Schmidt, SLP  Time Calculation: 15 mins

## 2020-07-07 NOTE — PROGRESS NOTES
Chief Complaint: stroke    Has no complaints. Laying in bed alert continues to neglect the left. More coherent for me today. Updated son who has consented for peg placement. Discussed poor prognosis for independent living and almost certain need for long term skilled nursing. Discussed the catastrophic nature of her stroke and the co morbidities associated with it. Offered what support that is in my ability to give. Assesment and Plan  1. Acute ischemic right MCA stroke (HCC)  A1C : 5.7  LDL: 93  Echocardiogram:   Moderate concentric hypertrophy. Moderate-to-severe segmental systolic function. Estimated left ventricular ejection fraction is 25 - 30%. Visually measured ejection fraction. Severe (grade 3) left ventricular diastolic dysfunction. Repeat CT : shows evolving stroke no bleed  Initial CT: IMPRESSION:  1.  Occluded right internal carotid artery from the carotid bifurcation to the  [place] and further intraluminal thrombus at the right MCA bifurcation. 2.  Large core infarct in the right MCA distribution with a 55 cc ischemic  penumbra. 3.  Ischemic changes on delayed postcontrast head CT without evidence of  hemorrhage. dispo recommended  to inpatient rehabilitation     2. Atrial fibrillation with rapid ventricular response (HCC)  Continue rectal aspirin  Will start eliquis after PEG    3. Hyperlipoproteinemia  Continue atorvastatin    4. Left hemiparesis ([address]  With visual and sensory neglect. 5. Dysphagia  Not safe for swallow  PEG in discussion       D/w son    Allergies  Latex; Bactrim [sulfamethoprim ds]; Aspirin; Codeine;  Iodinated contrast media; Pcn [penicillins]; and Tape [adhesive]     Medications  Current Facility-Administered Medications   Medication Dose Route Frequency    dextrose 5% - 0.45% NaCl with KCl 10 mEq/L infusion  75 mL/hr IntraVENous CONTINUOUS    acetaminophen (TYLENOL) tablet 650 mg  650 mg Oral Q4H PRN    Or    acetaminophen (TYLENOL) solution 650 mg 650 mg Per NG tube Q4H PRN    Or    acetaminophen (TYLENOL) suppository 650 mg  650 mg Rectal Q4H PRN    ondansetron (ZOFRAN) injection 4 mg  4 mg IntraVENous Q6H PRN    atorvastatin (LIPITOR) tablet 40 mg  40 mg Oral QHS    labetaloL (NORMODYNE;TRANDATE) injection 5 mg  5 mg IntraVENous Q10MIN PRN    heparin (porcine) injection 5,000 Units  5,000 Units SubCUTAneous Q8H    aspirin (ASA) suppository 300 mg  300 mg Rectal DAILY        Medical History  Past Medical History:   Diagnosis Date    Acute pharyngitis 2/8/2011    Allergic rhinitis, cause unspecified 2/8/2011    Arrhythmia     PVC    Asymptomatic varicose veins 2/8/2011    [facility] [address]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     Carpal tunnel syndrome 2/8/2011    Degenerative Joint Disese ( improved/resolving) 2/8/2011    Degenetrative Dis Disease, 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    Diverticulosis of colon (without mention of hemorrhage) 2/8/2011    Dysphagia 2/8/2011    Edema, peripheral 2/8/2011    GERD (gastroesophageal reflux disease)     GERD Gastro- Esophageal Reflux Disease 2/8/2011    Herniated nucleus pulposus ( slipped disc) 2/8/2011    Menopausal 2/8/2011    PUD (peptic ulcer disease) 2012    Pure hypercholesterolemia 2/8/2011    Recurrent ear pain 2/8/2011    S/P radiation therapy     Scalp lesion 10/23/2014    Sebaceous cyst 4/22/2014    Unspecified cataract 2/8/2011    Unspecified essential hypertension 2/8/2011    Unspecified sleep apnea      Review of Systems   Unable to perform ROS: Mental acuity       Exam:    Visit Vitals  /68   Pulse 69   Temp 98.1 °F (36.7 °C)   Resp 16   Ht 5' 2\" (1.575 m)   Wt 152 lb 5.4 oz (69.1 kg)   SpO2 100%   Breastfeeding No   BMI 27.86 kg/m²      General: Confused   Head: Normocephalic, atraumatic, scarring on the scalp anicteric sclera   Neck Normal ROM,  No thyromegally   Lungs:  Clear to auscultatio. Cardiac: Regular rate and rhythm with no murmurs. Abd: Bowel sounds were audible.  No tenderness on palpation   Ext: No pedal edema   Skin: Supple no rash     NeurologicExam:  Mental Status:  Confused but able to follow simple commands   Speech:  Hesitant, fluent. Cranial Nerves:  Opens Eyes Spontaneously  orients to sound  Dolls eyes intact  PERRLA  Corneal reflex intact  Left facial droop  Left visual neglect   Motor: Good strength on the right. Does not move the left. Reflexes:   Deep tendon reflexes 2+/4 on the left 1+ on the right   Sensory:   Left sensory neglect    Tremor:   No tremor noted. Cerebellar:  Coordination intact. (right)   Neurovascular: No carotid bruits.  No JVD       Lab Review  Lab Results   Component Value Date/Time    WBC 6.9 07/07/2020 02:30 AM    HCT 38.4 07/07/2020 02:30 AM    HGB 12.1 07/07/2020 02:30 AM    PLATELET 278 66/20/7887 02:30 AM       Lab Results   Component Value Date/Time    Sodium 138 07/07/2020 02:30 AM    Potassium 3.2 (L) 07/07/2020 02:30 AM    Chloride 105 07/07/2020 02:30 AM    CO2 26 07/07/2020 02:30 AM    Glucose 99 07/07/2020 02:30 AM    BUN 10 07/07/2020 02:30 AM    Creatinine 0.74 07/07/2020 02:30 AM    Calcium 8.7 07/07/2020 02:30 AM       Lab Results   Component Value Date/Time    Hemoglobin A1c 5.7 (H) 07/04/2020 05:43 AM    Hemoglobin A1c (POC) 5.7 08/08/2016 03:43 PM          Lab Results   Component Value Date/Time    Cholesterol, total 185 07/04/2020 05:43 AM    Cholesterol (POC) 168 03/12/2020 12:50 PM    HDL Cholesterol 73 07/04/2020 05:43 AM    HDL Cholesterol (POC) 57 03/12/2020 12:50 PM    LDL Cholesterol (POC) 93 03/12/2020 12:50 PM    LDL, calculated 93 07/04/2020 05:43 AM    VLDL, calculated 19 07/04/2020 05:43 AM    Triglyceride 95 07/04/2020 05:43 AM    Triglycerides (POC) 96 03/12/2020 12:50 PM    CHOL/HDL Ratio 2.5 07/04/2020 05:43 AM     90  minutes spent more than 50% spent in chart review and face to face  examination, discussion of treatment options and approach

## 2020-07-07 NOTE — PROGRESS NOTES
Bedside shift change report given to Amy RN  (oncoming nurse) by GAMALIEL Hernandez (offgoing nurse).  Report included the following information SBAR, Kardex and Quality Measures.     Zone Phone:  [phone]        Significant changes during shift: failed modified barium swallowing         Patient Information     Montse Hernandez  78 y.o.  7/3/2020  2:59 PM by Darlene Gil MD. 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    Scalp lesion 10/23/2014    Sebaceous cyst 04/22/2014    Keloid 04/22/2014    Dysphagia 02/08/2011    Recurrent ear pain 02/08/2011    Acute pharyngitis 02/08/2011    Essential hypertension 02/08/2011    Edema, peripheral 02/08/2011    Pure hypercholesterolemia 02/08/2011    Allergic rhinitis, cause unspecified 02/08/2011    Diverticulosis of colon (without mention of hemorrhage) 02/08/2011    Herniated nucleus pulposus ( slipped disc) 02/08/2011    Degenetrative Dis Disease, 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    Carpal tunnel syndrome 02/08/2011    Unspecified cataract 02/08/2011    GERD Gastro- Esophageal Reflux Disease 02/08/2011    Degenerative Joint Disese ( improved/resolving) 02/08/2011    Menopausal 02/08/2011    Asymptomatic varicose veins 02/08/2011              Past Medical History:   Diagnosis Date    Acute pharyngitis 2/8/2011    Allergic rhinitis, cause unspecified 2/8/2011    Arrhythmia       PVC    Asymptomatic varicose veins 2/8/2011    Cancer ([address] 2009     stage 4 throat     Carpal tunnel syndrome 2/8/2011    Degenerative Joint Disese ( improved/resolving) 2/8/2011    Degenetrative Dis Disease, 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    Diverticulosis of colon (without mention of hemorrhage) 2/8/2011    Dysphagia 2/8/2011    Edema, peripheral 2/8/2011    GERD (gastroesophageal reflux disease)      GERD Gastro- Esophageal Reflux Disease 2/8/2011    Herniated nucleus pulposus ( slipped disc) 2/8/2011    Menopausal 2/8/2011    PUD (peptic ulcer disease) 2012    Pure hypercholesterolemia 2/8/2011    Recurrent ear pain 2/8/2011    S/P radiation therapy      Scalp lesion 10/23/2014    Sebaceous cyst 4/22/2014    Unspecified cataract 2/8/2011    Unspecified essential hypertension 2/8/2011    Unspecified sleep apnea              Core Measures:     CVA: Yes Yes  CHF:No No  PNA:No No     Post Op Surgical (If Applicable):      Number times ambulated in hallway past shift:  0  Number of times OOB to chair past shift:   0  NG Tube: No  Incentive Spirometer: No  Drains: No   Volume  0  Dressing Present:  No  Flatus:  Yes     Activity Status:     OOB to Chair No  Ambulated this shift No   Bed Rest Yes     Supplemental B4: (QU Applicable)     NC No  NRB No  Venti-mask No  On 0 Liters/min        LINES AND DRAINS:     Central Line? No Placement date 0 Reason Medically Necessary 0     PICC LINE? No Placement date 0Reason Medically Necessary 0     Urinary Catheter? No Placement Date 0 Reason Medically Necessary 0     DVT prophylaxis:     DVT prophylaxis Med- Yes  DVT prophylaxis SCD or MEY- No      Wounds: (If Applicable)     Wounds- Yes open areas     Location [place] upper groin area     Patient Safety:     Falls Score Total Score: 4  Safety Level_______  Bed Alarm On? Yes  Sitter? No     Plan for upcoming shift:   palliative will speak with family           Discharge Plan: yes- rehab     Active Consults:  IP CONSULT TO NEUROLOGY  IP CONSULT TO PALLIATIVE CARE - PROVIDER

## 2020-07-07 NOTE — PROGRESS NOTES
Problem: Self Care Deficits Care Plan (Adult)  Goal: *Acute Goals and Plan of Care (Insert Text)  Description: FUNCTIONAL STATUS PRIOR TO ADMISSION: per chart pt lived alone, amb with RW, I with ADLs. HOME SUPPORT: The patient lived alone with family in area. Occupational Therapy Goals  Initiated 7/5/2020   1. Patient will perform grooming with supervision/set-up within 7 day(s). 2.  Patient will perform upper body dressing with min A using troy technique PRN within 7 day(s). 3.  Patient will locate and utilize 25% of ADL objects presented to L of midline with minimal assistance/contact guard and assist and cues within 7 day(s). 4.  Patient will perform sitting at EOB without UE support for 2 minutes with minimal assistance/contact guard assist within 7 day(s). 5.  Patient will follow 50% of simple one step commands for ADL within 7 day(s). 6.  Patient will participate in upper extremity therapeutic exercise/activities with minimal assistance/contact guard assist for 5 minutes within 7 day(s). 7.  Patient will utilize energy conservation techniques during functional activities with verbal, visual and tactile cues within 7 day(s). 8.  Patient will improve their Fugl Lo score by 5 points in prep for ADLs within 7 days. Outcome: Progressing Towards Goal   OCCUPATIONAL THERAPY TREATMENT  Patient: Esteban Parsons (78 y.o. female)  Date: 7/7/2020  Diagnosis: CVA (cerebral vascular accident) ([facility] [I63.9]   <principal problem not specified>  Procedure(s) (LRB):  ESOPHAGOGASTRODUODENOSCOPY (EGD)URGENT (N/A)  PERCUTANEOUS ENDOSCOPIC GASTROSTOMY TUBE INSERTION (N/A)    Precautions: Aspiration, Bed Alarm, Fall  Chart, occupational therapy assessment, plan of care, and goals were reviewed. ASSESSMENT  Patient continues with skilled OT services and is slowly progressing towards goals. Pt was alert upon arrival, her daughter present, and by the end of tx session, pt was drowsy and appeared fatigued. Followed same treatment plan as last session with noted improvement in head control, however, searching behaviors, visual tracking is severely limited by at least 75% horizontally. Educated daughter on LUE (flaccid) positioning and sensory input to LUE. Daughter verbalized understanding and is supportive. Pt demonstrated less posterior lean/pushing this date and was able to maintain trunk in midline while seated with significant support  at EOB. Pt will need rehab at discharge. Current Level of Function Impacting Discharge (ADLs): dependent. Other factors to consider for discharge: supportive family         PLAN :  Patient continues to benefit from skilled intervention to address the above impairments. Continue treatment per established plan of care. to address goals. Recommend with staff: encourage sidelying and appropriate LUE positioning    Recommendation for discharge: (in order for the patient to meet his/her long term goals)  To be determined: will likely benefit from rehab, dependent on family's wishes     This discharge recommendation:  Has not yet been discussed the attending provider and/or case management    IF patient discharges home will need the following DME: will need set up for total care at bed level       SUBJECTIVE:   Patient able to follow most commands  OBJECTIVE DATA SUMMARY:   Cognitive/Behavioral Status:  Neurologic State: Alert  Orientation Level: Disoriented to situation  Cognition: Follows commands             Functional Mobility and Transfers for ADLs:  Bed Mobility:  Rolling: Maximum assistance  Supine to Sit: Maximum assistance;Assist x2  Sit to Supine: Maximum assistance;Assist x2  Scooting:  Total assistance    Transfers:             Balance:  Sitting: Impaired  Sitting - Static: Poor (constant support)(constant posterior lean)  Sitting - Dynamic: Poor (constant support)    ADL Intervention:   Educated daughter in LUE positioning and ways to provide sensory input into LUE to increase awareness of LUE. Educated on facilitating visual awareness of environment activities. Daughter verbalized understanding. Therapeutic Exercises:   Worked on trunk control at EOB and visual aWareness of L side and of midline          Activity Tolerance:   Fair  Please refer to the flowsheet for vital signs taken during this treatment.     After treatment patient left in no apparent distress:   Supine in bed, Heels elevated for pressure relief, Call bell within reach, Bed / chair alarm activated, Caregiver / family present, and Side rails x 3    COMMUNICATION/COLLABORATION:   The patients plan of care was discussed with: Physical therapist.     Nic Simms, OTR/L  Time Calculation: 42 mins

## 2020-07-07 NOTE — PROGRESS NOTES
Problem: Mobility Impaired (Adult and Pediatric)  Goal: *Acute Goals and Plan of Care (Insert Text)  Description:   FUNCTIONAL STATUS PRIOR TO ADMISSION: unclear     HOME SUPPORT PRIOR TO ADMISSION: The patient lived at home alone? Physical Therapy Goals  Initiated 7/5/2020  1. Patient will move from supine to sit and sit to supine , scoot up and down and roll side to side in bed with maximal assistance within 7 day(s). 2.  Patient will transfer from bed to chair and chair to bed with maximal assistance using the least restrictive device within 7 day(s). 3.  Patient will perform sit to stand with maximal assistance within 7 day(s). 4.  Patient will ambulate with maximal assistance for 3 feet with the least restrictive device within 7 day(s). 5. Patient will improve Arizmendi Balance score by 7 points within 7 days. Outcome: Progressing Towards Goal   PHYSICAL THERAPY TREATMENT  Patient: Abdi Neal (78 y.o. female)  Date: 7/7/2020  Diagnosis: CVA (cerebral vascular accident) [facility]) [I63.9]   <principal problem not specified>       Precautions: Aspiration, Bed Alarm, Fall  Chart, physical therapy assessment, plan of care and goals were reviewed. ASSESSMENT  Patient continues with skilled PT services and is progressing towards goals. Patient alert and following commands. She continues to demonstrate head rotation to R with strongly deviated eyes to R. Patient is able to maintain head in midline briefly when manually placed in midline position but not able to actively assist to attain position. Unable to track to center. Patient continues to require max A of 2 for bed mobility and max A to maintain static sitting balance but decreased pushing and L lateral lean noted. Continues to demonstrate constant posterior lean. Patient able to reach with minimal weight displacement but requires mod A for balance and mod A to attempt reaching. Mild improvements noted in sitting balance overall today.  Continue to recommend SNF following discharge     Current Level of Function Impacting Discharge (mobility/balance): max A of 2           PLAN :  Patient continues to benefit from skilled intervention to address the above impairments. Continue treatment per established plan of care. to address goals. Recommendation for discharge: (in order for the patient to meet his/her long term goals)  Therapy up to 5 days/week in SNF setting    This discharge recommendation:  Has been made in collaboration with the attending provider and/or case management    IF patient discharges home will need the following DME: to be determined (TBD)         OBJECTIVE DATA SUMMARY:   Critical Behavior:  Neurologic State: Confused, Drowsy  Orientation Level: Oriented to person, Oriented to place, Oriented to time  Cognition: Poor safety awareness, Follows commands  Safety/Judgement: Lack of insight into deficits  Functional Mobility Training:  Bed Mobility:  Rolling: Maximum assistance  Supine to Sit: Maximum assistance;Assist x2  Sit to Supine: Maximum assistance;Assist x2  Scooting: Total assistance        Transfers:      Not able to safely assess                             Balance:  Sitting: Impaired  Sitting - Static: Poor (constant support)(constant posterior lean)  Sitting - Dynamic: Poor (constant support)                Therapeutic Exercises:   Patent requiring max A to maintain sitting balance; working on midline orientation and reaching with RUE; also performing lateral weight shifts with weight bearing through RUE. Activity Tolerance:   Fair  Please refer to the flowsheet for vital signs taken during this treatment. After treatment patient left in no apparent distress:   Supine in bed, Call bell within reach, and Caregiver / family present    COMMUNICATION/COLLABORATION:   The patients plan of care was discussed with: Occupational therapist, Registered nurse, and daughter .      Clarke Causey, PT   Time Calculation: 42 mins

## 2020-07-07 NOTE — PROGRESS NOTES
Problem: Motor Speech Impaired (Adult)  Goal: *Acute Goals and Plan of Care (Insert Text)  Description: 7/7/2020  Speech path goals  1. Patient will produce short sentences with 90% intelligibility with max cues. 2. Patient will produce lengthier sentences with 90% intelligibility with max cues. 3. Patient will produce conversational speech with 70% intelligibility with max cues. 4. Patient will state 3 motor speech strategies with 66% acc with min cues. Outcome: Not Met     SPEECH LANGUAGE PATHOLOGY EVALUATION  Patient: Abdi Neal (78 y.o. female)  Date: 7/7/2020  Primary Diagnosis: CVA (cerebral vascular accident) [facility]) [I63.9]        Precautions:   Aspiration, Bed Alarm, Fall    ASSESSMENT :  Based on the objective data described below, the patient presents with mod, mod to severe dysarthria with low volume, imprecise articulation and blended word boundaries. Her intelligibility is moderate to severely impaired. Patient is more intelligible with context known. Patient will benefit from skilled intervention to address the above impairments. Patients rehabilitation potential is considered to be Good     PLAN :  Recommendations and Planned Interventions:  Intensive speech therapy for dysarthria and dysphagia  Frequency/Duration: Patient will be followed by speech-language pathology 4 times a week to address goals. Discharge Recommendations: Inpatient Rehab     SUBJECTIVE:   Patient stated she was here because they found something they didn't like.      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    Diverticulosis of colon (without mention of hemorrhage) 2/8/2011 Dysphagia 2/8/2011    Edema, peripheral 2/8/2011    GERD (gastroesophageal reflux disease)     GERD Gastro- Esophageal Reflux Disease 2/8/2011    Herniated nucleus pulposus ( slipped disc) 2/8/2011    Menopausal 2/8/2011    PUD (peptic ulcer disease) 2012    Pure hypercholesterolemia 2/8/2011    Recurrent ear pain 2/8/2011    S/P radiation therapy     Scalp lesion 10/23/2014    Sebaceous cyst 4/22/2014    Unspecified cataract 2/8/2011    Unspecified essential hypertension 2/8/2011    Unspecified sleep apnea      Past Surgical History:   Procedure Laterality Date    HX HYSTERECTOMY      HX ORTHOPAEDIC  neck/back 2006    HX OTHER SURGICAL  5/8/2014     Excise recurrent scalp lesion and application of ACell    HX OTHER SURGICAL  5/8/2014    Excise keloid, anterior chest wall, and application of ACell    HX OTHER SURGICAL  5/8/2014     Excise sebaceous cyst, anterior chest wall    HX OTHER SURGICAL  5/8/2014    Punch biopsy, skin lesions, right forearm x2, right calf x1    DC ICAR CATHETER ABLATION ATRIOVENTR NODE FUNCTION N/A 5/6/2019    ABLATION AV NODE performed by Freida Hopkins MD at [facility] [address] CATH LAB    DC INS NEW/RPLCMT PRM PM W/TRANSV ELTRD ATRIAL&VENT  9/30/2017         DC RMVL IMPLANTABLE PT-ACTIVATED CAR EVENT RECORDER  9/30/2017          Prior Level of Function/Home Situation:   Home Situation  Home Environment: Private residence  One/Two Story Residence: Other (Comment)  Living Alone: Yes(per chart)  Support Systems: Child(jeniffer)  Patient Expects to be Discharged to[de-identified] Private residence  Current DME Used/Available at Home: (unable to provide history)  Mental Status:  Neurologic State: Drowsy  Orientation Level: Oriented to person, Disoriented to place, Disoriented to situation, Disoriented to time  Cognition: Poor safety awareness, Follows commands  Perception: Cues to maintain midline in sitting, Cues to attend left visual field, Cues to attend to left side of body  Perseveration: No perseveration noted  Safety/Judgement: Lack of insight into deficits  Motor Speech:  Oral-Motor Structure/Motor Speech  Intelligibility: Impaired  Sentence Intelligibility (%): 50 %  Conversation Intelligibility (%): 30 %  Overall Impairment Severity: Moderate  Language Comprehension and Expression:  Auditory Comprehension  Auditory Impairment: No   Verbal Expression  Primary Mode of Expression: Verbal  Initiation: No impairment  Repetition: No impairment  Conversation: Dysarthric;Fluent  Overall Impairment: None            Pragmatics:   wnl                                                        NOMS: motor speech 3    Pain:  Pain Scale 1: Visual  Pain Intensity 1: 0       After treatment:   Patient left in no apparent distress sitting up in chair    COMMUNICATION/EDUCATION:   Patient was educated regarding her deficit(s) of dysarthria as this relates to her diagnosis of CVA. She demonstrated Fair understanding. The patient's plan of care including recommendations, planned interventions, and recommended diet changes were discussed with: Registered nurse. Patient/family have participated as able in goal setting and plan of care.     Thank you for this referral.  NIKKI Ramirez  Time Calculation: 20 mins

## 2020-07-07 NOTE — PROGRESS NOTES
Hospitalist Progress Note    NAME: Abran Cooper   :  1940   MRN:  [id number]       Assessment / Plan:  Acute right MCA stroke POA  Occluded right internal carotid artery from the carotid bifurcation to the [place] and further intraluminal thrombus at the right MCA bifurcation POA  Acute Encephalopathy POA- due to above  Dysphagia due to acute stroke  CT brain:  Large acute right MCA infarct with suggestion of intraluminal thrombus in the  MCA. No hemorrhage is identified  CTA  H&N:  1. Occluded right internal carotid artery from the carotid bifurcation to the  [place] and further intraluminal thrombus at the right MCA bifurcation. 2.  Large core infarct in the right MCA distribution with a 55 cc ischemic  penumbra. Ischemic changes on delayed postcontrast head CT without evidence of hemorrhage. LDL= 93/A1c= 5.7/Trop neg  large on initial CT (catastrophic), Evolving on repeat CT ()    Patient was not a TPA candidate and neuro interventionalist did not recc any interventions  Complete the stroke work-up of the brain without contrast when able- pt has compatible PPM?  2D echo -EF 56-84%, severe Diastolic Dysfunction noted  Cont rectal aspirin, and statin when able to take p.o.- currently strict NPO per SLP eval on saturday  MBS 2020 with Severe oropharyngeal dysphagia  Discussed with family and patient's PCP and they agreed for PEG tube. GI was consulted for PEG tube, I talked with Dr. Raine Da Silva.    IV labetalol prn  Holding Xarelto for now till able to PO  Pt is at a risk for hemorrhagic conversion of this large stroke , repeat CT = no bleed noted  Neuro consult noted-consider Eliquis when able to take PO   IP Palliative care consulted - catastrophic large CVA with severe Dysphagia     Hypertension  Hold all BP meds for now, resume as needed now    Arrhythmia  Xarelto on her PTA list, hold it , not  sure if she is taking it   med rec for pharmacy      Code Status: Full code confirmed in ER with the daughter and the son who is a power of   Surrogate Decision Maker:  Brandin Hutchins [phone]         DVT Prophylaxis: Heparin  GI Prophylaxis: not indicated     Baseline: Walk with a walker    Recommended Disposition: SNF/LTC +/- hospice? ?  TBD- Palliative consult awaited     Subjective:     Chief Complaint / Reason for Physician Visit :F/u Large R MCA CVA, dysphagia,   Acute right MCA stroke/dysphagia     Discussed with RN events overnight. Review of Systems:  Symptom Y/N Comments  Symptom Y/N Comments   Fever/Chills    Chest Pain     Poor Appetite    Edema     Cough    Abdominal Pain     Sputum    Joint Pain     SOB/FRANK    Pruritis/Rash     Nausea/vomit    Tolerating PT/OT     Diarrhea    Tolerating Diet     Constipation    Other       Could NOT obtain due to: Aphasia/sleepy     Objective:     VITALS:   Last 24hrs VS reviewed since prior progress note. Most recent are:  Patient Vitals for the past 24 hrs:   Temp Pulse Resp BP SpO2   07/07/20 0643 97.8 °F (36.6 °C) 74 18 135/81 100 %   07/07/20 0315 97.2 °F (36.2 °C) 79 18 155/77 100 %   07/07/20 0020 97.6 °F (36.4 °C) 71 18 142/73 100 %   07/06/20 2143 99.3 °F (37.4 °C) 72 18 142/80 96 %   07/06/20 1623 97.8 °F (36.6 °C) 69 18 147/71 100 %   07/06/20 1517 -- 69 -- 140/74 95 %   07/06/20 1219 98.6 °F (37 °C) 69 18 150/81 98 %     No intake or output data in the 24 hours ending 07/07/20 1023     PHYSICAL EXAM:  General: WD, WN.drowsy, non cooperative, no acute distress    EENT:  EOMI. Anicteric sclerae. MMM  Resp:  CTA bilaterally, no wheezing or rales. No accessory muscle use  CV:  Regular  rhythm,  No edema  GI:  Soft, Non distended, Non tender.  +Bowel sounds  Neurologic:  Sleepy, unable to follow commands, severe Oropharyngeal Dysphagia noted +  Psych:   Un able to assess  Skin:  No rashes.   No jaundice    Reviewed most current lab test results and cultures  YES  Reviewed most current radiology test results   YES  Review and summation of old records today    NO  Reviewed patient's current orders and MAR    YES  PMH/SH reviewed - no change compared to H&P  ________________________________________________________________________  Care Plan discussed with:    Comments   Patient     Family  x  patient's son Ronnie Christianson x    Care Manager x    Consultant                       x Multidiciplinary team rounds were held today with , nursing, pharmacist and clinical coordinator. Patient's plan of care was discussed; medications were reviewed and discharge planning was addressed. ________________________________________________________________________        Comments   >50% of visit spent in counseling and coordination of care     ________________________________________________________________________  Bandar Castelan MD     Procedures: see electronic medical records for all procedures/Xrays and details which were not copied into this note but were reviewed prior to creation of Plan. LABS:  I reviewed today's most current labs and imaging studies.   Pertinent labs include:  Recent Labs     07/07/20 0230 07/06/20 0455 07/05/20  1040   WBC 6.9 7.1 6.3   HGB 12.1 12.7 13.5   HCT 38.4 40.4 42.2    272 285     Recent Labs     07/07/20 0230 07/06/20 0455 07/05/20  1040    137 138   K 3.2* 3.3* 3.0*    104 103   CO2 26 28 30   GLU 99 114* 111*   BUN 10 9 12   CREA 0.74 0.75 0.94   CA 8.7 8.8 8.5       Signed: Bandar Castelan MD

## 2020-07-07 NOTE — PROGRESS NOTES
Problem: Falls - Risk of  Goal: *Absence of Falls  Description: Document Adeline Calderon Fall Risk and appropriate interventions in the flowsheet. Outcome: Progressing Towards Goal  Note: Fall Risk Interventions:       Mentation Interventions: Door open when patient unattended, Bed/chair exit alarm, Family/sitter at bedside, More frequent rounding    Medication Interventions: Bed/chair exit alarm, Patient to call before getting OOB    Elimination Interventions: Call light in reach, Bed/chair exit alarm, Toileting schedule/hourly rounds    History of Falls Interventions: Bed/chair exit alarm, Door open when patient unattended         Problem: Pressure Injury - Risk of  Goal: *Prevention of pressure injury  Description: Document Howard Scale and appropriate interventions in the flowsheet.   Outcome: Progressing Towards Goal  Note: Pressure Injury Interventions:  Sensory Interventions: Assess changes in LOC, Assess need for specialty bed, Float heels, Maintain/enhance activity level, Minimize linen layers    Moisture Interventions: Apply protective barrier, creams and emollients, Limit adult briefs    Activity Interventions: Increase time out of bed    Mobility Interventions: Float heels, HOB 30 degrees or less    Nutrition Interventions: Document food/fluid/supplement intake    Friction and Shear Interventions: Apply protective barrier, creams and emollients, Feet elevated on foot rest, HOB 30 degrees or less, Lift team/patient mobility team, Minimize layers                Problem: Patient Education: Go to Patient Education Activity  Goal: Patient/Family Education  Outcome: Progressing Towards Goal

## 2020-07-08 ENCOUNTER — ANESTHESIA (OUTPATIENT)
Dept: ENDOSCOPY | Age: 80
DRG: 064 | End: 2020-07-08
Payer: MEDICARE

## 2020-07-08 ENCOUNTER — ANESTHESIA EVENT (OUTPATIENT)
Dept: ENDOSCOPY | Age: 80
DRG: 064 | End: 2020-07-08
Payer: MEDICARE

## 2020-07-08 ENCOUNTER — APPOINTMENT (OUTPATIENT)
Dept: MRI IMAGING | Age: 80
DRG: 064 | End: 2020-07-08
Attending: INTERNAL MEDICINE
Payer: MEDICARE

## 2020-07-08 LAB
GLUCOSE BLD STRIP.AUTO-MCNC: 86 MG/DL (ref 65–100)
SERVICE CMNT-IMP: NORMAL

## 2020-07-08 PROCEDURE — 74011250636 HC RX REV CODE- 250/636: Performed by: INTERNAL MEDICINE

## 2020-07-08 PROCEDURE — 77030038269 HC DRN EXT URIN PURWCK BARD -A

## 2020-07-08 PROCEDURE — 74011250637 HC RX REV CODE- 250/637: Performed by: INTERNAL MEDICINE

## 2020-07-08 PROCEDURE — 0DH63UZ INSERTION OF FEEDING DEVICE INTO STOMACH, PERCUTANEOUS APPROACH: ICD-10-PCS | Performed by: INTERNAL MEDICINE

## 2020-07-08 PROCEDURE — 76060000032 HC ANESTHESIA 0.5 TO 1 HR: Performed by: INTERNAL MEDICINE

## 2020-07-08 PROCEDURE — 82962 GLUCOSE BLOOD TEST: CPT

## 2020-07-08 PROCEDURE — 76040000007: Performed by: INTERNAL MEDICINE

## 2020-07-08 PROCEDURE — 74011250636 HC RX REV CODE- 250/636: Performed by: NURSE ANESTHETIST, CERTIFIED REGISTERED

## 2020-07-08 PROCEDURE — 74011250636 HC RX REV CODE- 250/636: Performed by: FAMILY MEDICINE

## 2020-07-08 PROCEDURE — 77030005123 HC CATH GASTMY PEG BSC -C: Performed by: INTERNAL MEDICINE

## 2020-07-08 PROCEDURE — 74011000250 HC RX REV CODE- 250: Performed by: NURSE ANESTHETIST, CERTIFIED REGISTERED

## 2020-07-08 PROCEDURE — 92507 TX SP LANG VOICE COMM INDIV: CPT

## 2020-07-08 PROCEDURE — 77030039825 HC MSK NSL PAP SUPERNO2VA VYRM -B: Performed by: ANESTHESIOLOGY

## 2020-07-08 PROCEDURE — 94760 N-INVAS EAR/PLS OXIMETRY 1: CPT

## 2020-07-08 PROCEDURE — 65660000000 HC RM CCU STEPDOWN

## 2020-07-08 PROCEDURE — 74011000258 HC RX REV CODE- 258: Performed by: INTERNAL MEDICINE

## 2020-07-08 PROCEDURE — 70551 MRI BRAIN STEM W/O DYE: CPT

## 2020-07-08 RX ORDER — MIDAZOLAM HYDROCHLORIDE 1 MG/ML
.25-5 INJECTION, SOLUTION INTRAMUSCULAR; INTRAVENOUS
Status: DISCONTINUED | OUTPATIENT
Start: 2020-07-08 | End: 2020-07-08 | Stop reason: HOSPADM

## 2020-07-08 RX ORDER — FLUMAZENIL 0.1 MG/ML
0.2 INJECTION INTRAVENOUS
Status: DISCONTINUED | OUTPATIENT
Start: 2020-07-08 | End: 2020-07-08 | Stop reason: HOSPADM

## 2020-07-08 RX ORDER — DEXTROMETHORPHAN/PSEUDOEPHED 2.5-7.5/.8
1.2 DROPS ORAL
Status: DISCONTINUED | OUTPATIENT
Start: 2020-07-08 | End: 2020-07-08 | Stop reason: HOSPADM

## 2020-07-08 RX ORDER — MIDAZOLAM HYDROCHLORIDE 1 MG/ML
.25-5 INJECTION, SOLUTION INTRAMUSCULAR; INTRAVENOUS
Status: DISCONTINUED | OUTPATIENT
Start: 2020-07-08 | End: 2020-07-08

## 2020-07-08 RX ORDER — MORPHINE SULFATE 2 MG/ML
1 INJECTION, SOLUTION INTRAMUSCULAR; INTRAVENOUS ONCE
Status: DISCONTINUED | OUTPATIENT
Start: 2020-07-08 | End: 2020-07-08

## 2020-07-08 RX ORDER — CEFAZOLIN SODIUM 1 G/3ML
INJECTION, POWDER, FOR SOLUTION INTRAMUSCULAR; INTRAVENOUS
Status: DISPENSED
Start: 2020-07-08 | End: 2020-07-09

## 2020-07-08 RX ORDER — NALOXONE HYDROCHLORIDE 0.4 MG/ML
0.4 INJECTION, SOLUTION INTRAMUSCULAR; INTRAVENOUS; SUBCUTANEOUS
Status: DISCONTINUED | OUTPATIENT
Start: 2020-07-08 | End: 2020-07-08 | Stop reason: HOSPADM

## 2020-07-08 RX ORDER — SODIUM CHLORIDE 0.9 % (FLUSH) 0.9 %
5-40 SYRINGE (ML) INJECTION AS NEEDED
Status: DISCONTINUED | OUTPATIENT
Start: 2020-07-08 | End: 2020-07-15 | Stop reason: HOSPADM

## 2020-07-08 RX ORDER — KETOROLAC TROMETHAMINE 30 MG/ML
15 INJECTION, SOLUTION INTRAMUSCULAR; INTRAVENOUS
Status: COMPLETED | OUTPATIENT
Start: 2020-07-08 | End: 2020-07-08

## 2020-07-08 RX ORDER — SODIUM CHLORIDE 9 MG/ML
100 INJECTION, SOLUTION INTRAVENOUS CONTINUOUS
Status: DISPENSED | OUTPATIENT
Start: 2020-07-08 | End: 2020-07-08

## 2020-07-08 RX ORDER — LIDOCAINE HYDROCHLORIDE 20 MG/ML
INJECTION, SOLUTION EPIDURAL; INFILTRATION; INTRACAUDAL; PERINEURAL AS NEEDED
Status: DISCONTINUED | OUTPATIENT
Start: 2020-07-08 | End: 2020-07-08 | Stop reason: HOSPADM

## 2020-07-08 RX ORDER — PROPOFOL 10 MG/ML
INJECTION, EMULSION INTRAVENOUS AS NEEDED
Status: DISCONTINUED | OUTPATIENT
Start: 2020-07-08 | End: 2020-07-08 | Stop reason: HOSPADM

## 2020-07-08 RX ORDER — ATROPINE SULFATE 0.1 MG/ML
0.5 INJECTION INTRAVENOUS
Status: DISCONTINUED | OUTPATIENT
Start: 2020-07-08 | End: 2020-07-08 | Stop reason: HOSPADM

## 2020-07-08 RX ORDER — SODIUM CHLORIDE 0.9 % (FLUSH) 0.9 %
5-40 SYRINGE (ML) INJECTION EVERY 8 HOURS
Status: DISCONTINUED | OUTPATIENT
Start: 2020-07-08 | End: 2020-07-15 | Stop reason: HOSPADM

## 2020-07-08 RX ADMIN — HEPARIN SODIUM 5000 UNITS: 5000 INJECTION INTRAVENOUS; SUBCUTANEOUS at 16:08

## 2020-07-08 RX ADMIN — SODIUM CHLORIDE 100 ML/HR: 900 INJECTION, SOLUTION INTRAVENOUS at 11:36

## 2020-07-08 RX ADMIN — ATORVASTATIN CALCIUM 40 MG: 40 TABLET, FILM COATED ORAL at 21:54

## 2020-07-08 RX ADMIN — KETOROLAC TROMETHAMINE 15 MG: 30 INJECTION, SOLUTION INTRAMUSCULAR at 06:03

## 2020-07-08 RX ADMIN — PROPOFOL 10 MG: 10 INJECTION, EMULSION INTRAVENOUS at 12:28

## 2020-07-08 RX ADMIN — Medication: at 17:29

## 2020-07-08 RX ADMIN — ASPIRIN 300 MG: 300 SUPPOSITORY RECTAL at 10:10

## 2020-07-08 RX ADMIN — Medication: at 08:37

## 2020-07-08 RX ADMIN — PROPOFOL 30 MG: 10 INJECTION, EMULSION INTRAVENOUS at 12:22

## 2020-07-08 RX ADMIN — POTASSIUM BICARBONATE 40 MEQ: 782 TABLET, EFFERVESCENT ORAL at 16:10

## 2020-07-08 RX ADMIN — PROPOFOL 20 MG: 10 INJECTION, EMULSION INTRAVENOUS at 12:25

## 2020-07-08 RX ADMIN — PROPOFOL 10 MG: 10 INJECTION, EMULSION INTRAVENOUS at 12:32

## 2020-07-08 RX ADMIN — DEXTROSE MONOHYDRATE, SODIUM CHLORIDE, AND POTASSIUM CHLORIDE 75 ML/HR: 50; 4.5; .745 INJECTION, SOLUTION INTRAVENOUS at 06:03

## 2020-07-08 RX ADMIN — LIDOCAINE HYDROCHLORIDE 50 MG: 20 INJECTION, SOLUTION EPIDURAL; INFILTRATION; INTRACAUDAL; PERINEURAL at 12:22

## 2020-07-08 RX ADMIN — Medication 10 ML: at 16:08

## 2020-07-08 RX ADMIN — Medication 10 ML: at 21:55

## 2020-07-08 RX ADMIN — CEFAZOLIN 1 G: 1 INJECTION, POWDER, FOR SOLUTION INTRAMUSCULAR; INTRAVENOUS; PARENTERAL at 12:17

## 2020-07-08 RX ADMIN — HEPARIN SODIUM 5000 UNITS: 5000 INJECTION INTRAVENOUS; SUBCUTANEOUS at 21:54

## 2020-07-08 NOTE — PROGRESS NOTES
Nutrition Assessment:    INTERVENTIONS/RECOMMENDATIONS:   · Jevity 1.5 @ 20 mL/hr and advance as tolerated by 10 mL q 12 hours to goal rate of 40 mL/hr + 100 mL water flushes q 4 hours (provides 1440 kcal, 61g protein, 1330 mL water)  · Once tolerating continuous feedings, consider transition to bolus regimen. Recommend Jevity 1.5 240 mL bolus 4x/day + 75 mL water flushes before and after each bolus     ASSESSMENT:   Chart reviewed; medically noted for CVA and dysphagia. Patient s/p PEG placement today. Consult received was for TPN recommendations but given PEG placement and GI note indicating nutrition consult for TF recommendations, TF recommendations provided above. Goal rate adequate to meet 100% of estimated kcal/protein needs. Spoke to son at bedside. He notes she's had gradual weight loss since she had to bury her . Son states she was eating more sporadically but when she ate, she ate well. Good appetite in general. Discussed plans for TF and eventual transition to bolus. Questions answered. Will monitor TF tolerance, weight, and labs. Diet Order: NPO  % Eaten:  No data found. Pertinent Medications: [x] Reviewed []Other: Atorvastatin, Effer-K, D5% IVF + KCl  Pertinent Labs: [x]Reviewed  []Other: K+ 3.2  Food Allergies: [x]None []Yes:     Last BM: 7/8   [x]Active     []Hyperactive  []Hypoactive       [] Absent  BS  Skin:    [x] Intact   [] Incision  [] Breakdown   []Edema   []Other:    Anthropometrics: Height: 5' 2\" (157.5 cm) Weight: 68.9 kg (152 lb)    IBW (%IBW):   ( ) UBW (%UBW):   (  %)    BMI: Body mass index is 27.8 kg/m².     This BMI is indicative of:  []Underweight   []Normal   [x]Overweight   [] Obesity   [] Extreme Obesity (BMI>40)  Last Weight Metrics:  Weight Loss Metrics 7/8/2020 6/16/2020 3/12/2020 10/31/2019 10/3/2019 9/26/2019 9/18/2019   Today's Wt 152 lb 156 lb 166 lb 160 lb - 164 lb -   BMI 27.8 kg/m2 28.53 kg/m2 30.36 kg/m2 29.26 kg/m2 30 kg/m2 30 kg/m2 30.12 kg/m2 Estimated Nutrition Needs (Based on): 2686 Kcals/day(BMR (1108) x 1. 3AF) , 55 g(-69g (0.8-1.0 g/kg bw)) Protein  Carbohydrate:  At Least 130 g/day  Fluids: 1450 mL/day     Pt expected to meet estimated nutrient needs: [x]Yes []No    NUTRITION DIAGNOSES:   Problem:  Swallowing difficulty      Etiology: related to catastrophic CVA     Signs/Symptoms: as evidenced by NPO, new PEG placement       NUTRITION INTERVENTIONS:    Enteral/Parenteral Nutrition: Initiate enteral nutrition                GOAL:   TF started and advanced to goal rate next 1-3 days     NUTRITION MONITORING AND EVALUATION   Food/Nutrient Intake Outcomes: Enteral/parenteral nutrition intake  Physical Signs/Symptoms Outcomes: Weight/weight change, Electrolyte and renal profile, GI profile, Glucose profile    Previous Goal Met:   [x] Met              [] Progressing Towards Goal              [] Not Progressing Towards Goal   Previous Recommendations:   [x] Implemented          [] Not Implemented          [] Not Applicable    LEARNING NEEDS (Diet, Food/Nutrient-Drug Interaction):    [x] None Identified   [] Identified and Education Provided/Documented   [] Identified and Pt declined/was not appropriate     Cultural, Jehovah's witness, OR Ethnic Dietary Needs:    [x] None Identified   [] Identified and Addressed     [x] Interdisciplinary Care Plan Reviewed/Documented    [x] Discharge Planning: See TF recommendations above    [] Participated in Interdisciplinary Rounds    NUTRITION RISK:    [x] Patient At Nutritional Risk             [] Patient Not At Nutritional Risk      Mireya Bradshaw [id number]  Pager [phone]    Weekend Pager [phone]

## 2020-07-08 NOTE — PROGRESS NOTES
Problem: Falls - Risk of  Goal: *Absence of Falls  Description: Document Apryl Glover Fall Risk and appropriate interventions in the flowsheet. Outcome: Progressing Towards Goal  Note: Fall Risk Interventions:       Mentation Interventions: Bed/chair exit alarm    Medication Interventions: Bed/chair exit alarm, Evaluate medications/consider consulting pharmacy, Patient to call before getting OOB    Elimination Interventions: Call light in reach, Patient to call for help with toileting needs    History of Falls Interventions: Bed/chair exit alarm, Door open when patient unattended         Problem: Pressure Injury - Risk of  Goal: *Prevention of pressure injury  Description: Document Howard Scale and appropriate interventions in the flowsheet. Outcome: Progressing Towards Goal  Note: Pressure Injury Interventions:  Sensory Interventions: Assess changes in LOC    Moisture Interventions: Apply protective barrier, creams and emollients    Activity Interventions: Increase time out of bed, Pressure redistribution bed/mattress(bed type), PT/OT evaluation    Mobility Interventions: HOB 30 degrees or less, PT/OT evaluation, Turn and reposition approx.  every two hours(pillow and wedges)    Nutrition Interventions: Document food/fluid/supplement intake    Friction and Shear Interventions: Apply protective barrier, creams and emollients                Problem: TIA/CVA Stroke: Day 2 Until Discharge  Goal: Off Pathway (Use only if patient is Off Pathway)  Outcome: Progressing Towards Goal  Goal: Activity/Safety  Outcome: Progressing Towards Goal  Goal: Diagnostic Test/Procedures  Outcome: Progressing Towards Goal  Goal: Nutrition/Diet  Outcome: Progressing Towards Goal  Goal: Discharge Planning  Outcome: Progressing Towards Goal  Goal: Medications  Outcome: Progressing Towards Goal  Goal: Respiratory  Outcome: Progressing Towards Goal  Goal: Treatments/Interventions/Procedures  Outcome: Progressing Towards Goal  Goal: Psychosocial  Outcome: Progressing Towards Goal  Goal: *Verbalizes anxiety and depression are reduced or absent  Outcome: Progressing Towards Goal  Goal: *Absence of aspiration  Outcome: Progressing Towards Goal  Goal: *Absence of deep venous thrombosis signs and symptoms(Stroke Metric)  Outcome: Progressing Towards Goal  Goal: *Optimal pain control at patient's stated goal  Outcome: Progressing Towards Goal  Goal: *Tolerating diet  Outcome: Progressing Towards Goal  Goal: *Ability to perform ADLs and demonstrates progressive mobility and function  Outcome: Progressing Towards Goal  Goal: *Stroke education continued(Stroke Metric)  Outcome: Progressing Towards Goal     Problem: Ischemic Stroke: Discharge Outcomes  Goal: *Verbalizes anxiety and depression are reduced or absent  Outcome: Progressing Towards Goal  Goal: *Verbalize understanding of risk factor modification(Stroke Metric)  Outcome: Progressing Towards Goal  Goal: *Hemodynamically stable  Outcome: Progressing Towards Goal  Goal: *Absence of aspiration pneumonia  Outcome: Progressing Towards Goal  Goal: *Aware of needed dietary changes  Outcome: Progressing Towards Goal  Goal: *Verbalize understanding of prescribed medications including anti-coagulants, anti-lipid, and/or anti-platelets(Stroke Metric)  Outcome: Progressing Towards Goal  Goal: *Tolerating diet  Outcome: Progressing Towards Goal  Goal: *Aware of follow-up diagnostics related to anticoagulants  Outcome: Progressing Towards Goal  Goal: *Ability to perform ADLs and demonstrates progressive mobility and function  Outcome: Progressing Towards Goal  Goal: *Absence of DVT(Stroke Metric)  Outcome: Progressing Towards Goal  Goal: *Absence of aspiration  Outcome: Progressing Towards Goal  Goal: *Optimal pain control at patient's stated goal  Outcome: Progressing Towards Goal  Goal: *Home safety concerns addressed  Outcome: Progressing Towards Goal  Goal: *Describes available resources and support systems  Outcome: Progressing Towards Goal  Goal: *Verbalizes understanding of activation of EMS(911) for stroke symptoms(Stroke Metric)  Outcome: Progressing Towards Goal  Goal: *Understands and describes signs and symptoms to report to providers(Stroke Metric)  Outcome: Progressing Towards Goal  Goal: *Neurolgocially stable (absence of additional neurological deficits)  Outcome: Progressing Towards Goal  Goal: *Verbalizes importance of follow-up with primary care physician(Stroke Metric)  Outcome: Progressing Towards Goal  Goal: *Smoking cessation discussed,if applicable(Stroke Metric)  Outcome: Progressing Towards Goal  Goal: *Depression screening completed(Stroke Metric)  Outcome: Progressing Towards Goal

## 2020-07-08 NOTE — ANESTHESIA PREPROCEDURE EVALUATION
Anesthetic History   No history of anesthetic complications            Review of Systems / Medical History  Patient summary reviewed, nursing notes reviewed and pertinent labs reviewed    Pulmonary        Sleep apnea           Neuro/Psych       CVA      Comments: 1.  Occluded right internal carotid artery from the carotid bifurcation to the  [place] and further intraluminal thrombus at the right MCA bifurcation. 2.  Large core infarct in the right MCA distribution    Left hemiplegia Cardiovascular    Hypertension      CHF  Dysrhythmias : atrial fibrillation  CAD    Exercise tolerance: <4 METS  Comments: Estimated left ventricular ejection fraction is 25 - 30%.     Severe diastolic dysfunction   GI/Hepatic/Renal     GERD      PUD     Endo/Other        Arthritis     Other Findings   Comments: Dysphagia           Physical Exam    Airway  Mallampati: II  TM Distance: 4 - 6 cm  Neck ROM: normal range of motion   Mouth opening: Normal    Comments: L facial droop Cardiovascular  Regular rate and rhythm,  S1 and S2 normal,  no murmur, click, rub, or gallop             Dental         Pulmonary  Breath sounds clear to auscultation               Abdominal  GI exam deferred       Other Findings            Anesthetic Plan    ASA: 4  Anesthesia type: MAC          Induction: Intravenous  Anesthetic plan and risks discussed with: Patient

## 2020-07-08 NOTE — PROGRESS NOTES
Bedside shift change report given to Irma Jett RN  (oncoming nurse) by Amie Garcia RN (offgoing nurse). Report included the following information SBAR, Kardex and Quality Measures.     Zone Phone:  [phone]        Significant changes during shift: pt scheduled for EGD with peg placement today. CHG bath given. complaints of L leg pain. new orders for Toradol given.  Patient Information     Aamir Curling  78 y.o.  7/3/2020  2:59 PM by Darlene Mckeon MD. 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    Scalp lesion 10/23/2014    Sebaceous cyst 04/22/2014    Keloid 04/22/2014    Dysphagia 02/08/2011    Recurrent ear pain 02/08/2011    Acute pharyngitis 02/08/2011    Essential hypertension 02/08/2011    Edema, peripheral 02/08/2011    Pure hypercholesterolemia 02/08/2011    Allergic rhinitis, cause unspecified 02/08/2011    Diverticulosis of colon (without mention of hemorrhage) 02/08/2011    Herniated nucleus pulposus ( slipped disc) 02/08/2011    Degenetrative Dis Disease, 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    Carpal tunnel syndrome 02/08/2011    Unspecified cataract 02/08/2011    GERD Gastro- Esophageal Reflux Disease 02/08/2011    Degenerative Joint Disese ( improved/resolving) 02/08/2011    Menopausal 02/08/2011    Asymptomatic varicose veins 02/08/2011              Past Medical History:   Diagnosis Date    Acute pharyngitis 2/8/2011    Allergic rhinitis, cause unspecified 2/8/2011    Arrhythmia       PVC    Asymptomatic varicose veins 2/8/2011    Cancer [facility] [address] 2009     stage 4 throat     Carpal tunnel syndrome 2/8/2011    Degenerative Joint Disese ( improved/resolving) 2/8/2011    Degenetrative Dis Disease, 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    Diverticulosis of colon (without mention of hemorrhage) 2/8/2011    Dysphagia 2/8/2011    Edema, peripheral 2/8/2011    GERD (gastroesophageal reflux disease)      GERD Gastro- Esophageal Reflux Disease 2/8/2011    Herniated nucleus pulposus ( slipped disc) 2/8/2011    Menopausal 2/8/2011    PUD (peptic ulcer disease) 2012    Pure hypercholesterolemia 2/8/2011    Recurrent ear pain 2/8/2011    S/P radiation therapy      Scalp lesion 10/23/2014    Sebaceous cyst 4/22/2014    Unspecified cataract 2/8/2011    Unspecified essential hypertension 2/8/2011    Unspecified sleep apnea              Core Measures:     CVA: Yes Yes  CHF:No No  PNA:No No     Post Op Surgical (If Applicable):      Number times ambulated in hallway past shift:  0  Number of times OOB to chair past shift:   0  NG Tube: No  Incentive Spirometer: No  Drains: No   Volume  0  Dressing Present:  No  Flatus:  Yes     Activity Status:     OOB to Chair No  Ambulated this shift No   Bed Rest Yes     Supplemental V0: (CP Applicable)     NC No  NRB No  Venti-mask No  On 0 Liters/min        LINES AND DRAINS:     Central Line? No Placement date 0 Reason Medically Necessary 0     PICC LINE? No Placement date 0Reason Medically Necessary 0     Urinary Catheter? No Placement Date 0 Reason Medically Necessary 0     DVT prophylaxis:     DVT prophylaxis Med- Yes  DVT prophylaxis SCD or MEY- No      Wounds: (If Applicable)     Wounds- Yes open areas     Location [place] upper groin area, dry flaky red skin.     Patient Safety:     Falls Score Total Score: 4  Safety Level_______  Bed Alarm On? Yes  Sitter? No     Plan for upcoming shift:   palliative will speak with family           Discharge Plan: yes- rehab     Active Consults:  IP CONSULT TO NEUROLOGY  IP CONSULT TO PALLIATIVE CARE - PROVIDER

## 2020-07-08 NOTE — PERIOP NOTES
PEG tube inserted by Dr. Silvana Natarajan and Stefano Graves RN  [facility] Scientific catalog # [id number]  Product # [id number]  Lot # [id number]   Expiration date 11/30/21    Placed at 1

## 2020-07-08 NOTE — PROGRESS NOTES
Chief Complaint: stroke    Has no complaints. Laying in bed alert continues to neglect the left. S/p Peg tube placement. Son, Christi Ruiz is in the room. Assesment and Plan  1. Acute ischemic right MCA stroke (HCC)  A1C : 5.7  LDL: 93  Echocardiogram:   Moderate concentric hypertrophy. Moderate-to-severe segmental systolic function. Estimated left ventricular ejection fraction is 25 - 30%. Visually measured ejection fraction. Severe (grade 3) left ventricular diastolic dysfunction. Repeat CT : shows evolving stroke no bleed  Initial CT: IMPRESSION:  1.  Occluded right internal carotid artery from the carotid bifurcation to the  [place] and further intraluminal thrombus at the right MCA bifurcation. 2.  Large core infarct in the right MCA distribution with a 55 cc ischemic  penumbra. 3.  Ischemic changes on delayed postcontrast head CT without evidence of  hemorrhage. dispo recommended  to inpatient rehabilitation. 2. Atrial fibrillation with rapid ventricular response (HCC)  Continue rectal aspirin  Will start eliquis before she is discharged from the hospital     3. Hyperlipoproteinemia  Continue atorvastatin    4. Left hemiparesis ([address]  With visual and sensory neglect. 5. Dysphagia  S./p Peg placement       D/w son    Allergies  Latex; Bactrim [sulfamethoprim ds]; Aspirin; Codeine;  Iodinated contrast media; Pcn [penicillins]; and Tape [adhesive]     Medications  Current Facility-Administered Medications   Medication Dose Route Frequency    sodium chloride (NS) flush 5-40 mL  5-40 mL IntraVENous Q8H    sodium chloride (NS) flush 5-40 mL  5-40 mL IntraVENous PRN    ceFAZolin (ANCEF) 1 gram injection        ammonium lactate (LAC-HYDRIN) 12 % lotion   Topical BID    dextrose 5% - 0.45% NaCl with KCl 10 mEq/L infusion  75 mL/hr IntraVENous CONTINUOUS    acetaminophen (TYLENOL) tablet 650 mg  650 mg Oral Q4H PRN    Or    acetaminophen (TYLENOL) solution 650 mg  650 mg Per NG tube Q4H PRN Or    acetaminophen (TYLENOL) suppository 650 mg  650 mg Rectal Q4H PRN    ondansetron (ZOFRAN) injection 4 mg  4 mg IntraVENous Q6H PRN    atorvastatin (LIPITOR) tablet 40 mg  40 mg Oral QHS    labetaloL (NORMODYNE;TRANDATE) injection 5 mg  5 mg IntraVENous Q10MIN PRN    heparin (porcine) injection 5,000 Units  5,000 Units SubCUTAneous Q8H    aspirin (ASA) suppository 300 mg  300 mg Rectal DAILY        Medical History  Past Medical History:   Diagnosis Date    Acute pharyngitis 2/8/2011    Allergic rhinitis, cause unspecified 2/8/2011    Arrhythmia     PVC    Asymptomatic varicose veins 2/8/2011    Cancer ([facility] [address]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     Carpal tunnel syndrome 2/8/2011    Degenerative Joint Disese ( improved/resolving) 2/8/2011    Degenetrative Dis Disease, 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    Diverticulosis of colon (without mention of hemorrhage) 2/8/2011    Dysphagia 2/8/2011    Edema, peripheral 2/8/2011    GERD (gastroesophageal reflux disease)     GERD Gastro- Esophageal Reflux Disease 2/8/2011    Herniated nucleus pulposus ( slipped disc) 2/8/2011    Menopausal 2/8/2011    PUD (peptic ulcer disease) 2012    Pure hypercholesterolemia 2/8/2011    Recurrent ear pain 2/8/2011    S/P radiation therapy     Scalp lesion 10/23/2014    Sebaceous cyst 4/22/2014    Unspecified cataract 2/8/2011    Unspecified essential hypertension 2/8/2011    Unspecified sleep apnea      Review of Systems   Unable to perform ROS: Mental acuity       Exam:    Visit Vitals  /64 (BP 1 Location: Left arm, BP Patient Position: At rest)   Pulse 72   Temp 99.3 °F (37.4 °C)   Resp 18   Ht 5' 2\" (1.575 m)   Wt 152 lb (68.9 kg)   SpO2 100%   Breastfeeding No   BMI 27.80 kg/m²      General: Confused   Head: Normocephalic, atraumatic, scarring on the scalp anicteric sclera   Neck Normal ROM,  No thyromegally   Lungs:  Clear to auscultation. Cardiac: Regular rate and rhythm with no murmurs.    Abd: Bowel sounds were audible. PEG tube placed clean wound   Ext: No pedal edema   Skin: Supple no rash     NeurologicExam:  Mental Status:  Confused but able to follow simple commands   Speech:  Hesitant, fluent. Cranial Nerves:  Opens Eyes Spontaneously  orients to sound  Dolls eyes intact  PERRLA  Corneal reflex intact  Left facial droop  Left visual neglect   Motor: Good strength on the right. Does not move the left. Reflexes:   Deep tendon reflexes 2+/4 on the left 1+ on the right   Sensory:   Left sensory neglect    Tremor:   No tremor noted. Cerebellar:  Coordination intact.  (right)       Lab Review  Lab Results   Component Value Date/Time    WBC 6.9 07/07/2020 02:30 AM    HCT 38.4 07/07/2020 02:30 AM    HGB 12.1 07/07/2020 02:30 AM    PLATELET 644 62/99/8587 02:30 AM       Lab Results   Component Value Date/Time    Sodium 138 07/07/2020 02:30 AM    Potassium 3.2 (L) 07/07/2020 02:30 AM    Chloride 105 07/07/2020 02:30 AM    CO2 26 07/07/2020 02:30 AM    Glucose 99 07/07/2020 02:30 AM    BUN 10 07/07/2020 02:30 AM    Creatinine 0.74 07/07/2020 02:30 AM    Calcium 8.7 07/07/2020 02:30 AM       Lab Results   Component Value Date/Time    Hemoglobin A1c 5.7 (H) 07/04/2020 05:43 AM    Hemoglobin A1c (POC) 5.7 08/08/2016 03:43 PM          Lab Results   Component Value Date/Time    Cholesterol, total 185 07/04/2020 05:43 AM    Cholesterol (POC) 168 03/12/2020 12:50 PM    HDL Cholesterol 73 07/04/2020 05:43 AM    HDL Cholesterol (POC) 57 03/12/2020 12:50 PM    LDL Cholesterol (POC) 93 03/12/2020 12:50 PM    LDL, calculated 93 07/04/2020 05:43 AM    VLDL, calculated 19 07/04/2020 05:43 AM    Triglyceride 95 07/04/2020 05:43 AM    Triglycerides (POC) 96 03/12/2020 12:50 PM    CHOL/HDL Ratio 2.5 07/04/2020 05:43 AM

## 2020-07-08 NOTE — PROGRESS NOTES
Problem: Ischemic Stroke: Discharge Outcomes  Goal: *Verbalizes anxiety and depression are reduced or absent  7/8/2020 0132 by Flor Mcnulty RN  Outcome: Progressing Towards Goal  7/8/2020 0129 by Flor Mcnulty RN  Outcome: Progressing Towards Goal  Goal: *Verbalize understanding of risk factor modification(Stroke Metric)  7/8/2020 0132 by Flor Mcnulty, RN  Outcome: Progressing Towards Goal  7/8/2020 0129 by Flor Mcnulty, RN  Outcome: Progressing Towards Goal  Goal: *Hemodynamically stable  7/8/2020 0132 by Flor Mcnulty, RN  Outcome: Progressing Towards Goal  7/8/2020 0129 by Flor Mcnulty RN  Outcome: Progressing Towards Goal  Goal: *Absence of aspiration pneumonia  7/8/2020 0132 by Flor Mcnulty, RN  Outcome: Progressing Towards Goal  7/8/2020 0129 by Flor Mcnulty RN  Outcome: Progressing Towards Goal  Goal: *Aware of needed dietary changes  7/8/2020 0132 by Flor Mcnulty, RN  Outcome: Progressing Towards Goal  7/8/2020 0129 by Flor Mcnulty RN  Outcome: Progressing Towards Goal  Goal: *Verbalize understanding of prescribed medications including anti-coagulants, anti-lipid, and/or anti-platelets(Stroke Metric)  7/8/2020 0132 by Flor Mcnulty, RN  Outcome: Progressing Towards Goal  7/8/2020 0129 by Flor Mcnulty RN  Outcome: Progressing Towards Goal  Goal: *Tolerating diet  7/8/2020 0132 by Flor Mcnulty, RN  Outcome: Not Progressing Towards Goal  7/8/2020 0129 by Flor Mcnulty RN  Outcome: Progressing Towards Goal  Goal: *Aware of follow-up diagnostics related to anticoagulants  7/8/2020 0132 by Flor Mcnulty RN  Outcome: Progressing Towards Goal  7/8/2020 0129 by Flor Mcnulty RN  Outcome: Progressing Towards Goal  Goal: *Ability to perform ADLs and demonstrates progressive mobility and function  7/8/2020 0132 by Flor Mcnulty RN  Outcome: Progressing Towards Goal  7/8/2020 0129 by Sunburnt, Otila Lennox, RN  Outcome: Progressing Towards Goal  Goal: *Absence of DVT(Stroke Metric)  7/8/2020 0132 by Yeimy Santiago RN  Outcome: Progressing Towards Goal  7/8/2020 0129 by Yeimy Santiago RN  Outcome: Progressing Towards Goal  Goal: *Absence of aspiration  7/8/2020 0132 by Yeimy Santiago RN  Outcome: Progressing Towards Goal  7/8/2020 0129 by Yeimy Santiago RN  Outcome: Progressing Towards Goal  Goal: *Optimal pain control at patient's stated goal  7/8/2020 0132 by Yeimy Santiago RN  Outcome: Progressing Towards Goal  7/8/2020 0129 by Yeimy Santiago RN  Outcome: Progressing Towards Goal  Goal: *Home safety concerns addressed  7/8/2020 0132 by Yeimy Santiago RN  Outcome: Progressing Towards Goal  7/8/2020 0129 by Yeimy Santiago RN  Outcome: Progressing Towards Goal  Goal: *Describes available resources and support systems  7/8/2020 0132 by Yeimy Santiago RN  Outcome: Progressing Towards Goal  7/8/2020 0129 by Yeimy Santiago RN  Outcome: Progressing Towards Goal  Goal: *Verbalizes understanding of activation of EMS(911) for stroke symptoms(Stroke Metric)  7/8/2020 0132 by Yeimy Santiago RN  Outcome: Progressing Towards Goal  7/8/2020 0129 by Yeimy Santiago RN  Outcome: Progressing Towards Goal  Goal: *Understands and describes signs and symptoms to report to providers(Stroke Metric)  7/8/2020 0132 by Yeimy Santiago RN  Outcome: Progressing Towards Goal  7/8/2020 0129 by Yeimy Santiago RN  Outcome: Progressing Towards Goal  Goal: *Neurolgocially stable (absence of additional neurological deficits)  7/8/2020 0132 by Yeimy Santiago RN  Outcome: Progressing Towards Goal  7/8/2020 0129 by Yeimy Santiago RN  Outcome: Progressing Towards Goal  Goal: *Verbalizes importance of follow-up with primary care physician(Stroke Metric)  7/8/2020 0132 by Yeimy Santiago RN  Outcome: Progressing Towards Goal  7/8/2020 0129 by Harini Murguia, RN  Outcome: Progressing Towards Goal  Goal: *Smoking cessation discussed,if applicable(Stroke Metric)  7/8/2020 0132 by Harini Murguia, RN  Outcome: Progressing Towards Goal  7/8/2020 0129 by Harini Murguia, RN  Outcome: Progressing Towards Goal  Goal: *Depression screening completed(Stroke Metric)  7/8/2020 0132 by Harini Murguia, RN  Outcome: Progressing Towards Goal  7/8/2020 0129 by Harini Murguia, RN  Outcome: Progressing Towards Goal

## 2020-07-08 NOTE — PROCEDURES
[facility]                   Endoscopic Gastroduodenoscopy with PEG Placement Procedure Note    Montse Hernandez  1940  [id number]    Procedure: Endoscopic Gastroduodenoscopy with PEG placement    Indication: Oropharyngeal dysphagia. Feeding difficulties. Pre-operative Diagnosis: see indication above    Post-operative Diagnosis: Successful PEG placement    : Marjorie Jiméenz MD    Assistant:  Haydee Aranda NP    Referring Provider:  Lilliana Guaman MD    Anesthesia/Sedation:  MAC anesthesia Propofol    Airway assessment: No airway problems anticipated    Pre-Procedural Exam:      Airway: clear, no airway problems anticipated  Heart: RRR, without gallops or rubs  Lungs: clear bilaterally without wheezes, crackles, or rhonchi  Abdomen: soft, nontender, nondistended, bowel sounds present  Mental Status: awake, alert and oriented to person, place and time       Procedure Details     After appropriate MAC anesthesia, the endoscope was passed into the esophagus without difficulty. The proximal esophagus is normal as is the distal esophagus. The fundus, body, antrum, pylorus, bulb and postbulbar area are unremarkable. On slow withdrawal of the scope, the stomach was transilluminated into the abdominal wall. Under sterile conditions and 1% Xylocaine anesthesia, a small incision was made in the abdominal wall. A needle was passed through the incision, and under direct vision into the stomach. A wire was passed through the needle, snared and brought out the mouth. The PEG tube was passed over the wire and brought out the abdominal wall without difficulty. The scope was then reinserted and the positioning of the PEG tube was excellent. He or she tolerated the procedure without complication and will return to his or her room in satisfactory condition.     Therapies:  PEG placement    Specimens: None           Complications:   None; patient tolerated the procedure well. EBL:  None.             Impression:    Successful PEG placement      Recommendations: May start tube feedings today.  -Nutrition consult for tube feeding recommendations    Hernandez Tao MD

## 2020-07-08 NOTE — PERIOP NOTES
Endoscope was pre-cleaned at bedside immediately following procedure by Emerita Rodrigues ET    Anesthesia reports 70mg Propofol, 50mg Lidocaine and 400mL NS given during procedure. Received report from anesthesia staff on vital signs and status of patient.

## 2020-07-08 NOTE — ANESTHESIA POSTPROCEDURE EVALUATION
Procedure(s):  ESOPHAGOGASTRODUODENOSCOPY (EGD)URGENT  PERCUTANEOUS ENDOSCOPIC GASTROSTOMY TUBE INSERTION. MAC    Anesthesia Post Evaluation        Patient location during evaluation: PACU  Note status: Adequate. Level of consciousness: responsive to verbal stimuli and sleepy but conscious  Pain management: satisfactory to patient  Airway patency: patent  Anesthetic complications: no  Cardiovascular status: acceptable  Respiratory status: acceptable  Hydration status: acceptable  Comments: +Post-Anesthesia Evaluation and Assessment    Patient: Laly Akins MRN: [id number]  SSN: [id number]   YOB: 1940  Age: 78 y.o. Sex: female      Cardiovascular Function/Vital Signs    /82   Pulse 71   Temp 36.6 °C (97.8 °F)   Resp 15   Ht 5' 2\" (1.575 m)   Wt 68.9 kg (152 lb)   SpO2 97%   Breastfeeding No   BMI 27.80 kg/m²     Patient is status post Procedure(s):  ESOPHAGOGASTRODUODENOSCOPY (EGD)URGENT  PERCUTANEOUS ENDOSCOPIC GASTROSTOMY TUBE INSERTION. Nausea/Vomiting: Controlled. Postoperative hydration reviewed and adequate. Pain:  Pain Scale 1: Numeric (0 - 10) (07/08/20 1257)  Pain Intensity 1: 0 (07/08/20 1257)   Managed. Neurological Status: At baseline. Mental Status and Level of Consciousness: Arousable. Pulmonary Status:   O2 Device: Room air (07/08/20 1257)   Adequate oxygenation and airway patent. Complications related to anesthesia: None    Post-anesthesia assessment completed. No concerns. Signed By: Daylin Villaseñor MD    7/8/2020  Post anesthesia nausea and vomiting:  controlled      INITIAL Post-op Vital signs:   Vitals Value Taken Time   /82 7/8/2020  1:01 PM   Temp 36.6 °C (97.8 °F) 7/8/2020 12:47 PM   Pulse 70 7/8/2020  1:02 PM   Resp 11 7/8/2020  1:02 PM   SpO2 97 % 7/8/2020  1:02 PM   Vitals shown include unvalidated device data.

## 2020-07-08 NOTE — PERIOP NOTES
Endoscope was pre-cleaned at bedside immediately following procedure by Hannah Sun Anesthesia reports ***mg Propofol, ***mg Lidocaine and ***mL NS given during procedure. Received report from anesthesia staff on vital signs and status of patient.

## 2020-07-08 NOTE — PROGRESS NOTES
Bedside and Verbal shift change report given to Isatu A[address] (oncoming nurse) by NAVYA Shahid RN (offgoing nurse). Report given with SBAR, Kardex, Intake/Output, MAR and Recent Results.

## 2020-07-08 NOTE — PROGRESS NOTES
Problem: Motor Speech Impaired (Adult)  Goal: *Acute Goals and Plan of Care (Insert Text)  Description: 7/7/2020  Speech path goals  1. Patient will produce short sentences with 90% intelligibility with max cues. 2. Patient will produce lengthier sentences with 90% intelligibility with max cues. 3. Patient will produce conversational speech with 70% intelligibility with max cues. 4. Patient will state 3 motor speech strategies with 66% acc with min cues. Outcome: Not Progressing Towards Goal   SPEECH LANGUAGE PATHOLOGY TREATMENT  Patient: Carrol Posada (78 y.o. female)  Date: 7/8/2020  Diagnosis: CVA (cerebral vascular accident) ([facility] [address] [I63.9]   <principal problem not specified>  Procedure(s) (LRB):  ESOPHAGOGASTRODUODENOSCOPY (EGD)URGENT (N/A)  PERCUTANEOUS ENDOSCOPIC GASTROSTOMY TUBE INSERTION (N/A) Day of Surgery    ASSESSMENT:  Patient is verbalizing more intelligibly. Her speech is moderately dysarthric. She is more intelligible in short sentences. Cannot state her strategies. Cognitive deficits are noted. As her speech becomes more intelligible these can be evaluated further. PLAN:  Patient continues to benefit from skilled intervention to address the above impairments. Continue treatment per established plan of care. Discharge Recommendations: To Be Determined     SUBJECTIVE:   Patient stated her childrens' names. She also stated she did not want the feeding tube today. OBJECTIVE:   Mental Status:  Neurologic State: Alert  Orientation Level: Oriented X4, Other (Comment)(intermittent confusion)  Cognition: Follows commands  Perception: Cues to maintain midline in sitting, Cues to attend left visual field, Cues to attend to left side of body  Perseveration: No perseveration noted  Safety/Judgement: Lack of insight into deficits  Treatment & Interventions: Motor Speech:      Patient practiced her strategies of slow rate and overarticulation.  Her speech was better at sentence level of 3-5 word length. Language Comprehension and Expression:     Verbal Expression  Verbal Expression  Conversation: Dysarthric;Fluent     Voice:        Response & Tolerance to Activities:   good    Pain:  Pain Scale 1: Numeric (0 - 10)  Pain Intensity 1: 0       After treatment:   Patient left in no apparent distress in bed and Call bell within reach    COMMUNICATION/EDUCATION:   Patient was educated regarding her deficit(s) of dysarthria. She may not remember her strategies. The patient's plan of care including recommendations, planned interventions, and recommended diet changes were discussed with: Registered nurse.      NIKKI Saeed  Time Calculation: 13 mins

## 2020-07-08 NOTE — PROGRESS NOTES
Occupational Therapy  Pt is off the floor at this time. Will defer and continue to follow. Addendum:  495:  Pt remains off the floor. Will follow up tomorrow for OT treatment.

## 2020-07-08 NOTE — ROUTINE PROCESS
Bedside shift change report given to Alexandra 32) by Ginger Han, RN (offgoing nurse). Report included the following information SBAR, Kardex and Quality Measures.     Zone Phone:  [phone]        Significant changes during shift: EGD with PEG placement done.  TF started; to be increased 10 mL at 5 AM     Patient Information     Jacqueline Brooke  78 y.o.  7/3/2020  2:59 PM by Darlene Lechuga MD. 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    Scalp lesion 10/23/2014    Sebaceous cyst 04/22/2014    Keloid 04/22/2014    Dysphagia 02/08/2011    Recurrent ear pain 02/08/2011    Acute pharyngitis 02/08/2011    Essential hypertension 02/08/2011    Edema, peripheral 02/08/2011    Pure hypercholesterolemia 02/08/2011    Allergic rhinitis, cause unspecified 02/08/2011    Diverticulosis of colon (without mention of hemorrhage) 02/08/2011    Herniated nucleus pulposus ( slipped disc) 02/08/2011    Degenetrative Dis Disease, 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    Carpal tunnel syndrome 02/08/2011    Unspecified cataract 02/08/2011    GERD Gastro- Esophageal Reflux Disease 02/08/2011    Degenerative Joint Disese ( improved/resolving) 02/08/2011    Menopausal 02/08/2011    Asymptomatic varicose veins 02/08/2011              Past Medical History:   Diagnosis Date    Acute pharyngitis 2/8/2011    Allergic rhinitis, cause unspecified 2/8/2011    Arrhythmia       PVC    Asymptomatic varicose veins 2/8/2011    Cancer ([facility] [address] 2009     stage 4 throat     Carpal tunnel syndrome 2/8/2011    Degenerative Joint Disese ( improved/resolving) 2/8/2011    Degenetrative Dis Disease, 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    Diverticulosis of colon (without mention of hemorrhage) 2/8/2011    Dysphagia 2/8/2011    Edema, peripheral 2/8/2011    GERD (gastroesophageal reflux disease)      GERD Gastro- Esophageal Reflux Disease 2/8/2011    Herniated nucleus pulposus ( slipped disc) 2/8/2011    Menopausal 2/8/2011    PUD (peptic ulcer disease) 2012    Pure hypercholesterolemia 2/8/2011    Recurrent ear pain 2/8/2011    S/P radiation therapy      Scalp lesion 10/23/2014    Sebaceous cyst 4/22/2014    Unspecified cataract 2/8/2011    Unspecified essential hypertension 2/8/2011    Unspecified sleep apnea              Core Measures:     CVA: Yes Yes  CHF:No No  PNA:No No     Post Op Surgical (If Applicable):      Number times ambulated in hallway past shift:  0  Number of times OOB to chair past shift:   0  NG Tube: No  Incentive Spirometer: No  Drains: No   Volume  0  Dressing Present:  No  Flatus:  Yes     Activity Status:     OOB to Chair No  Ambulated this shift No   Bed Rest Yes     Supplemental P6: (ZT Applicable)     NC No  NRB No  Venti-mask No  On 0 Liters/min        LINES AND DRAINS:     Central Line? No Placement date 0 Reason Medically Necessary 0     PICC LINE? No Placement date 0Reason Medically Necessary 0     Urinary Catheter? No Placement Date 0 Reason Medically Necessary 0     DVT prophylaxis:     DVT prophylaxis Med- Yes  DVT prophylaxis SCD or MEY- No      Wounds: (If Applicable)     Wounds- Yes open areas     Location [place] upper groin area, dry flaky red skin.     Patient Safety:     Falls Score Total Score: 4  Safety Level_______  Bed Alarm On? Yes  Sitter? No     Plan for upcoming shift: safety, increase TF to goal           Discharge Plan: yes- rehab     Active Consults:  IP CONSULT TO NEUROLOGY  IP CONSULT TO PALLIATIVE CARE - PROVIDER

## 2020-07-08 NOTE — PROGRESS NOTES
Physical Therapy  Patient is currently off floor for Peg placement. Will defer therapy today and follow back tomorrow.   Thank you,  Barak Smith, PT

## 2020-07-08 NOTE — PERIOP NOTES
[facility]  1940  [id number]    Situation:  Verbal report received from: Randell Burkett RN  Procedure: Procedure(s):  ESOPHAGOGASTRODUODENOSCOPY (EGD)URGENT  PERCUTANEOUS ENDOSCOPIC GASTROSTOMY TUBE INSERTION    Background:    Preoperative diagnosis: dysphagia  Postoperative diagnosis: Peg Placement    :  Dr. Gildardo Heard  Assistant(s): Endoscopy Technician-1: Jen Rose  Endoscopy RN-1: Anurag HU    Specimens: * No specimens in log *  H. Pylori  no    Assessment:  Intra-procedure medications   Anesthesia gave intra-procedure sedation and medications, see anesthesia flow sheet yes    Intravenous fluids: NS@ KVO     Vital signs stable     Abdominal assessment: round and soft     Recommendation:  Return to floor  Family or Friend   Permission to share finding with family or friend yes

## 2020-07-08 NOTE — PROGRESS NOTES
TELE-HOSPITALIST CROSS COVER NOTE:    Visit Vitals  /76   Pulse 71   Temp 97.9 °F (36.6 °C)   Resp 16   Ht 5' 2\" (1.575 m)   Wt 69.1 kg (152 lb 5.4 oz)   SpO2 100%   Breastfeeding No   BMI 27.86 kg/m²         D/W RN; medical records reviewed; Toradol 15mg IV x 1 for pain control.       Trinh Bueno

## 2020-07-08 NOTE — PROGRESS NOTES
Hospitalist Progress Note    NAME: Ary Griffin   :  1940   MRN:  [id number]       Assessment / Plan:  Acute right MCA stroke POA  Occluded right internal carotid artery from the carotid bifurcation to the [place] and further intraluminal thrombus at the right MCA bifurcation POA  Acute Encephalopathy POA- due to above  Dysphagia due to acute stroke  CT brain:  Large acute right MCA infarct with suggestion of intraluminal thrombus in the  MCA. No hemorrhage is identified  CTA  H&N:  1. Occluded right internal carotid artery from the carotid bifurcation to the  [place] and further intraluminal thrombus at the right MCA bifurcation. 2.  Large core infarct in the right MCA distribution with a 55 cc ischemic  penumbra. Ischemic changes on delayed postcontrast head CT without evidence of hemorrhage. LDL= 93/A1c= 5.7/Trop neg  large on initial CT (catastrophic), Evolving on repeat CT ()    Patient was not a TPA candidate and neuro interventionalist did not recc any interventions  Complete the stroke work-up of the brain without contrast when able- pt has compatible PPM?  2D echo -EF 47-15%, severe Diastolic Dysfunction noted  Cont rectal aspirin, and statin when able to take p.o.- currently strict NPO per SLP eval on saturday  MBS 2020 with Severe oropharyngeal dysphagia  Discussed with family and patient's PCP and they agreed for PEG tube. S/p PEG tube   IV labetalol prn  Pt is at a risk for hemorrhagic conversion of this large stroke , repeat CT = no bleed noted  Neuro consult noted-consider Eliquis before discharge   IP Palliative care consulted - catastrophic large CVA with severe Dysphagia     Hypertension  Hold all BP meds for now, resume as needed now    Arrhythmia  Xarelto on her PTA list, not  sure if she is taking it.  Son is not sure too      Code Status: Full code confirmed in ER with the daughter and the son who is a power of   Surrogate Decision Maker:  Mitzi Neumann [phone]         DVT Prophylaxis: Heparin  GI Prophylaxis: not indicated     Baseline: Walk with a walker    Recommended Disposition: SNF/LTC [facility] is [facility]     Subjective:     Chief Complaint / Reason for Physician Visit :F/u Large R MCA CVA, dysphagia,   Acute right MCA stroke/dysphagia     Discussed with RN events overnight. Talked to son at bedside. Review of Systems:  Symptom Y/N Comments  Symptom Y/N Comments   Fever/Chills    Chest Pain     Poor Appetite    Edema     Cough    Abdominal Pain     Sputum    Joint Pain     SOB/FRANK    Pruritis/Rash     Nausea/vomit    Tolerating PT/OT     Diarrhea    Tolerating Diet     Constipation    Other       Could NOT obtain due to: Aphasia/sleepy     Objective:     VITALS:   Last 24hrs VS reviewed since prior progress note. Most recent are:  Patient Vitals for the past 24 hrs:   Temp Pulse Resp BP SpO2   07/08/20 1309 -- 70 11 169/83 97 %   07/08/20 1305 -- 70 21 159/81 97 %   07/08/20 1302 -- 71 14 174/79 97 %   07/08/20 1257 -- 71 15 163/82 97 %   07/08/20 1252 -- 70 14 167/78 97 %   07/08/20 1247 97.8 °F (36.6 °C) 70 17 152/84 97 %   07/08/20 1240 -- 74 18 175/77 98 %   07/08/20 1236 -- 75 15 173/76 100 %   07/08/20 1132 -- 74 18 175/77 98 %   07/08/20 1126 98 °F (36.7 °C) 70 18 (!) 188/91 98 %   07/08/20 0700 98.1 °F (36.7 °C) 76 18 (!) 166/91 98 %   07/08/20 0350 97.9 °F (36.6 °C) 71 16 166/76 100 %   07/07/20 2335 97.3 °F (36.3 °C) 75 18 139/66 100 %   07/07/20 1850 97.7 °F (36.5 °C) 71 18 148/73 100 %   07/07/20 1546 97.3 °F (36.3 °C) 77 18 146/78 100 %       Intake/Output Summary (Last 24 hours) at 7/8/2020 1324  Last data filed at 7/8/2020 1234  Gross per 24 hour   Intake 5017.5 ml   Output --   Net 5017.5 ml        PHYSICAL EXAM:  General: WD, WN.drowsy, non cooperative, no acute distress    EENT:  EOMI. Anicteric sclerae. MMM  Resp:  CTA bilaterally, no wheezing or rales.   No accessory muscle use  CV:  Regular rhythm,  No edema  GI:  Soft, Non distended, Non tender.  +Bowel sounds  Neurologic:  Sleepy, unable to follow commands, severe Oropharyngeal Dysphagia noted +  Psych:   Un able to assess  Skin:  No rashes. No jaundice    Reviewed most current lab test results and cultures  YES  Reviewed most current radiology test results   YES  Review and summation of old records today    NO  Reviewed patient's current orders and MAR    YES  PMH/SH reviewed - no change compared to H&P  ________________________________________________________________________  Care Plan discussed with:    Comments   Patient     Family  x  Talked to son at bedside. RN x    Care Manager x    Consultant                       x Multidiciplinary team rounds were held today with , nursing, pharmacist and clinical coordinator. Patient's plan of care was discussed; medications were reviewed and discharge planning was addressed. ________________________________________________________________________        Comments   >50% of visit spent in counseling and coordination of care     ________________________________________________________________________  Kristine Peñaloza MD     Procedures: see electronic medical records for all procedures/Xrays and details which were not copied into this note but were reviewed prior to creation of Plan. LABS:  I reviewed today's most current labs and imaging studies.   Pertinent labs include:  Recent Labs     07/07/20 0230 07/06/20  0455   WBC 6.9 7.1   HGB 12.1 12.7   HCT 38.4 40.4    272     Recent Labs     07/07/20 0230 07/06/20  0455    137   K 3.2* 3.3*    104   CO2 26 28   GLU 99 114*   BUN 10 9   CREA 0.74 0.75   CA 8.7 8.8       Signed: Kristine Peñaloza MD

## 2020-07-08 NOTE — PROGRESS NOTES
TRANSFER - OUT REPORT:    Verbal report given to [facility], RN  (name) on Layton Covington  being transferred room [phone] (unit) for routine progression of care       Report consisted of patients Situation, Background, Assessment and   Recommendations(SBAR). Information from the following report(s) Procedure Summary, Intake/Output, MAR and Recent Results was reviewed with the receiving nurse. Lines:   Peripheral IV 07/08/20 Right Forearm (Active)   Site Assessment Clean, dry, & intact 7/8/2020  8:33 AM   Phlebitis Assessment 0 7/8/2020  8:33 AM   Infiltration Assessment 0 7/8/2020  8:33 AM   Dressing Status Clean, dry, & intact 7/8/2020  8:33 AM   Dressing Type Tape;Transparent 7/8/2020  8:33 AM   Hub Color/Line Status Blue 7/8/2020  8:33 AM        Opportunity for questions and clarification was provided.

## 2020-07-09 PROCEDURE — 74011250637 HC RX REV CODE- 250/637: Performed by: INTERNAL MEDICINE

## 2020-07-09 PROCEDURE — 65660000000 HC RM CCU STEPDOWN

## 2020-07-09 PROCEDURE — 74011250636 HC RX REV CODE- 250/636: Performed by: INTERNAL MEDICINE

## 2020-07-09 PROCEDURE — 92507 TX SP LANG VOICE COMM INDIV: CPT

## 2020-07-09 PROCEDURE — 94760 N-INVAS EAR/PLS OXIMETRY 1: CPT

## 2020-07-09 PROCEDURE — 92526 ORAL FUNCTION THERAPY: CPT

## 2020-07-09 PROCEDURE — 87635 SARS-COV-2 COVID-19 AMP PRB: CPT

## 2020-07-09 RX ORDER — ASPIRIN 325 MG
325 TABLET ORAL DAILY
Status: DISCONTINUED | OUTPATIENT
Start: 2020-07-10 | End: 2020-07-15 | Stop reason: HOSPADM

## 2020-07-09 RX ADMIN — HEPARIN SODIUM 5000 UNITS: 5000 INJECTION INTRAVENOUS; SUBCUTANEOUS at 22:15

## 2020-07-09 RX ADMIN — ATORVASTATIN CALCIUM 40 MG: 40 TABLET, FILM COATED ORAL at 22:15

## 2020-07-09 RX ADMIN — DEXTROSE MONOHYDRATE, SODIUM CHLORIDE, AND POTASSIUM CHLORIDE 75 ML/HR: 50; 4.5; .745 INJECTION, SOLUTION INTRAVENOUS at 00:15

## 2020-07-09 RX ADMIN — Medication: at 08:44

## 2020-07-09 RX ADMIN — HEPARIN SODIUM 5000 UNITS: 5000 INJECTION INTRAVENOUS; SUBCUTANEOUS at 15:03

## 2020-07-09 RX ADMIN — ASPIRIN 300 MG: 300 SUPPOSITORY RECTAL at 10:27

## 2020-07-09 RX ADMIN — Medication 10 ML: at 15:03

## 2020-07-09 RX ADMIN — Medication: at 18:11

## 2020-07-09 RX ADMIN — DEXTROSE MONOHYDRATE, SODIUM CHLORIDE, AND POTASSIUM CHLORIDE 75 ML/HR: 50; 4.5; .745 INJECTION, SOLUTION INTRAVENOUS at 12:59

## 2020-07-09 RX ADMIN — Medication 10 ML: at 22:17

## 2020-07-09 RX ADMIN — Medication 10 ML: at 05:09

## 2020-07-09 RX ADMIN — HEPARIN SODIUM 5000 UNITS: 5000 INJECTION INTRAVENOUS; SUBCUTANEOUS at 05:09

## 2020-07-09 NOTE — PROGRESS NOTES
JACOB:    SNF Placement  [employer] Auth  COVID  2nd IM Medicare Letter  Medical Transport      CM contacted pt's son: Brenda Frank, via telephone in regards to pt's d/c needs and plans. Brenda Frank reported that he would like for CM to send a referral to [facility] first, since that's he and his family choice. CM was informed to send a referral to [facility]. CM informed Brenda Frank that he and family will need to come up with additional options for pt if not accepted to [facility]. Pt will require COVID test at the time of d/c. Pt will require [employer] auth at the time of d/c. Pt will require 2nd IM Medicare letter and medical transport.     MARTINA López, [address] [place] [address]

## 2020-07-09 NOTE — PROGRESS NOTES
GI PROGRESS NOTE   Louis Loud, NP  [phone] NP in-hospital cell phone M-F until 4:30  After 5pm or on weekends, please call  for physician on call    NAME:Briseida Brock Alert :1940 WSK:[id number]   ATTG: Dr. Jose Mitchell  PCP: Patience Ocasio MD  Date/Time:  2020 3:56 PM     Primary GI: Dr. Mikel Plata    Reason for following: PEG placement, dysphagia    Assessment:   PEG placement, 2020  Dysphagia s/p large right MCA stroke  High risk aspiration  - Hx of PEG placement years ago secondary to hx of throat cancer in   -Unable to support nutritional needs PO  MCA Stroke  HTN     Plan:   -Continue tube feeds per nutritionist/dietician recommendations  -Supportive measures per primary team  -Will sign off for now as GI has nothing further to add to plan. Please contact with any further questions or concerns. Thank you! *Plan discussed with Dr. Mikel Plata  Subjective:   Patient resting in bed during visit. Not interacting during visit. Discussed with RN events overnight. Reports tolerating TF well, will be up to TF goal around 5pm    Complaint Y/N Description   Abdominal Pain     Hematemesis     Hematochezia     Melena     Constipation     Diarrhea     Dyspepsia     Dysphagia     Jaundiced     Nausea/vomiting       Review of Systems:  Symptom Y/N Comments  Symptom Y/N Comments   Fever/Chills    Chest Pain     Cough    Headaches     Sputum    Joint Pain     SOB/FRANK    Pruritis/Rash     Tolerating Diet Y TF  Other       Could NOT obtain due to: IBIS      Objective:   VITALS:   Last 24hrs VS reviewed since prior progress note.  Most recent are:  Visit Vitals  /65 (BP 1 Location: Left arm)   Pulse 72   Temp 98.4 °F (36.9 °C)   Resp 18   Ht 5' 2\" (1.575 m)   Wt 68.9 kg (152 lb)   SpO2 99%   Breastfeeding No   BMI 27.80 kg/m²       Intake/Output Summary (Last 24 hours) at 2020 1556  Last data filed at 2020 0751  Gross per 24 hour   Intake 518 ml   Output 100 ml   Net 418 ml PHYSICAL EXAM:  General: WD, WN. Alert, cooperative, no acute distress    HEENT: NC, Atraumatic. Anicteric sclerae. Lungs:  CTA Bilaterally. No Wheezing/Rhonchi/Rales. Heart:  Regular  rhythm,  No murmur (), No Rubs, No Gallops  Abdomen: Soft, Non distended, Non tender.  +Bowel sounds, no HSM. PEG at midline, dressing c/d/i. Incision site clean with minimal drainage, just dried blood, bumper easily moveable. Extremities: No c/c/e  Neurologic:  Alert. No acute neurological distress   Psych:   Good insight. Not anxious nor agitated. Lab and Radiology Data Reviewed: (see below)    Medications Reviewed: (see below)  PMH/SH reviewed - no change compared to H&P  ________________________________________________________________________  Total time spent with patient: 30 minutes ________________________________________________________________________  Care Plan discussed with:  Patient Y   Family     RN Y              Consultant:       Leo Echeverria NP     Procedures: see electronic medical records for all procedures/Xrays and details which were not copied into this note but were reviewed prior to creation of Plan. LABS:  Recent Labs     07/07/20  0230   WBC 6.9   HGB 12.1   HCT 38.4        Recent Labs     07/07/20  0230      K 3.2*      CO2 26   BUN 10   CREA 0.74   GLU 99   CA 8.7     No results for input(s): AP, TBIL, TP, ALB, GLOB, GGT, AML, LPSE in the last 72 hours. No lab exists for component: SGOT, GPT, AMYP, HLPSE  No results for input(s): INR, PTP, APTT, INREXT in the last 72 hours. No results for input(s): FE, TIBC, PSAT, FERR in the last 72 hours. No results found for: FOL, RBCF  No results for input(s): PH, PCO2, PO2 in the last 72 hours. No results for input(s): CPK, CKMB in the last 72 hours.     No lab exists for component: TROPONINI  No results found for: COLOR, APPRN, SPGRU, REFSG, GRAYSON, PROTU, GLUCU, KETU, BILU, UROU, MAKI, [address], EPSU, BACTU, WBCU, RBCU, NIKUNJ LKEIN    MEDICATIONS:  Current Facility-Administered Medications   Medication Dose Route Frequency    sodium chloride (NS) flush 5-40 mL  5-40 mL IntraVENous Q8H    sodium chloride (NS) flush 5-40 mL  5-40 mL IntraVENous PRN    ammonium lactate (LAC-HYDRIN) 12 % lotion   Topical BID    dextrose 5% - 0.45% NaCl with KCl 10 mEq/L infusion  75 mL/hr IntraVENous CONTINUOUS    acetaminophen (TYLENOL) tablet 650 mg  650 mg Oral Q4H PRN    Or    acetaminophen (TYLENOL) solution 650 mg  650 mg Per NG tube Q4H PRN    Or    acetaminophen (TYLENOL) suppository 650 mg  650 mg Rectal Q4H PRN    ondansetron (ZOFRAN) injection 4 mg  4 mg IntraVENous Q6H PRN    atorvastatin (LIPITOR) tablet 40 mg  40 mg Oral QHS    labetaloL (NORMODYNE;TRANDATE) injection 5 mg  5 mg IntraVENous Q10MIN PRN    heparin (porcine) injection 5,000 Units  5,000 Units SubCUTAneous Q8H    aspirin (ASA) suppository 300 mg  300 mg Rectal DAILY

## 2020-07-09 NOTE — PROGRESS NOTES
Problem: Dysphagia (Adult)  Goal: *Acute Goals and Plan of Care (Insert Text)  Description: Speech Therapy Goals  Initiated 7/4/2020  1. Patient will participate in re-evaluation of swallow function within 7 days. 2. Patient will complete MBS 7/7/20. Goal met    Outcome: Progressing Towards Goal     Problem: Motor Speech Impaired (Adult)  Goal: *Acute Goals and Plan of Care (Insert Text)  Description: 7/7/2020  Speech path goals  1. Patient will produce short sentences with 90% intelligibility with max cues. 2. Patient will produce lengthier sentences with 90% intelligibility with max cues. 3. Patient will produce conversational speech with 70% intelligibility with max cues. 4. Patient will state 3 motor speech strategies with 66% acc with min cues. Outcome: Progressing Towards Goal     SPEECH LANGUAGE PATHOLOGY DYSPHAGIA AND SPEECH TREATMENT  Patient: Car Nguyen (78 y.o. female)  Date: 7/9/2020  Diagnosis: CVA (cerebral vascular accident) ([facility] [I63.9]   <principal problem not specified>  Procedure(s) (LRB):  ESOPHAGOGASTRODUODENOSCOPY (EGD)URGENT (N/A)  PERCUTANEOUS ENDOSCOPIC GASTROSTOMY TUBE INSERTION (N/A) 1 Day Post-Op  Precautions:  Aspiration, Bed Alarm, Fall    ASSESSMENT:  Patient with continued moderate-severe oral dysphagia which limited evaluation of pharyngeal swallow. Patient with impaired bolus acceptance requiring verbal cues to close lips around spoon and absent acceptance x1. When ice chip was placed in patient's anterior oral cavity, patient with prolonged manipulation and oral holding requiring verbal cues to manipulation and swallow bolus. Although no overt s/s aspiration observed, evaluation was limited by severity of oral dysphagia. Patient also with continued moderate dysarthria characterized by decreased breath support resulting in low vocal volume, blended word boundaries, and imprecise articulation.  Patient with limited benefit from verbal cues and modeling to improve motor speech. Session ended when patient closed her eyes and stopped verbally responding to SLP, however when [facility] lowered, patient opened her eyes again and perseverated on conversation from earlier in session. Patient with slow processing, easily distractibe, and R gaze preference. Continue to recommend intensive SLP treatment to improve functional communication and swallowing. PLAN:  Recommendations and Planned Interventions:  -NPO with PEG  -Cue patient for loud speech  -SLP to follow for re-evaluation of swallow and motor speech treatment  Patient continues to benefit from skilled intervention to address the above impairments. Continue treatment per established plan of care. Discharge Recommendations:  Skilled Nursing Facility     SUBJECTIVE:   Patient stated her name. OBJECTIVE:   Cognitive and Communication Status:  Neurologic State: Alert, Confused  Orientation Level: Oriented to person, Oriented to place, Oriented to situation, Disoriented to time  Cognition: Decreased attention/concentration, Decreased command following  Perception: Cues to maintain midline in sitting, Cues to attend left visual field, Cues to attend to left side of body  Perseveration: No perseveration noted  Safety/Judgement: Lack of insight into deficits    Dysphagia Treatment and Interventions:  Oral Assessment:  Oral Assessment  Labial: Left droop  P.O. Trials:  Patient Position: upright in bed  Vocal quality prior to P.O.: Low volume  Consistency Presented: Ice chips  How Presented: SLP-fed/presented;Spoon     Bolus Acceptance: Impaired(cues to close lips, absent x1)  Bolus Formation/Control: Impaired  Type of Impairment: Delayed;Poor; Incomplete  Propulsion: Delayed (# of seconds)(required verbal cues)  Oral Residue: None  Initiation of Swallow: Delayed (# of seconds)  Laryngeal Elevation: Functional  Aspiration Signs/Symptoms: None               Speech Treatment and Interventions:   Motor Speech:  Nay Trotter Intelligibility (%): 33 %(phrases in conversation)  Intelligibility: Impaired     Phrase Intelligibility (%): 0 %(then closed eyes and stopped responding)     Conversation Intelligibility (%): 33 %(phrases in conversation)         Pain:  Pain Scale 1: Numeric (0 - 10)  Pain Intensity 1: 0       After treatment:   Patient left in no apparent distress in bed, Call bell within reach and Nursing notified    COMMUNICATION/EDUCATION:   Patient was educated regarding her deficit(s) of dysphagia, dysarthria as this relates to her diagnosis of CVA. She demonstrated Fair understanding as evidenced by cognitive deficits. The patient's plan of care including recommendations, planned interventions, and recommended diet changes were discussed with: Registered nurse.        NIKKI Arambula  Time Calculation: 16 mins

## 2020-07-09 NOTE — PROGRESS NOTES
Chief Complaint: stroke    Has no complaints. Laying in bed alert continues to neglect the left. S/p Peg tube placement. Receiving PEG tube feedings. MRI reviewed and discussed with daughter. Given the size of the stroke I would hold anticoagulation for 14 days. She may receive aspirin for now. Discussed with daughter. She assured me she would see to it that she start the eliquis on 7/17/20    Assesment and Plan  1. Acute ischemic right MCA stroke (HCC)  A1C : 5.7  LDL: 93  Echocardiogram:   Moderate concentric hypertrophy. Moderate-to-severe segmental systolic function. Estimated left ventricular ejection fraction is 25 - 30%. Visually measured ejection fraction. Severe (grade 3) left ventricular diastolic dysfunction. Repeat CT : shows evolving stroke no bleed  Initial CT: IMPRESSION:  1.  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 2.  Large core infarct in the right MCA distribution with a 55 cc ischemic  penumbra. 3.  Ischemic changes on delayed postcontrast head CT without evidence of  hemorrhage. Rather large stroke on MRI with cortical involvement. Hold anticoagulation for 14 days. Start aspirin  Strongly recommended  inpatient rehabilitation She seems willing and motivated and will need intensive occupational therapy. 2. Atrial fibrillation with rapid ventricular response (HCC)  Continue rectal aspirin  Will start eliquis before she is discharged from the hospital     3. Hyperlipoproteinemia  Continue atorvastatin    4. Left hemiparesis ([address]  With visual and sensory neglect. 5. Dysphagia  S./p Peg placement       D/w son    Allergies  Latex; Bactrim [sulfamethoprim ds]; Aspirin; Codeine;  Iodinated contrast media; Pcn [penicillins]; and Tape [adhesive]     Medications  Current Facility-Administered Medications   Medication Dose Route Frequency    sodium chloride (NS) flush 5-40 mL  5-40 mL IntraVENous Q8H    sodium chloride (NS) flush 5-40 mL  5-40 mL IntraVENous PRN    ammonium lactate (LAC-HYDRIN) 12 % lotion   Topical BID    dextrose 5% - 0.45% NaCl with KCl 10 mEq/L infusion  75 mL/hr IntraVENous CONTINUOUS    acetaminophen (TYLENOL) tablet 650 mg  650 mg Oral Q4H PRN    Or    acetaminophen (TYLENOL) solution 650 mg  650 mg Per NG tube Q4H PRN    Or    acetaminophen (TYLENOL) suppository 650 mg  650 mg Rectal Q4H PRN    ondansetron (ZOFRAN) injection 4 mg  4 mg IntraVENous Q6H PRN    atorvastatin (LIPITOR) tablet 40 mg  40 mg Oral QHS    labetaloL (NORMODYNE;TRANDATE) injection 5 mg  5 mg IntraVENous Q10MIN PRN    heparin (porcine) injection 5,000 Units  5,000 Units SubCUTAneous Q8H    aspirin (ASA) suppository 300 mg  300 mg Rectal DAILY        Medical History  Past Medical History:   Diagnosis Date    Acute pharyngitis 2/8/2011    Allergic rhinitis, cause unspecified 2/8/2011    Arrhythmia     PVC    Asymptomatic varicose veins 2/8/2011    [facility]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     Carpal tunnel syndrome 2/8/2011    Degenerative Joint Disese ( improved/resolving) 2/8/2011    Degenetrative Dis Disease, 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    Diverticulosis of colon (without mention of hemorrhage) 2/8/2011    Dysphagia 2/8/2011    Edema, peripheral 2/8/2011    GERD (gastroesophageal reflux disease)     GERD Gastro- Esophageal Reflux Disease 2/8/2011    Herniated nucleus pulposus ( slipped disc) 2/8/2011    Menopausal 2/8/2011    PUD (peptic ulcer disease) 2012    Pure hypercholesterolemia 2/8/2011    Recurrent ear pain 2/8/2011    S/P radiation therapy     Scalp lesion 10/23/2014    Sebaceous cyst 4/22/2014    Unspecified cataract 2/8/2011    Unspecified essential hypertension 2/8/2011    Unspecified sleep apnea      Review of Systems   Unable to perform ROS: Mental acuity       Exam:    Visit Vitals  /85 (BP 1 Location: Right arm)   Pulse 75   Temp 97.5 °F (36.4 °C) Resp 18   Ht 5' 2\" (1.575 m)   Wt 152 lb (68.9 kg)   SpO2 98%   Breastfeeding No   BMI 27.80 kg/m²      General: Confused   Head: Normocephalic, atraumatic, scarring on the scalp anicteric sclera   Neck Normal ROM,  No thyromegally   Lungs:  Clear to auscultation. Cardiac: Regular rate and rhythm with no murmurs. Abd: Bowel sounds were audible. PEG tube placed clean wound   Ext: No pedal edema   Skin: Supple no rash     NeurologicExam:  Mental Status:  Confused but able to follow simple commands   Speech:  Fluent   Cranial Nerves:  Opens Eyes Spontaneously  orients to sound  Dolls eyes intact  PERRLA  Corneal reflex intact  Left facial droop  Left visual neglect   Motor: Good strength on the right. Does not move the left. Reflexes:   Deep tendon reflexes 2+/4 on the left 1+ on the right   Sensory:   Left sensory neglect . Recognized her left arm today   Tremor:   No tremor noted. Cerebellar:  Coordination intact.  (right)       Lab Review  Lab Results   Component Value Date/Time    WBC 6.9 07/07/2020 02:30 AM    HCT 38.4 07/07/2020 02:30 AM    HGB 12.1 07/07/2020 02:30 AM    PLATELET 874 90/86/2766 02:30 AM       Lab Results   Component Value Date/Time    Sodium 138 07/07/2020 02:30 AM    Potassium 3.2 (L) 07/07/2020 02:30 AM    Chloride 105 07/07/2020 02:30 AM    CO2 26 07/07/2020 02:30 AM    Glucose 99 07/07/2020 02:30 AM    BUN 10 07/07/2020 02:30 AM    Creatinine 0.74 07/07/2020 02:30 AM    Calcium 8.7 07/07/2020 02:30 AM       Lab Results   Component Value Date/Time    Hemoglobin A1c 5.7 (H) 07/04/2020 05:43 AM    Hemoglobin A1c (POC) 5.7 08/08/2016 03:43 PM          Lab Results   Component Value Date/Time    Cholesterol, total 185 07/04/2020 05:43 AM    Cholesterol (POC) 168 03/12/2020 12:50 PM    HDL Cholesterol 73 07/04/2020 05:43 AM    HDL Cholesterol (POC) 57 03/12/2020 12:50 PM    LDL Cholesterol (POC) 93 03/12/2020 12:50 PM    LDL, calculated 93 07/04/2020 05:43 AM    VLDL, calculated 19 07/04/2020 05:43 AM    Triglyceride 95 07/04/2020 05:43 AM    Triglycerides (POC) 96 03/12/2020 12:50 PM    CHOL/HDL Ratio 2.5 07/04/2020 05:43 AM

## 2020-07-09 NOTE — PROGRESS NOTES
Bedside shift change report given to [facility]) by Veronika Limon, RN (offgoing nurse).  Report included the following information SBAR, Kardex and Quality Measures.     Zone Phone:  [phone]        Significant changes during shift: none      Patient Information     Abran Cooper  78 y.o.  7/3/2020  2:59 PM by Darlene Spence MD. 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[address] 05/06/2019    PAF [facility]paroxysmal atrial fibrillation) ([facility] 05/06/2019    Atrial fibrillation with rapid ventricular response ([address] 05/06/2019    S/P cardiac pacemaker procedure 09/29/2017    Bradycardia 09/29/2017    SSS (sick sinus syndrome) ([address] 09/29/2017    CAD (coronary artery disease) 02/17/2016    Abnormal nuclear stress test 02/11/2016    Syncope 02/11/2016    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    Scalp lesion 10/23/2014    Sebaceous cyst 04/22/2014    Keloid 04/22/2014    Dysphagia 02/08/2011    Recurrent ear pain 02/08/2011    Acute pharyngitis 02/08/2011    Essential hypertension 02/08/2011    Edema, peripheral 02/08/2011    Pure hypercholesterolemia 02/08/2011    Allergic rhinitis, cause unspecified 02/08/2011    Diverticulosis of colon (without mention of hemorrhage) 02/08/2011    Herniated nucleus pulposus ( slipped disc) 02/08/2011    Degenetrative Dis Disease, 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    Carpal tunnel syndrome 02/08/2011    Unspecified cataract 02/08/2011    GERD Gastro- Esophageal Reflux Disease 02/08/2011    Degenerative Joint Disese ( improved/resolving) 02/08/2011    Menopausal 02/08/2011    Asymptomatic varicose veins 02/08/2011              Past Medical History:   Diagnosis Date    Acute pharyngitis 2/8/2011    Allergic rhinitis, cause unspecified 2/8/2011    Arrhythmia       PVC    Asymptomatic varicose veins 2/8/2011    Cancer ([address] 2009     stage 4 throat     Carpal tunnel syndrome 2/8/2011    Degenerative Joint Disese ( improved/resolving) 2/8/2011    Degenetrative Dis Disease, 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    Diverticulosis of colon (without mention of hemorrhage) 2/8/2011    Dysphagia 2/8/2011    Edema, peripheral 2/8/2011    GERD (gastroesophageal reflux disease)      GERD Gastro- Esophageal Reflux Disease 2/8/2011    Herniated nucleus pulposus ( slipped disc) 2/8/2011    Menopausal 2/8/2011    PUD (peptic ulcer disease) 2012    Pure hypercholesterolemia 2/8/2011    Recurrent ear pain 2/8/2011    S/P radiation therapy      Scalp lesion 10/23/2014    Sebaceous cyst 4/22/2014    Unspecified cataract 2/8/2011    Unspecified essential hypertension 2/8/2011    Unspecified sleep apnea              Core Measures:     CVA: Yes Yes  CHF:No No  PNA:No No     Post Op Surgical (If Applicable):      Number times ambulated in hallway past shift:  0  Number of times OOB to chair past shift:   0  NG Tube: No  Incentive Spirometer: No  Drains: No   Volume  0  Dressing Present:  No  Flatus:  Yes     Activity Status:     OOB to Chair No  Ambulated this shift No   Bed Rest Yes     Supplemental K9: (FS Applicable)     NC No  NRB No  Venti-mask No  On 0 Liters/min        LINES AND DRAINS:     Central Line? No Placement date 0 Reason Medically Necessary 0     PICC LINE? No Placement date 0Reason Medically Necessary 0     Urinary Catheter? No Placement Date 0 Reason Medically Necessary 0     DVT prophylaxis:     DVT prophylaxis Med- Yes  DVT prophylaxis SCD or MEY- No      Wounds: (If Applicable)     Wounds- Yes open areas     Location [place] upper groin area, dry flaky red skin.     Patient Safety:     Falls Score Total Score: 4  Safety Level_______  Bed Alarm On? Yes  Sitter? No     Plan for upcoming shift: safety, aspiration precaution           Discharge Plan: yes- rehab     Active Consults:  IP CONSULT TO NEUROLOGY  IP CONSULT TO PALLIATIVE CARE - PROVIDER

## 2020-07-09 NOTE — PROGRESS NOTES
Bedside shift change report given to RN  (oncoming nurse) by Lucy Gutierrez RN (offgoing nurse).  Report included the following information SBAR, Kardex and Quality Measures.     Zone BRLP:  [id number]        Significant changes during shift:  pt tolerating tube feeding well       Patient Information     Norma Bonner  78 y.o.  7/3/2020  2:59 PM by Darlene Villareal MD. 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    Scalp lesion 10/23/2014    Sebaceous cyst 04/22/2014    Keloid 04/22/2014    Dysphagia 02/08/2011    Recurrent ear pain 02/08/2011    Acute pharyngitis 02/08/2011    Essential hypertension 02/08/2011    Edema, peripheral 02/08/2011    Pure hypercholesterolemia 02/08/2011    Allergic rhinitis, cause unspecified 02/08/2011    Diverticulosis of colon (without mention of hemorrhage) 02/08/2011    Herniated nucleus pulposus ( slipped disc) 02/08/2011    Degenetrative Dis Disease, 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    Carpal tunnel syndrome 02/08/2011    Unspecified cataract 02/08/2011    GERD Gastro- Esophageal Reflux Disease 02/08/2011    Degenerative Joint Disese ( improved/resolving) 02/08/2011    Menopausal 02/08/2011    Asymptomatic varicose veins 02/08/2011              Past Medical History:   Diagnosis Date    Acute pharyngitis 2/8/2011    Allergic rhinitis, cause unspecified 2/8/2011    Arrhythmia       PVC    Asymptomatic varicose veins 2/8/2011    Cancer ([address] 2009     stage 4 throat     Carpal tunnel syndrome 2/8/2011    Degenerative Joint Disese ( improved/resolving) 2/8/2011    Degenetrative Dis Disease, 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    Diverticulosis of colon (without mention of hemorrhage) 2/8/2011    Dysphagia 2/8/2011    Edema, peripheral 2/8/2011    GERD (gastroesophageal reflux disease)      GERD Gastro- Esophageal Reflux Disease 2/8/2011    Herniated nucleus pulposus ( slipped disc) 2/8/2011    Menopausal 2/8/2011    PUD (peptic ulcer disease) 2012    Pure hypercholesterolemia 2/8/2011    Recurrent ear pain 2/8/2011    S/P radiation therapy      Scalp lesion 10/23/2014    Sebaceous cyst 4/22/2014    Unspecified cataract 2/8/2011    Unspecified essential hypertension 2/8/2011    Unspecified sleep apnea              Core Measures:     CVA: Yes Yes  CHF:No No  PNA:No No     Post Op Surgical (If Applicable):      Number times ambulated in hallway past shift:  0  Number of times OOB to chair past shift:   0  NG Tube: No  Incentive Spirometer: No  Drains: No   Volume  0  Dressing Present:  No  Flatus:  Yes     Activity Status:     OOB to Chair No  Ambulated this shift No   Bed Rest Yes     Supplemental A6: (EE Applicable)     NC No  NRB No  Venti-mask No  On 0 Liters/min        LINES AND DRAINS:     Central Line? No Placement date 0 Reason Medically Necessary 0     PICC LINE? No Placement date 0Reason Medically Necessary 0     Urinary Catheter? No Placement Date 0 Reason Medically Necessary 0     DVT prophylaxis:     DVT prophylaxis Med- Yes  DVT prophylaxis SCD or MEY- No      Wounds: (If Applicable)     Wounds- Yes open areas     Location [place] upper groin area, dry flaky red skin.     Patient Safety:     Falls Score Total Score: 4  Safety Level_______  Bed Alarm On? Yes  Sitter? No     Plan for upcoming shift: safety            Discharge Plan: yes- rehab     Active Consults:  IP CONSULT TO NEUROLOGY  IP CONSULT TO PALLIATIVE CARE - PROVIDER

## 2020-07-09 NOTE — PROGRESS NOTES
Bedside shift change report given to RN  (oncoming nurse) by Kaylee Magallanes RN (offgoing nurse). Report included the following information SBAR, Kardex and Quality Measures.     Zone Phone:  [phone]        Significant changes during shift:  pt tolerating tube feeding well at this time. No residual noted.  At 5 am tube feeding was advanced.      Patient Information     Laly Akins  78 y.o.  7/3/2020  2:59 PM by Darlene Robb MD. 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    Scalp lesion 10/23/2014    Sebaceous cyst 04/22/2014    Keloid 04/22/2014    Dysphagia 02/08/2011    Recurrent ear pain 02/08/2011    Acute pharyngitis 02/08/2011    Essential hypertension 02/08/2011    Edema, peripheral 02/08/2011    Pure hypercholesterolemia 02/08/2011    Allergic rhinitis, cause unspecified 02/08/2011    Diverticulosis of colon (without mention of hemorrhage) 02/08/2011    Herniated nucleus pulposus ( slipped disc) 02/08/2011    Degenetrative Dis Disease, 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    Carpal tunnel syndrome 02/08/2011    Unspecified cataract 02/08/2011    GERD Gastro- Esophageal Reflux Disease 02/08/2011    Degenerative Joint Disese ( improved/resolving) 02/08/2011    Menopausal 02/08/2011    Asymptomatic varicose veins 02/08/2011              Past Medical History:   Diagnosis Date    Acute pharyngitis 2/8/2011    Allergic rhinitis, cause unspecified 2/8/2011    Arrhythmia       PVC  Asymptomatic varicose veins 2/8/2011    Cancer (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    Diverticulosis of colon (without mention of hemorrhage) 2/8/2011    Dysphagia 2/8/2011    Edema, peripheral 2/8/2011    GERD (gastroesophageal reflux disease)      GERD Gastro- Esophageal Reflux Disease 2/8/2011    Herniated nucleus pulposus ( slipped disc) 2/8/2011    Menopausal 2/8/2011    PUD (peptic ulcer disease) 2012    Pure hypercholesterolemia 2/8/2011    Recurrent ear pain 2/8/2011    S/P radiation therapy      Scalp lesion 10/23/2014    Sebaceous cyst 4/22/2014    Unspecified cataract 2/8/2011    Unspecified essential hypertension 2/8/2011    Unspecified sleep apnea              Core Measures:     CVA: Yes Yes  CHF:No No  PNA:No No     Post Op Surgical (If Applicable):      Number times ambulated in hallway past shift:  0  Number of times OOB to chair past shift:   0  NG Tube: No  Incentive Spirometer: No  Drains: No   Volume  0  Dressing Present:  No  Flatus:  Yes     Activity Status:     OOB to Chair No  Ambulated this shift No   Bed Rest Yes     Supplemental G5: (GL Applicable)     NC No  NRB No  Venti-mask No  On 0 Liters/min        LINES AND DRAINS:     Central Line? No Placement date 0 Reason Medically Necessary 0     PICC LINE? No Placement date 0Reason Medically Necessary 0     Urinary Catheter? No Placement Date 0 Reason Medically Necessary 0     DVT prophylaxis:     DVT prophylaxis Med- Yes  DVT prophylaxis SCD or MEY- No      Wounds: (If Applicable)     Wounds- Yes open areas     Location [place] upper groin area, dry flaky red skin.     Patient Safety:     Falls Score Total Score: 4  Safety Level_______  Bed Alarm On? Yes  Sitter? No     Plan for upcoming shift: safety, increase TF to goal           Discharge Plan: yes- rehab     Active Consults:  IP CONSULT TO NEUROLOGY  IP CONSULT TO PALLIATIVE CARE - PROVIDER

## 2020-07-09 NOTE — PROGRESS NOTES
Problem: Falls - Risk of  Goal: *Absence of Falls  Description: Document Smiley Mendoza Fall Risk and appropriate interventions in the flowsheet. Outcome: Progressing Towards Goal  Note: Fall Risk Interventions:       Mentation Interventions: Bed/chair exit alarm    Medication Interventions: Bed/chair exit alarm, Evaluate medications/consider consulting pharmacy, Patient to call before getting OOB    Elimination Interventions: Call light in reach, Bed/chair exit alarm, Patient to call for help with toileting needs    History of Falls Interventions: Bed/chair exit alarm         Problem: Pressure Injury - Risk of  Goal: *Prevention of pressure injury  Description: Document Howard Scale and appropriate interventions in the flowsheet. Outcome: Progressing Towards Goal  Note: Pressure Injury Interventions:  Sensory Interventions: Assess need for specialty bed    Moisture Interventions: Check for incontinence Q2 hours and as needed, Internal/External urinary devices    Activity Interventions: Increase time out of bed, PT/OT evaluation    Mobility Interventions: Turn and reposition approx.  every two hours(pillow and wedges), PT/OT evaluation    Nutrition Interventions: Document food/fluid/supplement intake    Friction and Shear Interventions: Apply protective barrier, creams and emollients, Lift sheet, Lift team/patient mobility team, Transferring/repositioning devices                Problem: Ischemic Stroke: Discharge Outcomes  Goal: *Verbalizes anxiety and depression are reduced or absent  Outcome: Progressing Towards Goal  Goal: *Verbalize understanding of risk factor modification(Stroke Metric)  Outcome: Progressing Towards Goal  Goal: *Hemodynamically stable  Outcome: Progressing Towards Goal  Goal: *Absence of aspiration pneumonia  Outcome: Progressing Towards Goal  Goal: *Aware of needed dietary changes  Outcome: Progressing Towards Goal  Goal: *Verbalize understanding of prescribed medications including anti-coagulants, anti-lipid, and/or anti-platelets(Stroke Metric)  Outcome: Progressing Towards Goal  Goal: *Tolerating diet  Outcome: Progressing Towards Goal  Goal: *Aware of follow-up diagnostics related to anticoagulants  Outcome: Progressing Towards Goal  Goal: *Ability to perform ADLs and demonstrates progressive mobility and function  Outcome: Progressing Towards Goal  Goal: *Absence of DVT(Stroke Metric)  Outcome: Progressing Towards Goal  Goal: *Absence of aspiration  Outcome: Progressing Towards Goal  Goal: *Optimal pain control at patient's stated goal  Outcome: Progressing Towards Goal  Goal: *Home safety concerns addressed  Outcome: Progressing Towards Goal  Goal: *Describes available resources and support systems  Outcome: Progressing Towards Goal  Goal: *Verbalizes understanding of activation of EMS(911) for stroke symptoms(Stroke Metric)  Outcome: Progressing Towards Goal  Goal: *Understands and describes signs and symptoms to report to providers(Stroke Metric)  Outcome: Progressing Towards Goal  Goal: *Neurolgocially stable (absence of additional neurological deficits)  Outcome: Progressing Towards Goal  Goal: *Verbalizes importance of follow-up with primary care physician(Stroke Metric)  Outcome: Progressing Towards Goal  Goal: *Smoking cessation discussed,if applicable(Stroke Metric)  Outcome: Progressing Towards Goal  Goal: *Depression screening completed(Stroke Metric)  Outcome: Progressing Towards Goal

## 2020-07-10 LAB
GLUCOSE BLD STRIP.AUTO-MCNC: 121 MG/DL (ref 65–100)
SARS-COV-2, COV2: NOT DETECTED
SERVICE CMNT-IMP: ABNORMAL
SOURCE, COVRS: NORMAL
SPECIMEN SOURCE, FCOV2M: NORMAL

## 2020-07-10 PROCEDURE — 94760 N-INVAS EAR/PLS OXIMETRY 1: CPT

## 2020-07-10 PROCEDURE — 92507 TX SP LANG VOICE COMM INDIV: CPT

## 2020-07-10 PROCEDURE — 97112 NEUROMUSCULAR REEDUCATION: CPT | Performed by: PHYSICAL THERAPIST

## 2020-07-10 PROCEDURE — 74011250637 HC RX REV CODE- 250/637: Performed by: PSYCHIATRY & NEUROLOGY

## 2020-07-10 PROCEDURE — 97530 THERAPEUTIC ACTIVITIES: CPT | Performed by: OCCUPATIONAL THERAPIST

## 2020-07-10 PROCEDURE — 82962 GLUCOSE BLOOD TEST: CPT

## 2020-07-10 PROCEDURE — 74011250636 HC RX REV CODE- 250/636: Performed by: INTERNAL MEDICINE

## 2020-07-10 PROCEDURE — 74011250637 HC RX REV CODE- 250/637: Performed by: INTERNAL MEDICINE

## 2020-07-10 PROCEDURE — 97530 THERAPEUTIC ACTIVITIES: CPT | Performed by: PHYSICAL THERAPIST

## 2020-07-10 PROCEDURE — 97535 SELF CARE MNGMENT TRAINING: CPT | Performed by: OCCUPATIONAL THERAPIST

## 2020-07-10 PROCEDURE — 65660000000 HC RM CCU STEPDOWN

## 2020-07-10 PROCEDURE — 92526 ORAL FUNCTION THERAPY: CPT

## 2020-07-10 PROCEDURE — 77030018798 HC PMP KT ENTRL FED COVD -A

## 2020-07-10 RX ADMIN — Medication 10 ML: at 22:13

## 2020-07-10 RX ADMIN — HEPARIN SODIUM 5000 UNITS: 5000 INJECTION INTRAVENOUS; SUBCUTANEOUS at 06:03

## 2020-07-10 RX ADMIN — DEXTROSE MONOHYDRATE, SODIUM CHLORIDE, AND POTASSIUM CHLORIDE 75 ML/HR: 50; 4.5; .745 INJECTION, SOLUTION INTRAVENOUS at 18:01

## 2020-07-10 RX ADMIN — Medication: at 09:00

## 2020-07-10 RX ADMIN — HEPARIN SODIUM 5000 UNITS: 5000 INJECTION INTRAVENOUS; SUBCUTANEOUS at 22:12

## 2020-07-10 RX ADMIN — HEPARIN SODIUM 5000 UNITS: 5000 INJECTION INTRAVENOUS; SUBCUTANEOUS at 14:48

## 2020-07-10 RX ADMIN — ATORVASTATIN CALCIUM 40 MG: 40 TABLET, FILM COATED ORAL at 22:12

## 2020-07-10 RX ADMIN — ASPIRIN 325 MG ORAL TABLET 325 MG: 325 PILL ORAL at 09:00

## 2020-07-10 RX ADMIN — Medication: at 18:04

## 2020-07-10 RX ADMIN — DEXTROSE MONOHYDRATE, SODIUM CHLORIDE, AND POTASSIUM CHLORIDE 75 ML/HR: 50; 4.5; .745 INJECTION, SOLUTION INTRAVENOUS at 02:36

## 2020-07-10 RX ADMIN — Medication 10 ML: at 06:03

## 2020-07-10 NOTE — PROGRESS NOTES
Problem: Mobility Impaired (Adult and Pediatric)  Goal: *Acute Goals and Plan of Care (Insert Text)  Description:   FUNCTIONAL STATUS PRIOR TO ADMISSION: unclear     HOME SUPPORT PRIOR TO ADMISSION: The patient lived at home alone? Physical Therapy Goals  Initiated 7/5/2020  1. Patient will move from supine to sit and sit to supine , scoot up and down and roll side to side in bed with maximal assistance within 7 day(s). 2.  Patient will transfer from bed to chair and chair to bed with maximal assistance using the least restrictive device within 7 day(s). 3.  Patient will perform sit to stand with maximal assistance within 7 day(s). 4.  Patient will ambulate with maximal assistance for 3 feet with the least restrictive device within 7 day(s). 5. Patient will improve Arizmendi Balance score by 7 points within 7 days. Outcome: Not Met   PHYSICAL THERAPY TREATMENT  Patient: Montse Hernandez (78 y.o. female)  Date: 7/10/2020  Diagnosis: [facility]cere[facility] vascular accident) ([facility] [I63.9]   <principal problem not specified>  Procedure(s) (LRB):  ESOPHAGOGASTRODUODENOSCOPY (EGD)URGENT (N/A)  PERCUTANEOUS ENDOSCOPIC GASTROSTOMY TUBE INSERTION (N/A) 2 Days Post-Op  Precautions: Aspiration, Bed Alarm, Fall  Chart, physical therapy assessment, plan of care and goals were reviewed. ASSESSMENT  Patient continues with skilled PT services and is not progressing towards goals. Patient continues to require max to total A of 2 for bed mobility and sitting balance is poor with posterior lean noted. Increased posterior pushing noted when using R UE for support on bed. Patient demonstrating improvements in ability to turn head to L and was noted to cross midline once when washing face with OT. Patient demonstrating decreased active movement in LLE and increased extensor tone when performing mobility. Patient alert but less conversant today.  She was able to follow one step commands only and required verbal and tactile redirection. Continue to recommend SNF at discharge. Patient fatigues easily and is likely not able to tolerate the 3 hours of intensive therapy. Current Level of Function Impacting Discharge (mobility/balance): max/total A of 2    Other factors to consider for discharge: family requesting [facility]         PLAN :  Patient continues to benefit from skilled intervention to address the above impairments. Continue treatment per established plan of care. to address goals. Recommendation for discharge: (in order for the patient to meet his/her long term goals)  Therapy up to 5 days/week in SNF setting    This discharge recommendation:  Has been made in collaboration with the attending provider and/or case management    IF patient discharges home will need the following DME: hospital bed, mechanical lift, and rolling walker       SUBJECTIVE:   Patient stated Kathi dean for helping me.     OBJECTIVE DATA SUMMARY:   Critical Behavior:  Neurologic State: Alert  Orientation Level: Oriented to person, Oriented to place, Oriented to time, Disoriented to situation  Cognition: Impaired decision making, Decreased attention/concentration  Safety/Judgement: Decreased awareness of environment, Decreased awareness of need for assistance, Decreased awareness of need for safety, Lack of insight into deficits(pt seems fearful of falling)  Functional Mobility Training:  Bed Mobility:  Rolling: Maximum assistance; Total assistance  Supine to Sit: Maximum assistance;Assist x2  Sit to Supine: Total assistance;Assist x2  Scooting:  Total assistance;Assist x2        Transfers:      Not able to safely attempt                             Balance:  Sitting: Impaired  Sitting - Static: Poor (constant support)- posterior lean and increased pushing noted when propping with R UE            Therapeutic Exercises:   Patient working on maintaining midline position- patient requires mod to max A to maintain sitting balance and not to push posteriorly when using R UE for support      Activity Tolerance:   Poor  Please refer to the flowsheet for vital signs taken during this treatment. After treatment patient left in no apparent distress:   Supine in bed, Heels elevated for pressure relief, Call bell within reach, and Bed / chair alarm activated    COMMUNICATION/COLLABORATION:   The patients plan of care was discussed with: Occupational therapist, Registered nurse, and Case management.      Umair Rebolledo, PT   Time Calculation: 31 mins

## 2020-07-10 NOTE — PROGRESS NOTES
JACOB:    SNF Placement   [employer] Auth   2nd  Medicare Letter   Medical Transport    UPDATE: 3:29PM    CM was informed by [facility] that pt has been denied acceptance to snf due to her not being able to complete exercise to inpatient rehab. EHSAN spoke with Leonidas Heller, via telephone and informed her of the following. Leonidas Heller reported that she has selected [facility] and she would like a referral sent to facility. Once CM hearts back from [facility], pt will require auth from [employer]. CM will send documents. CM will continue to follow. MARTINA Estevez, [place]        UPDATE: 9:49AM    CM spoke with pt's daughter: Usha Guadarrama, via telephone in regards to pt's d.c needs and plans. Leonidas Heller was informed that [facility] is unable to accept pt at this time. EHSAN informed [facility] that other options will need to be selected for pt. Leonidas Heller wanted to send referral to [facility] (inpatient rehab). CM informed Leonidas Heller that a referral can be sent to [facility], however, additional options (snf placement) will be needed for pt, depending on therapist recommendations. EHSNA will continue to follow. MARTINA Estevez, EHSAN          CM informed that pt's insurance is currently out of network with [facility], and they are unable to accept at this time. EHSAN will contact pt's brother, via telephone to inform him of the following. CM will encourage family to select another option for placement. EHSAN will update MD of the following. Pt will require [employer] auth and negative COVID test at the time. CM will continue to follow.     MARTINA Estevez, [address] [place] [address]

## 2020-07-10 NOTE — PROGRESS NOTES
Chief Complaint: stroke    Has no complaints. Patient is asleep. Easily awakened. No complaints. Son is in the room. Patient is awaiting placement. Assesment and Plan  1. Acute ischemic right MCA stroke (HCC)  A1C : 5.7  LDL: 93  Echocardiogram:   Moderate concentric hypertrophy. Moderate-to-severe segmental systolic function. Estimated left ventricular ejection fraction is 25 - 30%. Visually measured ejection fraction. Severe (grade 3) left ventricular diastolic dysfunction. Repeat CT : shows evolving stroke no bleed  Initial CT: IMPRESSION:  1.  Occluded right internal carotid artery from the carotid bifurcation to the  [place] and further intraluminal thrombus at the right MCA bifurcation. 2.  Large core infarct in the right MCA distribution with a 55 cc ischemic  penumbra. 3.  Ischemic changes on delayed postcontrast head CT without evidence of  hemorrhage. Rather large stroke on MRI with cortical involvement. Hold anticoagulation for 14 days. Start aspirin. Start Eliquis 5 mg twice daily on July 17, 2020  Strongly recommended  inpatient rehabilitation She seems willing and motivated and will need intensive occupational therapy. 2. Atrial fibrillation with rapid ventricular response (HCC)  Continue rectal aspirin  Will start eliquis before she is discharged from the hospital     3. Hyperlipoproteinemia  Continue atorvastatin    4. Left hemiparesis ([address]  With visual and sensory neglect. 5. Dysphagia  S./p Peg placement       D/w son    Allergies  Latex; Bactrim [sulfamethoprim ds]; Aspirin; Codeine;  Iodinated contrast media; Pcn [penicillins]; and Tape [adhesive]     Medications  Current Facility-Administered Medications   Medication Dose Route Frequency    aspirin tablet 325 mg  325 mg PEG Tube DAILY    sodium chloride (NS) flush 5-40 mL  5-40 mL IntraVENous Q8H    sodium chloride (NS) flush 5-40 mL  5-40 mL IntraVENous PRN    ammonium lactate (LAC-HYDRIN) 12 % lotion Topical BID    dextrose 5% - 0.45% NaCl with KCl 10 mEq/L infusion  75 mL/hr IntraVENous CONTINUOUS    acetaminophen (TYLENOL) tablet 650 mg  650 mg Oral Q4H PRN    Or    acetaminophen (TYLENOL) solution 650 mg  650 mg Per NG tube Q4H PRN    Or    acetaminophen (TYLENOL) suppository 650 mg  650 mg Rectal Q4H PRN    ondansetron (ZOFRAN) injection 4 mg  4 mg IntraVENous Q6H PRN    atorvastatin (LIPITOR) tablet 40 mg  40 mg Oral QHS    labetaloL (NORMODYNE;TRANDATE) injection 5 mg  5 mg IntraVENous Q10MIN PRN    heparin (porcine) injection 5,000 Units  5,000 Units SubCUTAneous Q8H        Medical History  Past Medical History:   Diagnosis Date    Acute pharyngitis 2/8/2011    Allergic rhinitis, cause unspecified 2/8/2011    Arrhythmia     PVC    Asymptomatic varicose veins 2/8/2011    Cancer ([facility]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     Carpal tunnel syndrome 2/8/2011    Degenerative Joint Disese ( improved/resolving) 2/8/2011    Degenetrative Dis Disease, 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    Diverticulosis of colon (without mention of hemorrhage) 2/8/2011    Dysphagia 2/8/2011    Edema, peripheral 2/8/2011    GERD (gastroesophageal reflux disease)     GERD Gastro- Esophageal Reflux Disease 2/8/2011    Herniated nucleus pulposus ( slipped disc) 2/8/2011    Menopausal 2/8/2011    PUD (peptic ulcer disease) 2012    Pure hypercholesterolemia 2/8/2011    Recurrent ear pain 2/8/2011    S/P radiation therapy     Scalp lesion 10/23/2014    Sebaceous cyst 4/22/2014    Unspecified cataract 2/8/2011    Unspecified essential hypertension 2/8/2011    Unspecified sleep apnea      Review of Systems   Unable to perform ROS: Mental acuity       Exam:    Visit Vitals  /74 (BP 1 Location: Right arm, BP Patient Position: At rest)   Pulse 70   Temp 99 °F (37.2 °C)   Resp 18   Ht 5' 2\" (1.575 m)   Wt 152 lb (68.9 kg)   SpO2 99%   Breastfeeding No   BMI 27.80 kg/m²      General: Confused   Head: Normocephalic, atraumatic, scarring on the scalp anicteric sclera   Neck Normal ROM,  No thyromegally   Lungs:  Clear to auscultation. Cardiac: Regular rate and rhythm with no murmurs. Abd: Bowel sounds were audible. PEG tube placed clean wound   Ext: No pedal edema   Skin: Supple no rash     NeurologicExam:  Mental Status:  Confused but able to follow simple commands   Speech:  Fluent   Cranial Nerves:  Opens Eyes Spontaneously  orients to sound  Dolls eyes intact  PERRLA  Corneal reflex intact  Left facial droop  Left visual neglect   Motor: Good strength on the right. Does not move the left. Reflexes:   Deep tendon reflexes 2+/4 on the left 1+ on the right   Sensory:   Left sensory neglect . Recognized her left arm today   Tremor:   No tremor noted. Cerebellar:  Coordination intact.  (right)       Lab Review  Lab Results   Component Value Date/Time    WBC 6.9 07/07/2020 02:30 AM    HCT 38.4 07/07/2020 02:30 AM    HGB 12.1 07/07/2020 02:30 AM    PLATELET 261 97/12/0732 02:30 AM       Lab Results   Component Value Date/Time    Sodium 138 07/07/2020 02:30 AM    Potassium 3.2 (L) 07/07/2020 02:30 AM    Chloride 105 07/07/2020 02:30 AM    CO2 26 07/07/2020 02:30 AM    Glucose 99 07/07/2020 02:30 AM    BUN 10 07/07/2020 02:30 AM    Creatinine 0.74 07/07/2020 02:30 AM    Calcium 8.7 07/07/2020 02:30 AM       Lab Results   Component Value Date/Time    Hemoglobin A1c 5.7 (H) 07/04/2020 05:43 AM    Hemoglobin A1c (POC) 5.7 08/08/2016 03:43 PM          Lab Results   Component Value Date/Time    Cholesterol, total 185 07/04/2020 05:43 AM    Cholesterol (POC) 168 03/12/2020 12:50 PM    HDL Cholesterol 73 07/04/2020 05:43 AM    HDL Cholesterol (POC) 57 03/12/2020 12:50 PM    LDL Cholesterol (POC) 93 03/12/2020 12:50 PM    LDL, calculated 93 07/04/2020 05:43 AM    VLDL, calculated 19 07/04/2020 05:43 AM    Triglyceride 95 07/04/2020 05:43 AM    Triglycerides (POC) 96 03/12/2020 12:50 PM    CHOL/HDL Ratio 2.5 07/04/2020 05:43 AM

## 2020-07-10 NOTE — PROGRESS NOTES
Discussed with patient's son Juan Yeboah at bedside. All questions were answered and he was updated on the plan. Son is agreeable to discharge to SNF once SNF bed is available. Son is requesting decision-making capacity evaluation. Psych consultation was placed.

## 2020-07-10 NOTE — PROGRESS NOTES
Nutrition Assessment     Type and Reason for Visit: Reassess    Nutrition Recommendations/Plan:  Consider transition to bolus regimen - Jevity 1.5 240 mL bolus 4x/day + 75 mL water flushes before and after each bolus (provides 1440 kcal, 61g protein, 1330 mL water)      Nutrition Assessment:     Chart reviewed; patient has reached goal with Jevity 1.5 via PEG tube. Tolerating well. Working with therapy at time of visit; no family present. CM working on finding rehab placement. Can transition to bolus regimen as desired. Malnutrition Assessment:  Malnutrition Status: No malnutrition     Estimated Daily Nutrient Needs:  Energy (kcal):  1441 kcal (BMR (1108) x 1. 3AF)  Protein (g):  55-69g (0.8-1.0 g/kg bw)       Fluid (ml/day):  1450 mL    Current Nutrition Therapies:  DIET NPO With Tube Feedings  DIET TUBE FEEDING (Jevity 1.5 @ 40 mL/hr + 100 mL water flushes q 4 hours (1440 kcal, 61g protein, 1330 mL water)    Anthropometric Measures:  · Height:  5' 2\" (157.5 cm)  · Current Body Wt:  68.9 kg (151 lb 14.4 oz)  · BMI: 27.8    Nutrition Diagnosis:   No nutritional diagnosis at this time    Nutrition Intervention:  Food and/or Nutrient Delivery: Continue tube feeding  Nutrition Education and Counseling: No recommendations at this time  Coordination of Nutrition Care: No recommendation at this time    Goals:  TF tolerated at goal rate next 2-4 days       Nutrition Monitoring and Evaluation:   Food/Nutrient Intake Outcomes: Enteral nutrition intake/tolerance  Physical Signs/Symptoms Outcomes: Weight, GI status    Discharge Planning:    Enteral nutrtition     Electronically signed by Getachew Kauffman RD on 7/10/2020 at 11:31 AM    Contact Number: Ext [phone]  Pager [phone]

## 2020-07-10 NOTE — PROGRESS NOTES
Problem: Self Care Deficits Care Plan (Adult)  Goal: *Acute Goals and Plan of Care (Insert Text)  Description: FUNCTIONAL STATUS PRIOR TO ADMISSION: per chart pt lived alone, amb with RW, I with ADLs. HOME SUPPORT: The patient lived alone with family in area. Occupational Therapy Goals  Initiated 7/5/2020   1. Patient will perform grooming with supervision/set-up within 7 day(s). 2.  Patient will perform upper body dressing with min A using troy technique PRN within 7 day(s). 3.  Patient will locate and utilize 25% of ADL objects presented to L of midline with minimal assistance/contact guard and assist and cues within 7 day(s). 4.  Patient will perform sitting at EOB without UE support for 2 minutes with minimal assistance/contact guard assist within 7 day(s). 5.  Patient will follow 50% of simple one step commands for ADL within 7 day(s). 6.  Patient will participate in upper extremity therapeutic exercise/activities with minimal assistance/contact guard assist for 5 minutes within 7 day(s). 7.  Patient will utilize energy conservation techniques during functional activities with verbal, visual and tactile cues within 7 day(s). 8.  Patient will improve their Fugl Lo score by 5 points in prep for ADLs within 7 days. Outcome: Not Met   OCCUPATIONAL THERAPY TREATMENT  Patient: Darling Santamaria (78 y.o. female)  Date: 7/10/2020  Diagnosis: [facility]ce[facility] [I63.9]   <principal problem not specified>  Procedure(s) (LRB):  ESOPHAGOGASTRODUODENOSCOPY (EGD)URGENT (N/A)  PERCUTANEOUS ENDOSCOPIC GASTROSTOMY TUBE INSERTION (N/A) 2 Days Post-Op  Precautions: Aspiration, Bed Alarm, Fall, tube feedings--HOB at 30 degrees. Chart, occupational therapy assessment, plan of care, and goals were reviewed. ASSESSMENT  Patient continues with skilled OT services and is minimally  progressing towards goals.   Pt continues to be dependent for self care and needs assistance X2 for functional bed mobility. Pt was better able to move her head towards midline today still requiring maximal cues, and eyes still with limited movement from R corners to midline, despite head position. While seated EOB with total support, pt continues to lean posteriorly. She was able to wipe her face and did move her head while using the wash cloth with minimal assistance. Tone is noted to slightly increased in LUE. Overall pt, is making very slow gains. She will benefit from rehab at discharge. Current Level of Function Impacting Discharge (ADLs): maximal to total assistance     Other factors to consider for discharge: large cva, PEG tube,          PLAN :  Patient continues to benefit from skilled intervention to address the above impairments. Continue treatment per established plan of care. to address goals. Recommend with staff: approach pt to her left side and encourage head/eye  movement    Recommend next OT session: simple grooming, vision    Recommendation for discharge: (in order for the patient to meet his/her long term goals)  Therapy up to 5 days/week in SNF setting    This discharge recommendation:  Has not yet been discussed the attending provider and/or case management    IF patient discharges home will need the following DME: needs rehab       SUBJECTIVE:   Patient had no complaints, limited verbalizations during tx session this date. OBJECTIVE DATA SUMMARY:   Cognitive/Behavioral Status:  Neurologic State: Alert  Orientation Level: Oriented to person;;;Disoriented to situation  Cognition: Impaired decision making;Decreased attention/concentration  Perception: Appears intact  Perseveration: No perseveration noted;Perseverates during conversation  Safety/Judgement: Decreased awareness of environment;Decreased awareness of need for assistance;Decreased awareness of need for safety; Lack of insight into deficits(pt seems fearful of falling)    Functional Mobility and Transfers for ADLs:  Bed Mobility:  Rolling: Maximum assistance; Total assistance  Supine to Sit: Maximum assistance;Assist x2  Sit to Supine: Total assistance;Assist x2  Scooting: Total assistance;Assist x2    Transfers:             Balance:  Sitting: Impaired  Sitting - Static: Poor (constant support)    ADL Intervention:  Feeding  Feeding Assistance: (pt is NPO--tube feeding in progress, placed on hold during )    Grooming  Position Performed: Seated edge of bed(needs total assistance for sitting EOB)  Washing Face: Minimum assistance; Moderate assistance(improved midline head position when seated upright/cues )                        Toileting  Clothing Management: Total assistance (dependent)    Cognitive Retraining  Safety/Judgement: Decreased awareness of environment;Decreased awareness of need for assistance;Decreased awareness of need for safety; Lack of insight into deficits(pt seems fearful of falling)  Decreased awareness overall of LUE/L side of body    Addendum:  1500:  Discussion with son at his request re: pt's psychological baseline and plan for rehab. 10 minutes. Therapeutic Exercises:   Encouraged pt positioning of LUE. Pt is unable to initiate nor perform this task. Pain:  No complaints    Activity Tolerance:   Pt seems tired this date    After treatment patient left in no apparent distress:   Supine in bed, Heels elevated for pressure relief, Call bell within reach, Bed / chair alarm activated, Side rails x 3, and HOB at 30    COMMUNICATION/COLLABORATION:   The patients plan of care was discussed with: Physical therapist and Registered nurse.      BRIANNA Huitron/L  Time Calculation: 32 mins

## 2020-07-10 NOTE — PROGRESS NOTES
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 No hemorrhage is identified  CTA  H&N:  1. Occluded right internal carotid artery from the carotid bifurcation to the  [place] and further intraluminal thrombus at the right MCA bifurcation. 2.  Large core infarct in the right MCA distribution with a 55 cc ischemic  penumbra. Ischemic changes on delayed postcontrast head CT without evidence of hemorrhage. LDL= 93/A1c= 5.7/Trop neg  large on initial CT (catastrophic), Evolving on repeat CT ()    Patient was not a TPA candidate and neuro interventionalist did not recc any interventions  Complete the stroke work-up of the brain without contrast when able- pt has compatible PPM?  2D echo -EF 38-33%, severe Diastolic Dysfunction noted  Cont rectal aspirin, and statin when able to take p.o.- currently strict NPO per SLP eval on saturday  MBS 2020 with Severe oropharyngeal dysphagia  Discussed with family and patient's PCP and they agreed for PEG tube. S/p PEG tube . Will start feeding  IV labetalol prn  Pt is at a risk for hemorrhagic conversion of this large stroke , repeat CT = no bleed noted  Neuro consult noted-consider Eliquis before discharge   IP Palliative care consulted - catastrophic large CVA with severe Dysphagia  Patient is ready to be discharged to [facility] Rehab whenever bed is available.  COVID-19 test negative (was ordered due to IPR policy)  Clinically improving today     Hypertension  Hold all BP meds for now, resume as needed now    Arrhythmia  Xarelto on her PTA list, not  sure if she is taking it. Son is not sure too  She will be on eliquis before discharge       Code Status: Full code confirmed in ER with the daughter and the son who is a power of   Surrogate Decision Maker:  David Camargo [phone]         DVT Prophylaxis: Heparin  GI Prophylaxis: not indicated     Baseline: Walk with a walker    Recommended Disposition: SNF/LTC son is requesting [facility]     Subjective:     Chief Complaint / Reason for Physician Visit :F/u Large R MCA CVA, dysphagia,   Acute right MCA stroke/dysphagia     Discussed with RN events overnight. Review of Systems:  Symptom Y/N Comments  Symptom Y/N Comments   Fever/Chills    Chest Pain x    Poor Appetite    Edema     Cough x   Abdominal Pain x    Sputum    Joint Pain     SOB/FRANK x   Pruritis/Rash     Nausea/vomit x   Tolerating PT/OT     Diarrhea    Tolerating Diet     Constipation    Other       Could NOT obtain due to:      Objective:     VITALS:   Last 24hrs VS reviewed since prior progress note. Most recent are:  Patient Vitals for the past 24 hrs:   Temp Pulse Resp BP SpO2   07/10/20 1226 98 °F (36.7 °C) 73 18 180/86 99 %   07/10/20 0742 98 °F (36.7 °C) 76 16 175/68 --   07/10/20 0345 98.4 °F (36.9 °C) 98 18 (!) 158/100 --   07/09/20 2330 98.4 °F (36.9 °C) 75 18 183/60 96 %   07/09/20 1920 97.6 °F (36.4 °C) 76 18 159/82 100 %   07/09/20 1609 97.5 °F (36.4 °C) 75 18 163/85 98 %       Intake/Output Summary (Last 24 hours) at 7/10/2020 1338  Last data filed at 7/10/2020 0747  Gross per 24 hour   Intake 200 ml   Output 2850 ml   Net -2650 ml        PHYSICAL EXAM:  General: WD, WN.drowsy, non cooperative, no acute distress    EENT:  EOMI. Anicteric sclerae. MMM  Resp:  CTA bilaterally, no wheezing or rales.   No accessory muscle use  CV:  Regular  rhythm,  No edema  GI:  Soft, Non distended, Non tender.  +Bowel sounds  Neurologic:  A&O x3, Left side flaccid, right side 5/5 strength, left facial droop  Psych:   Good insight  Skin:  No rashes. No jaundice    Reviewed most current lab test results and cultures  YES  Reviewed most current radiology test results   YES  Review and summation of old records today    NO  Reviewed patient's current orders and MAR    YES  PMH/SH reviewed - no change compared to H&P  ________________________________________________________________________  Care Plan discussed with:    Comments   Patient x    Family      RN x    Care Manager x    Consultant  x neuro                    x Multidiciplinary team rounds were held today with , nursing, pharmacist and clinical coordinator. Patient's plan of care was discussed; medications were reviewed and discharge planning was addressed. ________________________________________________________________________        Comments   >50% of visit spent in counseling and coordination of care     ________________________________________________________________________  Gavi Maria MD     Procedures: see electronic medical records for all procedures/Xrays and details which were not copied into this note but were reviewed prior to creation of Plan. LABS:  I reviewed today's most current labs and imaging studies. Pertinent labs include:  No results for input(s): WBC, HGB, HCT, PLT, HGBEXT, HCTEXT, PLTEXT, HGBEXT, HCTEXT, PLTEXT in the last 72 hours. No results for input(s): NA, K, CL, CO2, GLU, BUN, CREA, CA, MG, PHOS, ALB, TBIL, TBILI, ALT, INR, INREXT, INREXT in the last 72 hours.     No lab exists for component: SGOT    Signed: Gavi Maria MD

## 2020-07-10 NOTE — PROGRESS NOTES
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 No hemorrhage is identified  CTA  H&N:  1. Occluded right internal carotid artery from the carotid bifurcation to the  [facility] [place] and further intraluminal thrombus at the right MCA bifurcation. 2.  Large core infarct in the right MCA distribution with a 55 cc ischemic  penumbra. Ischemic changes on delayed postcontrast head CT without evidence of hemorrhage. LDL= 93/A1c= 5.7/Trop neg  large on initial CT (catastrophic), Evolving on repeat CT ()    Patient was not a TPA candidate and neuro interventionalist did not recc any interventions  Complete the stroke work-up of the brain without contrast when able- pt has compatible PPM?  2D echo -EF 53-07%, severe Diastolic Dysfunction noted  Cont rectal aspirin, and statin when able to take p.o.- currently strict NPO per SLP eval on saturday  MBS 2020 with Severe oropharyngeal dysphagia  Discussed with family and patient's PCP and they agreed for PEG tube. S/p PEG tube .  Will start feeding  IV labetalol prn  Pt is at a risk for hemorrhagic conversion of this large stroke , repeat CT = no bleed noted  Neuro consult noted-consider Eliquis before discharge   IP Palliative care consulted - catastrophic large CVA with severe Dysphagia  Patient is ready to be discharged to [facility]hab whenever bed is available and when COVID-19 test negative (was ordered due to IPR policy)     Hypertension  Hold all BP meds for now, resume as needed now    Arrhythmia  Xarelto on her PTA list, not  sure if she is taking it. Son is not sure too  She will be on eliquis before discharge       Code Status: Full code confirmed in ER with the daughter and the son who is a power of   Surrogate Decision Maker:  Paulino Hartman [phone]         DVT Prophylaxis: Heparin  GI Prophylaxis: not indicated     Baseline: Walk with a walker    Recommended Disposition: SNF/LTC son is requesting [facility]     Subjective:     Chief Complaint / Reason for Physician Visit :F/u Large R MCA CVA, dysphagia,   Acute right MCA stroke/dysphagia     Discussed with RN events overnight. Review of Systems:  Symptom Y/N Comments  Symptom Y/N Comments   Fever/Chills    Chest Pain     Poor Appetite    Edema     Cough    Abdominal Pain     Sputum    Joint Pain     SOB/FRANK    Pruritis/Rash     Nausea/vomit    Tolerating PT/OT     Diarrhea    Tolerating Diet     Constipation    Other       Could NOT obtain due to: Aphasia/sleepy     Objective:     VITALS:   Last 24hrs VS reviewed since prior progress note. Most recent are:  Patient Vitals for the past 24 hrs:   Temp Pulse Resp BP SpO2   07/10/20 0742 98 °F (36.7 °C) 76 16 175/68 --   07/10/20 0345 98.4 °F (36.9 °C) 98 18 (!) 158/100 --   07/09/20 2330 98.4 °F (36.9 °C) 75 18 183/60 96 %   07/09/20 1920 97.6 °F (36.4 °C) 76 18 159/82 100 %   07/09/20 1609 97.5 °F (36.4 °C) 75 18 163/85 98 %   07/09/20 1131 98.4 °F (36.9 °C) 72 18 155/65 99 %       Intake/Output Summary (Last 24 hours) at 7/10/2020 0835  Last data filed at 7/10/2020 0747  Gross per 24 hour   Intake 200 ml   Output 2850 ml   Net -2650 ml        PHYSICAL EXAM:  General: WD, WN.drowsy, non cooperative, no acute distress    EENT:  EOMI. Anicteric sclerae. MMM  Resp:  CTA bilaterally, no wheezing or rales. No accessory muscle use  CV:  Regular  rhythm,  No edema  GI:  Soft, Non distended, Non tender.  +Bowel sounds  Neurologic:  Sleepy, unable to follow commands  Psych:   Unable to assess  Skin:  No rashes.   No jaundice    Reviewed most current lab test results and cultures  YES  Reviewed most current radiology test results   YES  Review and summation of old records today    NO  Reviewed patient's current orders and MAR    YES  PMH/SH reviewed - no change compared to H&P  ________________________________________________________________________  Care Plan discussed with:    Comments   Patient     Family      RN x    Care Manager x    Consultant  x neuro                    x Multidiciplinary team rounds were held today with , nursing, pharmacist and clinical coordinator. Patient's plan of care was discussed; medications were reviewed and discharge planning was addressed. ________________________________________________________________________        Comments   >50% of visit spent in counseling and coordination of care     ________________________________________________________________________  Davion Radford MD     Procedures: see electronic medical records for all procedures/Xrays and details which were not copied into this note but were reviewed prior to creation of Plan. LABS:  I reviewed today's most current labs and imaging studies. Pertinent labs include:  No results for input(s): WBC, HGB, HCT, PLT, HGBEXT, HCTEXT, PLTEXT, HGBEXT, HCTEXT, PLTEXT in the last 72 hours. No results for input(s): NA, K, CL, CO2, GLU, BUN, CREA, CA, MG, PHOS, ALB, TBIL, TBILI, ALT, INR, INREXT, INREXT in the last 72 hours.     No lab exists for component: SGOT    Signed: Davion Radford MD

## 2020-07-10 NOTE — PROGRESS NOTES
Problem: Dysphagia (Adult)  Goal: *Acute Goals and Plan of Care (Insert Text)  Description: Speech Therapy Goals  Initiated 7/4/2020  1. Patient will participate in re-evaluation of swallow function within 7 days. 2. Patient will complete MBS 7/7/20. Goal met    Outcome: Not Met   SPEECH LANGUAGE PATHOLOGY DYSPHAGIA TREATMENT  Patient: Elisabeth Stack (78 y.o. female)  Date: 7/10/2020  Diagnosis: CVA (cerebral vascular accident) ([facility] [address] [I63.9]   <principal problem not specified>  Procedure(s) (LRB):  ESOPHAGOGASTRODUODENOSCOPY (EGD)URGENT (N/A)  PERCUTANEOUS ENDOSCOPIC GASTROSTOMY TUBE INSERTION (N/A) 2 Days Post-Op  Precautions:  Aspiration, Bed Alarm, Fall    ASSESSMENT:  Patient was given ice chips. Oral phase was moderately slow and she had residue in the L cheek about 25% of the time. After 8-9 bolus, she coughed suggesting at least laryngeal penetration and possible aspiration. Her speech is mod dysarthric. Words and sentences are intelligible with low volume and imprecise artic. In conversation, her intelligibility was reduced significantly. PLAN:  Recommendations and Planned Interventions:  Recommend NPO and PEG feedings continue. Patient continues to benefit from skilled intervention to address the above impairments. Continue treatment per established plan of care. Discharge Recommendations:  None     SUBJECTIVE:   Patient stated she has 3 children. OBJECTIVE:   Cognitive and Communication Status:  Neurologic State: Alert  Orientation Level: Oriented to person, Oriented to place, Oriented to time, Disoriented to situation  Cognition: Impaired decision making, Decreased attention/concentration  Perception: Appears intact  Perseveration: No perseveration noted, Perseverates during conversation  Safety/Judgement: Lack of insight into deficits  Dysphagia Treatment:  Oral Assessment:     P.O.  Trials:  Patient Position: upright in bed  Vocal quality prior to P.O.: Low volume  Consistency Presented: Ice chips  How Presented: Self-fed/presented;Spoon     Bolus Acceptance: No impairment  Bolus Formation/Control: Impaired  Type of Impairment: Delayed  Propulsion: Delayed (# of seconds)  Oral Residue: Less than 10% of bolus     Laryngeal Elevation: Functional                   Oral Phase Severity: Moderate  Pharyngeal Phase Severity : Moderate-severe            Pain:      none       After treatment:   Patient left in no apparent distress in bed and Call bell within reach    COMMUNICATION/EDUCATION:   Patient was educated regarding her deficit(s) of dysphagia and dysarthria  The patient's plan of care including recommendations, planned interventions, and recommended diet changes were discussed with: Registered nurse.      Tiara Andino, SLP  Time Calculation: 14 mins

## 2020-07-10 NOTE — PROGRESS NOTES
Bedside and Verbal shift change report given to Sherry Shirley (oncoming nurse) by Dalton lozano (offgoing nurse). Report included the following information SBAR, Kardex, MAR and Recent Results.

## 2020-07-11 LAB
GLUCOSE BLD STRIP.AUTO-MCNC: 107 MG/DL (ref 65–100)
SERVICE CMNT-IMP: ABNORMAL

## 2020-07-11 PROCEDURE — 94760 N-INVAS EAR/PLS OXIMETRY 1: CPT

## 2020-07-11 PROCEDURE — 65660000000 HC RM CCU STEPDOWN

## 2020-07-11 PROCEDURE — 74011250636 HC RX REV CODE- 250/636: Performed by: INTERNAL MEDICINE

## 2020-07-11 PROCEDURE — 74011250637 HC RX REV CODE- 250/637: Performed by: INTERNAL MEDICINE

## 2020-07-11 PROCEDURE — 74011250637 HC RX REV CODE- 250/637: Performed by: PSYCHIATRY & NEUROLOGY

## 2020-07-11 PROCEDURE — 82962 GLUCOSE BLOOD TEST: CPT

## 2020-07-11 RX ADMIN — HEPARIN SODIUM 5000 UNITS: 5000 INJECTION INTRAVENOUS; SUBCUTANEOUS at 22:50

## 2020-07-11 RX ADMIN — Medication 10 ML: at 14:33

## 2020-07-11 RX ADMIN — Medication: at 09:14

## 2020-07-11 RX ADMIN — Medication 10 ML: at 22:50

## 2020-07-11 RX ADMIN — ATORVASTATIN CALCIUM 40 MG: 40 TABLET, FILM COATED ORAL at 22:51

## 2020-07-11 RX ADMIN — HEPARIN SODIUM 5000 UNITS: 5000 INJECTION INTRAVENOUS; SUBCUTANEOUS at 14:33

## 2020-07-11 RX ADMIN — Medication 10 ML: at 05:19

## 2020-07-11 RX ADMIN — Medication: at 17:53

## 2020-07-11 RX ADMIN — HEPARIN SODIUM 5000 UNITS: 5000 INJECTION INTRAVENOUS; SUBCUTANEOUS at 05:19

## 2020-07-11 RX ADMIN — DEXTROSE MONOHYDRATE, SODIUM CHLORIDE, AND POTASSIUM CHLORIDE 75 ML/HR: 50; 4.5; .745 INJECTION, SOLUTION INTRAVENOUS at 09:14

## 2020-07-11 RX ADMIN — ASPIRIN 325 MG ORAL TABLET 325 MG: 325 PILL ORAL at 09:14

## 2020-07-11 NOTE — PROGRESS NOTES
Problem: Falls - Risk of  Goal: *Absence of Falls  Description: Document Padilla Escobedo Fall Risk and appropriate interventions in the flowsheet. Outcome: Progressing Towards Goal  Note: Fall Risk Interventions:       Mentation Interventions: Adequate sleep, hydration, pain control, Evaluate medications/consider consulting pharmacy    Medication Interventions: Bed/chair exit alarm, Evaluate medications/consider consulting pharmacy    Elimination Interventions: Bed/chair exit alarm, Toileting schedule/hourly rounds    History of Falls Interventions: Bed/chair exit alarm, Door open when patient unattended, Evaluate medications/consider consulting pharmacy         Problem: Pressure Injury - Risk of  Goal: *Prevention of pressure injury  Description: Document Howard Scale and appropriate interventions in the flowsheet. Outcome: Progressing Towards Goal  Note: Pressure Injury Interventions:  Sensory Interventions: Assess changes in LOC, Keep linens dry and wrinkle-free, Maintain/enhance activity level, Minimize linen layers, Pressure redistribution bed/mattress (bed type), Turn and reposition approx.  every two hours (pillows and wedges if needed), Use 30-degree side-lying position    Moisture Interventions: Absorbent underpads, Apply protective barrier, creams and emollients, Internal/External urinary devices, Check for incontinence Q2 hours and as needed, Minimize layers, Maintain skin hydration (lotion/cream)    Activity Interventions: Pressure redistribution bed/mattress(bed type), PT/OT evaluation    Mobility Interventions: HOB 30 degrees or less, Pressure redistribution bed/mattress (bed type), PT/OT evaluation    Nutrition Interventions: Document food/fluid/supplement intake    Friction and Shear Interventions: Lift team/patient mobility team, Apply protective barrier, creams and emollients

## 2020-07-11 NOTE — PROGRESS NOTES
Problem: Falls - Risk of  Goal: *Absence of Falls  Description: Document Carrie Christiano Fall Risk and appropriate interventions in the flowsheet. Outcome: Progressing Towards Goal  Note: Fall Risk Interventions:       Mentation Interventions: Door open when patient unattended    Medication Interventions: Bed/chair exit alarm, Evaluate medications/consider consulting pharmacy    Elimination Interventions: Toileting schedule/hourly rounds    History of Falls Interventions: Bed/chair exit alarm, Door open when patient unattended, Evaluate medications/consider consulting pharmacy         Problem: Patient Education: Go to Patient Education Activity  Goal: Patient/Family Education  Outcome: Progressing Towards Goal     Problem: Pressure Injury - Risk of  Goal: *Prevention of pressure injury  Description: Document Howard Scale and appropriate interventions in the flowsheet. Outcome: Progressing Towards Goal  Note: Pressure Injury Interventions:  Sensory Interventions: Assess changes in LOC, Check visual cues for pain, Keep linens dry and wrinkle-free, Maintain/enhance activity level, Minimize linen layers, Monitor skin under medical devices, Pressure redistribution bed/mattress (bed type), Turn and reposition approx.  every two hours (pillows and wedges if needed)    Moisture Interventions: Absorbent underpads, Apply protective barrier, creams and emollients, Check for incontinence Q2 hours and as needed, Internal/External urinary devices, Maintain skin hydration (lotion/cream), Moisture barrier    Activity Interventions: Pressure redistribution bed/mattress(bed type)    Mobility Interventions: HOB 30 degrees or less    Nutrition Interventions: Document food/fluid/supplement intake    Friction and Shear Interventions: Lift team/patient mobility team, Apply protective barrier, creams and emollients                Problem: Patient Education: Go to Patient Education Activity  Goal: Patient/Family Education  Outcome: Progressing Towards Goal     Problem: Patient Education: Go to Patient Education Activity  Goal: Patient/Family Education  Outcome: Progressing Towards Goal     Problem: Patient Education: Go to Patient Education Activity  Goal: Patient/Family Education  Outcome: Progressing Towards Goal     Problem: Patient Education: Go to Patient Education Activity  Goal: Patient/Family Education  Outcome: Progressing Towards Goal     Problem: Patient Education: Go to Patient Education Activity  Goal: Patient/Family Education  Outcome: Progressing Towards Goal     Problem: TIA/CVA Stroke: 0-24 hours  Goal: Off Pathway (Use only if patient is Off Pathway)  Outcome: Progressing Towards Goal  Goal: Activity/Safety  Outcome: Progressing Towards Goal  Goal: Consults, if ordered  Outcome: Progressing Towards Goal  Goal: Diagnostic Test/Procedures  Outcome: Progressing Towards Goal  Goal: Nutrition/Diet  Outcome: Progressing Towards Goal  Goal: Discharge Planning  Outcome: Progressing Towards Goal  Goal: Medications  Outcome: Progressing Towards Goal  Goal: Respiratory  Outcome: Progressing Towards Goal  Goal: Treatments/Interventions/Procedures  Outcome: Progressing Towards Goal  Goal: Minimize risk of bleeding post-thrombolytic infusion  Outcome: Progressing Towards Goal  Goal: Monitor for complications post-thrombolytic infusion  Outcome: Progressing Towards Goal  Goal: Psychosocial  Outcome: Progressing Towards Goal  Goal: *Hemodynamically stable  Outcome: Progressing Towards Goal  Goal: *Neurologically stable  Description: Absence of additional neurological deficits    Outcome: Progressing Towards Goal  Goal: *Verbalizes anxiety and depression are reduced or absent  Outcome: Progressing Towards Goal  Goal: *Absence of Signs of Aspiration on Current Diet  Outcome: Progressing Towards Goal  Goal: *Absence of deep venous thrombosis signs and symptoms(Stroke Metric)  Outcome: Progressing Towards Goal  Goal: *Ability to perform ADLs and demonstrates progressive mobility and function  Outcome: Progressing Towards Goal  Goal: *Stroke education started(Stroke Metric)  Outcome: Progressing Towards Goal  Goal: *Dysphagia screen performed(Stroke Metric)  Outcome: Progressing Towards Goal  Goal: *Rehab consulted(Stroke Metric)  Outcome: Progressing Towards Goal     Problem: TIA/CVA Stroke: Day 2 Until Discharge  Goal: Off Pathway (Use only if patient is Off Pathway)  Outcome: Progressing Towards Goal  Goal: Activity/Safety  Outcome: Progressing Towards Goal  Goal: Diagnostic Test/Procedures  Outcome: Progressing Towards Goal  Goal: Nutrition/Diet  Outcome: Progressing Towards Goal  Goal: Discharge Planning  Outcome: Progressing Towards Goal  Goal: Medications  Outcome: Progressing Towards Goal  Goal: Respiratory  Outcome: Progressing Towards Goal  Goal: Treatments/Interventions/Procedures  Outcome: Progressing Towards Goal  Goal: Psychosocial  Outcome: Progressing Towards Goal  Goal: *Verbalizes anxiety and depression are reduced or absent  Outcome: Progressing Towards Goal  Goal: *Absence of aspiration  Outcome: Progressing Towards Goal  Goal: *Absence of deep venous thrombosis signs and symptoms(Stroke Metric)  Outcome: Progressing Towards Goal  Goal: *Optimal pain control at patient's stated goal  Outcome: Progressing Towards Goal  Goal: *Tolerating diet  Outcome: Progressing Towards Goal  Goal: *Ability to perform ADLs and demonstrates progressive mobility and function  Outcome: Progressing Towards Goal  Goal: *Stroke education continued(Stroke Metric)  Outcome: Progressing Towards Goal     Problem: Ischemic Stroke: Discharge Outcomes  Goal: *Verbalizes anxiety and depression are reduced or absent  Outcome: Progressing Towards Goal  Goal: *Verbalize understanding of risk factor modification(Stroke Metric)  Outcome: Progressing Towards Goal  Goal: *Hemodynamically stable  Outcome: Progressing Towards Goal  Goal: *Absence of aspiration pneumonia  Outcome: Progressing Towards Goal  Goal: *Aware of needed dietary changes  Outcome: Progressing Towards Goal  Goal: *Verbalize understanding of prescribed medications including anti-coagulants, anti-lipid, and/or anti-platelets(Stroke Metric)  Outcome: Progressing Towards Goal  Goal: *Tolerating diet  Outcome: Progressing Towards Goal  Goal: *Aware of follow-up diagnostics related to anticoagulants  Outcome: Progressing Towards Goal  Goal: *Ability to perform ADLs and demonstrates progressive mobility and function  Outcome: Progressing Towards Goal  Goal: *Absence of DVT(Stroke Metric)  Outcome: Progressing Towards Goal  Goal: *Absence of aspiration  Outcome: Progressing Towards Goal  Goal: *Optimal pain control at patient's stated goal  Outcome: Progressing Towards Goal  Goal: *Home safety concerns addressed  Outcome: Progressing Towards Goal  Goal: *Describes available resources and support systems  Outcome: Progressing Towards Goal  Goal: *Verbalizes understanding of activation of EMS(911) for stroke symptoms(Stroke Metric)  Outcome: Progressing Towards Goal  Goal: *Understands and describes signs and symptoms to report to providers(Stroke Metric)  Outcome: Progressing Towards Goal  Goal: *Neurolgocially stable (absence of additional neurological deficits)  Outcome: Progressing Towards Goal  Goal: *Verbalizes importance of follow-up with primary care physician(Stroke Metric)  Outcome: Progressing Towards Goal  Goal: *Smoking cessation discussed,if applicable(Stroke Metric)  Outcome: Progressing Towards Goal  Goal: *Depression screening completed(Stroke Metric)  Outcome: Progressing Towards Goal

## 2020-07-11 NOTE — CONSULTS
PSYCHIATRY CONSULT NOTE:    REASON FOR CONSULT:  capacity  Following CVA    HISTORY OF PRESENTING COMPLAINT:  Matthew Toscano is a 78 y.o. female admitted to neurology unit following a CVA. She is alert and oriented, she is able to state why she is in the hospital. She is pleasant upon presentation, mood is calm, affect is congruent. Her speech is slow with a low tone. It is difficult to understand what she is saying at times. However, she reports that she is wanting to return home with her  when she is medically cleared.        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     Carpal tunnel syndrome 2/8/2011    Degenerative Joint Disese ( improved/resolving) 2/8/2011    Degenetrative Dis Disease, 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    Diverticulosis of colon (without mention of hemorrhage) 2/8/2011    Dysphagia 2/8/2011    Edema, peripheral 2/8/2011    GERD (gastroesophageal reflux disease)     GERD Gastro- Esophageal Reflux Disease 2/8/2011    Herniated nucleus pulposus ( slipped disc) 2/8/2011    Menopausal 2/8/2011    PUD (peptic ulcer disease) 2012    Pure hypercholesterolemia 2/8/2011    Recurrent ear pain 2/8/2011    S/P radiation therapy     Scalp lesion 10/23/2014    Sebaceous cyst 4/22/2014    Unspecified cataract 2/8/2011    Unspecified essential hypertension 2/8/2011    Unspecified sleep apnea        SOCIAL HISTORY:  Illicit drugs: no history of illicit drug use    VITALS:  Visit Vitals  /69 (BP 1 Location: Right arm, BP Patient Position: At rest)   Pulse 71   Temp 99.2 °F (37.3 °C)   Resp 18   Ht 5' 2\" (1.575 m)   Wt 68.9 kg (152 lb)   SpO2 99%   Breastfeeding No   BMI 27.80 kg/m²       MEDICATIONS:    Current Facility-Administered Medications:     aspirin tablet 325 mg, 325 mg, PEG Tube, DAILY, Marbin Saul MD, 325 mg at 07/10/20 0900    sodium chloride (NS) flush 5-40 mL, 5-40 mL, IntraVENous, Q8H, Theresa Wiggins MD, 10 mL at 07/10/20 0603    sodium chloride (NS) flush 5-40 mL, 5-40 mL, IntraVENous, PRN, Maxwell Mondragon MD    ammonium lactate (LAC-HYDRIN) 12 % lotion, , Topical, BID, Dallas Vidal MD    dextrose 5% - 0.45% NaCl with KCl 10 mEq/L infusion, 75 mL/hr, IntraVENous, CONTINUOUS, Arnol BENSON MD, Last Rate: 75 mL/hr at 07/10/20 1801, 75 mL/hr at 07/10/20 1801    acetaminophen (TYLENOL) tablet 650 mg, 650 mg, Oral, Q4H PRN **OR** acetaminophen (TYLENOL) solution 650 mg, 650 mg, Per NG tube, Q4H PRN **OR** acetaminophen (TYLENOL) suppository 650 mg, 650 mg, Rectal, Q4H PRN, Dallas Vidal MD    ondansetron [facility]) injection 4 mg, 4 mg, IntraVENous, Q6H PRN, Dallas Vidal MD    atorvastatin (LIPITOR) tablet 40 mg, 40 mg, Oral, QHS, Dallas Vidal MD, 40 mg at 07/09/20 2215    labetaloL (NORMODYNE;TRANDATE) injection 5 mg, 5 mg, IntraVENous, Q10MIN PRN, Dallas Vidal MD    heparin (porcine) injection 5,000 Units, 5,000 Units, SubCUTAneous, Q8H, Dallas Vidal MD, 5,000 Units at 07/10/20 1448    MENTAL STATUS EXAM:  Calm and Congruent  Oriented in all spheres  Alert and oriented to all spheres  Logical and Within normal limits    ASSESSMENT AND PLAN:  See above, No diagnosis , Problems related to social environment and Occupational problems  and 71-80 if symptoms are present, they are transient and expectable reactions to stressors    Impression:   It is this writers opinion that [facility] has the mental capacity to make decisions involving her health care.      Thank you for this consult      BOSTON Miller  7/10/2020

## 2020-07-11 NOTE — PROGRESS NOTES
Neurology Note    Patient ID:  Bailey Altamirano  [id number]  36 y.o.  1940      Date of Consultation:  July 11, 2020     Subjective: I am weak       History of Present Illness: Bailey Altamirano is a 78 y.o. female who was admitted with acute onset of left sided weakness. Found to have a large right mca stroke. She has been followed by Dr. Kath Lowry during the hospitalization. No new numbness, tingling, or weakness overnight. This am, the patient has no complaints.     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    Diverticulosis of colon (without mention of hemorrhage) 2/8/2011    Dysphagia 2/8/2011    Edema, peripheral 2/8/2011    GERD (gastroesophageal reflux disease)     GERD Gastro- Esophageal Reflux Disease 2/8/2011    Herniated nucleus pulposus ( slipped disc) 2/8/2011    Menopausal 2/8/2011    PUD (peptic ulcer disease) 2012    Pure hypercholesterolemia 2/8/2011    Recurrent ear pain 2/8/2011    S/P radiation therapy     Scalp lesion 10/23/2014    Sebaceous cyst 4/22/2014    Unspecified cataract 2/8/2011    Unspecified essential hypertension 2/8/2011    Unspecified sleep apnea         Past Surgical History:   Procedure Laterality Date    HX HYSTERECTOMY      HX ORTHOPAEDIC  neck/back 2006    HX OTHER SURGICAL  5/8/2014     Excise recurrent scalp lesion and application of ACell    HX OTHER SURGICAL  5/8/2014    Excise keloid, anterior chest wall, and application of ACell    HX OTHER SURGICAL  5/8/2014     Excise sebaceous cyst, anterior chest wall    HX OTHER SURGICAL  5/8/2014    Punch biopsy, skin lesions, right forearm x2, right calf x1    PLACE PERCUT GASTROSTOMY TUBE  7/8/2020         RI ICAR CATHETER ABLATION ATRIOVENTR NODE FUNCTION N/A 5/6/2019    ABLATION AV NODE performed by Corinne Nieves MD at [address], [facility] [address] CATH LAB    RI INS NEW/RPLCMT PRM PM W/TRANSV ELTRD ATRIAL&VENT  9/30/2017         RI RMVL IMPLANTABLE PT-ACTIVATED CAR EVENT RECORDER  9/30/2017             Family History   Problem Relation Age of Onset    Hypertension Mother     Stroke Mother     Hypertension Father     Cancer Father         PROSTATE, MELANOMA    Anesth Problems Neg Hx         Social History     Tobacco Use    Smoking status: Never Smoker    Smokeless tobacco: Never Used   Substance Use Topics    Alcohol use: No        Allergies   Allergen Reactions    Latex Rash    Bactrim [Sulfamethoprim Ds] Hives    Aspirin Other (comments)     Anything higher than 81mg makes pt jittery    Codeine Hives and Itching    Iodinated Contrast Media Itching    Pcn [Penicillins] Hives and Itching    Tape [Adhesive] Rash        Prior to Admission medications    Medication Sig Start Date End Date Taking? Authorizing Provider   metoprolol succinate (TOPROL-XL) 25 mg XL tablet TAKE 1 TABLET BY MOUTH EVERY DAY 6/29/20   Ariel Chan MD   cyclobenzaprine (FLEXERIL) 10 mg tablet Take 1 Tab by mouth three (3) times daily as needed for Muscle Spasm(s). 5/13/20   Ariel hCan MD   meclizine (ANTIVERT) 25 mg tablet TAKE 1 TABLET THREE TIMES DAILY AS NEEDED 3/22/20   Ariel Chan MD   rivaroxaban (Xarelto) 15 mg tab tablet Take 1 Tab by mouth daily (with dinner). 3/12/20   Ariel Chan MD   gabapentin (NEURONTIN) 300 mg capsule Take 1 Cap by mouth three (3) times daily. 3/12/20   Ariel Chan MD   amLODIPine (NORVASC) 5 mg tablet Take 1 Tab by mouth daily.  3/12/20 3/12/21  Ariel Chan MD   potassium chloride SR (KLOR-CON 10) 10 mEq tablet TAKE 1 TABLET EVERY DAY 3/12/20   Ariel Chan MD   omeprazole (PRILOSEC) 40 mg capsule TAKE 1 CAPSULE EVERY DAY 3/12/20   Maryam Gamez MD   fluticasone propionate (FLONASE) 50 mcg/actuation nasal spray 2 Sprays by Both Nostrils route daily as needed for Rhinitis. Provider, Historical   acetaminophen (TYLENOL) 325 mg tablet Take 650 mg by mouth nightly. Provider, Historical   MULTIVIT WITH CALCIUM,IRON,MIN [facility] MULTIPLE VITAMINS PO) Take 1 Tab by mouth daily. Provider, Historical   aspirin delayed-release 81 mg tablet Take 81 mg by mouth daily. Provider, Historical       Review of Systems:      [x]Unable to obtain  ROS due to  [x]mental status change  []sedated   []intubated    Objective:     Visit Vitals  /75   Pulse 70   Temp 98.5 °F (36.9 °C)   Resp 18   Ht 5' 2\" (1.575 m)   Wt 152 lb (68.9 kg)   SpO2 100%   Breastfeeding No   BMI 27.80 kg/m²       Physical Exam:    General:  appears well nourished in no acute distress  Neck: no carotid bruits  Lungs: clear to auscultation  Heart:  no murmurs, regular rate  Lower extremity: peripheral pulses palpable and no edema. Skin changed noted    Neurological exam:    Awake, alert, oriented to person, place. She will follow simple one step commands (stick out tongue, close eyes)   Decreased attention. Mild dysarthria. Fund of knowledge was limited    Cranial nerves:   II-XII were tested    Perrrla  Right gaze preference. Does have have full lateral eye movements to the left. Facial sensation:  normal and symmetric  Facial motor: left facial droop  Hearing intact  Tongue: midline without fasciculations    Motor:   Decreased on the left. No evidence of fasciculations    No movement in the left upper and lower extremity. At least a 4/5 in the right upper and lower extremity    Sensory:  Decreased sensation on the left to pp. Does have mild residual neglect. Reflexes:    Right Left  Biceps  1 2  Triceps 1 2  Brachiorad.  1 2  Patella  1 2  Achilles 1 1    Plantar response:  Extensor on the left    Labs:     Lab Results   Component Value Date/Time    Hemoglobin A1c 5.7 (H) 07/04/2020 05:43 AM    Sodium 138 07/07/2020 02:30 AM    Potassium 3.2 (L) 07/07/2020 02:30 AM    Chloride 105 07/07/2020 02:30 AM    Glucose 99 07/07/2020 02:30 AM    BUN 10 07/07/2020 02:30 AM    Creatinine 0.74 07/07/2020 02:30 AM    Calcium 8.7 07/07/2020 02:30 AM    WBC 6.9 07/07/2020 02:30 AM    HCT 38.4 07/07/2020 02:30 AM    HGB 12.1 07/07/2020 02:30 AM    PLATELET 066 10/37/5438 02:30 AM       Imaging:    Results from Hospital Encounter encounter on 07/03/20   MRI BRAIN WO CONT    Narrative EXAM: MRI BRAIN WO CONT    INDICATION: CVA    COMPARISON: CT head 7/5/2020, CTA head and neck 7/3/2020. CONTRAST: None. TECHNIQUE:    Multiplanar multisequence acquisition without contrast of the brain. FINDINGS:  Evaluation is limited by motion. Redemonstrated acute large right MCA territory infarct involving nearly the  entirety of the right MCA territory, including the right frontal, parietal,  temporal lobes, insula, and basal ganglia. Edema and mass effect from the large  infarction results in partial effacement of the right lateral ventricle with  trace 2 mm of leftward midline shift. Few scattered foci of petechial hemorrhage  are noted within the infarcted territory. There are additional small scattered periventricular deep white matter T2/FLAIR  hyperintensities, consistent with mild chronic microvascular ischemic disease. There is a small chronic microhemorrhage in the left cerebellum. There is no  cerebellar tonsillar herniation. There is loss of the normal cervical through  cavernous right internal carotid artery flow-void. The paranasal sinuses, mastoid air cells, and middle ears are clear. The orbital  contents are within normal limits with right lens implants. Unchanged large  bifrontal vertex scalp defect. Partially visualized cervical spinal fusion  hardware. Impression IMPRESSION:     1.  Redemonstrated acute large right MCA territory infarct involving nearly the  entirety of the right MCA territory. Edema and mass effect from the infarct  results in partial effacement of the right lateral ventricle and trace 2 mm of  leftward midline shift. 2. Loss of the normal right internal carotid artery flow-void as above,  consistent with occlusion as seen on prior CTA. 23X           Results from [place] encounter on 07/03/20   CT HEAD WO CONT    Narrative EXAM: CT HEAD WO CONT    INDICATION: R/o Hemorrhagic transformation of MCA stroke, AMS    COMPARISON: 7/3/2020. CONTRAST: None. TECHNIQUE: Unenhanced CT of the head was performed using 5 mm images. Brain and  bone windows were generated. Coronal and sagittal reformats. CT dose reduction  was achieved through use of a standardized protocol tailored for this  examination and automatic exposure control for dose modulation. FINDINGS:  There is a large infarct in the distribution of the right MCA. No evidence of  hemorrhagic transformation. There is no intracranial hemorrhage, extra-axial  collection, or mass effect. The basilar cisterns are open. The bone windows demonstrate no abnormalities. The visualized portions of the  paranasal sinuses and mastoid air cells are clear. Impression IMPRESSION:   Evolving large infarct in the right MCA distribution. No evidence of hemorrhagic  transformation. Assessment and Plan:    The patient is a pleasant 78year old female with acute right mca stroke. Neurological examination with left hemiplegia, right gaze preference. Acute stroke:     Risk factors:  Afib, htn, dyslipidemia. On asa for time being. Plan per Dr. Zaid Rizzo was to restart eliquis on July 17th due to the size of the stroke. Continue statin therapy. Will need continue aggressive pt and ot. Afib:   On rate control. The patient will need follow up in the neurology clinic with Dr. Zaid Rizzo 3-4 weeks after discharge.   If any additional questions arise during the hospitalization, please do not hesitate to contact me. Active Problems:    * No active hospital problems.  *                 Signed By:  Carlos Díaz DO FAAN    July 11, 2020

## 2020-07-11 NOTE — PROGRESS NOTES
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 No hemorrhage is identified  CTA  H&N:  1. 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 2.  Large core infarct in the right MCA distribution with a 55 cc ischemic  penumbra. Ischemic changes on delayed postcontrast head CT without evidence of hemorrhage. LDL= 93/A1c= 5.7/Trop neg  large on initial CT (catastrophic), Evolving on repeat CT ()    Patient was not a TPA candidate and neuro interventionalist did not recc any interventions  Complete the stroke work-up of the brain without contrast when able- pt has compatible PPM?  2D echo -EF 45-61%, severe Diastolic Dysfunction noted  Cont rectal aspirin, and statin when able to take p.o.- currently strict NPO per SLP eval on saturday  MBS 2020 with Severe oropharyngeal dysphagia  Discussed with family and patient's PCP and they agreed for PEG tube. S/p PEG tube . Will start feeding  IV labetalol prn  Pt is at a risk for hemorrhagic conversion of this large stroke , repeat CT = no bleed noted  Neuro consult noted-Eliquis to start on . IP Palliative care consulted - catastrophic large CVA with severe Dysphagia  Patient is ready to be discharged to [facility] Rehab whenever bed is available.  COVID-19 test negative (was ordered due to IPR policy)  Clinically improving today     Hypertension  Hold all BP meds for now, resume as needed now    Arrhythmia  Xarelto on her PTA list, not sure if she is taking it. Son is not sure too  She will be on eliquis before discharge       Code Status: Full code confirmed in ER with the daughter and the son who is a power of   Surrogate Decision Maker:  Mitzi Neumann [phone]         DVT Prophylaxis: Heparin  GI Prophylaxis: not indicated     Baseline: Walk with a walker    Recommended Disposition: SNF/LTC medically stable to be discharged to SNF whenever bed is available     Subjective:     Chief Complaint / Reason for Physician Visit :F/u Large R MCA CVA, dysphagia,   Acute right MCA stroke/dysphagia     Discussed with RN events overnight. \" I am doing all right\"    Review of Systems:  Symptom Y/N Comments  Symptom Y/N Comments   Fever/Chills n   Chest Pain n    Poor Appetite    Edema     Cough n   Abdominal Pain n    Sputum    Joint Pain     SOB/FRANK n   Pruritis/Rash     Nausea/vomit n   Tolerating PT/OT     Diarrhea    Tolerating Diet     Constipation    Other       Could NOT obtain due to:          Objective:     VITALS:   Last 24hrs VS reviewed since prior progress note. Most recent are:  Patient Vitals for the past 24 hrs:   Temp Pulse Resp BP SpO2   07/11/20 1119 98.5 °F (36.9 °C) 70 18 142/75 100 %   07/11/20 0653 99 °F (37.2 °C) 66 18 157/69 100 %   07/11/20 0436 98.7 °F (37.1 °C) 78 18 172/76 100 %   07/10/20 2352 -- -- -- 169/89 --   07/10/20 2351 98.4 °F (36.9 °C) 76 18 189/84 97 %   07/10/20 1932 99.2 °F (37.3 °C) 71 18 130/69 99 %   07/10/20 1458 99 °F (37.2 °C) 70 18 166/74 99 %   07/10/20 1431 97.2 °F (36.2 °C) 72 18 144/66 98 %   07/10/20 1226 98 °F (36.7 °C) 73 18 180/86 99 %       Intake/Output Summary (Last 24 hours) at 7/11/2020 1127  Last data filed at 7/11/2020 1006  Gross per 24 hour   Intake --   Output 200 ml   Net -200 ml        PHYSICAL EXAM:  General: WD, WN.more awake today, no acute distress    EENT:  EOMI. Anicteric sclerae. MMM  Resp:  CTA bilaterally, no wheezing or rales.   No accessory muscle use  CV:  Regular  rhythm,  No edema  GI:  Soft, Non distended, Non tender.  +Bowel sounds  Neurologic:  A&O x3, Left side flaccid, right side 5/5 strength, left facial droop  Psych:   Good insight  Skin:  No rashes. No jaundice    Reviewed most current lab test results and cultures  YES  Reviewed most current radiology test results   YES  Review and summation of old records today    NO  Reviewed patient's current orders and MAR    YES  PMH/SH reviewed - no change compared to H&P  ________________________________________________________________________  Care Plan discussed with:    Comments   Patient x    Family  x  I talked to the son at bedside on July 10   RN x    Care Manager     Consultant                        Multidiciplinary team rounds were held today with , nursing, pharmacist and clinical coordinator. Patient's plan of care was discussed; medications were reviewed and discharge planning was addressed. ________________________________________________________________________        Comments   >50% of visit spent in counseling and coordination of care     ________________________________________________________________________  Ag Natarajan MD     Procedures: see electronic medical records for all procedures/Xrays and details which were not copied into this note but were reviewed prior to creation of Plan. LABS:  I reviewed today's most current labs and imaging studies. Pertinent labs include:  No results for input(s): WBC, HGB, HCT, PLT, HGBEXT, HCTEXT, PLTEXT, HGBEXT, HCTEXT, PLTEXT in the last 72 hours. No results for input(s): NA, K, CL, CO2, GLU, BUN, CREA, CA, MG, PHOS, ALB, TBIL, TBILI, ALT, INR, INREXT, INREXT in the last 72 hours.     No lab exists for component: SGOT    Signed: Ag Natarajan MD

## 2020-07-12 PROCEDURE — 74011250637 HC RX REV CODE- 250/637: Performed by: INTERNAL MEDICINE

## 2020-07-12 PROCEDURE — 65660000000 HC RM CCU STEPDOWN

## 2020-07-12 PROCEDURE — 94760 N-INVAS EAR/PLS OXIMETRY 1: CPT

## 2020-07-12 PROCEDURE — 74011250636 HC RX REV CODE- 250/636: Performed by: INTERNAL MEDICINE

## 2020-07-12 PROCEDURE — 74011250637 HC RX REV CODE- 250/637: Performed by: PSYCHIATRY & NEUROLOGY

## 2020-07-12 PROCEDURE — 77030018798 HC PMP KT ENTRL FED COVD -A

## 2020-07-12 PROCEDURE — 77030038269 HC DRN EXT URIN PURWCK BARD -A

## 2020-07-12 RX ADMIN — Medication 10 ML: at 22:34

## 2020-07-12 RX ADMIN — HEPARIN SODIUM 5000 UNITS: 5000 INJECTION INTRAVENOUS; SUBCUTANEOUS at 22:31

## 2020-07-12 RX ADMIN — Medication 10 ML: at 05:12

## 2020-07-12 RX ADMIN — Medication: at 09:33

## 2020-07-12 RX ADMIN — DEXTROSE MONOHYDRATE, SODIUM CHLORIDE, AND POTASSIUM CHLORIDE 75 ML/HR: 50; 4.5; .745 INJECTION, SOLUTION INTRAVENOUS at 17:55

## 2020-07-12 RX ADMIN — ASPIRIN 325 MG ORAL TABLET 325 MG: 325 PILL ORAL at 09:32

## 2020-07-12 RX ADMIN — HEPARIN SODIUM 5000 UNITS: 5000 INJECTION INTRAVENOUS; SUBCUTANEOUS at 16:09

## 2020-07-12 RX ADMIN — Medication: at 22:29

## 2020-07-12 RX ADMIN — ATORVASTATIN CALCIUM 40 MG: 40 TABLET, FILM COATED ORAL at 22:30

## 2020-07-12 RX ADMIN — HEPARIN SODIUM 5000 UNITS: 5000 INJECTION INTRAVENOUS; SUBCUTANEOUS at 05:11

## 2020-07-12 RX ADMIN — ACETAMINOPHEN 650 MG: 325 TABLET ORAL at 04:01

## 2020-07-12 NOTE — PROGRESS NOTES
Tolerated shift well, No changes from beginning of shift assessment. Vitals stable as charted. Remained AV Dual Paced on tele monitor, rate between 70-78 bpm.. No complaints at this time, no acute distress noted. Bed locked & lowered, call light in reach. Will pass on report to upcoming shift.

## 2020-07-12 NOTE — PROGRESS NOTES
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 No hemorrhage is identified  CTA  H&N:  1. Occluded right internal carotid artery from the carotid bifurcation to the  [place] and further intraluminal thrombus at the right MCA bifurcation. 2.  Large core infarct in the right MCA distribution with a 55 cc ischemic  penumbra. Ischemic changes on delayed postcontrast head CT without evidence of hemorrhage. LDL= 93/A1c= 5.7/Trop neg  large on initial CT (catastrophic), Evolving on repeat CT ()    Patient was not a TPA candidate and neuro interventionalist did not recc any interventions  Complete the stroke work-up of the brain without contrast when able- pt has compatible PPM?  2D echo -EF 35-22%, severe Diastolic Dysfunction noted  Cont rectal aspirin, and statin when able to take p.o.- currently strict NPO per SLP eval on saturday  MBS 2020 with Severe oropharyngeal dysphagia  Discussed with family and patient's PCP and they agreed for PEG tube. S/p PEG tube . Will start feeding  IV labetalol prn  Pt is at a risk for hemorrhagic conversion of this large stroke , repeat CT = no bleed noted  Neuro consult noted-Eliquis to start on . IP Palliative care consulted - catastrophic large CVA with severe Dysphagia  Patient is ready to be discharged to IP Rehab whenever bed is available.  COVID-19 test negative (was ordered due to IPR policy)  Clinically improving today     Hypertension  Hold all BP meds for now, resume as needed now    Arrhythmia  Xarelto on her PTA list, not sure if she is taking it. Son is not sure too  She will be on eliquis before discharge       Code Status: Full code confirmed in ER with the daughter and the son who is a power of   Surrogate Decision Maker:  David Camargo [phone]         DVT Prophylaxis: Heparin  GI Prophylaxis: not indicated     Baseline: Walk with a walker    Recommended Disposition: SNF/LTC medically stable to be discharged to inpatient rehab whenever bed is available     Subjective:     Chief Complaint / Reason for Physician Visit :F/u Large R MCA CVA, dysphagia,   Acute right MCA stroke/dysphagia     Discussed with RN events overnight. \" I think I am ready to go to the facility but I have no choice\"    No change in the last 24 hours, patient was awaiting placement to inpatient rehab    Review of Systems:  Symptom Y/N Comments  Symptom Y/N Comments   Fever/Chills n   Chest Pain n    Poor Appetite    Edema     Cough n   Abdominal Pain n    Sputum    Joint Pain     SOB/FRANK n   Pruritis/Rash     Nausea/vomit n   Tolerating PT/OT     Diarrhea    Tolerating Diet     Constipation    Other       Could NOT obtain due to:          Objective:     VITALS:   Last 24hrs VS reviewed since prior progress note. Most recent are:  Patient Vitals for the past 24 hrs:   Temp Pulse Resp BP SpO2   07/12/20 1156 98 °F (36.7 °C) 70 18 (!) 159/93 99 %   07/12/20 0645 98 °F (36.7 °C) 89 20 162/80 100 %   07/12/20 0354 98.4 °F (36.9 °C) 76 18 (!) 196/91 100 %   07/11/20 2254 98.4 °F (36.9 °C) 74 20 (!) 212/95 99 %   07/11/20 1906 98.1 °F (36.7 °C) 75 16 176/61 100 %   07/11/20 1531 98.5 °F (36.9 °C) 71 18 162/62 96 %       Intake/Output Summary (Last 24 hours) at 7/12/2020 1206  Last data filed at 7/11/2020 1945  Gross per 24 hour   Intake 356 ml   Output 1700 ml   Net -1344 ml        PHYSICAL EXAM:  General: WD, WN.more awake today, no acute distress    EENT:  EOMI. Anicteric sclerae. MMM  Resp:  CTA bilaterally, no wheezing or rales.   No accessory muscle use  CV:  Regular  rhythm,  No edema  GI:  Soft, Non distended, Non tender.  +Bowel sounds  Neurologic:  A&O x3, Left side flaccid, right side 5/5 strength, left facial droop  Psych:   Good insight  Skin:  No rashes. No jaundice    Reviewed most current lab test results and cultures  YES  Reviewed most current radiology test results   YES  Review and summation of old records today    NO  Reviewed patient's current orders and MAR    YES  PMH/SH reviewed - no change compared to H&P  ________________________________________________________________________  Care Plan discussed with:    Comments   Patient x    Family      RN x    Care Manager     Consultant                        Multidiciplinary team rounds were held today with , nursing, pharmacist and clinical coordinator. Patient's plan of care was discussed; medications were reviewed and discharge planning was addressed. ________________________________________________________________________        Comments   >50% of visit spent in counseling and coordination of care     ________________________________________________________________________  Arun Ruiz MD     Procedures: see electronic medical records for all procedures/Xrays and details which were not copied into this note but were reviewed prior to creation of Plan. LABS:  I reviewed today's most current labs and imaging studies. Pertinent labs include:  No results for input(s): WBC, HGB, HCT, PLT, HGBEXT, HCTEXT, PLTEXT, HGBEXT, HCTEXT, PLTEXT in the last 72 hours. No results for input(s): NA, K, CL, CO2, GLU, BUN, CREA, CA, MG, PHOS, ALB, TBIL, TBILI, ALT, INR, INREXT, INREXT in the last 72 hours.     No lab exists for component: SGOT    Signed: Arun Ruiz MD

## 2020-07-13 PROCEDURE — 97535 SELF CARE MNGMENT TRAINING: CPT

## 2020-07-13 PROCEDURE — 74011250636 HC RX REV CODE- 250/636: Performed by: INTERNAL MEDICINE

## 2020-07-13 PROCEDURE — 74011250637 HC RX REV CODE- 250/637: Performed by: INTERNAL MEDICINE

## 2020-07-13 PROCEDURE — 92526 ORAL FUNCTION THERAPY: CPT

## 2020-07-13 PROCEDURE — 97112 NEUROMUSCULAR REEDUCATION: CPT

## 2020-07-13 PROCEDURE — 77030018798 HC PMP KT ENTRL FED COVD -A

## 2020-07-13 PROCEDURE — 77030038269 HC DRN EXT URIN PURWCK BARD -A

## 2020-07-13 PROCEDURE — 92507 TX SP LANG VOICE COMM INDIV: CPT

## 2020-07-13 PROCEDURE — 97530 THERAPEUTIC ACTIVITIES: CPT | Performed by: PHYSICAL THERAPIST

## 2020-07-13 PROCEDURE — 65660000000 HC RM CCU STEPDOWN

## 2020-07-13 PROCEDURE — 97112 NEUROMUSCULAR REEDUCATION: CPT | Performed by: PHYSICAL THERAPIST

## 2020-07-13 PROCEDURE — 94760 N-INVAS EAR/PLS OXIMETRY 1: CPT

## 2020-07-13 PROCEDURE — 74011250637 HC RX REV CODE- 250/637: Performed by: PSYCHIATRY & NEUROLOGY

## 2020-07-13 PROCEDURE — 87635 SARS-COV-2 COVID-19 AMP PRB: CPT

## 2020-07-13 PROCEDURE — 97530 THERAPEUTIC ACTIVITIES: CPT

## 2020-07-13 RX ORDER — AMLODIPINE BESYLATE 5 MG/1
5 TABLET ORAL DAILY
Status: DISCONTINUED | OUTPATIENT
Start: 2020-07-14 | End: 2020-07-15 | Stop reason: HOSPADM

## 2020-07-13 RX ORDER — HYDRALAZINE HYDROCHLORIDE 20 MG/ML
10 INJECTION INTRAMUSCULAR; INTRAVENOUS
Status: DISCONTINUED | OUTPATIENT
Start: 2020-07-13 | End: 2020-07-15 | Stop reason: HOSPADM

## 2020-07-13 RX ORDER — METOPROLOL SUCCINATE 25 MG/1
25 TABLET, EXTENDED RELEASE ORAL DAILY
Status: DISCONTINUED | OUTPATIENT
Start: 2020-07-14 | End: 2020-07-15 | Stop reason: HOSPADM

## 2020-07-13 RX ADMIN — HYDRALAZINE HYDROCHLORIDE 10 MG: 20 INJECTION INTRAMUSCULAR; INTRAVENOUS at 12:12

## 2020-07-13 RX ADMIN — HEPARIN SODIUM 5000 UNITS: 5000 INJECTION INTRAVENOUS; SUBCUTANEOUS at 07:03

## 2020-07-13 RX ADMIN — Medication 10 ML: at 12:12

## 2020-07-13 RX ADMIN — HEPARIN SODIUM 5000 UNITS: 5000 INJECTION INTRAVENOUS; SUBCUTANEOUS at 13:34

## 2020-07-13 RX ADMIN — Medication: at 08:36

## 2020-07-13 RX ADMIN — Medication 10 ML: at 22:32

## 2020-07-13 RX ADMIN — DEXTROSE MONOHYDRATE, SODIUM CHLORIDE, AND POTASSIUM CHLORIDE 75 ML/HR: 50; 4.5; .745 INJECTION, SOLUTION INTRAVENOUS at 17:33

## 2020-07-13 RX ADMIN — Medication 10 ML: at 07:04

## 2020-07-13 RX ADMIN — HEPARIN SODIUM 5000 UNITS: 5000 INJECTION INTRAVENOUS; SUBCUTANEOUS at 22:31

## 2020-07-13 RX ADMIN — ASPIRIN 325 MG ORAL TABLET 325 MG: 325 PILL ORAL at 08:36

## 2020-07-13 RX ADMIN — ACETAMINOPHEN 650 MG: 325 SOLUTION ORAL at 14:50

## 2020-07-13 RX ADMIN — ATORVASTATIN CALCIUM 40 MG: 40 TABLET, FILM COATED ORAL at 22:31

## 2020-07-13 RX ADMIN — Medication: at 22:31

## 2020-07-13 NOTE — PROGRESS NOTES
Problem: Pressure Injury - Risk of  Goal: *Prevention of pressure injury  Description: Document Howard Scale and appropriate interventions in the flowsheet. Outcome: Progressing Towards Goal  Note: Pressure Injury Interventions:  Sensory Interventions: Assess changes in LOC, Avoid rigorous massage over bony prominences, Float heels, Pad between skin to skin, Turn and reposition approx.  every two hours (pillows and wedges if needed)    Moisture Interventions: Absorbent underpads, Apply protective barrier, creams and emollients, Internal/External urinary devices, Limit adult briefs, Minimize layers, Moisture barrier    Activity Interventions: Increase time out of bed    Mobility Interventions: Float heels, HOB 30 degrees or less    Nutrition Interventions: Document food/fluid/supplement intake    Friction and Shear Interventions: Apply protective barrier, creams and emollients, Feet elevated on foot rest, HOB 30 degrees or less, Lift team/patient mobility team, Minimize layers

## 2020-07-13 NOTE — PROGRESS NOTES
Problem: Falls - Risk of  Goal: *Absence of Falls  Description: Document Carrie Alvarado Fall Risk and appropriate interventions in the flowsheet.   Outcome: Not Progressing Towards Goal  Note: Fall Risk Interventions:       Mentation Interventions: Adequate sleep, hydration, pain control, Bed/chair exit alarm, More frequent rounding    Medication Interventions: Bed/chair exit alarm, Patient to call before getting OOB    Elimination Interventions: Bed/chair exit alarm, Call light in reach, Toileting schedule/hourly rounds    History of Falls Interventions: Bed/chair exit alarm, Door open when patient unattended, Room close to nurse's station

## 2020-07-13 NOTE — PROGRESS NOTES
JACOB PLAN:    1) [facility] - will need another Covid test w/in 48 hours of discharge per facility request - will need [employer] auth    2) 2nd IM prior to discharge    3) EMS transport needed at discharge.     Vero Gardner RN, BSN, [address] [place] [address]    Coordinator  [phone]

## 2020-07-13 NOTE — PROGRESS NOTES
Problem: Self Care Deficits Care Plan (Adult)  Goal: *Acute Goals and Plan of Care (Insert Text)  Description: FUNCTIONAL STATUS PRIOR TO ADMISSION: per chart pt lived alone, amb with RW, I with ADLs. HOME SUPPORT: The patient lived alone with family in area. Occupational Therapy Goals  Initiated 7/5/2020; Reviewed 7/13/2020 for weekly reassessment, none met, continue all   1. Patient will perform grooming with supervision/set-up within 7 day(s). 2.  Patient will perform upper body dressing with min A using troy technique PRN within 7 day(s). 3.  Patient will locate and utilize 25% of ADL objects presented to L of midline with minimal assistance/contact guard and assist and cues within 7 day(s). 4.  Patient will perform sitting at EOB without UE support for 2 minutes with minimal assistance/contact guard assist within 7 day(s). 5.  Patient will follow 50% of simple one step commands for ADL within 7 day(s). 6.  Patient will participate in upper extremity therapeutic exercise/activities with minimal assistance/contact guard assist for 5 minutes within 7 day(s). 7.  Patient will utilize energy conservation techniques during functional activities with verbal, visual and tactile cues within 7 day(s). 8.  Patient will improve their Fugl Lo score by 5 points in prep for ADLs within 7 days. 7/13/2020 1341 by Chicho Husain OT  Outcome: Not Met  OCCUPATIONAL THERAPY TREATMENT/WEEKLY RE-EVALUATION  Patient: Laly Akins (78 y.o. female)  Date: 7/13/2020  Diagnosis: CVA (cerebral vascular accident) ([facility] [address] [I63.9]   <principal problem not specified>  Procedure(s) (LRB):  ESOPHAGOGASTRODUODENOSCOPY (EGD)URGENT (N/A)  PERCUTANEOUS ENDOSCOPIC GASTROSTOMY TUBE INSERTION (N/A) 5 Days Post-Op  Precautions: Aspiration, Bed Alarm, Fall  Chart, occupational therapy assessment, plan of care, and goals were reviewed.     ASSESSMENT  Based on the objective data described below, pt's functional performance is significantly limited from mod I baseline by L hemiparesis, L neglect, poor sitting balance, decreased alertness, and impaired functional mobility. Today pt received semisupine in bed, initially very drowsy, VSS. Alertness improved with verbal/tactile stimulation, pt offering little verbal communication this session. Total A x2 for supine>sit, pt pushing heavily to L and requiring constant support to maintain balance. Following WB exercises onto R elbow, pt's sitting balance improved to brief periods of min A. Mild improvement with midline head position but pt primarily still with R gaze. She fatigued seated EOB and was returned to bed. Performed PROM of LUE, noted increased tone L fingers. Pt's in bed with LUE supported on pillow at end of session. At this time pt is well below her functional baseline, requiring assistance for all transfers and ADLs. Current Level of Function Impacting Discharge (ADLs): total A x2 bed mobility, total A ADLs    Other factors to consider for discharge: Lives alone at baseline         PLAN :  Goals have been updated based on progression since last assessment. Patient continues to benefit from skilled intervention to address the above impairments. Continue to follow patient 5 times a week to address goals. Recommendation for discharge: (in order for the patient to meet his/her long term goals)  Therapy up to 5 days/week in SNF setting    This discharge recommendation:  Has been made in collaboration with the attending provider and/or case management    Equipment recommendations for successful discharge (if) home: TBD       SUBJECTIVE:   Patient stated Ok.     OBJECTIVE DATA SUMMARY:   Cognitive/Behavioral Status:  Neurologic State: Drowsy  Orientation Level: Oriented to person  Cognition: Decreased command following  Perception: Cues to maintain midline in sitting;Cues to attend left visual field;Cues to attend to left side of body  Perseveration: No perseveration noted  Safety/Judgement: Fall prevention;Decreased insight into deficits    Functional Mobility and Transfers for ADLs:  Bed Mobility:  Rolling: Maximum assistance  Supine to Sit: Total assistance;Assist x2  Sit to Supine: Total assistance;Assist x2  Scooting: Total assistance    Transfers:             Balance:  Sitting: Impaired  Sitting - Static: Poor (constant support)(posterior lean)  Sitting - Dynamic: Poor (constant support)    ADL Intervention:   Performed PROM of all LUE joints. Performed WB exercise onto R elbow and had pt return to midline in order promote improved sitting balance and midline posture. Pt able to push back to midline with min A and demonstrated brief improved sitting balance before returning to pushing with RUE and strong L lateral lean. Lower Body Dressing Assistance  Socks: Total assistance (dependent)         Cognitive Retraining  Safety/Judgement: Fall prevention;Decreased insight into deficits    Pain:  Pt did not indicate pain    Activity Tolerance:   Fair  Please refer to the flowsheet for vital signs taken during this treatment.     After treatment patient left in no apparent distress:   Semisupine in bed, Call bell within reach, Bed / chair alarm activated, and Side rails x 3    COMMUNICATION/COLLABORATION:   The patients plan of care was discussed with: Physical Therapist and Registered Nurse    Brooke Baig OT  Time Calculation: 38 mins

## 2020-07-13 NOTE — PROGRESS NOTES
Hospitalist Progress Note    NAME: Javier Duenas   :  1940   MRN:  [id number]       Assessment / Plan:  Acute right MCA stroke POA  Occluded right internal carotid artery from the carotid bifurcation to the [place] and further intraluminal thrombus at the right MCA bifurcation POA  Acute Encephalopathy POA- due to above  Dysphagia due to acute stroke  CT brain:  Large acute right MCA infarct with suggestion of intraluminal thrombus in the  MCA. No hemorrhage is identified  CTA  H&N:  1. Occluded right internal carotid artery from the carotid bifurcation to the  [place] and further intraluminal thrombus at the right MCA bifurcation. 2.  Large core infarct in the right MCA distribution with a 55 cc ischemic  penumbra. Ischemic changes on delayed postcontrast head CT without evidence of hemorrhage. LDL= 93/A1c= 5.7/Trop neg  large on initial CT (catastrophic), Evolving on repeat CT ()    Patient was not a TPA candidate and neuro interventionalist did not recc any interventions  Complete the stroke work-up of the brain without contrast when able- pt has compatible PPM?  2D echo -EF 22-38%, severe Diastolic Dysfunction noted  Cont rectal aspirin, and statin when able to take p.o.- currently strict NPO per SLP eval on saturday  MBS 2020 with Severe oropharyngeal dysphagia  Discussed with family and patient's PCP and they agreed for PEG tube. S/p PEG tube . Feeding tube  IV labetalol prn  Pt is at a risk for hemorrhagic conversion of this large stroke , repeat CT = no bleed noted  Neuro consult noted-Eliquis to start on . IP Palliative care consulted - catastrophic large CVA with severe Dysphagia  Patient is ready to be discharged to IP Rehab whenever bed is available.  COVID-19 test negative (was ordered due to IPR policy but now they are asking for test within 48 hr and insurance auth)  Clinically stable     Hypertension  Resume norvasc, metoprolol and add prn hydralazine     Arrhythmia  Xarelto on her PTA list, not  sure if she is taking it. Son is not sure too  She will be on eliquis before discharge       Code Status: Full code confirmed in ER with the daughter and the son who is a power of   Surrogate Decision Maker:  William Oneill [phone]         DVT Prophylaxis: Heparin  GI Prophylaxis: not indicated     Baseline: Walk with a walker    Recommended Disposition: IPR. medically stable to be discharged to inpatient rehab whenever bed is available and once insurance [facility] is completed. Subjective:     Chief Complaint / Reason for Physician Visit :F/u Large R MCA CVA, dysphagia,   Acute right MCA stroke/dysphagia     Discussed with RN events overnight. \"nothing much and I have no choice in going to facility\"    No change in the last 24 hours,resumed BP meds,  patient is awaiting placement to inpatient rehab    Review of Systems:  Symptom Y/N Comments  Symptom Y/N Comments   Fever/Chills n   Chest Pain n    Poor Appetite    Edema     Cough n   Abdominal Pain n    Sputum    Joint Pain     SOB/FRANK n   Pruritis/Rash     Nausea/vomit n   Tolerating PT/OT     Diarrhea    Tolerating Diet     Constipation    Other       Could NOT obtain due to:          Objective:     VITALS:   Last 24hrs VS reviewed since prior progress note.  Most recent are:  Patient Vitals for the past 24 hrs:   Temp Pulse Resp BP SpO2   07/13/20 1145 97.6 °F (36.4 °C) 81 18 (!) 196/109 100 %   07/13/20 0957 -- 70 -- (!) 168/96 100 %   07/13/20 0700 97.8 °F (36.6 °C) 83 20 165/78 98 %   07/13/20 0132 98.6 °F (37 °C) (!) 148 18 (!) 194/110 99 %   07/12/20 1858 98.3 °F (36.8 °C) 82 18 156/81 100 %   07/12/20 1524 98.8 °F (37.1 °C) 88 18 168/75 100 %       Intake/Output Summary (Last 24 hours) at 7/13/2020 1259  Last data filed at 7/13/2020 0857  Gross per 24 hour   Intake 990 ml   Output 1000 ml   Net -10 ml        PHYSICAL EXAM:  General: WD, WN.more awake today, no acute distress    EENT:  EOMI. Anicteric sclerae. MMM  Resp:  CTA bilaterally, no wheezing or rales. No accessory muscle use  CV:  Regular  rhythm,  No edema  GI:  Soft, Non distended, Non tender.  +Bowel sounds  Neurologic:  A&O x3, Left side flaccid, right side 5/5 strength, left facial droop  Psych:   Good insight  Skin:  No rashes. No jaundice    Reviewed most current lab test results and cultures  YES  Reviewed most current radiology test results   YES  Review and summation of old records today    NO  Reviewed patient's current orders and MAR    YES  PMH/SH reviewed - no change compared to H&P  ________________________________________________________________________  Care Plan discussed with:    Comments   Patient x    Family      RN x    Care Manager x    Consultant                       x Multidiciplinary team rounds were held today with , nursing, pharmacist and clinical coordinator. Patient's plan of care was discussed; medications were reviewed and discharge planning was addressed. ________________________________________________________________________        Comments   >50% of visit spent in counseling and coordination of care     ________________________________________________________________________  Ry Cavazos MD     Procedures: see electronic medical records for all procedures/Xrays and details which were not copied into this note but were reviewed prior to creation of Plan. LABS:  I reviewed today's most current labs and imaging studies. Pertinent labs include:  No results for input(s): WBC, HGB, HCT, PLT, HGBEXT, HCTEXT, PLTEXT, HGBEXT, HCTEXT, PLTEXT in the last 72 hours. No results for input(s): NA, K, CL, CO2, GLU, BUN, CREA, CA, MG, PHOS, ALB, TBIL, TBILI, ALT, INR, INREXT, INREXT in the last 72 hours.     No lab exists for component: SGOT    Signed: Ry Cavazos MD

## 2020-07-13 NOTE — PROGRESS NOTES
shift change report given to [address] ) by GAMALIEL Reyes (offgoing nurse).  Report included the following information SBAR, Kardex and Quality Measures.     Zone Phone:  [phone]        Significant changes during shift: elevated BP,Hydralazine 10 mg given      Patient Information     Aamir Curling  78 y.o.  7/3/2020  2:59 PM by Darlene Mckeon MD. 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    Scalp lesion 10/23/2014    Sebaceous cyst 04/22/2014    Keloid 04/22/2014    Dysphagia 02/08/2011    Recurrent ear pain 02/08/2011    Acute pharyngitis 02/08/2011    Essential hypertension 02/08/2011    Edema, peripheral 02/08/2011    Pure hypercholesterolemia 02/08/2011    Allergic rhinitis, cause unspecified 02/08/2011    Diverticulosis of colon (without mention of hemorrhage) 02/08/2011    Herniated nucleus pulposus ( slipped disc) 02/08/2011    Degenetrative Dis Disease, 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    Carpal tunnel syndrome 02/08/2011    Unspecified cataract 02/08/2011    GERD Gastro- Esophageal Reflux Disease 02/08/2011    Degenerative Joint Disese ( improved/resolving) 02/08/2011    Menopausal 02/08/2011    Asymptomatic varicose veins 02/08/2011              Past Medical History:   Diagnosis Date    Acute pharyngitis 2/8/2011    Allergic rhinitis, cause unspecified 2/8/2011    Arrhythmia       PVC    Asymptomatic varicose veins 2/8/2011    Cancer ([address] 2009   stage 4 throat     Carpal tunnel syndrome 2/8/2011    Degenerative Joint Disese ( improved/resolving) 2/8/2011    Degenetrative Dis Disease, 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    Diverticulosis of colon (without mention of hemorrhage) 2/8/2011    Dysphagia 2/8/2011    Edema, peripheral 2/8/2011    GERD (gastroesophageal reflux disease)      GERD Gastro- Esophageal Reflux Disease 2/8/2011    Herniated nucleus pulposus ( slipped disc) 2/8/2011    Menopausal 2/8/2011    PUD (peptic ulcer disease) 2012    Pure hypercholesterolemia 2/8/2011    Recurrent ear pain 2/8/2011    S/P radiation therapy      Scalp lesion 10/23/2014    Sebaceous cyst 4/22/2014    Unspecified cataract 2/8/2011    Unspecified essential hypertension 2/8/2011    Unspecified sleep apnea              Core Measures:     CVA: Yes Yes  CHF:No No  PNA:No No     Post Op Surgical (If Applicable):      Number times ambulated in hallway past shift:  0  Number of times OOB to chair past shift:   0  NG Tube: No  Incentive Spirometer: No  Drains: No   Volume  0  Dressing Present:  No  Flatus:  Yes     Activity Status:     OOB to Chair No  Ambulated this shift No   Bed Rest Yes     Supplemental H9: (YA Applicable)     NC No  NRB No  Venti-mask No  On 0 Liters/min        LINES AND DRAINS:     Central Line? No Placement date 0 Reason Medically Necessary 0     PICC LINE? No Placement date 0Reason Medically Necessary 0     Urinary Catheter? No Placement Date 0 Reason Medically Necessary 0     DVT prophylaxis:     DVT prophylaxis Med- Yes  DVT prophylaxis SCD or MEY- No      Wounds: (If Applicable)     Wounds- Yes open areas     Location [place] upper groin area, dry flaky red skin.     Patient Safety:     Falls Score Total Score: 4  Safety Level_______  Bed Alarm On? Yes  Sitter? No     Plan for upcoming shift: safety, aspiration precaution           Discharge Plan: yes- rehab     Active Consults:  IP CONSULT TO NEUROLOGY  IP CONSULT TO PALLIATIVE CARE - PROVIDER

## 2020-07-13 NOTE — PROGRESS NOTES
Problem: Mobility Impaired (Adult and Pediatric)  Goal: *Acute Goals and Plan of Care (Insert Text)  Description:   FUNCTIONAL STATUS PRIOR TO ADMISSION: unclear     HOME SUPPORT PRIOR TO ADMISSION: The patient lived at home alone? Physical Therapy Goals  Initiated 7/13/2020  1. Patient will roll side to side in bed with moderate assistance  within 7 day(s). 2. Patient will move from supine to sit and sit to supine  and scoot up and down in bed with maximal assistance within 7 day(s). 3  Patient will sit EOB for 2 minutes with fair static sitting balance within 7 days  6. Patient will improve Arizmendi Balance score by 7 points within 7 days. Physical Therapy Goals  Initiated 7/5/2020  1. Patient will move from supine to sit and sit to supine , scoot up and down and roll side to side in bed with maximal assistance within 7 day(s). 2.  Patient will transfer from bed to chair and chair to bed with maximal assistance using the least restrictive device within 7 day(s). 3.  Patient will perform sit to stand with maximal assistance within 7 day(s). 4.  Patient will ambulate with maximal assistance for 3 feet with the least restrictive device within 7 day(s). 5. Patient will improve Arizmendi Balance score by 7 points within 7 days. Outcome: Progressing Towards Goal   PHYSICAL THERAPY TREATMENT: WEEKLY REASSESSMENT  Patient: Loree Reed (78 y.o. female)  Date: 7/13/2020  Primary Diagnosis: CVA (cerebral vascular accident) ([facility] [I63.9]  Procedure(s) (LRB):  ESOPHAGOGASTRODUODENOSCOPY (EGD)URGENT (N/A)  PERCUTANEOUS ENDOSCOPIC GASTROSTOMY TUBE INSERTION (N/A) 5 Days Post-Op   Precautions:   Aspiration, Bed Alarm, Fall      ASSESSMENT  Patient continues with skilled PT services and is minimally progressing towards goals. Patient difficult to alert today and minimally conversant during tx session with inconsistent command following. Patient continues to require max to total A of 2 for bed mobility. Slight improvements noted in ability to maintain head in midline however eyes remain deviated to R. Strong pushing noted again in R UE in sitting causing L lateral lean. Improvements noted in ability to maintain midline with following lateral lean to R with weight bearing through R forearm. Decreased active movement noted in LLE today. Continue to recommend SNF following discharge. Patient's progression toward goals since last assessment: slight improvements with head in midline and ability to attain midline in sitting    Current Level of Function Impacting Discharge (mobility/balance): max to total A of 2    Functional Outcome Measure: The patient scored 0/56 on the  Arizmendi balance outcome measure which is indicative of  high fall risk. PLAN :  Goals have been updated based on progression since last assessment. Patient continues to benefit from skilled intervention to address the above impairments. Recommendations and Planned Interventions: bed mobility training, therapeutic exercises, neuromuscular re-education, patient and family training/education, and therapeutic activities      Frequency/Duration: Patient will be followed by physical therapy:  5 times a week to address goals.     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     Carpal tunnel syndrome 2/8/2011    Degenerative Joint Disese ( improved/resolving) 2/8/2011    Degenetrative Dis Disease, 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    Diverticulosis of colon (without mention of hemorrhage) 2/8/2011    Dysphagia 2/8/2011    Edema, peripheral 2/8/2011    GERD (gastroesophageal reflux disease)     GERD Gastro- Esophageal Reflux Disease 2/8/2011    Herniated nucleus pulposus ( slipped disc) 2/8/2011    Menopausal 2/8/2011    PUD (peptic ulcer disease) 2012    Pure hypercholesterolemia 2/8/2011    Recurrent ear pain 2/8/2011    S/P radiation therapy     Scalp lesion 10/23/2014    Sebaceous cyst 4/22/2014    Unspecified cataract 2/8/2011    Unspecified essential hypertension 2/8/2011    Unspecified sleep apnea      Past Surgical History:   Procedure Laterality Date    HX HYSTERECTOMY      HX ORTHOPAEDIC  neck/back 2006    HX OTHER SURGICAL  5/8/2014     Excise recurrent scalp lesion and application of ACell    HX OTHER SURGICAL  5/8/2014    Excise keloid, anterior chest wall, and application of ACell    HX OTHER SURGICAL  5/8/2014     Excise sebaceous cyst, anterior chest wall    HX OTHER SURGICAL  5/8/2014    Punch biopsy, skin lesions, right forearm x2, right calf x1    PLACE PERCUT GASTROSTOMY TUBE  7/8/2020         MN ICAR CATHETER ABLATION ATRIOVENTR NODE FUNCTION N/A 5/6/2019    ABLATION AV NODE performed by Shila Eason MD at [address] CATH LAB    MN INS NEW/RPLCMT PRM PM W/TRANSV ELTRD ATRIAL&VENT  9/30/2017         MN RMVL IMPLANTABLE PT-ACTIVATED CAR EVENT RECORDER  9/30/2017            Home Situation  Home Environment: Private residence  One/Two Story Residence: Other (Comment)  Living Alone: Yes(per chart)  Support Systems: Child(jeniffer)  Patient Expects to be Discharged to[de-identified] Private residence  Current DME Used/Available at Home: (unable to provide history)    EXAMINATION/PRESENTATION/DECISION MAKING:   Critical Behavior:  Neurologic State: Drowsy  Orientation Level: Oriented to person  Cognition: Decreased command following  Safety/Judgement: Fall prevention, Decreased insight into deficits  Hearing: Auditory  Auditory Impairment: None    Edema: L hand is edematous  Range Of Motion:   LUE/LE are non-functional                       Strength:        Grossly decreased, non-functional LUE/LE                 Tone & Sensation:       L UE is flaccide                           Coordination:   Grossly impaired  Vision:    Unable to track to midline; eyes deviated to R  Functional Mobility:  Bed Mobility:  Rolling: Maximum assistance  Supine to Sit: Total assistance;Assist x2  Sit to Supine: Total assistance;Assist x2  Scooting: Total assistance  Transfers:      Not able to safety assess                       Balance:   Sitting: Impaired  Sitting - Static: Poor (constant support)(posterior lean)  Sitting - Dynamic: Poor (constant support)          Therapeutic Exercises:   Patient performing lateral weight shift to R with weight bearing through R forearm    Functional Measure:  Arizmendi Balance Test:    Sitting to Standin  Standing Unsupported: 0  Sitting with Back Unsupported: 0  Standing to Sittin  Transfers: 0  Standing Unsupported with Eyes Closed: 0  Standing Unsupported with Feet Together: 0  Reach Forward with Outstretched Arm: 0   Object: 0  Turn to Look Over Shoulders: 0  Turn 360 Degrees: 0  Alternate Foot on Step/Stool: 0  Standing Unsupported One Foot in Front: 0  Stand on One Le  Total: 0/56         56=Maximum possible score;   0-20=High fall risk  21-40=Moderate fall risk   41-56=Low fall risk                   Activity Tolerance:   Poor  Please refer to the flowsheet for vital signs taken during this treatment. After treatment patient left in no apparent distress:   Supine in bed, Heels elevated for pressure relief, Call bell within reach, and Bed / chair alarm activated    COMMUNICATION/EDUCATION:   The patients plan of care was discussed with: Occupational therapist, Registered nurse, and Case management.      Fall prevention education was provided and the patient/caregiver indicated understanding. and Patient is unable to participate in goal setting and plan of care.     Thank you for this referral.  Ronen Argueta, PT   Time Calculation: 39 mins

## 2020-07-13 NOTE — PROGRESS NOTES
Problem: Motor Speech Impaired (Adult)  Goal: *Acute Goals and Plan of Care (Insert Text)  Description: 7/7/2020  Speech path goals  1. Patient will produce short sentences with 90% intelligibility with max cues. Continued 7/13/2020   2. Patient will produce lengthier sentences with 90% intelligibility with max cues. Continued 7/13/2020     3. Patient will produce conversational speech with 70% intelligibility with max cues. Continued 7/13/2020     4. Patient will state 3 motor speech strategies with 66% acc with min cues. Continued 7/13/2020     Outcome: Progressing Towards Goal     Problem: Dysphagia (Adult)  Goal: *Acute Goals and Plan of Care (Insert Text)  Description: Speech Therapy Goals  Initiated 7/4/2020  1. Patient will participate in re-evaluation of swallow function within 7 days. Continued 7/13/2020   2. Patient will complete MBS 7/7/20. Goal met    SPEECH LANGUAGE PATHOLOGY SPEECH/LANGUAGE AND DYSPHAGIA TREATMENT: WEEKLY REASSESSMENT  Patient: Aamir Curling (78 y.o. female)  Date: 7/13/2020  Diagnosis: CVA (cerebral vascular accident) ([facility] [I63.9]   <principal problem not specified>  Procedure(s) (LRB):  ESOPHAGOGASTRODUODENOSCOPY (EGD)URGENT (N/A)  PERCUTANEOUS ENDOSCOPIC GASTROSTOMY TUBE INSERTION (N/A) 5 Days Post-Op  Precautions:  Aspiration, Bed Alarm, Fall    ASSESSMENT:  Patient drowsy but agreeable to participate in tx. Patient with active bolus acceptance of ice chip, slow bolus manipulation and suspect premature spillage. Patient with cough x1 prior to swallow initiation suspect from premature loss of bolus. ; otherwise no overt s/s aspiration with another 5 ice chips. Swallow initiation is suspected to be delayed via palpation. Patient with moderate dysarthria which is impacting speech intelligibility. Speech is characterized by poor breath support with low volume, blended word boundaries and imprecise articulation.  Patient repeatedly required verbal cueing to utilize compensatory strategies; however, no carryover. She was able to recall \"speak clear\" but not slow and loud. Many attempts made to have patient utilize deep breathing and vocalizing on exhalation but unsuccessful. Patient's progression toward goals since last assessment: patient with improved speech intelligibility, improved oral phase of swallow. PLAN:  Goals have been updated based on progression since last assessment. Patient continues to benefit from skilled intervention to address the above impairments. Continue to follow the patient 3 times a week to address goals  -- continue npo with TF via PEG  -- slp to continue to follow for re-assessment of swallow, dysarthria tx  -- Encourage speak slow, LOUD, clear!! Discharge Recommendations: Rehab     SUBJECTIVE:   Patient stated Do you go camping? OBJECTIVE:   Cognitive and Communication Status:  Neurologic State: Drowsy  Orientation Level: Oriented to person  Cognition: Decreased command following  Perception: Cues to maintain midline in sitting, Cues to attend left visual field, Cues to attend to left side of body  Perseveration: No perseveration noted  Safety/Judgement: Fall prevention, Decreased insight into deficits  Dysphagia Treatment:       P.O. Trials:  Patient Position: (up in bed)  Vocal quality prior to P.O.: Low volume  Consistency Presented: Ice chips  How Presented: SLP-fed/presented;Spoon     Bolus Acceptance: No impairment  Bolus Formation/Control: Impaired  Type of Impairment: Delayed  Propulsion: Delayed (# of seconds)  Oral Residue: None  Initiation of Swallow: Delayed (# of seconds)  Laryngeal Elevation: Functional     Pharyngeal Phase Characteristics: (delay)              Speech/Language treatment & Interventions:   Motor Speech:  Conversation Intelligibility (%): 75 %  Intelligibility: Impaired        Review of strategies  Recalled 1/3 Peyton               Pain:  Pain Scale 1: Numeric (0 - 10)  Pain Intensity 1: 8  Pain Location 1: Back    After treatment patient left in no apparent distress:   Patient left in no apparent distress in bed, Call bell within reach, Nursing notified, and Caregiver / family present    COMMUNICATION/EDUCATION:   Patient was educated regarding her deficit(s) of dysphagia, dysarthria as this relates to her diagnosis of cva. She demonstrated Fair understanding as evidenced by verbalization. The patients plan of care including recommendations, planned interventions, and recommended diet changes were discussed with: Registered nurse.      Jarad Rivas, SLP  Time Calculation: 20 mins

## 2020-07-13 NOTE — PROGRESS NOTES
4:12pm:  Patient has been accepted at [facility] in [place]. Other referrals still pending. 2:10PM:  Meenakshi Garcia ([phone]) daughter of patient called care manager and listed other SNF's to try for short term SNF bed for patient. Discussed possible need for long term care and discharge plan once patient is ready to leave SNF. Daughter stated that she feels that her mother does need to be screened for medicaid. She does not know what her mom's needs will be but that her children are unable to take care of her now and she lived alone PTA. Asmita Allison Referrals sent to other SNF facilities. Patient has been referred to [facility] for Medicaid LTC screening if needed. 12:17pm: Called Socorro Biggs back and advised him that Care management is not able to help with Financial POA's for patient's. Suggested that he contact an  for the elderly, that specializes in financial POA's. Also asked for more SNF names that family maybe interested in due to bed possibly not being available at time of discharge for patient. Selena العراقي will get back with Care manager. [facility]  SNF at [facility] has accepted patient for admission pending negative COVID test within 48 hours of admission. Also [employer] acceptance of discharge plan  is pending. Nurse [address] notified of the need for a COVID test as well as the discharge plan. Patient's son Socorro Biggs ([phone]) was notified of patient's acceptance at [facility] and [facility] and the need for COVID test and pending insurance authorization. Selena العراقي asked for help establishing a financial POA for patient. Transportation will be arranged via [facility] when patient is discharged. 2nd IM letter will need to given prior to discharge. Care manager went in to see patient regarding freedom of choice letter however PT and OT are working with patient at this time. [facility] letter will need to be signed prior to discharge.     Care Manager will continue to follow for discharge needs.     Tre Olmos, RN  Care Manager  Ext [phone]

## 2020-07-14 LAB
SARS-COV-2, COV2: NOT DETECTED
SOURCE, COVRS: NORMAL
SPECIMEN SOURCE, FCOV2M: NORMAL

## 2020-07-14 PROCEDURE — 74011250636 HC RX REV CODE- 250/636: Performed by: INTERNAL MEDICINE

## 2020-07-14 PROCEDURE — 77030018798 HC PMP KT ENTRL FED COVD -A

## 2020-07-14 PROCEDURE — 74011250637 HC RX REV CODE- 250/637: Performed by: INTERNAL MEDICINE

## 2020-07-14 PROCEDURE — 92507 TX SP LANG VOICE COMM INDIV: CPT

## 2020-07-14 PROCEDURE — 74011250637 HC RX REV CODE- 250/637: Performed by: PSYCHIATRY & NEUROLOGY

## 2020-07-14 PROCEDURE — 97530 THERAPEUTIC ACTIVITIES: CPT | Performed by: PHYSICAL THERAPIST

## 2020-07-14 PROCEDURE — 94760 N-INVAS EAR/PLS OXIMETRY 1: CPT

## 2020-07-14 PROCEDURE — 97535 SELF CARE MNGMENT TRAINING: CPT | Performed by: OCCUPATIONAL THERAPIST

## 2020-07-14 PROCEDURE — 65660000000 HC RM CCU STEPDOWN

## 2020-07-14 PROCEDURE — 97112 NEUROMUSCULAR REEDUCATION: CPT | Performed by: PHYSICAL THERAPIST

## 2020-07-14 PROCEDURE — 92526 ORAL FUNCTION THERAPY: CPT

## 2020-07-14 PROCEDURE — 97112 NEUROMUSCULAR REEDUCATION: CPT | Performed by: OCCUPATIONAL THERAPIST

## 2020-07-14 RX ADMIN — AMLODIPINE BESYLATE 5 MG: 5 TABLET ORAL at 09:42

## 2020-07-14 RX ADMIN — HEPARIN SODIUM 5000 UNITS: 5000 INJECTION INTRAVENOUS; SUBCUTANEOUS at 13:06

## 2020-07-14 RX ADMIN — Medication 10 ML: at 06:09

## 2020-07-14 RX ADMIN — METOPROLOL SUCCINATE 25 MG: 25 TABLET, EXTENDED RELEASE ORAL at 09:42

## 2020-07-14 RX ADMIN — DEXTROSE MONOHYDRATE, SODIUM CHLORIDE, AND POTASSIUM CHLORIDE 75 ML/HR: 50; 4.5; .745 INJECTION, SOLUTION INTRAVENOUS at 21:08

## 2020-07-14 RX ADMIN — Medication: at 21:09

## 2020-07-14 RX ADMIN — Medication: at 09:56

## 2020-07-14 RX ADMIN — DEXTROSE MONOHYDRATE, SODIUM CHLORIDE, AND POTASSIUM CHLORIDE 75 ML/HR: 50; 4.5; .745 INJECTION, SOLUTION INTRAVENOUS at 06:41

## 2020-07-14 RX ADMIN — HEPARIN SODIUM 5000 UNITS: 5000 INJECTION INTRAVENOUS; SUBCUTANEOUS at 06:05

## 2020-07-14 RX ADMIN — ATORVASTATIN CALCIUM 40 MG: 40 TABLET, FILM COATED ORAL at 21:08

## 2020-07-14 RX ADMIN — Medication 10 ML: at 13:05

## 2020-07-14 RX ADMIN — HEPARIN SODIUM 5000 UNITS: 5000 INJECTION INTRAVENOUS; SUBCUTANEOUS at 21:08

## 2020-07-14 RX ADMIN — ASPIRIN 325 MG ORAL TABLET 325 MG: 325 PILL ORAL at 09:42

## 2020-07-14 RX ADMIN — Medication 10 ML: at 21:08

## 2020-07-14 NOTE — ROUTINE PROCESS
Peg Placement  Peg Placement  Bedside shift change report given to Lilia (oncoming nurse) by GAMALIEL Hernandez (offgoing nurse). Report included the following information SBAR and Kardex.     Zone Phone:   [phone]      Significant changes during shift:  none        Patient Information    Nazario Jacobo  78 y.o.  7/3/2020  2:59 PM by Mary Jo Winkler MD. 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    Scalp lesion 10/23/2014    Sebaceous cyst 04/22/2014    Keloid 04/22/2014    Dysphagia 02/08/2011    Recurrent ear pain 02/08/2011    Acute pharyngitis 02/08/2011    Essential hypertension 02/08/2011    Edema, peripheral 02/08/2011    Pure hypercholesterolemia 02/08/2011    Allergic rhinitis, cause unspecified 02/08/2011    Diverticulosis of colon (without mention of hemorrhage) 02/08/2011    Herniated nucleus pulposus ( slipped disc) 02/08/2011    Degenetrative Dis Disease, 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    Carpal tunnel syndrome 02/08/2011    Unspecified cataract 02/08/2011    GERD Gastro- Esophageal Reflux Disease 02/08/2011    Degenerative Joint Disese ( improved/resolving) 02/08/2011    Menopausal 02/08/2011    Asymptomatic varicose veins 02/08/2011     Past Medical History:   Diagnosis Date    Acute pharyngitis 2/8/2011    Allergic rhinitis, cause unspecified 2/8/2011    Arrhythmia     PVC    Asymptomatic varicose veins 2/8/2011    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     Carpal tunnel syndrome 2/8/2011    Degenerative Joint Disese ( improved/resolving) 2/8/2011    Degenetrative Dis Disease, 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    Diverticulosis of colon (without mention of hemorrhage) 2/8/2011    Dysphagia 2/8/2011    Edema, peripheral 2/8/2011    GERD (gastroesophageal reflux disease)     GERD Gastro- Esophageal Reflux Disease 2/8/2011    Herniated nucleus pulposus ( slipped disc) 2/8/2011    Menopausal 2/8/2011    PUD (peptic ulcer disease) 2012    Pure hypercholesterolemia 2/8/2011    Recurrent ear pain 2/8/2011    S/P radiation therapy     Scalp lesion 10/23/2014    Sebaceous cyst 4/22/2014    Unspecified cataract 2/8/2011    Unspecified essential hypertension 2/8/2011    Unspecified sleep apnea          Core Measures:    CVA: Yes Yes  CHF:No No  PNA:No No      Activity Status:    OOB to Chair No  Ambulated this shift No   Bed Rest Yes    DVT prophylaxis:    DVT prophylaxis Med- Yes  DVT prophylaxis SCD or MEY- No       Patient Safety:    Falls Score Total Score: 3  Safety Level_______  Bed Alarm On? Yes  Sitter?  No    Plan for upcoming shift: safety         Discharge Plan: Yes safety    Active Consults:  IP CONSULT TO NEUROLOGY  IP CONSULT TO PALLIATIVE CARE - PROVIDER  IP CONSULT TO GASTROENTEROLOGY  IP CONSULT TO PSYCHIATRY

## 2020-07-14 NOTE — PROGRESS NOTES
Peg Placement  Peg Placement  Bedside and Verbal shift change report given to Wilma Davis (oncoming nurse) by Anu Musa RN (offgoing nurse). Report included the following information SBAR, Kardex, Intake/Output, MAR and Recent Results.     Zone Phone:   N/a       Significant changes during shift:  none        Patient Information    Norma Bonner  78 y.o.  7/3/2020  2:59 PM by Wilda Beltran MD. 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[address] 05/06/2019    PAF (paroxysmal atrial fibrillation) ([address] 05/06/2019    Atrial fibrillation with rapid ventricular response ([address] 05/06/2019    S/P cardiac pacemaker procedure 09/29/2017    Bradycardia 09/29/2017    SSS (sick sinus syndrome) ([address] 09/29/2017    CAD (coronary artery disease) 02/17/2016    Abnormal nuclear stress test 02/11/2016    Syncope 02/11/2016    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    Scalp lesion 10/23/2014    Sebaceous cyst 04/22/2014    Keloid 04/22/2014    Dysphagia 02/08/2011    Recurrent ear pain 02/08/2011    Acute pharyngitis 02/08/2011    Essential hypertension 02/08/2011    Edema, peripheral 02/08/2011    Pure hypercholesterolemia 02/08/2011    Allergic rhinitis, cause unspecified 02/08/2011    Diverticulosis of colon (without mention of hemorrhage) 02/08/2011    Herniated nucleus pulposus ( slipped disc) 02/08/2011    Degenetrative Dis Disease, 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    Carpal tunnel syndrome 02/08/2011    Unspecified cataract 02/08/2011    GERD Gastro- Esophageal Reflux Disease 02/08/2011    Degenerative Joint Disese ( improved/resolving) 02/08/2011    Menopausal 02/08/2011    Asymptomatic varicose veins 02/08/2011     Past Medical History:   Diagnosis Date    Acute pharyngitis 2/8/2011    Allergic rhinitis, cause unspecified 2/8/2011    Arrhythmia     PVC    Asymptomatic varicose veins 2/8/2011    Cancer ([facility]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     Carpal tunnel syndrome 2/8/2011    Degenerative Joint Disese ( improved/resolving) 2/8/2011    Degenetrative Dis Disease, 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    Diverticulosis of colon (without mention of hemorrhage) 2/8/2011    Dysphagia 2/8/2011    Edema, peripheral 2/8/2011    GERD (gastroesophageal reflux disease)     GERD Gastro- Esophageal Reflux Disease 2/8/2011    Herniated nucleus pulposus ( slipped disc) 2/8/2011    Menopausal 2/8/2011    PUD (peptic ulcer disease) 2012    Pure hypercholesterolemia 2/8/2011    Recurrent ear pain 2/8/2011    S/P radiation therapy     Scalp lesion 10/23/2014    Sebaceous cyst 4/22/2014    Unspecified cataract 2/8/2011    Unspecified essential hypertension 2/8/2011    Unspecified sleep apnea          Core Measures:    CVA: Yes Yes  CHF:No   PNA:No     Post Op Surgical (If Applicable):     Number times ambulated in hallway past shift:  0   Number of times OOB to chair past shift:   0  Drains: No     Dressing Present: no      Activity Status:    OOB to Chair No  Ambulated this shift No   Bed Rest Yes    Supplemental O2: (If Applicable)    NC No  NRB No  Venti-mask No  On 0 Liters/min      LINES AND DRAINS:    Central Line? No     PICC LINE? No   Urinary Catheter? No     DVT prophylaxis:    DVT prophylaxis Med- Yes  DVT prophylaxis SCD or MEY- No     Wounds: (If Applicable)    Wounds- Yes    Location Bilateral cracked/dry skin    Patient Safety:    Falls Score Total Score: 2  Safety Level_______  Bed Alarm On? Yes  Sitter? No (daughter at bedside)    Plan for upcoming shift: Safety, nutrition.   Awaiting placement at [facility]        Discharge Plan: Yes     Active Consults:  IP CONSULT TO NEUROLOGY  IP CONSULT TO PALLIATIVE CARE - PROVIDER  IP CONSULT TO GASTROENTEROLOGY  IP CONSULT TO PSYCHIATRY

## 2020-07-14 NOTE — PROGRESS NOTES
Problem: Falls - Risk of  Goal: *Absence of Falls  Description: Document Adeline Calderon Fall Risk and appropriate interventions in the flowsheet. Outcome: Progressing Towards Goal  Note: Fall Risk Interventions:       Mentation Interventions: Adequate sleep, hydration, pain control, Bed/chair exit alarm    Medication Interventions: Bed/chair exit alarm    Elimination Interventions: Bed/chair exit alarm, Call light in reach    History of Falls Interventions: Bed/chair exit alarm         Problem: Patient Education: Go to Patient Education Activity  Goal: Patient/Family Education  Outcome: Progressing Towards Goal     Problem: Pressure Injury - Risk of  Goal: *Prevention of pressure injury  Description: Document Howard Scale and appropriate interventions in the flowsheet. Outcome: Progressing Towards Goal  Note: Pressure Injury Interventions:  Sensory Interventions: Assess changes in LOC, Float heels, Keep linens dry and wrinkle-free, Turn and reposition approx.  every two hours (pillows and wedges if needed)    Moisture Interventions: Absorbent underpads, Check for incontinence Q2 hours and as needed    Activity Interventions: Pressure redistribution bed/mattress(bed type)    Mobility Interventions: Float heels, Pressure redistribution bed/mattress (bed type)    Nutrition Interventions: Offer support with meals,snacks and hydration, Document food/fluid/supplement intake    Friction and Shear Interventions: Apply protective barrier, creams and emollients, Feet elevated on foot rest, Minimize layers                Problem: Patient Education: Go to Patient Education Activity  Goal: Patient/Family Education  Outcome: Progressing Towards Goal     Problem: Patient Education: Go to Patient Education Activity  Goal: Patient/Family Education  Outcome: Progressing Towards Goal     Problem: Patient Education: Go to Patient Education Activity  Goal: Patient/Family Education  Outcome: Progressing Towards Goal     Problem: Patient Education: Go to Patient Education Activity  Goal: Patient/Family Education  Outcome: Progressing Towards Goal     Problem: Patient Education: Go to Patient Education Activity  Goal: Patient/Family Education  Outcome: Progressing Towards Goal     Problem: TIA/CVA Stroke: 0-24 hours  Goal: Off Pathway (Use only if patient is Off Pathway)  Outcome: Progressing Towards Goal  Goal: Activity/Safety  Outcome: Progressing Towards Goal  Goal: Consults, if ordered  Outcome: Progressing Towards Goal  Goal: Diagnostic Test/Procedures  Outcome: Progressing Towards Goal  Goal: Nutrition/Diet  Outcome: Progressing Towards Goal  Goal: Discharge Planning  Outcome: Progressing Towards Goal  Goal: Medications  Outcome: Progressing Towards Goal  Goal: Respiratory  Outcome: Progressing Towards Goal  Goal: Treatments/Interventions/Procedures  Outcome: Progressing Towards Goal  Goal: Minimize risk of bleeding post-thrombolytic infusion  Outcome: Progressing Towards Goal  Goal: Monitor for complications post-thrombolytic infusion  Outcome: Progressing Towards Goal  Goal: Psychosocial  Outcome: Progressing Towards Goal  Goal: *Hemodynamically stable  Outcome: Progressing Towards Goal  Goal: *Neurologically stable  Description: Absence of additional neurological deficits    Outcome: Progressing Towards Goal  Goal: *Verbalizes anxiety and depression are reduced or absent  Outcome: Progressing Towards Goal  Goal: *Absence of Signs of Aspiration on Current Diet  Outcome: Progressing Towards Goal  Goal: *Absence of deep venous thrombosis signs and symptoms(Stroke Metric)  Outcome: Progressing Towards Goal  Goal: *Ability to perform ADLs and demonstrates progressive mobility and function  Outcome: Progressing Towards Goal  Goal: *Stroke education started(Stroke Metric)  Outcome: Progressing Towards Goal  Goal: *Dysphagia screen performed(Stroke Metric)  Outcome: Progressing Towards Goal  Goal: *Rehab consulted(Stroke Metric)  Outcome: Progressing Towards Goal     Problem: TIA/CVA Stroke: Day 2 Until Discharge  Goal: Off Pathway (Use only if patient is Off Pathway)  Outcome: Progressing Towards Goal  Goal: Activity/Safety  Outcome: Progressing Towards Goal  Goal: Diagnostic Test/Procedures  Outcome: Progressing Towards Goal  Goal: Nutrition/Diet  Outcome: Progressing Towards Goal  Goal: Discharge Planning  Outcome: Progressing Towards Goal  Goal: Medications  Outcome: Progressing Towards Goal  Goal: Respiratory  Outcome: Progressing Towards Goal  Goal: Treatments/Interventions/Procedures  Outcome: Progressing Towards Goal  Goal: Psychosocial  Outcome: Progressing Towards Goal  Goal: *Verbalizes anxiety and depression are reduced or absent  Outcome: Progressing Towards Goal  Goal: *Absence of aspiration  Outcome: Progressing Towards Goal  Goal: *Absence of deep venous thrombosis signs and symptoms(Stroke Metric)  Outcome: Progressing Towards Goal  Goal: *Optimal pain control at patient's stated goal  Outcome: Progressing Towards Goal  Goal: *Tolerating diet  Outcome: Progressing Towards Goal  Goal: *Ability to perform ADLs and demonstrates progressive mobility and function  Outcome: Progressing Towards Goal  Goal: *Stroke education continued(Stroke Metric)  Outcome: Progressing Towards Goal     Problem: Ischemic Stroke: Discharge Outcomes  Goal: *Verbalizes anxiety and depression are reduced or absent  Outcome: Progressing Towards Goal  Goal: *Verbalize understanding of risk factor modification(Stroke Metric)  Outcome: Progressing Towards Goal  Goal: *Hemodynamically stable  Outcome: Progressing Towards Goal  Goal: *Absence of aspiration pneumonia  Outcome: Progressing Towards Goal  Goal: *Aware of needed dietary changes  Outcome: Progressing Towards Goal  Goal: *Verbalize understanding of prescribed medications including anti-coagulants, anti-lipid, and/or anti-platelets(Stroke Metric)  Outcome: Progressing Towards Goal  Goal: *Tolerating diet  Outcome: Progressing Towards Goal  Goal: *Aware of follow-up diagnostics related to anticoagulants  Outcome: Progressing Towards Goal  Goal: *Ability to perform ADLs and demonstrates progressive mobility and function  Outcome: Progressing Towards Goal  Goal: *Absence of DVT(Stroke Metric)  Outcome: Progressing Towards Goal  Goal: *Absence of aspiration  Outcome: Progressing Towards Goal  Goal: *Optimal pain control at patient's stated goal  Outcome: Progressing Towards Goal  Goal: *Home safety concerns addressed  Outcome: Progressing Towards Goal  Goal: *Describes available resources and support systems  Outcome: Progressing Towards Goal  Goal: *Verbalizes understanding of activation of EMS(911) for stroke symptoms(Stroke Metric)  Outcome: Progressing Towards Goal  Goal: *Understands and describes signs and symptoms to report to providers(Stroke Metric)  Outcome: Progressing Towards Goal  Goal: *Neurolgocially stable (absence of additional neurological deficits)  Outcome: Progressing Towards Goal  Goal: *Verbalizes importance of follow-up with primary care physician(Stroke Metric)  Outcome: Progressing Towards Goal  Goal: *Smoking cessation discussed,if applicable(Stroke Metric)  Outcome: Progressing Towards Goal  Goal: *Depression screening completed(Stroke Metric)  Outcome: Progressing Towards Goal

## 2020-07-14 NOTE — PROGRESS NOTES
Nutrition Assessment     Type and Reason for Visit: Reassess    Nutrition Recommendations/Plan:   Continue current TF regimen     Nutrition Assessment:     Chart reviewed; patient continues on TF via PEG. Jevity 1.5 infusing at goal rate at time of visit. Tolerating well. Patient sleeping and no family at bedside. Discharge planning underway. Malnutrition Assessment:  Malnutrition Status: No malnutrition     Estimated Daily Nutrient Needs:  Energy (kcal):  1441 kcal (BMR (1108) x 1. 3AF)  Protein (g):  55-69g (0.8-1.0 g/kg bw)       Fluid (ml/day):  1450    Nutrition Related Findings:         Current Nutrition Therapies:  DIET NPO With Tube Feedings  DIET TUBE FEEDING (Jevity 1.5 @ 40 mL/hr + 100 mL flushes q 4 hours, provides 1440 kcal, 61g protein, 1330 mL water)    Anthropometric Measures:  · Height:  5' 2\" (157.5 cm)  · Current Body Wt:  68.9 kg (151 lb 14.4 oz)  · BMI: 27.8    Nutrition Diagnosis:   · No nutritional diagnosis at this time        Nutrition Intervention:  Food and/or Nutrient Delivery: Continue tube feeding  Nutrition Education and Counseling: No recommendations at this time  Coordination of Nutrition Care: No recommendation at this time    Goals:  Continue to tolerate TF at goal rate next 2-4 days       Nutrition Monitoring and Evaluation:   Behavioral-Environmental Outcomes:    Food/Nutrient Intake Outcomes: Enteral nutrition intake/tolerance  Physical Signs/Symptoms Outcomes: Weight, GI status    Discharge Planning:    Enteral nutrtition     Electronically signed by Penelope Paez RD on 7/14/2020 at 9:48 AM    Contact Number: ext [phone], pager [phone]

## 2020-07-14 NOTE — PROGRESS NOTES
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 No hemorrhage is identified  CTA  H&N:  1. Occluded right internal carotid artery from the carotid bifurcation to the  [place] and further intraluminal thrombus at the right MCA bifurcation. 2.  Large core infarct in the right MCA distribution with a 55 cc ischemic  penumbra. Ischemic changes on delayed postcontrast head CT without evidence of hemorrhage. LDL= 93/A1c= 5.7/Trop neg  large on initial CT (catastrophic), Evolving on repeat CT ()    Patient was not a TPA candidate and neuro interventionalist did not recc any interventions  Complete the stroke work-up of the brain without contrast when able- pt has compatible PPM?  2D echo -EF 94-76%, severe Diastolic Dysfunction noted  Cont rectal aspirin, and statin when able to take p.o.- currently strict NPO per SLP eval on saturday  MBS 2020 with Severe oropharyngeal dysphagia  Discussed with family and patient's PCP and they agreed for PEG tube. S/p PEG tube . Feeding tube  IV labetalol prn  Pt is at a risk for hemorrhagic conversion of this large stroke , repeat CT = no bleed noted  Neuro consult noted-Eliquis to start on . IP Palliative care consulted - catastrophic large CVA with severe Dysphagia  Patient is ready to be discharged to IP Rehab whenever bed is available.  COVID-19 test negative (was ordered due to [facility] policy but now they are asking for test within 48 hr and insurance auth is pending)  Clinically stable  No changes in the last 24 hours     Hypertension  Resume norvasc, metoprolol and add prn hydralazine  Blood pressure is more controlled today    Arrhythmia  Xarelto on her PTA list, not  sure if she is taking it. Son is not sure too  She will be on eliquis starting July 17 as per neurology recommendations      Code Status: Full code confirmed in ER with the daughter and the son who is a power of   Surrogate Decision Maker:  Rosita Jacobo [phone]         DVT Prophylaxis: Heparin  GI Prophylaxis: not indicated     Baseline: Walk with a walker    Recommended Disposition: IPR. medically stable to be discharged to inpatient rehab whenever bed is available and once insurance [employer] is completed. Subjective:     Chief Complaint / Reason for Physician Visit :F/u Large R MCA CVA, dysphagia,   Acute right MCA stroke/dysphagia     Discussed with RN events overnight. \"I slept well last night\"    No changes in the last 24 hours. Blood pressure is more controlled. Review of Systems:  Symptom Y/N Comments  Symptom Y/N Comments   Fever/Chills n   Chest Pain n    Poor Appetite    Edema     Cough n   Abdominal Pain n    Sputum    Joint Pain     SOB/FRANK n   Pruritis/Rash     Nausea/vomit n   Tolerating PT/OT     Diarrhea    Tolerating Diet     Constipation    Other       Could NOT obtain due to:          Objective:     VITALS:   Last 24hrs VS reviewed since prior progress note.  Most recent are:  Patient Vitals for the past 24 hrs:   Temp Pulse Resp BP SpO2   07/14/20 1121 98.7 °F (37.1 °C) 87 18 171/69 99 %   07/14/20 0653 99 °F (37.2 °C) 92 18 137/62 97 %   07/14/20 0311 99.2 °F (37.3 °C) 73 20 145/75 100 %   07/14/20 0005 -- 90 -- (!) 146/92 --   07/13/20 2319 98 °F (36.7 °C) 90 18 185/74 93 %   07/13/20 1842 98.3 °F (36.8 °C) 79 18 140/61 100 %   07/13/20 1528 97.7 °F (36.5 °C) 90 16 125/64 95 %       Intake/Output Summary (Last 24 hours) at 7/14/2020 1408  Last data filed at 7/14/2020 0654  Gross per 24 hour   Intake 150 ml Output 400 ml   Net -250 ml        PHYSICAL EXAM:  General: WD, WN.more awake today, no acute distress    EENT:  EOMI. Anicteric sclerae. MMM  Resp:  CTA bilaterally, no wheezing or rales. No accessory muscle use  CV:  Regular  rhythm,  No edema  GI:  Soft, Non distended, Non tender.  +Bowel sounds  Neurologic:  A&O x3, Left side flaccid, right side 5/5 strength, left facial droop  Psych:   Good insight  Skin:  No rashes. No jaundice    Reviewed most current lab test results and cultures  YES  Reviewed most current radiology test results   YES  Review and summation of old records today    NO  Reviewed patient's current orders and MAR    YES  PMH/SH reviewed - no change compared to H&P  ________________________________________________________________________  Care Plan discussed with:    Comments   Patient x    Family      RN x    Care Manager x    Consultant                        Multidiciplinary team rounds were held today with , nursing, pharmacist and clinical coordinator. Patient's plan of care was discussed; medications were reviewed and discharge planning was addressed. ________________________________________________________________________        Comments   >50% of visit spent in counseling and coordination of care     ________________________________________________________________________  Ag Natarajan MD     Procedures: see electronic medical records for all procedures/Xrays and details which were not copied into this note but were reviewed prior to creation of Plan. LABS:  I reviewed today's most current labs and imaging studies. Pertinent labs include:  No results for input(s): WBC, HGB, HCT, PLT, HGBEXT, HCTEXT, PLTEXT, HGBEXT, HCTEXT, PLTEXT in the last 72 hours. No results for input(s): NA, K, CL, CO2, GLU, BUN, CREA, CA, MG, PHOS, ALB, TBIL, TBILI, ALT, INR, INREXT, INREXT in the last 72 hours.     No lab exists for component: SGOT    Signed: Ag Natarajan MD

## 2020-07-14 NOTE — PROGRESS NOTES
Problem: Mobility Impaired (Adult and Pediatric)  Goal: *Acute Goals and Plan of Care (Insert Text)  Description:   FUNCTIONAL STATUS PRIOR TO ADMISSION: unclear     HOME SUPPORT PRIOR TO ADMISSION: The patient lived at home alone? Physical Therapy Goals  Initiated 7/13/2020  1. Patient will roll side to side in bed with moderate assistance  within 7 day(s). 2. Patient will move from supine to sit and sit to supine  and scoot up and down in bed with maximal assistance within 7 day(s). 3  Patient will sit EOB for 2 minutes with fair static sitting balance within 7 days  6. Patient will improve Arizmendi Balance score by 7 points within 7 days. Physical Therapy Goals  Initiated 7/5/2020  1. Patient will move from supine to sit and sit to supine , scoot up and down and roll side to side in bed with maximal assistance within 7 day(s). 2.  Patient will transfer from bed to chair and chair to bed with maximal assistance using the least restrictive device within 7 day(s). 3.  Patient will perform sit to stand with maximal assistance within 7 day(s). 4.  Patient will ambulate with maximal assistance for 3 feet with the least restrictive device within 7 day(s). 5. Patient will improve Arizmendi Balance score by 7 points within 7 days. Outcome: Not Progressing Towards Goal   PHYSICAL THERAPY TREATMENT  Patient: Merrill Hale (78 y.o. female)  Date: 7/14/2020  Diagnosis: CVA (cerebral vascular accident) ([facility] [place] [facility] [I63.9]   <principal problem not specified>  Procedure(s) (LRB):  ESOPHAGOGASTRODUODENOSCOPY (EGD)URGENT (N/A)  PERCUTANEOUS ENDOSCOPIC GASTROSTOMY TUBE INSERTION (N/A) 6 Days Post-Op  Precautions: Aspiration, Bed Alarm, Fall  Chart, physical therapy assessment, plan of care and goals were reviewed. ASSESSMENT  Patient continues with skilled PT services and is not progressing towards goals. Patient more alert today.  She continues to require total A for bed mobility and sitting balance remains poor. Patient demonstrating increasing pusher syndrome especially in sitting but also noted in supine which affects her ability to actively move R LE. She requires mod to max facilitation to actively flex R knee. Sitting balance is poor requiring total A to maintain midline secondary to increased pushing. Patient continues to demonstrate eye deviation to R with inability to attain midline. Head is rotated to R also and torticollis developing. Unable to achieve full neck ROM with stretching. 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 She will require extensive rehab following discharge. Continue to recommend SNF following discharge. Current Level of Function Impacting Discharge (mobility/balance): total A           PLAN :  Patient continues to benefit from skilled intervention to address the above impairments. Continue treatment per established plan of care. to address goals. Recommendation for discharge: (in order for the patient to meet his/her long term goals)  Therapy up to 5 days/week in SNF setting    This discharge recommendation:  Has been made in collaboration with the attending provider and/or case management    IF patient discharges home will need the following DME: hospital bed, mechanical lift, and wheelchair         OBJECTIVE DATA SUMMARY:   Critical Behavior:  Neurologic State: Alert, Confused  Orientation Level: Oriented to person, Oriented to place, Disoriented to time, Disoriented to situation  Cognition: Decreased attention/concentration, Decreased command following  Safety/Judgement: Fall prevention, Decreased insight into deficits  Functional Mobility Training:  Bed Mobility:  Rolling: Maximum assistance; Total assistance  Supine to Sit: Total assistance;Assist x2  Sit to Supine: Total assistance;Assist x2  Scooting:  Total assistance        Transfers:      Not able to safely assess                             Balance:  Sitting: Impaired  Sitting - Static: Poor (constant support)(posterior lean and pushing to L)  Sitting - Dynamic: Poor (constant support)      Activity Tolerance:   Fair and requires rest breaks  Please refer to the flowsheet for vital signs taken during this treatment. After treatment patient left in no apparent distress:   Supine in bed, Call bell within reach, and Bed / chair alarm activated    COMMUNICATION/COLLABORATION:   The patients plan of care was discussed with: Occupational therapist, Registered nurse, and Case management.      Quan Mcdonough, PT   Time Calculation: 36 mins

## 2020-07-14 NOTE — PROGRESS NOTES
3:48pm:  Care Manager initiated referral with [address] [place] [address] for SNF admission for SNF. Will await decision. 3:10pm: Advised patient and daughter June Driscoll that discharge to [facility] will more than likely be on 7/15/20. Care Manager will follow up with [facility] and [address] and Daughter June Driscoll in am of 7/15.    2:00pm:  Halle Baird from admissions at [facility] called and she is waiting on auth from [address] [place] [address] for patient to be discharged to [facility].  She is going to call [employer] to expedite the process. Await further call from Halle Baird at [facility]    1:15pm:  Care Manager met with daughter Ana Grayson ([phone]) and patient at bedside. Rehab at a [address] [place] [address] facility was discussed and the 3 options were presented to the patient. Patient and daughter prefer [facility];  Patient is ready for discharge and Care Manager called admissions at [facility] and left voice message for admissions director to return call. Waiting to hear back from [facility] about whether bed is available today. If so will set up transport via [facility]. FOC was taken verbally and placed on patient's chart. [facility] have accepted patient. Care manager and daughter June Driscoll will meet with patient when daughter arrives to discuss SNF early afternoon. Transportation via [facility] will be arranged     Care Manager will continue to follow for discharge needs.     Michelle Bernal, RN  Care Manager  Ext [phone]

## 2020-07-14 NOTE — PROGRESS NOTES
Bedside and Verbal shift change report given to David RN (oncoming nurse) by Sonya Wilson RN (offgoing nurse). Report included the following information SBAR, Kardex, MAR and Recent Results.

## 2020-07-14 NOTE — PROGRESS NOTES
Problem: Dysphagia (Adult)  Goal: *Acute Goals and Plan of Care (Insert Text)  Description: Speech Therapy Goals  Initiated 7/4/2020  1. Patient will participate in re-evaluation of swallow function within 7 days. Continued 7/13/2020   2. Patient will complete MBS 7/7/20. Goal met    Outcome: Progressing Towards Goal     Problem: Motor Speech Impaired (Adult)  Goal: *Acute Goals and Plan of Care (Insert Text)  Description: 7/7/2020  Speech path goals  1. Patient will produce short sentences with 90% intelligibility with max cues. Continued 7/13/2020   2. Patient will produce lengthier sentences with 90% intelligibility with max cues. Continued 7/13/2020     3. Patient will produce conversational speech with 70% intelligibility with max cues. Continued 7/13/2020     4. Patient will state 3 motor speech strategies with 66% acc with min cues. Continued 7/13/2020     Outcome: Progressing Towards Goal     SPEECH LANGUAGE PATHOLOGY DYSPHAGIA AND SPEECH TREATMENT  Patient: Dipesh Palacios (78 y.o. female)  Date: 7/14/2020  Diagnosis: [facility]cere[facility] [I63.9]   <principal problem not specified>  Procedure(s) (LRB):  ESOPHAGOGASTRODUODENOSCOPY (EGD)URGENT (N/A)  PERCUTANEOUS ENDOSCOPIC GASTROSTOMY TUBE INSERTION (N/A) 6 Days Post-Op  Precautions:  Aspiration, Bed Alarm, Fall    ASSESSMENT:  Patient with continued moderate-severe oral dysphagia characterized by impaired bolus acceptance with patient never closing her lips around spoon, and ice was placed in anterior oral cavity. Patient then with slow oral prep with prolonged oral holding requiring verbal cues to manipulate bolus. Patient reported that she swallowed when she had not, and only swallowed when cued to open her mouth. Although no overt s/s aspiration observed, severity of oral dysphagia limits safe and efficient PO intake.     Patient also with continued moderate dysarthria characterized by decreased breath support resulting in low vocal volume that did not improve despite max verbal cues. Continue to recommend SLP treatment to improve functional communication. PLAN:  Recommendations and Planned Interventions:  -Continue nutrition, hydration, and meds via PEG  -Cue patient for loud speech  -SLP to follow for continued dysphagia and dysarthria treatment  Patient continues to benefit from skilled intervention to address the above impairments. Continue treatment per established plan of care. Discharge Recommendations:  Skilled Nursing Facility     SUBJECTIVE:   Patient stated Somebody got tired of me being at home.  Oriented to person and place only. OBJECTIVE:   Cognitive and Communication Status:  Neurologic State: Alert, Confused  Orientation Level: Oriented to person, Oriented to place, Disoriented to time, Disoriented to situation  Cognition: Decreased attention/concentration, Decreased command following  Perception: Cues to maintain midline in sitting, Cues to attend left visual field, Cues to attend to left side of body  Perseveration: No perseveration noted  Safety/Judgement: Fall prevention, Decreased insight into deficits    Dysphagia Treatment and Interventions:     P.O. Trials:  Patient Position: upright in bed  Vocal quality prior to P.O.: Low volume  Consistency Presented: Ice chips  How Presented: SLP-fed/presented;Spoon     Bolus Acceptance: Impaired(did not close lips around spoon)  Bolus Formation/Control: Impaired  Type of Impairment: Delayed; Other (comment)(prolonged oral holding, required verbal cues)  Propulsion: Delayed (# of seconds)  Oral Residue: None  Initiation of Swallow: Delayed (# of seconds)     Aspiration Signs/Symptoms: None            Speech Treatment and Interventions:   Motor Speech:     Intelligibility: Impaired     Phrase Intelligibility (%): 20 %             Pain:  Pain Scale 1: Numeric (0 - 10)  Pain Intensity 1: 0       After treatment:   Patient left in no apparent distress in bed, Call bell within reach, and Nursing notified    COMMUNICATION/EDUCATION:   The patient's plan of care including recommendations, planned interventions, and recommended diet changes were discussed with: Registered nurse.        NIKKI Choi  Time Calculation: 20 mins

## 2020-07-14 NOTE — PROGRESS NOTES
UAI completed and submitted to [facility] in anticipation of Medicaid application and need for long term care.     Almas Ortiz RN, BSN, [address] [place] [address]    Coordinator  [phone]

## 2020-07-14 NOTE — PROGRESS NOTES
Problem: Falls - Risk of  Goal: *Absence of Falls  Description: Document Makayla [place] Fall Risk and appropriate interventions in the flowsheet. Outcome: Progressing Towards Goal  Note: Fall Risk Interventions:       Mentation Interventions: Adequate sleep, hydration, pain control, Bed/chair exit alarm, Door open when patient unattended, More frequent rounding, Reorient patient    Medication Interventions: Bed/chair exit alarm, Patient to call before getting OOB    Elimination Interventions: Bed/chair exit alarm, Call light in reach    History of Falls Interventions: Bed/chair exit alarm, Door open when patient unattended         Problem: Patient Education: Go to Patient Education Activity  Goal: Patient/Family Education  Outcome: Progressing Towards Goal     Problem: Pressure Injury - Risk of  Goal: *Prevention of pressure injury  Description: Document Howard Scale and appropriate interventions in the flowsheet. Outcome: Progressing Towards Goal  Note: Pressure Injury Interventions:  Sensory Interventions: Avoid rigorous massage over bony prominences, Keep linens dry and wrinkle-free    Moisture Interventions: Absorbent underpads, Apply protective barrier, creams and emollients, Check for incontinence Q2 hours and as needed, Internal/External urinary devices, Maintain skin hydration (lotion/cream)    Activity Interventions: Assess need for specialty bed    Mobility Interventions: Turn and reposition approx.  every two hours(pillow and wedges)    Nutrition Interventions: Document food/fluid/supplement intake    Friction and Shear Interventions: Apply protective barrier, creams and emollients, Lift sheet                Problem: Patient Education: Go to Patient Education Activity  Goal: Patient/Family Education  Outcome: Progressing Towards Goal     Problem: Patient Education: Go to Patient Education Activity  Goal: Patient/Family Education  Outcome: Progressing Towards Goal     Problem: Patient Education: Go to Patient Education Activity  Goal: Patient/Family Education  Outcome: Progressing Towards Goal     Problem: Patient Education: Go to Patient Education Activity  Goal: Patient/Family Education  Outcome: Progressing Towards Goal     Problem: Patient Education: Go to Patient Education Activity  Goal: Patient/Family Education  Outcome: Progressing Towards Goal     Problem: TIA/CVA Stroke: Day 2 Until Discharge  Goal: Off Pathway (Use only if patient is Off Pathway)  Outcome: Progressing Towards Goal  Goal: Activity/Safety  Outcome: Progressing Towards Goal  Goal: Diagnostic Test/Procedures  Outcome: Progressing Towards Goal  Goal: Nutrition/Diet  Outcome: Progressing Towards Goal  Goal: Discharge Planning  Outcome: Progressing Towards Goal  Goal: Medications  Outcome: Progressing Towards Goal  Goal: Respiratory  Outcome: Progressing Towards Goal  Goal: Treatments/Interventions/Procedures  Outcome: Progressing Towards Goal  Goal: Psychosocial  Outcome: Progressing Towards Goal  Goal: *Verbalizes anxiety and depression are reduced or absent  Outcome: Progressing Towards Goal  Goal: *Absence of aspiration  Outcome: Progressing Towards Goal  Goal: *Absence of deep venous thrombosis signs and symptoms(Stroke Metric)  Outcome: Progressing Towards Goal  Goal: *Optimal pain control at patient's stated goal  Outcome: Progressing Towards Goal  Goal: *Tolerating diet  Outcome: Progressing Towards Goal  Goal: *Ability to perform ADLs and demonstrates progressive mobility and function  Outcome: Progressing Towards Goal  Goal: *Stroke education continued(Stroke Metric)  Outcome: Progressing Towards Goal

## 2020-07-14 NOTE — PROGRESS NOTES
Problem: Self Care Deficits Care Plan (Adult)  Goal: *Acute Goals and Plan of Care (Insert Text)  Description: FUNCTIONAL STATUS PRIOR TO ADMISSION: per chart pt lived alone, amb with RW, I with ADLs. HOME SUPPORT: The patient lived alone with family in area. Occupational Therapy Goals  Initiated 7/5/2020; Reviewed 7/13/2020 for weekly reassessment, none met, continue all   1. Patient will perform grooming with supervision/set-up within 7 day(s). 2.  Patient will perform upper body dressing with min A using troy technique PRN within 7 day(s). 3.  Patient will locate and utilize 25% of ADL objects presented to L of midline with minimal assistance/contact guard and assist and cues within 7 day(s). 4.  Patient will perform sitting at EOB without UE support for 2 minutes with minimal assistance/contact guard assist within 7 day(s). 5.  Patient will follow 50% of simple one step commands for ADL within 7 day(s). 6.  Patient will participate in upper extremity therapeutic exercise/activities with minimal assistance/contact guard assist for 5 minutes within 7 day(s). 7.  Patient will utilize energy conservation techniques during functional activities with verbal, visual and tactile cues within 7 day(s). 8.  Patient will improve their Fugl Lo score by 5 points in prep for ADLs within 7 days. Outcome: Progressing Towards Goal   OCCUPATIONAL THERAPY TREATMENT  Patient: Diamond Zuluaga (78 y.o. female)  Date: 7/14/2020  Diagnosis: CVA (cerebral vascular accident) ([facility] [I63.9]   <principal problem not specified>  Procedure(s) (LRB):  ESOPHAGOGASTRODUODENOSCOPY (EGD)URGENT (N/A)  PERCUTANEOUS ENDOSCOPIC GASTROSTOMY TUBE INSERTION (N/A) 6 Days Post-Op  Precautions: Aspiration, Bed Alarm, Fall  Chart, occupational therapy assessment, plan of care, and goals were reviewed. ASSESSMENT  Patient continues with skilled OT services and is progressing towards goals.   Pt has eyes focused to the right and with manual head turns the the left pt has a +VOR. She is unable to track to the left and with seated EOB tasks heavily pushes to the left. Mild tone noted in LUE but is non functional.  Manual assist needed with head turns in attempt at tracking exercises today and pt is resistive to head turn to the left. Performed EOB lateral weight shifts and gentle rocking left and right and briefly pt stopped pushing and relaxed her RUE/LE. Pt was able to wipe her mouth seated EOB with max assist to remain in midline and using RUE to wipe lips and chin. Manual cues needed for pt to initiate task. Attempted visual perceptual exercises having pt place used tissues on blue paper in front of her and to the right of midline. Pt was unable to locate colored paper and needed assist with head turns and hand over hand assist.  I question if pt has hemianopsia. Assisted pt back to supine at end of session with heels floated and left UE supported with pillow. Positioned pts bed to fully face the window to look at as pt could not see the window with the bed turned at a 45 degree angle to the window. Current Level of Function Impacting Discharge (ADLs): total assist bed mobility and max/total assist for seated EOB balance with pt pushing with RUE/LE    Grooming  Washing Face: Stand-by assistance(mouth and nose, R hand seated EOB with max A balance)    Other factors to consider for discharge: max/total assist         PLAN :  Patient continues to benefit from skilled intervention to address the above impairments. Continue treatment per established plan of care. to address goals.     Recommend with staff: frequent turns    Recommend next OT session: mirror for feedback with sitting, attempt positioning pt against firm surface on the right to reduce pushing, ADLs    Recommendation for discharge: (in order for the patient to meet his/her long term goals)  Therapy up to 5 days/week in SNF setting    This discharge recommendation:  Has been made in collaboration with the attending provider and/or case management    IF patient discharges home will need the following DME: hospital bed, mechanical lift, and wheelchair, bedpan       SUBJECTIVE:   Patient stated I can see it.   Pt was not focusing on the object that therapist was asking her to focus on    OBJECTIVE DATA SUMMARY:   Cognitive/Behavioral Status:  Neurologic State: Alert;Confused  Orientation Level: Oriented to person  Cognition: Decreased attention/concentration;Decreased command following  Perception: Cues to attend to left side of body;Cues to attend left visual field;Cues to maintain midline in sitting; Tactile;Verbal;Visual  Perseveration: No perseveration noted  Safety/Judgement: Decreased awareness of need for assistance    Functional Mobility and Transfers for ADLs:  Bed Mobility:  Rolling: Maximum assistance; Total assistance  Supine to Sit: Total assistance;Assist x2  Sit to Supine: Total assistance;Assist x2  Scooting: Total assistance    Transfers:      Unsafe to attempt       Balance:  Sitting: Impaired  Sitting - Static: Poor (constant support)(posterior lean and pushing to L)  Sitting - Dynamic: Poor (constant support)    ADL Intervention:       Grooming  Washing Face: Stand-by assistance(mouth and nose, R hand seated EOB with max A balance)    Cognitive Retraining  Orientation Retraining: Situation  Problem Solving: Identifying the problem; Identifying the task  Safety/Judgement: Decreased awareness of need for assistance    Therapeutic Exercises:   See assessment        Activity Tolerance:   Fair  Please refer to the flowsheet for vital signs taken during this treatment. After treatment patient left in no apparent distress:   Supine in bed, Heels elevated for pressure relief, Call bell within reach, and Bed / chair alarm activated    COMMUNICATION/COLLABORATION:   The patients plan of care was discussed with: Physical therapist, Registered nurse, and patient . Neli Jo, OTR/L  Time Calculation: 32 mins

## 2020-07-15 VITALS
SYSTOLIC BLOOD PRESSURE: 159 MMHG | DIASTOLIC BLOOD PRESSURE: 66 MMHG | TEMPERATURE: 98.9 F | HEART RATE: 88 BPM | WEIGHT: 152 LBS | HEIGHT: 62 IN | RESPIRATION RATE: 20 BRPM | OXYGEN SATURATION: 100 % | BODY MASS INDEX: 27.97 KG/M2

## 2020-07-15 PROCEDURE — 74011250637 HC RX REV CODE- 250/637: Performed by: PSYCHIATRY & NEUROLOGY

## 2020-07-15 PROCEDURE — 77030018798 HC PMP KT ENTRL FED COVD -A

## 2020-07-15 PROCEDURE — 77030038269 HC DRN EXT URIN PURWCK BARD -A

## 2020-07-15 PROCEDURE — 74011250636 HC RX REV CODE- 250/636: Performed by: INTERNAL MEDICINE

## 2020-07-15 PROCEDURE — 74011250637 HC RX REV CODE- 250/637: Performed by: INTERNAL MEDICINE

## 2020-07-15 RX ORDER — ATORVASTATIN CALCIUM 40 MG/1
40 TABLET, FILM COATED ORAL
Qty: 30 TAB | Refills: 0 | Status: SHIPPED | OUTPATIENT
Start: 2020-07-15 | End: 2020-10-01 | Stop reason: SDUPTHER

## 2020-07-15 RX ORDER — ASPIRIN 81 MG/1
81 TABLET ORAL DAILY
Qty: 1 TAB | Refills: 0 | Status: SHIPPED | OUTPATIENT
Start: 2020-07-15 | End: 2020-07-16

## 2020-07-15 RX ADMIN — ASPIRIN 325 MG ORAL TABLET 325 MG: 325 PILL ORAL at 10:27

## 2020-07-15 RX ADMIN — DEXTROSE MONOHYDRATE, SODIUM CHLORIDE, AND POTASSIUM CHLORIDE 75 ML/HR: 50; 4.5; .745 INJECTION, SOLUTION INTRAVENOUS at 10:22

## 2020-07-15 RX ADMIN — METOPROLOL SUCCINATE 25 MG: 25 TABLET, EXTENDED RELEASE ORAL at 10:27

## 2020-07-15 RX ADMIN — Medication 10 ML: at 06:48

## 2020-07-15 RX ADMIN — AMLODIPINE BESYLATE 5 MG: 5 TABLET ORAL at 10:28

## 2020-07-15 RX ADMIN — Medication: at 10:28

## 2020-07-15 RX ADMIN — HEPARIN SODIUM 5000 UNITS: 5000 INJECTION INTRAVENOUS; SUBCUTANEOUS at 06:47

## 2020-07-15 NOTE — PROGRESS NOTES
Report given to Author Katelyn at [facility]. IV catheter removed with tip intact. Tube feeding removed from PEG. Telebox #[id number] placed at bedside. Patient left via [facility] with all belongings. Daughter at bedside.

## 2020-07-15 NOTE — DISCHARGE SUMMARY
Hospitalist Discharge Summary     Patient ID:  Tolu Pollard  [id number]  78 y.o.  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  female past medical history hypertension, arrhythmia , GERD who went to the ED dysphasia and facial paralysis concerning for stroke. Most of the HPI obtained from the daughter who was at bedside,  daughter reported that her mom could not get up this morning and  found to have facial droop and inability to [facility]. She also mentioned left-sided weakness that started this morning around 7 AM. At  baseline, patient ambulates with a walker, lives by herself. In the ED, code S level 2was called. A stat CT scan and CTA brain were done which showed Large acute right MCA infarct with suggestion of intraluminal thrombus in the MCA. 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 neuro  Interventionalist was called by the ED physician who there did not retain any indication for intervention. transfer to [facility] was therefore canceled . patient was not a TPA candidate either  ______________________________________________________________________  DISCHARGE SUMMARY/HOSPITAL COURSE:  for full details see H&P, daily progress notes, labs, consult notes.        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 No hemorrhage is identified  CTA  H&N:  1. 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 2.  Large core infarct in the right MCA distribution with a 55 cc ischemic  penumbra. Ischemic changes on delayed postcontrast head CT without evidence of hemorrhage. LDL= 93/A1c= 5.7/Trop neg  large on initial CT (catastrophic), Evolving on repeat CT (7/5)     Patient was not a TPA candidate and neuro interventionalist did not recc any interventions  2D echo -EF 10-75%, severe Diastolic Dysfunction noted  Cont rectal aspirin, and statin when able to take p.o.- currently strict NPO per SLP eval on saturday  MBS July 7, 2020 with Severe oropharyngeal dysphagia  Discussed with family and patient's PCP and they agreed for PEG tube. S/p PEG tube 07/08. Feeding tube  Pt is at a risk for hemorrhagic conversion of this large stroke , repeat CT 7/5= no bleed noted  Cont ASA (till 07/17) and statin   Neuro consult noted-Eliquis 5mg BID to start on July 17. IP Palliative care consulted - catastrophic large CVA with severe Dysphagia  Clinically stable      _______________________________________________________________________  Patient seen and examined by me on discharge day. Pertinent Findings:  Gen:    Not in distress  Chest: Clear lungs  CVS:   Regular rhythm.   No edema  Abd:  Soft, not distended, not tender  Neuro:  Alert  _______________________________________________________________________  DISCHARGE MEDICATIONS:   Current Discharge Medication List      START taking these medications    Details   atorvastatin (LIPITOR) 40 mg tablet Take 1 Tab by mouth nightly. Qty: 30 Tab, Refills: 0      apixaban (Eliquis) 5 mg tablet Take 1 Tab by mouth two (2) times a day. Qty: 60 Tab, Refills: 0         CONTINUE these medications which have CHANGED    Details   aspirin delayed-release 81 mg tablet Take 1 Tab by mouth daily for 1 day. Qty: 1 Tab, Refills: 0         CONTINUE these medications which have NOT CHANGED    Details   metoprolol succinate (TOPROL-XL) 25 mg XL tablet TAKE 1 TABLET BY MOUTH EVERY DAY  Qty: 90 Tab, Refills: 3      amLODIPine (NORVASC) 5 mg tablet Take 1 Tab by mouth daily. Qty: 90 Tab, Refills: 3               Patient Follow Up Instructions:    Activity: Activity as tolerated      Follow-up Information     Follow up With Specialties Details Why Contact Jose A Vergara MD Internal Medicine   [facility]  [phone]          ________________________________________________________________    Risk of deterioration: Low    Condition at Discharge:  Stable  __________________________________________________________________    Disposition  SNF/LTC    ____________________________________________________________________    Code Status: Full Code  ___________________________________________________________________      Total time in minutes spent coordinating this discharge (includes going over instructions, follow-up, prescriptions, and preparing report for sign off to her PCP) :  40 minutes    Signed:  Jose Maria Palomares MD

## 2020-07-15 NOTE — ADT AUTH CERT NOTES
Patient Demographics     Patient Name   Skylar Workman   [id number]  Sex   Female     1940  Address   [address] [place] [address]  Phone   [phone] (Home) *Preferred*   [phone] (Mobile)    CSN:    [id number]    Admit Date:  Admit Time  Room  Bed    Jul 3, 2020   2:59 PM  3120 [22535]  01 [[id number]]    Attending Providers     Provider  Pager  From  To    Teddy Bazzi MD   20    Farzad Arguello MD   20    Marsha Pop MD   20    Nat Lal MD   20     Emergency Contact(s)     Name  Relation  Home  Work  Mobile    Isidro Felton  [phone]        Daughter  [phone]      Skippy Saint  Daughter    [phone]    Utilization Reviews         Stroke: Ischemic - Care Day 11 (2020) by Alex Newman RN         Review Entered  Review Status    2020 13:56  Completed        Criteria Review       Care Day: 11 Care Date: 2020 Level of Care: Inpatient Floor    Guideline Day 3    Level Of Care    (X) Stroke unit or floor to discharge    2020 13:56:14 EDT by Zhane Shane      Neurostroke bed    Clinical Status    (X) * Hemodynamic stability    2020 13:56:14 EDT by Zhane Shane      98.3 79 18 100 140/61 RA    ( ) * Dangerous arrhythmia absent    ( ) * Mental status at baseline or stable and appropriate for next level of care    ( ) * Neurologic deficits absent or stable and appropriate for next level of care    ( ) * Adequate dietary intake    ( ) * Discharge plans and education understood    Activity    ( ) * Up to chair    ( ) * Assisted ambulation as tolerated    Routes    (X) * Oral hydration, medications, and diet    2020 13:56:14 EDT by Zhane Shane      Tyl 650 mg po q4 prn x1,  mg po qd, Lipitor 40 mg po qd, D5 1/2 NS 75 hr, Hep 5000 u sc q8, Hydralazine 10 mg iv q6,    (X) Advance diet as tolerated    2020 13:56:14 EDT by Pete Martinez Jo Ann Escoto      npo    Interventions    ( ) * Supplemental oxygen absent or at baseline requirement    Medications    ( ) * Antithrombotic therapy appropriate for next level of care established [J]    * Milestone    Additional Notes    7/13       Assessment / Plan:    Acute right MCA stroke POA    Occluded right internal carotid artery from the carotid bifurcation to the [place] and further intraluminal thrombus at the right MCA bifurcation POA    Acute Encephalopathy POA- due to above    Dysphagia due to acute stroke    CT brain:    Large acute right MCA infarct with suggestion of intraluminal thrombus in the    MCA. No hemorrhage is identified    CTA  H&N:    1. Occluded right internal carotid artery from the carotid bifurcation to the    [place] and further intraluminal thrombus at the right MCA bifurcation. 2.  Large core infarct in the right MCA distribution with a 55 cc ischemic    penumbra. Ischemic changes on delayed postcontrast head CT without evidence of hemorrhage. LDL= 93/A1c= 5.7/Trop neg    large on initial CT (catastrophic), Evolving on repeat CT (7/5)         Patient was not a TPA candidate and neuro interventionalist did not recc any interventions    Complete the stroke work-up of the brain without contrast when able- pt has compatible PPM?    2D echo -EF 07-83%, severe Diastolic Dysfunction noted    Cont rectal aspirin, and statin when able to take p.o.- currently strict NPO per SLP eval on saturday    MBS July 7, 2020 with Severe oropharyngeal dysphagia    Discussed with family and patient's PCP and they agreed for PEG tube. S/p PEG tube 07/08.  Feeding tube    IV labetalol prn    Pt is at a risk for hemorrhagic conversion of this large stroke , repeat CT 7/5= no bleed noted    Neuro consult noted-Eliquis to start on July 17.     IP Palliative care consulted - catastrophic large CVA with severe Dysphagia    Patient is ready to be discharged to IP Rehab whenever bed is available. COVID-19 test negative (was ordered due to IPR policy but now they are asking for test within 48 hr and insurance auth)    Clinically stable         Hypertension    Resume norvasc, metoprolol and add prn hydralazine         Arrhythmia    Xarelto on her PTA list, not  sure if she is taking it. Son is not sure too    She will be on eliquis before discharge              Code Status: Full code confirmed in ER with the daughter and the son who is a power of     Surrogate Decision Maker:       William Oneill [phone]              DVT Prophylaxis: Heparin    GI Prophylaxis: not indicated         Baseline: Walk with a walker         Recommended Disposition: IPR. medically stable to be discharged to inpatient rehab whenever bed is available and once insurance auth is completed.         Subjective:         Chief Complaint / Reason for Physician Visit :F/u Large R MCA CVA, dysphagia,    Acute right MCA stroke/dysphagia          Discussed with RN events overnight.         \"nothing much and I have no choice in going to facility         PHYSICAL EXAM:    General:          WD, WN.more awake today, no acute distress      EENT:              EOMI. Anicteric sclerae.  MMM    Resp:               CTA bilaterally, no wheezing or rales.  No accessory muscle use    CV:                  Regular  rhythm,  No edema    GI:                   Soft, Non distended, Non tender.  +Bowel sounds    Neurologic:      A&O x3, Left side flaccid, right side 5/5 strength, left facial droop    Psych:             Good insight    Skin:                No rashes.  No jaundice        Stroke: Ischemic - Care Day 5 (7/7/2020) by Katlyn Adames RN         Review Entered  Review Status    7/7/2020 12:40  Completed        Criteria Review       Care Day: 5 Care Date: 7/7/2020 Level of Care: Inpatient Floor    Guideline Day 3    Level Of Care    (X) Stroke unit or floor to discharge    7/7/2020 12:39:38 EDT by Milind Mendez      Neuro stroke bed    Clinical Status    (X) * Hemodynamic stability    7/7/2020 12:39:38 EDT by Javier Man      98.1 69 16 100 131/68 RA    ( ) * Dangerous arrhythmia absent    ( ) * Mental status at baseline or stable and appropriate for next level of care    ( ) * Neurologic deficits absent or stable and appropriate for next level of care    ( ) * Adequate dietary intake    ( ) * Discharge plans and education understood    Activity    ( ) * Up to chair    ( ) * Assisted ambulation as tolerated    Routes    (X) * Oral hydration, medications, and diet    7/7/2020 12:39:38 EDT by Javier Man       mg re qd, D5 1/2 MS 10 K 75 hr, Hep 5000 u sc q8,    (X) Advance diet as tolerated    7/7/2020 12:39:56 EDT by Javier Man      npo    Interventions    ( ) * Supplemental oxygen absent or at baseline requirement    Medications    ( ) * Antithrombotic therapy appropriate for next level of care established [J]    7/7/2020 12:39:38 EDT by Javier Man    Subject: Additional Clinical Information       K 3.2    : Severe oropharyngeal dysphagia. Patient took one swallow of puddingmixed with barium with no penetration or aspiration. There is pooling in the    vallecula. * Milestone    Additional Notes    7/7       IM: I talked with the patient's son, Suha Corona, and he was updated on the plan, all questions were answered.         Discussed with the son, Suha Corona, about the possibility of big tube and he agreed on that       The patient's son, Suha Corona, requested that we talk to patient's primary care physician Dr. Geraldo Howard.     I talked to Dr. Gearldo Howard on his cell phone and he was updated on the patient status.  Permission was given from the son to talk to Dr. Geraldo Howard

## 2020-07-15 NOTE — PROGRESS NOTES
Problem: Falls - Risk of  Goal: *Absence of Falls  Description: Document Armani Guajardo Fall Risk and appropriate interventions in the flowsheet. Outcome: Progressing Towards Goal  Note: Fall Risk Interventions:       Mentation Interventions: Adequate sleep, hydration, pain control, Bed/chair exit alarm, Door open when patient unattended, More frequent rounding, Reorient patient    Medication Interventions: Bed/chair exit alarm    Elimination Interventions: Bed/chair exit alarm, Call light in reach    History of Falls Interventions: Bed/chair exit alarm, Door open when patient unattended         Problem: Patient Education: Go to Patient Education Activity  Goal: Patient/Family Education  Outcome: Progressing Towards Goal     Problem: Pressure Injury - Risk of  Goal: *Prevention of pressure injury  Description: Document Howard Scale and appropriate interventions in the flowsheet. Outcome: Progressing Towards Goal  Note: Pressure Injury Interventions:  Sensory Interventions: Assess changes in LOC, Avoid rigorous massage over bony prominences, Keep linens dry and wrinkle-free, Turn and reposition approx. every two hours (pillows and wedges if needed)    Moisture Interventions: Absorbent underpads, Apply protective barrier, creams and emollients, Internal/External urinary devices, Maintain skin hydration (lotion/cream)    Activity Interventions: Pressure redistribution bed/mattress(bed type)    Mobility Interventions: Pressure redistribution bed/mattress (bed type), Turn and reposition approx.  every two hours(pillow and wedges)    Nutrition Interventions: Offer support with meals,snacks and hydration    Friction and Shear Interventions: Apply protective barrier, creams and emollients, Lift team/patient mobility team                Problem: Patient Education: Go to Patient Education Activity  Goal: Patient/Family Education  Outcome: Progressing Towards Goal     Problem: Patient Education: Go to Patient Education Activity  Goal: Patient/Family Education  Outcome: Progressing Towards Goal     Problem: Patient Education: Go to Patient Education Activity  Goal: Patient/Family Education  Outcome: Progressing Towards Goal     Problem: TIA/CVA Stroke: Day 2 Until Discharge  Goal: Off Pathway (Use only if patient is Off Pathway)  Outcome: Progressing Towards Goal  Goal: Activity/Safety  Outcome: Progressing Towards Goal  Goal: Diagnostic Test/Procedures  Outcome: Progressing Towards Goal  Goal: Nutrition/Diet  Outcome: Progressing Towards Goal  Goal: Discharge Planning  Outcome: Progressing Towards Goal  Goal: Medications  Outcome: Progressing Towards Goal  Goal: Respiratory  Outcome: Progressing Towards Goal  Goal: Treatments/Interventions/Procedures  Outcome: Progressing Towards Goal  Goal: Psychosocial  Outcome: Progressing Towards Goal  Goal: *Verbalizes anxiety and depression are reduced or absent  Outcome: Progressing Towards Goal  Goal: *Absence of aspiration  Outcome: Progressing Towards Goal  Goal: *Absence of deep venous thrombosis signs and symptoms(Stroke Metric)  Outcome: Progressing Towards Goal  Goal: *Optimal pain control at patient's stated goal  Outcome: Progressing Towards Goal  Goal: *Tolerating diet  Outcome: Progressing Towards Goal  Goal: *Ability to perform ADLs and demonstrates progressive mobility and function  Outcome: Progressing Towards Goal  Goal: *Stroke education continued(Stroke Metric)  Outcome: Progressing Towards Goal     Problem: Patient Education: Go to Patient Education Activity  Goal: Patient/Family Education  Outcome: Progressing Towards Goal

## 2020-07-15 NOTE — PROGRESS NOTES
Transition of Care Plan to SNF/Rehab    Communication to Patient/Family:  Met with patient and family and they are agreeable to the transition plan. The Plan for Transition of Care is related to the following treatment goals:     CM informed both Kate Chavira and Gerald Ruelas to inform them of pt's accepted and insurance auth to [facility]. Both family members are agreeable to pt's transition to facility. CM informed family of 2nd IM Medicare Letter (placed in chart). The Patient and/or patient representative was provided with a choice of provider and agrees  with the discharge plan. Yes [x] No []    A Freedom of choice list was provided with basic dialogue that supports the patient's individualized plan of care/goals and shares the quality data associated with the providers. Yes [x] No []    SNF/Rehab Transition:  Patient has been accepted to [facility] SNF/Rehab and meets criteria for admission. Patient will transported by [facility]  and expected to leave at 2:00pm.    Communication to SNF/Rehab:  Bedside RN, Alma Rosa Krishnamurthy, has been notified to update the transition plan to the facility and call report ([phone]). Discharge information has been updated on the AVS. And communicated to facility via [employer], or CC link. Discharge instructions to be fax'd to facility at [facility] #). Patient has been identified as part of the [employer].  For Care Coordination associated with that Bundle Program, please contact   Bundle information has been communication to . Nursing Please include all hard scripts for controlled substances, med rec and dc summary, and AVS in packet. Reviewed and confirmed with facility, [facility] , can manage the patient care needs for the following:     Donovan Grant with (X) only those applicable:  Medication:  [x]Medications are available at the facility  []IV Antibiotics    []Controlled Substance - hard copies available sent.   []Weekly Labs    Equipment:  []CPAP/BiPAP  []Wound Vacuum  []Roach or Urinary Device  []PICC/Central Line  []Nebulizer  []Ventilator    Treatment:  []Isolation (for MRSA, VRE, etc.)  []Surgical Drain Management  []Tracheostomy Care  []Dressing Changes  []Dialysis with transportation  [x]PEG Care  []Oxygen  []Daily Weights for Heart Failure    Dietary:  []Any diet limitations  [x]Tube Feedings   []Total Parenteral Management (TPN)    Financial Resources:  []Medicaid Application Completed    []UAI Completed and copy given to pt/family  and copy given to pt/family  []A screening has previously been completed. []Level II Completed    [] Private pay individual who will not become   financially eligible for Medicaid within 6 months from admission to a [address] [place] [address]. [] Individual refused to have screening conducted. []Medicaid Application Completed    []The screening denied because it was determined individual did not need/did not qualify for nursing facility level of care. [] Out of state residents seeking direct admission to a [address] facility. [] Individuals who are inpatients of an out of state hospital, or in state or out of state veterans/ hospital and seek direct admission to a [address] facility  [] Individuals who are pateints or residents of a state owned/operated facility that is licensed by [employer] of [employer] (DBS) and seek direct admission to [address] [place] [address]  [] A screening not required for enrollment in [address] [address] [address] services as set out in [address] [place] [address] 88-  [] [facility]) staff shall perform screenings of the [facility] clients. Advanced Care Plan:  []Surrogate Decision Maker of Care  []POA  []Communicated Code Status and copy sent. Other:   CM reviewed with Haroldo Guevara (Coordinator at [facility]) pt's PEG placement and tube feeding care.

## 2020-07-15 NOTE — PROGRESS NOTES
JACOB:    SNF Placement   [employer] Auth  2nd IM Medicare Letter  Medical Transport      CM: Tabatha Valentine is currently working with the pt in the Neuro Unit. Pt has been accepted to [address] [place] [address] ([facility]) has received Shannon President. CM informed that pt's auth acceptance number is: [id number]    Pt will require 2nd IM Medicare Letter and medical transport at the time of d/c. CM will arrange.     Tabatha Valentine, MARTINA, [address] [place] [address]

## 2020-07-15 NOTE — PROGRESS NOTES
Peg Placement  Peg Placement  Bedside shift change report given to GAMALIEL Peterson (oncoming nurse) by GAMALIEL Hernandez (offgoing nurse). Report included the following information SBAR and Kardex.     Zone Phone:   [phone]      Significant changes during shift:  none        Patient Information    Rafael Murillo  78 y.o.  7/3/2020  2:59 PM by Jackie Torres MD. 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    Scalp lesion 10/23/2014    Sebaceous cyst 04/22/2014    Keloid 04/22/2014    Dysphagia 02/08/2011    Recurrent ear pain 02/08/2011    Acute pharyngitis 02/08/2011    Essential hypertension 02/08/2011    Edema, peripheral 02/08/2011    Pure hypercholesterolemia 02/08/2011    Allergic rhinitis, cause unspecified 02/08/2011    Diverticulosis of colon (without mention of hemorrhage) 02/08/2011    Herniated nucleus pulposus ( slipped disc) 02/08/2011    Degenetrative Dis Disease, 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    Carpal tunnel syndrome 02/08/2011    Unspecified cataract 02/08/2011    GERD Gastro- Esophageal Reflux Disease 02/08/2011    Degenerative Joint Disese ( improved/resolving) 02/08/2011    Menopausal 02/08/2011    Asymptomatic varicose veins 02/08/2011     Past Medical History:   Diagnosis Date    Acute pharyngitis 2/8/2011    Allergic rhinitis, cause unspecified 2/8/2011    Arrhythmia     PVC    Asymptomatic varicose veins 2/8/2011    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     Carpal tunnel syndrome 2/8/2011    Degenerative Joint Disese ( improved/resolving) 2/8/2011    Degenetrative Dis Disease, 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    Diverticulosis of colon (without mention of hemorrhage) 2/8/2011    Dysphagia 2/8/2011    Edema, peripheral 2/8/2011    GERD (gastroesophageal reflux disease)     GERD Gastro- Esophageal Reflux Disease 2/8/2011    Herniated nucleus pulposus ( slipped disc) 2/8/2011    Menopausal 2/8/2011    PUD (peptic ulcer disease) 2012    Pure hypercholesterolemia 2/8/2011    Recurrent ear pain 2/8/2011    S/P radiation therapy     Scalp lesion 10/23/2014    Sebaceous cyst 4/22/2014    Unspecified cataract 2/8/2011    Unspecified essential hypertension 2/8/2011    Unspecified sleep apnea          Core Measures:    CVA: Yes Yes  CHF:No No  PNA:No No      Activity Status:    OOB to Chair No  Ambulated this shift No   Bed Rest Yes      DVT prophylaxis:    DVT prophylaxis Med- Yes  DVT prophylaxis SCD or MEY- No         Patient Safety:    Falls Score Total Score: 4  Safety Level_______  Bed Alarm On? Yes  Sitter?  No    Plan for upcoming shift: safety         Discharge Plan: Yes Rehab     Active Consults:  IP CONSULT TO NEUROLOGY  IP CONSULT TO PALLIATIVE CARE - PROVIDER  IP CONSULT TO GASTROENTEROLOGY  IP CONSULT TO PSYCHIATRY

## 2020-07-15 NOTE — PROGRESS NOTES
Transition of Care Plan to SNF/Rehab    Communication to Patient/Family:  Met with patient and family and they are agreeable to the transition plan. The Plan for Transition of Care is related to the following treatment goals:     CM informed both Zofia Recinos and Brenda Frank to inform them of pt's accepted and insurance auth to [facility]. Both family members are agreeable to pt's transition to facility. CM informed family of 2nd IM Medicare Letter (placed in chart). The Patient and/or patient representative was provided with a choice of provider and agrees  with the discharge plan. Yes [x] No []    A Freedom of choice list was provided with basic dialogue that supports the patient's individualized plan of care/goals and shares the quality data associated with the providers. Yes [x] No []    SNF/Rehab Transition:  Patient has been accepted to [facility] SNF/Rehab and meets criteria for admission. Patient will transported by [facility]  and expected to leave at 2:00pm.    Communication to SNF/Rehab:  Bedside RN, Jamie Harper, has been notified to update the transition plan to the facility and call report ([phone]). Discharge information has been updated on the AVS. And communicated to facility via [employer], or CC link. Discharge instructions to be fax'd to facility at [facility] #). Patient has been identified as part of the [employer].  For Care Coordination associated with that Bundle Program, please contact   Bundle information has been communication to . Nursing Please include all hard scripts for controlled substances, med rec and dc summary, and AVS in packet. Reviewed and confirmed with facility, [facility] , can manage the patient care needs for the following:     Payton Evans with (X) only those applicable:  Medication:  [x]Medications are available at the facility  []IV Antibiotics    []Controlled Substance - hard copies available sent.   []Weekly Labs    Equipment:  []CPAP/BiPAP  []Wound Vacuum  []Roach or Urinary Device  []PICC/Central Line  []Nebulizer  []Ventilator    Treatment:  []Isolation (for MRSA, VRE, etc.)  []Surgical Drain Management  []Tracheostomy Care  []Dressing Changes  []Dialysis with transportation  [x]PEG Care  []Oxygen  []Daily Weights for Heart Failure    Dietary:  []Any diet limitations  [x]Tube Feedings   []Total Parenteral Management (TPN)    Financial Resources:  []Medicaid Application Completed    []UAI Completed and copy given to pt/family  and copy given to pt/family  []A screening has previously been completed. []Level II Completed    [] Private pay individual who will not become   financially eligible for Medicaid within 6 months from admission to a [address] [place] [address]. [] Individual refused to have screening conducted. []Medicaid Application Completed    []The screening denied because it was determined individual did not need/did not qualify for nursing facility level of care. [] Out of state residents seeking direct admission to a [address] facility. [] Individuals who are inpatients of an out of state hospital, or in state or out of state veterans/ hospital and seek direct admission to a [address] facility  [] Individuals who are pateints or residents of a state owned/operated facility that is licensed by [employer] ([employer]) and seek direct admission to [address] [place] [address]  [] A screening not required for enrollment in [address] [address] [address] services as set out in [address] [place] [address] 37-  [] [facility]) staff shall perform screenings of the [facility] clients. Advanced Care Plan:  []Surrogate Decision Maker of Care  []POA  []Communicated Code Status and copy sent. Other:   CM reviewed with Sadiq Martines (Coordinator at [facility]) pt's PEG placement and tube feeding care.

## 2020-07-17 ENCOUNTER — TELEPHONE (OUTPATIENT)
Dept: CARDIOLOGY CLINIC | Age: 80
End: 2020-07-17

## 2020-07-17 NOTE — TELEPHONE ENCOUNTER
Pt's son states the pt had a massive stroke on the right side of the brain and paralyzed on the right side but it has affected the left side of her body. . Pt went to [facility] for the stroke purpose. Pt is in a rehab facility as of 7/15.          Phone:[phone]

## 2020-07-17 NOTE — TELEPHONE ENCOUNTER
Verified patient with two types of identifiers. Spoke with patient's son, verified on HIPAA. Son wanted to notify our office that patient had a major stroke and is now in rehab. Son states he got a message that she missed her PPM appointment he wanted to notify our office why. Asked son to bring patient's ppm box to rehab and asked the nurse to send over a manual transmission. Son states he will try to find the box at his mothers how and drop it off at her rehab. He will then ask the rehab facility to call our office to talk through how to send in a remote transmission. Patient verbalized understanding and will call with any other questions.

## 2020-07-21 ENCOUNTER — TELEPHONE (OUTPATIENT)
Dept: NEUROLOGY | Age: 80
End: 2020-07-21

## 2020-07-21 NOTE — TELEPHONE ENCOUNTER
----- Message from Richard Arellano sent at 7/21/2020 11:51 AM EDT -----  Regarding: dr bro/ telephone  General Message/Vendor Calls    Caller's first and last name: Caty Stroud from [facility] re[facility]      Reason for call: needs to know when the pts stables are going to be removed from her head and if they are supposed to do it then a date is needed      Callback required yes/no and why: yes      Best contact number(s): [phone]      Details to clarify the request:      Ronald Amaro

## 2020-08-25 RX ORDER — METOPROLOL SUCCINATE 25 MG/1
TABLET, EXTENDED RELEASE ORAL
Qty: 90 TAB | Refills: 3 | Status: SHIPPED | OUTPATIENT
Start: 2020-08-25 | End: 2021-05-20 | Stop reason: SDUPTHER

## 2020-09-02 ENCOUNTER — VIRTUAL VISIT (OUTPATIENT)
Dept: INTERNAL MEDICINE CLINIC | Age: 80
End: 2020-09-02
Payer: MEDICARE

## 2020-09-02 DIAGNOSIS — I69.359 CVA, OLD, HEMIPARESIS (HCC): ICD-10-CM

## 2020-09-02 DIAGNOSIS — I10 ESSENTIAL HYPERTENSION: ICD-10-CM

## 2020-09-02 DIAGNOSIS — M47.817 LUMBAR AND SACRAL OSTEOARTHRITIS: ICD-10-CM

## 2020-09-02 DIAGNOSIS — E78.00 HYPERCHOLESTEREMIA: ICD-10-CM

## 2020-09-02 DIAGNOSIS — F01.50 VASCULAR DEMENTIA WITHOUT BEHAVIORAL DISTURBANCE (HCC): Primary | ICD-10-CM

## 2020-09-02 PROCEDURE — G8399 PT W/DXA RESULTS DOCUMENT: HCPCS | Performed by: INTERNAL MEDICINE

## 2020-09-02 PROCEDURE — G8756 NO BP MEASURE DOC: HCPCS | Performed by: INTERNAL MEDICINE

## 2020-09-02 PROCEDURE — 1090F PRES/ABSN URINE INCON ASSESS: CPT | Performed by: INTERNAL MEDICINE

## 2020-09-02 PROCEDURE — 99214 OFFICE O/P EST MOD 30 MIN: CPT | Performed by: INTERNAL MEDICINE

## 2020-09-02 PROCEDURE — 1101F PT FALLS ASSESS-DOCD LE1/YR: CPT | Performed by: INTERNAL MEDICINE

## 2020-09-02 PROCEDURE — G8432 DEP SCR NOT DOC, RNG: HCPCS | Performed by: INTERNAL MEDICINE

## 2020-09-02 PROCEDURE — G8427 DOCREV CUR MEDS BY ELIG CLIN: HCPCS | Performed by: INTERNAL MEDICINE

## 2020-09-02 NOTE — PROGRESS NOTES
Ash Valiente is a 78 y.o. female being evaluated by a Virtual Visit (video visit) encounter to address concerns as mentioned above. A caregiver was present when appropriate. Due to this being a TeleHealth encounter (During VUI-69 public health emergency), evaluation of the following organ systems was limited: Vitals/Constitutional/EENT/Resp/CV/GI//MS/Neuro/Skin/Heme-Lymph-Imm. Pursuant to the emergency declaration under the [address] [place] [address] and the [employer] Act, this Virtual Visit was conducted with patient's (and/or legal guardian's) consent, to reduce the risk of exposure to COVID-19 and provide necessary medical care. Services were provided through a video synchronous discussion virtually to substitute for in-person encounter. --Jorge Alba MD on 9/2/2020 at 12:07 PM    An electronic signature was used to authenticate this note. Ash Valiente is a 78 y.o. female and presents with Cerebrovascular Accident and Letter for School/Work  . Subjective:  Back Pain Review:  Patient presents for evaluation of low back problems. Symptoms have been present for  weeks and include pain in lower back (dull, mild in character; 7/10 in severity). Initial inciting event: unknown. Symptoms are worst: at times. Alleviating factors identifiable by patient are lying flat, medication . Exacerbating factors identifiable by patient are bending forwards, bending backwards. Treatments so far initiated by patient: medication Previous lower back problems: reported. Previous workup: none. She has a recent hx of cva with lt.sided weakness.   She has had physical therapy once and is to have repeated    Dementia Review:  Difficulty with recent memory is reported since CVA  Bouts of agitation are reported  Tends to not sleep at night  Tends to loose objects  Has difficulty with activities of daily living  She is in total care        Review of Systems  Constitutional: negative for fevers, chills, anorexia and weight loss  Eyes:   negative for visual disturbance and irritation  ENT:   negative for tinnitus,sore throat,nasal congestion,ear pains. hoarseness  Respiratory:  negative for cough, hemoptysis, dyspnea,wheezing  CV:   negative for chest pain, palpitations, lower extremity edema  GI:   negative for nausea, vomiting, diarrhea, abdominal pain,melena  Endo:               negative for polyuria,polydipsia,polyphagia,heat intolerance  Genitourinary: negative for frequency, dysuria and hematuria  Integument:  negative for rash and pruritus  Hematologic:  negative for easy bruising and gum/nose bleeding  Musculoskel: myalgias, arthralgias, back pain, muscle weakness, joint pain  Neurological:  negative for headaches, dizziness, vertigo, memory problems and gait   Behavl/Psych: negative for feelings of anxiety, depression, mood changes    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    Diverticulosis of colon (without mention of hemorrhage) 2/8/2011    Dysphagia 2/8/2011    Edema, peripheral 2/8/2011    GERD (gastroesophageal reflux disease)     GERD Gastro- Esophageal Reflux Disease 2/8/2011    Herniated nucleus pulposus ( slipped disc) 2/8/2011    Menopausal 2/8/2011    PUD (peptic ulcer disease) 2012    Pure hypercholesterolemia 2/8/2011    Recurrent ear pain 2/8/2011    S/P radiation therapy     Scalp lesion 10/23/2014    Sebaceous cyst 4/22/2014    Unspecified cataract 2/8/2011    Unspecified essential hypertension 2/8/2011    Unspecified sleep apnea      Past Surgical History: Procedure Laterality Date    HX HYSTERECTOMY      HX ORTHOPAEDIC  neck/back 2006    HX OTHER SURGICAL  5/8/2014     Excise recurrent scalp lesion and application of ACell    HX OTHER SURGICAL  5/8/2014    Excise keloid, anterior chest wall, and application of ACell    HX OTHER SURGICAL  5/8/2014     Excise sebaceous cyst, anterior chest wall    HX OTHER SURGICAL  5/8/2014    Punch biopsy, skin lesions, right forearm x2, right calf x1    PLACE PERCUT GASTROSTOMY TUBE  7/8/2020         NC ICAR CATHETER ABLATION ATRIOVENTR NODE FUNCTION N/A 5/6/2019    ABLATION AV NODE performed by Corinne Nieves MD at [address] CATH LAB    NC INS NEW/RPLCMT PRM PM W/TRANSV ELTRD ATRIAL&VENT  9/30/2017         NC RMVL IMPLANTABLE PT-ACTIVATED CAR EVENT RECORDER  9/30/2017          Social History     Socioeconomic History    Marital status:      Spouse name: Not on file    Number of children: Not on file    Years of education: Not on file    Highest education level: Not on file   Tobacco Use    Smoking status: Never Smoker    Smokeless tobacco: Never Used   Substance and Sexual Activity    Alcohol use: No    Drug use: No    Sexual activity: Yes     Partners: Male     Birth control/protection: None     Family History   Problem Relation Age of Onset    Hypertension Mother     Stroke Mother     Hypertension Father     Cancer Father         PROSTATE, MELANOMA    Anesth Problems Neg Hx      Current Outpatient Medications   Medication Sig Dispense Refill    metoprolol succinate (TOPROL-XL) 25 mg XL tablet TAKE 1 TABLET BY MOUTH EVERY DAY 90 Tab 3    atorvastatin (LIPITOR) 40 mg tablet Take 1 Tab by mouth nightly. 30 Tab 0    apixaban (Eliquis) 5 mg tablet Take 1 Tab by mouth two (2) times a day. 60 Tab 0    cyclobenzaprine (FLEXERIL) 10 mg tablet Take 1 Tab by mouth three (3) times daily as needed for Muscle Spasm(s).  90 Tab 0    meclizine (ANTIVERT) 25 mg tablet TAKE 1 TABLET THREE TIMES DAILY AS NEEDED 270 Tab 3    rivaroxaban (Xarelto) 15 mg tab tablet Take 1 Tab by mouth daily (with dinner). 90 Tab 3    gabapentin (NEURONTIN) 300 mg capsule Take 1 Cap by mouth three (3) times daily. 270 Cap 3    amLODIPine (NORVASC) 5 mg tablet Take 1 Tab by mouth daily. 90 Tab 3    potassium chloride SR (KLOR-CON 10) 10 mEq tablet TAKE 1 TABLET EVERY DAY 90 Tab 3    omeprazole (PRILOSEC) 40 mg capsule TAKE 1 CAPSULE EVERY DAY 90 Cap 3    fluticasone propionate (FLONASE) 50 mcg/actuation nasal spray 2 Sprays by Both Nostrils route daily as needed for Rhinitis.  MULTIVIT WITH CALCIUM,IRON,MIN [facility] MULTIPLE VITAMINS PO) Take 1 Tab by mouth daily.  acetaminophen (TYLENOL) 325 mg tablet Take 650 mg by mouth nightly. Allergies   Allergen Reactions    Latex Rash    Bactrim [Sulfamethoprim Ds] Hives    Aspirin Other (comments)     Anything higher than 81mg makes pt jittery    Codeine Hives and Itching    Iodinated Contrast Media Itching    Pcn [Penicillins] Hives and Itching    Tape [Adhesive] Rash       Objective: There were no vitals taken for this visit. Physical Exam:   General appearance - alert, well appearing, and in no distress  Mental status - alert, oriented to person, place, and time  EYE-DARLENE, EOMI, corneas normal, no foreign bodies  ENT-ENT exam normal, no neck nodes or sinus tenderness  Nose - normal and patent, no erythema, discharge or polyps  Mouth - mucous membranes moist, pharynx normal without lesions  Skin-Warm and dry.  no hyperpigmentation, vitiligo, or suspicious lesions  Neuro -alert, oriented, normal speech, no focal findings or movement disorder noted  Neck-normal C-spine, no tenderness, full ROM without pain        Results for orders placed or performed during the hospital encounter of 07/03/20   CBC WITH AUTOMATED DIFF   Result Value Ref Range    WBC 6.7 3.6 - 11.0 K/uL    RBC 5.36 (H) 3.80 - 5.20 M/uL    HGB 12.8 11.5 - 16.0 g/dL    HCT 39.8 35.0 - 47.0 %    MCV 74.3 (L) 80.0 - 99.0 FL    MCH 23.9 (L) 26.0 - 34.0 PG    MCHC 32.2 30.0 - 36.5 g/dL    RDW 21.1 (H) 11.5 - 14.5 %    PLATELET 822 091 - 768 K/uL    MPV 10.7 8.9 - 12.9 FL    NRBC 0.0 0  WBC    ABSOLUTE NRBC 0.00 0.00 - 0.01 K/uL    NEUTROPHILS 79 (H) 32 - 75 %    LYMPHOCYTES 12 12 - 49 %    MONOCYTES 7 5 - 13 %    EOSINOPHILS 1 0 - 7 %    BASOPHILS 1 0 - 1 %    IMMATURE GRANULOCYTES 0 0.0 - 0.5 %    ABS. NEUTROPHILS 5.2 1.8 - 8.0 K/UL    ABS. LYMPHOCYTES 0.8 0.8 - 3.5 K/UL    ABS. MONOCYTES 0.5 0.0 - 1.0 K/UL    ABS. EOSINOPHILS 0.1 0.0 - 0.4 K/UL    ABS. BASOPHILS 0.1 0.0 - 0.1 K/UL    ABS. IMM. GRANS. 0.0 0.00 - 0.04 K/UL    DF SMEAR SCANNED      RBC COMMENTS MICROCYTOSIS  PRESENT       METABOLIC PANEL, COMPREHENSIVE   Result Value Ref Range    Sodium 138 136 - 145 mmol/L    Potassium 3.0 (L) 3.5 - 5.1 mmol/L    Chloride 103 97 - 108 mmol/L    CO2 27 21 - 32 mmol/L    Anion gap 8 5 - 15 mmol/L    Glucose 126 (H) 65 - 100 mg/dL    BUN 19 6 - 20 MG/DL    Creatinine 0.75 0.55 - 1.02 MG/DL    BUN/Creatinine ratio 25 (H) 12 - 20      GFR est AA >60 >60 ml/min/1.73m2    GFR est non-AA >60 >60 ml/min/1.73m2    Calcium 9.0 8.5 - 10.1 MG/DL    Bilirubin, total 0.5 0.2 - 1.0 MG/DL    ALT (SGPT) 22 12 - 78 U/L    AST (SGOT) 25 15 - 37 U/L    Alk.  phosphatase 106 45 - 117 U/L    Protein, total 8.0 6.4 - 8.2 g/dL    Albumin 3.4 (L) 3.5 - 5.0 g/dL    Globulin 4.6 (H) 2.0 - 4.0 g/dL    A-G Ratio 0.7 (L) 1.1 - 2.2     PROTHROMBIN TIME + INR   Result Value Ref Range    INR 1.1 0.9 - 1.1      Prothrombin time 11.5 (H) 9.0 - 11.1 sec   TROPONIN I   Result Value Ref Range    Troponin-I, Qt. <0.05 <0.05 ng/mL   POC EG7   Result Value Ref Range    Calcium, ionized (POC) 1.11 (L) 1.12 - 1.32 mmol/L    FIO2 (POC) 21 %    pH (POC) 7.53 (H) 7.35 - 7.45      pCO2 (POC) 37.4 35.0 - 45.0 MMHG    pO2 (POC) 77 (L) 80 - 100 MMHG    HCO3 (POC) 31.0 (H) 22 - 26 MMOL/L    Base excess (POC) 8 mmol/L    sO2 (POC) 97 92 - 97 %    Site LEFT RADIAL Device: ROOM AIR      Allens test (POC) YES      Specimen type (POC) ARTERIAL      Total resp. rate 16     LIPID PANEL   Result Value Ref Range    LIPID PROFILE          Cholesterol, total 185 <200 MG/DL    Triglyceride 95 <150 MG/DL    HDL Cholesterol 73 MG/DL    LDL, calculated 93 0 - 100 MG/DL    VLDL, calculated 19 MG/DL    CHOL/HDL Ratio 2.5 0.0 - 5.0     HEMOGLOBIN A1C WITH EAG   Result Value Ref Range    Hemoglobin A1c 5.7 (H) 4.0 - 5.6 %    Est. average glucose 812 mg/dL   METABOLIC PANEL, BASIC   Result Value Ref Range    Sodium 137 136 - 145 mmol/L    Potassium 3.0 (L) 3.5 - 5.1 mmol/L    Chloride 102 97 - 108 mmol/L    CO2 27 21 - 32 mmol/L    Anion gap 8 5 - 15 mmol/L    Glucose 127 (H) 65 - 100 mg/dL    BUN 14 6 - 20 MG/DL    Creatinine 0.80 0.55 - 1.02 MG/DL    BUN/Creatinine ratio 18 12 - 20      GFR est AA >60 >60 ml/min/1.73m2    GFR est non-AA >60 >60 ml/min/1.73m2    Calcium 9.0 8.5 - 10.1 MG/DL   MAGNESIUM   Result Value Ref Range    Magnesium 2.1 1.6 - 2.4 mg/dL   PHOSPHORUS   Result Value Ref Range    Phosphorus 2.8 2.6 - 4.7 MG/DL   CBC WITH AUTOMATED DIFF   Result Value Ref Range    WBC 9.0 3.6 - 11.0 K/uL    RBC 5.48 (H) 3.80 - 5.20 M/uL    HGB 12.8 11.5 - 16.0 g/dL    HCT 40.7 35.0 - 47.0 %    MCV 74.3 (L) 80.0 - 99.0 FL    MCH 23.4 (L) 26.0 - 34.0 PG    MCHC 31.4 30.0 - 36.5 g/dL    RDW 21.2 (H) 11.5 - 14.5 %    PLATELET 147 409 - 163 K/uL    MPV 10.7 8.9 - 12.9 FL    NRBC 0.0 0  WBC    ABSOLUTE NRBC 0.00 0.00 - 0.01 K/uL    NEUTROPHILS 80 (H) 32 - 75 %    LYMPHOCYTES 11 (L) 12 - 49 %    MONOCYTES 9 5 - 13 %    EOSINOPHILS 0 0 - 7 %    BASOPHILS 0 0 - 1 %    IMMATURE GRANULOCYTES 0 0.0 - 0.5 %    ABS. NEUTROPHILS 7.2 1.8 - 8.0 K/UL    ABS. LYMPHOCYTES 1.0 0.8 - 3.5 K/UL    ABS. MONOCYTES 0.8 0.0 - 1.0 K/UL    ABS. EOSINOPHILS 0.0 0.0 - 0.4 K/UL    ABS. BASOPHILS 0.0 0.0 - 0.1 K/UL    ABS. IMM.  GRANS. 0.0 0.00 - 0.04 K/UL    DF AUTOMATED      RBC COMMENTS ANISOCYTOSIS  2+        RBC COMMENTS TARGET CELLS  PRESENT       TSH 3RD GENERATION   Result Value Ref Range    TSH 0.65 0.36 - 4.83 uIU/mL   METABOLIC PANEL, BASIC   Result Value Ref Range    Sodium 138 136 - 145 mmol/L    Potassium 3.0 (L) 3.5 - 5.1 mmol/L    Chloride 103 97 - 108 mmol/L    CO2 30 21 - 32 mmol/L    Anion gap 5 5 - 15 mmol/L    Glucose 111 (H) 65 - 100 mg/dL    BUN 12 6 - 20 MG/DL    Creatinine 0.94 0.55 - 1.02 MG/DL    BUN/Creatinine ratio 13 12 - 20      GFR est AA >60 >60 ml/min/1.73m2    GFR est non-AA 57 (L) >60 ml/min/1.73m2    Calcium 8.5 8.5 - 10.1 MG/DL   CBC W/O DIFF   Result Value Ref Range    WBC 6.3 3.6 - 11.0 K/uL    RBC 5.65 (H) 3.80 - 5.20 M/uL    HGB 13.5 11.5 - 16.0 g/dL    HCT 42.2 35.0 - 47.0 %    MCV 74.7 (L) 80.0 - 99.0 FL    MCH 23.9 (L) 26.0 - 34.0 PG    MCHC 32.0 30.0 - 36.5 g/dL    RDW 20.9 (H) 11.5 - 14.5 %    PLATELET 104 011 - 716 K/uL    MPV 11.0 8.9 - 12.9 FL    NRBC 0.0 0  WBC    ABSOLUTE NRBC 0.00 0.00 - 1.59 K/uL   METABOLIC PANEL, BASIC   Result Value Ref Range    Sodium 137 136 - 145 mmol/L    Potassium 3.3 (L) 3.5 - 5.1 mmol/L    Chloride 104 97 - 108 mmol/L    CO2 28 21 - 32 mmol/L    Anion gap 5 5 - 15 mmol/L    Glucose 114 (H) 65 - 100 mg/dL    BUN 9 6 - 20 MG/DL    Creatinine 0.75 0.55 - 1.02 MG/DL    BUN/Creatinine ratio 12 12 - 20      GFR est AA >60 >60 ml/min/1.73m2    GFR est non-AA >60 >60 ml/min/1.73m2    Calcium 8.8 8.5 - 10.1 MG/DL   CBC W/O DIFF   Result Value Ref Range    WBC 7.1 3.6 - 11.0 K/uL    RBC 5.30 (H) 3.80 - 5.20 M/uL    HGB 12.7 11.5 - 16.0 g/dL    HCT 40.4 35.0 - 47.0 %    MCV 76.2 (L) 80.0 - 99.0 FL    MCH 24.0 (L) 26.0 - 34.0 PG    MCHC 31.4 30.0 - 36.5 g/dL    RDW 20.3 (H) 11.5 - 14.5 %    PLATELET 545 091 - 998 K/uL    MPV 10.6 8.9 - 12.9 FL    NRBC 0.0 0  WBC    ABSOLUTE NRBC 0.00 0.00 - 7.29 K/uL   METABOLIC PANEL, BASIC   Result Value Ref Range    Sodium 138 136 - 145 mmol/L    Potassium 3.2 (L) 3.5 - 5.1 mmol/L    Chloride 105 97 - 108 mmol/L    CO2 26 21 - 32 mmol/L    Anion gap 7 5 - 15 mmol/L    Glucose 99 65 - 100 mg/dL    BUN 10 6 - 20 MG/DL    Creatinine 0.74 0.55 - 1.02 MG/DL    BUN/Creatinine ratio 14 12 - 20      GFR est AA >60 >60 ml/min/1.73m2    GFR est non-AA >60 >60 ml/min/1.73m2    Calcium 8.7 8.5 - 10.1 MG/DL   CBC W/O DIFF   Result Value Ref Range    WBC 6.9 3.6 - 11.0 K/uL    RBC 5.11 3.80 - 5.20 M/uL    HGB 12.1 11.5 - 16.0 g/dL    HCT 38.4 35.0 - 47.0 %    MCV 75.1 (L) 80.0 - 99.0 FL    MCH 23.7 (L) 26.0 - 34.0 PG    MCHC 31.5 30.0 - 36.5 g/dL    RDW 19.6 (H) 11.5 - 14.5 %    PLATELET 020 190 - 357 K/uL    MPV 10.6 8.9 - 12.9 FL    NRBC 0.0 0  WBC    ABSOLUTE NRBC 0.00 0.00 - 0.01 K/uL   SARS-COV-2   Result Value Ref Range    Specimen source Nasopharyngeal      SARS-CoV-2 Not detected NOTD      Specimen source Nasopharyngeal     SARS-COV-2   Result Value Ref Range    Specimen source Nasopharyngeal      SARS-CoV-2 Not detected NOTD      Specimen source Nasopharyngeal     GLUCOSE, POC   Result Value Ref Range    Glucose (POC) 108 (H) 65 - 100 mg/dL    Performed by Dayton Rankin PCT    GLUCOSE, POC   Result Value Ref Range    Glucose (POC) 86 65 - 100 mg/dL    Performed by John Paul Martinez (PCT)    GLUCOSE, POC   Result Value Ref Range    Glucose (POC) 121 (H) 65 - 100 mg/dL    Performed by Kylah Brooke (TRV RN)    GLUCOSE, POC   Result Value Ref Range    Glucose (POC) 107 (H) 65 - 100 mg/dL    Performed by Remy Renner (TRV RN)    EKG, 12 LEAD, INITIAL   Result Value Ref Range    Ventricular Rate 76 BPM    Atrial Rate 76 BPM    P-R Interval 182 ms    QRS Duration 160 ms    Q-T Interval 464 ms    QTC Calculation (Bezet) 522 ms    Calculated P Axis 83 degrees    Calculated R Axis -68 degrees    Calculated T Axis 72 degrees    Diagnosis       AV dual-paced rhythm  When compared with ECG of 07-AUG-2019 00:59,  Vent.  rate has increased BY   5 BPM  Confirmed by Aden Powell ([id number]) on 7/4/2020 12:56:19 PM     ECHO ADULT COMPLETE   Result Value Ref Range    IVSd 1.88 (A) 0.6 - 0.9 cm    IVSs 2.81 cm    LVIDd 4.07 3.9 - 5.3 cm    LVIDs 3.49 cm    LVOT d 2.12 cm    LVPWd 1.74 (A) 0.6 - 0.9 cm    LVPWs 2.16 cm    LVOT Peak Gradient 2.70 mmHg    LVOT Peak Velocity 82.16 cm/s    TAPSV 16.7 cm/s    Left Atrium to Aortic Root Ratio 1.28     Left Atrium to Aortic Root Ratio 1.28     Est. RA Pressure 10.00 mmHg    AV Cusp 2.40 cm    Aortic Valve Area by Continuity of Peak Velocity 2.85 cm2    AoV PG 4.13 mmHg    Aortic Valve Systolic Peak Velocity 785.08 cm/s    MV A Hcong 91.66 cm/s    Mitral Valve E Wave Deceleration Time 0.16 s    MV E Chong 46.50 cm/s    E/E' septal 7.75     LV E' Septal Velocity 6.00 cm/s    AR Max Chong 184.31 cm/s    Pulmonic Valve Systolic Peak Instantaneous Gradient 2.80 mmHg    AR Max Chong 83.52 cm/s    Ao Root D 2.93 cm    MV E/A 0.51     LV Mass .9 67 - 162 g    LV Mass AL Index 189.6 43 - 95 g/m2    MARIANNA/BSA Pk Chong 1.7 cm2/m2       Assessment/Plan:    ICD-10-CM ICD-9-CM    1. Vascular dementia without behavioral disturbance (HCC)  F01.50 290.40    2. Essential hypertension  I10 401.9    3. Hypercholesteremia  E78.00 272.0    4. Lumbar and sacral osteoarthritis  M47.817 721.3    5. CVA, old, hemiparesis ([facility] [address]  I69.359 438.20      No orders of the defined types were placed in this encounter. call if any problems,Take 81mg aspirin daily  Patient Instructions   [facility] Activation    Thank you for requesting access to [facility]. Please follow the instructions below to securely access and download your online medical record. [facility] allows you to send messages to your doctor, view your test results, renew your prescriptions, schedule appointments, and more. How Do I Sign Up? 1. In your internet browser, go to [email or web address]  2. Click on the First Time User? Click Here link in the Sign In box. You will be redirect to the New Member Sign Up page.   3. Enter your [facility] Access Code exactly as it appears below. You will not need to use this code after youve completed the sign-up process. If you do not sign up before the expiration date, you must request a new code. [email or web address] Access Code: [email or web address]  Expires: 10/17/2020 11:30 AM (This is the date your [email or web address] access code will )    4. Enter the last four digits of your Social Security Number (xxxx) and Date of Birth (mm/dd/yyyy) as indicated and click Submit. You will be taken to the next sign-up page. 5. Create a [employer] ID. This will be your [email or web address] login ID and cannot be changed, so think of one that is secure and easy to remember. 6. Create a [email or web address] password. You can change your password at any time. 7. Enter your Password Reset Question and Answer. This can be used at a later time if you forget your password. 8. Enter your e-mail address. You will receive e-mail notification when new information is available in [address] 9. Click Sign Up. You can now view and download portions of your medical record. 10. Click the Download Summary menu link to download a portable copy of your medical information. Additional Information    If you have questions, please visit the Frequently Asked Questions section of the [email or web address] website at [email or web address] Remember, [email or web address] is NOT to be used for urgent needs. For medical emergencies, dial 911. Follow-up and Dispositions    · Return in about 4 weeks (around 2020), or if symptoms worsen or fail to improve. I have reviewed with the patient details of the assessment and plan and all questions were answered. Relevent patient education was performed. The most recent lab findings were reviewed with the patient. An After Visit Summary was printed and given to the patient.

## 2020-09-02 NOTE — PROGRESS NOTES
Chief Complaint   Patient presents with    Cerebrovascular Accident    Letter for School/Work     3 most recent PHQ Screens 9/2/2020   PHQ Not Done Medical Reason (indicate in comments)   Little interest or pleasure in doing things -   Feeling down, depressed, irritable, or hopeless -   Total Score PHQ 2 -     1. Have you been to the ER, urgent care clinic since your last visit? Hospitalized since your last visit? yes    2. Have you seen or consulted any other health care providers outside of the [address] [place] [address] since your last visit? Include any pap smears or colon screening.  yes

## 2020-09-02 NOTE — PATIENT INSTRUCTIONS
[facility] Activation Thank you for requesting access to [facility]. Please follow the instructions below to securely access and download your online medical record. [facility] allows you to send messages to your doctor, view your test results, renew your prescriptions, schedule appointments, and more. How Do I Sign Up? 1. In your internet browser, go to [email or web address] 
2. Click on the First Time User? Click Here link in the Sign In box. You will be redirect to the New Member Sign Up page. 3. Enter your [facility] Access Code exactly as it appears below. You will not need to use this code after youve completed the sign-up process. If you do not sign up before the expiration date, you must request a new code. [facility] Access Code: [email or web address] Expires: 10/17/2020 11:30 AM (This is the date your [facility] access code will ) 4. Enter the last four digits of your Social Security Number (xxxx) and Date of Birth (mm/dd/yyyy) as indicated and click Submit. You will be taken to the next sign-up page. 5. Create a [facility]PASS ID. This will be your [facility] login ID and cannot be changed, so think of one that is secure and easy to remember. 6. Create a [facility] password. You can change your password at any time. 7. Enter your Password Reset Question and Answer. This can be used at a later time if you forget your password. 8. Enter your e-mail address. You will receive e-mail notification when new information is available in [address] 9. Click Sign Up. You can now view and download portions of your medical record. 10. Click the Download Summary menu link to download a portable copy of your medical information. Additional Information If you have questions, please visit the Frequently Asked Questions section of the [facility] website at [email or web address] Remember, [facility] is NOT to be used for urgent needs. For medical emergencies, dial 911.

## 2020-09-02 NOTE — LETTER
9/2/2020 12:12 PM 
 
Ms. Demetris Butts Crta. [address] [address] The patient has Dementia and is not able to handle any of her personal or legal affairs at this time. She has sustained a massive CVA with lt.sided weakness and is  Totally disabled. Sincerely, Any Baires MD

## 2020-09-04 ENCOUNTER — TELEPHONE (OUTPATIENT)
Dept: INTERNAL MEDICINE CLINIC | Age: 80
End: 2020-09-04

## 2020-09-04 DIAGNOSIS — K21.9 GASTROESOPHAGEAL REFLUX DISEASE WITHOUT ESOPHAGITIS: ICD-10-CM

## 2020-09-04 RX ORDER — POTASSIUM CHLORIDE 750 MG/1
TABLET, EXTENDED RELEASE ORAL
Status: CANCELLED | OUTPATIENT
Start: 2020-09-04

## 2020-09-04 RX ORDER — ESOMEPRAZOLE MAGNESIUM 40 MG/1
FOR SUSPENSION ORAL
Qty: 30 PACKET | Refills: 3 | Status: SHIPPED | OUTPATIENT
Start: 2020-09-04 | End: 2020-09-23 | Stop reason: CLARIF

## 2020-09-04 RX ORDER — POTASSIUM CHLORIDE 750 MG/1
TABLET, FILM COATED, EXTENDED RELEASE ORAL
Qty: 90 TAB | Refills: 3 | Status: CANCELLED | OUTPATIENT
Start: 2020-09-04

## 2020-09-04 RX ORDER — HYDROGEN PEROXIDE 3 %
SOLUTION, NON-ORAL MISCELLANEOUS DAILY
Status: CANCELLED | OUTPATIENT
Start: 2020-09-04

## 2020-09-04 RX ORDER — OMEPRAZOLE 40 MG/1
CAPSULE, DELAYED RELEASE ORAL
Qty: 90 CAP | Refills: 3 | Status: CANCELLED | OUTPATIENT
Start: 2020-09-04

## 2020-09-04 NOTE — TELEPHONE ENCOUNTER
Margoth from [facility] left a voice message regarding patient's medication not dissolving all the way in the PEG-Tube. The medications Prilosec and Potassium are not dissolving all the way. Suggested medications are:  Nexium,  Klor-Con M. Pharmacy has spoken to son Mis Clifford [phone] regarding the medications and is asking the medications to be sent to [facility] [phone] if appropriate. Please review. Last visit 09/02/2020 Virtual visit MD Latonia Arnold   Next appointment 4 weeks (09/2020) - Nothing scheduled       Requested Prescriptions     Pending Prescriptions Disp Refills    potassium chloride SR (KLOR-CON 10) 10 mEq tablet 90 Tab 3     Sig: TAKE 1 TABLET EVERY DAY    omeprazole (PRILOSEC) 40 mg capsule 90 Cap 3     Sig: TAKE 1 CAPSULE EVERY DAY    potassium chloride (Klor-Con M10) 10 mEq tablet      esomeprazole (NexIUM) 20 mg capsule       Sig: daily.

## 2020-09-23 DIAGNOSIS — K21.9 GASTROESOPHAGEAL REFLUX DISEASE WITHOUT ESOPHAGITIS: Primary | ICD-10-CM

## 2020-09-23 DIAGNOSIS — I69.359 CVA, OLD, HEMIPARESIS (HCC): ICD-10-CM

## 2020-09-23 RX ORDER — PANTOPRAZOLE SODIUM 40 MG/1
40 GRANULE, DELAYED RELEASE ORAL DAILY
Qty: 30 EACH | Refills: 3 | Status: SHIPPED | OUTPATIENT
Start: 2020-09-23 | End: 2022-09-22

## 2020-09-28 RX ORDER — FAMOTIDINE 40 MG/5ML
40 POWDER, FOR SUSPENSION ORAL 2 TIMES DAILY
Qty: 50 ML | Refills: 12 | Status: SHIPPED | OUTPATIENT
Start: 2020-09-28 | End: 2020-11-23 | Stop reason: SDUPTHER

## 2020-10-01 ENCOUNTER — VIRTUAL VISIT (OUTPATIENT)
Dept: INTERNAL MEDICINE CLINIC | Age: 80
End: 2020-10-01
Payer: MEDICARE

## 2020-10-01 DIAGNOSIS — K21.9 GASTROESOPHAGEAL REFLUX DISEASE WITHOUT ESOPHAGITIS: ICD-10-CM

## 2020-10-01 DIAGNOSIS — E78.00 HYPERCHOLESTEREMIA: ICD-10-CM

## 2020-10-01 DIAGNOSIS — I69.359 CVA, OLD, HEMIPARESIS (HCC): Primary | ICD-10-CM

## 2020-10-01 DIAGNOSIS — I10 ESSENTIAL HYPERTENSION: ICD-10-CM

## 2020-10-01 PROCEDURE — 1101F PT FALLS ASSESS-DOCD LE1/YR: CPT | Performed by: INTERNAL MEDICINE

## 2020-10-01 PROCEDURE — G8428 CUR MEDS NOT DOCUMENT: HCPCS | Performed by: INTERNAL MEDICINE

## 2020-10-01 PROCEDURE — G8432 DEP SCR NOT DOC, RNG: HCPCS | Performed by: INTERNAL MEDICINE

## 2020-10-01 PROCEDURE — G8399 PT W/DXA RESULTS DOCUMENT: HCPCS | Performed by: INTERNAL MEDICINE

## 2020-10-01 PROCEDURE — G8756 NO BP MEASURE DOC: HCPCS | Performed by: INTERNAL MEDICINE

## 2020-10-01 PROCEDURE — 99214 OFFICE O/P EST MOD 30 MIN: CPT | Performed by: INTERNAL MEDICINE

## 2020-10-01 PROCEDURE — 1090F PRES/ABSN URINE INCON ASSESS: CPT | Performed by: INTERNAL MEDICINE

## 2020-10-01 RX ORDER — ATORVASTATIN CALCIUM 40 MG/1
40 TABLET, FILM COATED ORAL
Qty: 90 TAB | Refills: 3 | Status: SHIPPED | OUTPATIENT
Start: 2020-10-01 | End: 2021-10-09

## 2020-10-01 RX ORDER — ACETAMINOPHEN 160 MG/5ML
15 LIQUID ORAL
COMMUNITY
End: 2021-10-18 | Stop reason: SDUPTHER

## 2020-10-01 NOTE — PROGRESS NOTES
1. Have you been to the ER, urgent care clinic since your last visit? Hospitalized since your last visit?no    2. Have you seen or consulted any other health care providers outside of the [address] [place] [address] since your last visit? Include any pap smears or colon screening. No    3 most recent PHQ Screens 9/2/2020   PHQ Not Done Medical Reason (indicate in comments)   Little interest or pleasure in doing things -   Feeling down, depressed, irritable, or hopeless -   Total Score PHQ 2 -     Chief Complaint   Patient presents with    Hypertension    Referral Request     podiatrist    Medication Refill     Per Dr. Carlos Hernandez.,  verbal order given for needed amb poc labs.

## 2020-10-01 NOTE — PROGRESS NOTES
Trent Jenkins is a 78 y.o. female being evaluated by a Virtual Visit (video visit) encounter to address concerns as mentioned above. A caregiver was present when appropriate. Due to this being a TeleHealth encounter (During OLBQX-72 public health emergency), evaluation of the following organ systems was limited: Vitals/Constitutional/EENT/Resp/CV/GI//MS/Neuro/Skin/Heme-Lymph-Imm. Pursuant to the emergency declaration under the [address] [place] [address] and the [employer] and [employer] Act, this Virtual Visit was conducted with patient's (and/or legal guardian's) consent, to reduce the risk of exposure to COVID-19 and provide necessary medical care. Services were provided through a video synchronous discussion virtually to substitute for in-person encounter. --Shazia Blancas MD on 10/1/2020 at 12:07 PM    An electronic signature was used to authenticate this note. Trent Jenkins is a 78 y.o. female and presents with Hypertension; Referral Request (podiatrist); and Medication Refill  . Subjective:  Back Pain Review:  Patient presents for evaluation of low back problems. Symptoms have been present for  weeks and include pain in lower back (dull, mild in character; 3/10 in severity). Initial inciting event: unknown. Symptoms are worst: at times. Alleviating factors identifiable by patient are lying flat, medication . Exacerbating factors identifiable by patient are bending forwards, bending backwards. Treatments so far initiated by patient: medication Previous lower back problems: reported. Previous workup: none. She has a recent hx of cva with lt.sided weakness.     Dementia Review:  Difficulty with recent memory is reported since CVA  Bouts of agitation are reported  Tends to not sleep at night  Tends to loose objects  Has difficulty with activities of daily living  She is in total care    She needs her nails evaluated    Needs a seat belt for wheelchair    Needs an air mattress due to possible early possible decubitus      Review of Systems  Constitutional: negative for fevers, chills, anorexia and weight loss  Eyes:   negative for visual disturbance and irritation  ENT:   negative for tinnitus,sore throat,nasal congestion,ear pains. hoarseness  Respiratory:  negative for cough, hemoptysis, dyspnea,wheezing  CV:   negative for chest pain, palpitations, lower extremity edema  GI:   negative for nausea, vomiting, diarrhea, abdominal pain,melena  Endo:               negative for polyuria,polydipsia,polyphagia,heat intolerance  Genitourinary: negative for frequency, dysuria and hematuria  Integument:  negative for rash and pruritus  Hematologic:  negative for easy bruising and gum/nose bleeding  Musculoskel: myalgias, arthralgias, back pain, muscle weakness, joint pain  Neurological:  negative for headaches, dizziness, vertigo, memory problems and gait   Behavl/Psych: negative for feelings of anxiety, depression, mood changes    Past Medical History:   Diagnosis Date    Acute pharyngitis 2/8/2011    Allergic rhinitis, cause unspecified 2/8/2011    Arrhythmia     PVC    Asymptomatic varicose veins 2/8/2011    [facility]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     Carpal tunnel syndrome 2/8/2011    Degenerative Joint Disese ( improved/resolving) 2/8/2011    Degenetrative Dis Disease, 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    Diverticulosis of colon (without mention of hemorrhage) 2/8/2011    Dysphagia 2/8/2011    Edema, peripheral 2/8/2011    GERD (gastroesophageal reflux disease)     GERD Gastro- Esophageal Reflux Disease 2/8/2011    Herniated nucleus pulposus ( slipped disc) 2/8/2011    Menopausal 2/8/2011    PUD (peptic ulcer disease) 2012    Pure hypercholesterolemia 2/8/2011    Recurrent ear pain 2/8/2011    S/P radiation therapy     Scalp lesion 10/23/2014    Sebaceous cyst 4/22/2014    Unspecified cataract 2/8/2011    Unspecified essential hypertension 2/8/2011    Unspecified sleep apnea      Past Surgical History:   Procedure Laterality Date    HX HYSTERECTOMY      HX ORTHOPAEDIC  neck/back 2006    HX OTHER SURGICAL  5/8/2014     Excise recurrent scalp lesion and application of ACell    HX OTHER SURGICAL  5/8/2014    Excise keloid, anterior chest wall, and application of ACell    HX OTHER SURGICAL  5/8/2014     Excise sebaceous cyst, anterior chest wall    HX OTHER SURGICAL  5/8/2014    Punch biopsy, skin lesions, right forearm x2, right calf x1    PLACE PERCUT GASTROSTOMY TUBE  7/8/2020         RI ICAR CATHETER ABLATION ATRIOVENTR NODE FUNCTION N/A 5/6/2019    ABLATION AV NODE performed by Barbra Hong MD at [address] CATH LAB    RI INS NEW/RPLCMT PRM PM W/TRANSV ELTRD ATRIAL&VENT  9/30/2017         RI RMVL IMPLANTABLE PT-ACTIVATED CAR EVENT RECORDER  9/30/2017          Social History     Socioeconomic History    Marital status:      Spouse name: Not on file    Number of children: Not on file    Years of education: Not on file    Highest education level: Not on file   Tobacco Use    Smoking status: Never Smoker    Smokeless tobacco: Never Used   Substance and Sexual Activity    Alcohol use: No    Drug use: No    Sexual activity: Yes     Partners: Male     Birth control/protection: None     Family History   Problem Relation Age of Onset    Hypertension Mother     Stroke Mother     Hypertension Father     Cancer Father         PROSTATE, MELANOMA    Anesth Problems Neg Hx      Current Outpatient Medications   Medication Sig Dispense Refill    acetaminophen (TYLENOL) 160 mg/5 mL liquid Take 15 mg/kg by mouth every six (6) hours as needed for Fever.  atorvastatin (LIPITOR) 40 mg tablet Take 1 Tab by mouth nightly. 90 Tab 3    apixaban (Eliquis) 5 mg tablet Take 1 Tab by mouth two (2) times a day. 180 Tab 3    famotidine (PEPCID) 40 mg/5 mL (8 mg/mL) suspension Take 5 mL by mouth two (2) times a day.  48 mL 12    metoprolol succinate (TOPROL-XL) 25 mg XL tablet TAKE 1 TABLET BY MOUTH EVERY DAY 90 Tab 3    amLODIPine (NORVASC) 5 mg tablet Take 1 Tab by mouth daily. 90 Tab 3    pantoprazole (PROTONIX) 40 mg granules for oral suspension Take 40 mg by mouth daily. (Patient not taking: Reported on 10/1/2020) 30 Each 3    potassium chloride (Klor-Con/25) 25 mEq pack Sig:give via peg bid 60 Packet 3    cyclobenzaprine (FLEXERIL) 10 mg tablet Take 1 Tab by mouth three (3) times daily as needed for Muscle Spasm(s). (Patient not taking: Reported on 10/1/2020) 90 Tab 0    meclizine (ANTIVERT) 25 mg tablet TAKE 1 TABLET THREE TIMES DAILY AS NEEDED (Patient not taking: Reported on 10/1/2020) 270 Tab 3    rivaroxaban (Xarelto) 15 mg tab tablet Take 1 Tab by mouth daily (with dinner). (Patient not taking: Reported on 10/1/2020) 90 Tab 3    gabapentin (NEURONTIN) 300 mg capsule Take 1 Cap by mouth three (3) times daily. (Patient not taking: Reported on 10/1/2020) 270 Cap 3    fluticasone propionate (FLONASE) 50 mcg/actuation nasal spray 2 Sprays by Both Nostrils route daily as needed for Rhinitis.  acetaminophen (TYLENOL) 325 mg tablet Take 650 mg by mouth nightly.  MULTIVIT WITH CALCIUM,IRON,MIN [facility] MULTIPLE VITAMINS PO) Take 1 Tab by mouth daily. Allergies   Allergen Reactions    Latex Rash    Bactrim [Sulfamethoprim Ds] Hives    Aspirin Other (comments)     Anything higher than 81mg makes pt jittery    Codeine Hives and Itching    Iodinated Contrast Media Itching    Pcn [Penicillins] Hives and Itching    Tape [Adhesive] Rash       Objective: There were no vitals taken for this visit.   Physical Exam:   General appearance - alert, well appearing, and in no distress  Mental status - alert, oriented to person, place, and time  EYE-DARLENE, EOMI, corneas normal, no foreign bodies  ENT-ENT exam normal, no neck nodes or sinus tenderness  Nose - normal and patent, no erythema, discharge or polyps  Mouth - mucous membranes moist, pharynx normal without lesions  Skin-Warm and dry. no hyperpigmentation, vitiligo, or suspicious lesions  Neuro -alert, oriented, normal speech, no focal findings or movement disorder noted  Neck-normal C-spine, no tenderness, full ROM without pain        Results for orders placed or performed during the hospital encounter of 07/03/20   CBC WITH AUTOMATED DIFF   Result Value Ref Range    WBC 6.7 3.6 - 11.0 K/uL    RBC 5.36 (H) 3.80 - 5.20 M/uL    HGB 12.8 11.5 - 16.0 g/dL    HCT 39.8 35.0 - 47.0 %    MCV 74.3 (L) 80.0 - 99.0 FL    MCH 23.9 (L) 26.0 - 34.0 PG    MCHC 32.2 30.0 - 36.5 g/dL    RDW 21.1 (H) 11.5 - 14.5 %    PLATELET 728 224 - 937 K/uL    MPV 10.7 8.9 - 12.9 FL    NRBC 0.0 0  WBC    ABSOLUTE NRBC 0.00 0.00 - 0.01 K/uL    NEUTROPHILS 79 (H) 32 - 75 %    LYMPHOCYTES 12 12 - 49 %    MONOCYTES 7 5 - 13 %    EOSINOPHILS 1 0 - 7 %    BASOPHILS 1 0 - 1 %    IMMATURE GRANULOCYTES 0 0.0 - 0.5 %    ABS. NEUTROPHILS 5.2 1.8 - 8.0 K/UL    ABS. LYMPHOCYTES 0.8 0.8 - 3.5 K/UL    ABS. MONOCYTES 0.5 0.0 - 1.0 K/UL    ABS. EOSINOPHILS 0.1 0.0 - 0.4 K/UL    ABS. BASOPHILS 0.1 0.0 - 0.1 K/UL    ABS. IMM. GRANS. 0.0 0.00 - 0.04 K/UL    DF SMEAR SCANNED      RBC COMMENTS MICROCYTOSIS  PRESENT       METABOLIC PANEL, COMPREHENSIVE   Result Value Ref Range    Sodium 138 136 - 145 mmol/L    Potassium 3.0 (L) 3.5 - 5.1 mmol/L    Chloride 103 97 - 108 mmol/L    CO2 27 21 - 32 mmol/L    Anion gap 8 5 - 15 mmol/L    Glucose 126 (H) 65 - 100 mg/dL    BUN 19 6 - 20 MG/DL    Creatinine 0.75 0.55 - 1.02 MG/DL    BUN/Creatinine ratio 25 (H) 12 - 20      GFR est AA >60 >60 ml/min/1.73m2    GFR est non-AA >60 >60 ml/min/1.73m2    Calcium 9.0 8.5 - 10.1 MG/DL    Bilirubin, total 0.5 0.2 - 1.0 MG/DL    ALT (SGPT) 22 12 - 78 U/L    AST (SGOT) 25 15 - 37 U/L    Alk.  phosphatase 106 45 - 117 U/L    Protein, total 8.0 6.4 - 8.2 g/dL    Albumin 3.4 (L) 3.5 - 5.0 g/dL    Globulin 4.6 (H) 2.0 - 4.0 g/dL    A-G Ratio 0.7 (L) 1.1 - 2.2     PROTHROMBIN TIME + INR   Result Value Ref Range    INR 1.1 0.9 - 1.1      Prothrombin time 11.5 (H) 9.0 - 11.1 sec   TROPONIN I   Result Value Ref Range    Troponin-I, Qt. <0.05 <0.05 ng/mL   POC EG7   Result Value Ref Range    Calcium, ionized (POC) 1.11 (L) 1.12 - 1.32 mmol/L    FIO2 (POC) 21 %    pH (POC) 7.53 (H) 7.35 - 7.45      pCO2 (POC) 37.4 35.0 - 45.0 MMHG    pO2 (POC) 77 (L) 80 - 100 MMHG    HCO3 (POC) 31.0 (H) 22 - 26 MMOL/L    Base excess (POC) 8 mmol/L    sO2 (POC) 97 92 - 97 %    Site LEFT RADIAL      Device: ROOM AIR      Allens test (POC) YES      Specimen type (POC) ARTERIAL      Total resp.  rate 16     LIPID PANEL   Result Value Ref Range    LIPID PROFILE          Cholesterol, total 185 <200 MG/DL    Triglyceride 95 <150 MG/DL    HDL Cholesterol 73 MG/DL    LDL, calculated 93 0 - 100 MG/DL    VLDL, calculated 19 MG/DL    CHOL/HDL Ratio 2.5 0.0 - 5.0     HEMOGLOBIN A1C WITH EAG   Result Value Ref Range    Hemoglobin A1c 5.7 (H) 4.0 - 5.6 %    Est. average glucose 572 mg/dL   METABOLIC PANEL, BASIC   Result Value Ref Range    Sodium 137 136 - 145 mmol/L    Potassium 3.0 (L) 3.5 - 5.1 mmol/L    Chloride 102 97 - 108 mmol/L    CO2 27 21 - 32 mmol/L    Anion gap 8 5 - 15 mmol/L    Glucose 127 (H) 65 - 100 mg/dL    BUN 14 6 - 20 MG/DL    Creatinine 0.80 0.55 - 1.02 MG/DL    BUN/Creatinine ratio 18 12 - 20      GFR est AA >60 >60 ml/min/1.73m2    GFR est non-AA >60 >60 ml/min/1.73m2    Calcium 9.0 8.5 - 10.1 MG/DL   MAGNESIUM   Result Value Ref Range    Magnesium 2.1 1.6 - 2.4 mg/dL   PHOSPHORUS   Result Value Ref Range    Phosphorus 2.8 2.6 - 4.7 MG/DL   CBC WITH AUTOMATED DIFF   Result Value Ref Range    WBC 9.0 3.6 - 11.0 K/uL    RBC 5.48 (H) 3.80 - 5.20 M/uL    HGB 12.8 11.5 - 16.0 g/dL    HCT 40.7 35.0 - 47.0 %    MCV 74.3 (L) 80.0 - 99.0 FL    MCH 23.4 (L) 26.0 - 34.0 PG    MCHC 31.4 30.0 - 36.5 g/dL    RDW 21.2 (H) 11.5 - 14.5 %    PLATELET 624 320 - 796 K/uL    MPV 10.7 8.9 - 12.9 FL    NRBC 0.0 0  WBC    ABSOLUTE NRBC 0.00 0.00 - 0.01 K/uL    NEUTROPHILS 80 (H) 32 - 75 %    LYMPHOCYTES 11 (L) 12 - 49 %    MONOCYTES 9 5 - 13 %    EOSINOPHILS 0 0 - 7 %    BASOPHILS 0 0 - 1 %    IMMATURE GRANULOCYTES 0 0.0 - 0.5 %    ABS. NEUTROPHILS 7.2 1.8 - 8.0 K/UL    ABS. LYMPHOCYTES 1.0 0.8 - 3.5 K/UL    ABS. MONOCYTES 0.8 0.0 - 1.0 K/UL    ABS. EOSINOPHILS 0.0 0.0 - 0.4 K/UL    ABS. BASOPHILS 0.0 0.0 - 0.1 K/UL    ABS. IMM.  GRANS. 0.0 0.00 - 0.04 K/UL    DF AUTOMATED      RBC COMMENTS ANISOCYTOSIS  2+        RBC COMMENTS TARGET CELLS  PRESENT       TSH 3RD GENERATION   Result Value Ref Range    TSH 0.65 0.36 - 5.55 uIU/mL   METABOLIC PANEL, BASIC   Result Value Ref Range    Sodium 138 136 - 145 mmol/L    Potassium 3.0 (L) 3.5 - 5.1 mmol/L    Chloride 103 97 - 108 mmol/L    CO2 30 21 - 32 mmol/L    Anion gap 5 5 - 15 mmol/L    Glucose 111 (H) 65 - 100 mg/dL    BUN 12 6 - 20 MG/DL    Creatinine 0.94 0.55 - 1.02 MG/DL    BUN/Creatinine ratio 13 12 - 20      GFR est AA >60 >60 ml/min/1.73m2    GFR est non-AA 57 (L) >60 ml/min/1.73m2    Calcium 8.5 8.5 - 10.1 MG/DL   CBC W/O DIFF   Result Value Ref Range    WBC 6.3 3.6 - 11.0 K/uL    RBC 5.65 (H) 3.80 - 5.20 M/uL    HGB 13.5 11.5 - 16.0 g/dL    HCT 42.2 35.0 - 47.0 %    MCV 74.7 (L) 80.0 - 99.0 FL    MCH 23.9 (L) 26.0 - 34.0 PG    MCHC 32.0 30.0 - 36.5 g/dL    RDW 20.9 (H) 11.5 - 14.5 %    PLATELET 249 802 - 653 K/uL    MPV 11.0 8.9 - 12.9 FL    NRBC 0.0 0  WBC    ABSOLUTE NRBC 0.00 0.00 - 5.79 K/uL   METABOLIC PANEL, BASIC   Result Value Ref Range    Sodium 137 136 - 145 mmol/L    Potassium 3.3 (L) 3.5 - 5.1 mmol/L    Chloride 104 97 - 108 mmol/L    CO2 28 21 - 32 mmol/L    Anion gap 5 5 - 15 mmol/L    Glucose 114 (H) 65 - 100 mg/dL    BUN 9 6 - 20 MG/DL    Creatinine 0.75 0.55 - 1.02 MG/DL    BUN/Creatinine ratio 12 12 - 20      GFR est AA >60 >60 ml/min/1.73m2    GFR est non-AA >60 >60 ml/min/1.73m2    Calcium 8.8 8.5 - 10.1 MG/DL   CBC W/O DIFF   Result Value Ref Range    WBC 7.1 3.6 - 11.0 K/uL    RBC 5.30 (H) 3.80 - 5.20 M/uL    HGB 12.7 11.5 - 16.0 g/dL    HCT 40.4 35.0 - 47.0 %    MCV 76.2 (L) 80.0 - 99.0 FL    MCH 24.0 (L) 26.0 - 34.0 PG    MCHC 31.4 30.0 - 36.5 g/dL    RDW 20.3 (H) 11.5 - 14.5 %    PLATELET 473 523 - 492 K/uL    MPV 10.6 8.9 - 12.9 FL    NRBC 0.0 0  WBC    ABSOLUTE NRBC 0.00 0.00 - 0.08 K/uL   METABOLIC PANEL, BASIC   Result Value Ref Range    Sodium 138 136 - 145 mmol/L    Potassium 3.2 (L) 3.5 - 5.1 mmol/L    Chloride 105 97 - 108 mmol/L    CO2 26 21 - 32 mmol/L    Anion gap 7 5 - 15 mmol/L    Glucose 99 65 - 100 mg/dL    BUN 10 6 - 20 MG/DL    Creatinine 0.74 0.55 - 1.02 MG/DL    BUN/Creatinine ratio 14 12 - 20      GFR est AA >60 >60 ml/min/1.73m2    GFR est non-AA >60 >60 ml/min/1.73m2    Calcium 8.7 8.5 - 10.1 MG/DL   CBC W/O DIFF   Result Value Ref Range    WBC 6.9 3.6 - 11.0 K/uL    RBC 5.11 3.80 - 5.20 M/uL    HGB 12.1 11.5 - 16.0 g/dL    HCT 38.4 35.0 - 47.0 %    MCV 75.1 (L) 80.0 - 99.0 FL    MCH 23.7 (L) 26.0 - 34.0 PG    MCHC 31.5 30.0 - 36.5 g/dL    RDW 19.6 (H) 11.5 - 14.5 %    PLATELET 495 333 - 201 K/uL    MPV 10.6 8.9 - 12.9 FL    NRBC 0.0 0  WBC    ABSOLUTE NRBC 0.00 0.00 - 0.01 K/uL   SARS-COV-2   Result Value Ref Range    Specimen source Nasopharyngeal      SARS-CoV-2 Not detected NOTD      Specimen source Nasopharyngeal     SARS-COV-2   Result Value Ref Range    Specimen source Nasopharyngeal      SARS-CoV-2 Not detected NOTD      Specimen source Nasopharyngeal     GLUCOSE, POC   Result Value Ref Range    Glucose (POC) 108 (H) 65 - 100 mg/dL    Performed by Shawna Todd PCT    GLUCOSE, POC   Result Value Ref Range    Glucose (POC) 86 65 - 100 mg/dL    Performed by Bri Osman (PCT)    GLUCOSE, POC   Result Value Ref Range    Glucose (POC) 121 (H) 65 - 100 mg/dL    Performed by Shannan Box (TRV RN)    GLUCOSE, POC   Result Value Ref Range    Glucose (POC) 107 (H) 65 - 100 mg/dL    Performed by Анна Robins (NICKOLAS RN)    EKG, 12 LEAD, INITIAL   Result Value Ref Range    Ventricular Rate 76 BPM    Atrial Rate 76 BPM    P-R Interval 182 ms    QRS Duration 160 ms    Q-T Interval 464 ms    QTC Calculation (Bezet) 522 ms    Calculated P Axis 83 degrees    Calculated R Axis -68 degrees    Calculated T Axis 72 degrees    Diagnosis       AV dual-paced rhythm  When compared with ECG of 07-AUG-2019 00:59,  Vent. rate has increased BY   5 BPM  Confirmed by Eder Joy ([id number]) on 7/4/2020 12:56:19 PM     ECHO ADULT COMPLETE   Result Value Ref Range    IVSd 1.88 (A) 0.6 - 0.9 cm    IVSs 2.81 cm    LVIDd 4.07 3.9 - 5.3 cm    LVIDs 3.49 cm    LVOT d 2.12 cm    LVPWd 1.74 (A) 0.6 - 0.9 cm    LVPWs 2.16 cm    LVOT Peak Gradient 2.70 mmHg    LVOT Peak Velocity 82.16 cm/s    TAPSV 16.7 cm/s    Left Atrium to Aortic Root Ratio 1.28     Left Atrium to Aortic Root Ratio 1.28     Est. RA Pressure 10.00 mmHg    AV Cusp 2.40 cm    Aortic Valve Area by Continuity of Peak Velocity 2.85 cm2    AoV PG 4.13 mmHg    Aortic Valve Systolic Peak Velocity 311.24 cm/s    MV A Chong 91.66 cm/s    Mitral Valve E Wave Deceleration Time 0.16 s    MV E Chong 46.50 cm/s    E/E' septal 7.75     LV E' Septal Velocity 6.00 cm/s    AR Max Chong 184.31 cm/s    Pulmonic Valve Systolic Peak Instantaneous Gradient 2.80 mmHg    AR Max Chong 83.52 cm/s    Ao Root D 2.93 cm    MV E/A 0.51     LV Mass .9 67 - 162 g    LV Mass AL Index 189.6 43 - 95 g/m2    MARIANNA/BSA Pk Chong 1.7 cm2/m2       Assessment/Plan:    ICD-10-CM ICD-9-CM    1. CVA, old, hemiparesis ([facility]  I69.359 438.20    2. Gastroesophageal reflux disease without esophagitis  K21.9 530.81    3. Essential hypertension  I10 401.9    4. Hypercholesteremia  E78.00 272.0      Orders Placed This Encounter    acetaminophen (TYLENOL) 160 mg/5 mL liquid     Sig: Take 15 mg/kg by mouth every six (6) hours as needed for Fever.     atorvastatin (LIPITOR) 40 mg tablet     Sig: Take 1 Tab by mouth nightly. Dispense:  90 Tab     Refill:  3    apixaban (Eliquis) 5 mg tablet     Sig: Take 1 Tab by mouth two (2) times a day. Dispense:  180 Tab     Refill:  3     call if any problems,Take 81mg aspirin daily  There are no Patient Instructions on file for this visit. I have reviewed with the patient details of the assessment and plan and all questions were answered. Relevent patient education was performed. The most recent lab findings were reviewed with the patient. An After Visit Summary was printed and given to the patient.

## 2020-10-01 NOTE — PATIENT INSTRUCTIONS
[employer] Activation Thank you for requesting access to [employer]. Please follow the instructions below to securely access and download your online medical record. [employer] allows you to send messages to your doctor, view your test results, renew your prescriptions, schedule appointments, and more. How Do I Sign Up? 1. In your internet browser, go to [email or web address] 
2. Click on the First Time User? Click Here link in the Sign In box. You will be redirect to the New Member Sign Up page. 3. Enter your [employer] Access Code exactly as it appears below. You will not need to use this code after youve completed the sign-up process. If you do not sign up before the expiration date, you must request a new code. [employer] Access Code: [email or web address] Expires: 11/15/2020  2:03 PM (This is the date your [employer] access code will ) 4. Enter the last four digits of your Social Security Number (xxxx) and Date of Birth (mm/dd/yyyy) as indicated and click Submit. You will be taken to the next sign-up page. 5. Create a [employer] ID. This will be your [employer] login ID and cannot be changed, so think of one that is secure and easy to remember. 6. Create a [employer] password. You can change your password at any time. 7. Enter your Password Reset Question and Answer. This can be used at a later time if you forget your password. 8. Enter your e-mail address. You will receive e-mail notification when new information is available in [address]. 9. Click Sign Up. You can now view and download portions of your medical record. 10. Click the Download Summary menu link to download a portable copy of your medical information. Additional Information If you have questions, please visit the Frequently Asked Questions section of the [employer] website at [email or web address] Remember, [employer] is NOT to be used for urgent needs. For medical emergencies, dial 911.

## 2020-10-09 ENCOUNTER — TELEPHONE (OUTPATIENT)
Dept: INTERNAL MEDICINE CLINIC | Age: 80
End: 2020-10-09

## 2020-11-18 ENCOUNTER — TELEPHONE (OUTPATIENT)
Dept: CARDIOLOGY CLINIC | Age: 80
End: 2020-11-18

## 2020-11-18 NOTE — TELEPHONE ENCOUNTER
Called pt Arelis Mims two patient identifiers confirmed. Arelis Mims stated that pt has had a massive stroke and is on hospice care. Arelis Mims stated that he wants to make sure the pacemaker is still working correctly but when moving pt to his house lost home management box. Notifeid Arelis Mims I will let the Device clinic know about this and see what can be done to get a new box sent to his address. Verified address is correct in chart. Will notify MD and Device clinic.     Pt Pedro Pablo Martino phone number [phone]

## 2020-11-18 NOTE — TELEPHONE ENCOUNTER
Patients son is calling to speak a nurse regarding the patients pacemaker. Please advise.      Phone: [phone]

## 2020-11-19 ENCOUNTER — HOSPITAL ENCOUNTER (EMERGENCY)
Age: 80
Discharge: HOME | End: 2020-11-20
Attending: EMERGENCY MEDICINE
Payer: MEDICARE

## 2020-11-19 ENCOUNTER — APPOINTMENT (OUTPATIENT)
Dept: CT IMAGING | Age: 80
End: 2020-11-19
Attending: EMERGENCY MEDICINE
Payer: MEDICARE

## 2020-11-19 ENCOUNTER — APPOINTMENT (OUTPATIENT)
Dept: GENERAL RADIOLOGY | Age: 80
End: 2020-11-19
Attending: EMERGENCY MEDICINE
Payer: MEDICARE

## 2020-11-19 DIAGNOSIS — R51.9 ACUTE NONINTRACTABLE HEADACHE, UNSPECIFIED HEADACHE TYPE: Primary | ICD-10-CM

## 2020-11-19 LAB
ALBUMIN SERPL-MCNC: 3.6 G/DL (ref 3.5–5)
ALBUMIN/GLOB SERPL: 0.9 {RATIO} (ref 1.1–2.2)
ALP SERPL-CCNC: 111 U/L (ref 45–117)
ALT SERPL-CCNC: 36 U/L (ref 12–78)
ANION GAP SERPL CALC-SCNC: 5 MMOL/L (ref 5–15)
APPEARANCE UR: ABNORMAL
AST SERPL-CCNC: 19 U/L (ref 15–37)
BACTERIA URNS QL MICRO: ABNORMAL /HPF
BASOPHILS # BLD: 0.1 K/UL (ref 0–0.1)
BASOPHILS NFR BLD: 1 % (ref 0–1)
BILIRUB SERPL-MCNC: 0.5 MG/DL (ref 0.2–1)
BILIRUB UR QL: NEGATIVE
BUN SERPL-MCNC: 16 MG/DL (ref 6–20)
BUN/CREAT SERPL: 24 (ref 12–20)
CALCIUM SERPL-MCNC: 9.7 MG/DL (ref 8.5–10.1)
CHLORIDE SERPL-SCNC: 104 MMOL/L (ref 97–108)
CO2 SERPL-SCNC: 31 MMOL/L (ref 21–32)
COLOR UR: ABNORMAL
CREAT SERPL-MCNC: 0.66 MG/DL (ref 0.55–1.02)
DIFFERENTIAL METHOD BLD: ABNORMAL
EOSINOPHIL # BLD: 0.2 K/UL (ref 0–0.4)
EOSINOPHIL NFR BLD: 3 % (ref 0–7)
EPITH CASTS URNS QL MICRO: ABNORMAL /LPF
ERYTHROCYTE [DISTWIDTH] IN BLOOD BY AUTOMATED COUNT: 21.7 % (ref 11.5–14.5)
GLOBULIN SER CALC-MCNC: 4.1 G/DL (ref 2–4)
GLUCOSE SERPL-MCNC: 134 MG/DL (ref 65–100)
GLUCOSE UR STRIP.AUTO-MCNC: NEGATIVE MG/DL
HCT VFR BLD AUTO: 42.9 % (ref 35–47)
HGB BLD-MCNC: 13.8 G/DL (ref 11.5–16)
HGB UR QL STRIP: NEGATIVE
HYALINE CASTS URNS QL MICRO: ABNORMAL /LPF (ref 0–5)
IMM GRANULOCYTES # BLD AUTO: 0.1 K/UL (ref 0–0.04)
IMM GRANULOCYTES NFR BLD AUTO: 1 % (ref 0–0.5)
KETONES UR QL STRIP.AUTO: NEGATIVE MG/DL
LACTATE BLD-SCNC: 2.31 MMOL/L (ref 0.4–2)
LEUKOCYTE ESTERASE UR QL STRIP.AUTO: NEGATIVE
LYMPHOCYTES # BLD: 2.5 K/UL (ref 0.8–3.5)
LYMPHOCYTES NFR BLD: 31 % (ref 12–49)
MCH RBC QN AUTO: 24.9 PG (ref 26–34)
MCHC RBC AUTO-ENTMCNC: 32.2 G/DL (ref 30–36.5)
MCV RBC AUTO: 77.4 FL (ref 80–99)
MONOCYTES # BLD: 0.7 K/UL (ref 0–1)
MONOCYTES NFR BLD: 8 % (ref 5–13)
MUCOUS THREADS URNS QL MICRO: ABNORMAL /LPF
NEUTS SEG # BLD: 4.6 K/UL (ref 1.8–8)
NEUTS SEG NFR BLD: 56 % (ref 32–75)
NITRITE UR QL STRIP.AUTO: NEGATIVE
NRBC # BLD: 0 K/UL (ref 0–0.01)
NRBC BLD-RTO: 0 PER 100 WBC
OTHER,OTHU: ABNORMAL
PH UR STRIP: 7 [PH] (ref 5–8)
PLATELET # BLD AUTO: 336 K/UL (ref 150–400)
PMV BLD AUTO: 10.3 FL (ref 8.9–12.9)
POTASSIUM SERPL-SCNC: 3.2 MMOL/L (ref 3.5–5.1)
PROT SERPL-MCNC: 7.7 G/DL (ref 6.4–8.2)
PROT UR STRIP-MCNC: NEGATIVE MG/DL
RBC # BLD AUTO: 5.54 M/UL (ref 3.8–5.2)
RBC #/AREA URNS HPF: ABNORMAL /HPF (ref 0–5)
RBC MORPH BLD: ABNORMAL
SODIUM SERPL-SCNC: 140 MMOL/L (ref 136–145)
SP GR UR REFRACTOMETRY: 1.01 (ref 1–1.03)
UA: UC IF INDICATED,UAUC: ABNORMAL
UROBILINOGEN UR QL STRIP.AUTO: 0.2 EU/DL (ref 0.2–1)
WBC # BLD AUTO: 8.2 K/UL (ref 3.6–11)
WBC URNS QL MICRO: ABNORMAL /HPF (ref 0–4)

## 2020-11-19 PROCEDURE — 96375 TX/PRO/DX INJ NEW DRUG ADDON: CPT

## 2020-11-19 PROCEDURE — 36415 COLL VENOUS BLD VENIPUNCTURE: CPT

## 2020-11-19 PROCEDURE — 80053 COMPREHEN METABOLIC PANEL: CPT

## 2020-11-19 PROCEDURE — 99285 EMERGENCY DEPT VISIT HI MDM: CPT

## 2020-11-19 PROCEDURE — 71045 X-RAY EXAM CHEST 1 VIEW: CPT

## 2020-11-19 PROCEDURE — 70450 CT HEAD/BRAIN W/O DYE: CPT

## 2020-11-19 PROCEDURE — 51701 INSERT BLADDER CATHETER: CPT

## 2020-11-19 PROCEDURE — 83605 ASSAY OF LACTIC ACID: CPT

## 2020-11-19 PROCEDURE — 74011250636 HC RX REV CODE- 250/636: Performed by: EMERGENCY MEDICINE

## 2020-11-19 PROCEDURE — 85025 COMPLETE CBC W/AUTO DIFF WBC: CPT

## 2020-11-19 PROCEDURE — 93005 ELECTROCARDIOGRAM TRACING: CPT

## 2020-11-19 PROCEDURE — 81001 URINALYSIS AUTO W/SCOPE: CPT

## 2020-11-19 RX ORDER — ONDANSETRON 2 MG/ML
4 INJECTION INTRAMUSCULAR; INTRAVENOUS
Status: COMPLETED | OUTPATIENT
Start: 2020-11-19 | End: 2020-11-19

## 2020-11-19 RX ORDER — ONDANSETRON 2 MG/ML
INJECTION INTRAMUSCULAR; INTRAVENOUS
Status: DISCONTINUED
Start: 2020-11-19 | End: 2020-11-20 | Stop reason: HOSPADM

## 2020-11-19 RX ADMIN — ONDANSETRON 4 MG: 2 INJECTION INTRAMUSCULAR; INTRAVENOUS at 21:36

## 2020-11-20 VITALS
TEMPERATURE: 98 F | RESPIRATION RATE: 21 BRPM | WEIGHT: 163.8 LBS | HEART RATE: 77 BPM | SYSTOLIC BLOOD PRESSURE: 111 MMHG | HEIGHT: 62 IN | DIASTOLIC BLOOD PRESSURE: 67 MMHG | BODY MASS INDEX: 30.14 KG/M2 | OXYGEN SATURATION: 100 %

## 2020-11-20 LAB
ATRIAL RATE: 75 BPM
CALCULATED P AXIS, ECG09: 35 DEGREES
CALCULATED R AXIS, ECG10: -63 DEGREES
CALCULATED T AXIS, ECG11: 64 DEGREES
DIAGNOSIS, 93000: NORMAL
P-R INTERVAL, ECG05: 162 MS
Q-T INTERVAL, ECG07: 478 MS
QRS DURATION, ECG06: 160 MS
QTC CALCULATION (BEZET), ECG08: 533 MS
VENTRICULAR RATE, ECG03: 75 BPM

## 2020-11-20 PROCEDURE — 74011250637 HC RX REV CODE- 250/637: Performed by: EMERGENCY MEDICINE

## 2020-11-20 PROCEDURE — 96365 THER/PROPH/DIAG IV INF INIT: CPT

## 2020-11-20 PROCEDURE — 74011250636 HC RX REV CODE- 250/636: Performed by: EMERGENCY MEDICINE

## 2020-11-20 RX ORDER — GABAPENTIN 300 MG/1
300 CAPSULE ORAL
Status: COMPLETED | OUTPATIENT
Start: 2020-11-20 | End: 2020-11-20

## 2020-11-20 RX ORDER — CYCLOBENZAPRINE HCL 10 MG
10 TABLET ORAL
Status: COMPLETED | OUTPATIENT
Start: 2020-11-20 | End: 2020-11-20

## 2020-11-20 RX ORDER — DIAZEPAM 5 MG/1
5 TABLET ORAL
Status: COMPLETED | OUTPATIENT
Start: 2020-11-20 | End: 2020-11-20

## 2020-11-20 RX ORDER — BUTALBITAL, ACETAMINOPHEN AND CAFFEINE 300; 40; 50 MG/1; MG/1; MG/1
1 CAPSULE ORAL
Qty: 10 CAP | Refills: 0 | Status: SHIPPED | OUTPATIENT
Start: 2020-11-20 | End: 2022-09-22 | Stop reason: ALTCHOICE

## 2020-11-20 RX ORDER — MAGNESIUM SULFATE HEPTAHYDRATE 40 MG/ML
2 INJECTION, SOLUTION INTRAVENOUS
Status: COMPLETED | OUTPATIENT
Start: 2020-11-20 | End: 2020-11-20

## 2020-11-20 RX ADMIN — MAGNESIUM SULFATE HEPTAHYDRATE 2 G: 40 INJECTION, SOLUTION INTRAVENOUS at 03:17

## 2020-11-20 RX ADMIN — DIAZEPAM 5 MG: 5 TABLET ORAL at 03:16

## 2020-11-20 RX ADMIN — GABAPENTIN 300 MG: 300 CAPSULE ORAL at 04:53

## 2020-11-20 RX ADMIN — ACETAMINOPHEN ORAL SOLUTION 1000 MG: 650 SOLUTION ORAL at 00:45

## 2020-11-20 RX ADMIN — CYCLOBENZAPRINE 10 MG: 10 TABLET, FILM COATED ORAL at 04:53

## 2020-11-20 NOTE — DISCHARGE INSTRUCTIONS

## 2020-11-20 NOTE — ED TRIAGE NOTES
21:35 Assumed care of pt. Plan of care discussed. Call bell in reach. Will continue to monitor. 22:49  Pt to CT via stretcher. 23.08 Bedside and Verbal shift change report given to Darcie2 SILVANA Li (oncoming nurse) by Kelly Matson RN  (offgoing nurse). Report included the following information SBAR, MAR and Recent Results.

## 2020-11-20 NOTE — ED NOTES
Patient having muscle twitching and pain in leg; Dr Suyapa Justice bedside to assess. Orders received. [facility] cancelled.

## 2020-11-20 NOTE — ED NOTES
[facility] at bedside, paperwork completed and report given to transport staff. Patient ready for discharge home via [facility].

## 2020-11-20 NOTE — ED PROVIDER NOTES
EMERGENCY DEPARTMENT HISTORY AND PHYSICAL EXAM     ----------------------------------------------------------------------------  Please note that this dictation was completed with [employer], the [employer] voice recognition software. Quite often unanticipated grammatical, syntax, homophones, and other interpretive errors are inadvertently transcribed by the computer software. Please disregard these errors. Please excuse any errors that have escaped final proofreading  ----------------------------------------------------------------------------      Date: 11/19/2020  Patient Name: Jhony Thao    History of Presenting Illness     Chief Complaint   Patient presents with    Headache     Per EMS pt on long term hospice, full code, sent for headache and looking to the right which she never does. Pt vomiting in route to ED. History Provided By:  Patient, EMS    HPI: Jhony Thao is a 78 y.o. female, with significant pmhx of previous stroke with deficits and typically looks leftward, diabetes, cholesterol, reflux, stage IV throat cancer with PEG tube in place who presents via private EMS to the ED with report of complaint of headache that started around 2030 this evening. Patient noted to be on Eliquis. Per EMS patient is on \"long-term hospice\" but family reports they have been told to call [phone] with any changes or issues. EMS reports blood sugar was 147 in route. Patient noted to have nausea and episode of vomiting during transport from home to the hospital.  Arrives alert and able to speak. Notes that she has no pain but feels nauseous. EMS reports during transport she also became cold and clammy without other changes in vital signs or mental status. Patient specifically denies any associated fevers, chills, CP, SOB, nausea, vomiting, diarrhea, abd pain, changes in BM, urinary sxs.     Social Hx: denies tobacco  denies EtOH , denies Illicit Drugs    There are no other complaints, changes, or physical findings at this time. PCP: Heena Fermin MD    Allergies   Allergen Reactions    Latex Rash    Bactrim [Sulfamethoprim Ds] Hives    Aspirin Other (comments)     Anything higher than 81mg makes pt jittery    Codeine Hives and Itching    Iodinated Contrast Media Itching    Pcn [Penicillins] Hives and Itching    Tape [Adhesive] Rash       Current Facility-Administered Medications   Medication Dose Route Frequency Provider Last Rate Last Dose    magnesium sulfate 2 g/50 ml IVPB (premix or compounded)  2 g IntraVENous NOW Eileen Hayes MD 50 mL/hr at 11/20/20 0317 2 g at 11/20/20 0317    cyclobenzaprine (FLEXERIL) tablet 10 mg  10 mg Oral NOW Eileen Hayes MD        gabapentin (NEURONTIN) capsule 300 mg  300 mg Oral NOW Eileen Hayes MD         Current Outpatient Medications   Medication Sig Dispense Refill    butalbital-acetaminophen-caff (Fioricet) -40 mg per capsule Take 1 Cap by mouth every four (4) hours as needed for Headache. 10 Cap 0    acetaminophen (TYLENOL) 160 mg/5 mL liquid Take 15 mg/kg by mouth every six (6) hours as needed for Fever.  atorvastatin (LIPITOR) 40 mg tablet Take 1 Tab by mouth nightly. 90 Tab 3    apixaban (Eliquis) 5 mg tablet Take 1 Tab by mouth two (2) times a day. 180 Tab 3    famotidine (PEPCID) 40 mg/5 mL (8 mg/mL) suspension Take 5 mL by mouth two (2) times a day. 50 mL 12    pantoprazole (PROTONIX) 40 mg granules for oral suspension Take 40 mg by mouth daily. (Patient not taking: Reported on 10/1/2020) 30 Each 3    potassium chloride (Klor-Con/25) 25 mEq pack Sig:give via peg bid 60 Packet 3    metoprolol succinate (TOPROL-XL) 25 mg XL tablet TAKE 1 TABLET BY MOUTH EVERY DAY 90 Tab 3    cyclobenzaprine (FLEXERIL) 10 mg tablet Take 1 Tab by mouth three (3) times daily as needed for Muscle Spasm(s).  (Patient not taking: Reported on 10/1/2020) 90 Tab 0    meclizine (ANTIVERT) 25 mg tablet TAKE 1 TABLET THREE TIMES DAILY AS NEEDED (Patient not taking: Reported on 10/1/2020) 270 Tab 3    rivaroxaban (Xarelto) 15 mg tab tablet Take 1 Tab by mouth daily (with dinner). (Patient not taking: Reported on 10/1/2020) 90 Tab 3    gabapentin (NEURONTIN) 300 mg capsule Take 1 Cap by mouth three (3) times daily. (Patient not taking: Reported on 10/1/2020) 270 Cap 3    amLODIPine (NORVASC) 5 mg tablet Take 1 Tab by mouth daily. 90 Tab 3    fluticasone propionate (FLONASE) 50 mcg/actuation nasal spray 2 Sprays by Both Nostrils route daily as needed for Rhinitis.  acetaminophen (TYLENOL) 325 mg tablet Take 650 mg by mouth nightly.  MULTIVIT WITH CALCIUM,IRON,MIN [facility] MULTIPLE VITAMINS PO) Take 1 Tab by mouth daily.          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    Diverticulosis of colon (without mention of hemorrhage) 2/8/2011    Dysphagia 2/8/2011    Edema, peripheral 2/8/2011    GERD (gastroesophageal reflux disease)     GERD Gastro- Esophageal Reflux Disease 2/8/2011    Herniated nucleus pulposus ( slipped disc) 2/8/2011    Menopausal 2/8/2011    PUD (peptic ulcer disease) 2012    Pure hypercholesterolemia 2/8/2011    Recurrent ear pain 2/8/2011    S/P radiation therapy     Scalp lesion 10/23/2014    Sebaceous cyst 4/22/2014    Unspecified cataract 2/8/2011    Unspecified essential hypertension 2/8/2011    Unspecified sleep apnea        Past Surgical History:  Past Surgical History:   Procedure Laterality Date    HX HYSTERECTOMY      HX ORTHOPAEDIC  neck/back 2006    HX OTHER SURGICAL  5/8/2014     Excise recurrent scalp lesion and application of ACell    HX OTHER SURGICAL  5/8/2014    Excise keloid, anterior chest wall, and application of ACell    HX OTHER SURGICAL  5/8/2014     Excise sebaceous cyst, anterior chest wall    HX OTHER SURGICAL  5/8/2014    Punch biopsy, skin lesions, right forearm x2, right calf x1    PLACE PERCUT GASTROSTOMY TUBE  7/8/2020         ME ICAR CATHETER ABLATION ATRIOVENTR NODE FUNCTION N/A 5/6/2019    ABLATION AV NODE performed by Tana Leonard MD at [address], [facility] CATH LAB    ME INS NEW/RPLCMT PRM PM W/TRANSV ELTRD ATRIAL&VENT  9/30/2017         ME RMVL IMPLANTABLE PT-ACTIVATED CAR EVENT RECORDER  9/30/2017            Family History:  Family History   Problem Relation Age of Onset    Hypertension Mother     Stroke Mother     Hypertension Father     Cancer Father         PROSTATE, MELANOMA    Anesth Problems Neg Hx        Social History:  Social History     Tobacco Use    Smoking status: Never Smoker    Smokeless tobacco: Never Used   Substance Use Topics    Alcohol use: No    Drug use: No       Allergies: Allergies   Allergen Reactions    Latex Rash    Bactrim [Sulfamethoprim Ds] Hives    Aspirin Other (comments)     Anything higher than 81mg makes pt jittery    Codeine Hives and Itching    Iodinated Contrast Media Itching    Pcn [Penicillins] Hives and Itching    Tape [Adhesive] Rash         Review of Systems   Review of Systems   Constitutional: Negative. Negative for fever. Eyes: Negative. Respiratory: Negative. Negative for shortness of breath. Cardiovascular: Negative for chest pain. Gastrointestinal: Positive for nausea and vomiting. Negative for abdominal pain. Endocrine: Negative. Genitourinary: Negative. Negative for difficulty urinating, dysuria and hematuria. Musculoskeletal: Negative. Skin: Negative. Neurological: Positive for headaches. Psychiatric/Behavioral: Negative for suicidal ideas. All other systems reviewed and are negative.         Physical Exam   Physical Exam  Vitals signs and nursing note reviewed. Constitutional:       General: She is not in acute distress. Appearance: She is well-developed. She is obese. She is not diaphoretic. HENT:      Head: Normocephalic and atraumatic. Nose: Nose normal.   Eyes:      General: No scleral icterus. Conjunctiva/sclera: Conjunctivae normal.   Neck:      Musculoskeletal: Normal range of motion. Trachea: No tracheal deviation. Cardiovascular:      Rate and Rhythm: Normal rate and regular rhythm. Heart sounds: Normal heart sounds. No murmur. No friction rub. Pulmonary:      Effort: Pulmonary effort is normal. No respiratory distress. Breath sounds: Normal breath sounds. No stridor. No wheezing or rales. Abdominal:      General: Bowel sounds are normal. There is no distension. Palpations: Abdomen is soft. Tenderness: There is no abdominal tenderness. There is no rebound. Comments: PEG tube to left mid abdomen   Musculoskeletal: Normal range of motion. General: No tenderness. Skin:     General: Skin is warm and dry. Findings: No rash. Neurological:      Mental Status: She is alert. GCS: GCS eye subscore is 4. GCS verbal subscore is 5. GCS motor subscore is 6. Cranial Nerves: No cranial nerve deficit. Motor: Weakness (Chronic) present. Psychiatric:         Speech: Speech normal.         Behavior: Behavior normal.         Thought Content:  Thought content normal.         Judgment: Judgment normal.           Diagnostic Study Results     Labs -     Recent Results (from the past 12 hour(s))   EKG, 12 LEAD, INITIAL    Collection Time: 11/19/20  9:37 PM   Result Value Ref Range    Ventricular Rate 75 BPM    Atrial Rate 75 BPM    P-R Interval 162 ms    QRS Duration 160 ms    Q-T Interval 478 ms    QTC Calculation (Bezet) 533 ms    Calculated P Axis 35 degrees    Calculated R Axis -63 degrees    Calculated T Axis 64 degrees    Diagnosis       Atrial-sensed ventricular-paced rhythm  When compared with ECG of 03-JUL-2020 16:17,  No significant change was found     CBC WITH AUTOMATED DIFF    Collection Time: 11/19/20  9:40 PM   Result Value Ref Range    WBC 8.2 3.6 - 11.0 K/uL    RBC 5.54 (H) 3.80 - 5.20 M/uL    HGB 13.8 11.5 - 16.0 g/dL    HCT 42.9 35.0 - 47.0 %    MCV 77.4 (L) 80.0 - 99.0 FL    MCH 24.9 (L) 26.0 - 34.0 PG    MCHC 32.2 30.0 - 36.5 g/dL    RDW 21.7 (H) 11.5 - 14.5 %    PLATELET 247 406 - 212 K/uL    MPV 10.3 8.9 - 12.9 FL    NRBC 0.0 0  WBC    ABSOLUTE NRBC 0.00 0.00 - 0.01 K/uL    NEUTROPHILS 56 32 - 75 %    LYMPHOCYTES 31 12 - 49 %    MONOCYTES 8 5 - 13 %    EOSINOPHILS 3 0 - 7 %    BASOPHILS 1 0 - 1 %    IMMATURE GRANULOCYTES 1 (H) 0.0 - 0.5 %    ABS. NEUTROPHILS 4.6 1.8 - 8.0 K/UL    ABS. LYMPHOCYTES 2.5 0.8 - 3.5 K/UL    ABS. MONOCYTES 0.7 0.0 - 1.0 K/UL    ABS. EOSINOPHILS 0.2 0.0 - 0.4 K/UL    ABS. BASOPHILS 0.1 0.0 - 0.1 K/UL    ABS. IMM. GRANS. 0.1 (H) 0.00 - 0.04 K/UL    DF SMEAR SCANNED      RBC COMMENTS ANISOCYTOSIS  1+       METABOLIC PANEL, COMPREHENSIVE    Collection Time: 11/19/20  9:40 PM   Result Value Ref Range    Sodium 140 136 - 145 mmol/L    Potassium 3.2 (L) 3.5 - 5.1 mmol/L    Chloride 104 97 - 108 mmol/L    CO2 31 21 - 32 mmol/L    Anion gap 5 5 - 15 mmol/L    Glucose 134 (H) 65 - 100 mg/dL    BUN 16 6 - 20 MG/DL    Creatinine 0.66 0.55 - 1.02 MG/DL    BUN/Creatinine ratio 24 (H) 12 - 20      GFR est AA >60 >60 ml/min/1.73m2    GFR est non-AA >60 >60 ml/min/1.73m2    Calcium 9.7 8.5 - 10.1 MG/DL    Bilirubin, total 0.5 0.2 - 1.0 MG/DL    ALT (SGPT) 36 12 - 78 U/L    AST (SGOT) 19 15 - 37 U/L    Alk.  phosphatase 111 45 - 117 U/L    Protein, total 7.7 6.4 - 8.2 g/dL    Albumin 3.6 3.5 - 5.0 g/dL    Globulin 4.1 (H) 2.0 - 4.0 g/dL    A-G Ratio 0.9 (L) 1.1 - 2.2     URINALYSIS W/ REFLEX CULTURE    Collection Time: 11/19/20  9:40 PM    Specimen: Urine   Result Value Ref Range    Color YELLOW/STRAW      Appearance CLOUDY (A) CLEAR      Specific gravity 1.013 1.003 - 1.030      pH (UA) 7.0 5.0 - 8.0      Protein Negative NEG mg/dL    Glucose Negative NEG mg/dL    Ketone Negative NEG mg/dL    Bilirubin Negative NEG      Blood Negative NEG      Urobilinogen 0.2 0.2 - 1.0 EU/dL    Nitrites Negative NEG      Leukocyte Esterase Negative NEG      WBC 0-4 0 - 4 /hpf    RBC 0-5 0 - 5 /hpf    Epithelial cells FEW FEW /lpf    Bacteria 1+ (A) NEG /hpf    UA:UC IF INDICATED CULTURE NOT INDICATED BY UA RESULT CNI      Mucus TRACE (A) NEG /lpf    Hyaline cast 0-2 0 - 5 /lpf    Other: Renal Epithelial cells Present     POC LACTIC ACID    Collection Time: 11/19/20  9:52 PM   Result Value Ref Range    Lactic Acid (POC) 2.31 (HH) 0.40 - 2.00 mmol/L       Radiologic Studies -   CT HEAD WO CONT   Final Result   IMPRESSION:    No acute intracranial hemorrhage. Chronic right MCA territory infarct. XR CHEST PORT   Final Result   IMPRESSION:      Mild central pulmonary vascular congestion without overt pulmonary edema. CT Results  (Last 48 hours)               11/19/20 2317  CT HEAD WO CONT Final result    Impression:  IMPRESSION:    No acute intracranial hemorrhage. Chronic right MCA territory infarct. Narrative:  EXAM:  CT head without contrast       INDICATION: Headache. On anticoagulation. COMPARISON: MRI 7/8/2020. CT 7/5/2020       TECHNIQUE: Axial noncontrast head CT from foramen magnum to vertex. Coronal and   sagittal reformatted images were obtained. CT dose reduction was achieved   through use of a standardized protocol tailored for this examination and   automatic exposure control for dose modulation. FINDINGS:  There is diffuse age-related parenchymal volume loss. The ventricles   and sulci are age-appropriate without hydrocephalus. There is no mass effect or   midline shift. There is no intracranial hemorrhage or extra-axial fluid   collection.  Scattered foci of low attenuation in the periventricular white   matter represent stable chronic microvascular ischemic changes. A chronic right   MCA territory infarct is noted. The basal cisterns are patent. The osseous structures are intact. The visualized paranasal sinuses and mastoid   air cells are clear. CXR Results  (Last 48 hours)               11/19/20 2247  XR CHEST PORT Final result    Impression:  IMPRESSION:       Mild central pulmonary vascular congestion without overt pulmonary edema. Narrative:  EXAM:  XR CHEST PORT       INDICATION: Headache. Change in mental status. COMPARISON: 3/9/2019       TECHNIQUE: Portable AP semiupright chest view at 2240 hours       FINDINGS: The left chest ICD and wires are stable. The cardiomediastinal   contours are stable. There is mild central pulmonary vascular congestion. The lungs and pleural spaces are clear. There is no pneumothorax. The bones and   upper abdomen are stable. Medical Decision Making   I am the first provider for this patient. I reviewed the vital signs, available nursing notes, past medical history, past surgical history, family history and social history. Vital Signs-Reviewed the patient's vital signs. Patient Vitals for the past 12 hrs:   Temp Pulse Resp BP SpO2   11/20/20 0316 -- 83 -- 137/82 --   11/19/20 2245 -- 71 20 (!) 161/76 95 %   11/19/20 2230 -- 76 18 (!) 175/70 94 %   11/19/20 2215 -- 70 18 (!) 171/69 96 %   11/19/20 2200 -- 71 17 (!) 182/70 95 %   11/19/20 2133 98 °F (36.7 °C) 74 16 (!) 157/79 95 %       Pulse Oximetry Analysis - 95% on RA, normal  Cardiac Monitor:   Rate: 74 bpm  Rhythm: nsr      Provider Notes (Medical Decision Making):     DDX:  CVA, intracranial bleed, UTI, electrolyte abnormality    Plan:  Head CT, chest x-ray, labs, UA, antiemetic    Impression:  Acute headache    ED Course:   Initial assessment performed.  The patients presenting problems have been discussed, and they are in agreement with the care plan formulated and outlined with them. I have encouraged them to ask questions as they arise throughout their visit. I reviewed the nursing notes and and vital signs from today's visit, as well as the electronic medical record system for any past medical records that were available that may contribute to the patients current condition, including devious office visit with cardiology for pacemaker evaluation on 10/20/2020    Nursing notes will be reviewed as they become available in realtime while the pt has been in the ED. Beto Pollock MD    HYPERTENSION COUNSELING:  Patient made aware of their elevated blood pressure and is instructed to follow up this week with their Primary Care or [address] [id number] for a recheck (should they be discharged.) Patient is counseled regarding consequences of chronic, uncontrolled hypertension including kidney disease, heart disease, stroke or even death. Patient states their understanding    EKG interpretation 2137: Atrial sensed, V paced rhythm, L Axis, rate 75; , , QTc 533; NO STEMI; interpreted by Beto Pollock MD    I personally reviewed/interpreted pt's imaging. Agree with official read by radiology as noted above. Beto Pollock MD      PROGRESS NOTE  2:33 AM  [facility] transport arrived on scene at time of plan discharge. [facility] staff now reports patient complaining of left lower extremity pain and jumping sensation. Patient with minor twitch to left lower extremity appreciated on exam.  Will cancel [facility] trip at this time and provide patient with exam and Valium reassess. PROGRESS NOTE  4:11 AM  Patient noted to have some improvement of her symptoms with medication provided. Upon further chart review and discussion with the patient she reports that she is on medication for her arms and legs regularly. Patient noted to be on cyclobenzaprine 3 times a day scheduled as well as gabapentin 3 times a day.   Neither of these medications were in the bag that the patient arrived with. Both for muscle spasms and neuropathy. Patient notably missed her evening dose while in the emergency department today. Will provide her with her previous  medications and plan for discharge home. Progress note:  Pt noted to be feeling better, patient notes no longer having headache or other complaints ready for discharge. Discussed lab and imaging findings with pt, specifically noting no significant change on head CT. Pt will follow up with primary care as instructed. All questions have been answered, pt voiced understanding and agreement with plan. Specific return precautions provided in addition to instructions for pt to return to the ED immediately should sx worsen at any time. Jag Reyez MD       Critical Care Time:     none      Diagnosis     Clinical Impression:   1. Acute nonintractable headache, unspecified headache type        PLAN:  1. Current Discharge Medication List      START taking these medications    Details   butalbital-acetaminophen-caff (Fioricet) -40 mg per capsule Take 1 Cap by mouth every four (4) hours as needed for Headache. Qty: 10 Cap, Refills: 0           2. Follow-up Information     Follow up With Specialties Details Why Contact Info    Buzz Ramirez MD Internal Medicine Schedule an appointment as soon as possible for a visit in 2 days  [address] [address]  [phone]          Return to ED if worse     Disposition:  4:13 AM  The patient's results have been reviewed with family and/or caregiver. They verbally convey their understanding and agreement of the patient's signs, symptoms, diagnosis, treatment and prognosis and additionally agree to follow up as recommended in the discharge instructions or to return to the Emergency Room should the patient's condition change prior to their follow-up appointment.  The family and/or caregiver verbally agrees with the care-plan and all of their questions have been answered. The discharge instructions have also been provided to the them with educational information regarding the patient's diagnosis as well a list of reasons why the patient would want to return to the ER prior to their follow-up appointment should their condition change.   Susan Jain MD

## 2020-11-23 RX ORDER — FAMOTIDINE 40 MG/5ML
40 POWDER, FOR SUSPENSION ORAL 2 TIMES DAILY
Qty: 50 ML | Refills: 12 | Status: SHIPPED | OUTPATIENT
Start: 2020-11-23 | End: 2021-04-26 | Stop reason: SDUPTHER

## 2020-11-27 ENCOUNTER — OFFICE VISIT (OUTPATIENT)
Dept: CARDIOLOGY CLINIC | Age: 80
End: 2020-11-27
Payer: MEDICARE

## 2020-11-27 DIAGNOSIS — Z95.0 CARDIAC PACEMAKER IN SITU: Primary | ICD-10-CM

## 2020-11-27 PROCEDURE — 93294 REM INTERROG EVL PM/LDLS PM: CPT | Performed by: INTERNAL MEDICINE

## 2020-11-27 PROCEDURE — 93296 REM INTERROG EVL PM/IDS: CPT | Performed by: INTERNAL MEDICINE

## 2020-12-22 ENCOUNTER — HOSPITAL ENCOUNTER (EMERGENCY)
Age: 80
Discharge: HOME | End: 2020-12-22
Attending: EMERGENCY MEDICINE
Payer: MEDICARE

## 2020-12-22 VITALS
TEMPERATURE: 98.3 F | HEIGHT: 64 IN | RESPIRATION RATE: 18 BRPM | OXYGEN SATURATION: 97 % | DIASTOLIC BLOOD PRESSURE: 90 MMHG | HEART RATE: 77 BPM | BODY MASS INDEX: 27.83 KG/M2 | WEIGHT: 163 LBS | SYSTOLIC BLOOD PRESSURE: 189 MMHG

## 2020-12-22 DIAGNOSIS — K94.23 PEG TUBE MALFUNCTION (HCC): Primary | ICD-10-CM

## 2020-12-22 PROCEDURE — 99284 EMERGENCY DEPT VISIT MOD MDM: CPT

## 2020-12-22 NOTE — ED TRIAGE NOTES
Pt arrived via EMS who report's they were called for peg tube problem. pt has dementia at [facility] [place] and does not verbalized well. EMS state's pt was having crust around site of peg tube and they were unable to fully flush. RN auscultated peg tube and flushed with 30 cc of water no resistance no residual, no complications or leaking noted.

## 2020-12-22 NOTE — ED NOTES
Bedside shift change report given to [address]  (oncoming nurse) by Marlen Moody RN  (offgoing nurse). Report included the following information SBAR, Kardex and ED Summary.

## 2020-12-22 NOTE — ED NOTES
Spoke with Son who said there was air leak sound coming from the peg tube site and when he poured water on the abdomen and water drained immediately into peg tube site and there was presence of bubbling.     Son is Sneha Madden [phone]

## 2020-12-22 NOTE — ED NOTES
Discharge paperwork provided to patient at this time. Vital signs stable. Patient in no apparent distress at this time. Mental status at baseline. Discharge paperwork in hand and prescription instructions if applicable. [facility] here to transport patient back home.

## 2020-12-22 NOTE — ED PROVIDER NOTES
EMERGENCY DEPARTMENT HISTORY AND PHYSICAL EXAM    Date: 12/22/2020  Patient Name: Sola Au    History of Presenting Illness     Chief Complaint   Patient presents with    Other     Pt arrived via EMS who report's they were called for peg tube problem. pt has [facility] at [facility] and does not verbalized well. EMS state's pt was having crust around site of peg tube and they were unable to fully flush. History Provided By: Patient's Son      HPI: Sola Au is a 78 y.o. female with a PMH of Hypercholesterolemia, DJD, stroke, GERD who presents via EMS for PEG tube complication. Son reports concern of fluid going around PEG tube site into the stoma after he removed the dressing. No redness, drainage around stoma site. Son reports there is crust around the PEG tube site. He reports that home health nurse referred him to the emergency room. He believes the PEG tube is flushing well. She receives gravity feeding. PCP: Rowena Mace., MD    Current Outpatient Medications   Medication Sig Dispense Refill    famotidine (PEPCID) 40 mg/5 mL (8 mg/mL) suspension Take 5 mL by mouth two (2) times a day. 50 mL 12    butalbital-acetaminophen-caff (Fioricet) -40 mg per capsule Take 1 Cap by mouth every four (4) hours as needed for Headache. 10 Cap 0    acetaminophen (TYLENOL) 160 mg/5 mL liquid Take 15 mg/kg by mouth every six (6) hours as needed for Fever.  atorvastatin (LIPITOR) 40 mg tablet Take 1 Tab by mouth nightly. 90 Tab 3    apixaban (Eliquis) 5 mg tablet Take 1 Tab by mouth two (2) times a day. 180 Tab 3    pantoprazole (PROTONIX) 40 mg granules for oral suspension Take 40 mg by mouth daily.  (Patient not taking: Reported on 10/1/2020) 30 Each 3    potassium chloride (Klor-Con/25) 25 mEq pack Sig:give via peg bid 60 Packet 3    metoprolol succinate (TOPROL-XL) 25 mg XL tablet TAKE 1 TABLET BY MOUTH EVERY DAY 90 Tab 3    cyclobenzaprine (FLEXERIL) 10 mg tablet Take 1 Tab by mouth three (3) times daily as needed for Muscle Spasm(s). (Patient not taking: Reported on 10/1/2020) 90 Tab 0    meclizine (ANTIVERT) 25 mg tablet TAKE 1 TABLET THREE TIMES DAILY AS NEEDED (Patient not taking: Reported on 10/1/2020) 270 Tab 3    rivaroxaban (Xarelto) 15 mg tab tablet Take 1 Tab by mouth daily (with dinner). (Patient not taking: Reported on 10/1/2020) 90 Tab 3    gabapentin (NEURONTIN) 300 mg capsule Take 1 Cap by mouth three (3) times daily. (Patient not taking: Reported on 10/1/2020) 270 Cap 3    amLODIPine (NORVASC) 5 mg tablet Take 1 Tab by mouth daily. 90 Tab 3    fluticasone propionate (FLONASE) 50 mcg/actuation nasal spray 2 Sprays by Both Nostrils route daily as needed for Rhinitis.  acetaminophen (TYLENOL) 325 mg tablet Take 650 mg by mouth nightly.  MULTIVIT WITH CALCIUM,IRON,MIN [facility] MULTIPLE VITAMINS PO) Take 1 Tab by mouth daily.          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    Diverticulosis of colon (without mention of hemorrhage) 2/8/2011    Dysphagia 2/8/2011    Edema, peripheral 2/8/2011    GERD (gastroesophageal reflux disease)     GERD Gastro- Esophageal Reflux Disease 2/8/2011    Herniated nucleus pulposus ( slipped disc) 2/8/2011    Menopausal 2/8/2011    PUD (peptic ulcer disease) 2012    Pure hypercholesterolemia 2/8/2011    Recurrent ear pain 2/8/2011    S/P radiation therapy     Scalp lesion 10/23/2014    Sebaceous cyst 4/22/2014    Unspecified cataract 2/8/2011    Unspecified essential hypertension 2/8/2011    Unspecified sleep apnea        Past Surgical History:  Past Surgical History:   Procedure Laterality Date    HX HYSTERECTOMY      HX ORTHOPAEDIC  neck/back 2006    HX OTHER SURGICAL  5/8/2014     Excise recurrent scalp lesion and application of ACell    HX OTHER SURGICAL  5/8/2014    Excise keloid, anterior chest wall, and application of ACell    HX OTHER SURGICAL  5/8/2014     Excise sebaceous cyst, anterior chest wall    HX OTHER SURGICAL  5/8/2014    Punch biopsy, skin lesions, right forearm x2, right calf x1    PLACE PERCUT GASTROSTOMY TUBE  7/8/2020         KS ICAR CATHETER ABLATION ATRIOVENTR NODE FUNCTION N/A 5/6/2019    ABLATION AV NODE performed by Janina Bell MD at [address] CATH LAB    KS INS NEW/RPLCMT PRM PM W/TRANSV ELTRD ATRIAL&VENT  9/30/2017         KS RMVL IMPLANTABLE PT-ACTIVATED CAR EVENT RECORDER  9/30/2017            Family History:  Family History   Problem Relation Age of Onset    Hypertension Mother     Stroke Mother     Hypertension Father     Cancer Father         PROSTATE, MELANOMA    Anesth Problems Neg Hx        Social History:  Social History     Tobacco Use    Smoking status: Never Smoker    Smokeless tobacco: Never Used   Substance Use Topics    Alcohol use: No    Drug use: No       Allergies: Allergies   Allergen Reactions    Latex Rash    Bactrim [Sulfamethoprim Ds] Hives    Aspirin Other (comments)     Anything higher than 81mg makes pt jittery    Codeine Hives and Itching    Iodinated Contrast Media Itching    Pcn [Penicillins] Hives and Itching    Tape [Adhesive] Rash         Review of Systems   Review of Systems   Unable to perform ROS: Acuity of condition       Physical Exam     Vitals:    12/22/20 1202   BP: (!) 189/90   Pulse: 77   Resp: 18   Temp: 98.3 °F (36.8 °C)   SpO2: 97%   Weight: 73.9 kg (163 lb)   Height: 5' 4\" (1.626 m)     Physical Exam  Vitals signs and nursing note reviewed. Constitutional:       General: She is not in acute distress. Appearance: She is well-developed. She is obese.  She is not ill-appearing. HENT:      Head: Normocephalic and atraumatic. Neck:      Musculoskeletal: Normal range of motion and neck supple. Cardiovascular:      Rate and Rhythm: Normal rate and regular rhythm. Pulses: Normal pulses. Heart sounds: Normal heart sounds. Pulmonary:      Effort: Pulmonary effort is normal.      Breath sounds: Normal breath sounds. Abdominal:      General: Bowel sounds are normal.      Palpations: Abdomen is soft. Tenderness: There is no abdominal tenderness. There is no guarding or rebound. Skin:     General: Skin is warm and dry. Comments: PEG tube in place with scant crust around stoma site    Neurological:      Mental Status: She is alert. Diagnostic Study Results     Labs -   No results found for this or any previous visit (from the past 12 hour(s)). Radiologic Studies -   No orders to display     CT Results  (Last 48 hours)    None        CXR Results  (Last 48 hours)    None            Medical Decision Making   I am the first provider for this patient. I reviewed the vital signs, available nursing notes, past medical history, past surgical history, family history and social history. Vital Signs-Reviewed the patient's vital signs. Records Reviewed: Nursing Notes and Old Medical Records          69-year-old female presented with PEG tube complication exhibiting mild scabbing around stoma site however PEG tube has been able to flush and withdraw without complication. I discussed unremarkable findings with son via phone. He verbalized understanding. No imaging warranted to assess for Peg Tub dislodgment since it is working appropriately. Disposition:  Discharge     DISCHARGE NOTE:       Care plan outlined and precautions discussed. Patient has no new complaints, changes, or physical findings. All of pt's questions and concerns were addressed.  Patient was instructed and agrees to follow up with PCP, as well as to return to the ED upon further deterioration. Patient is ready to go home. Follow-up Information     Follow up With Specialties Details Why Contact Jose A Mera MD Internal Medicine Call  As needed, If symptoms worsen Molly  [phone]            Current Discharge Medication List          Provider Notes (Medical Decision Making):   DDX: Peg Tube Malfunction, Stoma infection     8:01 AM  I was personally available for consultation in the emergency department. I have reviewed the chart and agree with the documentation recorded by the [facility], including the assessment, treatment plan, and disposition. Ganga Godinez MD      Procedures:  Procedures    Please note that this dictation was completed with Dragon, computer voice recognition software. Quite often unanticipated grammatical, syntax, homophones, and other interpretive errors are inadvertently transcribed by the computer software. Please disregard these errors. Additionally, please excuse any errors that have escaped final proofreading. Diagnosis     Clinical Impression:   1.  PEG tube malfunction ([address]

## 2021-03-09 ENCOUNTER — DOCUMENTATION ONLY (OUTPATIENT)
Dept: CARDIOLOGY CLINIC | Age: 81
End: 2021-03-09

## 2021-03-09 NOTE — PROGRESS NOTES
Received alert for NSVT x 3 since 01/18/2021 x 1-2 seconds (8-9 beats), max V rate 214 bpm.  Pt has Medtronic dual chamber pacemaker. Per [facility] notes in 11/2020, patient is on hospice s/p CVA. Please confirm whether patient is still on hospice; if so, no change in medications, no further evaluation. If no longer hospice, increase Toprol XL to 50 mg po daily if BP allows; however, she has had syncope with hypotension previously. Known cardiomyopathy, noted 07/05/2020 with mod concentric LVH. No ACEi/ARB. Last seen in clinic in 2019.     Future Appointments   Date Time Provider Nasim Cerrato   3/24/2021 10:15 AM REMOTE1, JES HAMMOND   7/7/2021 10:30 AM REMOTE1, JES EID AMB

## 2021-03-09 NOTE — PROGRESS NOTES
Verified patient with two types of identifiers. Spoke with patient's son verified on HIPAA. Notified of device findings. Son states patient is still in hospice. No changes at this time to medication. Patient's son verbalized understanding and will call with any other questions.

## 2021-04-01 ENCOUNTER — OFFICE VISIT (OUTPATIENT)
Dept: CARDIOLOGY CLINIC | Age: 81
End: 2021-04-01
Payer: MEDICARE

## 2021-04-01 DIAGNOSIS — Z95.0 CARDIAC PACEMAKER IN SITU: Primary | ICD-10-CM

## 2021-04-01 PROCEDURE — 93294 REM INTERROG EVL PM/LDLS PM: CPT | Performed by: INTERNAL MEDICINE

## 2021-04-01 PROCEDURE — 93296 REM INTERROG EVL PM/IDS: CPT | Performed by: INTERNAL MEDICINE

## 2021-04-01 NOTE — LETTER
4/1/2021 9:20 AM 
 
Ms. Angi Molina. [address] [facility] [address] After careful review of your remote check of your implated device on 0-6-41. I have concluded that your device is working properly. Your next: Automatic remote check ( from home) is scheduled for  7-7-21 If you have any questions, please call [employer] [facility] [address] at [phone]. Additional Comments: ________________________________________________ 
 
__________________________________________________________________ 
 
__________________________________________________________________ Jim Parikh MD [facility]

## 2021-04-26 RX ORDER — FAMOTIDINE 40 MG/5ML
40 POWDER, FOR SUSPENSION ORAL 2 TIMES DAILY
Qty: 50 ML | Refills: 12 | Status: SHIPPED | OUTPATIENT
Start: 2021-04-26 | End: 2021-08-09 | Stop reason: SDUPTHER

## 2021-05-20 RX ORDER — METOPROLOL SUCCINATE 25 MG/1
TABLET, EXTENDED RELEASE ORAL
Qty: 90 TABLET | Refills: 3 | Status: SHIPPED | OUTPATIENT
Start: 2021-05-20 | End: 2021-06-14 | Stop reason: SDUPTHER

## 2021-06-14 RX ORDER — METOPROLOL SUCCINATE 25 MG/1
TABLET, EXTENDED RELEASE ORAL
Qty: 90 TABLET | Refills: 3 | Status: SHIPPED | OUTPATIENT
Start: 2021-06-14 | End: 2022-06-22 | Stop reason: SDUPTHER

## 2021-06-14 NOTE — TELEPHONE ENCOUNTER
Dr. Valentine Gonzalez,          Pt son is requesting a new prescription to be sent to [place] [address] on behalf of the pt. Previous prescription was sent to [facility]. Last visit 10/01/2020 Virtual visit MD Valentine Gonzalez   Next appointment 4 weeks (10/2020) - Nothing scheduled   Previous refill encounter(s)   05/20/2021 Toprol-XL #90 with 3 refills.      Requested Prescriptions     Pending Prescriptions Disp Refills    metoprolol succinate (TOPROL-XL) 25 mg XL tablet 90 Tablet 3     Sig: TAKE 1 TABLET BY MOUTH EVERY DAY

## 2021-06-14 NOTE — TELEPHONE ENCOUNTER
----- Message from Naomy Watts sent at 6/14/2021 10:36 AM EDT -----  Regarding: FW: Green/Rx    ----- Message -----  From: Zaid Mccray  Sent: 6/14/2021  10:03 AM EDT  To: [facility] Front Office Pool  Subject: Green/Rx                                         Pts son Rosi Rosario is requesting a Rx for Metoprolol. [facility] number is [phone] and [facility] number is [phone] and fax is [phone].

## 2021-07-07 ENCOUNTER — OFFICE VISIT (OUTPATIENT)
Dept: CARDIOLOGY CLINIC | Age: 81
End: 2021-07-07
Payer: MEDICARE

## 2021-07-07 DIAGNOSIS — Z95.0 CARDIAC PACEMAKER IN SITU: Primary | ICD-10-CM

## 2021-07-07 PROCEDURE — 93294 REM INTERROG EVL PM/LDLS PM: CPT | Performed by: INTERNAL MEDICINE

## 2021-07-07 NOTE — LETTER
2021 1:52 PM    Ms[address] [id number]          Dear Patient,    This letter is to inform you that as of  [facility] will no longer be sending out confirmation letters for remote transmissions through the [employer]. All letters in the future will be posted in an electronic medical records format therefore we highly encourage enrollment in [employer] patient's portal. Once enrolled you will have access to any letters we send as well as appointment information that can be found in your medical record. Our EP team would contact you via phone if there are significant abnormal findings so you can discuss with Dr. Subhash Treviño further in office. Missed transmission letters will also be digitized in [employer] but we will continue to send those out through the mail as well. How to register for [employer]:    - Visit [employer]  - Click Create an Account  - Provide your name, address, and   - You will then be asked a short series of questions to verify your identity. This helps to ensure that no one else can access your information.   - If you have any further questions, please contact our office at [phone]. If you choose not to enroll in [employer] or do not have internet access, please kindly let device clinic know and we will accommodate your preference. We are grateful for your understanding and looking forward to this new, improved and more efficient way of communication.            Warm Regards,  [facility] Staff

## 2021-07-07 NOTE — LETTER
7/7/2021 1:50 PM    Ms. Shakeel Landeros  [facility] [address] [facility] [address]              After careful review of your remote check of your implated device on 5-5-42. I have concluded that your device is working properly. Your next: In clinic device check is scheduled for   11-2-21 at  9:00am.          If you have any questions, please call [employer] [address] at [phone].      Additional Comments: ________________________________________________    __________________________________________________________________    __________________________________________________________________              Logan Parikh MD [facility]

## 2021-08-09 ENCOUNTER — VIRTUAL VISIT (OUTPATIENT)
Dept: INTERNAL MEDICINE CLINIC | Age: 81
End: 2021-08-09
Payer: MEDICARE

## 2021-08-09 DIAGNOSIS — I10 ESSENTIAL HYPERTENSION: ICD-10-CM

## 2021-08-09 DIAGNOSIS — E78.00 HYPERCHOLESTEREMIA: ICD-10-CM

## 2021-08-09 DIAGNOSIS — K94.29 IRRITATION AROUND PERCUTANEOUS ENDOSCOPIC GASTROSTOMY (PEG) TUBE SITE (HCC): ICD-10-CM

## 2021-08-09 DIAGNOSIS — K21.9 GASTROESOPHAGEAL REFLUX DISEASE WITHOUT ESOPHAGITIS: ICD-10-CM

## 2021-08-09 DIAGNOSIS — I69.359 CVA, OLD, HEMIPARESIS (HCC): Primary | ICD-10-CM

## 2021-08-09 PROCEDURE — 1101F PT FALLS ASSESS-DOCD LE1/YR: CPT | Performed by: INTERNAL MEDICINE

## 2021-08-09 PROCEDURE — G8432 DEP SCR NOT DOC, RNG: HCPCS | Performed by: INTERNAL MEDICINE

## 2021-08-09 PROCEDURE — G8428 CUR MEDS NOT DOCUMENT: HCPCS | Performed by: INTERNAL MEDICINE

## 2021-08-09 PROCEDURE — 99214 OFFICE O/P EST MOD 30 MIN: CPT | Performed by: INTERNAL MEDICINE

## 2021-08-09 PROCEDURE — 1090F PRES/ABSN URINE INCON ASSESS: CPT | Performed by: INTERNAL MEDICINE

## 2021-08-09 PROCEDURE — G8399 PT W/DXA RESULTS DOCUMENT: HCPCS | Performed by: INTERNAL MEDICINE

## 2021-08-09 PROCEDURE — G8756 NO BP MEASURE DOC: HCPCS | Performed by: INTERNAL MEDICINE

## 2021-08-09 RX ORDER — POTASSIUM CHLORIDE 20MEQ/15ML
20 LIQUID (ML) ORAL 2 TIMES DAILY
COMMUNITY
End: 2021-12-06

## 2021-08-09 RX ORDER — FAMOTIDINE 40 MG/5ML
40 POWDER, FOR SUSPENSION ORAL 2 TIMES DAILY
Qty: 50 ML | Refills: 12 | Status: SHIPPED | OUTPATIENT
Start: 2021-08-09 | End: 2022-01-03 | Stop reason: SDUPTHER

## 2021-08-09 RX ORDER — AMLODIPINE BESYLATE 5 MG/1
TABLET ORAL
Qty: 30 TABLET | Refills: 12 | Status: SHIPPED | OUTPATIENT
Start: 2021-08-09 | End: 2022-08-29

## 2021-08-09 RX ORDER — LEVETIRACETAM 100 MG/ML
SOLUTION ORAL
COMMUNITY
Start: 2021-06-17 | End: 2021-08-24

## 2021-08-09 RX ORDER — AMLODIPINE BESYLATE 5 MG/1
TABLET ORAL
COMMUNITY
Start: 2021-04-08 | End: 2021-08-09 | Stop reason: SDUPTHER

## 2021-08-09 NOTE — PROGRESS NOTES
Javy Aranda is a 78 y.o. female being evaluated by a Virtual Visit (video visit) encounter to address concerns as mentioned above. A caregiver was present when appropriate. Due to this being a TeleHealth encounter (During [facility] public health emergency), evaluation of the following organ systems was limited: Vitals/Constitutional/EENT/Resp/CV/GI//MS/Neuro/Skin/Heme-Lymph-Imm. Pursuant to the emergency declaration under the [address] [place] [address] and the [employer] Act, this Virtual Visit was conducted with patient's (and/or legal guardian's) consent, to reduce the risk of exposure to COVID-19 and provide necessary medical care. Services were provided through a video synchronous discussion virtually to substitute for in-person encounter. --Keaton Gee MD on 8/9/2021 at 2:40 PM    An electronic signature was used to authenticate this note. Javy Aranda is a 78 y.o. female and presents with Cholesterol Problem and Medication Refill  . Subjective:  She has been home bound  She is still bedridden    Hypertension Review:  The patient has essential hypertension  Diet and Lifestyle: generally follows a  low sodium diet, exercises sporadically  Home BP Monitoring: is not measured at home. Pertinent ROS: taking medications as instructed, no medication side effects noted, no TIA's, no chest pain on exertion, no dyspnea on exertion, no swelling of ankles. Dyslipidemia Review:  Patient presents for evaluation of lipids. Compliance with treatment thus far has been excellent. A repeat fasting lipid profile was not done. The patient does not use medications that may worsen dyslipidemias (corticosteroids, progestins, anabolic steroids, amiodarone, cyclosporine, olanzapine). The patient exercises some    GERD Review:   Patient has a history of gastroesophageal reflux with heartburn.  Symptoms have been present for a few months. She denies dysphagia. She  has not lost weight. She denies melena, hematochezia, hematemesis, and coffee ground emesis. This has been associated with fullness after meals. She denies abdominal bloating and none. Medical therapy in the past has included proton pump inhibitor    She has a history of a cva. She has not had any recent seizures    She has some bleeding from her Peg  She has seen on Eliquis      Review of Systems  Constitutional: weight gain  Eyes:   negative for visual disturbance and irritation  ENT:   negative for tinnitus,sore throat,nasal congestion,ear pains. hoarseness  Respiratory:  negative for cough, hemoptysis, dyspnea,wheezing  CV:   negative for chest pain, palpitations, lower extremity edema  GI:   negative for nausea, vomiting, diarrhea, abdominal pain,melena  Endo:               negative for polyuria,polydipsia,polyphagia,heat intolerance  Genitourinary: negative for frequency, dysuria and hematuria  Integument:  negative for rash and pruritus  Hematologic:  negative for easy bruising and gum/nose bleeding  Musculoskel: myalgias, arthralgias, back pain, muscle weakness, joint pain  Neurological:  negative for headaches, dizziness, vertigo, memory problems and gait   Behavl/Psych: negative for feelings of anxiety, depression, mood changes    Past Medical History:   Diagnosis Date    Acute pharyngitis 2/8/2011    Allergic rhinitis, cause unspecified 2/8/2011    Arrhythmia     PVC    Asymptomatic varicose veins 2/8/2011    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     Carpal tunnel syndrome 2/8/2011    Degenerative Joint Disese ( improved/resolving) 2/8/2011    Degenetrative Dis Disease, 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    Diverticulosis of colon (without mention of hemorrhage) 2/8/2011    Dysphagia 2/8/2011    Edema, peripheral 2/8/2011    GERD (gastroesophageal reflux disease)     GERD Gastro- Esophageal Reflux Disease 2/8/2011    Herniated nucleus pulposus ( slipped disc) 2/8/2011    Menopausal 2/8/2011    PUD (peptic ulcer disease) 2012    Pure hypercholesterolemia 2/8/2011    Recurrent ear pain 2/8/2011    S/P radiation therapy     Scalp lesion 10/23/2014    Sebaceous cyst 4/22/2014    Unspecified cataract 2/8/2011    Unspecified essential hypertension 2/8/2011    Unspecified sleep apnea      Past Surgical History:   Procedure Laterality Date    HX HYSTERECTOMY      HX ORTHOPAEDIC  neck/back 2006    HX OTHER SURGICAL  5/8/2014     Excise recurrent scalp lesion and application of ACell    HX OTHER SURGICAL  5/8/2014    Excise keloid, anterior chest wall, and application of ACell    HX OTHER SURGICAL  5/8/2014     Excise sebaceous cyst, anterior chest wall    HX OTHER SURGICAL  5/8/2014    Punch biopsy, skin lesions, right forearm x2, right calf x1    PLACE PERCUT GASTROSTOMY TUBE  7/8/2020         IL ICAR CATHETER ABLATION ATRIOVENTR NODE FUNCTION N/A 5/6/2019    ABLATION AV NODE performed by Ryann Beard MD at [address] CATH LAB    IL INS NEW/RPLCMT PRM PM W/TRANSV ELTRD ATRIAL&VENT  9/30/2017         IL RMVL IMPLANTABLE PT-ACTIVATED CAR EVENT RECORDER  9/30/2017          Social History     Socioeconomic History    Marital status:      Spouse name: Not on file    Number of children: Not on file    Years of education: Not on file    Highest education level: Not on file   Tobacco Use    Smoking status: Never Smoker    Smokeless tobacco: Never Used   Substance and Sexual Activity    Alcohol use: No    Drug use: No    Sexual activity: Yes     Partners: Male     Birth control/protection: None     Social Determinants of Health     Financial Resource Strain:     Difficulty of Paying Living Expenses:    Food Insecurity:     Worried About Running Out of Food in the Last Year:     Ran Out of Food in the Last Year:    Transportation Needs:     Lack of Transportation (Medical):      Lack of Transportation (Non-Medical):    Physical Activity:     Days of Exercise per Week:     Minutes of Exercise per Session:    Stress:     Feeling of Stress :    Social Connections:     Frequency of Communication with Friends and Family:     Frequency of Social Gatherings with Friends and Family:     Attends Anabaptism Services:     Active Member of Clubs or Organizations:     Attends Club or Organization Meetings:     Marital Status:      Family History   Problem Relation Age of Onset    Hypertension Mother     Stroke Mother     Hypertension Father     Cancer Father         PROSTATE, MELANOMA    Anesth Problems Neg Hx      Current Outpatient Medications   Medication Sig Dispense Refill    potassium chloride (KAON 10%) 20 mEq/15 mL solution Take 20 mEq by mouth two (2) times a day.  levETIRAcetam (KEPPRA) 100 mg/mL solution TAKE 5 ML BY MOUTH TWICE DAILY      amLODIPine (NORVASC) 5 mg tablet 5 mg = 1 tab each dose, PO, daily 30 Tablet 12    famotidine (PEPCID) 40 mg/5 mL (8 mg/mL) suspension Take 5 mL by mouth two (2) times a day. 50 mL 12    metoprolol succinate (TOPROL-XL) 25 mg XL tablet TAKE 1 TABLET BY MOUTH EVERY DAY 90 Tablet 3    acetaminophen (TYLENOL) 160 mg/5 mL liquid Take 15 mg/kg by mouth every six (6) hours as needed for Fever.  atorvastatin (LIPITOR) 40 mg tablet Take 1 Tab by mouth nightly. 90 Tab 3    apixaban (Eliquis) 5 mg tablet Take 1 Tab by mouth two (2) times a day. 180 Tab 3    butalbital-acetaminophen-caff (Fioricet) -40 mg per capsule Take 1 Cap by mouth every four (4) hours as needed for Headache. (Patient not taking: Reported on 8/9/2021) 10 Cap 0    pantoprazole (PROTONIX) 40 mg granules for oral suspension Take 40 mg by mouth daily.  (Patient not taking: Reported on 10/1/2020) 30 Each 3    potassium chloride (Klor-Con/25) 25 mEq pack Sig:give via peg bid (Patient not taking: Reported on 8/9/2021) 60 Packet 3    cyclobenzaprine (FLEXERIL) 10 mg tablet Take 1 Tab by mouth three (3) times daily as needed for Muscle Spasm(s). (Patient not taking: Reported on 10/1/2020) 90 Tab 0    meclizine (ANTIVERT) 25 mg tablet TAKE 1 TABLET THREE TIMES DAILY AS NEEDED (Patient not taking: Reported on 10/1/2020) 270 Tab 3    rivaroxaban (Xarelto) 15 mg tab tablet Take 1 Tab by mouth daily (with dinner). (Patient not taking: Reported on 10/1/2020) 90 Tab 3    gabapentin (NEURONTIN) 300 mg capsule Take 1 Cap by mouth three (3) times daily. (Patient not taking: Reported on 10/1/2020) 270 Cap 3    fluticasone propionate (FLONASE) 50 mcg/actuation nasal spray 2 Sprays by Both Nostrils route daily as needed for Rhinitis. (Patient not taking: Reported on 8/9/2021)      acetaminophen (TYLENOL) 325 mg tablet Take 650 mg by mouth nightly. (Patient not taking: Reported on 8/9/2021)      MULTIVIT WITH CALCIUM,IRON,MIN [facility] MULTIPLE VITAMINS PO) Take 1 Tab by mouth daily. (Patient not taking: Reported on 8/9/2021)       Allergies   Allergen Reactions    Latex Rash    Bactrim [Sulfamethoprim Ds] Hives    Aspirin Other (comments)     Anything higher than 81mg makes pt jittery    Codeine Hives and Itching    Iodinated Contrast Media Itching    Pcn [Penicillins] Hives and Itching    Tape [Adhesive] Rash       Objective: There were no vitals taken for this visit. Physical Exam:   General appearance - alert, well appearing, and in no distress  Mental status - alert, oriented to person, place, and time  EYE-DARLENE, EOMI, corneas normal, no foreign bodies  ENT-ENT exam normal, no neck nodes or sinus tenderness  Nose - normal and patent, no erythema, discharge or polyps  Mouth - mucous membranes moist, pharynx normal without lesions  Skin-Warm and dry.  no hyperpigmentation, vitiligo, or suspicious lesions  Neuro -alert, oriented, normal speech, no focal findings or movement disorder noted  Neck-normal C-spine, no tenderness, full ROM without pain  abd-peg site scant blood noted      Results for orders placed or performed during the hospital encounter of 11/19/20   CBC WITH AUTOMATED DIFF   Result Value Ref Range    WBC 8.2 3.6 - 11.0 K/uL    RBC 5.54 (H) 3.80 - 5.20 M/uL    HGB 13.8 11.5 - 16.0 g/dL    HCT 42.9 35.0 - 47.0 %    MCV 77.4 (L) 80.0 - 99.0 FL    MCH 24.9 (L) 26.0 - 34.0 PG    MCHC 32.2 30.0 - 36.5 g/dL    RDW 21.7 (H) 11.5 - 14.5 %    PLATELET 518 378 - 074 K/uL    MPV 10.3 8.9 - 12.9 FL    NRBC 0.0 0  WBC    ABSOLUTE NRBC 0.00 0.00 - 0.01 K/uL    NEUTROPHILS 56 32 - 75 %    LYMPHOCYTES 31 12 - 49 %    MONOCYTES 8 5 - 13 %    EOSINOPHILS 3 0 - 7 %    BASOPHILS 1 0 - 1 %    IMMATURE GRANULOCYTES 1 (H) 0.0 - 0.5 %    ABS. NEUTROPHILS 4.6 1.8 - 8.0 K/UL    ABS. LYMPHOCYTES 2.5 0.8 - 3.5 K/UL    ABS. MONOCYTES 0.7 0.0 - 1.0 K/UL    ABS. EOSINOPHILS 0.2 0.0 - 0.4 K/UL    ABS. BASOPHILS 0.1 0.0 - 0.1 K/UL    ABS. IMM. GRANS. 0.1 (H) 0.00 - 0.04 K/UL    DF SMEAR SCANNED      RBC COMMENTS ANISOCYTOSIS  1+       METABOLIC PANEL, COMPREHENSIVE   Result Value Ref Range    Sodium 140 136 - 145 mmol/L    Potassium 3.2 (L) 3.5 - 5.1 mmol/L    Chloride 104 97 - 108 mmol/L    CO2 31 21 - 32 mmol/L    Anion gap 5 5 - 15 mmol/L    Glucose 134 (H) 65 - 100 mg/dL    BUN 16 6 - 20 MG/DL    Creatinine 0.66 0.55 - 1.02 MG/DL    BUN/Creatinine ratio 24 (H) 12 - 20      GFR est AA >60 >60 ml/min/1.73m2    GFR est non-AA >60 >60 ml/min/1.73m2    Calcium 9.7 8.5 - 10.1 MG/DL    Bilirubin, total 0.5 0.2 - 1.0 MG/DL    ALT (SGPT) 36 12 - 78 U/L    AST (SGOT) 19 15 - 37 U/L    Alk.  phosphatase 111 45 - 117 U/L    Protein, total 7.7 6.4 - 8.2 g/dL    Albumin 3.6 3.5 - 5.0 g/dL    Globulin 4.1 (H) 2.0 - 4.0 g/dL    A-G Ratio 0.9 (L) 1.1 - 2.2     URINALYSIS W/ REFLEX CULTURE    Specimen: Urine   Result Value Ref Range    Color YELLOW/STRAW      Appearance CLOUDY (A) CLEAR      Specific gravity 1.013 1.003 - 1.030      pH (UA) 7.0 5.0 - 8.0      Protein Negative NEG mg/dL    Glucose Negative NEG mg/dL    Ketone Negative NEG mg/dL    Bilirubin Negative NEG      Blood Negative NEG      Urobilinogen 0.2 0.2 - 1.0 EU/dL    Nitrites Negative NEG      Leukocyte Esterase Negative NEG      WBC 0-4 0 - 4 /hpf    RBC 0-5 0 - 5 /hpf    Epithelial cells FEW FEW /lpf    Bacteria 1+ (A) NEG /hpf    UA:UC IF INDICATED CULTURE NOT INDICATED BY UA RESULT CNI      Mucus TRACE (A) NEG /lpf    Hyaline cast 0-2 0 - 5 /lpf    Other: Renal Epithelial cells Present     POC LACTIC ACID   Result Value Ref Range    Lactic Acid (POC) 2.31 (HH) 0.40 - 2.00 mmol/L   EKG, 12 LEAD, INITIAL   Result Value Ref Range    Ventricular Rate 75 BPM    Atrial Rate 75 BPM    P-R Interval 162 ms    QRS Duration 160 ms    Q-T Interval 478 ms    QTC Calculation (Bezet) 533 ms    Calculated P Axis 35 degrees    Calculated R Axis -63 degrees    Calculated T Axis 64 degrees    Diagnosis       Atrial-sensed ventricular-paced rhythm     Confirmed by Margaret Mcnally M.D. ([id number]) on 2020 8:55:42 AM         Assessment/Plan:    ICD-10-CM ICD-9-CM    1. CVA, old, hemiparesis ([facility]  I69.359 438.20    2. Gastroesophageal reflux disease without esophagitis  K21.9 530.81    3. Essential hypertension  I10 401.9    4. Hypercholesteremia  E78.00 272.0    5. Irritation around percutaneous endoscopic gastrostomy (PEG) tube site [facility])  K94.29 536.49      Orders Placed This Encounter    potassium chloride (KAON 10%) 20 mEq/15 mL solution     Sig: Take 20 mEq by mouth two (2) times a day.  DISCONTD: amLODIPine (NORVASC) 5 mg tablet     Si mg = 1 tab each dose, PO, daily    levETIRAcetam (KEPPRA) 100 mg/mL solution     Sig: TAKE 5 ML BY MOUTH TWICE DAILY    amLODIPine (NORVASC) 5 mg tablet     Si mg = 1 tab each dose, PO, daily     Dispense:  30 Tablet     Refill:  12    famotidine (PEPCID) 40 mg/5 mL (8 mg/mL) suspension     Sig: Take 5 mL by mouth two (2) times a day.      Dispense:  50 mL     Refill:  12     lose weight, call if any problems,Take 81mg aspirin daily  Patient Instructions [employer] Activation    Thank you for requesting access to [employer]. Please follow the instructions below to securely access and download your online medical record. [employer] allows you to send messages to your doctor, view your test results, renew your prescriptions, schedule appointments, and more. How Do I Sign Up? 1. In your internet browser, go to [email or web address]  2. Click on the First Time User? Click Here link in the Sign In box. You will be redirect to the New Member Sign Up page. 3. Enter your [employer] Access Code exactly as it appears below. You will not need to use this code after youve completed the sign-up process. If you do not sign up before the expiration date, you must request a new code. [employer] Access Code: [place]  Expires: 2021  1:43 PM (This is the date your [employer] access code will )    4. Enter the last four digits of your Social Security Number (xxxx) and Date of Birth (mm/dd/yyyy) as indicated and click Submit. You will be taken to the next sign-up page. 5. Create a [employer] ID. This will be your [employer] login ID and cannot be changed, so think of one that is secure and easy to remember. 6. Create a [employer] password. You can change your password at any time. 7. Enter your Password Reset Question and Answer. This can be used at a later time if you forget your password. 8. Enter your e-mail address. You will receive e-mail notification when new information is available in [address] 9. Click Sign Up. You can now view and download portions of your medical record. 10. Click the Download Summary menu link to download a portable copy of your medical information. Additional Information    If you have questions, please visit the Frequently Asked Questions section of the [employer] website at [email or web address] Remember, [employer] is NOT to be used for urgent needs. For medical emergencies, dial 911.            Follow-up and Dispositions · Return in about 4 weeks (around 9/6/2021), or if symptoms worsen or fail to improve. She may benefit from quick clot or silver nitrate to peg site    I have reviewed with the patient details of the assessment and plan and all questions were answered. Relevent patient education was performed. The most recent lab findings were reviewed with the patient. An After Visit Summary was printed and given to the patient.

## 2021-08-09 NOTE — PATIENT INSTRUCTIONS
[employer] Activation    Thank you for requesting access to [employer]. Please follow the instructions below to securely access and download your online medical record. [employer] allows you to send messages to your doctor, view your test results, renew your prescriptions, schedule appointments, and more. How Do I Sign Up? 1. In your internet browser, go to [email or web address]  2. Click on the First Time User? Click Here link in the Sign In box. You will be redirect to the New Member Sign Up page. 3. Enter your [employer] Access Code exactly as it appears below. You will not need to use this code after youve completed the sign-up process. If you do not sign up before the expiration date, you must request a new code. [employer] Access Code: Pee Beal  Expires: 2021  1:43 PM (This is the date your [employer] access code will )    4. Enter the last four digits of your Social Security Number (xxxx) and Date of Birth (mm/dd/yyyy) as indicated and click Submit. You will be taken to the next sign-up page. 5. Create a [employer] ID. This will be your [employer] login ID and cannot be changed, so think of one that is secure and easy to remember. 6. Create a [employer] password. You can change your password at any time. 7. Enter your Password Reset Question and Answer. This can be used at a later time if you forget your password. 8. Enter your e-mail address. You will receive e-mail notification when new information is available in [address] 9. Click Sign Up. You can now view and download portions of your medical record. 10. Click the Download Summary menu link to download a portable copy of your medical information. Additional Information    If you have questions, please visit the Frequently Asked Questions section of the [employer] website at [email or web address] Remember, [employer] is NOT to be used for urgent needs. For medical emergencies, dial 911.

## 2021-08-09 NOTE — PROGRESS NOTES
1. Have you been to the ER, urgent care clinic since your last visit? Hospitalized since your last visit?no    2. Have you seen or consulted any other health care providers outside of the [address] [place] [address] since your last visit?   Include any pap smears or colon screening. no    .  Chief Complaint   Patient presents with    Cholesterol Problem    Medication Refill

## 2021-08-18 ENCOUNTER — APPOINTMENT (OUTPATIENT)
Dept: GENERAL RADIOLOGY | Age: 81
End: 2021-08-18
Attending: EMERGENCY MEDICINE
Payer: MEDICARE

## 2021-08-18 ENCOUNTER — HOSPITAL ENCOUNTER (EMERGENCY)
Age: 81
Discharge: HOME | End: 2021-08-19
Attending: EMERGENCY MEDICINE
Payer: MEDICARE

## 2021-08-18 DIAGNOSIS — K94.23 PEG TUBE MALFUNCTION (HCC): Primary | ICD-10-CM

## 2021-08-18 PROCEDURE — 74018 RADEX ABDOMEN 1 VIEW: CPT

## 2021-08-18 PROCEDURE — 74011250637 HC RX REV CODE- 250/637: Performed by: EMERGENCY MEDICINE

## 2021-08-18 PROCEDURE — 99285 EMERGENCY DEPT VISIT HI MDM: CPT

## 2021-08-18 PROCEDURE — 74011000636 HC RX REV CODE- 636: Performed by: EMERGENCY MEDICINE

## 2021-08-18 RX ADMIN — LEVETIRACETAM 500 MG: 500 SOLUTION ORAL at 23:40

## 2021-08-18 RX ADMIN — IOHEXOL 50 ML: 240 INJECTION, SOLUTION INTRATHECAL; INTRAVASCULAR; INTRAVENOUS; ORAL at 17:06

## 2021-08-18 NOTE — ED NOTES
Bedside and Verbal shift change report given to Bailey (oncoming nurse) by Caitlin Wakefield (offgoing nurse). Report included the following information SBAR, ED Summary and MAR.

## 2021-08-18 NOTE — ED PROVIDER NOTES
EMERGENCY DEPARTMENT HISTORY AND PHYSICAL EXAM      Date: 8/18/2021  Patient Name: Valentino Schrader    History of Presenting Illness     Chief Complaint   Patient presents with    Feeding Tube Problem     Pt arrives via EMS with CC of PEG tube problem. Per patient's son, PEG has been draining blood and other fluid x 4 days. Patient has hx of stroke with L sided deficits. Per EMS patient does take blood thinners       History Provided By: EMS    HPI: Valentino Schrader, 78 y.o. female presents to the ED with cc of PEG problem. 78 YOF presents to the ED with a chief complaint of PEG dysfunction. Patient's family reports PEG tube \"broke\" in abdomen 4 days ago. Home health noted some serosanguinous discharge today. Family wanted it evaluated. Patient has history of dementia, at baseline. There are no other complaints, changes, or physical findings at this time. PCP: Kinsey Anderson MD    No current facility-administered medications on file prior to encounter. Current Outpatient Medications on File Prior to Encounter   Medication Sig Dispense Refill    potassium chloride (KAON 10%) 20 mEq/15 mL solution Take 20 mEq by mouth two (2) times a day.  levETIRAcetam (KEPPRA) 100 mg/mL solution TAKE 5 ML BY MOUTH TWICE DAILY      amLODIPine (NORVASC) 5 mg tablet 5 mg = 1 tab each dose, PO, daily 30 Tablet 12    famotidine (PEPCID) 40 mg/5 mL (8 mg/mL) suspension Take 5 mL by mouth two (2) times a day. 50 mL 12    metoprolol succinate (TOPROL-XL) 25 mg XL tablet TAKE 1 TABLET BY MOUTH EVERY DAY 90 Tablet 3    butalbital-acetaminophen-caff (Fioricet) -40 mg per capsule Take 1 Cap by mouth every four (4) hours as needed for Headache. (Patient not taking: Reported on 8/9/2021) 10 Cap 0    acetaminophen (TYLENOL) 160 mg/5 mL liquid Take 15 mg/kg by mouth every six (6) hours as needed for Fever.  atorvastatin (LIPITOR) 40 mg tablet Take 1 Tab by mouth nightly.  90 Tab 3    apixaban (Eliquis) 5 mg tablet Take 1 Tab by mouth two (2) times a day. 180 Tab 3    pantoprazole (PROTONIX) 40 mg granules for oral suspension Take 40 mg by mouth daily. (Patient not taking: Reported on 10/1/2020) 30 Each 3    potassium chloride (Klor-Con/25) 25 mEq pack Sig:give via peg bid (Patient not taking: Reported on 8/9/2021) 60 Packet 3    cyclobenzaprine (FLEXERIL) 10 mg tablet Take 1 Tab by mouth three (3) times daily as needed for Muscle Spasm(s). (Patient not taking: Reported on 10/1/2020) 90 Tab 0    meclizine (ANTIVERT) 25 mg tablet TAKE 1 TABLET THREE TIMES DAILY AS NEEDED (Patient not taking: Reported on 10/1/2020) 270 Tab 3    rivaroxaban (Xarelto) 15 mg tab tablet Take 1 Tab by mouth daily (with dinner). (Patient not taking: Reported on 10/1/2020) 90 Tab 3    gabapentin (NEURONTIN) 300 mg capsule Take 1 Cap by mouth three (3) times daily. (Patient not taking: Reported on 10/1/2020) 270 Cap 3    fluticasone propionate (FLONASE) 50 mcg/actuation nasal spray 2 Sprays by Both Nostrils route daily as needed for Rhinitis. (Patient not taking: Reported on 8/9/2021)      acetaminophen (TYLENOL) 325 mg tablet Take 650 mg by mouth nightly. (Patient not taking: Reported on 8/9/2021)      MULTIVIT WITH CALCIUM,IRON,MIN [facility] MULTIPLE VITAMINS PO) Take 1 Tab by mouth daily.  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    Diverticulosis of colon (without mention of hemorrhage) 2/8/2011    Dysphagia 2/8/2011    Edema, peripheral 2/8/2011    GERD (gastroesophageal reflux disease)     GERD Gastro- Esophageal Reflux Disease 2/8/2011    Herniated nucleus pulposus ( slipped disc) 2/8/2011    Menopausal 2/8/2011    PUD (peptic ulcer disease) 2012    Pure hypercholesterolemia 2/8/2011    Recurrent ear pain 2/8/2011    S/P radiation therapy     Scalp lesion 10/23/2014    Sebaceous cyst 4/22/2014    Unspecified cataract 2/8/2011    Unspecified essential hypertension 2/8/2011    Unspecified sleep apnea        Past Surgical History:  Past Surgical History:   Procedure Laterality Date    HX HYSTERECTOMY      HX ORTHOPAEDIC  neck/back 2006    HX OTHER SURGICAL  5/8/2014     Excise recurrent scalp lesion and application of ACell    HX OTHER SURGICAL  5/8/2014    Excise keloid, anterior chest wall, and application of ACell    HX OTHER SURGICAL  5/8/2014     Excise sebaceous cyst, anterior chest wall    HX OTHER SURGICAL  5/8/2014    Punch biopsy, skin lesions, right forearm x2, right calf x1    PLACE PERCUT GASTROSTOMY TUBE  7/8/2020         OR ICAR CATHETER ABLATION ATRIOVENTR NODE FUNCTION N/A 5/6/2019    ABLATION AV NODE performed by Neyda Barajas MD at [address] CATH LAB    OR INS NEW/RPLCMT PRM PM W/TRANSV ELTRD ATRIAL&VENT  9/30/2017         OR RMVL IMPLANTABLE PT-ACTIVATED CAR EVENT RECORDER  9/30/2017            Family History:  Family History   Problem Relation Age of Onset    Hypertension Mother     Stroke Mother     Hypertension Father     Cancer Father         PROSTATE, MELANOMA    Anesth Problems Neg Hx        Social History:  Social History     Tobacco Use    Smoking status: Never Smoker    Smokeless tobacco: Never Used   Substance Use Topics    Alcohol use: No    Drug use: No       Allergies:   Allergies   Allergen Reactions    Latex Rash    Bactrim [Sulfamethoprim Ds] Hives    Aspirin Other (comments)     Anything higher than 81mg makes pt jittery    Codeine Hives and Itching    Iodinated Contrast Media Itching    Pcn [Penicillins] Hives and Itching    Tape [Adhesive] Rash Review of Systems   Review of Systems   Unable to perform ROS: Dementia       Physical Exam   Physical Exam  Vitals and nursing note reviewed. Constitutional:       Comments: 78-year-old female, resting in bed, responds to commands   HENT:      Head: Normocephalic and atraumatic. Cardiovascular:      Rate and Rhythm: Normal rate and regular rhythm. Heart sounds: No murmur heard. No friction rub. No gallop. Pulmonary:      Effort: Pulmonary effort is normal.      Breath sounds: Normal breath sounds. Abdominal:      General: There is no distension. Palpations: Abdomen is soft. Tenderness: There is no abdominal tenderness. Comments: In the left upper quadrant there is a PEG tube in place. There is hypertrophic granulation tissue around the tract. There is some mild oozing but no gross bleeding. There is no surrounding erythema or tenderness to palpation. Musculoskeletal:         General: No swelling. Normal range of motion. Cervical back: Normal range of motion. Skin:     General: Skin is warm. Capillary Refill: Capillary refill takes less than 2 seconds. Findings: No rash. Neurological:      Mental Status: Mental status is at baseline. Comments: Contracted right upper extremity   Psychiatric:         Mood and Affect: Mood normal.         Diagnostic Study Results     Labs -   No results found for this or any previous visit (from the past 12 hour(s)). Radiologic Studies -   XR ABD (KUB)   Final Result   1. No extravasation of contrast demonstrated           CT Results  (Last 48 hours)    None        CXR Results  (Last 48 hours)    None          Medical Decision Making   I am the first provider for this patient. I reviewed the vital signs, available nursing notes, past medical history, past surgical history, family history and social history. Vital Signs-Reviewed the patient's vital signs.   Patient Vitals for the past 12 hrs:   Temp Pulse Resp BP SpO2   08/18/21 2300 -- 70 16 111/62 99 %   08/18/21 2015 -- 70 14 108/74 94 %   08/18/21 1621 97.4 °F (36.3 °C) 82 16 125/81 98 %     Records Reviewed: Nursing Notes, Old Medical Records and Ambulance Run Sheet    Provider Notes (Medical Decision Making):     23-year-old female presents emergency department with a chief complaint of PEG tube malfunction. Vitals are stable. Patient is at baseline per EMS. No family at bedside. Patient appears well. I evaluated the PEG tube, there is some bleeding from the granulation tissue surrounding it, this is controlled. Will observe, consider chemical cautery PRN. I was able to easily flush and aspirate the PEG tube. There was some report for EMS for blood coming from the PEG tube. I injected approximately 60 cc of tap water into the PEG tube and was easily able to aspirate 50 cc of gastric contents without blood. Out of abundance of caution given there was a concern that a part was dislodged, I will check a KUB with Gastrografin. Does have an iodine allergy to \"itching\" but I think that the benefits outweigh the risks. ED Course:   Initial assessment performed. The patients presenting problems have been discussed, and they are in agreement with the care plan formulated and outlined with them. I have encouraged them to ask questions as they arise throughout their visit. ED Course as of Aug 18 2357   Wed Aug 18, 2021   1735 KUB with no contrast extravasation    [MB]   1926 Nursing updated patient's family. Discussed the fact that patient's PEG tube appears well with no bleeding. There is no blood on aspiration. It is in adequate position and she is appropriate for discharge. [MB]   1926 Patient observed in ED with normal vitals and no other complaints. [MB]      ED Course User Index  [MB] Arie Sorensen MD     Patient awaiting transportation for discharge from the ED. She has had no reaction to the Gastrografin.   She was given her nighttime medications including Eliquis and Keppra. She will be discharged into care of family. PCP follow-up. Isacc Bell MD      Disposition:    Discharged    DISCHARGE PLAN:  1. Current Discharge Medication List        2. Follow-up Information     Follow up With Specialties Details Why Foreign Lowry MD Internal Medicine In 3 days  [facility]  [phone]          3. Return to ED if worse     Diagnosis     Clinical Impression:   1. PEG tube malfunction [facility])        Attestations:    Isacc Bell MD    Please note that this dictation was completed with [employer], the computer voice recognition software. Quite often unanticipated grammatical, syntax, homophones, and other interpretive errors are inadvertently transcribed by the computer software. Please disregard these errors. Please excuse any errors that have escaped final proofreading. Thank you.

## 2021-08-18 NOTE — DISCHARGE INSTRUCTIONS
There is some soft tissue around the PEG tube that is prone to bleeding. The PEG tube is in a good location and there are no broken pieces. It functions well. Please follow-up with your primary doctor and return for worsening symptoms.

## 2021-08-19 VITALS
TEMPERATURE: 98.2 F | DIASTOLIC BLOOD PRESSURE: 100 MMHG | RESPIRATION RATE: 11 BRPM | HEIGHT: 64 IN | OXYGEN SATURATION: 98 % | HEART RATE: 75 BPM | BODY MASS INDEX: 27.98 KG/M2 | SYSTOLIC BLOOD PRESSURE: 144 MMHG

## 2021-08-19 PROCEDURE — 74011250637 HC RX REV CODE- 250/637: Performed by: EMERGENCY MEDICINE

## 2021-08-19 RX ADMIN — APIXABAN 5 MG: 5 TABLET, FILM COATED ORAL at 00:11

## 2021-08-24 ENCOUNTER — HOSPITAL ENCOUNTER (EMERGENCY)
Age: 81
Discharge: HOME | End: 2021-08-24
Attending: EMERGENCY MEDICINE
Payer: MEDICARE

## 2021-08-24 VITALS
OXYGEN SATURATION: 99 % | BODY MASS INDEX: 25.86 KG/M2 | DIASTOLIC BLOOD PRESSURE: 70 MMHG | HEART RATE: 72 BPM | HEIGHT: 66 IN | WEIGHT: 160.94 LBS | SYSTOLIC BLOOD PRESSURE: 148 MMHG | TEMPERATURE: 97.8 F | RESPIRATION RATE: 18 BRPM

## 2021-08-24 DIAGNOSIS — L92.9 GRANULATION TISSUE: Primary | ICD-10-CM

## 2021-08-24 LAB
ANION GAP SERPL CALC-SCNC: 1 MMOL/L (ref 5–15)
BASOPHILS # BLD: 0.1 K/UL (ref 0–0.1)
BASOPHILS NFR BLD: 1 % (ref 0–1)
BUN SERPL-MCNC: 10 MG/DL (ref 6–20)
BUN/CREAT SERPL: 21 (ref 12–20)
CALCIUM SERPL-MCNC: 9.6 MG/DL (ref 8.5–10.1)
CHLORIDE SERPL-SCNC: 104 MMOL/L (ref 97–108)
CO2 SERPL-SCNC: 35 MMOL/L (ref 21–32)
CREAT SERPL-MCNC: 0.48 MG/DL (ref 0.55–1.02)
DIFFERENTIAL METHOD BLD: ABNORMAL
EOSINOPHIL # BLD: 0.1 K/UL (ref 0–0.4)
EOSINOPHIL NFR BLD: 2 % (ref 0–7)
ERYTHROCYTE [DISTWIDTH] IN BLOOD BY AUTOMATED COUNT: 18.5 % (ref 11.5–14.5)
GLUCOSE SERPL-MCNC: 94 MG/DL (ref 65–100)
HCT VFR BLD AUTO: 44.2 % (ref 35–47)
HGB BLD-MCNC: 14.2 G/DL (ref 11.5–16)
IMM GRANULOCYTES # BLD AUTO: 0 K/UL (ref 0–0.04)
IMM GRANULOCYTES NFR BLD AUTO: 1 % (ref 0–0.5)
LYMPHOCYTES # BLD: 1.3 K/UL (ref 0.8–3.5)
LYMPHOCYTES NFR BLD: 17 % (ref 12–49)
MCH RBC QN AUTO: 25.9 PG (ref 26–34)
MCHC RBC AUTO-ENTMCNC: 32.1 G/DL (ref 30–36.5)
MCV RBC AUTO: 80.5 FL (ref 80–99)
MONOCYTES # BLD: 0.6 K/UL (ref 0–1)
MONOCYTES NFR BLD: 9 % (ref 5–13)
NEUTS SEG # BLD: 5.4 K/UL (ref 1.8–8)
NEUTS SEG NFR BLD: 70 % (ref 32–75)
NRBC # BLD: 0 K/UL (ref 0–0.01)
NRBC BLD-RTO: 0 PER 100 WBC
PLATELET # BLD AUTO: 313 K/UL (ref 150–400)
PMV BLD AUTO: 10.6 FL (ref 8.9–12.9)
POTASSIUM SERPL-SCNC: 4.2 MMOL/L (ref 3.5–5.1)
RBC # BLD AUTO: 5.49 M/UL (ref 3.8–5.2)
SODIUM SERPL-SCNC: 140 MMOL/L (ref 136–145)
WBC # BLD AUTO: 7.6 K/UL (ref 3.6–11)

## 2021-08-24 PROCEDURE — 85025 COMPLETE CBC W/AUTO DIFF WBC: CPT

## 2021-08-24 PROCEDURE — 75810000109 HC GASTRO TUBE CHANGE

## 2021-08-24 PROCEDURE — 36415 COLL VENOUS BLD VENIPUNCTURE: CPT

## 2021-08-24 PROCEDURE — 99284 EMERGENCY DEPT VISIT MOD MDM: CPT

## 2021-08-24 PROCEDURE — 80048 BASIC METABOLIC PNL TOTAL CA: CPT

## 2021-08-24 RX ORDER — LEVETIRACETAM 100 MG/ML
SOLUTION ORAL
Qty: 900 ML | Refills: 3 | Status: SHIPPED | OUTPATIENT
Start: 2021-08-24 | End: 2022-06-22 | Stop reason: SDUPTHER

## 2021-08-24 NOTE — CONSULTS
GI CONSULTATION NOTE  Nydia Peters, ANAMARIA  [phone] NP in-hospital cell phone M-F until 4:30  After 5pm or on weekends, please call  for physician on call    NAME: Steven Rodgers   :  1940   MRN:  [id number]   Attending: Dr. Lavonda Hammans  Primary GI: Dr. Veena Valles  Date/Time:  2021 2:42 PM  Assessment:   -Peg malfunction/infection? · PEG has flushed nicely for both bedside nurse and me. Does have tape on end of peg tube to keep cap attached to tube, no leaking noted with flush, flushed total of 80cc of water (30ml the first time, 50cc the second time), no residuals, denies any pain or discomfort at site  · No signs of infection, has granulation tissue around peg site, no drainage noted, possibly what is causing the drainage son is concerned about      Plan:   -Unable to exchange peg today as is on Eliquis and patient has original PEG in place that has flange that would have to be essentially pulled through incision tract.   -Plan for PEG exchange within the next two weeks in clinic, with possible application of silver to help cauterize and manage symptoms from granulation tissue  -Will have to hold Eliquis for 3 days prior to peg exchange  - will call patient's son to set up date/time for clinic appointment  -Reviewed with patient this is not able to be completed in ER even in future, not an appropriate use of ER resources, needs to happen in clinic  -Encouraged patient to check with insurance company to have them help set up transport for patient to come to clinic since patient's son is unable to mobilize patient himself  -Will sign off for now. Please call GI with any further questions or concerns. Thank you! Plan discussed with Dr. Hammad Husain:     HISTORY OF PRESENT ILLNESS:     Steven Rodgers is an 78 y.o.  [place] female who we are asked to see for complaint of PEG tube issue. Patient has dementia and is unable to provide accurate HPI.  Does report PEG did not work at all today. Discussed with son. Was concerned because cap of peg tube kept falling off of the peg tube, was able to reattach with gorilla tape. Reports it is still the original tube that was placed 7/2020. Reports he sent her to the ER a few days/a week ago for peg infection, was told there was no infection and she was sent home again. He reports him and the nurse that comes over to take care of her both agree that she has foul smelling drainage from the peg tube site and that its infected. They decided again today that she needed to be seen and potentially have the peg exchanged for a new one given its broken and the presumed infection. No fevers or chills. No abdominal pain complaints. No nausea or vomiting. Tolerating tube feeds and medications. Is on Eliquis for CVA that occurred 7/2020. Reports seizures just started April 2021.        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    Diverticulosis of colon (without mention of hemorrhage) 2/8/2011    Dysphagia 2/8/2011    Edema, peripheral 2/8/2011    GERD (gastroesophageal reflux disease)     GERD Gastro- Esophageal Reflux Disease 2/8/2011    Herniated nucleus pulposus ( slipped disc) 2/8/2011    Menopausal 2/8/2011    PUD (peptic ulcer disease) 2012    Pure hypercholesterolemia 2/8/2011    Recurrent ear pain 2/8/2011    S/P radiation therapy     Scalp lesion 10/23/2014    Sebaceous cyst 4/22/2014    Unspecified cataract 2/8/2011    Unspecified essential hypertension 2/8/2011    Unspecified sleep apnea       Past Surgical History:   Procedure Laterality Date    HX HYSTERECTOMY      HX ORTHOPAEDIC  neck/back 2006  HX OTHER SURGICAL  5/8/2014     Excise recurrent scalp lesion and application of ACell    HX OTHER SURGICAL  5/8/2014    Excise keloid, anterior chest wall, and application of ACell    HX OTHER SURGICAL  5/8/2014     Excise sebaceous cyst, anterior chest wall    HX OTHER SURGICAL  5/8/2014    Punch biopsy, skin lesions, right forearm x2, right calf x1    PLACE PERCUT GASTROSTOMY TUBE  7/8/2020         NV ICAR CATHETER ABLATION ATRIOVENTR NODE FUNCTION N/A 5/6/2019    ABLATION AV NODE performed by Shila Walker MD at [address] CATH LAB    NV INS NEW/RPLCMT PRM PM W/TRANSV ELTRD ATRIAL&VENT  9/30/2017         NV RMVL IMPLANTABLE PT-ACTIVATED CAR EVENT RECORDER  9/30/2017          Social History     Tobacco Use    Smoking status: Never Smoker    Smokeless tobacco: Never Used   Substance Use Topics    Alcohol use: No      Family History   Problem Relation Age of Onset    Hypertension Mother     Stroke Mother     Hypertension Father     Cancer Father         PROSTATE, MELANOMA    Anesth Problems Neg Hx       Allergies   Allergen Reactions    Latex Rash    Bactrim [Sulfamethoprim Ds] Hives    Aspirin Other (comments)     Anything higher than 81mg makes pt jittery    Codeine Hives and Itching    Iodinated Contrast Media Itching    Pcn [Penicillins] Hives and Itching    Tape [Adhesive] Rash      Prior to Admission medications    Medication Sig Start Date End Date Taking? Authorizing Provider   levETIRAcetam (KEPPRA) 100 mg/mL solution TAKE 5 ML BY MOUTH TWICE DAILY 8/24/21   Vivian Maravilla MD   potassium chloride (KAON 10%) 20 mEq/15 mL solution Take 20 mEq by mouth two (2) times a day. Provider, Historical   amLODIPine (NORVASC) 5 mg tablet 5 mg = 1 tab each dose, PO, daily 8/9/21   Vivian Maravilla MD   famotidine (PEPCID) 40 mg/5 mL (8 mg/mL) suspension Take 5 mL by mouth two (2) times a day.  8/9/21   Vivian Maravilla MD   metoprolol succinate (TOPROL-XL) 25 mg XL tablet TAKE 1 TABLET BY MOUTH EVERY DAY 6/14/21   James Lyn MD   butalbital-acetaminophen-caff (Fioricet) -40 mg per capsule Take 1 Cap by mouth every four (4) hours as needed for Headache. Patient not taking: Reported on 8/9/2021 11/20/20   Bishnu Shaver MD   acetaminophen (TYLENOL) 160 mg/5 mL liquid Take 15 mg/kg by mouth every six (6) hours as needed for Fever. Provider, Historical   atorvastatin (LIPITOR) 40 mg tablet Take 1 Tab by mouth nightly. 10/1/20   James Lyn MD   apixaban (Eliquis) 5 mg tablet Take 1 Tab by mouth two (2) times a day. 10/1/20   James Lyn MD   pantoprazole (PROTONIX) 40 mg granules for oral suspension Take 40 mg by mouth daily. Patient not taking: Reported on 10/1/2020 9/23/20   James Lyn MD   potassium chloride (Klor-Con/25) 25 mEq pack Sig:give via peg bid  Patient not taking: Reported on 8/9/2021 9/4/20   James Lyn MD   cyclobenzaprine (FLEXERIL) 10 mg tablet Take 1 Tab by mouth three (3) times daily as needed for Muscle Spasm(s). Patient not taking: Reported on 10/1/2020 5/13/20   James Lyn MD   meclizine (ANTIVERT) 25 mg tablet TAKE 1 TABLET THREE TIMES DAILY AS NEEDED  Patient not taking: Reported on 10/1/2020 3/22/20   James Lyn MD   rivaroxaban (Xarelto) 15 mg tab tablet Take 1 Tab by mouth daily (with dinner). Patient not taking: Reported on 10/1/2020 3/12/20   James Lyn MD   gabapentin (NEURONTIN) 300 mg capsule Take 1 Cap by mouth three (3) times daily. Patient not taking: Reported on 10/1/2020 3/12/20   James Lyn MD   fluticasone propionate [facility]) 50 mcg/actuation nasal spray 2 Sprays by Both Nostrils route daily as needed for Rhinitis. Patient not taking: Reported on 8/9/2021    Provider, Historical   acetaminophen (TYLENOL) 325 mg tablet Take 650 mg by mouth nightly.   Patient not taking: Reported on 8/9/2021    Provider, [facility] MULTIPLE VITAMINS PO) Take 1 Tab by mouth daily.   Patient not taking: Reported on 8/9/2021    Provider, Historical       Patient Active Problem List   Diagnosis Code    Dysphagia R13.10    Recurrent ear pain H92.09    Acute pharyngitis J02.9    Essential hypertension I10    Edema, peripheral R60.9    Pure hypercholesterolemia E78.00    Allergic rhinitis, cause unspecified J30.9    Diverticulosis of colon (without mention of hemorrhage) K57.30    Herniated nucleus pulposus ( slipped disc) [id number]    Degenetrative Dis Disease, 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    Carpal tunnel syndrome G56.00    Unspecified cataract H26.9    GERD Gastro- Esophageal Reflux Disease K21.9    Degenerative Joint Disese ( improved/resolving) M19.90    Menopausal N95.1    Asymptomatic varicose veins I83.90    Sebaceous cyst L72.3    Keloid L91.0    Scalp lesion L98.9    Abnormal nuclear stress test R94.39    Syncope R55    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    CAD (coronary artery disease) I25.10    S/P cardiac pacemaker procedure Z95.0    Bradycardia R00.1    SSS (sick sinus syndrome) ([facility]) I49.5    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, 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    PAF (paroxysmal atrial fibrillation) ([facility]) I48.0    Atrial fibrillation with rapid ventricular response ([facility]) I48.91       REVIEW OF SYSTEMS:    Constitutional: negative fever, negative chills, negative weight loss  Eyes:   negative visual changes  ENT:   negative sore throat, tongue or lip swelling   Respiratory:  negative cough, negative dyspnea  Cards:  negative for chest pain, palpitations, lower extremity edema  GI:   See HPI  :  negative for frequency, dysuria  Integument:  negative for rash and pruritus  Heme:  negative for easy bruising and gum/nose bleeding  Musculoskel: negative for myalgias,  back pain and muscle weakness  Neuro: negative for headaches, dizziness, vertigo  Psych: negative for feelings of anxiety, depression     Pertinent Positives: Irritation around PEG site      Objective:   VITALS:    Visit Vitals  BP (!) 151/77   Pulse 72   Temp 97.8 °F (36.6 °C)   Resp 18   Ht 5' 6\" (1.676 m)   Wt 73 kg (160 lb 15 oz)   SpO2 100%   BMI 25.98 kg/m²       PHYSICAL EXAM:   General:          Alert, confused, cooperative, no distress, appears stated age. Head:               Normocephalic, without obvious abnormality, atraumatic. Eyes:               Conjunctivae clear and pale, anicteric sclerae. Pupils are equal  Nose:               Nares normal. No drainage or sinus tenderness. Throat:             Lips, mucosa, and tongue normal.  No Thrush  Neck:               Supple, symmetrical,  no adenopathy, thyroid: non tender  Back:               Symmetric,  No CVA tenderness. Lungs:             CTA bilaterally. No wheezing/rhonchi/rales. Chest wall:      No tenderness or deformity. No Accessory muscle use. Heart:              Regular rate and rhythm,  no murmur, rub or gallop. Abdomen:        Soft, non-tender. Not distended. Bowel sounds normal. No masses. PEG in place midline above umbilicus, granulation tissue around insertion site, no drainage noted. PEG itself intact, flushes easily. Black tape noted holding end with caps to PEG tubing. No leaking noted with flushing. Extremities:     Atraumatic, No cyanosis. No edema. No clubbing  Skin:                Texture, turgor normal. No rashes/lesions/jaundice  Lymph:            Cervical, supraclavicular normal.  Psych:             Not anxious or agitated. Neurologic:      EOMs intact. No facial asymmetry. Speech is hard to understand. A/O to person.      LAB DATA REVIEWED:    Recent Results (from the past 24 hour(s))   CBC WITH AUTOMATED DIFF    Collection Time: 08/24/21  2:17 PM   Result Value Ref Range    WBC 7.6 3.6 - 11.0 K/uL    RBC 5.49 (H) 3.80 - 5.20 M/uL    HGB 14.2 11.5 - 16.0 g/dL    HCT 44.2 35.0 - 47.0 %    MCV 80.5 80.0 - 99.0 FL    MCH 25.9 (L) 26.0 - 34.0 PG    MCHC 32.1 30.0 - 36.5 g/dL RDW 18.5 (H) 11.5 - 14.5 %    PLATELET 619 367 - 722 K/uL    MPV 10.6 8.9 - 12.9 FL    NRBC 0.0 0  WBC    ABSOLUTE NRBC 0.00 0.00 - 0.01 K/uL    NEUTROPHILS 70 32 - 75 %    LYMPHOCYTES 17 12 - 49 %    MONOCYTES 9 5 - 13 %    EOSINOPHILS 2 0 - 7 %    BASOPHILS 1 0 - 1 %    IMMATURE GRANULOCYTES 1 (H) 0.0 - 0.5 %    ABS. NEUTROPHILS 5.4 1.8 - 8.0 K/UL    ABS. LYMPHOCYTES 1.3 0.8 - 3.5 K/UL    ABS. MONOCYTES 0.6 0.0 - 1.0 K/UL    ABS. EOSINOPHILS 0.1 0.0 - 0.4 K/UL    ABS. BASOPHILS 0.1 0.0 - 0.1 K/UL    ABS. IMM.  GRANS. 0.0 0.00 - 0.04 K/UL    DF AUTOMATED         IMAGING RESULTS:  I have personally reviewed the imaging reports      Total time spent with patient: 60 minutes ________________________________________________________________________  Care Plan discussed with:  Patient Y   Family  Y-son via phone   RN Y              Consultant:  Latasha WILLIS  8/24/2021:  ________________________________________________________________________    ___________________________________________________  Consulting Provider: Geo Ron NP      8/24/2021  2:42 PM

## 2021-08-24 NOTE — ED PROVIDER NOTES
EMERGENCY DEPARTMENT HISTORY AND PHYSICAL EXAM      Date: 8/24/2021  Patient Name: Marko Gómez  Patient Age and Sex: 78 y.o. female     History of Presenting Illness     Chief Complaint   Patient presents with    Feeding Tube Problem     Patient here for peg tube replacement       History Provided By: Patient    HPI: Marko Gómez is an 78year old female history stroke presenting for PEG tube issue. Patient was reportedly transported from her facility for evaluation for possible infection surrounding PEG tube. GI apparently contacted prior to patient arrival and was planning for PEG tube replacement. Patient denies any complaint at time of my evaluation. There are no other complaints, changes, or physical findings at this time. PCP: Becki Brock MD    No current facility-administered medications on file prior to encounter. Current Outpatient Medications on File Prior to Encounter   Medication Sig Dispense Refill    levETIRAcetam (KEPPRA) 100 mg/mL solution TAKE 5 ML BY MOUTH TWICE DAILY 900 mL 3    potassium chloride (KAON 10%) 20 mEq/15 mL solution Take 20 mEq by mouth two (2) times a day.  amLODIPine (NORVASC) 5 mg tablet 5 mg = 1 tab each dose, PO, daily 30 Tablet 12    famotidine (PEPCID) 40 mg/5 mL (8 mg/mL) suspension Take 5 mL by mouth two (2) times a day. 50 mL 12    [DISCONTINUED] levETIRAcetam (KEPPRA) 100 mg/mL solution TAKE 5 ML BY MOUTH TWICE DAILY      metoprolol succinate (TOPROL-XL) 25 mg XL tablet TAKE 1 TABLET BY MOUTH EVERY DAY 90 Tablet 3    butalbital-acetaminophen-caff (Fioricet) -40 mg per capsule Take 1 Cap by mouth every four (4) hours as needed for Headache. (Patient not taking: Reported on 8/9/2021) 10 Cap 0    acetaminophen (TYLENOL) 160 mg/5 mL liquid Take 15 mg/kg by mouth every six (6) hours as needed for Fever.  atorvastatin (LIPITOR) 40 mg tablet Take 1 Tab by mouth nightly.  90 Tab 3    apixaban (Eliquis) 5 mg tablet Take 1 Tab by mouth two (2) times a day. 180 Tab 3    pantoprazole (PROTONIX) 40 mg granules for oral suspension Take 40 mg by mouth daily. (Patient not taking: Reported on 10/1/2020) 30 Each 3    potassium chloride (Klor-Con/25) 25 mEq pack Sig:give via peg bid (Patient not taking: Reported on 8/9/2021) 60 Packet 3    cyclobenzaprine (FLEXERIL) 10 mg tablet Take 1 Tab by mouth three (3) times daily as needed for Muscle Spasm(s). (Patient not taking: Reported on 10/1/2020) 90 Tab 0    meclizine (ANTIVERT) 25 mg tablet TAKE 1 TABLET THREE TIMES DAILY AS NEEDED (Patient not taking: Reported on 10/1/2020) 270 Tab 3    rivaroxaban (Xarelto) 15 mg tab tablet Take 1 Tab by mouth daily (with dinner). (Patient not taking: Reported on 10/1/2020) 90 Tab 3    gabapentin (NEURONTIN) 300 mg capsule Take 1 Cap by mouth three (3) times daily. (Patient not taking: Reported on 10/1/2020) 270 Cap 3    fluticasone propionate (FLONASE) 50 mcg/actuation nasal spray 2 Sprays by Both Nostrils route daily as needed for Rhinitis. (Patient not taking: Reported on 8/9/2021)      acetaminophen (TYLENOL) 325 mg tablet Take 650 mg by mouth nightly. (Patient not taking: Reported on 8/9/2021)      MULTIVIT WITH CALCIUM,IRON,MIN [facility] MULTIPLE VITAMINS PO) Take 1 Tab by mouth daily.  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    Diverticulosis of colon (without mention of hemorrhage) 2/8/2011    Dysphagia 2/8/2011    Edema, peripheral 2/8/2011    GERD (gastroesophageal reflux disease)     GERD Gastro- Esophageal Reflux Disease 2/8/2011    Herniated nucleus pulposus ( slipped disc) 2/8/2011    Menopausal 2/8/2011    PUD (peptic ulcer disease) 2012    Pure hypercholesterolemia 2/8/2011    Recurrent ear pain 2/8/2011    S/P radiation therapy     Scalp lesion 10/23/2014    Sebaceous cyst 4/22/2014    Unspecified cataract 2/8/2011    Unspecified essential hypertension 2/8/2011    Unspecified sleep apnea        Past Surgical History:  Past Surgical History:   Procedure Laterality Date    HX HYSTERECTOMY      HX ORTHOPAEDIC  neck/back 2006    HX OTHER SURGICAL  5/8/2014     Excise recurrent scalp lesion and application of ACell    HX OTHER SURGICAL  5/8/2014    Excise keloid, anterior chest wall, and application of ACell    HX OTHER SURGICAL  5/8/2014     Excise sebaceous cyst, anterior chest wall    HX OTHER SURGICAL  5/8/2014    Punch biopsy, skin lesions, right forearm x2, right calf x1    PLACE PERCUT GASTROSTOMY TUBE  7/8/2020         [place] ICAR CATHETER ABLATION ATRIOVENTR NODE FUNCTION N/A 5/6/2019    ABLATION AV NODE performed by Celina Rubio MD at [address] CATH LAB    [place] NEW/RPLCMT PRM PM W/TRANSV ELTRD ATRIAL&VENT  9/30/2017         [place] RMVL IMPLANTABLE PT-ACTIVATED CAR EVENT RECORDER  9/30/2017            Family History:  Family History   Problem Relation Age of Onset    Hypertension Mother     Stroke Mother     Hypertension Father     Cancer Father         PROSTATE, MELANOMA    Anesth Problems Neg Hx        Social History:  Social History     Tobacco Use    Smoking status: Never Smoker    Smokeless tobacco: Never Used   Substance Use Topics    Alcohol use: No    Drug use: No       Allergies:   Allergies   Allergen Reactions    Latex Rash    Bactrim [Sulfamethoprim Ds] Hives    Aspirin Other (comments)     Anything higher than 81mg makes pt jittery    Codeine Hives and Itching    Iodinated Contrast Media Itching    Pcn [Penicillins] Hives and Itching    Tape [Adhesive] Rash         Review of Systems   Review of Systems   Gastrointestinal:        Positive for bleeding at PEG tube site   All other systems reviewed and are negative. Physical Exam   Physical Exam  Constitutional:       Appearance: Normal appearance. HENT:      Head: Normocephalic. Right Ear: External ear normal.      Left Ear: External ear normal.      Mouth/Throat:      Mouth: Mucous membranes are moist.   Eyes:      Conjunctiva/sclera: Conjunctivae normal.   Cardiovascular:      Rate and Rhythm: Normal rate and regular rhythm. Pulses: Normal pulses. Pulmonary:      Effort: Pulmonary effort is normal.      Breath sounds: Normal breath sounds. Abdominal:      Palpations: Abdomen is soft. Tenderness: There is no abdominal tenderness. Comments: PEG tube in place in left upper quadrant with surrounding granulation tissue with scant amount of blood-tinged to surrounding dressing. No surrounding erythema tenderness fluctuance or purulence from site. Musculoskeletal:      Cervical back: Neck supple. Skin:     General: Skin is warm and dry. Capillary Refill: Capillary refill takes less than 2 seconds. Neurological:      Mental Status: Mental status is at baseline. Psychiatric:         Behavior: Behavior normal.       Diagnostic Study Results     Labs -     Recent Results (from the past 12 hour(s))   CBC WITH AUTOMATED DIFF    Collection Time: 08/24/21  2:17 PM   Result Value Ref Range    WBC 7.6 3.6 - 11.0 K/uL    RBC 5.49 (H) 3.80 - 5.20 M/uL    HGB 14.2 11.5 - 16.0 g/dL    HCT 44.2 35.0 - 47.0 %    MCV 80.5 80.0 - 99.0 FL    MCH 25.9 (L) 26.0 - 34.0 PG    MCHC 32.1 30.0 - 36.5 g/dL    RDW 18.5 (H) 11.5 - 14.5 %    PLATELET 756 814 - 438 K/uL    MPV 10.6 8.9 - 12.9 FL    NRBC 0.0 0  WBC    ABSOLUTE NRBC 0.00 0.00 - 0.01 K/uL    NEUTROPHILS 70 32 - 75 %    LYMPHOCYTES 17 12 - 49 %    MONOCYTES 9 5 - 13 %    EOSINOPHILS 2 0 - 7 %    BASOPHILS 1 0 - 1 %    IMMATURE GRANULOCYTES 1 (H) 0.0 - 0.5 %    ABS.  NEUTROPHILS 5. 4 1.8 - 8.0 K/UL    ABS. LYMPHOCYTES 1.3 0.8 - 3.5 K/UL    ABS. MONOCYTES 0.6 0.0 - 1.0 K/UL    ABS. EOSINOPHILS 0.1 0.0 - 0.4 K/UL    ABS. BASOPHILS 0.1 0.0 - 0.1 K/UL    ABS. IMM. GRANS. 0.0 0.00 - 0.04 K/UL    DF AUTOMATED     METABOLIC PANEL, BASIC    Collection Time: 08/24/21  2:17 PM   Result Value Ref Range    Sodium 140 136 - 145 mmol/L    Potassium 4.2 3.5 - 5.1 mmol/L    Chloride 104 97 - 108 mmol/L    CO2 35 (H) 21 - 32 mmol/L    Anion gap 1 (L) 5 - 15 mmol/L    Glucose 94 65 - 100 mg/dL    BUN 10 6 - 20 MG/DL    Creatinine 0.48 (L) 0.55 - 1.02 MG/DL    BUN/Creatinine ratio 21 (H) 12 - 20      GFR est AA >60 >60 ml/min/1.73m2    GFR est non-AA >60 >60 ml/min/1.73m2    Calcium 9.6 8.5 - 10.1 MG/DL       Radiologic Studies -   No orders to display     CT Results  (Last 48 hours)    None        CXR Results  (Last 48 hours)    None            Medical Decision Making   I am the first provider for this patient. I reviewed the vital signs, available nursing notes, past medical history, past surgical history, family history and social history. Vital Signs-Reviewed the patient's vital signs. Patient Vitals for the past 12 hrs:   Temp Pulse Resp BP SpO2   08/24/21 1900 -- -- -- (!) 148/70 99 %   08/24/21 1300 -- -- -- (!) 151/77 100 %   08/24/21 1228 97.8 °F (36.6 °C) 72 18 (!) 165/83 99 %       Records Reviewed: Nursing Notes and Old Medical Records    Provider Notes (Medical Decision Making):   78-year-old with PEG tube placement presenting with reported infection surrounding PEG tube    PEG tube does not appear infected on exam no surrounding erythema, there is granulation tissue with some bloody changes. But no purulence erythema discharge. Will contact facility as GI was apparently already involved and plans to replace PEG tube today.     GI NP did come to bedside to evaluate patient and agree with my assessment that there is no evidence of infection, scant bleeding from granulation tissue, the tube flushes and pools beautifully and patient was discharged with instructions to follow-up with GI.    ED Course:   Initial assessment performed. The patients presenting problems have been discussed, and they are in agreement with the care plan formulated and outlined with them. I have encouraged them to ask questions as they arise throughout their visit. ED Course as of Aug 24 2211   Tue Aug 24, 2021   1343 Spoke with son Flint Severin, patient has had a fever, PEG tube 'came apart'; the top part of the tube disconnected; pus from the ostomy itself. Temperature of 99 at home and diaphoresis noted by son. Son spoke with [facility], told to send in and Dr Bianca Mercedes is on call to consider replacing tube. [WB]      ED Course User Index  [WB] Annmarie Mccarthy MD     Critical Care Time:   0    Disposition:  Discharge Note:  The patient has been re-evaluated and is ready for discharge. Reviewed available results with patient. Counseled patient on diagnosis and care plan. Patient has expressed understanding, and all questions have been answered. Patient agrees with plan and agrees to follow up as recommended, or to return to the ED if their symptoms worsen. Discharge instructions have been provided and explained to the patient, along with reasons to return to the ED. PLAN:  Discharge Medication List as of 8/24/2021  3:34 PM        2. Follow-up Information     Follow up With Specialties Details Why Contact Info    [facility]   Follow up as scheduled         3. Return to ED if worse     Diagnosis     Clinical Impression:   1. Granulation tissue        Attestations:    Ruth Garcia M.D. Please note that this dictation was completed with ePatientFinder, the computer voice recognition software. Quite often unanticipated grammatical, syntax, homophones, and other interpretive errors are inadvertently transcribed by the computer software. Please disregard these errors.   Please excuse any errors that have escaped final proofreading. Thank you.

## 2021-10-08 ENCOUNTER — TELEPHONE (OUTPATIENT)
Dept: CARDIOLOGY CLINIC | Age: 81
End: 2021-10-08

## 2021-10-08 NOTE — TELEPHONE ENCOUNTER
Verified patient with two types of identifiers. Spoke with patient's son verified on PHI. Son states patient has an in office appointment next month but patient will be unable to make it into the office. Patient is no longer on hospice but is still completely bed bound and needs stretcher transportation. Requesting to change next appointment to remote. Asked son to send remote on 11/2/21 and then will ask device clinic to set up remote schedule and mail. Patient over all feeling okay. Will update MD. Patient's son verbalized understanding and will call with any other questions.

## 2021-10-09 RX ORDER — ATORVASTATIN CALCIUM 40 MG/1
TABLET, FILM COATED ORAL
Qty: 90 TABLET | Refills: 3 | Status: SHIPPED | OUTPATIENT
Start: 2021-10-09 | End: 2022-09-22 | Stop reason: SDUPTHER

## 2021-10-09 RX ORDER — APIXABAN 5 MG/1
TABLET, FILM COATED ORAL
Qty: 180 TABLET | Refills: 3 | Status: SHIPPED | OUTPATIENT
Start: 2021-10-09 | End: 2022-07-13

## 2021-10-18 ENCOUNTER — VIRTUAL VISIT (OUTPATIENT)
Dept: INTERNAL MEDICINE CLINIC | Age: 81
End: 2021-10-18
Payer: MEDICARE

## 2021-10-18 DIAGNOSIS — F01.50 VASCULAR DEMENTIA WITHOUT BEHAVIORAL DISTURBANCE (HCC): ICD-10-CM

## 2021-10-18 DIAGNOSIS — I69.359 CVA, OLD, HEMIPARESIS (HCC): Primary | ICD-10-CM

## 2021-10-18 DIAGNOSIS — K94.23 MALFUNCTION OF PERCUTANEOUS ENDOSCOPIC GASTROSTOMY (PEG) TUBE (HCC): ICD-10-CM

## 2021-10-18 DIAGNOSIS — I10 ESSENTIAL HYPERTENSION: ICD-10-CM

## 2021-10-18 PROCEDURE — 1090F PRES/ABSN URINE INCON ASSESS: CPT | Performed by: INTERNAL MEDICINE

## 2021-10-18 PROCEDURE — G8432 DEP SCR NOT DOC, RNG: HCPCS | Performed by: INTERNAL MEDICINE

## 2021-10-18 PROCEDURE — 1101F PT FALLS ASSESS-DOCD LE1/YR: CPT | Performed by: INTERNAL MEDICINE

## 2021-10-18 PROCEDURE — G8399 PT W/DXA RESULTS DOCUMENT: HCPCS | Performed by: INTERNAL MEDICINE

## 2021-10-18 PROCEDURE — 99213 OFFICE O/P EST LOW 20 MIN: CPT | Performed by: INTERNAL MEDICINE

## 2021-10-18 PROCEDURE — G8427 DOCREV CUR MEDS BY ELIG CLIN: HCPCS | Performed by: INTERNAL MEDICINE

## 2021-10-18 PROCEDURE — G8756 NO BP MEASURE DOC: HCPCS | Performed by: INTERNAL MEDICINE

## 2021-10-18 RX ORDER — ACETAMINOPHEN 160 MG/5ML
LIQUID ORAL
Qty: 826 ML | Refills: 3 | Status: SHIPPED | OUTPATIENT
Start: 2021-10-18 | End: 2022-01-07

## 2021-10-18 NOTE — PROGRESS NOTES
1. Have you been to the ER, urgent care clinic since your last visit? Hospitalized since your last visit?no    2. Have you seen or consulted any other health care providers outside of the [address] [place] [address] since your last visit? Include any pap smears or colon screening.  No  Chief Complaint   Patient presents with   Floydene Ada Referral Request     Homehealth//GI

## 2021-10-18 NOTE — PATIENT INSTRUCTIONS
StackAdapt Activation    Thank you for requesting access to StackAdapt. Please follow the instructions below to securely access and download your online medical record. StackAdapt allows you to send messages to your doctor, view your test results, renew your prescriptions, schedule appointments, and more. How Do I Sign Up? 1. In your internet browser, go to [email or web address]  2. Click on the First Time User? Click Here link in the Sign In box. You will be redirect to the New Member Sign Up page. 3. Enter your StackAdapt Access Code exactly as it appears below. You will not need to use this code after youve completed the sign-up process. If you do not sign up before the expiration date, you must request a new code. StackAdapt Access Code: [email or web address]  Expires: 2021  2:51 PM (This is the date your StackAdapt access code will )    4. Enter the last four digits of your Social Security Number (xxxx) and Date of Birth (mm/dd/yyyy) as indicated and click Submit. You will be taken to the next sign-up page. 5. Create a StackAdapt ID. This will be your StackAdapt login ID and cannot be changed, so think of one that is secure and easy to remember. 6. Create a StackAdapt password. You can change your password at any time. 7. Enter your Password Reset Question and Answer. This can be used at a later time if you forget your password. 8. Enter your e-mail address. You will receive e-mail notification when new information is available in [address]. 9. Click Sign Up. You can now view and download portions of your medical record. 10. Click the Download Summary menu link to download a portable copy of your medical information. Additional Information    If you have questions, please visit the Frequently Asked Questions section of the StackAdapt website at [email or web address] Remember, StackAdapt is NOT to be used for urgent needs. For medical emergencies, dial 911.

## 2021-10-25 DIAGNOSIS — I69.359 CVA, OLD, HEMIPARESIS (HCC): Primary | ICD-10-CM

## 2021-11-15 ENCOUNTER — OFFICE VISIT (OUTPATIENT)
Dept: CARDIOLOGY CLINIC | Age: 81
End: 2021-11-15
Payer: MEDICARE

## 2021-11-15 DIAGNOSIS — Z95.0 CARDIAC PACEMAKER IN SITU: Primary | ICD-10-CM

## 2021-11-15 PROCEDURE — 93294 REM INTERROG EVL PM/LDLS PM: CPT | Performed by: INTERNAL MEDICINE

## 2021-11-15 PROCEDURE — 93296 REM INTERROG EVL PM/IDS: CPT | Performed by: INTERNAL MEDICINE

## 2021-11-15 NOTE — LETTER
11/15/2021 4:23 PM    Ms. Whitney Landeros  [address] [place] [address]        This letter confirms that we have received your scheduled remote check of your implanted     device on 11-15-21  . Our EP team will contact you via phone if there are significant abnormal    findings. Your next remote check from home is scheduled for 2-16-22  . If you have any questions, please call [address] [place] [address] Drive at [phone].                Sincerely,    Niyah Pereyra MD [facility]

## 2021-11-18 ENCOUNTER — DOCUMENTATION ONLY (OUTPATIENT)
Dept: CARDIOLOGY CLINIC | Age: 81
End: 2021-11-18

## 2021-11-18 NOTE — PROGRESS NOTES
bpm average rate 19 second in July 2021  Bed ridden after stroke  Known AF    Future Appointments   Date Time Provider Nasim Cerrato   2/16/2022  9:45 AM REMOTE1, JES HAMMOND   5/18/2022  1:15 PM REMOTE1, JES HAMMOND   8/24/2022 10:45 AM REMOTE1, JES EID AMB

## 2021-12-06 RX ORDER — POTASSIUM CHLORIDE 20MEQ/15ML
LIQUID (ML) ORAL
Qty: 437 ML | Refills: 12 | Status: SHIPPED | OUTPATIENT
Start: 2021-12-06 | End: 2022-04-03

## 2021-12-06 NOTE — TELEPHONE ENCOUNTER
Rhonasgdashawn Quintana is requesting a new prescription on behalf of the pt via fax.      Historical medication: Kaon 10% solution     Last visit 10/18/2021 Virtual visit MD Seth Villaseñor  Next appointment 4 weeks (11/2021) - Nothing scheduled       Requested Prescriptions     Pending Prescriptions Disp Refills    potassium chloride (KAON 10%) 20 mEq/15 mL solution [Pharmacy Med Name: POTASSIUM CHLOR 10% LIQ(20MEQ/15ML)]       Sig: TAKE 20 ML BY MOUTH TWICE DAILY

## 2022-01-03 RX ORDER — FAMOTIDINE 40 MG/5ML
40 POWDER, FOR SUSPENSION ORAL 2 TIMES DAILY
Qty: 300 ML | Refills: 12 | Status: SHIPPED | OUTPATIENT
Start: 2022-01-03 | End: 2022-09-22 | Stop reason: SDUPTHER

## 2022-01-07 RX ORDER — ACETAMINOPHEN 160 MG/5ML
LIQUID ORAL
Qty: 826 ML | Refills: 3 | Status: SHIPPED | OUTPATIENT
Start: 2022-01-07 | End: 2022-05-24 | Stop reason: SDUPTHER

## 2022-02-22 PROCEDURE — 93296 REM INTERROG EVL PM/IDS: CPT | Performed by: INTERNAL MEDICINE

## 2022-02-22 PROCEDURE — 93294 REM INTERROG EVL PM/LDLS PM: CPT | Performed by: INTERNAL MEDICINE

## 2022-02-23 ENCOUNTER — TELEPHONE (OUTPATIENT)
Dept: CARDIOLOGY CLINIC | Age: 82
End: 2022-02-23

## 2022-02-23 ENCOUNTER — OFFICE VISIT (OUTPATIENT)
Dept: CARDIOLOGY CLINIC | Age: 82
End: 2022-02-23
Payer: MEDICARE

## 2022-02-23 DIAGNOSIS — Z95.0 CARDIAC PACEMAKER IN SITU: Primary | ICD-10-CM

## 2022-02-23 NOTE — TELEPHONE ENCOUNTER
Patient's son Chantal Vega is calling because he is trying to make sure we received his mothers remote transmission manual check. Please give a call back.     [phone]

## 2022-02-23 NOTE — LETTER
2/23/2022 9:09 AM    Ms. Jonas Landeros  [address] [place] [address]            This letter confirms that we have received your scheduled remote check of your implanted     device on 2-23-22  . Our EP team will contact you via phone if there are significant abnormal    findings. Your next remote check from home is scheduled for 6-1-22  . If you have any questions, please call [address] [place] [address] Drive at [phone].                Sincerely,    Litzy Harvey MD [facility]

## 2022-03-18 PROBLEM — R00.1 BRADYCARDIA: Status: ACTIVE | Noted: 2017-09-29

## 2022-03-19 PROBLEM — I97.190 COMPLETE AV BLOCK DUE TO AV NODAL ABLATION (HCC): Status: ACTIVE | Noted: 2019-05-06

## 2022-03-19 PROBLEM — I48.91 ATRIAL FIBRILLATION WITH RAPID VENTRICULAR RESPONSE (HCC): Status: ACTIVE | Noted: 2019-05-06

## 2022-03-19 PROBLEM — Z95.0 S/P CARDIAC PACEMAKER PROCEDURE: Status: ACTIVE | Noted: 2017-09-29

## 2022-03-19 PROBLEM — I49.5 SSS (SICK SINUS SYNDROME) (HCC): Status: ACTIVE | Noted: 2017-09-29

## 2022-03-19 PROBLEM — I48.0 PAF (PAROXYSMAL ATRIAL FIBRILLATION) (HCC): Status: ACTIVE | Noted: 2019-05-06

## 2022-03-19 PROBLEM — I44.2 COMPLETE AV BLOCK DUE TO AV NODAL ABLATION (HCC): Status: ACTIVE | Noted: 2019-05-06

## 2022-04-03 RX ORDER — POTASSIUM CHLORIDE 20MEQ/15ML
LIQUID (ML) ORAL
Qty: 473 ML | Refills: 12 | Status: SHIPPED | OUTPATIENT
Start: 2022-04-03 | End: 2022-06-07

## 2022-04-07 ENCOUNTER — TELEPHONE (OUTPATIENT)
Dept: CARDIOLOGY CLINIC | Age: 82
End: 2022-04-07

## 2022-04-07 NOTE — TELEPHONE ENCOUNTER
pts son Beth Alarcon called stating pts [employer] box started showing an orange light & flashing different info on the screen. When he tried to send a manual transmission to verify it was working it gave him an error code. Told him that he will need to call [employer] [phone] to get help troubleshooting & they will send him new unit if needed. Once it is fixed he will send test transmission & call us to verify it is working.

## 2022-04-12 ENCOUNTER — TELEPHONE (OUTPATIENT)
Dept: CARDIOLOGY CLINIC | Age: 82
End: 2022-04-12

## 2022-04-12 NOTE — TELEPHONE ENCOUNTER
Received alert for 1 NSVT episode noted on 04/08/2022 at 17:52 lasting 1 second (9 beats) with max V rate 169 bpm.    She has had longer episodes previously. Known AF. Bedridden after CVA per Dr. Atul Lassiter notes. Echo in 07/2020 showed LVEF 25-30%. Continue to monitor.

## 2022-05-24 RX ORDER — ACETAMINOPHEN 160 MG/5ML
LIQUID ORAL
Qty: 826 ML | Refills: 3 | Status: SHIPPED | OUTPATIENT
Start: 2022-05-24 | End: 2022-08-16

## 2022-05-24 NOTE — TELEPHONE ENCOUNTER
Patient calling to report concerns with her pulse readings. Please call patient back on her home phone. [phone]   Requested Prescriptions     Pending Prescriptions Disp Refills    acetaminophen (M-PAP) 160 mg/5 mL liquid 826 mL 3

## 2022-06-04 PROCEDURE — 93296 REM INTERROG EVL PM/IDS: CPT | Performed by: INTERNAL MEDICINE

## 2022-06-04 PROCEDURE — 93294 REM INTERROG EVL PM/LDLS PM: CPT | Performed by: INTERNAL MEDICINE

## 2022-06-05 ENCOUNTER — HOSPITAL ENCOUNTER (EMERGENCY)
Age: 82
Discharge: HOME | End: 2022-06-05
Attending: EMERGENCY MEDICINE
Payer: MEDICARE

## 2022-06-05 VITALS
HEART RATE: 71 BPM | SYSTOLIC BLOOD PRESSURE: 127 MMHG | TEMPERATURE: 97.8 F | RESPIRATION RATE: 18 BRPM | WEIGHT: 182.9 LBS | BODY MASS INDEX: 29.52 KG/M2 | OXYGEN SATURATION: 98 % | DIASTOLIC BLOOD PRESSURE: 90 MMHG

## 2022-06-05 DIAGNOSIS — K94.23 PEG TUBE MALFUNCTION (HCC): Primary | ICD-10-CM

## 2022-06-05 PROCEDURE — 99283 EMERGENCY DEPT VISIT LOW MDM: CPT

## 2022-06-05 PROCEDURE — 74011250637 HC RX REV CODE- 250/637: Performed by: EMERGENCY MEDICINE

## 2022-06-05 RX ADMIN — LEVETIRACETAM 500 MG: 500 SOLUTION ORAL at 09:58

## 2022-06-05 RX ADMIN — APIXABAN 5 MG: 5 TABLET, FILM COATED ORAL at 10:38

## 2022-06-05 NOTE — ED NOTES
Patient and son given copy of dc instructions and 0 paper script(s) and 0 electronic scripts. Patient's son verbalized understanding of instructions and script (s). Patient and son given a current medication reconciliation form and verbalized understanding of their medications. Patient's son verbalized understanding of the importance of discussing medications with  his or her physician or clinic they will be following up with. Patient alert and at baseline, in no acute distress. Patient transferred home via [facility] transport company at this time.

## 2022-06-05 NOTE — ED PROVIDER NOTES
EMERGENCY DEPARTMENT HISTORY AND PHYSICAL EXAM      Date: 6/5/2022  Patient Name: Moni Resendiz    History of Presenting Illness     Chief Complaint   Patient presents with    Feeding Tube Problem     History Provided By: EMS    HPI: Moni Resendiz, 78 y.o. female with past medical history significant for CVA, PVCs, throat cancer, diverticulitis, dysphagia, peptic ulcer disease, and hypertension who presents via EMS to the ED with cc of feeding tube complication. Per EMS, the PEG tube tore last night and tube feeds got all over the floor. Patient's son is concerned that the tube needs to be changed and is not functioning correctly. Her son states that she got all of her medications last night but has not received her medications yet this morning. He is concerned that she needs her Keppra and her Eliquis specifically. PMHx: CVA, PVCs, throat cancer, diverticulitis, dysphagia, peptic ulcer disease, and hypertension (per chart review)  Social Hx: None    PCP: Pierre Salinas MD    History and review of systems limited secondary to aphasia and prior CVA. No current facility-administered medications on file prior to encounter. Current Outpatient Medications on File Prior to Encounter   Medication Sig Dispense Refill    acetaminophen (M-PAP) 160 mg/5 mL liquid TAKE 30 ML BY MOUTH EVERY 4 TO 6 AS NEEDED FOR PAIN 826 mL 3    potassium chloride (KAON 10%) 20 mEq/15 mL solution MIX 20 ML IN 4 OUNCES OF LIQUID AND DRINK TWICE AS DIRECTED 473 mL 12    famotidine (PEPCID) 40 mg/5 mL (8 mg/mL) suspension Take 5 mL by mouth two (2) times a day.  300 mL 12    atorvastatin (LIPITOR) 40 mg tablet TAKE 1 TABLET EVERY NIGHT 90 Tablet 3    Eliquis 5 mg tablet TAKE 1 TABLET TWICE DAILY 180 Tablet 3    levETIRAcetam (KEPPRA) 100 mg/mL solution TAKE 5 ML BY MOUTH TWICE DAILY 900 mL 3    amLODIPine (NORVASC) 5 mg tablet 5 mg = 1 tab each dose, PO, daily 30 Tablet 12    metoprolol succinate (TOPROL-XL) 25 mg XL tablet TAKE 1 TABLET BY MOUTH EVERY DAY 90 Tablet 3    butalbital-acetaminophen-caff (Fioricet) -40 mg per capsule Take 1 Cap by mouth every four (4) hours as needed for Headache. (Patient not taking: Reported on 8/9/2021) 10 Cap 0    pantoprazole (PROTONIX) 40 mg granules for oral suspension Take 40 mg by mouth daily. (Patient not taking: Reported on 10/1/2020) 30 Each 3    potassium chloride (Klor-Con/25) 25 mEq pack Sig:give via peg bid (Patient not taking: Reported on 8/9/2021) 60 Packet 3    cyclobenzaprine (FLEXERIL) 10 mg tablet Take 1 Tab by mouth three (3) times daily as needed for Muscle Spasm(s). (Patient not taking: Reported on 10/1/2020) 90 Tab 0    meclizine (ANTIVERT) 25 mg tablet TAKE 1 TABLET THREE TIMES DAILY AS NEEDED (Patient not taking: Reported on 10/1/2020) 270 Tab 3    rivaroxaban (Xarelto) 15 mg tab tablet Take 1 Tab by mouth daily (with dinner). (Patient not taking: Reported on 10/1/2020) 90 Tab 3    gabapentin (NEURONTIN) 300 mg capsule Take 1 Cap by mouth three (3) times daily. (Patient not taking: Reported on 10/1/2020) 270 Cap 3    fluticasone propionate (FLONASE) 50 mcg/actuation nasal spray 2 Sprays by Both Nostrils route daily as needed for Rhinitis. (Patient not taking: Reported on 8/9/2021)      acetaminophen (TYLENOL) 325 mg tablet Take 650 mg by mouth nightly. (Patient not taking: Reported on 8/9/2021)      MULTIVIT WITH CALCIUM,IRON,MIN [facility] MULTIPLE VITAMINS PO) Take 1 Tab by mouth daily.  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    Diverticulosis of colon (without mention of hemorrhage) 2/8/2011    Dysphagia 2/8/2011    Edema, peripheral 2/8/2011    GERD (gastroesophageal reflux disease)     GERD Gastro- Esophageal Reflux Disease 2/8/2011    Herniated nucleus pulposus ( slipped disc) 2/8/2011    Menopausal 2/8/2011    PUD (peptic ulcer disease) 2012    Pure hypercholesterolemia 2/8/2011    Recurrent ear pain 2/8/2011    S/P radiation therapy     Scalp lesion 10/23/2014    Sebaceous cyst 4/22/2014    Unspecified cataract 2/8/2011    Unspecified essential hypertension 2/8/2011    Unspecified sleep apnea      Past Surgical History:  Past Surgical History:   Procedure Laterality Date    HX HYSTERECTOMY      HX ORTHOPAEDIC  neck/back 2006    HX OTHER SURGICAL  5/8/2014     Excise recurrent scalp lesion and application of ACell    HX OTHER SURGICAL  5/8/2014    Excise keloid, anterior chest wall, and application of ACell    HX OTHER SURGICAL  5/8/2014     Excise sebaceous cyst, anterior chest wall    HX OTHER SURGICAL  5/8/2014    Punch biopsy, skin lesions, right forearm x2, right calf x1    PLACE PERCUT GASTROSTOMY TUBE  7/8/2020         [place] ICAR CATHETER ABLATION ATRIOVENTR NODE FUNCTION N/A 5/6/2019    ABLATION AV NODE performed by Guerda Atkins MD at [address] CATH LAB    [place] INS NEW/RPLCMT PRM PM W/TRANSV ELTRD ATRIAL&VENT  9/30/2017         [place] RMVL IMPLANTABLE PT-ACTIVATED CAR EVENT RECORDER  9/30/2017          Family History:  Family History   Problem Relation Age of Onset    Hypertension Mother     Stroke Mother     Hypertension Father     Cancer Father         PROSTATE, MELANOMA    Anesth Problems Neg Hx      Social History:  Social History     Tobacco Use    Smoking status: Never Smoker    Smokeless tobacco: Never Used   Substance Use Topics    Alcohol use: No    Drug use: No     Allergies:   Allergies   Allergen Reactions    Latex Rash    Bactrim [Sulfamethoprim Ds] Hives    Aspirin Other (comments)     Anything higher than 81mg makes pt jittery    Codeine Hives and Itching    Iodinated Contrast Media Itching    Pcn [Penicillins] Hives and Itching    Tape [Adhesive] Rash     Review of Systems   Review of Systems   Constitutional: Negative for chills and fever. Feeding tube complication   HENT: Negative for congestion, rhinorrhea, sneezing and sore throat. Eyes: Negative for redness and visual disturbance. Respiratory: Negative for shortness of breath. Cardiovascular: Negative for leg swelling. Gastrointestinal: Negative for abdominal pain, nausea and vomiting. Genitourinary: Negative for difficulty urinating and frequency. Musculoskeletal: Negative for back pain, myalgias and neck stiffness. Skin: Negative for rash. Neurological: Negative for dizziness, syncope, weakness and headaches. Hematological: Negative for adenopathy. All other systems reviewed and are negative. Physical Exam   Physical Exam  Vitals and nursing note reviewed. Constitutional:       Appearance: Normal appearance. She is well-developed. HENT:      Head: Normocephalic and atraumatic. Eyes:      Conjunctiva/sclera: Conjunctivae normal.   Cardiovascular:      Rate and Rhythm: Normal rate and regular rhythm. Pulses: Normal pulses. Heart sounds: Normal heart sounds, S1 normal and S2 normal.   Pulmonary:      Effort: Pulmonary effort is normal. No respiratory distress. Breath sounds: Normal breath sounds. No wheezing. Abdominal:      General: Bowel sounds are normal. There is no distension. Palpations: Abdomen is soft. Tenderness: There is no abdominal tenderness. There is no rebound. Musculoskeletal:         General: Normal range of motion. Cervical back: Full passive range of motion without pain, normal range of motion and neck supple. Skin:     General: Skin is warm and dry. Findings: No rash. Neurological:      Mental Status: She is alert. Mental status is at baseline. Comments: aphasic   Psychiatric:         Speech: Speech normal.         Behavior: Behavior normal.         Thought Content: Thought content normal.         Judgment: Judgment normal.       Diagnostic Study Results   Labs -   No results found for this or any previous visit (from the past 12 hour(s)). Radiologic Studies -   No orders to display     No results found. Medical Decision Making   I am the first provider for this patient. I reviewed the vital signs, available nursing notes, past medical history, past surgical history, family history and social history. Vital Signs-Reviewed the patient's vital signs. Patient Vitals for the past 24 hrs:   Temp Pulse Resp BP SpO2   06/05/22 0900 -- -- -- (!) 149/84 --   06/05/22 0830 -- -- -- 134/80 --   06/05/22 0800 -- -- -- (!) 154/78 --   06/05/22 0756 -- -- -- -- 100 %   06/05/22 0745 -- -- -- -- 99 %   06/05/22 0740 97.8 °F (36.6 °C) 71 18 (!) 148/93 99 %     Pulse Oximetry Analysis - 100% on RA (normal)    Records Reviewed: Nursing Notes and Old Medical Records    Provider Notes (Medical Decision Making):   70-year-old female presents via EMS with PEG tube complication. The external port of the PEG tube has frayed off and the PEG tube is extremely frail, almost dry rotted. It still is able to administer tube feeds and medications through it. It appears that Dr. Marce Dodge placed this tube in 2020 after her CVA. Since the tube is functioning at this time, will discharge her with outpatient GI follow-up for tube replacement. ED Course:   Initial assessment performed. The patients presenting problems have been discussed, and they are in agreement with the care plan formulated and outlined with them. I have encouraged them to ask questions as they arise throughout their visit. Progress Note  10:08 AM  I have re-evaluated pt and spoke with patient's son, Channie Denver.   I informed him that we cannot change this feeding tube today and she would need to follow-up with outpatient GI and possibly interventional radiology for tube change. He agrees with this plan. Will leave the facesheet for case management to assist with arranging this. He does tell me that he has called Dr. Ramon Restrepo office and they have given him late afternoon appointments which is challenging from a transportation standpoint given the fact that she is essentially bedbound and requires stretcher transport. Progress Note:   Updated pt on all returned results and findings. Discussed the importance of proper follow up as referred below along with return precautions. Pt in agreement with the care plan and expresses agreement with and understanding of all items discussed. Disposition:  Discharge Note:  The pt is ready for discharge. The pt's signs, symptoms, diagnosis, and discharge instructions have been discussed and pt has conveyed their understanding. The pt is to follow up as recommended or return to ER should their symptoms worsen. Plan has been discussed and pt is in agreement. PLAN:  1. Current Discharge Medication List        2. Follow-up Information     Follow up With Specialties Details Why Contact Info    Gladis Leyva MD Internal Medicine Physician Schedule an appointment as soon as possible for a visit   [address] [place] [address]      Kiki Shelton MD Gastroenterology Schedule an appointment as soon as possible for a visit  for PEG tube replacement/evaluation (or with one of the other providers in the office) [facility]  [address] [facility]  [address] [address] [phone]      [address] [place] [address] EMERGENCY DEPT Emergency Medicine  As needed, If symptoms worsen [facility]  [phone]        Return to ED if worse     Diagnosis     Clinical Impression:   1. PEG tube malfunction [facility])            Please note that this dictation was completed with Dragon, computer voice recognition software.   Quite often unanticipated grammatical, syntax, homophones, and other interpretive errors are inadvertently transcribed by the computer software. Please disregard these errors. Additionally, please excuse any errors that have escaped final proofreading.

## 2022-06-05 NOTE — ED NOTES
Patient brought here by EMS with c/o feeding tube problem. EMS reports that patient's family c/o that her feeding tube came out. Patient with hx of stroke, patient not frequently verbal, does not respond to most questions, and when she does verbalize it is often incomprehensible or does not correlate to the conversation. Patient assessed and feeding tube appears to be intact. EMS reports that family on their way to the hospital, will inquire to family for further details. Emergency Department Nursing Plan of Care       The Nursing Plan of Care is developed from the Nursing assessment and Emergency Department Attending provider initial evaluation. The plan of care may be reviewed in the ED Provider note.     The Plan of Care was developed with the following considerations:   Patient / Family readiness to learn indicated by:verbalized understanding  Persons(s) to be included in education: patient  Barriers to Learning/Limitations:No    Signed     Goldie Mcnulty RN    6/5/2022   7:59 AM

## 2022-06-06 ENCOUNTER — OFFICE VISIT (OUTPATIENT)
Dept: CARDIOLOGY CLINIC | Age: 82
End: 2022-06-06
Payer: MEDICARE

## 2022-06-06 ENCOUNTER — HOSPITAL ENCOUNTER (OUTPATIENT)
Age: 82
Setting detail: OBSERVATION
Discharge: HOSPICE | End: 2022-06-07
Attending: EMERGENCY MEDICINE | Admitting: INTERNAL MEDICINE
Payer: MEDICARE

## 2022-06-06 DIAGNOSIS — Z95.0 CARDIAC PACEMAKER IN SITU: Primary | ICD-10-CM

## 2022-06-06 DIAGNOSIS — K94.23 PEG TUBE MALFUNCTION (HCC): Primary | ICD-10-CM

## 2022-06-06 LAB
BASOPHILS # BLD: 0.1 K/UL (ref 0–0.1)
BASOPHILS NFR BLD: 1 % (ref 0–1)
DIFFERENTIAL METHOD BLD: ABNORMAL
EOSINOPHIL # BLD: 0.1 K/UL (ref 0–0.4)
EOSINOPHIL NFR BLD: 1 % (ref 0–7)
ERYTHROCYTE [DISTWIDTH] IN BLOOD BY AUTOMATED COUNT: 17.7 % (ref 11.5–14.5)
HCT VFR BLD AUTO: 46.9 % (ref 35–47)
HGB BLD-MCNC: 15.3 G/DL (ref 11.5–16)
IMM GRANULOCYTES # BLD AUTO: 0 K/UL (ref 0–0.04)
IMM GRANULOCYTES NFR BLD AUTO: 0 % (ref 0–0.5)
LYMPHOCYTES # BLD: 0.9 K/UL (ref 0.8–3.5)
LYMPHOCYTES NFR BLD: 10 % (ref 12–49)
MCH RBC QN AUTO: 26.5 PG (ref 26–34)
MCHC RBC AUTO-ENTMCNC: 32.6 G/DL (ref 30–36.5)
MCV RBC AUTO: 81.1 FL (ref 80–99)
MONOCYTES # BLD: 0.7 K/UL (ref 0–1)
MONOCYTES NFR BLD: 7 % (ref 5–13)
NEUTS SEG # BLD: 7.3 K/UL (ref 1.8–8)
NEUTS SEG NFR BLD: 81 % (ref 32–75)
NRBC # BLD: 0 K/UL (ref 0–0.01)
NRBC BLD-RTO: 0 PER 100 WBC
PLATELET # BLD AUTO: 266 K/UL (ref 150–400)
PMV BLD AUTO: 9.7 FL (ref 8.9–12.9)
RBC # BLD AUTO: 5.78 M/UL (ref 3.8–5.2)
WBC # BLD AUTO: 9 K/UL (ref 3.6–11)

## 2022-06-06 PROCEDURE — 94761 N-INVAS EAR/PLS OXIMETRY MLT: CPT

## 2022-06-06 PROCEDURE — 99285 EMERGENCY DEPT VISIT HI MDM: CPT

## 2022-06-06 PROCEDURE — 74011000258 HC RX REV CODE- 258: Performed by: INTERNAL MEDICINE

## 2022-06-06 PROCEDURE — 36415 COLL VENOUS BLD VENIPUNCTURE: CPT

## 2022-06-06 PROCEDURE — 96375 TX/PRO/DX INJ NEW DRUG ADDON: CPT

## 2022-06-06 PROCEDURE — G0378 HOSPITAL OBSERVATION PER HR: HCPCS

## 2022-06-06 PROCEDURE — 74011000258 HC RX REV CODE- 258: Performed by: EMERGENCY MEDICINE

## 2022-06-06 PROCEDURE — 74011250636 HC RX REV CODE- 250/636: Performed by: INTERNAL MEDICINE

## 2022-06-06 PROCEDURE — 74011250637 HC RX REV CODE- 250/637: Performed by: INTERNAL MEDICINE

## 2022-06-06 PROCEDURE — 85025 COMPLETE CBC W/AUTO DIFF WBC: CPT

## 2022-06-06 PROCEDURE — 96374 THER/PROPH/DIAG INJ IV PUSH: CPT

## 2022-06-06 RX ORDER — ENOXAPARIN SODIUM 100 MG/ML
40 INJECTION SUBCUTANEOUS EVERY 24 HOURS
Status: DISCONTINUED | OUTPATIENT
Start: 2022-06-07 | End: 2022-06-07 | Stop reason: HOSPADM

## 2022-06-06 RX ORDER — ACETAMINOPHEN 650 MG/1
650 SUPPOSITORY RECTAL
Status: DISCONTINUED | OUTPATIENT
Start: 2022-06-06 | End: 2022-06-07 | Stop reason: HOSPADM

## 2022-06-06 RX ORDER — ONDANSETRON 2 MG/ML
4 INJECTION INTRAMUSCULAR; INTRAVENOUS
Status: DISCONTINUED | OUTPATIENT
Start: 2022-06-06 | End: 2022-06-07 | Stop reason: HOSPADM

## 2022-06-06 RX ORDER — BISACODYL 5 MG
5 TABLET, DELAYED RELEASE (ENTERIC COATED) ORAL DAILY PRN
Status: DISCONTINUED | OUTPATIENT
Start: 2022-06-06 | End: 2022-06-07 | Stop reason: HOSPADM

## 2022-06-06 RX ORDER — HYDRALAZINE HYDROCHLORIDE 20 MG/ML
20 INJECTION INTRAMUSCULAR; INTRAVENOUS ONCE
Status: DISPENSED | OUTPATIENT
Start: 2022-06-06 | End: 2022-06-07

## 2022-06-06 RX ORDER — SODIUM CHLORIDE 0.9 % (FLUSH) 0.9 %
5-40 SYRINGE (ML) INJECTION AS NEEDED
Status: DISCONTINUED | OUTPATIENT
Start: 2022-06-06 | End: 2022-06-07 | Stop reason: HOSPADM

## 2022-06-06 RX ORDER — PROMETHAZINE HYDROCHLORIDE 25 MG/1
12.5 TABLET ORAL
Status: DISCONTINUED | OUTPATIENT
Start: 2022-06-06 | End: 2022-06-07 | Stop reason: HOSPADM

## 2022-06-06 RX ORDER — DEXTROSE MONOHYDRATE AND SODIUM CHLORIDE 5; .9 G/100ML; G/100ML
75 INJECTION, SOLUTION INTRAVENOUS CONTINUOUS
Status: DISCONTINUED | OUTPATIENT
Start: 2022-06-06 | End: 2022-06-07 | Stop reason: HOSPADM

## 2022-06-06 RX ORDER — ACETAMINOPHEN 325 MG/1
650 TABLET ORAL
Status: DISCONTINUED | OUTPATIENT
Start: 2022-06-06 | End: 2022-06-07 | Stop reason: HOSPADM

## 2022-06-06 RX ORDER — POLYETHYLENE GLYCOL 3350 17 G/17G
17 POWDER, FOR SOLUTION ORAL DAILY
Status: DISCONTINUED | OUTPATIENT
Start: 2022-06-07 | End: 2022-06-07 | Stop reason: HOSPADM

## 2022-06-06 RX ORDER — SODIUM CHLORIDE 0.9 % (FLUSH) 0.9 %
5-40 SYRINGE (ML) INJECTION EVERY 8 HOURS
Status: DISCONTINUED | OUTPATIENT
Start: 2022-06-06 | End: 2022-06-07 | Stop reason: HOSPADM

## 2022-06-06 RX ADMIN — LEVETIRACETAM 500 MG: 100 INJECTION, SOLUTION INTRAVENOUS at 20:14

## 2022-06-06 RX ADMIN — DEXTROSE AND SODIUM CHLORIDE 75 ML/HR: 5; 900 INJECTION, SOLUTION INTRAVENOUS at 21:26

## 2022-06-06 RX ADMIN — ACETAMINOPHEN 650 MG: 325 TABLET ORAL at 21:10

## 2022-06-06 NOTE — LETTER
6/6/2022 1:06 PM    Ms. Zaida Landeros  [address] [place] [address]            This letter confirms that we have received your scheduled remote check of your implanted     device on 6-6-22  . Our EP team will contact you via phone if there are significant abnormal    findings. Your next remote check from home is scheduled for 9-7-22  . If you have any questions, please call [address] at [phone].                Sincerely,    Ravi Colindres MD [facility]

## 2022-06-07 VITALS
OXYGEN SATURATION: 100 % | SYSTOLIC BLOOD PRESSURE: 137 MMHG | DIASTOLIC BLOOD PRESSURE: 67 MMHG | WEIGHT: 182.98 LBS | BODY MASS INDEX: 29.41 KG/M2 | HEART RATE: 73 BPM | HEIGHT: 66 IN | TEMPERATURE: 98.3 F | RESPIRATION RATE: 18 BRPM

## 2022-06-07 LAB
ALBUMIN SERPL-MCNC: 2.8 G/DL (ref 3.5–5)
ALBUMIN/GLOB SERPL: 0.7 {RATIO} (ref 1.1–2.2)
ALP SERPL-CCNC: 108 U/L (ref 45–117)
ALT SERPL-CCNC: 17 U/L (ref 12–78)
ANION GAP SERPL CALC-SCNC: 7 MMOL/L (ref 5–15)
AST SERPL-CCNC: 12 U/L (ref 15–37)
BASOPHILS # BLD: 0.1 K/UL (ref 0–0.1)
BASOPHILS NFR BLD: 1 % (ref 0–1)
BILIRUB SERPL-MCNC: 0.6 MG/DL (ref 0.2–1)
BNP SERPL-MCNC: 703 PG/ML
BUN SERPL-MCNC: 8 MG/DL (ref 6–20)
BUN/CREAT SERPL: 17 (ref 12–20)
CALCIUM SERPL-MCNC: 9.3 MG/DL (ref 8.5–10.1)
CHLORIDE SERPL-SCNC: 102 MMOL/L (ref 97–108)
CO2 SERPL-SCNC: 29 MMOL/L (ref 21–32)
CREAT SERPL-MCNC: 0.48 MG/DL (ref 0.55–1.02)
DIFFERENTIAL METHOD BLD: ABNORMAL
EOSINOPHIL # BLD: 0.1 K/UL (ref 0–0.4)
EOSINOPHIL NFR BLD: 1 % (ref 0–7)
ERYTHROCYTE [DISTWIDTH] IN BLOOD BY AUTOMATED COUNT: 17.1 % (ref 11.5–14.5)
GLOBULIN SER CALC-MCNC: 3.8 G/DL (ref 2–4)
GLUCOSE SERPL-MCNC: 90 MG/DL (ref 65–100)
HCT VFR BLD AUTO: 41.6 % (ref 35–47)
HGB BLD-MCNC: 13.6 G/DL (ref 11.5–16)
IMM GRANULOCYTES # BLD AUTO: 0 K/UL (ref 0–0.04)
IMM GRANULOCYTES NFR BLD AUTO: 1 % (ref 0–0.5)
LYMPHOCYTES # BLD: 1 K/UL (ref 0.8–3.5)
LYMPHOCYTES NFR BLD: 15 % (ref 12–49)
MCH RBC QN AUTO: 26.8 PG (ref 26–34)
MCHC RBC AUTO-ENTMCNC: 32.7 G/DL (ref 30–36.5)
MCV RBC AUTO: 82.1 FL (ref 80–99)
MONOCYTES # BLD: 0.7 K/UL (ref 0–1)
MONOCYTES NFR BLD: 10 % (ref 5–13)
NEUTS SEG # BLD: 4.8 K/UL (ref 1.8–8)
NEUTS SEG NFR BLD: 72 % (ref 32–75)
NRBC # BLD: 0 K/UL (ref 0–0.01)
NRBC BLD-RTO: 0 PER 100 WBC
PLATELET # BLD AUTO: 262 K/UL (ref 150–400)
PMV BLD AUTO: 9.6 FL (ref 8.9–12.9)
POTASSIUM SERPL-SCNC: 3.4 MMOL/L (ref 3.5–5.1)
PROT SERPL-MCNC: 6.6 G/DL (ref 6.4–8.2)
RBC # BLD AUTO: 5.07 M/UL (ref 3.8–5.2)
SODIUM SERPL-SCNC: 138 MMOL/L (ref 136–145)
WBC # BLD AUTO: 6.6 K/UL (ref 3.6–11)

## 2022-06-07 PROCEDURE — 74011250636 HC RX REV CODE- 250/636: Performed by: INTERNAL MEDICINE

## 2022-06-07 PROCEDURE — 96375 TX/PRO/DX INJ NEW DRUG ADDON: CPT

## 2022-06-07 PROCEDURE — 74011000250 HC RX REV CODE- 250: Performed by: INTERNAL MEDICINE

## 2022-06-07 PROCEDURE — 80053 COMPREHEN METABOLIC PANEL: CPT

## 2022-06-07 PROCEDURE — 74011250637 HC RX REV CODE- 250/637: Performed by: INTERNAL MEDICINE

## 2022-06-07 PROCEDURE — 96376 TX/PRO/DX INJ SAME DRUG ADON: CPT

## 2022-06-07 PROCEDURE — C9113 INJ PANTOPRAZOLE SODIUM, VIA: HCPCS | Performed by: INTERNAL MEDICINE

## 2022-06-07 PROCEDURE — 83880 ASSAY OF NATRIURETIC PEPTIDE: CPT

## 2022-06-07 PROCEDURE — 74011000258 HC RX REV CODE- 258: Performed by: INTERNAL MEDICINE

## 2022-06-07 PROCEDURE — 36415 COLL VENOUS BLD VENIPUNCTURE: CPT

## 2022-06-07 PROCEDURE — G0378 HOSPITAL OBSERVATION PER HR: HCPCS

## 2022-06-07 PROCEDURE — 85025 COMPLETE CBC W/AUTO DIFF WBC: CPT

## 2022-06-07 RX ADMIN — SODIUM CHLORIDE, PRESERVATIVE FREE 10 ML: 5 INJECTION INTRAVENOUS at 10:40

## 2022-06-07 RX ADMIN — POLYETHYLENE GLYCOL 3350 17 G: 17 POWDER, FOR SOLUTION ORAL at 10:40

## 2022-06-07 RX ADMIN — SODIUM CHLORIDE 40 MG: 9 INJECTION, SOLUTION INTRAMUSCULAR; INTRAVENOUS; SUBCUTANEOUS at 10:40

## 2022-06-07 RX ADMIN — LEVETIRACETAM 500 MG: 100 INJECTION, SOLUTION INTRAVENOUS at 10:39

## 2022-06-07 NOTE — ROUTINE PROCESS
Bedside shift change report given to GAMALIEL Qiu (oncoming nurse) by Se Gonsales RN (offgoing nurse). Report included the following information SBAR, Kardex, Procedure Summary, Intake/Output, MAR and Recent Results     .

## 2022-06-07 NOTE — PROGRESS NOTES
Comprehensive Nutrition Assessment    Type and Reason for Visit: Initial,Consult    Nutrition Recommendations/Plan:   1. RD placed orders for Jevity 1.5 imtiaz 240 ml bolus 4X/day with plans to administer a trial bolus. Flush with 60 ml water before and after each feeding. 2. Ok to resume home TF regimen once discharged. Nutrition Assessment:     6/7: Chart reviewed; med noted for PEG tube malfunction and replacement. RD consulted to assist with TF formula in effort to complete a trial bolus prior to discharge to ensure PEG functioning.  at the bedside who reports pt typically receives nocturnal feedings (equivalent of 960 ml or 4-8oz containers) over 12-14 hrs which provides ~1152 kcals/53 g protein/163 g CHO.  agreeable to a trial bolus with our formulary equivalent which is Jevity 1.5 imtiaz. Husbands administered meds and water via gravity. RN communicated plan with RN regarding Jevity 1.5 trial bolus. Last Weight Metric  Weight Loss Metrics 6/6/2022 6/5/2022 8/24/2021 8/18/2021 12/22/2020 11/19/2020 7/8/2020   Today's Wt 182 lb 15.7 oz 182 lb 14.4 oz 160 lb 15 oz - 163 lb 163 lb 12.8 oz 152 lb   BMI 29.53 kg/m2 29.52 kg/m2 25.98 kg/m2 27.98 kg/m2 27.98 kg/m2 29.96 kg/m2 27.8 kg/m2     Nutrition Related Findings:    BM: 6/7; Labs: K+ 3.4; Meds: reviewed Wound Type: None    Current Nutrition Intake & Therapies:        DIET NPO  DIET ONE TIME MESSAGE  ADULT TUBE FEEDING PEG; Standard without Fiber; Delivery Method: Bolus; Bolus Frequency: 4 Times Daily; Bolus Volume (mL): 240; Bolus Method of Infusion: Gravity; Water Flush Volume (mL): 60; Water Flush Frequency: Before and after each bolus    Anthropometric Measures:  Height: 5' 6\" (167.6 cm)  Ideal Body Weight (IBW): 130 lbs (59 kg)     Current Body Wt:  83 kg (182 lb 15.7 oz), 140.8 % IBW. Current BMI (kg/m2): 29.5                          BMI Category: Overweight (BMI 25.0-29. 9)    Estimated Daily Nutrient Needs:  Energy Requirements Based On: Formula  Weight Used for Energy Requirements: Current  Energy (kcal/day): 1700 (BMR x 1. 3AF)  Weight Used for Protein Requirements: Current  Protein (g/day): 83 (1.0 g/kg bw)  Method Used for Fluid Requirements: 1 ml/kcal  Fluid (ml/day): 1700 ml/day    Nutrition Diagnosis:   · Inadequate enteral nutrition infusion related to  (PEG malfunction) as evidenced by  (s/p PEG replacement)      Nutrition Interventions:   Food and/or Nutrient Delivery: Start tube feeding  Nutrition Education/Counseling: No recommendations at this time  Coordination of Nutrition Care: Continue to monitor while inpatient       Goals:     Goals:  Tolerate nutrition support at goal rate,by next RD assessment       Nutrition Monitoring and Evaluation:   Behavioral-Environmental Outcomes: None identified  Food/Nutrient Intake Outcomes: Enteral nutrition intake/tolerance  Physical Signs/Symptoms Outcomes: Biochemical data,Skin,Weight    Discharge Planning:    Enteral nutrition    Camila Cook RD  Contact:

## 2022-06-07 NOTE — H&P
Hospitalist Admission Note    NAME:  Lev Orellana   :   1940   MRN:   [id number]     Date of admit: 2022    PCP: Emy Aburto MD    Assessment/Plan:     PEG tube malfunction POA  PEG tube placed status post stroke in , tube now deteriorating  Seen in ED at [facility] yesterday, referred to GI clinic today  Son called GI clinic who sent patient to the emergency room  ED spoke with on-call for Dr. Santino Wilcox recommended ops admit for PEG change in a.m. Son reports she took an Eliquis dose this morning, will hold further until seen by GI  PEG tube may require endoscopic change  N.p.o.  Tameka Wilcox in a.m. Spoke with son at bedside    Acute right MCA stroke 2020 POA  Occluded right internal carotid artery POA  Dysphagia due to acute stroke d/pPEG tube 2020  Seizure disorder on Keppra POA  Dementia POA  Chronic left hemiparesis  P.o., fed through PEG tube  Hold Eliquis, started after stroke with left MCA clot   Resume once patient is status post PEG placement  Cannot see that A. fib was documented  Change Keppra to IV  All other meds    Chronic systolic CHF POA last LVEF 25-30%  Essential HTN POA  Hyperlipidemia POA  Resume statin once PEG tube replaced  Hold home Norvasc 5 mg, toprol XL 25 mg  , resume once PEG in place  PRN hydralazine  IV lasix as needed  Awaiting serum chemistries    Overweight  POA Body mass index is 29.53 kg/m². Given the patient's current clinical presentation, I have a high level of concern for decompensation if discharged from the emergency department. My assessment of this patient's clinical condition and my plan of care is as noted above.     DVT prophylaxis with SCDs    Code status: Full code  NOK:    History     CHIEF COMPLAINT: Referred to emergency room by GI clinic for PEG tube replacement    HISTORY OF PRESENT ILLNESS:    38-year-old female    History of right MCA stroke  with dysphagia and left hemiparesis  CTA during that admission showed acute thrombus in the right MCA, treated with Eliquis  Cannot see that DELIA arauz was documented  Seizure disorder on Keppra    PEG tube was placed after the stroke in 2020  She gets all of her medications and feeding through the PEG tube  Son notes over the past few months the PEG tube is [place] deteriorating\"  The tubing has become brittle and she is lost significant mount of the external tube  It appeared to rupture on Sunday evening, leaving only a small residual piece of the tube in place   Son was able to give some medications through the tube  Seen at [facility] yesterday to discuss tube   Was told to call GI clinic in the a.m.   Son spoke with Dr. Samantha Lomeli office they recommended come to the emergency room  Currently the tube may require endoscopic replacement  Was brought to the emergency room  Denies any recent illnesses  ER physician spoke with GI they recommended obs admission and GI consult in a.m. 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    Diverticulosis of colon (without mention of hemorrhage) 2/8/2011    Dysphagia 2/8/2011    Edema, peripheral 2/8/2011    GERD (gastroesophageal reflux disease)     GERD Gastro- Esophageal Reflux Disease 2/8/2011    Herniated nucleus pulposus ( slipped disc) 2/8/2011    Menopausal 2/8/2011    PUD (peptic ulcer disease) 2012    Pure hypercholesterolemia 2/8/2011    Recurrent ear pain 2/8/2011    S/P radiation therapy     Scalp lesion 10/23/2014    Sebaceous cyst 4/22/2014    Unspecified cataract 2/8/2011    Unspecified essential hypertension 2/8/2011    Unspecified sleep apnea         Past Surgical History:   Procedure Laterality Date    HX HYSTERECTOMY      HX ORTHOPAEDIC  neck/back 2006    HX OTHER SURGICAL  5/8/2014     Excise recurrent scalp lesion and application of ACell    HX OTHER SURGICAL  5/8/2014    Excise keloid, anterior chest wall, and application of ACell    HX OTHER SURGICAL  5/8/2014     Excise sebaceous cyst, anterior chest wall    HX OTHER SURGICAL  5/8/2014    Punch biopsy, skin lesions, right forearm x2, right calf x1    PLACE PERCUT GASTROSTOMY TUBE  7/8/2020         [place] ICAR CATHETER ABLATION ATRIOVENTR NODE FUNCTION N/A 5/6/2019    ABLATION AV NODE performed by Edward Topete MD at [address] CATH LAB    [place] INS NEW/RPLCMT PRM PM W/TRANSV ELTRD ATRIAL&VENT  9/30/2017         [place] RMVL IMPLANTABLE PT-ACTIVATED CAR EVENT RECORDER  9/30/2017            Social History     Tobacco Use    Smoking status: Never Smoker    Smokeless tobacco: Never Used   Substance Use Topics    Alcohol use: No        Family History   Problem Relation Age of Onset    Hypertension Mother     Stroke Mother     Hypertension Father     Cancer Father         PROSTATE, MELANOMA    Anesth Problems Neg Hx         Allergies   Allergen Reactions    Latex Rash    Bactrim [Sulfamethoprim Ds] Hives    Aspirin Other (comments)     Anything higher than 81mg makes pt jittery    Codeine Hives and Itching    Iodinated Contrast Media Itching    Pcn [Penicillins] Hives and Itching    Tape [Adhesive] Rash        Prior to Admission medications    Medication Sig Start Date End Date Taking?  Authorizing Provider   acetaminophen (M-PAP) 160 mg/5 mL liquid TAKE 30 ML BY MOUTH EVERY 4 TO 6 AS NEEDED FOR PAIN 5/24/22   José Butterfield., MD   potassium chloride (KAON 10%) 20 mEq/15 mL solution MIX 20 ML IN 4 OUNCES OF LIQUID AND DRINK TWICE AS DIRECTED 4/3/22   José Lew MD   famotidine (PEPCID) 40 mg/5 mL (8 mg/mL) suspension Take 5 mL by mouth two (2) times a day. 1/3/22   Edmond Varela MD   atorvastatin (LIPITOR) 40 mg tablet TAKE 1 TABLET EVERY NIGHT 10/9/21   Edmond Varela MD   Eliquis 5 mg tablet TAKE 1 TABLET TWICE DAILY 10/9/21   Edmond Varela MD   levETIRAcetam (KEPPRA) 100 mg/mL solution TAKE 5 ML BY MOUTH TWICE DAILY 8/24/21   Edmond Varela MD   amLODIPine (NORVASC) 5 mg tablet 5 mg = 1 tab each dose, PO, daily 8/9/21   Edmond Varela MD   metoprolol succinate (TOPROL-XL) 25 mg XL tablet TAKE 1 TABLET BY MOUTH EVERY DAY 6/14/21   Edmond Varela MD   butalbital-acetaminophen-caff (Fioricet) -40 mg per capsule Take 1 Cap by mouth every four (4) hours as needed for Headache. Patient not taking: Reported on 8/9/2021 11/20/20   Deisy Davila MD   pantoprazole (PROTONIX) 40 mg granules for oral suspension Take 40 mg by mouth daily. Patient not taking: Reported on 10/1/2020 9/23/20   Edmond Varela MD   potassium chloride (Klor-Con/25) 25 mEq pack Sig:give via peg bid  Patient not taking: Reported on 8/9/2021 9/4/20   Edmond Varela MD   cyclobenzaprine (FLEXERIL) 10 mg tablet Take 1 Tab by mouth three (3) times daily as needed for Muscle Spasm(s). Patient not taking: Reported on 10/1/2020 5/13/20   Edmond Varela MD   meclizine (ANTIVERT) 25 mg tablet TAKE 1 TABLET THREE TIMES DAILY AS NEEDED  Patient not taking: Reported on 10/1/2020 3/22/20   Edmond Varela MD   rivaroxaban (Xarelto) 15 mg tab tablet Take 1 Tab by mouth daily (with dinner). Patient not taking: Reported on 10/1/2020 3/12/20   Edmond Varela MD   gabapentin (NEURONTIN) 300 mg capsule Take 1 Cap by mouth three (3) times daily. Patient not taking: Reported on 10/1/2020 3/12/20   Edmond Varela MD   fluticasone propionate [facility]) 50 mcg/actuation nasal spray 2 Sprays by Both Nostrils route daily as needed for Rhinitis.   Patient not taking: Reported on 8/9/2021    Provider, Ariane acetaminophen (TYLENOL) 325 mg tablet Take 650 mg by mouth nightly. Patient not taking: Reported on 2021    Provider, Historical   MULTIVIT WITH CALCIUM,IRON,MIN [facility] MULTIPLE VITAMINS PO) Take 1 Tab by mouth daily.   Patient not taking: Reported on 2021    Provider, Historical       Review of symptoms:     POSITIVE= Bold  Negative = not bold  General:  fever, chills, sweats, generalized weakness\  Eyes:    blurred vision, eye pain, double vision  ENT:    Coryza, sore throat, trouble swallowing  Respiratory:   cough, sputum, SOB  Cardiology:   chest pain, orthopnea, PND, edema  Gastrointestinal:  abdominal pain , N/V, diarrhea, constipation, melena or BRBPR  Genitourinary:  Urgency, dysuria, hematuria  Muskuloskeletal :  Joint redness, swelling or acute joint pain, myalgias  Hematology:  easy bruising, nose or gum bleeding  Dermatological: rash, ulceration  Endocrine:   Polyuria or polydipsia, heat or hold intolerance  Neurological:  Headache, focal motor or sensory changes     Speech difficulties, memory loss  Psychological: depression, agitation      Objective:   VITALS:    Patient Vitals for the past 24 hrs:   Temp Pulse Resp BP SpO2   22 1531 97.6 °F (36.4 °C) 70 18 135/88 98 %     Temp (24hrs), Av.6 °F (36.4 °C), Min:97.6 °F (36.4 °C), Max:97.6 °F (36.4 °C)      O2 Device: None (Room air)    Wt Readings from Last 12 Encounters:   22 83 kg (182 lb 15.7 oz)   22 83 kg (182 lb 14.4 oz)   21 73 kg (160 lb 15 oz)   20 73.9 kg (163 lb)   20 74.3 kg (163 lb 12.8 oz)   20 68.9 kg (152 lb)   20 70.8 kg (156 lb)   20 75.3 kg (166 lb)   10/31/19 72.6 kg (160 lb)   19 74.4 kg (164 lb)   19 74.7 kg (164 lb 10.9 oz)   19 77.1 kg (170 lb)         PHYSICAL EXAM:     I had a face to face encounter and independently examined this patient on 22  as outlined below:    General:    Sleepy,   in no distress     HEENT: Normocephalic, atraumatic    PERRL,  Sclera no icterus    Nasal mucosa without masses or discharge  Hearing intact to voice    Oropharynx without erythema or exudate    Neck:  No meningismus, trachea midline, no carotid bruits     Thyroid not enlarged, no nodules or tenderness  Lungs:   Clear to auscultation bilaterally. No wheezing or rales    No accessory muscle use or retractions. Heart:   Regular rate and rhythm,  no murmur or gallop. No LE edema  Abdomen:   Soft, non-tender. Not distended. Bowel sounds normal.     No masses, No Hepatosplenomegaly, No Rebound or guarding  Lymph nodes: No cervical or inguinal ELLI  Musculoskeletal:  No Joint swelling, erythema, warmth. No Cyanosis or clubbing  Skin:      No rashes     Not Jaundiced   No nodules or thickenin  Neurologic: Sleepy    Cranial nerves 2 to 12 intact    Left hemiparesis       LAB DATA REVIEWED:    Recent Results (from the past 12 hour(s))   CBC WITH AUTOMATED DIFF    Collection Time: 06/06/22  5:43 PM   Result Value Ref Range    WBC 9.0 3.6 - 11.0 K/uL    RBC 5.78 (H) 3.80 - 5.20 M/uL    HGB 15.3 11.5 - 16.0 g/dL    HCT 46.9 35.0 - 47.0 %    MCV 81.1 80.0 - 99.0 FL    MCH 26.5 26.0 - 34.0 PG    MCHC 32.6 30.0 - 36.5 g/dL    RDW 17.7 (H) 11.5 - 14.5 %    PLATELET 081 553 - 459 K/uL    MPV 9.7 8.9 - 12.9 FL    NRBC 0.0 0  WBC    ABSOLUTE NRBC 0.00 0.00 - 0.01 K/uL    NEUTROPHILS 81 (H) 32 - 75 %    LYMPHOCYTES 10 (L) 12 - 49 %    MONOCYTES 7 5 - 13 %    EOSINOPHILS 1 0 - 7 %    BASOPHILS 1 0 - 1 %    IMMATURE GRANULOCYTES 0 0.0 - 0.5 %    ABS. NEUTROPHILS 7.3 1.8 - 8.0 K/UL    ABS. LYMPHOCYTES 0.9 0.8 - 3.5 K/UL    ABS. MONOCYTES 0.7 0.0 - 1.0 K/UL    ABS. EOSINOPHILS 0.1 0.0 - 0.4 K/UL    ABS. BASOPHILS 0.1 0.0 - 0.1 K/UL    ABS. IMM. GRANS. 0.0 0.00 - 0.04 K/UL    DF AUTOMATED           CT Results  (Last 48 hours)    None            No results found. I saw the patient personally, took a history and did a complete physical exam at the bedside.  I performed complex decision making in coming up with a diagnostic and treatment plan for the patient. I reviewed the patient's past medical records, current laboratory and radiology results, and actual Xray films/EKG. I have also discussed this case with the involved ED physician.     Care Plan discussed with:    Patient, Family, ED Doc    Risk of deterioration:  High    Total Time Coordinating Admission:  60   minutes    Total Critical Care Time:         Jadon Werner MD

## 2022-06-07 NOTE — CONSULTS
Gastroenterology Attending Physician (for [facility]) attestation statement and comments. This patient was seen and examined by me in a face-to-face visit today. I reviewed the medical record including lab work, imaging and other provider notes. I confirmed the history as described above. I spoke to the patient, reviewed the medical record including lab work, imaging and other provider notes. I discussed this case in detail with ANAMARIA Ball. I formulated an  assessment of this patient and developed a treatment plan. I agree with the above consultation note. I agree with the history, exam and assessment and plan as outlined in the note. I would like to add the followinyo F with hx PEG >10yrs for throat CA, last changed  with noted deterioration of device since such that no longer functions well. Pt is on Eliquis. Abd s,NT, ND. Site C/D/I. Plan: 1) Degrade PEg removed with gentle traction, followed by replacement with 20FR right angle with external bolster set at 3cm. No bleeding noted. PEG is flushed. Balloon filled with 6cc water. .  Will sign off noting this PEG exchange would best have been completed as an Outpatient Visit as originally indicated by their  hospital consultation for the same.   Awilda Avalos MD            GI CONSULTATION NOTE  Omayra Shaffer NP  [phone] NP in-hospital cell phone M-F until 4:30  After 5pm or on weekends, please call  for physician on call    NAME: Wili Vega   :  1940   MRN:  [id number]   Attending: Dr. Marcin Casiano  Primary GI: Dr. DIOGENES OLIVIA  Date/Time:  2022 8:27 AM  Assessment:   PEG Tube Malfunction  · PEG placed 2020  · Current PEG falling apart/head of PEG tube has fallen off and PEG is clamped in place unusable  · Hx dysphagia s/p large right MCA stroke  · High risk aspiration given Hx  · Hx of PEG placement years ago 2/2 Hx throat cancer in   · Labs 22 negative for Anemia    Plan:   · Original disc PEG replaced with 20 Fr Balloon as of 6/7/22  · On removal of Original PEG there was noted normal gastric discharge and light bleeding. · Patient does not express apparent pain or discomfort and tolerated PEG replacement well  · Site does not appear infected at this time and cleaned during PEG replacement  · Symptomatic care per primary team  Nothing further to add from a GI standpoint. GI signing off. Please call with questions. Thank you for this consultation! Plan discussed with Dr. Naresh Rodriguez    Subjective:   HISTORY OF PRESENT ILLNESS:     Johny Loomis is an 78 y.o. female who we are asked to see for complaint of PEG tube malfunction/disinitegration. PEG placed 7/8/2020. Current PEG falling apart/head of PEG tube has fallen off and PEG is clamped in place unusable. Hx dysphagia s/p large right MCA stroke. Patient not very verbal but grunts to questions. Hx of PEG placement years ago 2/2 Hx throat cancer in 2009. Labs negative for Anemia 6/7/22.      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    Diverticulosis of colon (without mention of hemorrhage) 2/8/2011    Dysphagia 2/8/2011    Edema, peripheral 2/8/2011    GERD (gastroesophageal reflux disease)     GERD Gastro- Esophageal Reflux Disease 2/8/2011    Herniated nucleus pulposus ( slipped disc) 2/8/2011    Menopausal 2/8/2011    PUD (peptic ulcer disease) 2012    Pure hypercholesterolemia 2/8/2011    Recurrent ear pain 2/8/2011    S/P radiation therapy     Scalp lesion 10/23/2014    Sebaceous cyst 4/22/2014    Unspecified cataract 2/8/2011    Unspecified essential hypertension 2/8/2011    Unspecified sleep apnea       Past Surgical History: Procedure Laterality Date    HX HYSTERECTOMY      HX ORTHOPAEDIC  neck/back 2006    HX OTHER SURGICAL  5/8/2014     Excise recurrent scalp lesion and application of ACell    HX OTHER SURGICAL  5/8/2014    Excise keloid, anterior chest wall, and application of ACell    HX OTHER SURGICAL  5/8/2014     Excise sebaceous cyst, anterior chest wall    HX OTHER SURGICAL  5/8/2014    Punch biopsy, skin lesions, right forearm x2, right calf x1    PLACE PERCUT GASTROSTOMY TUBE  7/8/2020         CO ICAR CATHETER ABLATION ATRIOVENTR NODE FUNCTION N/A 5/6/2019    ABLATION AV NODE performed by Charan Vazquez MD at [address] CATH LAB    CO INS NEW/RPLCMT PRM PM W/TRANSV ELTRD ATRIAL&VENT  9/30/2017         CO RMVL IMPLANTABLE PT-ACTIVATED CAR EVENT RECORDER  9/30/2017          Social History     Tobacco Use    Smoking status: Never Smoker    Smokeless tobacco: Never Used   Substance Use Topics    Alcohol use: No      Family History   Problem Relation Age of Onset    Hypertension Mother     Stroke Mother     Hypertension Father     Cancer Father         PROSTATE, MELANOMA    Anesth Problems Neg Hx       Allergies   Allergen Reactions    Latex Rash    Bactrim [Sulfamethoprim Ds] Hives    Aspirin Other (comments)     Anything higher than 81mg makes pt jittery    Codeine Hives and Itching    Iodinated Contrast Media Itching    Pcn [Penicillins] Hives and Itching    Tape [Adhesive] Rash      Prior to Admission medications    Medication Sig Start Date End Date Taking? Authorizing Provider   acetaminophen (M-PAP) 160 mg/5 mL liquid TAKE 30 ML BY MOUTH EVERY 4 TO 6 AS NEEDED FOR PAIN 5/24/22   Yudelka Miles MD   potassium chloride (KAON 10%) 20 mEq/15 mL solution MIX 20 ML IN 4 OUNCES OF LIQUID AND DRINK TWICE AS DIRECTED 4/3/22   Yudelka Miles MD   famotidine (PEPCID) 40 mg/5 mL (8 mg/mL) suspension Take 5 mL by mouth two (2) times a day.  1/3/22   Yudelka Miles MD   atorvastatin (LIPITOR) 40 mg tablet TAKE 1 TABLET EVERY NIGHT 10/9/21   Dakota Vickers MD   Eliquis 5 mg tablet TAKE 1 TABLET TWICE DAILY 10/9/21   Dakota Vickers, MD   levETIRAcetam (KEPPRA) 100 mg/mL solution TAKE 5 ML BY MOUTH TWICE DAILY 8/24/21   Dakota Vickers, MD   amLODIPine (NORVASC) 5 mg tablet 5 mg = 1 tab each dose, PO, daily 8/9/21   Dakota Vickers MD   metoprolol succinate (TOPROL-XL) 25 mg XL tablet TAKE 1 TABLET BY MOUTH EVERY DAY 6/14/21   Dakota Vickers, MD   butalbital-acetaminophen-caff (Fioricet) -40 mg per capsule Take 1 Cap by mouth every four (4) hours as needed for Headache. Patient not taking: Reported on 8/9/2021 11/20/20   Coit Aid, MD   pantoprazole (PROTONIX) 40 mg granules for oral suspension Take 40 mg by mouth daily. Patient not taking: Reported on 10/1/2020 9/23/20   Dakota Vickers MD   potassium chloride (Klor-Con/25) 25 mEq pack Sig:give via peg bid  Patient not taking: Reported on 8/9/2021 9/4/20   Dakota Vickers MD   cyclobenzaprine (FLEXERIL) 10 mg tablet Take 1 Tab by mouth three (3) times daily as needed for Muscle Spasm(s). Patient not taking: Reported on 10/1/2020 5/13/20   Dakota Vickers MD   meclizine (ANTIVERT) 25 mg tablet TAKE 1 TABLET THREE TIMES DAILY AS NEEDED  Patient not taking: Reported on 10/1/2020 3/22/20   Dakota Vickers MD   rivaroxaban (Xarelto) 15 mg tab tablet Take 1 Tab by mouth daily (with dinner). Patient not taking: Reported on 10/1/2020 3/12/20   Dakota Vickers MD   gabapentin (NEURONTIN) 300 mg capsule Take 1 Cap by mouth three (3) times daily. Patient not taking: Reported on 10/1/2020 3/12/20   Dakota Vickers MD   fluticasone propionate [facility]) 50 mcg/actuation nasal spray 2 Sprays by Both Nostrils route daily as needed for Rhinitis. Patient not taking: Reported on 8/9/2021    Provider, Historical   acetaminophen (TYLENOL) 325 mg tablet Take 650 mg by mouth nightly.   Patient not taking: Reported on 8/9/2021    Provider, Historical   MULTIVIT WITH CALCIUM,IRON,MIN [facility] MULTIPLE VITAMINS PO) Take 1 Tab by mouth daily. Patient not taking: Reported on 8/9/2021    Provider, Historical       Patient Active Problem List   Diagnosis Code    Dysphagia R13.10    Recurrent ear pain H92.09    Acute pharyngitis J02.9    Essential hypertension I10    Edema, peripheral R60.9    Pure hypercholesterolemia E78.00    Allergic rhinitis, cause unspecified J30.9    Diverticulosis of colon (without mention of hemorrhage) K57.30    Herniated nucleus pulposus ( slipped disc) [id number]    Degenetrative Dis Disease, 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    Carpal tunnel syndrome G56.00    Unspecified cataract H26.9    GERD Gastro- Esophageal Reflux Disease K21.9    Degenerative Joint Disese ( improved/resolving) M19.90    Menopausal N95.1    Asymptomatic varicose veins I83.90    Sebaceous cyst L72.3    Keloid L91.0    Scalp lesion L98.9    Abnormal nuclear stress test R94.39    Syncope R55    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    CAD (coronary artery disease) I25.10    S/P cardiac pacemaker procedure Z95.0    Bradycardia R00.1    SSS (sick sinus syndrome) ([facility]) I49.5    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[facility]) I97.190, I44.2    PAF (paroxysmal atrial fibrillation) ([facility]) I48.0    Atrial fibrillation with rapid ventricular response ([facility]) I48.91    PEG tube malfunction ([facility]) K94.23     REVIEW OF SYSTEMS:    Constitutional:na  Eyes:   na  ENT:   na  Respiratory: na  Cards:  Na  GI:   See HPI  :  na  Integument:  na  Heme:  na  Musculoskel: Na  Neuro: na  Psych: na      Objective:   VITALS:    Visit Vitals  /88 (BP 1 Location: Left upper arm, BP Patient Position: At rest)   Pulse 71   Temp 97.4 °F (36.3 °C)   Resp 17   Ht 5' 6\" (1.676 m)   Wt 83 kg (182 lb 15.7 oz)   SpO2 100%   BMI 29.53 kg/m²       PHYSICAL EXAM:   General:          Alert, WD, WN, cooperative, no distress, appears stated age.     Head: Normocephalic, without obvious abnormality, atraumatic. Eyes:               Conjunctivae clear and pale, anicteric sclerae. Pupils are equal  Lungs:             CTA bilaterally. No wheezing/rhonchi/rales. Chest wall:      No tenderness or deformity. No Accessory muscle use. Heart:              Regular rate and rhythm,  no murmur, rub or gallop. Abdomen:        PEG present as noted in Assessment/HPI. Extremities:     Atraumatic, No cyanosis. No edema. No clubbing  Skin:                Texture, turgor normal. No rashes/lesions/jaundice  Psych:             Good insight. Not depressed. Not anxious or agitated. Neurologic:      EOMs intact. No facial asymmetry. No aphasia or slurred speech. Normal                        strength, A/O X 3. LAB DATA REVIEWED:    Recent Results (from the past 24 hour(s))   CBC WITH AUTOMATED DIFF    Collection Time: 06/06/22  5:43 PM   Result Value Ref Range    WBC 9.0 3.6 - 11.0 K/uL    RBC 5.78 (H) 3.80 - 5.20 M/uL    HGB 15.3 11.5 - 16.0 g/dL    HCT 46.9 35.0 - 47.0 %    MCV 81.1 80.0 - 99.0 FL    MCH 26.5 26.0 - 34.0 PG    MCHC 32.6 30.0 - 36.5 g/dL    RDW 17.7 (H) 11.5 - 14.5 %    PLATELET 373 413 - 804 K/uL    MPV 9.7 8.9 - 12.9 FL    NRBC 0.0 0  WBC    ABSOLUTE NRBC 0.00 0.00 - 0.01 K/uL    NEUTROPHILS 81 (H) 32 - 75 %    LYMPHOCYTES 10 (L) 12 - 49 %    MONOCYTES 7 5 - 13 %    EOSINOPHILS 1 0 - 7 %    BASOPHILS 1 0 - 1 %    IMMATURE GRANULOCYTES 0 0.0 - 0.5 %    ABS. NEUTROPHILS 7.3 1.8 - 8.0 K/UL    ABS. LYMPHOCYTES 0.9 0.8 - 3.5 K/UL    ABS. MONOCYTES 0.7 0.0 - 1.0 K/UL    ABS. EOSINOPHILS 0.1 0.0 - 0.4 K/UL    ABS. BASOPHILS 0.1 0.0 - 0.1 K/UL    ABS. IMM.  GRANS. 0.0 0.00 - 0.04 K/UL    DF AUTOMATED     METABOLIC PANEL, COMPREHENSIVE    Collection Time: 06/07/22  4:11 AM   Result Value Ref Range    Sodium 138 136 - 145 mmol/L    Potassium 3.4 (L) 3.5 - 5.1 mmol/L    Chloride 102 97 - 108 mmol/L    CO2 29 21 - 32 mmol/L    Anion gap 7 5 - 15 mmol/L Glucose 90 65 - 100 mg/dL    BUN 8 6 - 20 MG/DL    Creatinine 0.48 (L) 0.55 - 1.02 MG/DL    BUN/Creatinine ratio 17 12 - 20      GFR est AA >60 >60 ml/min/1.73m2    GFR est non-AA >60 >60 ml/min/1.73m2    Calcium 9.3 8.5 - 10.1 MG/DL    Bilirubin, total 0.6 0.2 - 1.0 MG/DL    ALT (SGPT) 17 12 - 78 U/L    AST (SGOT) 12 (L) 15 - 37 U/L    Alk. phosphatase 108 45 - 117 U/L    Protein, total 6.6 6.4 - 8.2 g/dL    Albumin 2.8 (L) 3.5 - 5.0 g/dL    Globulin 3.8 2.0 - 4.0 g/dL    A-G Ratio 0.7 (L) 1.1 - 2.2     CBC WITH AUTOMATED DIFF    Collection Time: 06/07/22  4:11 AM   Result Value Ref Range    WBC 6.6 3.6 - 11.0 K/uL    RBC 5.07 3.80 - 5.20 M/uL    HGB 13.6 11.5 - 16.0 g/dL    HCT 41.6 35.0 - 47.0 %    MCV 82.1 80.0 - 99.0 FL    MCH 26.8 26.0 - 34.0 PG    MCHC 32.7 30.0 - 36.5 g/dL    RDW 17.1 (H) 11.5 - 14.5 %    PLATELET 174 739 - 468 K/uL    MPV 9.6 8.9 - 12.9 FL    NRBC 0.0 0  WBC    ABSOLUTE NRBC 0.00 0.00 - 0.01 K/uL    NEUTROPHILS 72 32 - 75 %    LYMPHOCYTES 15 12 - 49 %    MONOCYTES 10 5 - 13 %    EOSINOPHILS 1 0 - 7 %    BASOPHILS 1 0 - 1 %    IMMATURE GRANULOCYTES 1 (H) 0.0 - 0.5 %    ABS. NEUTROPHILS 4.8 1.8 - 8.0 K/UL    ABS. LYMPHOCYTES 1.0 0.8 - 3.5 K/UL    ABS. MONOCYTES 0.7 0.0 - 1.0 K/UL    ABS. EOSINOPHILS 0.1 0.0 - 0.4 K/UL    ABS. BASOPHILS 0.1 0.0 - 0.1 K/UL    ABS. IMM.  GRANS. 0.0 0.00 - 0.04 K/UL    DF AUTOMATED     NT-PRO BNP    Collection Time: 06/07/22  4:11 AM   Result Value Ref Range    NT pro- (H) <450 PG/ML       IMAGING RESULTS:  I have personally reviewed the imaging reports    Total time spent with patient: 25 minutes ________________________________________________________________________  Care Plan discussed with:  Patient y   Family  n   RN y              Consultant:  n     ________________________________________________________________________    ___________________________________________________  Consulting Provider: Adriana Frost NP      6/7/2022  8:27 AM

## 2022-06-07 NOTE — PROGRESS NOTES
Delivered MOON to patient and patient's rep. Patient's rep signed BOWERS and verbalized understanding. See scanned documents.  Ivis Rodriguez

## 2022-06-07 NOTE — DISCHARGE SUMMARY
Hospitalist Discharge Summary     Patient ID:    Kwadwo Munson  [id number]  65 y.o.  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 [facility]) ICD-10-CM: I97.190, I44.2  ICD-9-CM: 997.1, 426.0  5/6/2019 - Present    Overview Signed 5/6/2019 10:33 AM by Trinidad Ying MD     5/6/2019 junction rhythm 40 bpm post ablation with complete av block             PAF (paroxysmal atrial fibrillation) ([facility]) ICD-10-CM: I48.0  ICD-9-CM: 427.31  5/6/2019 - Present        Atrial fibrillation with rapid ventricular response (HCC) ICD-10-CM: I48.91  ICD-9-CM: 427.31  5/6/2019 - Present        S/P cardiac pacemaker procedure ICD-10-CM: Z95.0  ICD-9-CM: V45.01  9/29/2017 - Present    Overview Signed 9/29/2017 10:35 AM by ANAMARIA Hebert Dr.  9/29/17  Removal of ILR             Bradycardia ICD-10-CM: R00.1  ICD-9-CM: 427.89  9/29/2017 - Present    Overview Signed 9/29/2017 10:36 AM by Bernabe Phillip NP     ILR recorder recent showed many episodes of recurrent sinus bradycardia to 20-30 bpm  No syncope, but this confirmed sick sinus syndrome related to her syncope near syncope before. SSS (sick sinus syndrome) ([facility]) ICD-10-CM: I49.5  ICD-9-CM: 427.81  9/29/2017 - Present        CAD (coronary artery disease) ICD-10-CM: I25.10  ICD-9-CM: 414.00  2/17/2016 - Present    Overview Signed 2/17/2016 12:20 PM by Yanelis Caruso MD     2/16 cardiac cath,  50% mid lad, 60% mid diag 1, lcx minor irregs, diffuse 100% mid rca with well develped left to right collaterals. Normal lvef. She will be evaluated further for bradycardia and syncope with implantable loop recorder per dr michael.              Abnormal nuclear stress test ICD-10-CM: R94.39  ICD-9-CM: 794.39  2/11/2016 - Present    Overview Addendum 2/11/2016  7:49 AM by Nafisa Bui MD     1/14 echo normal lvef with ?  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[address] ICD-10-CM: I49.5  ICD-9-CM: 427.81  2/11/2016 - Present    Overview Signed 2/16/2016  9:19 AM by Sharri Russell NP     Echo at [facility] 12/31/15  LVEF 55-65%, no RWMA  Trivial regurgitation              Scalp lesion ICD-10-CM: L98.9  ICD-9-CM: 709.9  10/23/2014 - Present        Sebaceous cyst ICD-10-CM: L72.3  ICD-9-CM: 706.2  4/22/2014 - Present        Keloid ICD-10-CM: L91.0  ICD-9-CM: 701.4  4/22/2014 - Present        Dysphagia ICD-10-CM: R13.10  ICD-9-CM: 787.20  2/8/2011 - Present        Recurrent ear pain ICD-10-CM: H92.09  ICD-9-CM: 388.70  2/8/2011 - Present        Acute pharyngitis ICD-10-CM: J02.9  ICD-9-CM: 462  2/8/2011 - Present        Essential hypertension ICD-10-CM: I10  ICD-9-CM: 401.9  2/8/2011 - Present        Edema, peripheral ICD-10-CM: R60.9  ICD-9-CM: 782.3  2/8/2011 - Present        Pure hypercholesterolemia ICD-10-CM: E78.00  ICD-9-CM: 272.0  2/8/2011 - Present        Allergic rhinitis, cause unspecified ICD-10-CM: J30.9  ICD-9-CM: 477.9  2/8/2011 - Present        Diverticulosis of colon (without mention of hemorrhage) ICD-10-CM: K57.30  ICD-9-CM: 562.10  2/8/2011 - Present        Herniated nucleus pulposus ( slipped disc) ICD-10-CM: [id number]  ICD-9-CM: 722.2  2/8/2011 - Present        Degenetrative Dis Disease, Cervical ICD-10-CM: M50.30  ICD-9-CM: 722.4  2/8/2011 - Present        Carpal tunnel syndrome ICD-10-CM: G56.00  ICD-9-CM: 354.0  2/8/2011 - Present        Unspecified cataract ICD-10-CM: H26.9  ICD-9-CM: 366.9  2/8/2011 - Present        GERD Gastro- Esophageal Reflux Disease ICD-10-CM: K21.9  ICD-9-CM: 530.81  2/8/2011 - Present        Degenerative Joint Disese ( improved/resolving) ICD-10-CM: M19.90  ICD-9-CM: 715.90  2/8/2011 - Present        Menopausal ICD-10-CM: N95.1  ICD-9-CM: 627.2  2/8/2011 - Present        Asymptomatic varicose veins ICD-10-CM: I83.90  ICD-9-CM: 454.9  2/8/2011 - Present        RESOLVED: Status post placement of implantable loop recorder ICD-10-CM: Z95.818  ICD-9-CM: V45.09  9/29/2017 - 9/29/2017    Overview Signed 9/29/2017 10:35 AM by Keron Deluca NP     Placed 6/2017  Removed 09/29/17 22    Final Diagnoses: Active Problems:    PEG tube malfunction ([address] (6/6/2022)        Reason for Hospitalization:  PEG malfunction    Hospital Course:   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 fib was documented  Seizure disorder on Keppra  PEG tube was placed after the stroke in 2020  She gets all of her medications and feeding through the PEG tube  Son notes over the past few months the PEG tube is [place] deteriorating\"  The tubing has become brittle and she is lost significant mount of the external tube  It appeared to rupture on Sunday evening, leaving only a small residual piece of the tube in place              Son was able to give some medications through the tube  Seen at [facility] yesterday to discuss tube              Was told to call GI clinic in the a.m. Son spoke with Dr. Daren Kimble office they recommended come to the emergency room.    Pt admitted to medical floor for PEG exchange, done at bedside by GI   Tolerated procedure well, no bleeding, pt uses Jevity 1.2 at 75 ml/hr via pump and off during the day hours. Son is at bedside,  given pt bolus of Jevity 1.2 and flush with water to assure patency and tolerance. Pt is stable for discharge today home and resume home hospice and 24 hr caregiver. Son is in agreement with plan. Discharge Medications:   Current Discharge Medication List      CONTINUE these medications which have NOT CHANGED    Details   acetaminophen (M-PAP) 160 mg/5 mL liquid TAKE 30 ML BY MOUTH EVERY 4 TO 6 AS NEEDED FOR PAIN  Qty: 826 mL, Refills: 3      famotidine (PEPCID) 40 mg/5 mL (8 mg/mL) suspension Take 5 mL by mouth two (2) times a day. Qty: 300 mL, Refills: 12      atorvastatin (LIPITOR) 40 mg tablet TAKE 1 TABLET EVERY NIGHT  Qty: 90 Tablet, Refills: 3      Eliquis 5 mg tablet TAKE 1 TABLET TWICE DAILY  Qty: 180 Tablet, Refills: 3      levETIRAcetam (KEPPRA) 100 mg/mL solution TAKE 5 ML BY MOUTH TWICE DAILY  Qty: 900 mL, Refills: 3      amLODIPine (NORVASC) 5 mg tablet 5 mg = 1 tab each dose, PO, daily  Qty: 30 Tablet, Refills: 12      metoprolol succinate (TOPROL-XL) 25 mg XL tablet TAKE 1 TABLET BY MOUTH EVERY DAY  Qty: 90 Tablet, Refills: 3      butalbital-acetaminophen-caff (Fioricet) -40 mg per capsule Take 1 Cap by mouth every four (4) hours as needed for Headache. Qty: 10 Cap, Refills: 0      pantoprazole (PROTONIX) 40 mg granules for oral suspension Take 40 mg by mouth daily. Qty: 30 Each, Refills: 3      potassium chloride (Klor-Con/25) 25 mEq pack Sig:give via peg bid  Qty: 60 Packet, Refills: 3      cyclobenzaprine (FLEXERIL) 10 mg tablet Take 1 Tab by mouth three (3) times daily as needed for Muscle Spasm(s).   Qty: 90 Tab, Refills: 0      meclizine (ANTIVERT) 25 mg tablet TAKE 1 TABLET THREE TIMES DAILY AS NEEDED  Qty: 270 Tab, Refills: 3      rivaroxaban (Xarelto) 15 mg tab tablet Take 1 Tab by mouth daily (with dinner). Qty: 90 Tab, Refills: 3      gabapentin (NEURONTIN) 300 mg capsule Take 1 Cap by mouth three (3) times daily. Qty: 270 Cap, Refills: 3    Associated Diagnoses: Carpal tunnel syndrome, unspecified laterality      fluticasone propionate (FLONASE) 50 mcg/actuation nasal spray 2 Sprays by Both Nostrils route daily as needed for Rhinitis. MULTIVIT WITH CALCIUM,IRON,MIN [facility] MULTIPLE VITAMINS PO) Take 1 Tab by mouth daily. STOP taking these medications       potassium chloride (KAON 10%) 20 mEq/15 mL solution Comments:   Reason for Stopping:         acetaminophen (TYLENOL) 325 mg tablet Comments:   Reason for Stopping: Follow up Care:    1. Anna Pappas MD in 4 weeks. Follow-up Information     Follow up With Specialties Details Why Anastasiya Lopez MD Internal Medicine Physician In 4 weeks  [facility]  [phone]              * Follow-up Care/Patient Instructions:   Activity: Bedrest  Diet: PEG feeding at 75 ml/hr overnight 8 hrs /off during daytime hours  Wound Care: As directed      Condition at Discharge:  Stable  __________________________________________________________________    Disposition  Home Hospice  ____________________________________________________________________    Code Status:  Full Code  ___________________________________________________________________    Discharge Exam:  Patient seen and examined by me on discharge day   GEN: NAD, cooperative   RESP: Clear to all fields, diminished in bases    CV: S1,S2, no murmur, no gallop    ABD: new PEG tube intact, BS x 4 quadrants     MUSC: decreased ROM in all extremities     SKIN: intact     NEURO: alert, speech clear, old CVA,left side weakness     PSYCH: cooperative, pleasant    CONSULTATIONS: GI    Significant Diagnostic Studies:   Recent Results (from the past 24 hour(s))   CBC WITH AUTOMATED DIFF    Collection Time: 06/06/22 5:43 PM   Result Value Ref Range    WBC 9.0 3.6 - 11.0 K/uL    RBC 5.78 (H) 3.80 - 5.20 M/uL    HGB 15.3 11.5 - 16.0 g/dL    HCT 46.9 35.0 - 47.0 %    MCV 81.1 80.0 - 99.0 FL    MCH 26.5 26.0 - 34.0 PG    MCHC 32.6 30.0 - 36.5 g/dL    RDW 17.7 (H) 11.5 - 14.5 %    PLATELET 307 391 - 409 K/uL    MPV 9.7 8.9 - 12.9 FL    NRBC 0.0 0  WBC    ABSOLUTE NRBC 0.00 0.00 - 0.01 K/uL    NEUTROPHILS 81 (H) 32 - 75 %    LYMPHOCYTES 10 (L) 12 - 49 %    MONOCYTES 7 5 - 13 %    EOSINOPHILS 1 0 - 7 %    BASOPHILS 1 0 - 1 %    IMMATURE GRANULOCYTES 0 0.0 - 0.5 %    ABS. NEUTROPHILS 7.3 1.8 - 8.0 K/UL    ABS. LYMPHOCYTES 0.9 0.8 - 3.5 K/UL    ABS. MONOCYTES 0.7 0.0 - 1.0 K/UL    ABS. EOSINOPHILS 0.1 0.0 - 0.4 K/UL    ABS. BASOPHILS 0.1 0.0 - 0.1 K/UL    ABS. IMM. GRANS. 0.0 0.00 - 0.04 K/UL    DF AUTOMATED     METABOLIC PANEL, COMPREHENSIVE    Collection Time: 06/07/22  4:11 AM   Result Value Ref Range    Sodium 138 136 - 145 mmol/L    Potassium 3.4 (L) 3.5 - 5.1 mmol/L    Chloride 102 97 - 108 mmol/L    CO2 29 21 - 32 mmol/L    Anion gap 7 5 - 15 mmol/L    Glucose 90 65 - 100 mg/dL    BUN 8 6 - 20 MG/DL    Creatinine 0.48 (L) 0.55 - 1.02 MG/DL    BUN/Creatinine ratio 17 12 - 20      GFR est AA >60 >60 ml/min/1.73m2    GFR est non-AA >60 >60 ml/min/1.73m2    Calcium 9.3 8.5 - 10.1 MG/DL    Bilirubin, total 0.6 0.2 - 1.0 MG/DL    ALT (SGPT) 17 12 - 78 U/L    AST (SGOT) 12 (L) 15 - 37 U/L    Alk.  phosphatase 108 45 - 117 U/L    Protein, total 6.6 6.4 - 8.2 g/dL    Albumin 2.8 (L) 3.5 - 5.0 g/dL    Globulin 3.8 2.0 - 4.0 g/dL    A-G Ratio 0.7 (L) 1.1 - 2.2     CBC WITH AUTOMATED DIFF    Collection Time: 06/07/22  4:11 AM   Result Value Ref Range    WBC 6.6 3.6 - 11.0 K/uL    RBC 5.07 3.80 - 5.20 M/uL    HGB 13.6 11.5 - 16.0 g/dL    HCT 41.6 35.0 - 47.0 %    MCV 82.1 80.0 - 99.0 FL    MCH 26.8 26.0 - 34.0 PG    MCHC 32.7 30.0 - 36.5 g/dL    RDW 17.1 (H) 11.5 - 14.5 %    PLATELET 718 654 - 098 K/uL    MPV 9.6 8.9 - 12.9 FL    NRBC 0.0 0  WBC    ABSOLUTE NRBC 0.00 0.00 - 0.01 K/uL    NEUTROPHILS 72 32 - 75 %    LYMPHOCYTES 15 12 - 49 %    MONOCYTES 10 5 - 13 %    EOSINOPHILS 1 0 - 7 %    BASOPHILS 1 0 - 1 %    IMMATURE GRANULOCYTES 1 (H) 0.0 - 0.5 %    ABS. NEUTROPHILS 4.8 1.8 - 8.0 K/UL    ABS. LYMPHOCYTES 1.0 0.8 - 3.5 K/UL    ABS. MONOCYTES 0.7 0.0 - 1.0 K/UL    ABS. EOSINOPHILS 0.1 0.0 - 0.4 K/UL    ABS. BASOPHILS 0.1 0.0 - 0.1 K/UL    ABS. IMM. GRANS. 0.0 0.00 - 0.04 K/UL    DF AUTOMATED     NT-PRO BNP    Collection Time: 06/07/22  4:11 AM   Result Value Ref Range    NT pro- (H) <450 PG/ML     No orders to display       Discharge: time spent 35 minutes in discharge  Education and counseling.      Signed:  Noemi Acevedo NP  6/7/2022  12:39 PM

## 2022-06-07 NOTE — ED PROVIDER NOTES
EMERGENCY DEPARTMENT HISTORY AND PHYSICAL EXAM      Date: 6/6/2022  Patient Name: Wili Vega    History of Presenting Illness     Chief Complaint   Patient presents with    Feeding Tube Problem     coming from home needs her Peg tub replaced; h/o stroke with left side deficits; it is still in place but the family wants her to have PEG tube replacement-taken to community for this yesterday but it did not get changed. History Provided By: Patient's Son    HPI: Wili Vega, 78 y.o. female with PMHx significant for prior CVA, HEENT cancer, PEG tube dependent, who presents with a PEG tube problem. Son provides her history as patient is aphasic at baseline. Apparently, 3 days ago the PEG tube ports became completely detached and the tube deteriorated. Son says he cut off the end of it as the ports were breaking down and has had it clamped. He presented with the patient to [facility] last night but was told there was nothing they could do to correct the problem as they did not have GI on staff. Son reports he has had difficulty giving the patient her tube feeds since the deterioration as it does not have a functioning port anymore. He tells me they called the GI office twice today and referred to the emergency department. PEG tube was placed about 2 years ago by Dr. Mary Webber and has never been changed previously.       PCP: Yudelka Miles MD        Current Facility-Administered Medications   Medication Dose Route Frequency Provider Last Rate Last Admin    dextrose 5% and 0.9% NaCl infusion  75 mL/hr IntraVENous CONTINUOUS Fiordaliza Lake MD        sodium chloride (NS) flush 5-40 mL  5-40 mL IntraVENous Q8H Helen Pollack MD        sodium chloride (NS) flush 5-40 mL  5-40 mL IntraVENous PRN Emilee Chang MD        acetaminophen (TYLENOL) tablet 650 mg  650 mg Oral Q6H PRN Emilee Chang MD        Or    acetaminophen (TYLENOL) suppository 650 mg  650 mg Rectal Q6H PRN Wilma Brizuela MD        [START ON 6/7/2022] polyethylene glycol (MIRALAX) packet 17 g  17 g Oral DAILY Mirza Pollack MD        bisacodyL (DULCOLAX) tablet 5 mg  5 mg Oral DAILY PRN Wilma Brizuela MD        promethazine (PHENERGAN) tablet 12.5 mg  12.5 mg Oral Q6H PRN Wilma Brizuela MD        Or    ondansetron [facility]) injection 4 mg  4 mg IntraVENous Q6H PRN Wilma Brizuela MD        [START ON 6/7/2022] enoxaparin (LOVENOX) injection 40 mg  40 mg SubCUTAneous Q24H Mirza Pollack MD        levETIRAcetam (KEPPRA) 500 mg in 0.9% sodium chloride 100 mL IVPB  500 mg IntraVENous Q12H Wilma Brizuela  mL/hr at 06/06/22 2014 500 mg at 06/06/22 2014    [START ON 6/7/2022] pantoprazole (PROTONIX) 40 mg in 0.9% sodium chloride 10 mL injection  40 mg IntraVENous DAILY Mirza Pollack MD        hydrALAZINE (APRESOLINE) 20 mg/mL injection 20 mg  20 mg IntraVENous ONCE Mirza Pollack MD         Current Outpatient Medications   Medication Sig Dispense Refill    acetaminophen (M-PAP) 160 mg/5 mL liquid TAKE 30 ML BY MOUTH EVERY 4 TO 6 AS NEEDED FOR PAIN 826 mL 3    potassium chloride (KAON 10%) 20 mEq/15 mL solution MIX 20 ML IN 4 OUNCES OF LIQUID AND DRINK TWICE AS DIRECTED 473 mL 12    famotidine (PEPCID) 40 mg/5 mL (8 mg/mL) suspension Take 5 mL by mouth two (2) times a day. 300 mL 12    atorvastatin (LIPITOR) 40 mg tablet TAKE 1 TABLET EVERY NIGHT 90 Tablet 3    Eliquis 5 mg tablet TAKE 1 TABLET TWICE DAILY 180 Tablet 3    levETIRAcetam (KEPPRA) 100 mg/mL solution TAKE 5 ML BY MOUTH TWICE DAILY 900 mL 3    amLODIPine (NORVASC) 5 mg tablet 5 mg = 1 tab each dose, PO, daily 30 Tablet 12    metoprolol succinate (TOPROL-XL) 25 mg XL tablet TAKE 1 TABLET BY MOUTH EVERY DAY 90 Tablet 3    butalbital-acetaminophen-caff (Fioricet) -40 mg per capsule Take 1 Cap by mouth every four (4) hours as needed for Headache.  (Patient not taking: Reported on 8/9/2021) 10 Cap 0    pantoprazole (PROTONIX) 40 mg granules for oral suspension Take 40 mg by mouth daily. (Patient not taking: Reported on 10/1/2020) 30 Each 3    potassium chloride (Klor-Con/25) 25 mEq pack Sig:give via peg bid (Patient not taking: Reported on 8/9/2021) 60 Packet 3    cyclobenzaprine (FLEXERIL) 10 mg tablet Take 1 Tab by mouth three (3) times daily as needed for Muscle Spasm(s). (Patient not taking: Reported on 10/1/2020) 90 Tab 0    meclizine (ANTIVERT) 25 mg tablet TAKE 1 TABLET THREE TIMES DAILY AS NEEDED (Patient not taking: Reported on 10/1/2020) 270 Tab 3    rivaroxaban (Xarelto) 15 mg tab tablet Take 1 Tab by mouth daily (with dinner). (Patient not taking: Reported on 10/1/2020) 90 Tab 3    gabapentin (NEURONTIN) 300 mg capsule Take 1 Cap by mouth three (3) times daily. (Patient not taking: Reported on 10/1/2020) 270 Cap 3    fluticasone propionate (FLONASE) 50 mcg/actuation nasal spray 2 Sprays by Both Nostrils route daily as needed for Rhinitis. (Patient not taking: Reported on 8/9/2021)      acetaminophen (TYLENOL) 325 mg tablet Take 650 mg by mouth nightly. (Patient not taking: Reported on 8/9/2021)      MULTIVIT WITH CALCIUM,IRON,MIN [facility] MULTIPLE VITAMINS PO) Take 1 Tab by mouth daily.  (Patient not taking: Reported on 8/9/2021)       Past History     Past Medical History:  Past Medical History:   Diagnosis Date    Acute pharyngitis 2/8/2011    Allergic rhinitis, cause unspecified 2/8/2011    Arrhythmia     PVC    Asymptomatic varicose veins 2/8/2011    Cancer ([facility]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     Carpal tunnel syndrome 2/8/2011    Degenerative Joint Disese ( improved/resolving) 2/8/2011    Degenetrative Dis Disease, 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    Diverticulosis of colon (without mention of hemorrhage) 2/8/2011    Dysphagia 2/8/2011    Edema, peripheral 2/8/2011    GERD (gastroesophageal reflux disease)     GERD Gastro- Esophageal Reflux Disease 2/8/2011  Herniated nucleus pulposus ( slipped disc) 2/8/2011    Menopausal 2/8/2011    PUD (peptic ulcer disease) 2012    Pure hypercholesterolemia 2/8/2011    Recurrent ear pain 2/8/2011    S/P radiation therapy     Scalp lesion 10/23/2014    Sebaceous cyst 4/22/2014    Unspecified cataract 2/8/2011    Unspecified essential hypertension 2/8/2011    Unspecified sleep apnea      Past Surgical History:  Past Surgical History:   Procedure Laterality Date    HX HYSTERECTOMY      HX ORTHOPAEDIC  neck/back 2006    HX OTHER SURGICAL  5/8/2014     Excise recurrent scalp lesion and application of ACell    HX OTHER SURGICAL  5/8/2014    Excise keloid, anterior chest wall, and application of ACell    HX OTHER SURGICAL  5/8/2014     Excise sebaceous cyst, anterior chest wall    HX OTHER SURGICAL  5/8/2014    Punch biopsy, skin lesions, right forearm x2, right calf x1    PLACE PERCUT GASTROSTOMY TUBE  7/8/2020         VT ICAR CATHETER ABLATION ATRIOVENTR NODE FUNCTION N/A 5/6/2019    ABLATION AV NODE performed by Donaldo Nicole MD at [address] CATH LAB    VT INS NEW/RPLCMT PRM PM W/TRANSV ELTRD ATRIAL&VENT  9/30/2017         VT RMVL IMPLANTABLE PT-ACTIVATED CAR EVENT RECORDER  9/30/2017          Family History:  Family History   Problem Relation Age of Onset    Hypertension Mother     Stroke Mother     Hypertension Father     Cancer Father         PROSTATE, MELANOMA    Anesth Problems Neg Hx      Social History:  Social History     Tobacco Use    Smoking status: Never Smoker    Smokeless tobacco: Never Used   Substance Use Topics    Alcohol use: No    Drug use: No     Allergies:   Allergies   Allergen Reactions    Latex Rash    Bactrim [Sulfamethoprim Ds] Hives    Aspirin Other (comments)     Anything higher than 81mg makes pt jittery    Codeine Hives and Itching    Iodinated Contrast Media Itching    Pcn [Penicillins] Hives and Itching    Tape [Adhesive] Rash     Review of Systems   Review of Systems   Unable to perform ROS: Other (aphasia (baseline))     Physical Exam   Physical Exam  Vitals and nursing note reviewed. Constitutional:       General: She is not in acute distress. Appearance: She is well-developed. HENT:      Head: Normocephalic and atraumatic. Eyes:      Conjunctiva/sclera: Conjunctivae normal.      Pupils: Pupils are equal, round, and reactive to light. Cardiovascular:      Rate and Rhythm: Normal rate and regular rhythm. Pulmonary:      Effort: Pulmonary effort is normal. No respiratory distress. Breath sounds: Normal breath sounds. No stridor. Abdominal:      General: There is no distension. Palpations: Abdomen is soft. Tenderness: There is no abdominal tenderness. Comments: PEG in L abdomen, ports missing   Musculoskeletal:         General: Normal range of motion. Cervical back: Normal range of motion. Skin:     General: Skin is warm and dry. Neurological:      Mental Status: She is alert. Mental status is at baseline. Diagnostic Study Results   Labs -     Recent Results (from the past 12 hour(s))   CBC WITH AUTOMATED DIFF    Collection Time: 06/06/22  5:43 PM   Result Value Ref Range    WBC 9.0 3.6 - 11.0 K/uL    RBC 5.78 (H) 3.80 - 5.20 M/uL    HGB 15.3 11.5 - 16.0 g/dL    HCT 46.9 35.0 - 47.0 %    MCV 81.1 80.0 - 99.0 FL    MCH 26.5 26.0 - 34.0 PG    MCHC 32.6 30.0 - 36.5 g/dL    RDW 17.7 (H) 11.5 - 14.5 %    PLATELET 836 401 - 113 K/uL    MPV 9.7 8.9 - 12.9 FL    NRBC 0.0 0  WBC    ABSOLUTE NRBC 0.00 0.00 - 0.01 K/uL    NEUTROPHILS 81 (H) 32 - 75 %    LYMPHOCYTES 10 (L) 12 - 49 %    MONOCYTES 7 5 - 13 %    EOSINOPHILS 1 0 - 7 %    BASOPHILS 1 0 - 1 %    IMMATURE GRANULOCYTES 0 0.0 - 0.5 %    ABS. NEUTROPHILS 7.3 1.8 - 8.0 K/UL    ABS. LYMPHOCYTES 0.9 0.8 - 3.5 K/UL    ABS. MONOCYTES 0.7 0.0 - 1.0 K/UL    ABS. EOSINOPHILS 0.1 0.0 - 0.4 K/UL    ABS. BASOPHILS 0.1 0.0 - 0.1 K/UL    ABS. IMM.  GRANS. 0.0 0.00 - 0.04 K/UL    DF AUTOMATED         Radiologic Studies -   No orders to display     No results found. Medical Decision Making   I am the first provider for this patient. I reviewed the vital signs, available nursing notes, past medical history, past surgical history, family history and social history. Vital Signs-Reviewed the patient's vital signs. Patient Vitals for the past 12 hrs:   Temp Pulse Resp BP SpO2   06/06/22 1531 97.6 °F (36.4 °C) 70 18 135/88 98 %       Pulse Oximetry Analysis - 98% on ra      Records Reviewed: Nursing Notes and Old Medical Records    Provider Notes (Medical Decision Making):   Patient presents with a PEG tube problem. It has been nonfunctional now for 3 days. Given that pt's son spoke with GI office twice today, will touch base with GI prior to proceeding with any attempts to replace    ED Course:   Initial assessment performed. The patients presenting problems have been discussed, and they are in agreement with the care plan formulated and outlined with them. I have encouraged them to ask questions as they arise throughout their visit. ED Course as of 06/06/22 2016 Mon Jun 06, 2022   1740 Spoke with Dr. Jaguar Polanco, GI, recs obs admit for GI replacement of PEG, possibly via endoscopy in the AM [MOAR]      ED Course User Lester Sampson MD       Procedures:  Procedures    Critical Care:  none    Disposition:    Admission Note:  Patient is being admitted to the hospital by Dr. Mike Apple, Service: Hospitalist.  The results of their tests and reasons for their admission have been discussed with them and available family. They convey agreement and understanding for the need to be admitted and for their admission diagnosis. Diagnosis     Clinical Impression:   1. PEG tube malfunction [facility])            Please note that this dictation was completed with [employer] the [employer] voice recognition software.   Quite often unanticipated grammatical, syntax, homophones, and other interpretive errors are inadvertently transcribed by the computer software. Please disregard these errors.   Please excuse any errors that have escaped final proofreading

## 2022-06-07 NOTE — PROGRESS NOTES
Transition of Care Plan:    RUR: obs   Disposition: PEG Placement (exsitenting), [facility]'d [facility], and Personal Caregivers (paid)  Follow up appointments: Follow up with PCP and/or Specialist   DME needed: pt has equipment at home   Transportation at Discharge: [facility] transport [facility] or means to access home:     Family to provide keys    IM Medicare Letter:MOON letter to be given   Is patient a BCPI-A Bundle:      CM will provide if required     If yes, was Bundle Letter given?:    Is patient a  and connected with the [address] management ? N/A             If yes, was [place] transfer form completed and [facility] notified? Caregiver Contact: Thu Vu (son) [phone]  Discharge Caregiver contacted prior to discharge? Family to be contacted-by pt's bedside  Care Conference needed?:               Not at this time      CM aware that pt will be d/c on today and will transition home with family support. CM staffed case with clinical team and it was reported that pt had PEG placement (exsistenting pt). Pt will complete tube feeding before she d/c on today. CM completed room visit with pt, with pt's son: Wesly Colon by bedside. Pt will return home with Wesly Colon at the time of d/c. Wesly Colon reported that pt has hospice services at home, provided by [facility] and paid caregivers. Wesly Colon will make contact with both agencies to arrange care at his home for pt's return home. Wesly Math reported that pt's PEG supply is provided/delivered to his home, by [employer] [facility]. Wesly Colon reported that pt will require transport at the time of d/c.  CM arrange transport on today at 4P with [facility]. CM will provide pt's nurse with transport packet info. EHSAN completed the need of the pt at this time.     MARTINA Avila, [address] [place] [address]

## 2022-06-09 ENCOUNTER — TELEPHONE (OUTPATIENT)
Dept: CASE MANAGEMENT | Age: 82
End: 2022-06-09

## 2022-06-09 NOTE — TELEPHONE ENCOUNTER
CM receive after hour consult patient was in the ER on Sunday needs follow-up Dr. Akilah Diehl at [facility] for PEG patient is bed bound will need transportation for appointment. Per chart patient was admitted on 6/6/2022 to [facility] no follow-up is needed from CM.     Kelly Russo  [phone]

## 2022-06-22 RX ORDER — METOPROLOL SUCCINATE 25 MG/1
TABLET, EXTENDED RELEASE ORAL
Qty: 90 TABLET | Refills: 3 | Status: SHIPPED | OUTPATIENT
Start: 2022-06-22 | End: 2022-06-23

## 2022-06-22 RX ORDER — LEVETIRACETAM 100 MG/ML
SOLUTION ORAL
Qty: 900 ML | Refills: 3 | Status: SHIPPED | OUTPATIENT
Start: 2022-06-22 | End: 2022-09-22 | Stop reason: SDUPTHER

## 2022-06-23 RX ORDER — METOPROLOL SUCCINATE 25 MG/1
TABLET, EXTENDED RELEASE ORAL
Qty: 90 TABLET | Refills: 3 | Status: SHIPPED | OUTPATIENT
Start: 2022-06-23 | End: 2022-09-22 | Stop reason: SDUPTHER

## 2022-07-13 RX ORDER — APIXABAN 5 MG/1
TABLET, FILM COATED ORAL
Qty: 180 TABLET | Refills: 3 | Status: SHIPPED | OUTPATIENT
Start: 2022-07-13 | End: 2022-09-22 | Stop reason: SDUPTHER

## 2022-07-19 NOTE — TELEPHONE ENCOUNTER
PATIENT IS ASKING [facility] TO REFILL PRESCRIPTION FOR POSTASSIUM CHLORIDE (KAON)10% 20 MEQ/15ML SOLUTION. PRESCRIPTION. IF PRESCRIBED, PLEASE SEND TO PHARMACY LISTED ([facility]).

## 2022-07-26 NOTE — TELEPHONE ENCOUNTER
[facility] is requesting a new prescription for the Potassium Chloride 20 meq/15 ml liquid. Pt was previously on the Klor-Con/25 meq pack. Please review and prescribe if appropriate, Thank you.      Last visit 10/18/2021 Virtual visit MD Quinn Mejia   Next appointment Nothing scheduled     For Pharmacy Admin Tracking Only    Intervention Detail: New Rx: 1, reason: Patient Preference  Time Spent (min): 5      Requested Prescriptions     Pending Prescriptions Disp Refills    potassium chloride (KAON 10%) 20 mEq/15 mL solution

## 2022-07-28 RX ORDER — POTASSIUM CHLORIDE 20MEQ/15ML
20 LIQUID (ML) ORAL DAILY
Qty: 3800 ML | Refills: 3 | Status: SHIPPED | OUTPATIENT
Start: 2022-07-28 | End: 2022-09-22 | Stop reason: SDUPTHER

## 2022-08-16 RX ORDER — ACETAMINOPHEN 160 MG/5ML
LIQUID ORAL
Qty: 826 ML | Refills: 3 | Status: SHIPPED | OUTPATIENT
Start: 2022-08-16 | End: 2022-08-28 | Stop reason: SDUPTHER

## 2022-08-18 ENCOUNTER — VIRTUAL VISIT (OUTPATIENT)
Dept: INTERNAL MEDICINE CLINIC | Age: 82
End: 2022-08-18

## 2022-08-18 ENCOUNTER — TELEPHONE (OUTPATIENT)
Dept: FAMILY MEDICINE CLINIC | Age: 82
End: 2022-08-18

## 2022-08-18 DIAGNOSIS — F01.50 VASCULAR DEMENTIA WITHOUT BEHAVIORAL DISTURBANCE (HCC): ICD-10-CM

## 2022-08-18 DIAGNOSIS — I69.359 CVA, OLD, HEMIPARESIS (HCC): ICD-10-CM

## 2022-08-18 DIAGNOSIS — I48.91 ATRIAL FIBRILLATION WITH RAPID VENTRICULAR RESPONSE (HCC): ICD-10-CM

## 2022-08-18 NOTE — TELEPHONE ENCOUNTER
[address] [address]  Phone:  [phone]        Fax:  [phone]     Patient: Sandie Urbano  YOB: 1940     Bailey at Dr. Lorena Rubio office called to refer Quintero Pancoast. Green to [facility]. She states patient is going to be discharged from Hospice soon and needs a PCP that makes home visits. This nurse called and spoke with patient's son/caregiver Pilar Jonny. He says patient is going to be discharged from [address] [place] [address] on 8/20/22. [facility] is coming on 8/22/22 to admit patient to home health. Preliminary Intake Note:      Address:   [address] [place] [address], [address] [place] [address]         Does patient live within 15 miles of [facility] office at address listed above?      yes       Distance from [facility] office/Travel Time:   12.6 miles/ 22 minutes  Region:   [employer] Information:   [employer] HMO     Does patient qualify based on address and insurance?     yes     Date of Referral:  3/18/22  Referred By:   Dr. 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 Skyler Reza    Specialists:  none due to homebound status     Last Hospitalization:   6/6/22-6/7/22  [facility] (PEG tube malfunction)     Nursing Home/Rehab stay in the past year?   no     Homebound Assessment:  requires stretcher transport     Primary Caregiver:   Pilar Fuller  Relation to Patient:   son  Contact Information:   [phone]     Records Needed:   maybe from [facility]     Current Controlled Substances:   none per     Notes:  SON SAYS HIS MOTHER HAS DEMENTIA BUT HE DOES NOT WANT THE WORD \"DEMENTIA\" SAID AROUND HER     This nurse explained that the [facility] team discusses new referrals at our weekly IDG meetings. The next meeting is scheduled for 8/23/22. This nurse will present the referral to the [facility] team on 8/23/22 and this nurse or SW will call Veronica Dez Pruett back to notify him of the decision.

## 2022-08-21 PROBLEM — I69.359 CVA, OLD, HEMIPARESIS (HCC): Status: ACTIVE | Noted: 2022-08-21

## 2022-08-21 PROBLEM — F01.50 VASCULAR DEMENTIA WITHOUT BEHAVIORAL DISTURBANCE (HCC): Status: ACTIVE | Noted: 2022-08-21

## 2022-08-23 ENCOUNTER — TELEPHONE (OUTPATIENT)
Dept: FAMILY MEDICINE CLINIC | Age: 82
End: 2022-08-23

## 2022-08-23 NOTE — TELEPHONE ENCOUNTER
[facility]   Phone:  [phone]      Fax:  [phone] [address]    Name:  Deanna Fraire  YOB: 1940    [facility] team discussed Michaelaine Arian referral in the weekly IDG meeting on 8/23/22 and patient was approved for [address] [place] [address] services. This nurse called and to speak with patient's son Gerardo Lakhani to inform him that start of care date will be 9/22/22. Elaina Fry LCSW will be contacting them to set up an appointment for initial SW assessment and to get new patient paperwork signed prior to Hira Gonsalezing NP's start of care visit. No answer, left message. Advised son to contact Dr. Chetna Nunez office for any clinical needs prior to [address] [place] [address] start of care date.     Vivienne Deng, STANTONN, RN-BC, Located within [facility]  Referral Navigator, [facility]

## 2022-08-24 ENCOUNTER — TELEPHONE (OUTPATIENT)
Dept: FAMILY MEDICINE CLINIC | Age: 82
End: 2022-08-24

## 2022-08-24 NOTE — TELEPHONE ENCOUNTER
[facility]   Phone:  [phone]      Fax:  [phone] [facility], [address] [place] [address], [address] [place] [address]    Name:  Mario Porter  YOB: 1940    This nurse returned call to patient's son Malathi Flor [phone] who called out office to advise Hospice is requesting their electric high/low bed w/ guard rails back. The patient's start of care date with [facility] is 9/22/22. Son is asking for an order for the same bed to be written so he can give the one they have back to Hospice. This nurse explained to Malathi Flor that we cannot sign orders for Ms. Landeros until our provider makes that first visit, new patient paperwork is signed, and we start assuming care. This nurse will call Dr. Quinton Zavala office and request that they order the bed. Patient's last visit with Dr. Nydia Mendoza was 10/18/21. This nurse called and spoke with Bailey in Dr. Quinton Zavala office and informed her of son's request for a hospital bed for his mother. Patient has [employer] They require orders for DME be sent to [facility]. Fax the order and demographic sheet to [phone]. Phone Number for [facility]:  [phone].        ANKUSH Valderrama, RN-BC, [facility]  Referral Navigator, [facility]

## 2022-08-24 NOTE — TELEPHONE ENCOUNTER
The patient's son, Alta Escudero is calling to advise Hospice is requesting their electric high/low bed w/ guard rails back. The patient will not be seen until 9/22. He is asking for an order for the same bed to be written so he can give the one they have back to Hospice.   # [id number]

## 2022-08-25 DIAGNOSIS — I69.359 CVA, OLD, HEMIPARESIS (HCC): Primary | ICD-10-CM

## 2022-08-28 RX ORDER — ACETAMINOPHEN 160 MG/5ML
LIQUID ORAL
Qty: 826 ML | Refills: 3 | Status: SHIPPED | OUTPATIENT
Start: 2022-08-28 | End: 2022-09-22 | Stop reason: SDUPTHER

## 2022-08-29 RX ORDER — AMLODIPINE BESYLATE 5 MG/1
TABLET ORAL
Qty: 30 TABLET | Refills: 12 | Status: SHIPPED | OUTPATIENT
Start: 2022-08-29 | End: 2022-09-22 | Stop reason: SDUPTHER

## 2022-09-02 ENCOUNTER — TELEPHONE (OUTPATIENT)
Dept: FAMILY MEDICINE CLINIC | Age: 82
End: 2022-09-02

## 2022-09-02 NOTE — TELEPHONE ENCOUNTER
LCSW called pt's son, Nakia Ordaz ([phone]), to schedule in-home visit for [facility] intake paperwork completion and initial psychosocial assessment. Phone rang, but no answer and no voicemail set up. LCSW will continue attempts to reach son and schedule this appointment prior to [facility] by Kailey Desir NP on 9/22.     MARTINA Jeffers, [place]    [place] [facility] [address] [place] [address]  (o) [phone]

## 2022-09-06 ENCOUNTER — TELEPHONE (OUTPATIENT)
Dept: FAMILY MEDICINE CLINIC | Age: 82
End: 2022-09-06

## 2022-09-06 NOTE — TELEPHONE ENCOUNTER
LCSW called pt's son to inform of [facility] date of 9/22 with Azucena Mehta NP, and to schedule initial in-home visit for completion of [facility] intake paperwork and psychosocial assessment.  LCSW spoke with Roselyn Paredes, scheduled visit for Wednesday 9/7 @ 1:00pm.     MARTINA Barraza, [address]  (h) [phone]

## 2022-09-07 ENCOUNTER — OFFICE VISIT (OUTPATIENT)
Dept: FAMILY MEDICINE CLINIC | Age: 82
End: 2022-09-07

## 2022-09-07 DIAGNOSIS — Z76.89 ENCOUNTER FOR SOCIAL WORK INTERVENTION: Primary | ICD-10-CM

## 2022-09-07 NOTE — PROGRESS NOTES
Home Based Primary Care   [phone]  Initial Social Work Assessment    Date and Time of Initial In-Home Visit: 22, 1:00pm    Reason for Assessment: Completion of [facility] intake paperwork, initial in-home psychosocial assessment    Sources of Information: Pt's son, Saud Toscano    SOCIAL HISTORY  Relationship Status:   [] Single  []   [] Significant Other  []   [x] /: First   in , Second   2 years ago (May 2020). Pt has 3 children- 2 daughters and 1 son. [] Other:     Living Circumstances: Pt lives in home with her son and his wife. She has 24/7 caregivers. Current/Type of Housing:  [] Public/subsidized housing  [] Apartment, Is there an elevator:   [] Assisted Living  [x] Private House, How many floors: 1    FINANCIAL/INSURANCE  Income per month: $5,000/month  Source of Income:    [x] Social Security   [] SSI   [] Pension   [] Employment Income   [x] Spouse income: Survivors Benefits (Social Security)   [x] senior living    Insurance Information:   [x] Medicare   [x] Medicaid   [] [employer]   [] Other:     Are you having trouble paying for things like rent, food, medications? Not at this time    Is there an agency or person who pays your bills? If yes, who? [employer] manages the finances    ENVIRONMENT CHECKLIST  Food Security: Pt has feeding tube. [employer] manages tube feedings. Problem with bed bugs, odors, pests, or pets? No, no pets in the home. Working smoke alarm? [x] Yes [] No    Difficulty getting to exits from the home? Yes, pt is bedbound, there are steps to get from ground level to the front door. Firearm Assessment:   Are there firearms in the home? [] Yes, Where are they kept? [x] No      SOCIAL SUPPORT ENVIRONMENT  Support System: Pt's son is primary support    How often do social supports visit? Pt lives with primary support. Her daughters live very close by but come to visit infrequently. How do you socialize?  Pt unable to socialize, unable to engage in conversation or interact with others    Are you involved with any community agencies? Name/Contact: No other community agencies involved at this time. Pt has 2 private duty aides. Is or has Adult Protective Services ever been involved? No    In the last 6 months, have you seen a ? Psychiatrist? If yes, they be contacted by [employer] team? No    Substance Use:   Tobacco? [] Yes [x] No    Alcohol? [] Yes, How many drinks per week: [x] No   Drugs? [] Yes, which drugs:   [x] No    Do you have an Emergency Call Device system? [] Yes, Which brand? [x] No, Are you interested in obtaining and subscribing to an Emergency Call Device system? No, pt would not be able to use an emergency call device. COGNITIVE/EMOTIONAL   Mental Status: Pt remained asleep in hospital bed for duration of today's visit    How is your memory? Poor, son reports that she does not recognize who he is, often mistaking him for her late . In the last 6 months, has anyone told you that you are forgetful? Yes    Do you receive help with ADLs? [x] Yes, Who helps you? How many hours/days? Pt has 2 private duty aides 24/7. One aide is for an out of pocket cost, the other aide is through [place]. [] No, Do you need help? TRANSPORTATION NEEDS ASSESSMENT  Can you use public transportation? [] Yes [x] No  Do you use transportation services provided by: Managed Care Organization? Community Service Resource? Yes, Medicaid O transportation. She requires stretcher transport. EMERGENCY ACTION PLAN  JAVIER reviewed the Emergency Action Plan with pt and/or family during this initial in-home Social Work assessment. JAVIER completed Emergency Numbers, reviewed the content areas of Power Loss, Natural Disasters, Clinical Emergencies, Severe Weather, Safety in the Home, and Infection Control. Patient's acuity value consider to be HIGH. ADVANCED CARE PLANNING  No advance directive on file.  Pt would not be able to complete the document due to cognitive limitations at this time. Narrative:  SW visited with pt and son in the home where they reside together, along with son's spouse. New patient assessment of psychosocial needs and social determinants of health completed. SW introduced Home Based Primary Care and Supportive Services and reviewed Emergency Action Plan booklet. Pt remained asleep in hospital bed in her bedroom for duration of today's visit. She had 2 aides at her side. Son, Sofia Fletcher, stated that pt worked for 31 years for the [employer]. Her combined income (intermediate, Social Security, survivor's benefits) is $5,000/month. Per Sofia Fletcher, she qualified for Medicaid due to a \"financial waiver\". He stated that 2 doctors in the hospital deemed it necessary for her to have 2 aides provide care at all times, having only 1 aide could lead to serious injury. He said that mom has full Medicaid benefits, including full time caregiver. The [employer] screening was done while she was in the hospital. The 2nd caregiver is an out of pocket cost. Son states that this arrangement is sustainable financially. He also provides care such as managing her feeding tube and giving medications, as the aides are not allowed to do those things per their agency policies. He stays home with mom all day. He is retired, and appears to be a dutiful son. LCSW inquired if Sofia Fletcher has siblings. He stated, \"Yeah, she gave birth to 2 others\". He reports that he has 2 sisters who live locally, one less than a mile away, but they don't visit except for \"maybe 3 times a year\". He states that his sisters' presence is stressful, and he prefers that they do not come around. He has support through [employer], his wife, and friends. He states that his goal is to keep his mother at home, out of the hospital, and out of nursing facilities.  He states that pt has \"white coat syndrome\" and alluded to a very negative experience at [facility]. Pt was recently d/c from hospice. She had been with [facility] since her stroke in 2020. She was discharged on 8/20/22. Casey Lowry expressed appreciation for the Home Based Primary Care program, as he would not be able to get his mother in and out of the house for doctors' appointments. She is bedbound and total care.      Plan:   [x] Establish therapeutic relationship through in home visits and telephonic touch points  [] Assist in optimizing levels of psychosocial function  [] Assess safety concerns and address as appropriate  [x] Assess for caregiver burden and intervene as psychosocially indicated  [] Assess patient for caregiver needs to optimize psychosocial function and safety  [] Provide information on available community resources  [] Initiate conversation regarding health care wishes   [] Assess current Advance Care Planning documents and make ACP recommendations as appropriate  [] Discuss goals of care and treatment preferences  [] Screen for mental health conditions including but not limited to anxiety, depression, and anticipatory grief  [] Offer counseling services for mental health conditions including but not limited to anxiety, depression, and anticipatory grief  [] Engage family in patient health care goals to ensure support for those goals and minimize patient/family conflict  [] Assess cultural needs of patient/family, educate interdisciplinary team and engage appropriate resources    Frequency of Social Work Follow-Up/Visits  [x] As needed  [] Bi-monthly  [] Monthly  [] Weekly  [] Other:    MARTINA Pelletier, [facility]   Home Based [address] [place] [address]  [phone] [id number]

## 2022-09-09 ENCOUNTER — TELEPHONE (OUTPATIENT)
Dept: FAMILY MEDICINE CLINIC | Age: 82
End: 2022-09-09

## 2022-09-09 NOTE — TELEPHONE ENCOUNTER
Home Based Primary Care & Supportive Services   Phone:  [phone]      Fax:  [phone] [facility], [address] [place] [address], [address] [place] [address] [place]    Name:  Phill Weiner  YOB: 1940    Incoming call from patient's son Robert Ibrahim. He would like to know if [facility] provider will sign a form for Home Care Delivered. He says patient needs more than the allowed amount of diapers and in order for Medicaid to cover the cost, a provider needs to sign for it. He also wants to know what to do to get a fully electric hospital bed rather than a semi-electric hospital bed. This nurse explained the [address] [place] [address] provider is not able to sign any orders for supplies or DME until we have established care. Our start of care visit is scheduled for 9/22/22. This nurse also explained that if patient is not in hospice, insurance will cover a semi-electric hospital bed with documentation for necessity but not an electric bed. If he wants an electric bed there will be an additional charge.           STANTON GuardadoN, RN-BC, [facility]  Referral Navigator, Home Based Primary Care & Supportive Services

## 2022-09-22 ENCOUNTER — HOME VISIT (OUTPATIENT)
Dept: FAMILY MEDICINE CLINIC | Age: 82
End: 2022-09-22
Payer: MEDICARE

## 2022-09-22 ENCOUNTER — TELEPHONE (OUTPATIENT)
Dept: FAMILY MEDICINE CLINIC | Age: 82
End: 2022-09-22

## 2022-09-22 ENCOUNTER — DOCUMENTATION ONLY (OUTPATIENT)
Dept: FAMILY MEDICINE CLINIC | Age: 82
End: 2022-09-22

## 2022-09-22 VITALS
RESPIRATION RATE: 14 BRPM | SYSTOLIC BLOOD PRESSURE: 126 MMHG | OXYGEN SATURATION: 97 % | DIASTOLIC BLOOD PRESSURE: 68 MMHG | HEART RATE: 69 BPM

## 2022-09-22 DIAGNOSIS — Z95.0 S/P CARDIAC PACEMAKER PROCEDURE: ICD-10-CM

## 2022-09-22 DIAGNOSIS — G40.909 SEIZURE DISORDER (HCC): ICD-10-CM

## 2022-09-22 DIAGNOSIS — R13.10 DYSPHAGIA, UNSPECIFIED TYPE: ICD-10-CM

## 2022-09-22 DIAGNOSIS — I25.10 CORONARY ARTERY DISEASE INVOLVING NATIVE CORONARY ARTERY OF NATIVE HEART WITHOUT ANGINA PECTORIS: ICD-10-CM

## 2022-09-22 DIAGNOSIS — I10 ESSENTIAL HYPERTENSION: ICD-10-CM

## 2022-09-22 DIAGNOSIS — F01.50 VASCULAR DEMENTIA WITHOUT BEHAVIORAL DISTURBANCE (HCC): ICD-10-CM

## 2022-09-22 DIAGNOSIS — I49.5 SSS (SICK SINUS SYNDROME) (HCC): ICD-10-CM

## 2022-09-22 DIAGNOSIS — I69.359 CVA, OLD, HEMIPARESIS (HCC): Primary | ICD-10-CM

## 2022-09-22 DIAGNOSIS — Z93.1 S/P PERCUTANEOUS ENDOSCOPIC GASTROSTOMY (PEG) TUBE PLACEMENT (HCC): ICD-10-CM

## 2022-09-22 DIAGNOSIS — I48.0 PAF (PAROXYSMAL ATRIAL FIBRILLATION) (HCC): ICD-10-CM

## 2022-09-22 DIAGNOSIS — M50.30 DEGENERATION OF CERVICAL INTERVERTEBRAL DISC: ICD-10-CM

## 2022-09-22 DIAGNOSIS — K21.9 GASTROESOPHAGEAL REFLUX DISEASE WITHOUT ESOPHAGITIS: ICD-10-CM

## 2022-09-22 PROBLEM — I49.9 CARDIAC ARRHYTHMIA: Status: ACTIVE | Noted: 2022-09-22

## 2022-09-22 LAB
ALBUMIN SERPL-MCNC: 3.2 G/DL (ref 3.5–5)
ALBUMIN/GLOB SERPL: 1 {RATIO} (ref 1.1–2.2)
ALP SERPL-CCNC: 107 U/L (ref 45–117)
ALT SERPL-CCNC: 22 U/L (ref 12–78)
ANION GAP SERPL CALC-SCNC: 7 MMOL/L (ref 5–15)
AST SERPL-CCNC: 14 U/L (ref 15–37)
BILIRUB SERPL-MCNC: 0.3 MG/DL (ref 0.2–1)
BUN SERPL-MCNC: 11 MG/DL (ref 6–20)
BUN/CREAT SERPL: 28 (ref 12–20)
CALCIUM SERPL-MCNC: 9 MG/DL (ref 8.5–10.1)
CHLORIDE SERPL-SCNC: 104 MMOL/L (ref 97–108)
CHOLEST SERPL-MCNC: 98 MG/DL
CO2 SERPL-SCNC: 25 MMOL/L (ref 21–32)
CREAT SERPL-MCNC: 0.4 MG/DL (ref 0.55–1.02)
ERYTHROCYTE [DISTWIDTH] IN BLOOD BY AUTOMATED COUNT: 16.7 % (ref 11.5–14.5)
GLOBULIN SER CALC-MCNC: 3.1 G/DL (ref 2–4)
GLUCOSE SERPL-MCNC: 114 MG/DL (ref 65–100)
HCT VFR BLD AUTO: 39.5 % (ref 35–47)
HDLC SERPL-MCNC: 48 MG/DL
HDLC SERPL: 2 {RATIO} (ref 0–5)
HGB BLD-MCNC: 12.8 G/DL (ref 11.5–16)
LDLC SERPL CALC-MCNC: 23.4 MG/DL (ref 0–100)
MCH RBC QN AUTO: 26.6 PG (ref 26–34)
MCHC RBC AUTO-ENTMCNC: 32.4 G/DL (ref 30–36.5)
MCV RBC AUTO: 82 FL (ref 80–99)
NRBC # BLD: 0 K/UL (ref 0–0.01)
NRBC BLD-RTO: 0 PER 100 WBC
PLATELET # BLD AUTO: 268 K/UL (ref 150–400)
PMV BLD AUTO: 10.6 FL (ref 8.9–12.9)
POTASSIUM SERPL-SCNC: 4.6 MMOL/L (ref 3.5–5.1)
PROT SERPL-MCNC: 6.3 G/DL (ref 6.4–8.2)
RBC # BLD AUTO: 4.82 M/UL (ref 3.8–5.2)
SODIUM SERPL-SCNC: 136 MMOL/L (ref 136–145)
TRIGL SERPL-MCNC: 133 MG/DL (ref ?–150)
VLDLC SERPL CALC-MCNC: 26.6 MG/DL
WBC # BLD AUTO: 5 K/UL (ref 3.6–11)

## 2022-09-22 PROCEDURE — 99345 HOME/RES VST NEW HIGH MDM 75: CPT | Performed by: NURSE PRACTITIONER

## 2022-09-22 PROCEDURE — 1123F ACP DISCUSS/DSCN MKR DOCD: CPT | Performed by: NURSE PRACTITIONER

## 2022-09-22 RX ORDER — LEVETIRACETAM 100 MG/ML
100 SOLUTION ORAL 2 TIMES DAILY
Qty: 120 ML | Refills: 2 | Status: SHIPPED | OUTPATIENT
Start: 2022-09-22

## 2022-09-22 RX ORDER — AMLODIPINE BESYLATE 5 MG/1
5 TABLET ORAL DAILY
Qty: 90 TABLET | Refills: 1 | Status: SHIPPED | OUTPATIENT
Start: 2022-09-22

## 2022-09-22 RX ORDER — ACETAMINOPHEN 160 MG/5ML
960 LIQUID ORAL
Qty: 473 ML | Refills: 3 | Status: SHIPPED | OUTPATIENT
Start: 2022-09-22 | End: 2022-10-18

## 2022-09-22 RX ORDER — METOPROLOL SUCCINATE 25 MG/1
25 TABLET, EXTENDED RELEASE ORAL DAILY
Qty: 90 TABLET | Refills: 1 | Status: SHIPPED | OUTPATIENT
Start: 2022-09-22

## 2022-09-22 RX ORDER — FAMOTIDINE 40 MG/5ML
40 POWDER, FOR SUSPENSION ORAL 2 TIMES DAILY
Qty: 300 ML | Refills: 5 | Status: SHIPPED | OUTPATIENT
Start: 2022-09-22

## 2022-09-22 RX ORDER — ATORVASTATIN CALCIUM 40 MG/1
40 TABLET, FILM COATED ORAL
Qty: 90 TABLET | Refills: 1 | Status: SHIPPED | OUTPATIENT
Start: 2022-09-22

## 2022-09-22 RX ORDER — POTASSIUM CHLORIDE 20MEQ/15ML
20 LIQUID (ML) ORAL 2 TIMES DAILY
Qty: 3800 ML | Refills: 1 | Status: SHIPPED | OUTPATIENT
Start: 2022-09-22

## 2022-09-22 RX ORDER — LACTOSE-REDUCED FOOD/FIBER
1 LIQUID (ML) ORAL DAILY
COMMUNITY
Start: 2021-04-09

## 2022-09-22 NOTE — PROGRESS NOTES
Home Based [address] [facility]   [id number]          NOTE: Home Based Primary Care is a PROVIDER (MD/NP) based interdisciplinary practice (RN, LCSW, Pharmacy, Dietician) for patients who cannot access (or have a taxing effort) primary / specialty medical care in an office setting. [facility] is NOT Advanced Patient Care but works with [facility] when there is a skilled need. Our MD/NPs are integral in Care Transitions; PLEASE CALL [phone] if this patient arrives in the Emergency Department or Hospital.         Date of Current Visit: 9/22/2022     SUMMARY     Paul Davila is a 78 y.o. female seen in the home today due to taxing effort to seek primary care services in the community s/t stroke with L-sided hemiparesis, vascular dementia    Patient/Family present for Current Visit:  patient, son and primary caregiver Janine Boogie  Goals of Care as stated by the patient/family is: comfort    ASSESSMENT AND PLAN       ICD-10-CM ICD-9-CM    1. CVA, old, hemiparesis ([facility]  I69.359 438.20       2. Dysphagia, unspecified type  R13.10 787.20       3. S/P percutaneous endoscopic gastrostomy (PEG) tube placement ([facility]  Z93.1 V44.1       4. Coronary artery disease involving native coronary artery of native heart without angina pectoris  I25.10 414.01 LIPID PANEL      5. Vascular dementia without behavioral disturbance ([facility])  F01.50 290.40       6. Essential hypertension  C55 336.2 METABOLIC PANEL, COMPREHENSIVE      7. PAF (paroxysmal atrial fibrillation) ([facility])  I48.0 427.31 CBC W/O DIFF      8. SSS (sick sinus syndrome) ([facility])  I49.5 427.81       9. S/P cardiac pacemaker procedure  Z95.0 V45.01       10. Gastroesophageal reflux disease without esophagitis  K21.9 530.81       11. Degenetrative Dis Disease, Cervical  M50.30 722.4       12.  Seizure disorder ([facility]  G40.909 345.90         CVA with L hemiparesis- Right MCA stroke in 2020 with an occluded right internal carotid artery from the carotid bifurcation to the [place] of [place] and further intraluminal thrombus at the right MCA bifurcation. Residual dysphagia and L hemiparesis. No movement of left leg, no intentional movement of left arm- contracts with movement of other body parts. Son providing passive ROM exercises. She is functionally dependent for total care. Continue skin protective measures such as air mattress, frequent repositioning, brief changes and zinc paste, heel protectors. Managed with Eliquis, atorvastatin. No recent bleeding events. Lipid panel and CBC obtained today. Dysphagia/ PEG tube- s/t stroke in 2020. Swallow study 7/2022 notes Severe oropharyngeal dysphagia. Patient took one swallow of pudding mixed with barium with no penetration or aspiration. 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 PEG tube in place with last exchange 6/6/22 in ED due to dysfunction. Currently receiving total nutrition, hydration, and medications through PEG (Jevity 1.2cal for total of 8 hours overnight). No recent aspiration or weight loss. HOB elevated at visit today and son maintains this overnight. CMP obtained today. CAD- Currently managed with atorvastatin, Eliquis. CBC, Lipid panel obtained today    Vascular dementia- Patient has eyes closed most of the day, occasionally opens. Does not respond to voice or questions at visit today but does verbalize spontaneously to son (unrelated to situation. Not requiring any medications for behavior. Hypertension- Well-controlled currently with amlodipine, metoprolol. A-fib/Sick sinus Syndrome/Pacemaker- Patient unable to endorse symptoms of palpitations or SOB. Medtronic pacemaker in L chest placed 9/2017. Continues with remote interrogations through office of Dr. Risa Kearney, cardiologist.    GERD- Currently managed with Pepcid via PEG. Unable to endorse heartburn symptoms at visit today, but son reports occasionally thick secretions that she is able to clear. DDD- of cervical spine per chart review. S/p C4-C7 anterior cervical fusion (prior to 2013). Occasionally complains of pain (difficult to localize) and is managed with liquid Tylenol via PEG. Team nurse discussed nonverbal signs of pain at visit today, appears comfortable at this time. She had additional pain medications while on hospice services but never required. Seizure disorder- s/t Stroke in 2020. Currently managed on Keppra 500mg BID. No seizure activity since April of 2021.     -Labs: Labs obtained today by team nurse including CBC, CMP, Lipid panel  -AMD: No AMD previously completed. She has a son and two daughters who are next of kin. Son is primary caretaker and current healthcare surrogate. Son states that she would want hospitalization if indicated, but no aggressive measures. (Son types this in phone and shows provider as not to disturb patient with discussion)  -Code status: DNR, DDNR in chart and in patient's room  -Follow up: in 1 month (10/28/22). Will need MWV, follow up flu vaccine     Number provided for mobile podiatry through [facility]  Lipid screen obtained today, Covid vaccine series completed. Flu vaccine ordered through [employer]. Health Maintenance Due   Topic Date Due    COVID-19 Vaccine (1) Never done    DTaP/Tdap/Td series (1 - Tdap) Never done    Shingrix Vaccine Age 50> (1 of 2) Never done    Medicare Yearly Exam  07/16/2020    Lipid Screen  04/07/2022    Flu Vaccine (1) 08/01/2022       I have left a SUMMARY / INSTRUCTIONS from today's visit with the patient/family in the home        HISTORY:      CHIEF COMPLAINT:    Chief Complaint   Patient presents with    Establish Care      HPI/SUBJECTIVE:      Ms. [place] is an 77 y/o F seen today to establish care with Home Based Primary Care. Visit today is joint visit with team nurse, April. Ms. Brandin Wise is currently living in a home with her son, Shivani Lemos, and daughter-in-law. She also has full-time caregivers.   She is typically in bed with eyes closed, occasionally verbalizes, and rarely opens eyes. She will intermittently address Janel Diaz by name, other times calls him by her 's name. She has vascular dementia but is not aware, son does not speak of this around her as it causes distress. She is reliant on family and caregivers for total care. She is primarily bedbound with nutrition, hydration, and medications through PEG tube. She had a right MCA stroke in 2020 with residual dysphagia and L-sided hemiparesis. Her most recent hospitalization was 6/6-6/7/22 for exchange of feeding tube due to dysfunction. She is managed on Jevity 1.2 for 8 hours per night (held during the day). She has had no apparent episodes of aspiration or pneumonia. Weight has been stable with her current regimen, son reports weight is possibly increasing. 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 She is currently managed on Lipitor and Eliquis. She also has seizure disorder s/t stroke and is currently managed on Keppra with no seizure activity since April of last year. Her other chronic medical conditions include hypertension, sick sinus syndrome s/p pacemaker, CAD, and cervical DDD. Her blood pressure is well-controlled with her current regimen of amlodipine and metoprolol. She has a Medtronic pacemaker in L chest that is checked remotely at office of cardiologist Dr. Karen Nixon. She has scabbing on her scalp due to history of skin cancer with removal of portion of her scalp. Her skin is otherwise intact. Ms. Richard Salazar graduated from hospice services in August of this year. Discussed care goals and Advanced Directives with her son who is next of kin, and he states she is DNR but would want hospitalization, no aggressive measures. We completed DDNR at visit today. Janel Diaz feels that her health has been stable recently and has no additional concerns at this time. Nurse obtained labs including CBC, CMP, TSH.     REVIEW OF SYSTEMS: Unable to complete ROS as she does not respond to questions. Per son, she has had no fevers, weight loss, rashes, hearing difficulties, dental concerns, choking, seizure activity, or tremors. She has about 2 bowel movements per day, loose with no bleeding. She is incontinent of bowel and bladder, family or aides reposition her often. PHYSICAL EXAM:     Visit Vitals  /68 (BP 1 Location: Right upper arm, BP Patient Position: Supine)   Pulse 69   Resp 14   SpO2 97%       Physical Exam  Constitutional:       General: She is not in acute distress. Appearance: She is ill-appearing. Comments: Eyes closed, does not respond to name or light touch but does spontaneously verbalize to son   HENT:      Head: Normocephalic. Comments: Scab/lesion to medial top of scalp s/t removal of cancerous cells (see photo)     Right Ear: External ear normal.      Left Ear: External ear normal.      Nose: Nose normal. No rhinorrhea. Mouth/Throat:      Comments: Does not open mouth during visit  Eyes:      Comments: Does not open eyes at visit, lacrimation noted but no discharge, surrounding redness   Neck:      Vascular: No carotid bruit. Cardiovascular:      Rate and Rhythm: Rhythm irregular. Pulses: Normal pulses. Dorsalis pedis pulses are 2+ on the right side and 2+ on the left side. Heart sounds: Normal heart sounds. No murmur heard. Comments: Pacemaker L chest  Pulmonary:      Effort: Pulmonary effort is normal. No respiratory distress. Breath sounds: No rhonchi. Comments: Intermittent expiratory wheezes present   Abdominal:      General: Abdomen is flat. Bowel sounds are normal.      Palpations: Abdomen is soft. There is no mass. Tenderness: There is no guarding. Comments: Midline PEG in place surrounded by split gauze. No surrounding redness, warmth, drainage. Tube appears patent   Musculoskeletal:         General: Swelling present.       Right lower leg: No edema. Left lower leg: No edema. Right foot: Foot drop present. Left foot: Foot drop present. Comments: Spastic paralysis of left arm and left leg. No contracture of L hand or foot noted. Swelling of left knee without warmth or erythema. Feet:      Right foot:      Skin integrity: Skin integrity normal.      Toenail Condition: Right toenails are abnormally thick. Fungal disease present. Left foot:      Skin integrity: Skin integrity normal.      Toenail Condition: Left toenails are abnormally thick. Fungal disease present. Skin:     General: Skin is warm and dry. Findings: No rash. Neurological:      Motor: Weakness present.       Comments: Unable to assess orientation or focal deficits as patient does not open eyes or verbalize          HISTORY:     Past Medical and Surgical History reviewed in [facility] on date of initial visit    Patient Active Problem List   Diagnosis Code    Dysphagia R13.10    Recurrent ear pain H92.09    Acute pharyngitis J02.9    Essential hypertension I10    Edema, peripheral R60.9    Pure hypercholesterolemia E78.00    Allergic rhinitis, cause unspecified J30.9    Diverticulosis of colon (without mention of hemorrhage) K57.30    Herniated nucleus pulposus ( slipped disc) [id number]    Degenetrative Dis Disease, 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    Carpal tunnel syndrome G56.00    Unspecified cataract H26.9    GERD Gastro- Esophageal Reflux Disease K21.9    Degenerative Joint Disese ( improved/resolving) M19.90    Menopausal N95.1    Asymptomatic varicose veins I83.90    Sebaceous cyst L72.3    Keloid L91.0    Scalp lesion L98.9    Abnormal nuclear stress test R94.39    Syncope R55    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    CAD (coronary artery disease) I25.10    S/P cardiac pacemaker procedure Z95.0    Bradycardia R00.1    SSS (sick sinus syndrome) ([facility]) I49.5    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[facility]) I97.190, I44.2    PAF (paroxysmal atrial fibrillation) ([facility] I48.0    Atrial fibrillation with rapid ventricular response (HCC) I48.91    PEG tube malfunction ([facility] K94.23    CVA, old, hemiparesis ([facility] I69.359    Vascular dementia without behavioral disturbance ([facility]) F01.50     Family History   Problem Relation Age of Onset    Hypertension Mother     Stroke Mother     Hypertension Father     Cancer Father         PROSTATE, MELANOMA    Anesth Problems Neg Hx       Social History     Tobacco Use    Smoking status: Never    Smokeless tobacco: Never   Substance Use Topics    Alcohol use: No     Allergies   Allergen Reactions    Latex Rash    Bactrim [Sulfamethoprim Ds] Hives    Aspirin Other (comments)     Anything higher than 81mg makes pt jittery    Codeine Hives and Itching    Iodinated Contrast Media Itching    Pcn [Penicillins] Hives and Itching    Tape [Adhesive] Rash      Current Outpatient Medications   Medication Sig    amLODIPine (NORVASC) 5 mg tablet TAKE 1 TABLET BY MOUTH EVERY DAY    M- mg/5 mL liquid TAKE 30 ML BY MOUTH EVERY 4 TO 6 HOURS AS NEEDED FOR PAIN    potassium chloride (KAON 10%) 20 mEq/15 mL solution Take 15 mL by mouth in the morning. Eliquis 5 mg tablet TAKE 1 TABLET TWICE DAILY    metoprolol succinate (TOPROL-XL) 25 mg XL tablet TAKE 1 TABLET BY MOUTH EVERY DAY    levETIRAcetam (KEPPRA) 100 mg/mL solution TAKE 5ML TWICE DAILY    famotidine (PEPCID) 40 mg/5 mL (8 mg/mL) suspension Take 5 mL by mouth two (2) times a day. atorvastatin (LIPITOR) 40 mg tablet TAKE 1 TABLET EVERY NIGHT    butalbital-acetaminophen-caff (Fioricet) -40 mg per capsule Take 1 Cap by mouth every four (4) hours as needed for Headache. (Patient not taking: Reported on 8/18/2022)    pantoprazole (PROTONIX) 40 mg granules for oral suspension Take 40 mg by mouth daily.  (Patient not taking: No sig reported)    potassium chloride (Klor-Con/25) 25 mEq pack Sig:give via peg bid (Patient not taking: No sig reported)    cyclobenzaprine (FLEXERIL) 10 mg tablet Take 1 Tab by mouth three (3) times daily as needed for Muscle Spasm(s). (Patient not taking: No sig reported)    meclizine (ANTIVERT) 25 mg tablet TAKE 1 TABLET THREE TIMES DAILY AS NEEDED (Patient not taking: No sig reported)    rivaroxaban (Xarelto) 15 mg tab tablet Take 1 Tab by mouth daily (with dinner). (Patient not taking: No sig reported)    gabapentin (NEURONTIN) 300 mg capsule Take 1 Cap by mouth three (3) times daily. (Patient not taking: No sig reported)    fluticasone propionate (FLONASE) 50 mcg/actuation nasal spray 2 Sprays by Both Nostrils route daily as needed for Rhinitis. (Patient not taking: No sig reported)    MULTIVIT WITH CALCIUM,IRON,MIN [facility] MULTIPLE VITAMINS PO) Take 1 Tab by mouth daily. (Patient not taking: No sig reported)     No current facility-administered medications for this visit. LAB DATA REVIEWED:     Lab Results   Component Value Date/Time    Hemoglobin A1c 5.7 (H) 07/04/2020 05:43 AM    Hemoglobin A1c (POC) 5.7 08/08/2016 03:43 PM     No results found for: MCACR, MCA1, MCA2, MCA3, MCAU, MCAU2, MCALPOCT  Lab Results   Component Value Date/Time    TSH 0.65 07/04/2020 05:43 AM     No results found for: BONIFACIO Posadas      Lab Results   Component Value Date/Time    WBC 6.6 06/07/2022 04:11 AM    HGB 13.6 06/07/2022 04:11 AM    PLATELET 708 47/70/6677 04:11 AM     Lab Results   Component Value Date/Time    Sodium 138 06/07/2022 04:11 AM    Potassium 3.4 (L) 06/07/2022 04:11 AM    Chloride 102 06/07/2022 04:11 AM    CO2 29 06/07/2022 04:11 AM    BUN 8 06/07/2022 04:11 AM    Creatinine 0.48 (L) 06/07/2022 04:11 AM    Calcium 9.3 06/07/2022 04:11 AM    Magnesium 2.1 07/04/2020 05:43 AM    Phosphorus 2.8 07/04/2020 05:43 AM      Lab Results   Component Value Date/Time    Alk.  phosphatase 108 06/07/2022 04:11 AM    Protein, total 6.6 06/07/2022 04:11 AM    Albumin 2.8 (L) 06/07/2022 04:11 AM    Globulin 3.8 06/07/2022 04:11 AM     No results found for: IRON, FE, TIBC, IBCT, PSAT, JUANITO Romero, NP

## 2022-09-22 NOTE — PROGRESS NOTES
Advance Care Planning     General Advance Care Planning (ACP) Conversation      Date of Conversation: 9/22/2022  Conducted with: Healthcare Decision Maker: Next of Kin by law (only applies in absence of a Healthcare Power of  or Legal Guardian)    Healthcare Decision Maker:     Primary Decision Maker: Donaldo Tubbs Son - [phone]    Primary Decision Maker: Shay Gerer Daughter - [phone]    Primary Decision Maker: Luis Alberto Vail - Daughter - [phone]  Click here to complete [employer] including selection of the Healthcare Decision Maker Relationship (ie \"Primary\")    Today we documented Decision Maker(s) consistent with Legal Next of Kin hierarchy.     Content/Action Overview:   Has NO ACP documents/care preferences - requested patient complete ACP documents  Reviewed DNR/DNI and patient elects DNR order - completed portable DNR form & placed order  Topics discussed: treatment goals, hospitalization preferences, resuscitation preferences, and hospice care      Length of Voluntary ACP Conversation in minutes:  <16 minutes (Non-Billable)    Ysabel Amaro NP

## 2022-09-22 NOTE — PROGRESS NOTES
Home Based Primary Care  Plan of Care    [facility] Team Members: Lewis Aguero MD; Claudetta Mulligan, FNP-C; Juan Veloz NP; Edna Grigsby, RN; Merrill Phoenix, GAMALIEL; Marce Conklin RN; Mackey Lesches, LCSW    Stacey Cody  1940 / [id number]  female    The physician has reviewed and discussed the plan of care with the interdisciplinary group on 09/27/22 and agrees. 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    Carpal tunnel syndrome G56.00    Unspecified cataract H26.9    GERD Gastro- Esophageal Reflux Disease K21.9    Degenerative Joint Disese ( improved/resolving) M19.90    Menopausal N95.1    Asymptomatic varicose veins I83.90    Sebaceous cyst L72.3    Keloid L91.0    Scalp lesion L98.9    Abnormal nuclear stress test R94.39    Syncope R55    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    CAD (coronary artery disease) I25.10    S/P cardiac pacemaker procedure Z95.0    Bradycardia R00.1    SSS (sick sinus syndrome) ([facility]) I49.5    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 female who was referred to the [facility] program due to CVA with L hemiparesis, vascular dementia. Patient discussed today for initial POC review.     Advance Care Planning:  Code status: DNR      Primary Decision Kayden [place]  [phone]    Primary Decision Maker: Aden Jones - Daughter - [phone]    Primary Decision Maker: Gamaliel Hinton - Daughter - [phone]   Advance Care Planning 8/18/2022   Patient's Healthcare Decision Maker is: Named in scanned ACP document   Confirm Advance Directive -       DME/Supplies:  Hospital Bed     Allergies   Allergen Reactions    Latex Rash    Bactrim [Sulfamethoprim Ds] Hives    Aspirin Other (comments)     Anything higher than 81mg makes pt jittery    Codeine Hives and Itching    Iodinated Contrast Media Itching    Pcn [Penicillins] Hives and Itching    Tape [Adhesive] Rash       Nutritional Requirements:   Tube feeds with G / J tube    Functional/Activity Level:  Bedbound only    Safety Measures:   Aspiration risk, Fall risk, and Self-care deficity    Acuity Level Rating: High    Current Outpatient Medications   Medication Sig    amLODIPine (NORVASC) 5 mg tablet Take 1 Tablet by mouth daily. atorvastatin (LIPITOR) 40 mg tablet Take 1 Tablet by mouth nightly. apixaban (Eliquis) 5 mg tablet Take 1 Tablet by mouth two (2) times a day. famotidine (PEPCID) 40 mg/5 mL (8 mg/mL) suspension Take 5 mL by mouth two (2) times a day. levETIRAcetam (KEPPRA) 100 mg/mL solution Take 1 mL by mouth two (2) times a day. metoprolol succinate (TOPROL-XL) 25 mg XL tablet Take 1 Tablet by mouth daily. acetaminophen (M-PAP) 160 mg/5 mL liquid Take 30 mL by mouth every six (6) hours as needed for Pain or Fever (take every 6 hours as needed for pain or fever). potassium chloride (KAON 10%) 20 mEq/15 mL solution Take 15 mL by mouth two (2) times a day. No current facility-administered medications for this visit. The Plan of Care has been initiated for within 15 days of New Visit  and reviewed and updated by the Interdisciplinary Group (IDG) as frequently as the patient condition warrants. Plan of Care by Discipline:    1.  Provider  Assessment of medication adverse effects, Reduction of polypharmacy within benefit/burden framework appropriate to age, function and disease state, Determine need for laboratory assessment based on patient disease status , Assess results of laboratory testing and adjust treatment plan as appropriate, and Create and implement disease /symptom specific plan to manage high risk conditions / symptoms    2. Nursing  Introduce Home Based Primary Care and Supportive Services, Education regarding disease state, Education for safety; falls, Education for skin care in patients with limited mobility; with focus on preventing skin breakdown, and Skin assessment in patient's with limited mobility    3. 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 Residual dysphagia and L hemiparesis. No movement of left leg, no intentional movement of left arm- contracts with movement of other body parts. Son providing passive ROM exercises. She is functionally dependent for total care. Continue skin protective measures such as air mattress, frequent repositioning, brief changes and zinc paste, heel protectors. Managed with Eliquis, atorvastatin. No recent bleeding events. Lipid panel and CBC obtained today. Dysphagia/ PEG tube- s/t stroke in 2020.   Swallow study 7/2022 notes Severe oropharyngeal dysphagia. Patient took one swallow of pudding mixed with barium with no penetration or aspiration. 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 PEG tube in place with last exchange 6/6/22 in ED due to dysfunction. Currently receiving total nutrition, hydration, and medications through PEG (Jevity 1.2cal for total of 8 hours overnight). No recent aspiration or weight loss. HOB elevated at visit today and son maintains this overnight. CMP obtained today. CAD- Currently managed with atorvastatin, Eliquis. CBC, Lipid panel obtained today    Vascular dementia- Patient has eyes closed most of the day, occasionally opens. Does not respond to voice or questions at visit today but does verbalize spontaneously to son (unrelated to situation. Not requiring any medications for behavior. Hypertension- Well-controlled currently with amlodipine, metoprolol. A-fib/Sick sinus Syndrome/Pacemaker- Patient unable to endorse symptoms of palpitations or SOB. Medtronic pacemaker in L chest placed 9/2017. Continues with remote interrogations through office of Dr. Jackie Brooks, cardiologist.    GERD- Currently managed with Pepcid via PEG. Unable to endorse heartburn symptoms at visit today, but son reports occasionally thick secretions that she is able to clear. DDD- of cervical spine per chart review. S/p C4-C7 anterior cervical fusion (prior to 2013). Occasionally complains of pain (difficult to localize) and is managed with liquid Tylenol via PEG. Team nurse discussed nonverbal signs of pain at visit today, appears comfortable at this time. She had additional pain medications while on hospice services but never required. Seizure disorder- s/t Stroke in 2020. Currently managed on Keppra 500mg BID. No seizure activity since April of 2021.     -Labs: Labs obtained today by team nurse including CBC, CMP, Lipid panel  -AMD: No AMD previously completed.   She has a son and two daughters who are next of kin. Son is primary caretaker and current healthcare surrogate. Son states that she would want hospitalization if indicated, but no aggressive measures. (Son types this in phone and shows provider as not to disturb patient with discussion)  -Code status: DNR, DDNR in chart and in patient's room  -Follow up: in 1 month (10/28/22). Will need MWV, follow up flu vaccine     Number provided for mobile podiatry through [place]  Lipid screen obtained today, Covid vaccine series completed. Flu vaccine ordered through [facility] [employer].       Health Maintenance   Topic Date Due    COVID-19 Vaccine (1) Never done    DTaP/Tdap/Td series (1 - Tdap) Never done    Shingrix Vaccine Age 50> (1 of 2) Never done    Medicare Yearly Exam  07/16/2020    Lipid Screen  04/07/2022    Flu Vaccine (1) 08/01/2022    Depression Screen  08/18/2023    Bone Densitometry (Dexa) Screening  Completed    Pneumococcal 65+ years  Completed       Estimated Visit Frequency:  Monthly Provider Visit  SW visits PRN

## 2022-09-22 NOTE — TELEPHONE ENCOUNTER
Robert Ibrahim called and said that he received a call from [facility] stating that they need a doctors orders to provide nutrition products for the patient. Yinka Delgado said they told him to relay the message that they need medical records and chart notes in updated order and gave this fax number: [phone] [address].

## 2022-09-22 NOTE — PROGRESS NOTES
Joint home visit with Corie Jane NP to establish primary care relationship    S - Patient non-verbal during this visit, interview conducted with son Richard Ortiz. O - Patient lying in bed eyes closed for entire visit, offered mild resistance in right arm when taking BP. She has left hemiparesis since CVA 2 years ago. She is dependent in all ADLs, she has a PEG tube that was recently replaced and receives Jevity 1.2 imtiaz/ml and this is supplied by [facility] along with pump and ancillaries for TF. Isidro administers feeding overnight of 800 ml of Jevity at 75 ml/hr. Patient receives 4 bottles (16.9 oz each) of free water daily in both flush and after med administration. Instructed to use Pepsi or Coke 30 ml instilled into the PEG onceor twice a week to help maintain patency and prevent build up of sludge. She currently has hospital bed from [address] [place] [address] - she was discharged from their service on 8/20. We will order replacement bed. Son adamant that he needs a fully electric bed in order to care for his mother as he has a bad back and cannot crank bed up/down. Advised that Medicare/Medicaid will only pay for semi-electric and that if he wants fully electric he would need to pay out of pocket the difference in cost - he is willing to do this. Pt also received incontinence products from [address] [place] [address]. Venipuncture x 1 in right forearm, blood sample obtained for CBC, CMP and Lipid panel. Pt tolerated with no visible reaction. Specimen taken to [facility] lab. A - /68 right upper arm. P 69 irregularly irregular R 14  O2 sat 97%, noted sonorous respirations during the visit. Son states patient occasionally snores like that.     P - Order hospital bed, provide [facility] with updated orders for TF and ancillaries

## 2022-09-23 DIAGNOSIS — R13.10 DYSPHAGIA, UNSPECIFIED TYPE: Primary | ICD-10-CM

## 2022-09-23 DIAGNOSIS — I69.359 CVA, OLD, HEMIPARESIS (HCC): Primary | ICD-10-CM

## 2022-09-23 NOTE — PROGRESS NOTES
Called son, Roselyn Paredes, to discuss results which are largely stable/improved. We will not draw labs often as they stable at this time and goals are primarily comfort.  Roselyn Paredes states understanding

## 2022-09-23 NOTE — TELEPHONE ENCOUNTER
Orders, provider visit note, demographics faxed to [facility] as requested for enteral feeds/PEG maintenance.

## 2022-10-10 ENCOUNTER — TELEPHONE (OUTPATIENT)
Dept: FAMILY MEDICINE CLINIC | Age: 82
End: 2022-10-10

## 2022-10-10 NOTE — TELEPHONE ENCOUNTER
Telephone call to son Jourdan Nix regarding faxed form from [facility] requesting orders for Amlodipine, Famotidine liquid and Metoprolol succinate refills. Jourdan Nix said that he did not authorize this switch and would like for the medications to remain with [facility]  Lab[facility] and [address] [place] [address]

## 2022-10-12 ENCOUNTER — TELEPHONE (OUTPATIENT)
Dept: FAMILY MEDICINE CLINIC | Age: 82
End: 2022-10-12

## 2022-10-12 NOTE — TELEPHONE ENCOUNTER
Second call from tremayne Lundberg stating he contacted [employer] about the hospital bed and feeding pump and was told to call [facility] about the bed. He called [facility] who told him they do not have an order from us for the bed and that patient would not qualify for a hospital bed. Raphael Grimes that this nurse will follow up with [facility]. Telephone call to [facility] [address] and spoke with their rep [place] to determine why hospital bed has not been received. [employer] [place] was able to find the hospital bed order and provider visit note that was faxed to them by this nurse on 9/23. She was unable to tell why order was not processed, she advanced to a supervisor who advised that it was accidentally attached to an old order and closed out without action. They will re-open and urgently process. Apologies were offered. Also inquired about enteral feedings and after another \"hold\" was told that they do not do enteral for [employer] but do enteral for some other states. [employer] states son should receive call soon about the hospital bed. Telephone call to tremayne Lundberg to advise of above. Suggested that he call [facility] again about enteral and if they cannot help him quickly (she has only 4 days of TF solution remaining) then contact [employer] [employer] again to find name of another vendor that participates with [employer].

## 2022-10-12 NOTE — TELEPHONE ENCOUNTER
Hernan Castellon is the patient's son. He called and said that when he went to the [address] [place] [address], [phone], to  the patient's tylenol script, it was labeled as a partial refill before 9/22/23 with enough medication for 2 doses. Sunitha Whitfield asks for a callback at [phone].

## 2022-10-12 NOTE — TELEPHONE ENCOUNTER
Telephone call to son Heraclio Patel to advise that our office sent rx for MPAP liquid tylenol 473 ml bottles with 3 refills. Advised that if pharmacy provided a \"partial fill\" that it usually means that pharmacy did not have enough of the med on hand and had to order it. Advised son that he should deal directly with pharmacy for this issue. Son also states that they are calling him daily to  the [facility] hospital bed and feeding pump. Advised that we faxed order for bed to [address] [place] [address] on 9/23 and order for feeding pump, tube feeding and ancillary supplies for feeding to [facility] on 9/23 as well. Advised son he should follow up with [facility] [employer] insurance to determine what is holding up the delivery.

## 2022-10-12 NOTE — TELEPHONE ENCOUNTER
Call received from [facility] represent ant to inform that patient's insurance has denied the Hospital Bed order, and that is the reason why the order was not processed in September. I informed her that the patient is bed bound and needs a bed, but was informed that there's nothing that [facility] can do, as this is an insurance issue. Per idania cain, the patient/caregiver should have received a denial note from insurance explaining why the bed is not covered. I asked if she had any additional information for this and was told there was no additional information on file.

## 2022-10-13 ENCOUNTER — TELEPHONE (OUTPATIENT)
Dept: FAMILY MEDICINE CLINIC | Age: 82
End: 2022-10-13

## 2022-10-13 NOTE — TELEPHONE ENCOUNTER
Telephone call from son Jam Rashid stating that he just spoke to [facility] about denial of hospital bed for his mother. He states [employer] says we should send another order for the bed and an appeal letter marked urgent. They did not say where to send the appeal letter and order. Matias Wilks that we are happy to send an urgent appeal of denial but need to know what elements to include in the letter and where it should be faxed. Jam Rashid will call [facility] and return call to this nurse.

## 2022-10-13 NOTE — TELEPHONE ENCOUNTER
Telephone call to son Mario Hastings, no answer, left detailed message on his personal voice mail explaining that [employer] has denied the hospital bed for his mother.

## 2022-10-13 NOTE — TELEPHONE ENCOUNTER
Call from Lore Murphy who reports he has again spoken with [facility] and was told to have this nurse call [phone] and directly order the bed. Telephone call to the number provided which was a call center, there was great difficulty communicating with the representative who identified herself as Ret. She could not understand the issue of hospital bed denial nor was she able to advance the call to a supervisor, she repeatedly asked me for a procedure code and could not move forward without one. I repeatedly asked to speak to a supervisor so after 23 minutes of no progress I ended the call. Order for DME sent to [facility] and signed by [facility] NP. Order again faxed to [facility] as this is the plan's identified way to order DME for their patients. Telephone call to son Lore Murphy, left detailed message on his personal cell regarding the above documented call and actions taken. Jeimy Levi that our office is not able to further pursue this issue and he will have to go again to his insurance company if none of our actions result in a bed delivery.

## 2022-10-18 ENCOUNTER — TELEPHONE (OUTPATIENT)
Dept: FAMILY MEDICINE CLINIC | Age: 82
End: 2022-10-18

## 2022-10-18 DIAGNOSIS — I69.359 CVA, OLD, HEMIPARESIS (HCC): Primary | ICD-10-CM

## 2022-10-18 RX ORDER — ACETAMINOPHEN 160 MG/5ML
LIQUID ORAL
Qty: 473 ML | Refills: 3 | Status: SHIPPED | OUTPATIENT
Start: 2022-10-18

## 2022-10-18 NOTE — TELEPHONE ENCOUNTER
New order entered for semi electric hospital bed and faxed to [address] [place] [address]. Will not provide letter of medical necessity for fully electric hospital bed as this was a convenience that son requested and was willing to pay out of pocket for, but was not medically necessary.

## 2022-10-18 NOTE — TELEPHONE ENCOUNTER
Madeline French called from [facility] [address] and said that they need a letter of medical necessity for the fully electric bed, as to why the patient need this. If the semi electric bed is ok (covered), they need a new order for the semi electric bed. Their fax is [phone].   Callback # [phone]

## 2022-10-24 ENCOUNTER — TELEPHONE (OUTPATIENT)
Dept: FAMILY MEDICINE CLINIC | Age: 82
End: 2022-10-24

## 2022-10-24 RX ORDER — BUTENAFINE HYDROCHLORIDE 10 MG/G
CREAM TOPICAL DAILY
Qty: 30 G | Refills: 1 | Status: SHIPPED | OUTPATIENT
Start: 2022-10-24 | End: 2022-11-07

## 2022-10-24 NOTE — TELEPHONE ENCOUNTER
Call returned to [facility] to follow-up on skin condition. Patient unable to clearly express symptoms of pruritis vs pain but stated she was \"not having a good day. \"  [facility] provided photos of skin condition to this provider. Splotches are beneath panniculus bilaterally with moist erythematous red-brown patches with some central clearing. He reports the antifungal on hand has not been effective nor has steroid preparations. Per photo, no s/s of secondary cellulitis, no drainage or crusting. Due to some central clearing and lack of efficacy of current antifungal, suspect dermatophyte vs candidal involvement. Sent butenafine 1% cream to requested pharmacy for daily application. Follow-up visit already scheduled 10/28/22 and will reassess and obtain photos for record at that time.

## 2022-10-24 NOTE — TELEPHONE ENCOUNTER
Patient's son, Joanne Frankel called to report patient is presenting with red spots on her skin \"splotches\". These are located on patient's thighs and abdomen (where the diapers are)- son reports it started aproximatelly 2 days ago, and he believes it is getting worse. No pruritus noted (but patient is unable to reach some of the areas, and she has limited movement s/p CVA). Joanne Frankel denies changes in habits, such as using any new products, soaps, lotions, etc.  Patient has issues with lose BMs, but it was formed last night per son. Family has used antifungal cream on the first day, and cortisone cream last night (son doesn't think these have worked). Son wanted to send pictures, but unable to use \"my chart\" to do so.

## 2022-10-28 ENCOUNTER — HOME VISIT (OUTPATIENT)
Dept: FAMILY MEDICINE CLINIC | Age: 82
End: 2022-10-28
Payer: MEDICARE

## 2022-10-28 VITALS
HEART RATE: 70 BPM | OXYGEN SATURATION: 95 % | DIASTOLIC BLOOD PRESSURE: 74 MMHG | RESPIRATION RATE: 20 BRPM | SYSTOLIC BLOOD PRESSURE: 138 MMHG | TEMPERATURE: 98.5 F

## 2022-10-28 DIAGNOSIS — I49.5 SSS (SICK SINUS SYNDROME) (HCC): ICD-10-CM

## 2022-10-28 DIAGNOSIS — Z93.1 S/P PERCUTANEOUS ENDOSCOPIC GASTROSTOMY (PEG) TUBE PLACEMENT (HCC): ICD-10-CM

## 2022-10-28 DIAGNOSIS — G40.909 SEIZURE DISORDER (HCC): ICD-10-CM

## 2022-10-28 DIAGNOSIS — K21.9 GASTROESOPHAGEAL REFLUX DISEASE WITHOUT ESOPHAGITIS: ICD-10-CM

## 2022-10-28 DIAGNOSIS — R13.10 DYSPHAGIA, UNSPECIFIED TYPE: ICD-10-CM

## 2022-10-28 DIAGNOSIS — L30.4 ERYTHEMA INTERTRIGO: ICD-10-CM

## 2022-10-28 DIAGNOSIS — Z00.00 ENCOUNTER FOR MEDICARE ANNUAL WELLNESS EXAM: ICD-10-CM

## 2022-10-28 DIAGNOSIS — I10 ESSENTIAL HYPERTENSION: ICD-10-CM

## 2022-10-28 DIAGNOSIS — I69.359 CVA, OLD, HEMIPARESIS (HCC): Primary | ICD-10-CM

## 2022-10-28 DIAGNOSIS — I48.0 PAF (PAROXYSMAL ATRIAL FIBRILLATION) (HCC): ICD-10-CM

## 2022-10-28 DIAGNOSIS — I25.10 CORONARY ARTERY DISEASE INVOLVING NATIVE CORONARY ARTERY OF NATIVE HEART WITHOUT ANGINA PECTORIS: ICD-10-CM

## 2022-10-28 DIAGNOSIS — F01.50 VASCULAR DEMENTIA WITHOUT BEHAVIORAL DISTURBANCE (HCC): ICD-10-CM

## 2022-10-28 PROCEDURE — 99350 HOME/RES VST EST HIGH MDM 60: CPT | Performed by: NURSE PRACTITIONER

## 2022-10-28 PROCEDURE — 1123F ACP DISCUSS/DSCN MKR DOCD: CPT | Performed by: NURSE PRACTITIONER

## 2022-10-28 PROCEDURE — 3078F DIAST BP <80 MM HG: CPT | Performed by: NURSE PRACTITIONER

## 2022-10-28 PROCEDURE — 3074F SYST BP LT 130 MM HG: CPT | Performed by: NURSE PRACTITIONER

## 2022-10-28 PROCEDURE — G0439 PPPS, SUBSEQ VISIT: HCPCS | Performed by: NURSE PRACTITIONER

## 2022-10-28 NOTE — PROGRESS NOTES
Home Based [address] [facility]   [phone]          NOTE: Home Based Primary Care is a PROVIDER (MD/NP) based interdisciplinary practice (RN, LCSW, Pharmacy, Dietician) for patients who cannot access (or have a taxing effort) primary / specialty medical care in an office setting. [facility] is NOT [employer] but works with [facility] when there is a skilled need. Our MD/NPs are integral in Care Transitions; PLEASE CALL [phone] if this patient arrives in the Emergency Department or Hospital.         Date of Current Visit: 10/28/2022     SUMMARY     Naa Foreman is a 78 y.o. female seen in the home today due to taxing effort to seek primary care services in the community s/t stroke with L-sided hemiparesis, vascular dementia    Patient/Family present for Current Visit:  patient, son and primary caregiver Kevinguero Tonia  Goals of Care as stated by the patient/family is: comfort    ASSESSMENT AND PLAN       ICD-10-CM ICD-9-CM    1. CVA, old, hemiparesis ([facility]  I69.359 438.20       2. Dysphagia, unspecified type  R13.10 787.20       3. S/P percutaneous endoscopic gastrostomy (PEG) tube placement ([facility]  Z93.1 V44.1       4. Coronary artery disease involving native coronary artery of native heart without angina pectoris  I25.10 414.01       5. Vascular dementia without behavioral disturbance ([facility])  F01.50 290.40       6. Essential hypertension  I10 401.9       7. PAF (paroxysmal atrial fibrillation) ([facility])  I48.0 427.31       8. SSS (sick sinus syndrome) ([facility])  I49.5 427.81       9. Gastroesophageal reflux disease without esophagitis  K21.9 530.81       10. Seizure disorder ([facility]  G40.909 345.90       11. Encounter for Medicare annual wellness exam  Z00.00 V70.0       12.  Erythema intertrigo  L30.4 695.89         CVA with L hemiparesis- Right MCA stroke in 2020 with an occluded right internal carotid artery from the carotid bifurcation to the [place] and further intraluminal thrombus at the right MCA bifurcation. Residual dysphagia and L hemiparesis. No movement of left leg, no intentional movement of left arm, slightly contracted. Son providing passive ROM exercises. She is functionally dependent for total care. Continue skin protective measures such as air mattress, frequent repositioning, brief changes and zinc paste, heel protectors. Managed with Eliquis, atorvastatin. No recent bleeding events. Lipid panel and CBC obtained 9/22/22 and WNL. Dysphagia/ PEG tube- s/t stroke in 2020. Swallow study 7/2022 notes Severe oropharyngeal dysphagia. Patient took one swallow of pudding mixed with barium with no penetration or aspiration. 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 PEG tube in place with last exchange 6/6/22 in ED due to dysfunction, no issues since exchange. Currently receiving total nutrition, hydration, and medications through PEG (Jevity 1.2cal for total of 8 hours overnight). No recent aspiration or weight loss. HOB elevated at visit today and son maintains this overnight. CMP obtained 9/22/22 with albumin slightly low but improved from previous. CAD- Currently managed with atorvastatin, Eliquis. CBC, Lipid panel obtained 9/22/22 and WNL    Vascular dementia- Patient has eyes closed during visit today and at previous visit. Per son, she is able to verbalize pain or itching. She can typically answer simples yes/no questions but \"some days yes means no and no means yes. \"  She is not cooperative through exam, but does vocalize is response to son. Not requiring any medications for behavior, sleeping well overnight. Hypertension- Well-controlled currently with amlodipine, metoprolol. Modest goal <150/90 due to age and goal of care comfort. A-fib/Sick sinus Syndrome/Pacemaker- Patient unable to endorse symptoms of palpitations or SOB. Medtronic pacemaker in L chest placed 9/2017.   Continues with remote interrogations through office of Dr. Rafael Horta, cardiologist.    GERD- Currently managed with Pepcid via PEG. Unable to endorse heartburn symptoms at visit today, but son reports occasionally thick secretions that she is able to clear, he attributes to heartburn. No worsening cough, c/o CP. DDD- of cervical spine per chart review. S/p C4-C7 anterior cervical fusion (prior to 2013). Occasionally complains of pain (difficult to localize) and is managed with liquid Tylenol via PEG. Appears comfortable at visit today. She had additional pain medications while on hospice services but never required. Seizure disorder- s/t Stroke in 2020. Currently managed on Keppra 500mg BID. No seizure activity since April of 2021. Medicare Wellness Visit- Completed today (see separate note)  Intertrigo: Reported to office on 10/24/22. Splotches are beneath panniculus bilaterally with moist erythematous red-brown patches with some central clearing. He reports the antifungal on hand (suspect nystatin) has not been effective nor has hydrocortisone. No open areas, drainage, or crusting. Due to some central clearing and lack of efficacy of current antifungal, suspected  dermatophyte vs candidal involvement. Started butenafine 1% cream on 10/25/22 with significant improvement today. Similar episodes have occurred L antecubital fossa (due to contraction of arm), and we discussed OK to use in that areas as well.     -Labs: Labs obtained 9/22/22 including CBC, CMP, Lipid panel and stable (reviewed with son Cristofer Delacruz today)  -AMD: No AMD previously completed. She has a son and two daughters who are next of kin. Son is primary caretaker and current healthcare surrogate. Son states that she would want hospitalization if indicated, but no aggressive measures.   (Son types this in phone and shows provider as not to disturb patient with discussion)  -Code status: DNR, DDNR in chart and in patient's room  -Follow up: in 5 weeks (12/5/22) Number provided for mobile podiatry through [place], scheduled visit in Dec   Covid vaccine series completed. Flu vaccine completed since previous visit (10/21/22)  Health Maintenance Due   Topic Date Due    DTaP/Tdap/Td series (1 - Tdap) Never done    Shingrix Vaccine Age 50> (1 of 2) Never done    Medicare Yearly Exam  07/16/2020       I have left a SUMMARY / INSTRUCTIONS from today's visit with the patient/family in the home          HISTORY:      CHIEF COMPLAINT:    Chief Complaint   Patient presents with    Follow-up    Rash    Stroke    Annual Wellness Visit             HPI/SUBJECTIVE:      Ms. Miguel Ángel Dominguez is an 77 y/o F seen today for follow-up of chronic medical conditions and Medicare Wellness Visit. Ms. Missy Pablo is lives in a home with her son, Damion Hale, and daughter-in-law as well as full-time caregivers. She is typically in bed with eyes closed but responds to verbal stimuli. She does not open eyes for visit today or respond to this provider, but she does vocalize in response to Damion Hale. She has vascular dementia but is not aware, son does not speak of this around her as it causes distress. She is reliant on family and caregivers for total care. She is primarily bedbound with nutrition, hydration, and medications through PEG tube. She had a right MCA stroke in 2020 with residual dysphagia and L-sided hemiparesis. Her most recent hospitalization was 6/6-6/7/22 for exchange of feeding tube due to dysfunction. Since exchange, she has had no further issues with PEG. She is managed on Jevity 1.2 for 8 hours per night (held during the day). She has had no apparent episodes of aspiration or pneumonia, no weight loss. HOB is elevated except during hygiene. 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 She is currently managed on Lipitor and Eliquis. She also has seizure disorder s/t stroke and is currently managed on Keppra with no seizure activity since April 2021.        Her other chronic medical conditions include hypertension, sick sinus syndrome s/p pacemaker, CAD, and cervical DDD. Her blood pressure is well-controlled with her current regimen of amlodipine and metoprolol. She has a Medtronic pacemaker in L chest that is checked remotely at office of cardiologist Dr. Mely Pickard. She has scabbing on her scalp due to history of skin cancer with removal of portion of her scalp. Scalp visualized today with some ongoing scabbing, no drainage or s/s of infection. Her skin is otherwise intact. Astrid Shultz reported rash in abdominal panus on 10/24/22. He has since started butenafine cream with significant improvement. No rash is apparent today. Ms. Carissa Alberts graduated from hospice services in August of this year. Per son who is next of kin, she is DNR and goals remain primarily comfort. He would want hospitalization if indicated, but no aggressive measures. We completed DDNR at visit today. Astrid Shultz feels that her health has been stable recently and has no additional concerns at this time. We discussed lab results from previous visit with no concerns. We completed Medicare Wellness Visit today. Isidro's primary questions is if there is anything else he should be doing for his mother. We discussed he and caregivers are providing excellent care as evidenced by her comfort level, skin condition. Discussed s/s of infection (pneumonia or UTI) as these are common complications of dementia and immobility and he stated understanding. REVIEW OF SYSTEMS:     Unable to complete ROS as she does not respond to questions. Per son, she has had no fevers, weight loss, changes in behaviors or sleeping habits, dental concerns, choking, seizure activity, or tremors. She has about 2 bowel movements per day, loose with no bleeding. She is incontinent of bowel and bladder, family or aides reposition her often.       PHYSICAL EXAM:     Visit Vitals  /74 (BP 1 Location: Left upper arm, BP Patient Position: Lying)   Pulse 70 Temp 98.5 °F (36.9 °C) (Temporal)   Resp 20   SpO2 95%       Physical Exam  Constitutional:       General: She is not in acute distress. Appearance: She is ill-appearing. Comments: Eyes closed, does not respond to name or light touch but does spontaneously verbalize to son   HENT:      Head: Normocephalic. Comments: Scab/lesion to medial top of scalp s/t removal of cancerous cells . No open areas or drainage, no surrounding redness or erythema     Right Ear: External ear normal.      Left Ear: External ear normal.      Nose: Nose normal. No rhinorrhea. Mouth/Throat:      Comments: Does not open mouth during visit  Eyes:      Comments: Does not open eyes at visit, lacrimation noted but no discharge, surrounding redness   Neck:      Vascular: No carotid bruit. Cardiovascular:      Rate and Rhythm: Rhythm irregular. Pulses: Normal pulses. Dorsalis pedis pulses are 2+ on the right side and 2+ on the left side. Heart sounds: Normal heart sounds. No murmur heard. Comments: Pacemaker L chest  Pulmonary:      Effort: Pulmonary effort is normal. No respiratory distress. Breath sounds: No rhonchi. Comments: Auscultation limited by inability to follow commands for deep breath  Abdominal:      General: Abdomen is flat. Bowel sounds are normal.      Palpations: Abdomen is soft. There is no mass. Tenderness: There is no guarding. Comments: Midline PEG in place surrounded by split gauze. No surrounding redness, warmth, drainage. Tube appears patent   Musculoskeletal:         General: Swelling present. Right lower leg: No edema. Left lower leg: No edema. Right foot: Foot drop present. Left foot: Foot drop present. Comments: Spastic paralysis of left arm and left leg. No contracture of L hand or foot noted. Swelling of left knee without warmth or erythema.     Feet:      Right foot:      Skin integrity: Skin integrity normal.      Toenail Condition: Right toenails are abnormally thick. Fungal disease present. Left foot:      Skin integrity: Skin integrity normal.      Toenail Condition: Left toenails are abnormally thick. Fungal disease present. Skin:     General: Skin is warm and dry. Findings: No rash. Comments: Cream in abdominal panus and under breasts bilaterally. Hyperpigmentation in LAC due to previous rashes   Neurological:      Motor: Weakness present.       Comments: Unable to assess orientation or focal deficits as patient does not open eyes or verbalize        HISTORY:     Past Medical and Surgical History reviewed in [facility] on date of initial visit    Patient Active Problem List   Diagnosis Code    Dysphagia R13.10    Recurrent ear pain H92.09    Acute pharyngitis J02.9    Essential hypertension I10    Edema, peripheral R60.9    Pure hypercholesterolemia E78.00    Allergic rhinitis, cause unspecified J30.9    Diverticulosis of colon (without mention of hemorrhage) K57.30    Herniated nucleus pulposus ( slipped disc) [id number]    Degenetrative Dis Disease, 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    Carpal tunnel syndrome G56.00    Unspecified cataract H26.9    GERD Gastro- Esophageal Reflux Disease K21.9    Degenerative Joint Disese ( improved/resolving) M19.90    Menopausal N95.1    Asymptomatic varicose veins I83.90    Sebaceous cyst L72.3    Keloid L91.0    Scalp lesion L98.9    Abnormal nuclear stress test R94.39    Syncope R55    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    CAD (coronary artery disease) I25.10    S/P cardiac pacemaker procedure Z95.0    Bradycardia R00.1    SSS (sick sinus syndrome) ([facility]) I49.5    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[facility]) I97.190, I44.2    PAF (paroxysmal atrial fibrillation) ([facility]) I48.0    Atrial fibrillation with rapid ventricular response ([facility]) I48.91    PEG tube malfunction ([facility]) K94.23    CVA, old, hemiparesis ([address] A30.540    Vascular dementia without behavioral disturbance ([facility]) F01.50    Cardiac arrhythmia I49.9    Squamous cell carcinoma of scalp C44.42     Family History   Problem Relation Age of Onset    Hypertension Mother     Stroke Mother     Hypertension Father     Cancer Father         PROSTATE, MELANOMA    Anesth Problems Neg Hx       Social History     Tobacco Use    Smoking status: Never    Smokeless tobacco: Never   Substance Use Topics    Alcohol use: No     Allergies   Allergen Reactions    Latex Rash    Bactrim [Sulfamethoprim Ds] Hives    Aspirin Other (comments)     Anything higher than 81mg makes pt jittery    Clindamycin Rash    Codeine Hives and Itching    Diltiazem Other (comments)    Iodinated Contrast Media Itching    Pcn [Penicillins] Hives and Itching    Tape [Adhesive] Rash      Current Outpatient Medications   Medication Sig    butenafine (MENTAX) 1 % topical cream Apply  to affected area daily for 14 days. M- mg/5 mL liquid TAKE 30 ML BY MOUTH EVERY 6 HOURS AS NEEDED FOR PAIN OR FEVER    amLODIPine (NORVASC) 5 mg tablet Take 1 Tablet by mouth daily. atorvastatin (LIPITOR) 40 mg tablet Take 1 Tablet by mouth nightly. apixaban (Eliquis) 5 mg tablet Take 1 Tablet by mouth two (2) times a day. famotidine (PEPCID) 40 mg/5 mL (8 mg/mL) suspension Take 5 mL by mouth two (2) times a day. levETIRAcetam (KEPPRA) 100 mg/mL solution Take 1 mL by mouth two (2) times a day. metoprolol succinate (TOPROL-XL) 25 mg XL tablet Take 1 Tablet by mouth daily. potassium chloride (KAON 10%) 20 mEq/15 mL solution Take 15 mL by mouth two (2) times a day. lactose-reduced food with fibr (Jevity 1.2 [place]) 0.06 gram-1.2 kcal/mL liqd 1 Dose by PEG Tube route daily. Jevity 1.2 lamont 75ml/hr over 8 hours with free water flushes 4 times daily     No current facility-administered medications for this visit.         LAB DATA REVIEWED:     Lab Results   Component Value Date/Time    Hemoglobin A1c 5.7 (H) 07/04/2020 05:43 AM    Hemoglobin A1c (POC) 5.7 08/08/2016 03:43 PM     No results found for: MCACR, MCA1, MCA2, MCA3, MCAU, MCAU2, MCALPOCT  Lab Results   Component Value Date/Time    TSH 0.65 07/04/2020 05:43 AM     No results found for: Angel Epley, [id number]      Lab Results   Component Value Date/Time    WBC 5.0 09/22/2022 10:50 AM    HGB 12.8 09/22/2022 10:50 AM    PLATELET 351 29/48/2862 10:50 AM     Lab Results   Component Value Date/Time    Sodium 136 09/22/2022 10:50 AM    Potassium 4.6 09/22/2022 10:50 AM    Chloride 104 09/22/2022 10:50 AM    CO2 25 09/22/2022 10:50 AM    BUN 11 09/22/2022 10:50 AM    Creatinine 0.40 (L) 09/22/2022 10:50 AM    Calcium 9.0 09/22/2022 10:50 AM    Magnesium 2.1 07/04/2020 05:43 AM    Phosphorus 2.8 07/04/2020 05:43 AM      Lab Results   Component Value Date/Time    Alk.  phosphatase 107 09/22/2022 10:50 AM    Protein, total 6.3 (L) 09/22/2022 10:50 AM    Albumin 3.2 (L) 09/22/2022 10:50 AM    Globulin 3.1 09/22/2022 10:50 AM     No results found for: IRON, FE, TIBC, IBCT, PSAT, FERR             Lechuga Alpesh, ANAMARIA

## 2022-10-28 NOTE — PROGRESS NOTES
Date of visit: 10/28/2022     This is an Subsequent Medicare Annual Wellness Visit (AWV), (Performed more than 12 months after effective date of Medicare Part B enrollment and 12 months after last preventive visit, Once in a lifetime)    I have reviewed the patient's medical history in detail and updated the computerized patient record. History obtained from: the patient. Concerns today   (Patient understands that medical problems addressed today may incur additional cost as this is a preventive visit)  -See separate note for management of chronic conditions.     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    Carpal tunnel syndrome G56.00    Unspecified cataract H26.9    GERD Gastro- Esophageal Reflux Disease K21.9    Degenerative Joint Disese ( improved/resolving) M19.90    Menopausal N95.1    Asymptomatic varicose veins I83.90    Sebaceous cyst L72.3    Keloid L91.0    Scalp lesion L98.9    Abnormal nuclear stress test R94.39    Syncope R55    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    CAD (coronary artery disease) I25.10    S/P cardiac pacemaker procedure Z95.0    Bradycardia R00.1    SSS (sick sinus syndrome) ([facility]) I49.5    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[facility]) 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, I44.2    PAF (paroxysmal atrial fibrillation) ([facility]) I48.0    Atrial fibrillation with rapid ventricular response ([facility]) I48.91    PEG tube malfunction ([facility]) K94.23    CVA, old, hemiparesis ([facility]) S79.051    Vascular dementia without behavioral disturbance ([facility]) F01.50    Cardiac arrhythmia I49.9    Squamous cell carcinoma of scalp C44.42     Past Medical History:   Diagnosis Date    Acute pharyngitis 2/8/2011    Allergic rhinitis, cause unspecified 2/8/2011    Arrhythmia     PVC    Asymptomatic varicose veins 2/8/2011    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     Carpal tunnel syndrome 2/8/2011    Degenerative Joint Disese ( improved/resolving) 2/8/2011    Degenetrative Dis Disease, 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    Diverticulosis of colon (without mention of hemorrhage) 2/8/2011    Dysphagia 2/8/2011    Edema, peripheral 2/8/2011    GERD (gastroesophageal reflux disease)     GERD Gastro- Esophageal Reflux Disease 2/8/2011    Herniated nucleus pulposus ( slipped disc) 2/8/2011    Menopausal 2/8/2011    PUD (peptic ulcer disease) 2012    Pure hypercholesterolemia 2/8/2011    Recurrent ear pain 2/8/2011    S/P radiation therapy     Scalp lesion 10/23/2014    Sebaceous cyst 4/22/2014    Unspecified cataract 2/8/2011    Unspecified essential hypertension 2/8/2011    Unspecified sleep apnea       Past Surgical History:   Procedure Laterality Date    HX HYSTERECTOMY      HX ORTHOPAEDIC  neck/back 2006    HX OTHER SURGICAL  5/8/2014     Excise recurrent scalp lesion and application of ACell    HX OTHER SURGICAL  5/8/2014    Excise keloid, anterior chest wall, and application of ACell    HX OTHER SURGICAL  5/8/2014     Excise sebaceous cyst, anterior chest wall    HX OTHER SURGICAL  5/8/2014    Punch biopsy, skin lesions, right forearm x2, right calf x1    PLACE PERCUT GASTROSTOMY TUBE  7/8/2020         MD ICAR CATHETER ABLATION ATRIOVENTR NODE FUNCTION N/A 5/6/2019    ABLATION AV NODE performed by Heriberto Garcia MD at [address] CATH LAB    MD INS NEW/RPLCMT PRM PM W/TRANSV ELTRD ATRIAL&VENT  9/30/2017         MD RMVL IMPLANTABLE PT-ACTIVATED CAR EVENT RECORDER  9/30/2017          Allergies   Allergen Reactions    Latex Rash    Bactrim [Sulfamethoprim Ds] Hives    Aspirin Other (comments)     Anything higher than 81mg makes pt jittery    Clindamycin Rash    Codeine Hives and Itching    Diltiazem Other (comments)    Iodinated Contrast Media Itching    Pcn [Penicillins] Hives and Itching    Tape [Adhesive] Rash     Current Outpatient Medications   Medication Sig Dispense Refill    butenafine (MENTAX) 1 % topical cream Apply  to affected area daily for 14 days. 30 g 1    M- mg/5 mL liquid TAKE 30 ML BY MOUTH EVERY 6 HOURS AS NEEDED FOR PAIN OR FEVER 473 mL 3    amLODIPine (NORVASC) 5 mg tablet Take 1 Tablet by mouth daily. 90 Tablet 1    atorvastatin (LIPITOR) 40 mg tablet Take 1 Tablet by mouth nightly. 90 Tablet 1    apixaban (Eliquis) 5 mg tablet Take 1 Tablet by mouth two (2) times a day. 180 Tablet 1    famotidine (PEPCID) 40 mg/5 mL (8 mg/mL) suspension Take 5 mL by mouth two (2) times a day. 300 mL 5    levETIRAcetam (KEPPRA) 100 mg/mL solution Take 1 mL by mouth two (2) times a day. 120 mL 2    metoprolol succinate (TOPROL-XL) 25 mg XL tablet Take 1 Tablet by mouth daily. 90 Tablet 1    potassium chloride (KAON 10%) 20 mEq/15 mL solution Take 15 mL by mouth two (2) times a day. 3800 mL 1    lactose-reduced food with fibr (Jevity 1.2 Imtiaz) 0.06 gram-1.2 kcal/mL liqd 1 Dose by PEG Tube route daily. Jevity 1.2 imtiaz 75ml/hr over 8 hours with free water flushes 4 times daily       Family History   Problem Relation Age of Onset    Hypertension Mother     Stroke Mother     Hypertension Father     Cancer Father         PROSTATE, MELANOMA    Anesth Problems Neg Hx      Social History     Tobacco Use    Smoking status: Never    Smokeless tobacco: Never   Substance Use Topics    Alcohol use: No       Specialists/Care Team   Briseida Browning has established care with the following healthcare providers:  PCP: Darren Streeter  Cardiology: Dr. Yudelka Scruggs (for remote pacer checks)    Health Risk Assessment     Demographics   female  78 y.o.   BLACK/    General Health Questions   -During the past 4 weeks:   -How would you rate your health in general? Good   -How often have you been bothered by feeling dizzy when standing up? never   -How much have you been bothered by bodily pain? mildly   -Have you noticed any hearing difficulties? no   -Has your physical and emotional health limited your social activities with family or friends? yes    Emotional Health Questions   -Do you have a history of depression, anxiety, or emotional problems? yes  -Over the past 2 weeks, have you felt down, depressed or hopeless? Unable to answer, but son reports occasional tearfulness  -Over the past 2 weeks, have you felt little interest or pleasure in doing things? Unable to answer    Health Habits   Please describe your diet habits: all nutrition through PEG tube (Jevity 1.2 imtiaz x 8 hours overnight)  Do you get 5 servings of fruits or vegetables daily? N/A all nutrition via PEG  Do you exercise regularly? Unable to exercise, is bedbound  Tobacco use: None  Alcohol use: None    Activities of Daily Living and Functional Status   -Do you need help with eating, walking, dressing, bathing, toileting, the phone, transportation, shopping, preparing meals, housework, laundry, medications or managing money? yes  -In the past four weeks, was someone available to help you if you needed and wanted help with anything? yes  -Are you confident are you that you can control and manage most of your health problems? yes  -Have you been given information to help you keep track of your medications? yes  -How often do you have trouble taking your medications as prescribed? never    Fall Risk and Home Safety   Have you fallen 2 or more times in the past year? no  Do you have smoke detectors and check them regularly? yes  Do you have difficulties driving a car? Unable to drive  Do you always fasten your seat belt when you are in a car? N/A is not transported in car, must transport via stretcher    Review of Systems (if indicated for problems addressed today)   -See separate note for management of ROS.     Physical Examination     Vitals: 10/28/22 1040   BP: 138/74   Pulse: 70   Resp: 20   Temp: 98.5 °F (36.9 °C)   TempSrc: Temporal   SpO2: 95%     Was the patient's timed Up & Go test unsteady or longer than 30 seconds? Unable to complete- bedbound    Evaluation of Cognitive Function   Mood/affect:  neutral  Orientation: Person  Appearance: age appropriate and casually dressed  Family member/caregiver input: See separate note for management of chronic conditions. Additional exam if indicated for problems addressed today:  -See separate note for physical exam.    Advice/Referrals/Counseling (as indicated)   Education and counseling provided for any problems identified above:   -See separate note for management of chronic conditions. Preventive Services     Health Maintenance Due   Topic Date Due    DTaP/Tdap/Td series (1 - Tdap) Never done    Shingrix Vaccine Age 50> (1 of 2) Never done       (Preventive care checklist to be included in patient instructions)  Discussed today Done Previously Not Needed     X  Pneumococcal vaccines    X  Flu vaccine     X Hepatitis B vaccine (if at risk)     Not able to go to pharmacy  Shingles vaccine     Not able to go to pharmacy TDAP vaccine     X Mammogram     X Pap smear     X Colorectal cancer screening     X Low-dose CT for lung cancer screening     X Bone density test     X Glaucoma screening    X  Cholesterol test    X  Diabetes screening test      X Diabetes self-management class     X Nutritionist referral for diabetes or renal disease     Discussion of Advance Directive   Discussed with son, Ana Mayorga, previous completion of advance directive in the case that an injury or illness causes her to be unable to make health care decisions. They do not have POA paperwork, but Ana Mayorga and sister are next-of-kin. She is DNR and has DDNR. Assessment/Plan       ICD-10-CM ICD-9-CM    1. CVA, old, hemiparesis ([facility] [address]  I69.359 438.20       2. Dysphagia, unspecified type  R13.10 787.20       3.  S/P percutaneous endoscopic gastrostomy (PEG) tube placement ([facility]  Z93.1 V44.1       4. Coronary artery disease involving native coronary artery of native heart without angina pectoris  I25.10 414.01       5. Vascular dementia without behavioral disturbance ([facility])  F01.50 290.40       6. Essential hypertension  I10 401.9       7. PAF (paroxysmal atrial fibrillation) ([facility])  I48.0 427.31       8. SSS (sick sinus syndrome) ([facility])  I49.5 427.81       9. Gastroesophageal reflux disease without esophagitis  K21.9 530.81       10. Seizure disorder ([facility]  G40.909 345.90       11. Encounter for Medicare annual wellness exam  Z00.00 V70.0       12. Erythema intertrigo  L30.4 695.89           -MWV completed today:  -Immunizations: up to date except Shingles and Tdap, unable to obtain due to inability to get to pharmacy  -Preventive Services: UTD  -Continue routine follow up for management of chronic conditions.     Norberto Johnson NP

## 2022-11-09 ENCOUNTER — TELEPHONE (OUTPATIENT)
Dept: FAMILY MEDICINE CLINIC | Age: 82
End: 2022-11-09

## 2022-11-09 NOTE — TELEPHONE ENCOUNTER
Ami Giordano is the patient's son. He called and stated that there are no refills left on the patient's liquid tylenol prescription. He asks that a refill be sent to the [place] at [address] [facility] and [address] [facility] [address]. CB# if needed is [phone].   9:27am

## 2022-11-13 ENCOUNTER — DOCUMENTATION ONLY (OUTPATIENT)
Dept: PALLATIVE CARE | Age: 82
End: 2022-11-13

## 2022-11-13 NOTE — PROGRESS NOTES
Home based primary care on call~     Called by son No Sosa- since yesterday he has noticed pt has some swelling of R forearm and hand. They have been trying to keep it up, but feels very \"tight\" today. Reviewed note from [facility] team 10/28/22, did not have swelling then but was not interactive, did not answer questions- she is still that way. No F/C, redness or tenderness to touch of the arm. No swelling elsewhere or hx of swelling. Never on diuretics. Pt w/ L hemiparesis. No changes in medications, no injury to the arm. Photos reviewed of both arms to see swelling. Based on clinical hx discuss possibilities:    -Infection: Do not think arm w/ cellulitis, no erythema, pain or fevers. -Blood clot: However pt on Eliquis 5mg bid already. Non tender and does not involve upper arm. No Sosa understands I do not think this is likely but cannot exclude it w/out dopplers which today would require ED visit. He thinks that would be very hard on pt. Goals outlined clearly in recent note, they do want things treated if possible but don't want aggressive interventions. Treatment of a DVT would be incr the Eliquis dose, however this incr risk of bleeding.   -Swelling due to immobility, low protein levels: This is most likely. As pt comfortable, today to cont elevation and recommend cool compresses. If worsens, if has fever, if painful may need to go to ED. Otherwise our clinic to f/up and call tmrw.

## 2022-11-15 NOTE — PROGRESS NOTES
Late entry for call 11/14/22:   Called son to follow-up. Patient states that the arm is slightly less swollen, he has been elevating it on pillows. He reports patient is not complaining of pain, but typically uses this arm to scratch her head/eyebrows and has not been doing this due to the swelling. Son is giving scheduled Tylenol. Arm is not warm, red, no wounds/cuts/bruising. She has had no fevers, but is taking scheduled Tylenol. If he puts pressure on arm, \"it gives\" but does not make dents. He requests visit for assessment, scheduled 11/16/22.

## 2022-11-16 ENCOUNTER — DOCUMENTATION ONLY (OUTPATIENT)
Dept: FAMILY MEDICINE CLINIC | Age: 82
End: 2022-11-16

## 2022-11-16 ENCOUNTER — HOME VISIT (OUTPATIENT)
Dept: FAMILY MEDICINE CLINIC | Age: 82
End: 2022-11-16
Payer: MEDICARE

## 2022-11-16 VITALS
DIASTOLIC BLOOD PRESSURE: 58 MMHG | HEART RATE: 70 BPM | TEMPERATURE: 98 F | SYSTOLIC BLOOD PRESSURE: 124 MMHG | OXYGEN SATURATION: 95 % | RESPIRATION RATE: 18 BRPM

## 2022-11-16 DIAGNOSIS — F01.50 VASCULAR DEMENTIA WITHOUT BEHAVIORAL DISTURBANCE (HCC): ICD-10-CM

## 2022-11-16 DIAGNOSIS — R09.89 SUSPECTED DEEP VEIN THROMBOSIS (DVT): Primary | ICD-10-CM

## 2022-11-16 DIAGNOSIS — R60.0 EDEMA OF RIGHT FOREARM: ICD-10-CM

## 2022-11-16 DIAGNOSIS — I69.359 CVA, OLD, HEMIPARESIS (HCC): ICD-10-CM

## 2022-11-16 DIAGNOSIS — I25.10 CORONARY ARTERY DISEASE INVOLVING NATIVE CORONARY ARTERY OF NATIVE HEART WITHOUT ANGINA PECTORIS: ICD-10-CM

## 2022-11-16 PROCEDURE — 3078F DIAST BP <80 MM HG: CPT | Performed by: NURSE PRACTITIONER

## 2022-11-16 PROCEDURE — 99348 HOME/RES VST EST LOW MDM 30: CPT | Performed by: NURSE PRACTITIONER

## 2022-11-16 PROCEDURE — 1123F ACP DISCUSS/DSCN MKR DOCD: CPT | Performed by: NURSE PRACTITIONER

## 2022-11-16 PROCEDURE — 3074F SYST BP LT 130 MM HG: CPT | Performed by: NURSE PRACTITIONER

## 2022-11-16 NOTE — PROGRESS NOTES
Home Based [address] [facility]   [id number]          NOTE: Home Based Primary Care is a PROVIDER (MD/NP) based interdisciplinary practice (RN, LCSW, Pharmacy, Dietician) for patients who cannot access (or have a taxing effort) primary / specialty medical care in an office setting. [facility] is NOT [employer] but works with [facility] when there is a skilled need. Our MD/NPs are integral in Care Transitions; PLEASE CALL [phone] if this patient arrives in the Emergency Department or Hospital.         Date of Current Visit: 11/16/2022     SUMMARY     Diego Orlando is a 78 y.o. female seen in the home today due to taxing effort to seek primary care services in the community s/t stroke with L-sided hemiparesis, vascular dementia    Patient/Family present for Current Visit:  patient, son and primary caregiver Orlando Johnson  Goals of Care as stated by the patient/family is: comfort, no aggressive interventions- transfer to hospital only if intervention would improve comfort    ASSESSMENT AND PLAN       ICD-10-CM ICD-9-CM    1. Suspected deep vein thrombosis (DVT)  R09.89 785.9 DUPLEX UPPER EXT VENOUS RIGHT      2. Edema of right forearm  R60.0 782.3       3. CVA, old, hemiparesis ([facility] [address]  I69.359 438.20       4. Primary osteoarthritis, unspecified site  M19.91 715.10       5. Vascular dementia without behavioral disturbance (HCC)  F01.50 290.40         -Edema R forearm/ suspected DVT: Patient with somewhat firm, non-pitting edema in right forearm and elbow. Presented over the weekend and son spoke with on-call MD on 11/13/22. At that time entire arm from elbow to hand was swollen. He reports it is less \"tight\" today and has improved some with elevation on pillows. Patient has contracture of LUE s/t stroke, no swelling of this extremity. Edema is most prominent near elbow and just below extending into forearm. She exhibits no tenderness to palpation of olecranon or epicondyles.   No known trauma, no apparent displacement of radial head. No significant swelling of olecranon bursae, rather more diffuse process. Resists passive ROM (unclear if unable to extend or to follow commands). No palpable masses, no open wounds, redness, warmth. She has two minor scars to forearm from previous surgery. Will order mobile dopplers to rule out DVT due to unilateral swelling, but she is already anticoagulated on Eliquis. Differentials also include low protein, tendonitis, lymphedema. CVA with L hemiparesis- Right MCA stroke in 2020 with an occluded right internal carotid artery from the carotid bifurcation to the [place] and further intraluminal thrombus at the right MCA bifurcation. Residual dysphagia and L hemiparesis. No movement of left leg, no intentional movement of left arm, slightly contracted. Son providing passive ROM exercises. She is functionally dependent for total care. Continue skin protective measures such as air mattress, frequent repositioning, brief changes and zinc paste, heel protectors. Managed with Eliquis, atorvastatin. No recent bleeding events. Lipid panel and CBC obtained 9/22/22 and WNL. CAD- Currently managed with atorvastatin, Eliquis. CBC, Lipid panel obtained 9/22/22 and WNL    Vascular dementia- Patient has eyes closed during visit today and at previous visit. Per son, she is able to verbalize pain or itching and has endorsed yes to pain in this arm but some days \"no means yes and yes means no. \"  She is not cooperative through exam, but does vocalize in acknowledgement of her name. Exam of arm limited by her inability to cooperate or endorse symptoms.      -Labs: Labs obtained 9/22/22 including CBC, CMP, Lipid panel and stable  -AMD: No AMD previously completed. She has a son and two daughters who are next of kin. Son is primary caretaker and current healthcare surrogate.   Son states that she would want hospitalization if indicated, but no aggressive measures. (Son types this in phone and shows provider as not to disturb patient with discussion). She has been on hospice previously  -Code status: DNR, DDNR in chart and in patient's room  -Follow up: in 3 weeks (12/5/22) afternoon     Number provided for mobile podiatry through [id number] [place] [address], scheduled visit in Dec   Health Maintenance Due   Topic Date Due    DTaP/Tdap/Td series (1 - Tdap) Never done    Shingrix Vaccine Age 50> (1 of 2) Never done       I have left a SUMMARY / INSTRUCTIONS from today's visit with the patient/family in the home        HISTORY:      CHIEF COMPLAINT:    Chief Complaint   Patient presents with    Arm swelling      HPI/SUBJECTIVE:      Ms. Lore Isaacs is an 77 y/o F seen today for acute visit right right arm swelling. Ms. Stephani Kong is lives in a home with her son, Hiren James, and daughter-in-law as well as full-time caregivers. She is typically in bed with eyes closed but does vocalize some when addressed, does not verbalize significantly during visit today and per son this is common. She has vascular dementia but is not aware, son does not speak of this around her as it causes distress. She is reliant on family and caregivers for total care. She is primarily bedbound with nutrition, hydration, and medications through PEG tube. She had a right MCA stroke in 2020 with residual dysphagia and L-sided hemiparesis. Patient's son, Hiren James, called over the weekend and son spoke with on-call MD on 11/13/22. Per Hiren James, he noted the swelling first on 11/12/22. At that time entire arm from elbow to hand was swollen and firm. He reports it is less \"tight\" today and has improved some with elevation on pillows. Patient has contracture of LUE s/t stroke, no swelling of this extremity currently but has had intermittent LUE swelling in the past.  On exam today, edema is most prominent near elbow and just below extending into forearm. Edema is somewhat firm and non-pitting.   She exhibits no tenderness to palpation of olecranon or epicondyles. No known trauma, no apparent displacement of radial head. No significant swelling of olecranon bursae, rather more diffuse process. Resists passive ROM (unclear if unable to extend or to follow commands). No palpable masses, no open wounds, redness, warmth. She has two minor scars to forearm from previous surgery. Will order mobile dopplers to rule out DVT due to unilateral swelling, but she is already anticoagulated on Eliquis. Discussed differentials and potential treatment with son, Liam Willson, today and he is in agreement with mobile doppler and would likely want treatment as it would improve patient's comfort. REVIEW OF SYSTEMS:     Unable to complete ROS as she does not respond to questions. Per son, she has had no fevers, changes in behaviors or sleeping habits, no clear appearance of pain. She has had no falls, known injuries, or bleeding events. No skin breakdown or rashes     PHYSICAL EXAM:     Visit Vitals  BP (!) 124/58 (BP 1 Location: Left upper arm, BP Patient Position: Lying)   Pulse 70   Temp 98 °F (36.7 °C) (Temporal)   Resp 18   SpO2 95%       Physical Exam  Constitutional:       General: She is not in acute distress. Appearance: She is ill-appearing. Comments: Eyes closed, does not respond to name or light touch but does vocalize slightly in response to name   HENT:      Head: Normocephalic. Comments: Scab/lesion to medial top of scalp s/t removal of cancerous cells . No open areas or drainage, no surrounding redness or erythema     Right Ear: External ear normal.      Left Ear: External ear normal.      Nose: Nose normal. No rhinorrhea. Mouth/Throat:      Comments: Does not open mouth for assessment  Eyes:      Comments: Does not open eyes at visit, lacrimation noted but no discharge, surrounding redness   Neck:      Vascular: No carotid bruit. Cardiovascular:      Rate and Rhythm: Regular rhythm. Pulses: Normal pulses. Dorsalis pedis pulses are 2+ on the right side and 2+ on the left side. Heart sounds: Normal heart sounds. No murmur heard. Comments: Pacemaker L chest  Pulmonary:      Effort: Pulmonary effort is normal. No respiratory distress. Breath sounds: No rhonchi. Comments: Auscultation limited by inability to follow commands for deep breath  Abdominal:      General: Abdomen is flat. Bowel sounds are normal.      Palpations: Abdomen is soft. There is no mass. Tenderness: There is no guarding. Comments: Midline PEG in place surrounded by split gauze. No surrounding redness, warmth, drainage. Tube appears patent   Musculoskeletal:         General: Swelling present. Right lower leg: No edema. Left lower leg: No edema. Right foot: Foot drop present. Left foot: Foot drop present. Comments: Spastic paralysis of left arm and left leg. No contracture of L hand or foot noted. Swelling of left knee without warmth or erythema. Swelling from right elbow to mid forearm. No redness, open areas/drainage, warmth. Resists passive extension of elbow. Feet:      Right foot:      Skin integrity: Skin integrity normal.      Toenail Condition: Right toenails are abnormally thick. Fungal disease present. Left foot:      Skin integrity: Skin integrity normal.      Toenail Condition: Left toenails are abnormally thick. Fungal disease present. Skin:     General: Skin is warm and dry. Findings: No erythema, lesion or rash. Neurological:      Mental Status: Mental status is at baseline. Motor: Weakness present.       Comments: Unable to assess orientation or focal deficits as patient does not open eyes or verbalize        HISTORY:     Past Medical and Surgical History reviewed in [facility] on date of initial visit    Patient Active Problem List   Diagnosis Code    Dysphagia R13.10    Recurrent ear pain H92.09    Acute pharyngitis J02.9    Essential hypertension I10 Edema, peripheral R60.9    Pure hypercholesterolemia E78.00    Allergic rhinitis, cause unspecified J30.9    Diverticulosis of colon (without mention of hemorrhage) K57.30    Herniated nucleus pulposus ( slipped disc) [id number]    Degenetrative Dis Disease, 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    Carpal tunnel syndrome G56.00    Unspecified cataract H26.9    GERD Gastro- Esophageal Reflux Disease K21.9    Degenerative Joint Disese ( improved/resolving) M19.90    Menopausal N95.1    Asymptomatic varicose veins I83.90    Sebaceous cyst L72.3    Keloid L91.0    Scalp lesion L98.9    Abnormal nuclear stress test R94.39    Syncope R55    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    CAD (coronary artery disease) I25.10    S/P cardiac pacemaker procedure Z95.0    Bradycardia R00.1    SSS (sick sinus syndrome) ([facility]) I49.5    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[facility]) I97.190, I44.2    PAF (paroxysmal atrial fibrillation) ([facility]) I48.0    Atrial fibrillation with rapid ventricular response ([facility]) I48.91    PEG tube malfunction ([facility]) K94.23    CVA, old, hemiparesis ([facility]) L99.677    Vascular dementia without behavioral disturbance ([facility]) F01.50    Cardiac arrhythmia I49.9    Squamous cell carcinoma of scalp C44.42     Family History   Problem Relation Age of Onset    Hypertension Mother     Stroke Mother     Hypertension Father     Cancer Father         PROSTATE, MELANOMA    Anesth Problems Neg Hx       Social History     Tobacco Use    Smoking status: Never    Smokeless tobacco: Never   Substance Use Topics    Alcohol use: No     Allergies   Allergen Reactions    Latex Rash    Bactrim [Sulfamethoprim Ds] Hives    Aspirin Other (comments)     Anything higher than 81mg makes pt jittery    Clindamycin Rash    Codeine Hives and Itching    Diltiazem Other (comments)    Iodinated Contrast Media Itching    Pcn [Penicillins] Hives and Itching    Tape [Adhesive] Rash      Current Outpatient Medications   Medication Sig    acetaminophen (TYLENOL) 160 mg/5 mL liquid TAKE 30 ML BY MOUTH EVERY 6 HOURS AS NEEDED FOR PAIN OR FEVER    amLODIPine (NORVASC) 5 mg tablet Take 1 Tablet by mouth daily. atorvastatin (LIPITOR) 40 mg tablet Take 1 Tablet by mouth nightly. apixaban (Eliquis) 5 mg tablet Take 1 Tablet by mouth two (2) times a day. famotidine (PEPCID) 40 mg/5 mL (8 mg/mL) suspension Take 5 mL by mouth two (2) times a day. levETIRAcetam (KEPPRA) 100 mg/mL solution Take 1 mL by mouth two (2) times a day. metoprolol succinate (TOPROL-XL) 25 mg XL tablet Take 1 Tablet by mouth daily. potassium chloride (KAON 10%) 20 mEq/15 mL solution Take 15 mL by mouth two (2) times a day. lactose-reduced food with fibr (Jevity 1.2 Lamont) 0.06 gram-1.2 kcal/mL liqd 1 Dose by PEG Tube route daily. Jevity 1.2 lamont 75ml/hr over 8 hours with free water flushes 4 times daily     No current facility-administered medications for this visit. LAB DATA REVIEWED:     Lab Results   Component Value Date/Time    Hemoglobin A1c 5.7 (H) 07/04/2020 05:43 AM    Hemoglobin A1c (POC) 5.7 08/08/2016 03:43 PM     No results found for: MCACR, MCA1, MCA2, MCA3, MCAU, MCAU2, MCALPOCT  Lab Results   Component Value Date/Time    TSH 0.65 07/04/2020 05:43 AM     No results found for: BONIFACIO Hutchinson      Lab Results   Component Value Date/Time    WBC 5.0 09/22/2022 10:50 AM    HGB 12.8 09/22/2022 10:50 AM    PLATELET 043 36/48/5809 10:50 AM     Lab Results   Component Value Date/Time    Sodium 136 09/22/2022 10:50 AM    Potassium 4.6 09/22/2022 10:50 AM    Chloride 104 09/22/2022 10:50 AM    CO2 25 09/22/2022 10:50 AM    BUN 11 09/22/2022 10:50 AM    Creatinine 0.40 (L) 09/22/2022 10:50 AM    Calcium 9.0 09/22/2022 10:50 AM    Magnesium 2.1 07/04/2020 05:43 AM    Phosphorus 2.8 07/04/2020 05:43 AM      Lab Results   Component Value Date/Time    Alk.  phosphatase 107 09/22/2022 10:50 AM    Protein, total 6.3 (L) 09/22/2022 10:50 AM    Albumin 3.2 (L) 09/22/2022 10:50 AM Globulin 3.1 09/22/2022 10:50 AM     No results found for: IRON, FE, TIBC, IBCT, PSAT, FERR             Merle Ivy, NP

## 2022-11-17 ENCOUNTER — DOCUMENTATION ONLY (OUTPATIENT)
Dept: FAMILY MEDICINE CLINIC | Age: 82
End: 2022-11-17

## 2022-11-17 NOTE — PROGRESS NOTES
Home Based Primary Care  Plan of Care    [facility] Team Members: Ml Palacios MD; ZION Cabral; Timothy Lazcano NP; Kika Hinkle, GAMALIEL; Adwoa Bueno, GAMALIEL; Marce Conklin RN; JADE Eastonlennox Matajosé manuel  1940 / [id number]  female    The physician has reviewed and discussed the plan of care with the interdisciplinary group on 11/17/22 and agrees. 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    Carpal tunnel syndrome G56.00    Unspecified cataract H26.9    GERD Gastro- Esophageal Reflux Disease K21.9    Degenerative Joint Disese ( improved/resolving) M19.90    Menopausal N95.1    Asymptomatic varicose veins I83.90    Sebaceous cyst L72.3    Keloid L91.0    Scalp lesion L98.9    Abnormal nuclear stress test R94.39    Syncope R55    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    CAD (coronary artery disease) I25.10    S/P cardiac pacemaker procedure Z95.0    Bradycardia R00.1    SSS (sick sinus syndrome) ([facility]) I49.5    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 female who was referred to the [facility] program due to CVA with L hemiparesis, vascular dementia.   Patient discussed today for initial POC review. Advance Care Planning:  Code status: DNR      Primary Decision MakerMneville Ford - Son - [phone]    Primary Decision Maker: Ky Ledezma - Daughter - [phone]    Primary Decision Maker: Tiffanie Judd - Daughter - [phone]   Advance Care Planning 8/18/2022   Patient's Healthcare Decision Maker is: Named in scanned ACP document   Confirm Advance Directive -       DME/Supplies:  Hospital Bed     Allergies   Allergen Reactions    Latex Rash    Bactrim [Sulfamethoprim Ds] Hives    Aspirin Other (comments)     Anything higher than 81mg makes pt jittery    Clindamycin Rash    Codeine Hives and Itching    Diltiazem Other (comments)    Iodinated Contrast Media Itching    Pcn [Penicillins] Hives and Itching    Tape [Adhesive] Rash       Nutritional Requirements:   Tube feeds with G / J tube    Functional/Activity Level:  Bedbound only    Safety Measures:   Aspiration risk, Fall risk, and Self-care deficity    Acuity Level Rating: High    Current Outpatient Medications   Medication Sig    acetaminophen (TYLENOL) 160 mg/5 mL liquid TAKE 30 ML BY MOUTH EVERY 6 HOURS AS NEEDED FOR PAIN OR FEVER    amLODIPine (NORVASC) 5 mg tablet Take 1 Tablet by mouth daily. atorvastatin (LIPITOR) 40 mg tablet Take 1 Tablet by mouth nightly. apixaban (Eliquis) 5 mg tablet Take 1 Tablet by mouth two (2) times a day. famotidine (PEPCID) 40 mg/5 mL (8 mg/mL) suspension Take 5 mL by mouth two (2) times a day. levETIRAcetam (KEPPRA) 100 mg/mL solution Take 1 mL by mouth two (2) times a day. metoprolol succinate (TOPROL-XL) 25 mg XL tablet Take 1 Tablet by mouth daily. potassium chloride (KAON 10%) 20 mEq/15 mL solution Take 15 mL by mouth two (2) times a day. lactose-reduced food with fibr (Jevity 1.2 Imtiaz) 0.06 gram-1.2 kcal/mL liqd 1 Dose by PEG Tube route daily. Jevity 1.2 imtiaz 75ml/hr over 8 hours with free water flushes 4 times daily     No current facility-administered medications for this visit. The Plan of Care has been initiated for within 15 days of New Visit  and reviewed and updated by the Interdisciplinary Group (IDG) as frequently as the patient condition warrants. Plan of Care by Discipline:    1. Provider  Assessment of medication adverse effects, Reduction of polypharmacy within benefit/burden framework appropriate to age, function and disease state, Determine need for laboratory assessment based on patient disease status , Assess results of laboratory testing and adjust treatment plan as appropriate, and Create and implement disease /symptom specific plan to manage high risk conditions / symptoms    2. Nursing  Introduce Home Based Primary Care and Supportive Services, Education regarding disease state, Education for safety; falls, Education for skin care in patients with limited mobility; with focus on preventing skin breakdown, and Skin assessment in patient's with limited mobility    3. Social Work  Declan Mccord07-A 1498, Establish therapeutic relationship through in home visits and telephonic touch points, Assess safety concerns and address as appropriate, Assess for caregiver burden and intervene as psychosocially indicated, Provide information on available community resources, and Offer counseling services for mental health conditions including but not limited to anxiety, depression, and anticipatory grief      Plan of Care Orders / Action Items:  CVA with L hemiparesis- Right MCA stroke in 2020 with an occluded right internal carotid artery from the carotid bifurcation to the [place] and further intraluminal thrombus at the right MCA bifurcation. Residual dysphagia and L hemiparesis. No movement of left leg, no intentional movement of left arm- contracts with movement of other body parts. Son providing passive ROM exercises. She is functionally dependent for total care.   Continue skin protective measures such as air mattress, frequent repositioning, brief changes and zinc paste, heel protectors. Managed with Eliquis, atorvastatin. No recent bleeding events. Lipid panel and CBC obtained today. Dysphagia/ PEG tube- s/t stroke in 2020. Swallow study 7/2022 notes Severe oropharyngeal dysphagia. Patient took one swallow of pudding mixed with barium with no penetration or aspiration. 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 PEG tube in place with last exchange 6/6/22 in ED due to dysfunction. Currently receiving total nutrition, hydration, and medications through PEG (Jevity 1.2cal for total of 8 hours overnight). No recent aspiration or weight loss. HOB elevated at visit today and son maintains this overnight. CMP obtained today. CAD- Currently managed with atorvastatin, Eliquis. CBC, Lipid panel obtained today    Vascular dementia- Patient has eyes closed most of the day, occasionally opens. Does not respond to voice or questions at visit today but does verbalize spontaneously to son (unrelated to situation. Not requiring any medications for behavior. Hypertension- Well-controlled currently with amlodipine, metoprolol. A-fib/Sick sinus Syndrome/Pacemaker- Patient unable to endorse symptoms of palpitations or SOB. [employer] pacemaker in L chest placed 9/2017. Continues with remote interrogations through office of Dr. Shaka Sommers, cardiologist.    GERD- Currently managed with Pepcid via PEG. Unable to endorse heartburn symptoms at visit today, but son reports occasionally thick secretions that she is able to clear. DDD- of cervical spine per chart review. S/p C4-C7 anterior cervical fusion (prior to 2013). Occasionally complains of pain (difficult to localize) and is managed with liquid Tylenol via PEG. Team nurse discussed nonverbal signs of pain at visit today, appears comfortable at this time. She had additional pain medications while on hospice services but never required. Seizure disorder- s/t Stroke in 2020. Currently managed on Keppra 500mg BID. No seizure activity since April of 2021.     -Labs: Labs obtained today by team nurse including CBC, CMP, Lipid panel  -AMD: No AMD previously completed. She has a son and two daughters who are next of kin. Son is primary caretaker and current healthcare surrogate. Son states that she would want hospitalization if indicated, but no aggressive measures. (Son types this in phone and shows provider as not to disturb patient with discussion)  -Code status: DNR, DDNR in chart and in patient's room  -Follow up: in 1 month (10/28/22). Will need MWV, follow up flu vaccine     Number provided for mobile podiatry through [facility]  Lipid screen obtained today, Covid vaccine series completed. [facility]       Health Maintenance   Topic Date Due    DTaP/Tdap/Td series (1 - Tdap) Never done    Shingrix Vaccine Age 50> (1 of 2) Never done    Depression Screen  08/18/2023    Lipid Screen  09/22/2023    Medicare Yearly Exam  10/29/2023    Bone Densitometry (Dexa) Screening  Completed    Flu Vaccine  Completed    COVID-19 Vaccine  Completed    Pneumococcal 65+ years  Completed       Estimated Visit Frequency:  Monthly Provider Visit  SW visits PRN

## 2022-11-21 ENCOUNTER — TELEPHONE (OUTPATIENT)
Dept: FAMILY MEDICINE CLINIC | Age: 82
End: 2022-11-21

## 2022-11-21 DIAGNOSIS — R09.89 SUSPECTED DEEP VEIN THROMBOSIS (DVT): ICD-10-CM

## 2022-11-21 NOTE — TELEPHONE ENCOUNTER
Call placed to son, Caitlin Zamora, regarding results of doppler of right arm. Received his personal voicemail and left message notifying of results with callback number if questions or additional concerns. Doppler shows no DVT in this arm, which is great news. Recommend ongoing elevation of this arm (and he can massage if he finds this brings her comfort).

## 2022-11-22 ENCOUNTER — TELEPHONE (OUTPATIENT)
Dept: FAMILY MEDICINE CLINIC | Age: 82
End: 2022-11-22

## 2022-11-22 NOTE — TELEPHONE ENCOUNTER
Telephone call to son Mario Hastings to advise that he would need to speak to [facility] to determine what they mean by end fitted. In discussion with son this likely means that the type of syringes that they currently use on PEG are catheter tip and in 2 months time they will have to be leur-lock (screw-on) syringes. Josue Her that this is not something that we can help pt with at home and he will need to contact the GI doctor that inserted her PEG tube and discuss with him. Mario Hastings will follow up with the GI doc.

## 2022-11-22 NOTE — TELEPHONE ENCOUNTER
Astrid Shultz is the patient's son, he called and said that the swelling in the patient's right forearm has gone down, some swelling at the base of the elbow. He also said that in a call with [address], tthe representative said the patient's feeding tube would need to be \"in fitted\" in two months.   Astrid Shultz is not sure what this means and asks for a callback [phone]

## 2022-12-05 ENCOUNTER — HOME VISIT (OUTPATIENT)
Dept: FAMILY MEDICINE CLINIC | Age: 82
End: 2022-12-05
Payer: MEDICARE

## 2022-12-05 VITALS
RESPIRATION RATE: 20 BRPM | HEART RATE: 94 BPM | DIASTOLIC BLOOD PRESSURE: 68 MMHG | SYSTOLIC BLOOD PRESSURE: 134 MMHG | TEMPERATURE: 97.7 F | OXYGEN SATURATION: 99 %

## 2022-12-05 DIAGNOSIS — I69.359 CVA, OLD, HEMIPARESIS (HCC): Primary | ICD-10-CM

## 2022-12-05 DIAGNOSIS — K21.9 GASTROESOPHAGEAL REFLUX DISEASE WITHOUT ESOPHAGITIS: ICD-10-CM

## 2022-12-05 DIAGNOSIS — I10 ESSENTIAL HYPERTENSION: ICD-10-CM

## 2022-12-05 DIAGNOSIS — L30.4 ERYTHEMA INTERTRIGO: ICD-10-CM

## 2022-12-05 DIAGNOSIS — R60.0 EDEMA OF RIGHT FOREARM: ICD-10-CM

## 2022-12-05 DIAGNOSIS — I48.0 PAF (PAROXYSMAL ATRIAL FIBRILLATION) (HCC): ICD-10-CM

## 2022-12-05 DIAGNOSIS — Z93.1 S/P PERCUTANEOUS ENDOSCOPIC GASTROSTOMY (PEG) TUBE PLACEMENT (HCC): ICD-10-CM

## 2022-12-05 DIAGNOSIS — I49.5 SSS (SICK SINUS SYNDROME) (HCC): ICD-10-CM

## 2022-12-05 DIAGNOSIS — R13.10 DYSPHAGIA, UNSPECIFIED TYPE: ICD-10-CM

## 2022-12-05 DIAGNOSIS — F01.50 VASCULAR DEMENTIA WITHOUT BEHAVIORAL DISTURBANCE (HCC): ICD-10-CM

## 2022-12-05 DIAGNOSIS — G40.909 SEIZURE DISORDER (HCC): ICD-10-CM

## 2022-12-05 DIAGNOSIS — I25.10 CORONARY ARTERY DISEASE INVOLVING NATIVE CORONARY ARTERY OF NATIVE HEART WITHOUT ANGINA PECTORIS: ICD-10-CM

## 2022-12-05 PROCEDURE — 3074F SYST BP LT 130 MM HG: CPT | Performed by: NURSE PRACTITIONER

## 2022-12-05 PROCEDURE — 3078F DIAST BP <80 MM HG: CPT | Performed by: NURSE PRACTITIONER

## 2022-12-05 PROCEDURE — 1123F ACP DISCUSS/DSCN MKR DOCD: CPT | Performed by: NURSE PRACTITIONER

## 2022-12-05 PROCEDURE — 99348 HOME/RES VST EST LOW MDM 30: CPT | Performed by: NURSE PRACTITIONER

## 2022-12-05 RX ORDER — POTASSIUM CHLORIDE 20MEQ/15ML
20 LIQUID (ML) ORAL 2 TIMES DAILY
Qty: 3800 ML | Refills: 1 | Status: SHIPPED | OUTPATIENT
Start: 2022-12-05

## 2022-12-05 RX ORDER — ACETAMINOPHEN 160 MG/5ML
LIQUID ORAL
Qty: 473 ML | Refills: 3 | Status: SHIPPED | OUTPATIENT
Start: 2022-12-05

## 2022-12-05 NOTE — PROGRESS NOTES
Home Based [address] [facility]   [id number]          NOTE: Home Based Primary Care is a PROVIDER (MD/NP) based interdisciplinary practice (RN, LCSW, Pharmacy, Dietician) for patients who cannot access (or have a taxing effort) primary / specialty medical care in an office setting. [facility] is NOT [place] but works with [facility] when there is a skilled need. Our MD/NPs are integral in Care Transitions; PLEASE CALL [phone] if this patient arrives in the Emergency Department or Hospital.         Date of Current Visit: 12/5/2022     SUMMARY     Colleen Payan is a 78 y.o. female seen in the home today due to taxing effort to seek primary care services in the community s/t stroke with L-sided hemiparesis, vascular dementia    Patient/Family present for Current Visit:  patient, son and primary caregiver [place] [facility]  Goals of Care as stated by the patient/family is: comfort    ASSESSMENT AND PLAN       ICD-10-CM ICD-9-CM    1. CVA, old, hemiparesis ([facility]  I69.359 438.20       2. Coronary artery disease involving native coronary artery of native heart without angina pectoris  I25.10 414.01       3. Vascular dementia without behavioral disturbance ([facility])  F01.50 290.40       4. Dysphagia, unspecified type  R13.10 787.20       5. S/P percutaneous endoscopic gastrostomy (PEG) tube placement ([facility]  Z93.1 V44.1       6. Essential hypertension  I10 401.9       7. PAF (paroxysmal atrial fibrillation) ([facility])  I48.0 427.31       8. SSS (sick sinus syndrome) ([facility])  I49.5 427.81       9. Edema of right forearm  R60.0 782.3       10. Gastroesophageal reflux disease without esophagitis  K21.9 530.81       11. Seizure disorder ([facility]  G40.909 345.90       12.  Erythema intertrigo  L30.4 695.89         CVA with L hemiparesis- Right MCA stroke in 2020 with an occluded right internal carotid artery from the carotid bifurcation to the [place] and further intraluminal thrombus at the right MCA bifurcation. Residual dysphagia and L hemiparesis. Son providing passive ROM exercises and massages. She is functionally dependent for total care. Continue skin protective measures such as air mattress, frequent repositioning, brief changes and zinc paste, heel protectors. CVA prevention with Eliquis, atorvastatin. Lipid panel and CBC obtained 9/22/22 and WNL. Dysphagia/ PEG tube- s/t stroke, swallow study 7/2022 notes severe oropharyngeal dysphagia. 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 PEG tube in place with last exchange 6/6/22 in ED due to dysfunction, no issues since exchange. Currently receiving total nutrition, hydration, and medications through PEG (Jevity 1.2cal for total of 8 hours overnight). No recent aspiration or weight loss. HOB elevated overnight (bed is broken at visit today, [employer] arrived at end of visit to replace). CMP obtained 9/22/22 with albumin slightly low but improved from previous. CAD- Currently managed with atorvastatin, Eliquis. CBC, Lipid panel obtained 9/22/22 and WNL    Vascular dementia- Minimally responsive to this provider at most visits, today opens eyes and says \"no\" when asked if pain. Per son and daughter, she will endorse pain or request Tylenol. She can typically answer simples yes/no questions but \"some days yes means no and no means yes. \"  Not requiring any medications for behavior, continues sleeping well overnight. Hypertension- Well-controlled currently with amlodipine, metoprolol via PEG. Compliant with medications. Modest goal <150/90 due to age and goal of care comfort. A-fib/Sick sinus Syndrome/Pacemaker- Patient unable to endorse symptoms of palpitations or SOB. Medtronic pacemaker in L chest placed 9/2017. Continues with remote interrogations through office of Dr. Ortiz Faulkner, cardiologist.    GERD- Currently managed with Pepcid via PEG.   Unable to endorse heartburn symptoms at visit today, but son reports occasionally thick secretions that she is able to clear, he attributes to heartburn. No worsening cough. Discussed suction for oral secretions, but family feels they are well-managed at this time. DDD- of cervical spine, s/p C4-C7 anterior cervical fusion (prior to 2013). Occasionally complains of pain (difficult to localize) and is managed with liquid Tylenol via PEG. Appears comfortable at visit today, and denies pain. She had additional pain medications while on hospice services but never required. Seizure disorder- s/t Stroke in 2020. Currently managed on Keppra 500mg BID liquid via PEG. No seizure activity since April of 2021. Swelling of LUE- Improved from previous visit. Mobile doppler negative for DVT, continue elevation and monitor for pain. Intertrigo: Resolved currently with intermittent episodes. Continue butenafine cream as needed for recurrence (mostly in LAC due to contracture, below abdominal panus and breasts). -Labs: Labs obtained 9/22/22 including CBC, CMP, Lipid panel and stable. Repeat q 6 months or PRN  -AMD: No AMD previously completed. She has a son and two daughters who are next of kin. Son is primary caretaker and current healthcare surrogate. Son states that she would want hospitalization if indicated, but no aggressive measures.   (Son types this in phone and shows provider as not to disturb patient with discussion)  -Code status: DNR, DDNR in chart and in patient's room  -Follow up: in 6 weeks (1/19/22)     Health Maintenance Due   Topic Date Due    DTaP/Tdap/Td series (1 - Tdap) Never done    Shingrix Vaccine Age 50> (1 of 2) Never done       I have left a SUMMARY / INSTRUCTIONS from today's visit with the patient/family in the home            HISTORY:      CHIEF COMPLAINT:    Chief Complaint   Patient presents with    Follow-up    Extremity Weakness    Dementia      HPI/SUBJECTIVE:      Ms. Laron Stewart is an 77 y/o F seen today for follow-up of chronic medical conditions and follow-up of L arm swelling and intertrigo. Ms. Yoli Fry is lives in a home with her son, Lora Ventura, and daughter-in-law as well as full-time caregivers. Daughter present for visit today. Per family she responds to verbal stimuli, but sometimes \"no means yes, and yes means no. \"  She typically does not respond to this provider, but today opens eyes and makes eye contact and says \"no\" to question of pain. She has vascular dementia but is not aware, son does not speak of this around her as it causes distress. She is reliant on family and caregivers for total care. She is primarily bedbound with nutrition, hydration, and medications through PEG tube. She had a right MCA stroke in 2020 with residual dysphagia and L-sided hemiparesis. Her most recent hospitalization was 6/6-6/7/22 for exchange of feeding tube due to dysfunction. Since exchange, she has had no further issues with PEG. Today is leaking small amount of tube feeding, no surrounding redness or warmth, no guarding or appearance of pain. Discussed s/s of infection surrounding tube with family and they state understanding. She is managed on Jevity 1.2 for 8 hours per night (held during the day). She has had no apparent episodes of aspiration or pneumonia, no weight loss. Last CMP obtained 9/2022 with albumin slightly low but improved from previous. 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 She is currently managed on Lipitor and Eliquis. Family is giving all medications as scheduled via tube. She has had no bleeding events and last CBC 9/2022 stable. She also has seizure disorder s/t stroke and is currently managed on Keppra with no seizure activity since April 2021. Her other chronic medical conditions include hypertension, sick sinus syndrome s/p pacemaker, CAD, and cervical DDD. Her blood pressure is well-controlled with her current regimen of amlodipine and metoprolol.   She has a Medtronic pacemaker in L chest that is checked remotely at office of cardiologist Dr. Aron Galeas. She has scabbing on her scalp due to history of skin cancer with removal of portion of her scalp. Scalp visualized today, largely unchanged from previous visit with some ongoing scabbing, no drainage or s/s of infection. Her skin is otherwise intact. She has had intermittent episodes of intertrigo unresponsive to nystatin. Rash is not present today, family utilizing butenafine cream with improvement. Discussed can use if recurrence (rashes typically appear in LAC, beneath abdominal panus, and beneath breasts). Ms. Mateusz Schroeder graduated from hospice services in August of this year. Per son who is next of kin, she is DNR and goals remain primarily comfort. He would want hospitalization if acute condition that could not be treated in the home, but no aggressive measures. Daughter feels that her health is overall stable, she is sleeping well and at her recent behavioral baseline. Her hospital bed has broken this morning (unable to elevate HOB but fortunately tube feeding had already been disconnected). They have already notified [address] [place] [address] arrived near end of visit. REVIEW OF SYSTEMS:     Unable to complete ROS as she does not respond to questions. Per daughter, she has had very few episodes of apparent pain, she is sleeping well overnight, skin is intact, no changes to bowel or bladder habits. She has had no worsening cough, fevers, or headaches. PHYSICAL EXAM:     Visit Vitals  /68 (BP 1 Location: Right arm, BP Patient Position: Lying, BP Cuff Size: Large adult)   Pulse 94   Temp 97.7 °F (36.5 °C) (Temporal)   Resp 20   SpO2 99%       Physical Exam  Constitutional:       General: She is not in acute distress. Appearance: She is ill-appearing. Comments: Eyes closed, but does open once and state \"no\"   HENT:      Head: Normocephalic.       Comments: Scab/lesion to medial top of scalp s/t removal of cancerous cells . No open areas or drainage, no surrounding redness or erythema     Right Ear: External ear normal.      Left Ear: External ear normal.      Nose: Nose normal. No rhinorrhea. Mouth/Throat:      Comments: Does not open mouth during visit, per family she is edentulous   Eyes:      General: No scleral icterus. Comments: PERRL, arcus senilis present    Neck:      Vascular: No carotid bruit. Cardiovascular:      Rate and Rhythm: Rhythm irregular. Pulses: Normal pulses. Dorsalis pedis pulses are 2+ on the right side and 2+ on the left side. Heart sounds: Normal heart sounds. No murmur heard. Comments: Pacemaker L chest  Pulmonary:      Effort: Pulmonary effort is normal. No respiratory distress. Breath sounds: No rhonchi. Comments: Use of abdominal muscles for respirations (has been consistent throughout visits and family reports is baseline)  Abdominal:      General: Abdomen is flat. Bowel sounds are normal.      Palpations: Abdomen is soft. There is no mass. Tenderness: There is no guarding. Comments: Midline PEG in place surrounded by split gauze. No surrounding redness, warmth. Minimal drainage (appears to be tube feed). Tube appears patent   Musculoskeletal:         General: Swelling present. Right lower leg: No edema. Left lower leg: No edema. Right foot: Foot drop present. Left foot: Foot drop present. Comments: Spastic paralysis of left arm and left leg. No contracture of L hand or foot noted. Swelling of left knee without warmth or erythema. Feet:      Right foot:      Skin integrity: Skin integrity normal.      Toenail Condition: Right toenails are abnormally thick. Fungal disease present. Left foot:      Skin integrity: Skin integrity normal.      Toenail Condition: Left toenails are abnormally thick. Fungal disease present. Skin:     General: Skin is warm and dry. Findings: No rash.       Comments: No rashes present under panus today   Neurological:      Motor: Weakness present. Comments: Unable to assess orientation or focal deficits as patient does not open eyes or verbalize.   Contracture of LUE present, no movement of either LE on request        HISTORY:     Past Medical and Surgical History reviewed in [facility] on date of initial visit    Patient Active Problem List   Diagnosis Code    Dysphagia R13.10    Recurrent ear pain H92.09    Acute pharyngitis J02.9    Essential hypertension I10    Edema, peripheral R60.9    Pure hypercholesterolemia E78.00    Allergic rhinitis, cause unspecified J30.9    Diverticulosis of colon (without mention of hemorrhage) K57.30    Herniated nucleus pulposus ( slipped disc) [id number]    Degenetrative Dis Disease, 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    Carpal tunnel syndrome G56.00    Unspecified cataract H26.9    GERD Gastro- Esophageal Reflux Disease K21.9    Degenerative Joint Disese ( improved/resolving) M19.90    Menopausal N95.1    Asymptomatic varicose veins I83.90    Sebaceous cyst L72.3    Keloid L91.0    Scalp lesion L98.9    Abnormal nuclear stress test R94.39    Syncope R55    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    CAD (coronary artery disease) I25.10    S/P cardiac pacemaker procedure Z95.0    Bradycardia R00.1    SSS (sick sinus syndrome) ([facility]) I49.5    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[facility]) I97.190, I44.2    PAF (paroxysmal atrial fibrillation) ([facility]) I48.0    Atrial fibrillation with rapid ventricular response ([facility]) I48.91    PEG tube malfunction ([facility]) K94.23    CVA, old, hemiparesis ([address] O78.403    Vascular dementia without behavioral disturbance ([facility]) F01.50    Cardiac arrhythmia I49.9    Squamous cell carcinoma of scalp C44.42     Family History   Problem Relation Age of Onset    Hypertension Mother     Stroke Mother     Hypertension Father     Cancer Father         PROSTATE, MELANOMA    Anesth Problems Neg Hx       Social History     Tobacco Use    Smoking status: Never    Smokeless tobacco: Never   Substance Use Topics    Alcohol use: No     Allergies   Allergen Reactions    Latex Rash    Bactrim [Sulfamethoprim Ds] Hives    Aspirin Other (comments)     Anything higher than 81mg makes pt jittery    Clindamycin Rash    Codeine Hives and Itching    Diltiazem Other (comments)    Iodinated Contrast Media Itching    Pcn [Penicillins] Hives and Itching    Tape [Adhesive] Rash      Current Outpatient Medications   Medication Sig    M- mg/5 mL liquid TAKE 30 ML BY MOUTH EVERY 6 HOURS AS NEEDED FOR PAIN OR FEVER    potassium chloride (KAON 10%) 20 mEq/15 mL solution Take 15 mL by mouth two (2) times a day. amLODIPine (NORVASC) 5 mg tablet Take 1 Tablet by mouth daily. atorvastatin (LIPITOR) 40 mg tablet Take 1 Tablet by mouth nightly. apixaban (Eliquis) 5 mg tablet Take 1 Tablet by mouth two (2) times a day. famotidine (PEPCID) 40 mg/5 mL (8 mg/mL) suspension Take 5 mL by mouth two (2) times a day. levETIRAcetam (KEPPRA) 100 mg/mL solution Take 1 mL by mouth two (2) times a day. metoprolol succinate (TOPROL-XL) 25 mg XL tablet Take 1 Tablet by mouth daily. lactose-reduced food with fibr (Jevity 1.2 [place]) 0.06 gram-1.2 kcal/mL liqd 1 Dose by PEG Tube route daily. Jevity 1.2 lamont 75ml/hr over 8 hours with free water flushes 4 times daily     No current facility-administered medications for this visit.         LAB DATA REVIEWED:     Lab Results   Component Value Date/Time    Hemoglobin A1c 5.7 (H) 07/04/2020 05:43 AM    Hemoglobin A1c (POC) 5.7 08/08/2016 03:43 PM     No results found for: MCACR, MCA1, MCA2, MCA3, MCAU, MCAU2, MCALPOCT  Lab Results   Component Value Date/Time    TSH 0.65 07/04/2020 05:43 AM     No results found for: BONIFACIO Trejo      Lab Results   Component Value Date/Time    WBC 5.0 09/22/2022 10:50 AM    HGB 12.8 09/22/2022 10:50 AM    PLATELET 370 40/32/7567 10:50 AM     Lab Results   Component Value Date/Time Sodium 136 09/22/2022 10:50 AM    Potassium 4.6 09/22/2022 10:50 AM    Chloride 104 09/22/2022 10:50 AM    CO2 25 09/22/2022 10:50 AM    BUN 11 09/22/2022 10:50 AM    Creatinine 0.40 (L) 09/22/2022 10:50 AM    Calcium 9.0 09/22/2022 10:50 AM    Magnesium 2.1 07/04/2020 05:43 AM    Phosphorus 2.8 07/04/2020 05:43 AM      Lab Results   Component Value Date/Time    Alk.  phosphatase 107 09/22/2022 10:50 AM    Protein, total 6.3 (L) 09/22/2022 10:50 AM    Albumin 3.2 (L) 09/22/2022 10:50 AM    Globulin 3.1 09/22/2022 10:50 AM     No results found for: IRON, FE, TIBC, IBCT, PSAT, FERR             Oseas Adame, NP

## 2022-12-12 NOTE — ED NOTES
Bedside shift change report given to Lory Mcdermott (oncoming nurse) by Ary Colin (offgoing nurse). Report included the following information SBAR, ED Summary, Procedure Summary, Intake/Output, MAR and Recent Results. Xolair Counseling:  Patient informed of potential adverse effects including but not limited to fever, muscle aches, rash and allergic reactions.  The patient verbalized understanding of the proper use and possible adverse effects of Xolair.  All of the patient's questions and concerns were addressed.

## 2022-12-17 ENCOUNTER — HOSPITAL ENCOUNTER (EMERGENCY)
Age: 82
Discharge: HOME | End: 2022-12-17
Attending: EMERGENCY MEDICINE
Payer: MEDICARE

## 2022-12-17 ENCOUNTER — APPOINTMENT (OUTPATIENT)
Dept: GENERAL RADIOLOGY | Age: 82
End: 2022-12-17
Attending: EMERGENCY MEDICINE
Payer: MEDICARE

## 2022-12-17 VITALS
DIASTOLIC BLOOD PRESSURE: 81 MMHG | OXYGEN SATURATION: 99 % | RESPIRATION RATE: 18 BRPM | SYSTOLIC BLOOD PRESSURE: 156 MMHG | TEMPERATURE: 97 F | HEART RATE: 69 BPM

## 2022-12-17 DIAGNOSIS — K94.23 GASTROSTOMY TUBE DYSFUNCTION (HCC): ICD-10-CM

## 2022-12-17 DIAGNOSIS — Z78.9 ON TUBE FEEDING DIET: Primary | ICD-10-CM

## 2022-12-17 PROCEDURE — 99283 EMERGENCY DEPT VISIT LOW MDM: CPT

## 2022-12-17 PROCEDURE — 74011250637 HC RX REV CODE- 250/637: Performed by: EMERGENCY MEDICINE

## 2022-12-17 PROCEDURE — 75810000109 HC GASTRO TUBE CHANGE

## 2022-12-17 PROCEDURE — 74018 RADEX ABDOMEN 1 VIEW: CPT

## 2022-12-17 PROCEDURE — 74011000636 HC RX REV CODE- 636: Performed by: EMERGENCY MEDICINE

## 2022-12-17 RX ADMIN — DIATRIZOATE MEGLUMINE AND DIATRIZOATE SODIUM 30 ML: 600; 100 SOLUTION ORAL; RECTAL at 12:10

## 2022-12-17 RX ADMIN — ACETAMINOPHEN 1000 MG: 160 SOLUTION ORAL at 13:01

## 2022-12-17 NOTE — ED NOTES
I have reviewed discharge instructions with the caregiver. The caregiver verbalized understanding. [facility] for transportation.

## 2022-12-17 NOTE — ED PROVIDER NOTES
EMERGENCY DEPARTMENT HISTORY AND PHYSICAL EXAM      Date: 12/17/2022  Patient Name: Asa Maxwell    History of Presenting Illness     Chief Complaint   Patient presents with    Feeding Tube Problem     Per EMS, pt's feeding tube was completely dislodged either last night or this morning. 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 PEG tube site is covered with dressing, no leaking or shadowing seen. History Provided By: EMS and Caregiver    HPI: Asa Maxwell, 78 y.o. female presents to the ED with cc of G-tube problem. 13-year-old female arrives via EMS with a dislodged G-tube. 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 Per EMS G-tube dislodged this morning. Patient is dependent on this for feeds and medications. Otherwise in normal state of health. Denies any other concerns. Son at bedside corroborates history. There are no other complaints, changes, or physical findings at this time. PCP: Joss Eddy NP    No current facility-administered medications on file prior to encounter. Current Outpatient Medications on File Prior to Encounter   Medication Sig Dispense Refill    M- mg/5 mL liquid TAKE 30 ML BY MOUTH EVERY 6 HOURS AS NEEDED FOR PAIN OR FEVER 473 mL 3    potassium chloride (KAON 10%) 20 mEq/15 mL solution Take 15 mL by mouth two (2) times a day. 3800 mL 1    amLODIPine (NORVASC) 5 mg tablet Take 1 Tablet by mouth daily. 90 Tablet 1    atorvastatin (LIPITOR) 40 mg tablet Take 1 Tablet by mouth nightly. 90 Tablet 1    apixaban (Eliquis) 5 mg tablet Take 1 Tablet by mouth two (2) times a day. 180 Tablet 1    famotidine (PEPCID) 40 mg/5 mL (8 mg/mL) suspension Take 5 mL by mouth two (2) times a day. 300 mL 5    levETIRAcetam (KEPPRA) 100 mg/mL solution Take 1 mL by mouth two (2) times a day. 120 mL 2    metoprolol succinate (TOPROL-XL) 25 mg XL tablet Take 1 Tablet by mouth daily.  90 Tablet 1    lactose-reduced food with fibr (Jevity 1.2 Lamont) 0.06 gram-1.2 kcal/mL liqd 1 Dose by PEG Tube route daily.  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    Diverticulosis of colon (without mention of hemorrhage) 2/8/2011    Dysphagia 2/8/2011    Edema, peripheral 2/8/2011    GERD (gastroesophageal reflux disease)     GERD Gastro- Esophageal Reflux Disease 2/8/2011    Herniated nucleus pulposus ( slipped disc) 2/8/2011    Menopausal 2/8/2011    PUD (peptic ulcer disease) 2012    Pure hypercholesterolemia 2/8/2011    Recurrent ear pain 2/8/2011    S/P radiation therapy     Scalp lesion 10/23/2014    Sebaceous cyst 4/22/2014    Unspecified cataract 2/8/2011    Unspecified essential hypertension 2/8/2011    Unspecified sleep apnea        Past Surgical History:  Past Surgical History:   Procedure Laterality Date    HX HYSTERECTOMY      HX ORTHOPAEDIC  neck/back 2006    HX OTHER SURGICAL  5/8/2014     Excise recurrent scalp lesion and application of ACell    HX OTHER SURGICAL  5/8/2014    Excise keloid, anterior chest wall, and application of ACell    HX OTHER SURGICAL  5/8/2014     Excise sebaceous cyst, anterior chest wall    HX OTHER SURGICAL  5/8/2014    Punch biopsy, skin lesions, right forearm x2, right calf x1    PLACE PERCUT GASTROSTOMY TUBE  7/8/2020         IN ICAR CATHETER ABLATION ATRIOVENTR NODE FUNCTION N/A 5/6/2019    ABLATION AV NODE performed by Suhas Baker MD at [address] CATH LAB    IN INS NEW/RPLCMT PRM PM W/TRANSV ELTRD ATRIAL&VENT  9/30/2017         IN RMVL IMPLANTABLE PT-ACTIVATED CAR EVENT RECORDER  9/30/2017 Family History:  Family History   Problem Relation Age of Onset    Hypertension Mother     Stroke Mother     Hypertension Father     Cancer Father         PROSTATE, MELANOMA    Anesth Problems Neg Hx        Social History:  Social History     Tobacco Use    Smoking status: Never    Smokeless tobacco: Never   Substance Use Topics    Alcohol use: No    Drug use: No       Allergies: Allergies   Allergen Reactions    Latex Rash    Bactrim [Sulfamethoprim Ds] Hives    Aspirin Other (comments)     Anything higher than 81mg makes pt jittery    Clindamycin Rash    Codeine Hives and Itching    Diltiazem Other (comments)    Iodinated Contrast Media Itching    Pcn [Penicillins] Hives and Itching    Tape [Adhesive] Rash         Review of Systems   Review of Systems   Unable to perform ROS: Patient nonverbal     Physical Exam   Physical Exam  Vitals and nursing note reviewed. Constitutional:       Comments: 78 YOF, resting in bed, no distress   HENT:      Head: Normocephalic and atraumatic. Cardiovascular:      Rate and Rhythm: Normal rate. Pulmonary:      Effort: Pulmonary effort is normal.   Abdominal:      General: There is distension. Tenderness: There is no abdominal tenderness. Comments: There is a g-tube stoma, no bleeding   Musculoskeletal:         General: No swelling. Normal range of motion. Cervical back: Normal range of motion. Skin:     General: Skin is warm. Capillary Refill: Capillary refill takes less than 2 seconds. Neurological:      General: No focal deficit present. Mental Status: She is alert. Psychiatric:         Mood and Affect: Mood normal.       Diagnostic Study Results     Labs -   No results found for this or any previous visit (from the past 12 hour(s)). Radiologic Studies -   XR ABD (KUB)   Final Result   Satisfactory PEG tube position.            CT Results  (Last 48 hours)      None          CXR Results  (Last 48 hours)      None            Medical Decision Making   I am the first provider for this patient. I reviewed the vital signs, available nursing notes, past medical history, past surgical history, family history and social history. Vital Signs-Reviewed the patient's vital signs. Patient Vitals for the past 12 hrs:   Temp Pulse Resp BP SpO2   12/17/22 1137 -- 69 -- (!) 156/81 99 %   12/17/22 1118 97 °F (36.1 °C) 78 18 (!) 168/88 98 %     Records Reviewed: Nursing Notes and Old Medical Records    Provider Notes (Medical Decision Making):     28-year-old female presents emerged department with an accidental G-tube dislodgment. Vitals are unremarkable. Otherwise normal state of health. G-tube will be replaced after verbal consent from patient's caregiver. Do not believe labs or imaging are necessary. ED Course:   Initial assessment performed. The patients presenting problems have been discussed, and they are in agreement with the care plan formulated and outlined with them. I have encouraged them to ask questions as they arise throughout their visit. ED Course as of 12/17/22 1620   Sat Dec 17, 2022   1157 Initially attempted to replace with 20 [employer]gy feeding tube, this was unable to be passed. Downsized to 12 [employer] Angy which was passed without difficulty. [MB]   36 Son requesting dose of tylenol prior to d/c which I will order. [MB]      ED Course User Index  [MB] Kimball Gilford, MD     PROCEDURES    Verbal consent obtained from patient's caregiver    Feeding tube: A 16 Fr feeding tube was inserted. Using sterile technique, the stoma was cleaned with chlorhexidine. 12 Setswana feeding tube was inserted and advanced. The balloon was inflated with 6 cc of sterile water. Gastric contents aspirated. Patient tolerated the procedure well without complications. Yang Mayer MD      Disposition:    Discharged    DISCHARGE PLAN:  1. Discharge Medication List as of 12/17/2022 12:45 PM        2.    Follow-up Information       Follow up With Specialties Details Why Contact Jose A Kate NP Nurse Practitioner In 3 days  [place] [id number] [phone]      [facility] EMERGENCY DEPT Emergency Medicine  If symptoms worsen [address] [facility] [address]    Comfort Aguillon MD Gastroenterology In 1 week  [address]  [phone]            3. Return to ED if worse     Diagnosis     Clinical Impression:   1. On tube feeding diet    2. Gastrostomy tube dysfunction [facility])        Attestations:    Elva Hebert MD        Please note that this dictation was completed with [employer], the computer voice recognition software. Quite often unanticipated grammatical, syntax, homophones, and other interpretive errors are inadvertently transcribed by the computer software. Please disregard these errors. Please excuse any errors that have escaped final proofreading. Thank you.

## 2022-12-18 NOTE — PROGRESS NOTES
4:14pm- CM called pt's son via phone to address consult about stretcher transport for GI follow-up. CM informed pt's son that pt has medicaid and that he can arrange transport through her Medicaid.      Luz Manzanares [address] [place] [address]  [phone]

## 2022-12-27 ENCOUNTER — DOCUMENTATION ONLY (OUTPATIENT)
Dept: FAMILY MEDICINE CLINIC | Age: 82
End: 2022-12-27

## 2022-12-27 NOTE — PROGRESS NOTES
Social Determinants of Health wheel updated.    MARTINA Oh[place], [place]    [place] [facility] [address] [place] [address]  (e) [phone]

## 2022-12-29 ENCOUNTER — TELEPHONE (OUTPATIENT)
Dept: FAMILY MEDICINE CLINIC | Age: 82
End: 2022-12-29

## 2022-12-29 NOTE — TELEPHONE ENCOUNTER
Chidi Jacques called and stated that the patient right forearm is swelling. He said that her hand was swollen earlier but has gone down.   He asks for a callback at [phone]

## 2022-12-29 NOTE — TELEPHONE ENCOUNTER
Call returned to Magdalena Cox. Son reports patient's RIGHT arm is swollen, the same arm as before and the arm that patient had Doppler completed and negative for DVT. Magdalena Cox verbalized he did not want to \"ignore it\". Per discussion with Fiona Hayes NP, she continues to encourage elevation of the extremity and massages. NP also offered to move patient's visit to 1/4/23. Magdalena Cox states understanding and agreement with the visit on 1/4.

## 2023-01-04 ENCOUNTER — HOME VISIT (OUTPATIENT)
Dept: FAMILY MEDICINE CLINIC | Age: 83
End: 2023-01-04
Payer: MEDICARE

## 2023-01-04 VITALS
RESPIRATION RATE: 16 BRPM | SYSTOLIC BLOOD PRESSURE: 132 MMHG | OXYGEN SATURATION: 96 % | HEART RATE: 69 BPM | DIASTOLIC BLOOD PRESSURE: 78 MMHG | TEMPERATURE: 97.7 F

## 2023-01-04 DIAGNOSIS — F01.50 VASCULAR DEMENTIA WITHOUT BEHAVIORAL DISTURBANCE (HCC): ICD-10-CM

## 2023-01-04 DIAGNOSIS — I25.10 CORONARY ARTERY DISEASE INVOLVING NATIVE CORONARY ARTERY OF NATIVE HEART WITHOUT ANGINA PECTORIS: ICD-10-CM

## 2023-01-04 DIAGNOSIS — K21.9 GASTROESOPHAGEAL REFLUX DISEASE WITHOUT ESOPHAGITIS: ICD-10-CM

## 2023-01-04 DIAGNOSIS — Z95.0 S/P CARDIAC PACEMAKER PROCEDURE: ICD-10-CM

## 2023-01-04 DIAGNOSIS — G40.909 SEIZURE DISORDER (HCC): ICD-10-CM

## 2023-01-04 DIAGNOSIS — R13.10 DYSPHAGIA, UNSPECIFIED TYPE: ICD-10-CM

## 2023-01-04 DIAGNOSIS — I10 ESSENTIAL HYPERTENSION: ICD-10-CM

## 2023-01-04 DIAGNOSIS — R60.0 EDEMA OF RIGHT FOREARM: ICD-10-CM

## 2023-01-04 DIAGNOSIS — Z93.1 S/P PERCUTANEOUS ENDOSCOPIC GASTROSTOMY (PEG) TUBE PLACEMENT (HCC): ICD-10-CM

## 2023-01-04 DIAGNOSIS — I69.359 CVA, OLD, HEMIPARESIS (HCC): Primary | ICD-10-CM

## 2023-01-04 DIAGNOSIS — I48.0 PAF (PAROXYSMAL ATRIAL FIBRILLATION) (HCC): ICD-10-CM

## 2023-01-04 PROBLEM — K94.23 PEG TUBE MALFUNCTION (HCC): Status: RESOLVED | Noted: 2022-06-06 | Resolved: 2023-01-04

## 2023-01-04 PROCEDURE — 3078F DIAST BP <80 MM HG: CPT | Performed by: NURSE PRACTITIONER

## 2023-01-04 PROCEDURE — 1123F ACP DISCUSS/DSCN MKR DOCD: CPT | Performed by: NURSE PRACTITIONER

## 2023-01-04 PROCEDURE — 99349 HOME/RES VST EST MOD MDM 40: CPT | Performed by: NURSE PRACTITIONER

## 2023-01-04 PROCEDURE — 3075F SYST BP GE 130 - 139MM HG: CPT | Performed by: NURSE PRACTITIONER

## 2023-01-04 NOTE — PROGRESS NOTES
Home Based [address]   [phone]          NOTE: Home Based Primary Care is a PROVIDER (MD/NP) based interdisciplinary practice (RN, LCSW, Pharmacy, Dietician) for patients who cannot access (or have a taxing effort) primary / specialty medical care in an office setting. [facility] is NOT [facility] but works with [facility] when there is a skilled need. Our MD/NPs are integral in Care Transitions; PLEASE CALL [phone] if this patient arrives in the Emergency Department or Hospital.         Date of Current Visit: 1/4/2023     SUMMARY     Deanna Fraire is a 78 y.o. female seen in the home today due to taxing effort to seek primary care services in the community s/t stroke with L-sided hemiparesis, vascular dementia    Patient/Family present for Current Visit:  patient, son and primary caregiver Alan Kaur  Goals of Care as stated by the patient/family is: comfort    ASSESSMENT AND PLAN       ICD-10-CM ICD-9-CM    1. CVA, old, hemiparesis ([facility]  I69.359 438.20       2. Seizure disorder ([facility]  G40.909 345.90       3. Vascular dementia without behavioral disturbance ([facility])  F01.50 290.40       4. Dysphagia, unspecified type  R13.10 787.20       5. S/P percutaneous endoscopic gastrostomy (PEG) tube placement ([facility]  Z93.1 V44.1       6. PAF (paroxysmal atrial fibrillation) ([facility])  I48.0 427.31       7. S/P cardiac pacemaker procedure  Z95.0 V45.01       8. Coronary artery disease involving native coronary artery of native heart without angina pectoris  I25.10 414.01       9. Essential hypertension  I10 401.9       10. Edema of right forearm  R60.0 782.3         CVA with L hemiparesis- Right MCA stroke in 2020 with an occluded right internal carotid artery from the carotid bifurcation to the [place] of [place] and further intraluminal thrombus at the right MCA bifurcation. Residual dysphagia and L hemiparesis.   No contractures currently, son providing passive ROM exercises to extremities. She is functionally dependent for total care. Continue skin protective measures such as air mattress, frequent repositioning, brief changes/ Purewick urinary device, zinc paste, floating heels. Currently on Eliquis, atorvastatin. Lipid panel and CBC obtained 9/22/22 and at goal.   Dysphagia/ PEG tube- s/t stroke, swallow study 7/2022 notes severe oropharyngeal dysphagia. 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 PEG tube in place with last exchange 12/17/22 in ED due to dislodgement. GI follow-up scheduled 3/7/23. Currently receiving total nutrition, hydration, and medications through PEG (Jevity 1.2cal for total of 8 hours overnight). No recent aspiration, pneumonia, or apparent weight loss. HOB elevated overnight. CMP obtained 9/22/22 with albumin slightly low but improved from previous. CAD- Currently managed with atorvastatin, Eliquis. CBC, Lipid panel obtained 9/22/22 and WNL, will repeat q 6 months. Vascular dementia- Minimally responsive to this provider at most visits. Opens eyes today, but does not verbally respond or make eye contact. Per son and daughter, she will at times endorse pain or request Tylenol. She can typically answer simples yes/no questions but \"some days yes means no and no means yes. \"  Not requiring any medications for behavior, continues sleeping well overnight. Hypertension- At goal with amlodipine, metoprolol via PEG. Compliant with medications. Goal <140/80 due to stroke history but would weigh against primary goal of comfort. A-fib/Sick sinus Syndrome/Pacemaker- Medtronic pacemaker in L chest placed 9/2017. Continues with remote interrogations through office of Dr. Dell Chavez, cardiologist (son obtained a reading during our visit today). Patient unable to endorse symptoms of palpitations or SOB. GERD- Currently managed with Pepcid via PEG.   Per son, occasionally has thick secretions that he attributes to worsening symptoms, gives Pepcid and they improve. Discussed suction for oral secretions, but family feels they are well-managed at this time. Seizure disorder- s/t Stroke in 2020. Currently managed on Keppra 500mg BID liquid via PEG, has been compliant with this medication. No seizure activity since April of 2021. Swelling of RUE- Intermittent (improved at visit today). Per son, was worse last week and felt firm, he massaged and elevated this extremity and swelling improved in about two hours. Does have stiffness of this extremity. No warmth, erythema, or open areas noted. No known history of RUE or breast surgeries. Mobile doppler negative for DVT. Encouraged elevation, rest, cool compress, light compression (ace wrap), and ongoing Tylenol for pain control. Goal of care is comfort.     -Labs: Labs obtained 9/22/22 including CBC, CMP, Lipid panel and stable. Repeat q 6 months or PRN  -AMD: No AMD previously completed. She has a son and two daughters who are next of kin. Son is primary caretaker and current healthcare surrogate. Son states that she would want hospitalization if indicated, but no aggressive measures. (Son types this in phone and shows provider as not to disturb patient with discussion)  -Code status: DNR, DDNR in chart and in patient's room  -Follow up: in 6 weeks (2/22/23)    Health Maintenance Due   Topic Date Due    DTaP/Tdap/Td series (1 - Tdap) Never done    Shingles Vaccine (1 of 2) Never done       I have left a SUMMARY / Nolberto Cope from today's visit with the patient/family in the home          HISTORY:      CHIEF COMPLAINT:    Chief Complaint   Patient presents with    Follow-up    Extremity Weakness    Arm swelling    Seizure             HPI/SUBJECTIVE:      Ms. Hattie Dotson is an 79 y/o F seen today for follow-up of chronic medical conditions and recurrence of L arm swelling.  Ms. Yoselin Rob is lives in a home with her son, Yinka Delgado, and daughter-in-law as well as full-time caregivers. Larayne Meckel is present for visit today and supplements HPI. Per family she responds verbally to them at times, but sometimes \"no means yes, and yes means no. \"  She opens eyes during visit today but does not make eye contact or verbalize. She has vascular dementia but is not aware, son does not speak of this around her as it causes distress. She is reliant on family and caregivers for total care. She receives all nutrition, hydration, and medications through PEG tube. She had a right MCA stroke in 2020 with residual dysphagia and L-sided hemiparesis. Since our last visit, she was seen in ED on 12/17/22 due to PEG tube dislodgement. She also had a hospitalization 6/6-6/7/22 for exchange of feeding tube due to dysfunction. She has a follow-up appointment arranged with GI for 3/7/23, son is working on arranging transportation to this visit. PEG tube appears patent today, no surrounding redness or warmth, no guarding or appearance of pain. She is managed on Jevity 1.2 for 8 hours per night (held during the day). She has had no apparent episodes of aspiration or pneumonia, no weight loss. Last CMP obtained 9/2022 with albumin slightly low but improved from previous. 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 She also has flexed posture of her RUE, but family states she can straighten this arm and occasionally relaxes it. She is currently managed on Lipitor and Eliquis. She has had no bleeding events and last CBC 9/2022 stable. She also has seizure disorder s/t stroke and is currently managed on Keppra with no seizure activity since April 2021. Her chronic medical conditions also include hypertension, afib/SSS s/p pacemaker, and CAD. Her blood pressure is at goal with her current regimen of amlodipine and metoprolol. She has a Medtronic pacemaker in L chest that is checked remotely at office of cardiologist Dr. Melissa Da Silva.   Son obtains a reading during our visit today to ensure the device is working. She has scabbing on her scalp due to history of skin cancer with removal of portion of her scalp. Scalp visualized today, quarter-sized open area with pink granulation tissue, minimal bleeding and surrounding scabbing and scars, no drainage or s/s of infection. Per son she will scratch the top of her head occasionally and this removes the scab. He is keeping covered with dry gauze. She has had intermittent episodes of intertrigo. No rash present on exam today. She was also seen by podiatrist since our last visit and toenails were trimmed. She continues to have intermittent swelling of RUE from hand to slightly above R elbow. No palpable masses, areas of broken skin, warmth, or erythema. Son called office last week to report an episode of worsening swelling with firm skin texture. He elevated this arm with a pillow and the swelling improved in about 2 hours. Mobile dopplers previously obtained and negative for DVT. Discussed potential etiologies such as lymphedema, hypoalbuminemia, or orthopedic origin (edema is slightly worsened around elbow). Her goal of care is comfort, and would not want additional outpatient workups unless causing distress. Encouraged ongoing elevation, Tylenol, cool compress, massage, and light compression with ACE wrap if worsening swelling or discomfort. Son has no additional concerns at this time. Pharmacy is low on supply of liquid Tylenol- called pharmacy today to see if could be transferred to another location, but pharmacist stated they would receive a shipment today. Lana Napoles stated understanding and will reach out to office if unable to obtain at this location. REVIEW OF SYSTEMS:     Unable to complete ROS as she does not respond to questions. Per son, she has had some episodes of pain in right arm improved with Tylenol. She is sleeping well overnight and frequently during the day. No recent rashes, worsening cough, sick contacts, or fevers.  No changes to bowel or bladder habits, no apparent weight loss. PHYSICAL EXAM:     Visit Vitals  /78 (BP 1 Location: Right upper arm, BP Patient Position: Lying)   Pulse 69   Temp 97.7 °F (36.5 °C) (Temporal)   Resp 16   SpO2 96%       Physical Exam  Constitutional:       General: She is not in acute distress. Appearance: She is ill-appearing. HENT:      Head:      Comments: Scab/lesion to medial top of scalp s/t removal of cancerous cells . Quarter-sized open area with pink wound bed, small amount of bleeding from patient scratching/removing part of scab. Covered with clean dry gauze. No s/s of infection     Mouth/Throat:      Comments: Does not open mouth during visit, per family she is edentulous   Eyes:      General: No scleral icterus. Conjunctiva/sclera: Conjunctivae normal.      Comments: PERRL, arcus senilis present. Unable to assess EOM or if gaze is conjugate as she does not track or make eye contact    Neck:      Vascular: No carotid bruit. Cardiovascular:      Rate and Rhythm: Rhythm irregular. Pulses: Normal pulses. Dorsalis pedis pulses are 2+ on the right side and 2+ on the left side. Heart sounds: Normal heart sounds. No murmur heard. Comments: Pacemaker L chest  Pulmonary:      Effort: Pulmonary effort is normal. No respiratory distress. Breath sounds: No rhonchi. Comments: Use of abdominal muscles for respirations (has been consistent throughout visits and family reports is baseline)  Abdominal:      General: Abdomen is flat. Bowel sounds are normal.      Palpations: Abdomen is soft. There is no mass. Tenderness: There is no guarding. Comments: Midline PEG in place surrounded by split gauze. No surrounding redness, warmth, drainage. Tube appears patent   Musculoskeletal:         General: Swelling present. Right lower leg: No edema. Left lower leg: No edema. Right foot: Foot drop present.       Left foot: Foot drop present. Comments: Spastic paralysis of left arm and left leg. No contracture of L hand or foot noted. Non-pitting edema of RUE from hand to 1 inch above elbow. No warmth, erythema. Skin intact. Patient resists movement of this extremity but no grimacing, vocalization during palpation   Feet:      Right foot:      Skin integrity: Skin integrity normal.      Toenail Condition: Right toenails are abnormally thick. Fungal disease present. Left foot:      Skin integrity: Skin integrity normal.      Toenail Condition: Left toenails are abnormally thick. Fungal disease present. Skin:     General: Skin is warm and dry. Findings: No rash. Comments: No rashes present under panus today   Neurological:      Motor: Weakness present. Comments: No voluntary movement, does not follow commands.   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    Carpal tunnel syndrome G56.00    Unspecified cataract H26.9    GERD Gastro- Esophageal Reflux Disease K21.9    Degenerative Joint Disese ( improved/resolving) M19.90    Menopausal N95.1    Asymptomatic varicose veins I83.90    Sebaceous cyst L72.3    Keloid L91.0    Scalp lesion L98.9    Abnormal nuclear stress test R94.39    Syncope R55    Tachy-chnio syndrome (HCC) I49.5    CAD (coronary artery disease) I25.10    S/P cardiac pacemaker procedure Z95.0    Bradycardia R00.1    SSS (sick sinus syndrome) ([facility]) I49.5    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[facility] I97.190, I44.2    PAF (paroxysmal atrial fibrillation) ([facility]) I48.0    Atrial fibrillation with rapid ventricular response ([facility]) I48.91    CVA, old, hemiparesis ([facility] I69.359    Vascular dementia without behavioral disturbance ([facility]) F01.50    Cardiac arrhythmia I49.9    Squamous cell carcinoma of scalp C44.42    Seizure disorder ([facility] G40.909     Family History   Problem Relation Age of Onset    Hypertension Mother     Stroke Mother     Hypertension Father     Cancer Father         PROSTATE, MELANOMA    Anesth Problems Neg Hx       Social History     Tobacco Use    Smoking status: Never    Smokeless tobacco: Never   Substance Use Topics    Alcohol use: No     Allergies   Allergen Reactions    Latex Rash    Bactrim [Sulfamethoprim Ds] Hives    Aspirin Other (comments)     Anything higher than 81mg makes pt jittery    Clindamycin Rash    Codeine Hives and Itching    Diltiazem Other (comments)    Iodinated Contrast Media Itching    Pcn [Penicillins] Hives and Itching    Tape [Adhesive] Rash      Current Outpatient Medications   Medication Sig    M- mg/5 mL liquid TAKE 30 ML BY MOUTH EVERY 6 HOURS AS NEEDED FOR PAIN OR FEVER    potassium chloride (KAON 10%) 20 mEq/15 mL solution Take 15 mL by mouth two (2) times a day. amLODIPine (NORVASC) 5 mg tablet Take 1 Tablet by mouth daily. atorvastatin (LIPITOR) 40 mg tablet Take 1 Tablet by mouth nightly. apixaban (Eliquis) 5 mg tablet Take 1 Tablet by mouth two (2) times a day. famotidine (PEPCID) 40 mg/5 mL (8 mg/mL) suspension Take 5 mL by mouth two (2) times a day. levETIRAcetam (KEPPRA) 100 mg/mL solution Take 1 mL by mouth two (2) times a day. metoprolol succinate (TOPROL-XL) 25 mg XL tablet Take 1 Tablet by mouth daily. lactose-reduced food with fibr (Jevity 1.2 [place]) 0.06 gram-1.2 kcal/mL liqd 1 Dose by PEG Tube route daily.  Jevity 1.2 lamont 75ml/hr over 8 hours with free water flushes 4 times daily     No current facility-administered medications for this visit. LAB DATA REVIEWED:     Lab Results   Component Value Date/Time    Hemoglobin A1c 5.7 (H) 07/04/2020 05:43 AM    Hemoglobin A1c (POC) 5.7 08/08/2016 03:43 PM     No results found for: MCACR, MCA1, MCA2, MCA3, MCAU, MCAU2, MCALPOCT  Lab Results   Component Value Date/Time    TSH 0.65 07/04/2020 05:43 AM     No results found for: Rajesh ADALBERTO Purdy3RIA      Lab Results   Component Value Date/Time    WBC 5.0 09/22/2022 10:50 AM    HGB 12.8 09/22/2022 10:50 AM    PLATELET 048 90/68/8512 10:50 AM     Lab Results   Component Value Date/Time    Sodium 136 09/22/2022 10:50 AM    Potassium 4.6 09/22/2022 10:50 AM    Chloride 104 09/22/2022 10:50 AM    CO2 25 09/22/2022 10:50 AM    BUN 11 09/22/2022 10:50 AM    Creatinine 0.40 (L) 09/22/2022 10:50 AM    Calcium 9.0 09/22/2022 10:50 AM    Magnesium 2.1 07/04/2020 05:43 AM    Phosphorus 2.8 07/04/2020 05:43 AM      Lab Results   Component Value Date/Time    Alk.  phosphatase 107 09/22/2022 10:50 AM    Protein, total 6.3 (L) 09/22/2022 10:50 AM    Albumin 3.2 (L) 09/22/2022 10:50 AM    Globulin 3.1 09/22/2022 10:50 AM     No results found for: IRON, FE, TIBC, IBCT, PSAT, FERR             Jennifer Lopez NP

## 2023-01-05 DIAGNOSIS — I69.359 CVA, OLD, HEMIPARESIS (HCC): Primary | ICD-10-CM

## 2023-01-06 ENCOUNTER — DOCUMENTATION ONLY (OUTPATIENT)
Dept: FAMILY MEDICINE CLINIC | Age: 83
End: 2023-01-06

## 2023-01-06 NOTE — PROGRESS NOTES
Home Based Primary Care  Plan of Care    [facility] Team Members: Kwame Harvey MD; ZION Fagan; Italo Martinez NP; Yoni Messer, RN; Emmanuel Rendon RN; Marce Conklin RN; Marita García LCSW    Esther Youngkody  1940 / [id number]  female    The physician has reviewed and discussed the plan of care with the interdisciplinary group on 01/10/23 and agrees. 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    Carpal tunnel syndrome G56.00    Unspecified cataract H26.9    GERD Gastro- Esophageal Reflux Disease K21.9    Degenerative Joint Disese ( improved/resolving) M19.90    Menopausal N95.1    Asymptomatic varicose veins I83.90    Sebaceous cyst L72.3    Keloid L91.0    Scalp lesion L98.9    Abnormal nuclear stress test R94.39    Syncope R55    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    CAD (coronary artery disease) I25.10    S/P cardiac pacemaker procedure Z95.0    Bradycardia R00.1    SSS (sick sinus syndrome) ([facility]) I49.5    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 female who was referred to the [facility] program due to CVA with L hemiparesis, vascular dementia.   Patient discussed today for POC review. Advance Care Planning:  Code status: DNR      Primary Decision MakerHercules Gitelman - Son - [phone]    Primary Decision Maker: Stephanie Bender - Daughter - [phone]    Primary Decision Maker: Vicki Mclaughlin - Daughter - [phone]   Advance Care Planning 8/18/2022   Patient's Healthcare Decision Maker is: Named in scanned ACP document   Confirm Advance Directive -       DME/Supplies:  Hospital Bed     Allergies   Allergen Reactions    Latex Rash    Bactrim [Sulfamethoprim Ds] Hives    Aspirin Other (comments)     Anything higher than 81mg makes pt jittery    Clindamycin Rash    Codeine Hives and Itching    Diltiazem Other (comments)    Iodinated Contrast Media Itching    Pcn [Penicillins] Hives and Itching    Tape [Adhesive] Rash       Nutritional Requirements:   Tube feeds with G / J tube    Functional/Activity Level:  Bedbound only    Safety Measures:   Aspiration risk, Fall risk, and Self-care deficity    Acuity Level Rating: High    Current Outpatient Medications   Medication Sig    M- mg/5 mL liquid TAKE 30 ML BY MOUTH EVERY 6 HOURS AS NEEDED FOR PAIN OR FEVER    potassium chloride (KAON 10%) 20 mEq/15 mL solution Take 15 mL by mouth two (2) times a day. amLODIPine (NORVASC) 5 mg tablet Take 1 Tablet by mouth daily. atorvastatin (LIPITOR) 40 mg tablet Take 1 Tablet by mouth nightly. apixaban (Eliquis) 5 mg tablet Take 1 Tablet by mouth two (2) times a day. famotidine (PEPCID) 40 mg/5 mL (8 mg/mL) suspension Take 5 mL by mouth two (2) times a day. levETIRAcetam (KEPPRA) 100 mg/mL solution Take 1 mL by mouth two (2) times a day. metoprolol succinate (TOPROL-XL) 25 mg XL tablet Take 1 Tablet by mouth daily. lactose-reduced food with fibr (Jevity 1.2 Imtiaz) 0.06 gram-1.2 kcal/mL liqd 1 Dose by PEG Tube route daily. Jevity 1.2 imtiaz 75ml/hr over 8 hours with free water flushes 4 times daily     No current facility-administered medications for this visit.        The Plan of Care has been initiated for during discussion at interdisciplinary group meeting  and reviewed and updated by the Interdisciplinary Group (IDG) as frequently as the patient condition warrants. Plan of Care by Discipline:    1. Provider  Assessment of medication adverse effects, Reduction of polypharmacy within benefit/burden framework appropriate to age, function and disease state, Determine need for laboratory assessment based on patient disease status , Assess results of laboratory testing and adjust treatment plan as appropriate, and Create and implement disease /symptom specific plan to manage high risk conditions / symptoms    2. Nursing  Introduce Home Based Primary Care and Supportive Services, Education regarding disease state, Education for safety; falls, Education for skin care in patients with limited mobility; with focus on preventing skin breakdown, and Skin assessment in patient's with limited mobility    3. Social Work  Declan Bullard-07-A 1498, Establish therapeutic relationship through in home visits and telephonic touch points, Assess safety concerns and address as appropriate, Assess for caregiver burden and intervene as psychosocially indicated, Provide information on available community resources, and Offer counseling services for mental health conditions including but not limited to anxiety, depression, and anticipatory grief      Plan of Care Orders / Action Items:  CVA with L hemiparesis- Right MCA stroke in 2020 with an occluded right internal carotid artery from the carotid bifurcation to the [place] and further intraluminal thrombus at the right MCA bifurcation. Residual dysphagia and L hemiparesis. No contractures currently, son providing passive ROM exercises to extremities. She is functionally dependent for total care.   Continue skin protective measures such as air mattress, frequent repositioning, brief changes/ Purewick urinary device, zinc paste, floating heels. Currently on Eliquis, atorvastatin. Lipid panel and CBC obtained 9/22/22 and at goal.   Dysphagia/ PEG tube- s/t stroke, swallow study 7/2022 notes severe oropharyngeal dysphagia. 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 PEG tube in place with last exchange 12/17/22 in ED due to dislodgement. GI follow-up scheduled 3/7/23. Currently receiving total nutrition, hydration, and medications through PEG (Jevity 1.2cal for total of 8 hours overnight). No recent aspiration, pneumonia, or apparent weight loss. HOB elevated overnight. CMP obtained 9/22/22 with albumin slightly low but improved from previous. CAD- Currently managed with atorvastatin, Eliquis. CBC, Lipid panel obtained 9/22/22 and WNL, will repeat q 6 months. Vascular dementia- Minimally responsive to this provider at most visits. Opens eyes on 1/4/23, but does not verbally respond or make eye contact. Per son and daughter, she will at times endorse pain or request Tylenol. She can typically answer simples yes/no questions but \"some days yes means no and no means yes. \"  Not requiring any medications for behavior, continues sleeping well overnight. Hypertension- At goal with amlodipine, metoprolol via PEG. Compliant with medications. Goal <140/80 due to stroke history but would weigh against primary goal of comfort. A-fib/Sick sinus Syndrome/Pacemaker- [employer] pacemaker in L chest placed 9/2017. Continues with remote interrogations through office of Dr. Kenney Gómez, cardiologist (son obtained a reading during our visit on 1/4/23). Patient unable to endorse symptoms of palpitations or SOB. GERD- Currently managed with Pepcid via PEG. Per son, occasionally has thick secretions that he attributes to worsening symptoms, gives Pepcid and they improve. Discussed suction for oral secretions, but family feels they are well-managed at this time.      Seizure disorder- s/t Stroke in 2020.  Currently managed on Keppra 500mg BID liquid via PEG, has been compliant with this medication. No seizure activity since April of 2021. Swelling of RUE- Intermittent (improved at visit). Per son, was worse last week and felt firm, he massaged and elevated this extremity and swelling improved in about two hours. Does have stiffness of this extremity. No warmth, erythema, or open areas noted. No known history of RUE or breast surgeries. Mobile doppler negative for DVT. Encouraged elevation, rest, cool compress, light compression (ace wrap), and ongoing Tylenol for pain control. Goal of care is comfort.      -Labs: Labs obtained 9/22/22 including CBC, CMP, Lipid panel and stable. Repeat q 6 months or PRN  -AMD: No AMD previously completed. She has a son and two daughters who are next of kin. Son is primary caretaker and current healthcare surrogate. Son states that she would want hospitalization if indicated, but no aggressive measures.   (Son types this in phone and shows provider as not to disturb patient with discussion)  -Code status: DNR, DDNR in chart and in patient's room  -Follow up: in 6 weeks (2/22/23)    Health Maintenance   Topic Date Due    DTaP/Tdap/Td series (1 - Tdap) Never done    Shingles Vaccine (1 of 2) Never done    Depression Screen  08/18/2023    Lipid Screen  09/22/2023    Medicare Yearly Exam  10/29/2023    Bone Densitometry (Dexa) Screening  Completed    Flu Vaccine  Completed    COVID-19 Vaccine  Completed    Pneumococcal 65+ years  Completed       Estimated Visit Frequency:  Monthly Provider Visit  SW visits PRN

## 2023-02-02 ENCOUNTER — TELEPHONE (OUTPATIENT)
Dept: FAMILY MEDICINE CLINIC | Age: 83
End: 2023-02-02

## 2023-02-02 RX ORDER — GUAIFENESIN 100 MG/5ML
200 SOLUTION ORAL
Qty: 180 ML | Refills: 0 | Status: SHIPPED | OUTPATIENT
Start: 2023-02-02

## 2023-02-02 NOTE — TELEPHONE ENCOUNTER
Telephone call returned to son Radha Anderson who reports that patient was coughing a lot last night. Temp is 97.7, no other symptoms. Advised that this nurse has spoken with provider Divine Coker NP and she will send rx for liquid Mucinex to pharmacy. Advised if patient did not improve or begins with other symptoms to give our office a call. He expressed appreciation for quick response.

## 2023-02-02 NOTE — TELEPHONE ENCOUNTER
Call returned to patient's son to inform that the medication can be given by PEG tube. Zackery maki.

## 2023-02-02 NOTE — TELEPHONE ENCOUNTER
Mateusz Turner called to find out how the Robitussin cough medicine should be administered to the patient. He said that by mouth the patient will spit out the medication. Chanell Alvarez wants to know if he can give the cough medication to the patient through her peg tube. His callback is [phone].

## 2023-02-02 NOTE — TELEPHONE ENCOUNTER
Yennifer Covington called to let Trent Seip know that the patient is \"coughing quite a bit\". He said that he is giving her tylenol and asks for a medication for the cough. He said the cough is dry and worse at night.   His callback [phone]

## 2023-02-07 DIAGNOSIS — M51.27 HERNIATED NUCLEUS PULPOSUS OF LUMBOSACRAL REGION: ICD-10-CM

## 2023-02-07 DIAGNOSIS — M19.019 OSTEOARTHRITIS OF SHOULDER, UNSPECIFIED LATERALITY, UNSPECIFIED OSTEOARTHRITIS TYPE: Primary | ICD-10-CM

## 2023-02-07 DIAGNOSIS — M53.9 MULTILEVEL DEGENERATIVE DISC DISEASE: ICD-10-CM

## 2023-02-07 RX ORDER — ACETAMINOPHEN 160 MG/5ML
LIQUID ORAL
Qty: 8100 ML | Refills: 1 | Status: SHIPPED | OUTPATIENT
Start: 2023-02-07

## 2023-02-09 ENCOUNTER — HOSPITAL ENCOUNTER (INPATIENT)
Age: 83
LOS: 3 days | Discharge: HOME | DRG: 379 | End: 2023-02-12
Attending: STUDENT IN AN ORGANIZED HEALTH CARE EDUCATION/TRAINING PROGRAM | Admitting: INTERNAL MEDICINE
Payer: MEDICARE

## 2023-02-09 ENCOUNTER — APPOINTMENT (OUTPATIENT)
Dept: CT IMAGING | Age: 83
DRG: 379 | End: 2023-02-09
Attending: STUDENT IN AN ORGANIZED HEALTH CARE EDUCATION/TRAINING PROGRAM
Payer: MEDICARE

## 2023-02-09 DIAGNOSIS — K92.2 GASTROINTESTINAL HEMORRHAGE, UNSPECIFIED GASTROINTESTINAL HEMORRHAGE TYPE: Primary | ICD-10-CM

## 2023-02-09 LAB
ABO + RH BLD: NORMAL
ALBUMIN SERPL-MCNC: 3 G/DL (ref 3.5–5)
ALBUMIN/GLOB SERPL: 0.8 (ref 1.1–2.2)
ALP SERPL-CCNC: 117 U/L (ref 45–117)
ALT SERPL-CCNC: 30 U/L (ref 12–78)
ANION GAP SERPL CALC-SCNC: 7 MMOL/L (ref 5–15)
AST SERPL-CCNC: 28 U/L (ref 15–37)
BASOPHILS # BLD: 0 K/UL (ref 0–0.1)
BASOPHILS NFR BLD: 0 % (ref 0–1)
BILIRUB SERPL-MCNC: 0.4 MG/DL (ref 0.2–1)
BLOOD GROUP ANTIBODIES SERPL: NORMAL
BUN SERPL-MCNC: 12 MG/DL (ref 6–20)
BUN/CREAT SERPL: 34 (ref 12–20)
CALCIUM SERPL-MCNC: 8.8 MG/DL (ref 8.5–10.1)
CHLORIDE SERPL-SCNC: 95 MMOL/L (ref 97–108)
CO2 SERPL-SCNC: 29 MMOL/L (ref 21–32)
COMMENT, HOLDF: NORMAL
CREAT SERPL-MCNC: 0.35 MG/DL (ref 0.55–1.02)
DIFFERENTIAL METHOD BLD: ABNORMAL
EOSINOPHIL # BLD: 0.1 K/UL (ref 0–0.4)
EOSINOPHIL NFR BLD: 3 % (ref 0–7)
ERYTHROCYTE [DISTWIDTH] IN BLOOD BY AUTOMATED COUNT: 18.1 % (ref 11.5–14.5)
GLOBULIN SER CALC-MCNC: 3.7 G/DL (ref 2–4)
GLUCOSE SERPL-MCNC: 108 MG/DL (ref 65–100)
HCT VFR BLD AUTO: 40 % (ref 35–47)
HGB BLD-MCNC: 13 G/DL (ref 11.5–16)
IMM GRANULOCYTES # BLD AUTO: 0.1 K/UL (ref 0–0.04)
IMM GRANULOCYTES NFR BLD AUTO: 1 % (ref 0–0.5)
LYMPHOCYTES # BLD: 1 K/UL (ref 0.8–3.5)
LYMPHOCYTES NFR BLD: 18 % (ref 12–49)
MCH RBC QN AUTO: 25 PG (ref 26–34)
MCHC RBC AUTO-ENTMCNC: 32.5 G/DL (ref 30–36.5)
MCV RBC AUTO: 76.9 FL (ref 80–99)
MONOCYTES # BLD: 0.6 K/UL (ref 0–1)
MONOCYTES NFR BLD: 12 % (ref 5–13)
NEUTS SEG # BLD: 3.6 K/UL (ref 1.8–8)
NEUTS SEG NFR BLD: 66 % (ref 32–75)
NRBC # BLD: 0 K/UL (ref 0–0.01)
NRBC BLD-RTO: 0 PER 100 WBC
PLATELET # BLD AUTO: 337 K/UL (ref 150–400)
PMV BLD AUTO: 9.6 FL (ref 8.9–12.9)
POTASSIUM SERPL-SCNC: 4.5 MMOL/L (ref 3.5–5.1)
PROT SERPL-MCNC: 6.7 G/DL (ref 6.4–8.2)
RBC # BLD AUTO: 5.2 M/UL (ref 3.8–5.2)
SAMPLES BEING HELD,HOLD: NORMAL
SODIUM SERPL-SCNC: 131 MMOL/L (ref 136–145)
SPECIMEN EXP DATE BLD: NORMAL
WBC # BLD AUTO: 5.5 K/UL (ref 3.6–11)

## 2023-02-09 PROCEDURE — 99285 EMERGENCY DEPT VISIT HI MDM: CPT

## 2023-02-09 PROCEDURE — 74011250637 HC RX REV CODE- 250/637: Performed by: INTERNAL MEDICINE

## 2023-02-09 PROCEDURE — 74011250636 HC RX REV CODE- 250/636: Performed by: STUDENT IN AN ORGANIZED HEALTH CARE EDUCATION/TRAINING PROGRAM

## 2023-02-09 PROCEDURE — 36415 COLL VENOUS BLD VENIPUNCTURE: CPT

## 2023-02-09 PROCEDURE — 86900 BLOOD TYPING SEROLOGIC ABO: CPT

## 2023-02-09 PROCEDURE — 93005 ELECTROCARDIOGRAM TRACING: CPT

## 2023-02-09 PROCEDURE — 74011000250 HC RX REV CODE- 250: Performed by: INTERNAL MEDICINE

## 2023-02-09 PROCEDURE — 96374 THER/PROPH/DIAG INJ IV PUSH: CPT

## 2023-02-09 PROCEDURE — 85025 COMPLETE CBC W/AUTO DIFF WBC: CPT

## 2023-02-09 PROCEDURE — 80053 COMPREHEN METABOLIC PANEL: CPT

## 2023-02-09 PROCEDURE — 74176 CT ABD & PELVIS W/O CONTRAST: CPT

## 2023-02-09 PROCEDURE — 65270000046 HC RM TELEMETRY

## 2023-02-09 RX ORDER — POLYETHYLENE GLYCOL 3350 17 G/17G
17 POWDER, FOR SOLUTION ORAL DAILY PRN
Status: DISCONTINUED | OUTPATIENT
Start: 2023-02-09 | End: 2023-02-12 | Stop reason: HOSPADM

## 2023-02-09 RX ORDER — ACETAMINOPHEN 650 MG/1
650 SUPPOSITORY RECTAL
Status: DISCONTINUED | OUTPATIENT
Start: 2023-02-09 | End: 2023-02-12 | Stop reason: HOSPADM

## 2023-02-09 RX ORDER — METOPROLOL SUCCINATE 25 MG/1
25 TABLET, EXTENDED RELEASE ORAL DAILY
Status: DISCONTINUED | OUTPATIENT
Start: 2023-02-10 | End: 2023-02-12 | Stop reason: HOSPADM

## 2023-02-09 RX ORDER — DIPHENHYDRAMINE HYDROCHLORIDE 50 MG/ML
25 INJECTION, SOLUTION INTRAMUSCULAR; INTRAVENOUS
Status: COMPLETED | OUTPATIENT
Start: 2023-02-09 | End: 2023-02-09

## 2023-02-09 RX ORDER — AMLODIPINE BESYLATE 5 MG/1
5 TABLET ORAL DAILY
Status: DISCONTINUED | OUTPATIENT
Start: 2023-02-10 | End: 2023-02-12 | Stop reason: HOSPADM

## 2023-02-09 RX ORDER — SODIUM CHLORIDE 0.9 % (FLUSH) 0.9 %
5-40 SYRINGE (ML) INJECTION EVERY 8 HOURS
Status: DISCONTINUED | OUTPATIENT
Start: 2023-02-09 | End: 2023-02-12 | Stop reason: HOSPADM

## 2023-02-09 RX ORDER — ONDANSETRON 4 MG/1
4 TABLET, ORALLY DISINTEGRATING ORAL
Status: DISCONTINUED | OUTPATIENT
Start: 2023-02-09 | End: 2023-02-12 | Stop reason: HOSPADM

## 2023-02-09 RX ORDER — SODIUM CHLORIDE 0.9 % (FLUSH) 0.9 %
5-40 SYRINGE (ML) INJECTION AS NEEDED
Status: DISCONTINUED | OUTPATIENT
Start: 2023-02-09 | End: 2023-02-12 | Stop reason: HOSPADM

## 2023-02-09 RX ORDER — LEVETIRACETAM 100 MG/ML
500 SOLUTION ORAL 2 TIMES DAILY
COMMUNITY

## 2023-02-09 RX ORDER — FAMOTIDINE 40 MG/5ML
40 POWDER, FOR SUSPENSION ORAL 2 TIMES DAILY
Status: DISCONTINUED | OUTPATIENT
Start: 2023-02-09 | End: 2023-02-12 | Stop reason: HOSPADM

## 2023-02-09 RX ORDER — ONDANSETRON 2 MG/ML
4 INJECTION INTRAMUSCULAR; INTRAVENOUS
Status: DISCONTINUED | OUTPATIENT
Start: 2023-02-09 | End: 2023-02-12 | Stop reason: HOSPADM

## 2023-02-09 RX ORDER — GUAIFENESIN 100 MG/5ML
200 SOLUTION ORAL
Status: DISCONTINUED | OUTPATIENT
Start: 2023-02-09 | End: 2023-02-12 | Stop reason: HOSPADM

## 2023-02-09 RX ORDER — ACETAMINOPHEN 325 MG/1
650 TABLET ORAL
Status: DISCONTINUED | OUTPATIENT
Start: 2023-02-09 | End: 2023-02-12 | Stop reason: HOSPADM

## 2023-02-09 RX ORDER — ATORVASTATIN CALCIUM 40 MG/1
40 TABLET, FILM COATED ORAL
Status: DISCONTINUED | OUTPATIENT
Start: 2023-02-09 | End: 2023-02-12 | Stop reason: HOSPADM

## 2023-02-09 RX ADMIN — Medication 40 MG: at 18:00

## 2023-02-09 RX ADMIN — ATORVASTATIN CALCIUM 40 MG: 40 TABLET, FILM COATED ORAL at 21:26

## 2023-02-09 RX ADMIN — DIPHENHYDRAMINE HYDROCHLORIDE 25 MG: 50 INJECTION, SOLUTION INTRAMUSCULAR; INTRAVENOUS at 14:30

## 2023-02-09 RX ADMIN — SODIUM CHLORIDE, PRESERVATIVE FREE 10 ML: 5 INJECTION INTRAVENOUS at 22:42

## 2023-02-09 RX ADMIN — ACETAMINOPHEN 650 MG: 160 SOLUTION ORAL at 21:26

## 2023-02-09 RX ADMIN — POTASSIUM BICARBONATE 20 MEQ: 782 TABLET, EFFERVESCENT ORAL at 21:26

## 2023-02-09 RX ADMIN — LEVETIRACETAM 500 MG: 100 SOLUTION ORAL at 22:42

## 2023-02-09 NOTE — ED NOTES
Assumed care of pt at this time. Pt arrives with reports of rectal bleeding that has progressively gotten worse over the past 3 days. Pt on monitor x3 with call bell in reach. Pt family at bedside. 1430: Pt resting with call bell in reach. Pt on monitor x3 and family at bedside. 1545: Pt resting with no complaints. Pt on monitor x3 with call bell in reach. 1645: Pt has no complaints. Pt on monitor x3 with call bell in reach. Pt family at bedside. 1730: Pt resting with  no complaints. Pt on monitor x3 with call bell in reach. Pt family at bedside. 1830: Pt resting in ER stretcher. Pt on monitor x3 with call bell in reach. Pt family at bedside. 1910: Bedside and Verbal shift change report given to Dio Ramos RN (oncoming nurse) by GAMALIEL Santizo (offgoing nurse). Report included the following information SBAR, ED Summary, MAR, and Recent Results.

## 2023-02-09 NOTE — ED PROVIDER NOTES
[address]       Pt Name: Aayush Valdez  MRN: [id number]  [place] 1940  Date of evaluation: 2/9/2023  Provider: Khloe Baxter MD   PCP: Estephania Lechuga NP  Note Started: 1:24 PM 2/9/23     CHIEF COMPLAINT       Chief Complaint   Patient presents with    Rectal Bleeding    Melena        HISTORY OF PRESENT ILLNESS: 1 or more elements      History From: EMS, History limited by: AMS at baseline     Aayush Valdez is a 78 y.o. female with history of stroke with baseline GCS of 6 presents with melena. Per EMS reports a family has been going on for 3 days. Started with bright red light-colored blood noticed in her diaper but then has been progressing to melena. She is acting normally as her baseline GCS is 6 notably patient is unable to provide any history which limits history taking abilities. Please See MDM for Additional Details of the HPI/PMH  Nursing Notes were all reviewed and agreed with or any disagreements were addressed in the HPI. REVIEW OF SYSTEMS        Positives and Pertinent negatives as per HPI.     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    Diverticulosis of colon (without mention of hemorrhage) 2/8/2011    Dysphagia 2/8/2011    Edema, peripheral 2/8/2011    GERD (gastroesophageal reflux disease)     GERD Gastro- Esophageal Reflux Disease 2/8/2011    Herniated nucleus pulposus ( slipped disc) 2/8/2011    Menopausal 2/8/2011    PUD (peptic ulcer disease) 2012    Pure hypercholesterolemia 2/8/2011    Recurrent ear pain 2/8/2011    S/P radiation therapy     Scalp lesion 10/23/2014    Sebaceous cyst 4/22/2014    Unspecified cataract 2/8/2011    Unspecified essential hypertension 2/8/2011    Unspecified sleep apnea        Past Surgical History:  Past Surgical History:   Procedure Laterality Date    HX HYSTERECTOMY      HX ORTHOPAEDIC  neck/back 2006    HX OTHER SURGICAL  5/8/2014     Excise recurrent scalp lesion and application of ACell    HX OTHER SURGICAL  5/8/2014    Excise keloid, anterior chest wall, and application of ACell    HX OTHER SURGICAL  5/8/2014     Excise sebaceous cyst, anterior chest wall    HX OTHER SURGICAL  5/8/2014    Punch biopsy, skin lesions, right forearm x2, right calf x1    PLACE PERCUT GASTROSTOMY TUBE  7/8/2020         IL ICAR CATHETER ABLATION ATRIOVENTR NODE FUNCTION N/A 5/6/2019    ABLATION AV NODE performed by Chandra Cranker, MD at [address] CATH LAB    IL INS NEW/RPLCMT PRM PM W/TRANSV ELTRD ATRIAL&VENT  9/30/2017         [place] RMVL IMPLANTABLE PT-ACTIVATED CAR EVENT RECORDER  9/30/2017            Family History:  Family History   Problem Relation Age of Onset    Hypertension Mother     Stroke Mother     Hypertension Father     Cancer Father         PROSTATE, MELANOMA    Anesth Problems Neg Hx        Social History:  Social History     Tobacco Use    Smoking status: Never    Smokeless tobacco: Never   Substance Use Topics    Alcohol use: No    Drug use: No       Allergies: Allergies   Allergen Reactions    Latex Rash    Bactrim [Sulfamethoprim Ds] Hives    Aspirin Other (comments)     Anything higher than 81mg makes pt jittery    Clindamycin Rash    Codeine Hives and Itching    Diltiazem Other (comments)    Iodinated Contrast Media Itching    Pcn [Penicillins] Hives and Itching    Tape [Adhesive] Rash       CURRENT MEDICATIONS      Current Discharge Medication List        CONTINUE these medications which have NOT CHANGED    Details   cetirizine (ZYRTEC) 5 mg tablet Take 5 mg by mouth.      levETIRAcetam (Keppra) 100 mg/mL solution Take 500 mg by mouth two (2) times a day. acetaminophen (M-PAP) 160 mg/5 mL liquid TAKE 30 ML BY PEG EVERY 6 HOURS AS NEEDED FOR PAIN OR FEVER  Indications: pain associated with arthritis, backache, pain  Qty: 8100 mL, Refills: 1    Comments: Patient averages 3 doses per day = 90 ml/ day = 2700 ml/month  Associated Diagnoses: Osteoarthritis of shoulder, unspecified laterality, unspecified osteoarthritis type; Multilevel degenerative disc disease; Herniated nucleus pulposus of lumbosacral region      guaiFENesin (ROBITUSSIN) 100 mg/5 mL liquid Take 10 mL by mouth three (3) times daily as needed for Cough. Qty: 180 mL, Refills: 0      potassium chloride (KAON 10%) 20 mEq/15 mL solution Take 15 mL by mouth two (2) times a day. Qty: 3800 mL, Refills: 1      amLODIPine (NORVASC) 5 mg tablet Take 1 Tablet by mouth daily. Qty: 90 Tablet, Refills: 1      atorvastatin (LIPITOR) 40 mg tablet Take 1 Tablet by mouth nightly. Qty: 90 Tablet, Refills: 1      apixaban (Eliquis) 5 mg tablet Take 1 Tablet by mouth two (2) times a day. Qty: 180 Tablet, Refills: 1      famotidine (PEPCID) 40 mg/5 mL (8 mg/mL) suspension Take 5 mL by mouth two (2) times a day. Qty: 300 mL, Refills: 5      metoprolol succinate (TOPROL-XL) 25 mg XL tablet Take 1 Tablet by mouth daily. Qty: 90 Tablet, Refills: 1      lactose-reduced food with fibr (Jevity 1.2 Lamont) 0.06 gram-1.2 kcal/mL liqd 1 Dose by PEG Tube route daily. Jevity 1.2 lamont 75ml/hr over 8 hours with free water flushes 4 times daily             SCREENINGS               No data recorded         PHYSICAL EXAM      ED Triage Vitals   ED Encounter Vitals Group      BP 02/09/23 1241 (!) 153/97      Pulse (Heart Rate) 02/09/23 1241 77      Resp Rate 02/09/23 1242 18      Temp 02/09/23 1241 97.5 °F (36.4 °C)      Temp src --       O2 Sat (%) 02/09/23 1241 94 %      Weight 02/09/23 1242 200 lb      Height 02/09/23 1242 5' 6\"        Physical Exam  Vitals reviewed. Constitutional:       Comments: GCS of 6.   Opens eyes to pain and voice   Eyes:      Extraocular Movements: Extraocular movements intact. Pupils: Pupils are equal, round, and reactive to light. Abdominal:      Comments: PEG tube in place with no purulence, non-peritoneal, no wincing to palpation   Skin:     General: Skin is warm and dry. Neurological:      Comments: nonverbal        DIAGNOSTIC RESULTS   LABS:     Recent Results (from the past 12 hour(s))   CBC WITH AUTOMATED DIFF    Collection Time: 02/12/23 12:50 AM   Result Value Ref Range    WBC 6.3 3.6 - 11.0 K/uL    RBC 5.42 (H) 3.80 - 5.20 M/uL    HGB 13.7 11.5 - 16.0 g/dL    HCT 42.1 35.0 - 47.0 %    MCV 77.7 (L) 80.0 - 99.0 FL    MCH 25.3 (L) 26.0 - 34.0 PG    MCHC 32.5 30.0 - 36.5 g/dL    RDW 18.7 (H) 11.5 - 14.5 %    PLATELET 666 571 - 482 K/uL    MPV 9.6 8.9 - 12.9 FL    NRBC 0.0 0  WBC    ABSOLUTE NRBC 0.00 0.00 - 0.01 K/uL    NEUTROPHILS 73 32 - 75 %    LYMPHOCYTES 13 12 - 49 %    MONOCYTES 11 5 - 13 %    EOSINOPHILS 1 0 - 7 %    BASOPHILS 1 0 - 1 %    IMMATURE GRANULOCYTES 1 (H) 0.0 - 0.5 %    ABS. NEUTROPHILS 4.7 1.8 - 8.0 K/UL    ABS. LYMPHOCYTES 0.8 0.8 - 3.5 K/UL    ABS. MONOCYTES 0.7 0.0 - 1.0 K/UL    ABS. EOSINOPHILS 0.1 0.0 - 0.4 K/UL    ABS. BASOPHILS 0.0 0.0 - 0.1 K/UL    ABS. IMM. GRANS. 0.0 0.00 - 0.04 K/UL    DF AUTOMATED     METABOLIC PANEL, BASIC    Collection Time: 02/12/23 12:50 AM   Result Value Ref Range    Sodium 135 (L) 136 - 145 mmol/L    Potassium 4.3 3.5 - 5.1 mmol/L    Chloride 97 97 - 108 mmol/L    CO2 32 21 - 32 mmol/L    Anion gap 6 5 - 15 mmol/L    Glucose 118 (H) 65 - 100 mg/dL    BUN 12 6 - 20 MG/DL    Creatinine 0.53 (L) 0.55 - 1.02 MG/DL    BUN/Creatinine ratio 23 (H) 12 - 20      eGFR >60 >60 ml/min/1.73m2    Calcium 9.3 8.5 - 10.1 MG/DL        EKG:  If performed, independent interpretation documented below in the MDM section     RADIOLOGY:  Non-plain film images such as CT, Ultrasound and MRI are read by the radiologist. Plain radiographic images are visualized and preliminarily interpreted by the ED Provider with the findings documented in the MDM section. Interpretation per the Radiologist below, if available at the time of this note:     No results found.       PROCEDURES   Unless otherwise noted below, none  Procedures     CRITICAL CARE TIME   none    EMERGENCY DEPARTMENT COURSE and DIFFERENTIAL DIAGNOSIS/MDM   Vitals:    Vitals:    02/12/23 0000 02/12/23 0300 02/12/23 0400 02/12/23 0743   BP:  122/78  111/61   Pulse: 72 77 77 71   Resp:  16  18   Temp:  98.1 °F (36.7 °C)  99.1 °F (37.3 °C)   SpO2:  94%  100%   Weight:       Height:            Patient was given the following medications:  Medications   amLODIPine (NORVASC) tablet 5 mg (5 mg Oral Given 2/11/23 0914)   atorvastatin (LIPITOR) tablet 40 mg (40 mg Oral Given 2/11/23 2220)   famotidine (PEPCID) 40 mg/5 mL (8 mg/mL) oral suspension 40 mg (40 mg Oral Given 2/11/23 1800)   guaiFENesin (ROBITUSSIN) 100 mg/5 mL oral liquid 200 mg (has no administration in time range)   levETIRAcetam (KEPPRA) oral solution 500 mg (500 mg Oral Given 2/11/23 2220)   metoprolol succinate (TOPROL-XL) XL tablet 25 mg (25 mg Oral Given 2/11/23 0914)   potassium bicarb-citric acid (EFFER-K) tablet 20 mEq (20 mEq Oral Given 2/11/23 2220)   sodium chloride (NS) flush 5-40 mL (10 mL IntraVENous Given 2/12/23 0617)   sodium chloride (NS) flush 5-40 mL (has no administration in time range)   acetaminophen (TYLENOL) tablet 650 mg (has no administration in time range)     Or   acetaminophen (TYLENOL) suppository 650 mg (has no administration in time range)   polyethylene glycol (MIRALAX) packet 17 g (has no administration in time range)   ondansetron (ZOFRAN ODT) tablet 4 mg (has no administration in time range)     Or   ondansetron (ZOFRAN) injection 4 mg (has no administration in time range)   balsam peru-castor oiL (VENELEX) ointment ( Topical Given 2/11/23 2100)   cetirizine (ZYRTEC) tablet 5 mg (5 mg Oral Given 2/11/23 1858)   apixaban [facility]) tablet 5 mg (5 mg Oral Given 2/11/23 2220)   diphenhydrAMINE (BENADRYL) injection 25 mg (25 mg IntraVENous Given 2/9/23 1430)       Medical Decision Making  40-year-old female with history of CVA presents with melena. Vital signs are stable with notable hypertension. She does not appear to be in hemorrhagic shock. On exam she is at her baseline GCS of 6 and is nonverbal.  She is on Eliquis which is a complicating factor in her GI bleed. We will get basic labs evaluate for any signs of anemia, ALISON. We will also get a urinalysis to evaluate for hematuria. We will get a CT of the abdomen to evaluate for any signs of active GI bleeding including diverticulitis, colon cancer, AVM. I suspect this to be a upper GI bleed likely from her PEG tube. May need to just be on a PPI. We will review images and lab work for [address] Amount and/or Complexity of Data Reviewed  Labs: ordered. Radiology: ordered. ECG/medicine tests: ordered. Risk  OTC drugs. Prescription drug management. Decision regarding hospitalization. Reviewed imaging and labs, hgb stable. Will admit for persistant GI bleed on anticoagulation           FINAL IMPRESSION     1. Gastrointestinal hemorrhage, unspecified gastrointestinal hemorrhage type          DISPOSITION/PLAN   Elías Landeros's  results have been reviewed with her. She has been counseled regarding her diagnosis, treatment, and plan. She verbally conveys understanding and agreement of the signs, symptoms, diagnosis, treatment and prognosis and additionally agrees to follow up as discussed. She also agrees with the care-plan and conveys that all of her questions have been answered. CLINICAL IMPRESSION    Admit Note: Pt is being admitted by [facility]. The results of their tests and reason(s) for their admission have been discussed with pt and/or available family.  They convey agreement and understanding for the need to be admitted and for the admission diagnosis. PATIENT REFERRED TO:  Follow-up Information       Follow up With Specialties Details Why Contact Info    Hiram Chaudhari NP Nurse Practitioner   [place] [phone]                DISCHARGE MEDICATIONS:  Current Discharge Medication List        CONTINUE these medications which have NOT CHANGED    Details   cetirizine (ZYRTEC) 5 mg tablet Take 5 mg by mouth.      levETIRAcetam (Keppra) 100 mg/mL solution Take 500 mg by mouth two (2) times a day. acetaminophen (M-PAP) 160 mg/5 mL liquid TAKE 30 ML BY PEG EVERY 6 HOURS AS NEEDED FOR PAIN OR FEVER  Indications: pain associated with arthritis, backache, pain  Qty: 8100 mL, Refills: 1    Comments: Patient averages 3 doses per day = 90 ml/ day = 2700 ml/month  Associated Diagnoses: Osteoarthritis of shoulder, unspecified laterality, unspecified osteoarthritis type; Multilevel degenerative disc disease; Herniated nucleus pulposus of lumbosacral region      guaiFENesin (ROBITUSSIN) 100 mg/5 mL liquid Take 10 mL by mouth three (3) times daily as needed for Cough. Qty: 180 mL, Refills: 0      potassium chloride (KAON 10%) 20 mEq/15 mL solution Take 15 mL by mouth two (2) times a day. Qty: 3800 mL, Refills: 1      amLODIPine (NORVASC) 5 mg tablet Take 1 Tablet by mouth daily. Qty: 90 Tablet, Refills: 1      atorvastatin (LIPITOR) 40 mg tablet Take 1 Tablet by mouth nightly. Qty: 90 Tablet, Refills: 1      apixaban (Eliquis) 5 mg tablet Take 1 Tablet by mouth two (2) times a day. Qty: 180 Tablet, Refills: 1      famotidine (PEPCID) 40 mg/5 mL (8 mg/mL) suspension Take 5 mL by mouth two (2) times a day. Qty: 300 mL, Refills: 5      metoprolol succinate (TOPROL-XL) 25 mg XL tablet Take 1 Tablet by mouth daily. Qty: 90 Tablet, Refills: 1      lactose-reduced food with fibr (Jevity 1.2 Lamont) 0.06 gram-1.2 kcal/mL liqd 1 Dose by PEG Tube route daily.  Jevity 1.2 lamont 75ml/hr over 8 hours with free water flushes 4 times daily               DISCONTINUED MEDICATIONS:  Current Discharge Medication List          I am the Primary Clinician of Record. Renee Abdi MD (electronically signed)    (Please note that parts of this dictation were completed with voice recognition software. Quite often unanticipated grammatical, syntax, homophones, and other interpretive errors are inadvertently transcribed by the computer software. Please disregards these errors.  Please excuse any errors that have escaped final proofreading.)

## 2023-02-09 NOTE — PROGRESS NOTES
Pharmacy Medication Reconciliation     The patient was interviewed regarding current PTA medication list, use and drug allergies;  patient present in room and obtained permission from patient to discuss drug regimen with visitor(s) present. The patient was questioned regarding use of any other inhalers, topical products, over the counter medications, herbal medications, vitamin products or ophthalmic/nasal/otic medication use. Allergy Update: Latex, Bactrim [sulfamethoprim ds], Aspirin, Clindamycin, Codeine, Diltiazem, Iodinated contrast media, Pcn [penicillins], and Tape [adhesive]    Recommendations/Findings: The following amendments were made to the patient's active medication list on file at [facility]:   1) Additions: none  2) Deletions: none  3) Changes:   -keppra is 500mg bid  Pertinent Findings: none    Clarified PTA med list with rx query, patient's  (med list). PTA medication list was corrected to the following:     Prior to Admission Medications   Prescriptions Last Dose Informant Taking?   acetaminophen (M-PAP) 160 mg/5 mL liquid  Significant Other Yes   Sig: TAKE 30 ML BY PEG EVERY 6 HOURS AS NEEDED FOR PAIN OR FEVER  Indications: pain associated with arthritis, backache, pain   amLODIPine (NORVASC) 5 mg tablet 2023 Significant Other Yes   Sig: Take 1 Tablet by mouth daily. apixaban (Eliquis) 5 mg tablet 2023 at 0900 Significant Other Yes   Sig: Take 1 Tablet by mouth two (2) times a day. atorvastatin (LIPITOR) 40 mg tablet 2023 at 2100 Significant Other Yes   Sig: Take 1 Tablet by mouth nightly. famotidine (PEPCID) 40 mg/5 mL (8 mg/mL) suspension 2023 Significant Other Yes   Sig: Take 5 mL by mouth two (2) times a day. guaiFENesin (ROBITUSSIN) 100 mg/5 mL liquid  Significant Other Yes   Sig: Take 10 mL by mouth three (3) times daily as needed for Cough.    lactose-reduced food with fibr (Jevity 1.2 Lamont) 0.06 gram-1.2 kcal/mL liqd  Significant Other Yes   Si Dose by PEG Tube route daily. Jevity 1.2 imtiaz 75ml/hr over 8 hours with free water flushes 4 times daily   levETIRAcetam (Keppra) 100 mg/mL solution  Significant Other Yes   Sig: Take 500 mg by mouth two (2) times a day. metoprolol succinate (TOPROL-XL) 25 mg XL tablet  Significant Other Yes   Sig: Take 1 Tablet by mouth daily. potassium chloride (KAON 10%) 20 mEq/15 mL solution  Significant Other Yes   Sig: Take 15 mL by mouth two (2) times a day.       Facility-Administered Medications: None        Thank you,  MARIAJOSE Fuentes

## 2023-02-09 NOTE — ED TRIAGE NOTES
Pt arrives to ed via ems w reports of rectal bleeding darkening in color w onset 3 days. Dark red blood per photos seen by ems. Takes eliquis. Vitals stable for EMS. BL: nonverbal, unable to repsond w GCS 6 following CVA in 2020. Abd rigid in spots per ems. Hx cervical spine fusion per ems.

## 2023-02-09 NOTE — H&P
Hospitalist Admission Note    NAME: Ana Paula Reyes   :  1940   MRN:  [id number]     Date/Time:  2023 4:19 PM    Patient PCP: Rodo Castellano NP  _____________________________________________________________________  Given the patient's current clinical presentation, I have a high level of concern for decompensation if discharged from the emergency department. Complex decision making was performed, which includes reviewing the patient's available past medical records, laboratory results, and x-ray films. My assessment of this patient's clinical condition and my plan of care is as follows. Assessment / Plan:  Hematochezia  GIB  Likely diverticular bleed  Patient not currently anemic  Hold Eliquis  Trend hemoglobin and transfuse for hemoglobin less than 7  Monitor stool for hematochezia  Will hold off on GI consult unless worsening anemia  CT scan reviewed and agree that there is no significant pathology    Unclear why patient is on eliquis, patient's son reports she has been on this before her CVA in 2020      History of   Holding eliquis  Hold tube feeds for now  C/w metoprolol  Supportive care    Code Status: Full  Surrogate Decision Maker: Son    DVT Prophylaxis: SCD  GI Prophylaxis: not indicated    Baseline: Non-verbal, does not follow commands. Subjective:   CHIEF COMPLAINT:  Bright red blood    HISTORY OF PRESENT ILLNESS:     Felicia Sykes is a 78 y.o. 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 History was obtained from son who was at bedside and primary caregiver. Patient has been having bright red blood per rectum for the past 3 days. Patient son has pictures which indeed are bright red blood.   She also had some episodes of passing clots.  She does not seem to be in any pain. She had been tolerating her tube feeds. Been having any nausea or vomiting. No diarrhea. Patient's son is a primary caretaker and provides excellent care and no signs of any skin breakdown. We were asked to admit for work up and evaluation of the above problems.      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    Diverticulosis of colon (without mention of hemorrhage) 2/8/2011    Dysphagia 2/8/2011    Edema, peripheral 2/8/2011    GERD (gastroesophageal reflux disease)     GERD Gastro- Esophageal Reflux Disease 2/8/2011    Herniated nucleus pulposus ( slipped disc) 2/8/2011    Menopausal 2/8/2011    PUD (peptic ulcer disease) 2012    Pure hypercholesterolemia 2/8/2011    Recurrent ear pain 2/8/2011    S/P radiation therapy     Scalp lesion 10/23/2014    Sebaceous cyst 4/22/2014    Unspecified cataract 2/8/2011    Unspecified essential hypertension 2/8/2011    Unspecified sleep apnea         Past Surgical History:   Procedure Laterality Date    HX HYSTERECTOMY      HX ORTHOPAEDIC  neck/back 2006    HX OTHER SURGICAL  5/8/2014     Excise recurrent scalp lesion and application of ACell    HX OTHER SURGICAL  5/8/2014    Excise keloid, anterior chest wall, and application of ACell    HX OTHER SURGICAL  5/8/2014     Excise sebaceous cyst, anterior chest wall    HX OTHER SURGICAL  5/8/2014    Punch biopsy, skin lesions, right forearm x2, right calf x1    PLACE PERCUT GASTROSTOMY TUBE  7/8/2020         OK ICAR CATHETER ABLATION ATRIOVENTR NODE FUNCTION N/A 5/6/2019    ABLATION AV NODE performed by Han Contreras MD at [address] [address], [facility]  CATH LAB    OK INS NEW/RPLCMT PRM PM W/TRANSV ELTRD ATRIAL&VENT  9/30/2017         [place] RMVL IMPLANTABLE PT-ACTIVATED CAR EVENT RECORDER  9/30/2017            Social History     Tobacco Use    Smoking status: Never    Smokeless tobacco: Never   Substance Use Topics    Alcohol use: No        Family History   Problem Relation Age of Onset    Hypertension Mother     Stroke Mother     Hypertension Father     Cancer Father         PROSTATE, MELANOMA    Anesth Problems Neg Hx      Allergies   Allergen Reactions    Latex Rash    Bactrim [Sulfamethoprim Ds] Hives    Aspirin Other (comments)     Anything higher than 81mg makes pt jittery    Clindamycin Rash    Codeine Hives and Itching    Diltiazem Other (comments)    Iodinated Contrast Media Itching    Pcn [Penicillins] Hives and Itching    Tape [Adhesive] Rash        Prior to Admission medications    Medication Sig Start Date End Date Taking? Authorizing Provider   acetaminophen (M-PAP) 160 mg/5 mL liquid TAKE 30 ML BY PEG EVERY 6 HOURS AS NEEDED FOR PAIN OR FEVER  Indications: pain associated with arthritis, backache, pain 2/7/23   Hiram Chaudhari NP   guaiFENesin (ROBITUSSIN) 100 mg/5 mL liquid Take 10 mL by mouth three (3) times daily as needed for Cough. 2/2/23   Hiram Chaudhari NP   potassium chloride (KAON 10%) 20 mEq/15 mL solution Take 15 mL by mouth two (2) times a day. 12/5/22   Hiram Chaudhari NP   amLODIPine (NORVASC) 5 mg tablet Take 1 Tablet by mouth daily. 9/22/22   Hiram Chaudhari NP   atorvastatin (LIPITOR) 40 mg tablet Take 1 Tablet by mouth nightly. 9/22/22   Hiram Chaudhari NP   apixaban (Eliquis) 5 mg tablet Take 1 Tablet by mouth two (2) times a day. 9/22/22   Hiram Chaudhari NP   famotidine (PEPCID) 40 mg/5 mL (8 mg/mL) suspension Take 5 mL by mouth two (2) times a day. 9/22/22   Hiram Chaudhari NP   levETIRAcetam (KEPPRA) 100 mg/mL solution Take 1 mL by mouth two (2) times a day.  9/22/22   Hiram Chaudhari NP   metoprolol succinate (TOPROL-XL) 25 mg XL tablet Take 1 Tablet by mouth daily. 9/22/22   Felipa Amin NP   lactose-reduced food with fibr (Jevity 1.2 Lamont) 0.06 gram-1.2 kcal/mL liqd 1 Dose by PEG Tube route daily. Jevity 1.2 lamont 75ml/hr over 8 hours with free water flushes 4 times daily 4/9/21   Provider, Historical       REVIEW OF SYSTEMS:     I am not able to complete the review of systems because:    The patient is intubated and sedated    The patient has altered mental status due to his acute medical problems    The patient has baseline aphasia from prior stroke(s)    The patient has baseline dementia and is not reliable historian    The patient is in acute medical distress and unable to provide information           Total of 12 systems reviewed as follows:       POSITIVE= underlined text  Negative = text not underlined  General:  fever, chills, sweats, generalized weakness, weight loss/gain,      loss of appetite   Eyes:    blurred vision, eye pain, loss of vision, double vision  ENT:    rhinorrhea, pharyngitis   Respiratory:   cough, sputum production, SOB, FRANK, wheezing, pleuritic pain   Cardiology:   chest pain, palpitations, orthopnea, PND, edema, syncope   Gastrointestinal:  abdominal pain , N/V, diarrhea, dysphagia, constipation, bleeding   Genitourinary:  frequency, urgency, dysuria, hematuria, incontinence   Muskuloskeletal :  arthralgia, myalgia, back pain  Hematology:  easy bruising, nose or gum bleeding, lymphadenopathy   Dermatological: rash, ulceration, pruritis, color change / jaundice  Endocrine:   hot flashes or polydipsia   Neurological:  headache, dizziness, confusion, focal weakness, paresthesia,     Speech difficulties, memory loss, gait difficulty  Psychological: Feelings of anxiety, depression, agitation    Objective:   VITALS:    Visit Vitals  /73   Pulse 70   Temp 97.5 °F (36.4 °C)   Resp 18   Ht 5' 6\" (1.676 m)   Wt 90.7 kg (200 lb)   SpO2 100%   BMI 32.28 kg/m²       PHYSICAL EXAM:    General:    Alert, cooperative, no distress, appears stated age. HEENT: Atraumatic, anicteric sclerae, pink conjunctivae     No oral ulcers, mucosa moist, throat clear, dentition fair  Neck:  Supple, symmetrical,  thyroid: non tender  Lungs:   Clear to auscultation bilaterally. No Wheezing or Rhonchi. No rales. Chest wall:  No tenderness  No Accessory muscle use. Heart:   Regular  rhythm,  No  murmur   No edema  Abdomen:   Soft, non-tender. Not distended. Bowel sounds normal, PEG tube in place. No guarding or rebound. Extremities: No cyanosis. No clubbing,      Skin turgor normal, Capillary refill normal, Radial dial pulse 2+  Skin:     Not pale. Not Jaundiced  No rashes   Psych:  Good insight. Not depressed. Not anxious or agitated. Neurologic: EOMs intact. No facial asymmetry. No aphasia or slurred speech. Symmetrical strength, Sensation grossly intact. Alert and oriented X 4.     _______________________________________________________________________  Care Plan discussed with:    Comments   Patient     Family      RN     Care Manager                    Consultant:      _______________________________________________________________________  Expected  Disposition:   Home with Family    HH/PT/OT/RN    SNF/LTC    ADRIANA    ________________________________________________________________________  TOTAL TIME:    Minutes    Critical Care Provided     Minutes non procedure based      Comments     Reviewed previous records   >50% of visit spent in counseling and coordination of care  Discussion with patient and/or family and questions answered       Given the patient's current clinical presentation, I have a high level of concern for decompensation if discharged from the ED. Complex decision making was performed which includes reviewing the patient's available past medical records, laboratory results, and Xray films. I have also directly communicated my plan and discussed this case with the involved ED physician. ____________________________________________________________________  Lakshmi Rodriguez MD    Procedures: see electronic medical records for all procedures/Xrays and details which were not copied into this note but were reviewed prior to creation of Plan. LAB DATA REVIEWED:    Recent Results (from the past 24 hour(s))   METABOLIC PANEL, COMPREHENSIVE    Collection Time: 02/09/23 12:58 PM   Result Value Ref Range    Sodium 131 (L) 136 - 145 mmol/L    Potassium 4.5 3.5 - 5.1 mmol/L    Chloride 95 (L) 97 - 108 mmol/L    CO2 29 21 - 32 mmol/L    Anion gap 7 5 - 15 mmol/L    Glucose 108 (H) 65 - 100 mg/dL    BUN 12 6 - 20 MG/DL    Creatinine 0.35 (L) 0.55 - 1.02 MG/DL    BUN/Creatinine ratio 34 (H) 12 - 20      eGFR >60 >60 ml/min/1.73m2    Calcium 8.8 8.5 - 10.1 MG/DL    Bilirubin, total 0.4 0.2 - 1.0 MG/DL    ALT (SGPT) 30 12 - 78 U/L    AST (SGOT) 28 15 - 37 U/L    Alk. phosphatase 117 45 - 117 U/L    Protein, total 6.7 6.4 - 8.2 g/dL    Albumin 3.0 (L) 3.5 - 5.0 g/dL    Globulin 3.7 2.0 - 4.0 g/dL    A-G Ratio 0.8 (L) 1.1 - 2.2     CBC WITH AUTOMATED DIFF    Collection Time: 02/09/23 12:58 PM   Result Value Ref Range    WBC 5.5 3.6 - 11.0 K/uL    RBC 5.20 3.80 - 5.20 M/uL    HGB 13.0 11.5 - 16.0 g/dL    HCT 40.0 35.0 - 47.0 %    MCV 76.9 (L) 80.0 - 99.0 FL    MCH 25.0 (L) 26.0 - 34.0 PG    MCHC 32.5 30.0 - 36.5 g/dL    RDW 18.1 (H) 11.5 - 14.5 %    PLATELET 356 501 - 354 K/uL    MPV 9.6 8.9 - 12.9 FL    NRBC 0.0 0  WBC    ABSOLUTE NRBC 0.00 0.00 - 0.01 K/uL    NEUTROPHILS 66 32 - 75 %    LYMPHOCYTES 18 12 - 49 %    MONOCYTES 12 5 - 13 %    EOSINOPHILS 3 0 - 7 %    BASOPHILS 0 0 - 1 %    IMMATURE GRANULOCYTES 1 (H) 0.0 - 0.5 %    ABS. NEUTROPHILS 3.6 1.8 - 8.0 K/UL    ABS. LYMPHOCYTES 1.0 0.8 - 3.5 K/UL    ABS. MONOCYTES 0.6 0.0 - 1.0 K/UL    ABS. EOSINOPHILS 0.1 0.0 - 0.4 K/UL    ABS. BASOPHILS 0.0 0.0 - 0.1 K/UL    ABS. IMM.  GRANS. 0.1 (H) 0.00 - 0.04 K/UL    DF AUTOMATED     TYPE & SCREEN    Collection Time: 02/09/23 12:58 PM   Result Value Ref Range    Crossmatch Expiration 02/12/2023,2359     ABO/Rh(D) AB POSITIVE     Antibody screen NEG    SAMPLES BEING HELD    Collection Time: 02/09/23 12:58 PM   Result Value Ref Range    SAMPLES BEING HELD DRK GRN     COMMENT        Add-on orders for these samples will be processed based on acceptable specimen integrity and analyte stability, which may vary by analyte. EKG, 12 LEAD, INITIAL    Collection Time: 02/09/23  1:00 PM   Result Value Ref Range    Ventricular Rate 70 BPM    Atrial Rate 340 BPM    QRS Duration 158 ms    Q-T Interval 470 ms    QTC Calculation (Bezet) 507 ms    Calculated R Axis -63 degrees    Calculated T Axis 69 degrees    Diagnosis       Ventricular-paced rhythm  When compared with ECG of 19-NOV-2020 21:37,  Vent.  rate has decreased BY   5 BPM

## 2023-02-10 LAB
ANION GAP SERPL CALC-SCNC: 6 MMOL/L (ref 5–15)
ATRIAL RATE: 340 BPM
BASOPHILS # BLD: 0 K/UL (ref 0–0.1)
BASOPHILS NFR BLD: 1 % (ref 0–1)
BUN SERPL-MCNC: 10 MG/DL (ref 6–20)
BUN/CREAT SERPL: 29 (ref 12–20)
CALCIUM SERPL-MCNC: 9.2 MG/DL (ref 8.5–10.1)
CALCULATED R AXIS, ECG10: -63 DEGREES
CALCULATED T AXIS, ECG11: 69 DEGREES
CHLORIDE SERPL-SCNC: 98 MMOL/L (ref 97–108)
CO2 SERPL-SCNC: 29 MMOL/L (ref 21–32)
CREAT SERPL-MCNC: 0.35 MG/DL (ref 0.55–1.02)
DIAGNOSIS, 93000: NORMAL
DIFFERENTIAL METHOD BLD: ABNORMAL
EOSINOPHIL # BLD: 0.1 K/UL (ref 0–0.4)
EOSINOPHIL NFR BLD: 2 % (ref 0–7)
ERYTHROCYTE [DISTWIDTH] IN BLOOD BY AUTOMATED COUNT: 18 % (ref 11.5–14.5)
GLUCOSE BLD STRIP.AUTO-MCNC: 102 MG/DL (ref 65–117)
GLUCOSE SERPL-MCNC: 82 MG/DL (ref 65–100)
HCT VFR BLD AUTO: 39.6 % (ref 35–47)
HGB BLD-MCNC: 12.9 G/DL (ref 11.5–16)
IMM GRANULOCYTES # BLD AUTO: 0 K/UL (ref 0–0.04)
IMM GRANULOCYTES NFR BLD AUTO: 1 % (ref 0–0.5)
LYMPHOCYTES # BLD: 0.8 K/UL (ref 0.8–3.5)
LYMPHOCYTES NFR BLD: 13 % (ref 12–49)
MAGNESIUM SERPL-MCNC: 2 MG/DL (ref 1.6–2.4)
MCH RBC QN AUTO: 24.8 PG (ref 26–34)
MCHC RBC AUTO-ENTMCNC: 32.6 G/DL (ref 30–36.5)
MCV RBC AUTO: 76.2 FL (ref 80–99)
MONOCYTES # BLD: 0.6 K/UL (ref 0–1)
MONOCYTES NFR BLD: 9 % (ref 5–13)
NEUTS SEG # BLD: 4.6 K/UL (ref 1.8–8)
NEUTS SEG NFR BLD: 74 % (ref 32–75)
NRBC # BLD: 0 K/UL (ref 0–0.01)
NRBC BLD-RTO: 0 PER 100 WBC
PHOSPHATE SERPL-MCNC: 3.7 MG/DL (ref 2.6–4.7)
PLATELET # BLD AUTO: 325 K/UL (ref 150–400)
PMV BLD AUTO: 9.3 FL (ref 8.9–12.9)
POTASSIUM SERPL-SCNC: 4 MMOL/L (ref 3.5–5.1)
Q-T INTERVAL, ECG07: 470 MS
QRS DURATION, ECG06: 158 MS
QTC CALCULATION (BEZET), ECG08: 507 MS
RBC # BLD AUTO: 5.2 M/UL (ref 3.8–5.2)
SERVICE CMNT-IMP: NORMAL
SODIUM SERPL-SCNC: 133 MMOL/L (ref 136–145)
VENTRICULAR RATE, ECG03: 70 BPM
WBC # BLD AUTO: 6.3 K/UL (ref 3.6–11)

## 2023-02-10 PROCEDURE — 85025 COMPLETE CBC W/AUTO DIFF WBC: CPT

## 2023-02-10 PROCEDURE — 36415 COLL VENOUS BLD VENIPUNCTURE: CPT

## 2023-02-10 PROCEDURE — 84100 ASSAY OF PHOSPHORUS: CPT

## 2023-02-10 PROCEDURE — 74011000250 HC RX REV CODE- 250: Performed by: INTERNAL MEDICINE

## 2023-02-10 PROCEDURE — 74011250637 HC RX REV CODE- 250/637: Performed by: INTERNAL MEDICINE

## 2023-02-10 PROCEDURE — 80048 BASIC METABOLIC PNL TOTAL CA: CPT

## 2023-02-10 PROCEDURE — 83735 ASSAY OF MAGNESIUM: CPT

## 2023-02-10 PROCEDURE — 65270000046 HC RM TELEMETRY

## 2023-02-10 PROCEDURE — 82962 GLUCOSE BLOOD TEST: CPT

## 2023-02-10 RX ORDER — BALSAM PERU/CASTOR OIL
OINTMENT (GRAM) TOPICAL 2 TIMES DAILY
Status: DISCONTINUED | OUTPATIENT
Start: 2023-02-10 | End: 2023-02-12 | Stop reason: HOSPADM

## 2023-02-10 RX ORDER — CETIRIZINE HYDROCHLORIDE 5 MG/1
5 TABLET ORAL
COMMUNITY
End: 2023-02-16

## 2023-02-10 RX ORDER — CETIRIZINE HYDROCHLORIDE 10 MG/1
5 TABLET ORAL 2 TIMES DAILY
Status: DISCONTINUED | OUTPATIENT
Start: 2023-02-10 | End: 2023-02-12 | Stop reason: HOSPADM

## 2023-02-10 RX ADMIN — CETIRIZINE HYDROCHLORIDE 5 MG: 10 TABLET, FILM COATED ORAL at 18:56

## 2023-02-10 RX ADMIN — SODIUM CHLORIDE, PRESERVATIVE FREE 10 ML: 5 INJECTION INTRAVENOUS at 22:39

## 2023-02-10 RX ADMIN — LEVETIRACETAM 500 MG: 100 SOLUTION ORAL at 22:38

## 2023-02-10 RX ADMIN — SODIUM CHLORIDE, PRESERVATIVE FREE 10 ML: 5 INJECTION INTRAVENOUS at 18:56

## 2023-02-10 RX ADMIN — Medication 40 MG: at 09:00

## 2023-02-10 RX ADMIN — POTASSIUM BICARBONATE 20 MEQ: 782 TABLET, EFFERVESCENT ORAL at 22:38

## 2023-02-10 RX ADMIN — AMLODIPINE BESYLATE 5 MG: 5 TABLET ORAL at 08:52

## 2023-02-10 RX ADMIN — CASTOR OIL AND BALSAM, PERU: 788; 87 OINTMENT TOPICAL at 21:00

## 2023-02-10 RX ADMIN — LEVETIRACETAM 500 MG: 100 SOLUTION ORAL at 08:54

## 2023-02-10 RX ADMIN — ATORVASTATIN CALCIUM 40 MG: 40 TABLET, FILM COATED ORAL at 22:38

## 2023-02-10 RX ADMIN — METOPROLOL SUCCINATE 25 MG: 50 TABLET, EXTENDED RELEASE ORAL at 08:52

## 2023-02-10 RX ADMIN — Medication 40 MG: at 18:00

## 2023-02-10 RX ADMIN — POTASSIUM BICARBONATE 20 MEQ: 782 TABLET, EFFERVESCENT ORAL at 08:53

## 2023-02-10 NOTE — ED NOTES
TRANSITION OF CARE - SBAR OUT    Patient is being transferred to [facility], Room# 3121. Report GIVEN TO Rosa Elena Dumont RN on Karmen Massey for routine progression of care. Report is consisted of the following information SBAR. Patient transferred to receiving unit by: tech (RN or Tech Name)     Called outstanding consults: Yes   Collected routine labs: Yes     All current orders reviewed with accepting nurse: Yes    The following personal items will be sent with the patient during transfer to the floor: All valuables:                          CARDIAC MONITORING ORDERED: Yes     The following CURRENT information were reported to the receiving RN:    CODE STATUS: Full Code    NIH SCORE:    LEAH SCREENING:      NEURO ASSESSMENT: Neuro  Neurologic State: Eyes open spontaneously (02/09/23 1415)  Orientation Level: Unable to verbalize (02/09/23 1415)      RESTRAINTS IN USE: No      IS DOCUMENTATION COMPLETE: Yes      Vital Signs  Level of Consciousness: Alert (0) (02/09/23 1242)  Temp: 97.5 °F (36.4 °C) (02/09/23 1242)  Temp Source: Oral (02/09/23 1242)  Pulse (Heart Rate): 70 (02/10/23 1300)  Resp Rate: 16 (02/10/23 1300)  BP: 123/77 (02/10/23 1300)  MAP (Monitor): 92 (02/10/23 1300)  MAP (Calculated): 92 (02/10/23 1300)  MEWS Score: 1 (02/09/23 1242)  Pain 1  Pain Scale 1: Adult Nonverbal Pain Scale (02/09/23 1242)  Pain Intensity 1: 5 (02/09/23 1242)      OXYGEN: Oxygen Therapy  O2 Device: None (02/09/23 2215)    KINDER FALL ASSESSMENT:  Presents to emergency department  because of falls (Syncope, seizure, or loss of consciousness): No, Age > 79: Yes, Altered Mental Status, Intoxication with alcohol or substance confusion (Disorientation, impaired judgment, poor safety awaremess, or inability to follow instructions): Yes, Impaired Mobility: Ambulates or transfers with assistive devices or assistance;  Unable to ambulate or transer.: Yes    WOUNDS: No      URINARY CATHETER: external catheter    LINE ACCESS:   Peripheral IV 74/93/69 Left Basilic (Active)   Site Assessment Clean, dry, & intact 02/09/23 1300   Phlebitis Assessment 0 02/09/23 1300   Infiltration Assessment 0 02/09/23 1300   Dressing Status Clean, dry, & intact 02/09/23 1300   Dressing Type Tape;Transparent 02/09/23 1300        Opportunity for questions and clarification were provided.   Stevenson Reyes RN

## 2023-02-10 NOTE — PROGRESS NOTES
Hospitalist Progress Note    NAME: Mary Day   :  1940   MRN:  [id number]       Assessment / Plan:    Hematochezia  GIB  Likely diverticular bleed  Patient not currently anemic  Hold Eliquis  Trend hemoglobin and transfuse for hemoglobin less than 7  Monitor stool for hematochezia  Will hold off on GI consult unless worsening anemia  CT scan reviewed and agree that there is no significant pathology     Unclear why patient is on eliquis, patient's son reports she has been on this before her CVA in         History of CVA 54  Holding eliquis  We will resume on tube feeding  C/w metoprolol  Supportive care     Code Status: Full  Surrogate Decision Maker: Son     DVT Prophylaxis: SCD  GI Prophylaxis: not indicated     Baseline: Non-verbal, does not follow commands. 30.0 - 39.9 Obese / Body mass index is 32.28 kg/m². Estimated discharge date:   Barriers: Resumed on tube feeding, if no hematochezia and hemoglobin stable planning discharge tomorrow. Unable to reach the son today to discuss the plan    Recommended Disposition: Home w/Family     Subjective:     Chief Complaint / Reason for Physician Visit  \" No hematochezia reported per RN. Patient nonverbal\". Discussed with RN events overnight. Review of Systems:  Symptom Y/N Comments  Symptom Y/N Comments   Fever/Chills    Chest Pain     Poor Appetite    Edema     Cough    Abdominal Pain     Sputum    Joint Pain     SOB/FRANK    Pruritis/Rash     Nausea/vomit    Tolerating PT/OT     Diarrhea    Tolerating Diet     Constipation    Other       Could NOT obtain due to:      Objective:     VITALS:   Last 24hrs VS reviewed since prior progress note.  Most recent are:  Patient Vitals for the past 24 hrs:   Temp Pulse Resp BP SpO2   02/10/23 1453 -- -- -- -- 99 %   02/10/23 1435 98.4 °F (36.9 °C) 69 18 119/63 99 %   02/10/23 1333 98.6 °F (37 °C) 70 18 135/72 99 %   02/10/23 1300 -- 70 16 123/77 94 %   02/10/23 1200 -- 70 15 130/77 96 % 02/10/23 1100 -- 70 19 (!) 156/84 95 %   02/10/23 1001 -- 70 20 -- 96 %   02/10/23 1000 -- 71 18 (!) 157/78 --   02/10/23 0852 -- 70 17 139/73 100 %   02/10/23 0600 -- 70 13 (!) 143/77 100 %   02/10/23 0401 -- 70 18 -- 100 %   02/10/23 0400 -- 70 19 (!) 142/66 --   02/10/23 0200 -- 70 17 128/67 98 %   02/09/23 2300 -- 70 18 (!) 146/64 --   02/09/23 2259 -- 70 16 -- 100 %   02/09/23 2245 -- 70 19 (!) 141/95 --   02/09/23 2215 -- 70 18 (!) 141/82 98 %   02/09/23 2130 -- 69 15 124/66 --   02/09/23 2115 -- 70 19 124/68 96 %   02/09/23 1930 -- 70 19 (!) 157/82 --   02/09/23 1915 -- 70 18 (!) 141/79 --   02/09/23 1815 -- 71 20 (!) 144/79 97 %   02/09/23 1705 -- 70 20 -- --     No intake or output data in the 24 hours ending 02/10/23 1639     I had a face to face encounter and independently examined this patient on 2/10/2023, as outlined below:  PHYSICAL EXAM:  General: Awake   EENT:  EOMI. Anicteric sclerae. MMM  Resp:  C bilateral air entry no wheezes CV:  Regular  rhythm,  No edema  GI:  Soft, Non distended, N PEG tube present neurologic:  Nonverbal, not following commands  Psych:   Unable to   skin:  No rashes. No jaundice    Reviewed most current lab test results and cultures  YES  Reviewed most current radiology test results   YES  Review and summation of old records today    NO  Reviewed patient's current orders and MAR    YES  PMH/SH reviewed - no change compared to H&P  ________________________________________________________________________  Care Plan discussed with:    Comments   Patient x    Family   Left VM   RN x    Care Manager     Consultant                        Multidiciplinary team rounds were held today with , nursing, pharmacist and clinical coordinator. Patient's plan of care was discussed; medications were reviewed and discharge planning was addressed.      ________________________________________________________________________  Total NON critical care TIME:  35   Minutes    Total CRITICAL CARE TIME Spent:   Minutes non procedure based      Comments   >50% of visit spent in counseling and coordination of care x    ________________________________________________________________________  Ander Puga MD     Procedures: see electronic medical records for all procedures/Xrays and details which were not copied into this note but were reviewed prior to creation of Plan. LABS:  I reviewed today's most current labs and imaging studies.   Pertinent labs include:  Recent Labs     02/10/23  0200 02/09/23  1258   WBC 6.3 5.5   HGB 12.9 13.0   HCT 39.6 40.0    337     Recent Labs     02/10/23  0200 02/09/23  1258   * 131*   K 4.0 4.5   CL 98 95*   CO2 29 29   GLU 82 108*   BUN 10 12   CREA 0.35* 0.35*   CA 9.2 8.8   MG 2.0  --    PHOS 3.7  --    ALB  --  3.0*   TBILI  --  0.4   ALT  --  30       Signed: Ander Puga MD

## 2023-02-10 NOTE — PROGRESS NOTES
Comprehensive Nutrition Assessment    Type and Reason for Visit: Initial, Consult    Nutrition Recommendations/Plan:   Initiate Jevity 1.5 imtiaz at 20 ml/hr x 24 hrs continuous infusion. Increase as tolerated by 10 ml/hr q12h to max goal rate 50 ml/hr x 24 hr.   Flush with 125 ml free water q4h. Jevity 1.5 imtiaz at goal rate 50 ml/hr x 24 hr + free water flushes will provide daily approx. 1800 kcals/77 g protein/259 g CHO/25 g fiber/1662 ml free water. If pt has difficulty tolerating a higher fiber formula while diverticular bleed resolving, then we consider a low/fiber-free formula such as Osmolite. RD initiated feedings at low rate and we will gradually increase to goal. Eventually pt can transition back to a nocturnal regimen or bolus. Malnutrition Assessment:  Malnutrition Status:  No malnutrition (02/10/23 1440)      Nutrition Assessment:    2/10: Chart reviewed; med noted for GIB on admission; per attending likely diverticular in nature. Pt has a PEG tube in place and receives primary source nutrition via PEG. Pt previously placed on Jevity 1.5 imtiaz on prior admission. Pt initially in the ER but by the time RD arrived, pt had transferred to the surgical floor. RD visited with pt at bedside, sleeping soundly and no family present. W    Last Weight Metric  Weight Loss Metrics 2/9/2023 6/6/2022 6/5/2022 8/24/2021 8/18/2021 12/22/2020 11/19/2020   Today's Wt 200 lb 182 lb 15.7 oz 182 lb 14.4 oz 160 lb 15 oz - 163 lb 163 lb 12.8 oz   BMI 32.28 kg/m2 29.53 kg/m2 29.52 kg/m2 25.98 kg/m2 27.98 kg/m2 27.98 kg/m2 29.96 kg/m2        Nutrition Related Findings:    BM: no date documented; Labs: Na+ 133; Meds: Lipitor, Pepcid, Toprol Wound Type: None    Current Nutrition Intake & Therapies:        DIET NPO Other (Specify); tube feedings    Anthropometric Measures:  Height: 5' 6\" (167.6 cm)  Ideal Body Weight (IBW): 130 lbs (59 kg)     Current Body Wt:  90.7 kg (199 lb 15.3 oz), 153.8 % IBW.     Current BMI (kg/m2): 32.3 BMI Category: Obese class 1 (BMI 30.0-34. 9)    Estimated Daily Nutrient Needs:  Energy Requirements Based On: Formula  Weight Used for Energy Requirements: Current  Energy (kcal/day): 7874-3334 (BMR x 1.2-1. 3AF)  Weight Used for Protein Requirements: Current  Protein (g/day): 91 (1.0 g/kg bw)  Method Used for Fluid Requirements: 1 ml/kcal  Fluid (ml/day): 1800 ml/day    Nutrition Diagnosis:   Inadequate enteral nutrition infusion related to altered GI function as evidenced by NPO or clear liquid status due to medical condition (RD consulted to assist with TF recs/orders)    Nutrition Interventions:   Food and/or Nutrient Delivery: Continue NPO, Start tube feeding  Nutrition Education/Counseling: No recommendations at this time  Coordination of Nutrition Care: Continue to monitor while inpatient       Goals:     Goals:  Tolerate nutrition support at goal rate, by next RD assessment       Nutrition Monitoring and Evaluation:   Behavioral-Environmental Outcomes: None identified  Food/Nutrient Intake Outcomes: Enteral nutrition intake/tolerance  Physical Signs/Symptoms Outcomes: Biochemical data, Skin, Weight    Discharge Planning:    Enteral nutrition    Alexandrea Price RD  Contact:

## 2023-02-11 LAB
ANION GAP SERPL CALC-SCNC: 7 MMOL/L (ref 5–15)
BASOPHILS # BLD: 0 K/UL (ref 0–0.1)
BASOPHILS NFR BLD: 1 % (ref 0–1)
BUN SERPL-MCNC: 10 MG/DL (ref 6–20)
BUN/CREAT SERPL: 26 (ref 12–20)
CALCIUM SERPL-MCNC: 8.9 MG/DL (ref 8.5–10.1)
CHLORIDE SERPL-SCNC: 99 MMOL/L (ref 97–108)
CO2 SERPL-SCNC: 30 MMOL/L (ref 21–32)
CREAT SERPL-MCNC: 0.39 MG/DL (ref 0.55–1.02)
DIFFERENTIAL METHOD BLD: ABNORMAL
EOSINOPHIL # BLD: 0.1 K/UL (ref 0–0.4)
EOSINOPHIL NFR BLD: 2 % (ref 0–7)
ERYTHROCYTE [DISTWIDTH] IN BLOOD BY AUTOMATED COUNT: 18.6 % (ref 11.5–14.5)
GLUCOSE BLD STRIP.AUTO-MCNC: 111 MG/DL (ref 65–117)
GLUCOSE BLD STRIP.AUTO-MCNC: 113 MG/DL (ref 65–117)
GLUCOSE BLD STRIP.AUTO-MCNC: 88 MG/DL (ref 65–117)
GLUCOSE SERPL-MCNC: 104 MG/DL (ref 65–100)
HCT VFR BLD AUTO: 41 % (ref 35–47)
HGB BLD-MCNC: 13.1 G/DL (ref 11.5–16)
IMM GRANULOCYTES # BLD AUTO: 0.1 K/UL (ref 0–0.04)
IMM GRANULOCYTES NFR BLD AUTO: 1 % (ref 0–0.5)
LYMPHOCYTES # BLD: 0.7 K/UL (ref 0.8–3.5)
LYMPHOCYTES NFR BLD: 12 % (ref 12–49)
MCH RBC QN AUTO: 24.9 PG (ref 26–34)
MCHC RBC AUTO-ENTMCNC: 32 G/DL (ref 30–36.5)
MCV RBC AUTO: 77.9 FL (ref 80–99)
MONOCYTES # BLD: 0.6 K/UL (ref 0–1)
MONOCYTES NFR BLD: 9 % (ref 5–13)
NEUTS SEG # BLD: 4.9 K/UL (ref 1.8–8)
NEUTS SEG NFR BLD: 76 % (ref 32–75)
NRBC # BLD: 0 K/UL (ref 0–0.01)
NRBC BLD-RTO: 0 PER 100 WBC
PLATELET # BLD AUTO: 341 K/UL (ref 150–400)
PMV BLD AUTO: 9 FL (ref 8.9–12.9)
POTASSIUM SERPL-SCNC: 3.6 MMOL/L (ref 3.5–5.1)
RBC # BLD AUTO: 5.26 M/UL (ref 3.8–5.2)
SERVICE CMNT-IMP: NORMAL
SODIUM SERPL-SCNC: 136 MMOL/L (ref 136–145)
WBC # BLD AUTO: 6.4 K/UL (ref 3.6–11)

## 2023-02-11 PROCEDURE — 74011250637 HC RX REV CODE- 250/637: Performed by: INTERNAL MEDICINE

## 2023-02-11 PROCEDURE — 74011000250 HC RX REV CODE- 250: Performed by: INTERNAL MEDICINE

## 2023-02-11 PROCEDURE — 65270000046 HC RM TELEMETRY

## 2023-02-11 PROCEDURE — 82962 GLUCOSE BLOOD TEST: CPT

## 2023-02-11 PROCEDURE — 36415 COLL VENOUS BLD VENIPUNCTURE: CPT

## 2023-02-11 PROCEDURE — 85025 COMPLETE CBC W/AUTO DIFF WBC: CPT

## 2023-02-11 PROCEDURE — 80048 BASIC METABOLIC PNL TOTAL CA: CPT

## 2023-02-11 RX ADMIN — Medication 40 MG: at 18:00

## 2023-02-11 RX ADMIN — POTASSIUM BICARBONATE 20 MEQ: 782 TABLET, EFFERVESCENT ORAL at 09:14

## 2023-02-11 RX ADMIN — CASTOR OIL AND BALSAM, PERU: 788; 87 OINTMENT TOPICAL at 09:34

## 2023-02-11 RX ADMIN — LEVETIRACETAM 500 MG: 100 SOLUTION ORAL at 22:20

## 2023-02-11 RX ADMIN — METOPROLOL SUCCINATE 25 MG: 50 TABLET, EXTENDED RELEASE ORAL at 09:14

## 2023-02-11 RX ADMIN — CASTOR OIL AND BALSAM, PERU: 788; 87 OINTMENT TOPICAL at 21:00

## 2023-02-11 RX ADMIN — AMLODIPINE BESYLATE 5 MG: 5 TABLET ORAL at 09:14

## 2023-02-11 RX ADMIN — SODIUM CHLORIDE, PRESERVATIVE FREE 10 ML: 5 INJECTION INTRAVENOUS at 14:00

## 2023-02-11 RX ADMIN — CETIRIZINE HYDROCHLORIDE 5 MG: 10 TABLET, FILM COATED ORAL at 18:58

## 2023-02-11 RX ADMIN — ATORVASTATIN CALCIUM 40 MG: 40 TABLET, FILM COATED ORAL at 22:20

## 2023-02-11 RX ADMIN — APIXABAN 5 MG: 5 TABLET, FILM COATED ORAL at 22:20

## 2023-02-11 RX ADMIN — SODIUM CHLORIDE, PRESERVATIVE FREE 10 ML: 5 INJECTION INTRAVENOUS at 22:20

## 2023-02-11 RX ADMIN — APIXABAN 5 MG: 5 TABLET, FILM COATED ORAL at 16:03

## 2023-02-11 RX ADMIN — CETIRIZINE HYDROCHLORIDE 5 MG: 10 TABLET, FILM COATED ORAL at 09:14

## 2023-02-11 RX ADMIN — LEVETIRACETAM 500 MG: 100 SOLUTION ORAL at 09:13

## 2023-02-11 RX ADMIN — Medication 40 MG: at 09:00

## 2023-02-11 RX ADMIN — SODIUM CHLORIDE, PRESERVATIVE FREE 10 ML: 5 INJECTION INTRAVENOUS at 06:43

## 2023-02-11 RX ADMIN — POTASSIUM BICARBONATE 20 MEQ: 782 TABLET, EFFERVESCENT ORAL at 22:20

## 2023-02-11 NOTE — PROGRESS NOTES
Transition of Care Plan:    RUR: 16%-\"Moderate Risk\"  Disposition: Home with 24/7 Caregivers  If SNF or IPR: Date FOC offered: N/A  Follow up appointments: PCP & Specialists as indicated   DME needed: The patient has all needed DME at home   Transportation at Discharge: CM has scheduled stretcher transport for the patient tomorrow, 2/12/23, at 9 AM  [place] or means to access home: Yes      IM Medicare Letter: To be given prior to d/c   Is patient a  and connected with the [place]? N/A             If yes, was [place] transfer form completed and VA notified? Caregiver Contact: Raheem Salinas, Phone: [phone]  Discharge Caregiver contacted prior to discharge? Yes, CM spoke to the patient's son at [address] [place] [address] needed?: No    Reason for Admission:  Hematochezia                 RUR Score:  16%                PCP: First and Last name:   Jolene Franco NP   Name of [facility]: [facility]    Are you a current patient: Yes/No: Yes   Approximate date of last visit:    Can you participate in a virtual visit if needed: No    Do you (patient/family) have any concerns for transition/discharge? The patient resides in a 1 level home with 5 steps to enter with her son and daughter-in-law. The patient has 2 daughters that live locally but her son is her primary caregiver. She is total care and bed-bound and the patient's son has 24/7 assistance for her at home with caregivers through her Medicaid and caregivers that they privately pay for.                  Plan for utilizing home health: N/A, the patient's son does not feel they need home health services at this time       Current Advanced Directive/Advance Care Plan:  Full Code    Healthcare Decision Maker: Gabby Mello, Son, Phone: [phone]    Primary Decision Maker: La Nenach[place]in Douglas - Son - [phone]    Primary Decision Maker: Rebekah Ford - Daughter - [phone]    Primary Decision Maker: Eunice Navarrete - Daughter - [phone]    Transition of Care Plan:   CM met with with the patient's son at bedside to discuss dispo plan. The patient resides with her son and daughter-in-law in a 1 level home with 5 steps to enter. The patient is total care and bed-bound at baseline. They have a hospital bed and a irma life for her at home. The patient has 24/7 caregivers at home. The patient's son reports no concerns for her returning home at d/c. Care Management Interventions  PCP Verified by CM:  Yes  Mode of Transport at Discharge: BLS  Transition of Care Consult (CM Consult): Discharge Planning  MyChart Signup: No  Discharge Durable Medical Equipment: No  Physical Therapy Consult: No  Occupational Therapy Consult: No  Speech Therapy Consult: No  Support Systems: Child(jeniffer)  Confirm Follow Up Transport: Other (see comment) (The patient uses stretcher transport through her Medicaid )  The Patient and/or Patient Representative was Provided with a Choice of Provider and Agrees with the Discharge Plan?: Yes  Name of the Patient Representative Who was Provided with a Choice of Provider and Agrees with the Discharge Plan: Dean James (Son)  [address] Provided?: No  Discharge Location  Patient Expects to be Discharged to[de-identified] Home with family assistance     [place], [place]

## 2023-02-11 NOTE — PROGRESS NOTES
Hospitalist Progress Note    NAME: Chyrel Soulier   :  1940   MRN:  [id number]       Assessment / Plan:    Hematochezia  GIB  Likely diverticular bleed  Patient not currently anemic  Resumed on eliquis  Trend hemoglobin and transfuse for hemoglobin less than 7  Monitor stool for hematochezia  Will hold off on GI consult unless worsening anemia  CT scan reviewed and agree that there is no significant pathology     Unclear why patient is on eliquis, patient's son reports she has been on this before her CVA in         History of   Resume eliquis  Tolerating on tube feeding  C/w metoprolol  Supportive care     Code Status: Full  Surrogate Decision Maker: Son     DVT Prophylaxis: SCD  GI Prophylaxis: not indicated     Baseline: Non-verbal, does not follow commands. 30.0 - 39.9 Obese / Body mass index is 32.28 kg/m². Estimated discharge date:   Barriers: monitor hb on eliquis, if bleeding dc eliquis. Also son needs to make arrangements for her to come home. Plan DC tomorrow transport need to be arranged    Recommended Disposition: Home w/Family     Subjective:     Chief Complaint / Reason for Physician Visit  \" No hematochezia reported per RN. Patient nonverbal\". Discussed with RN events overnight. Review of Systems:  Symptom Y/N Comments  Symptom Y/N Comments   Fever/Chills    Chest Pain     Poor Appetite    Edema     Cough    Abdominal Pain     Sputum    Joint Pain     SOB/FRANK    Pruritis/Rash     Nausea/vomit    Tolerating PT/OT     Diarrhea    Tolerating Diet     Constipation    Other       Could NOT obtain due to:      Objective:     VITALS:   Last 24hrs VS reviewed since prior progress note.  Most recent are:  Patient Vitals for the past 24 hrs:   Temp Pulse Resp BP SpO2   23 1044 98.4 °F (36.9 °C) 71 16 132/77 100 %   23 0800 -- 69 -- -- --   23 0733 98.2 °F (36.8 °C) 69 20 129/78 99 %   23 0310 97.6 °F (36.4 °C) 79 18 132/80 97 %   23 0000 -- 70 -- -- --   02/10/23 2303 99.1 °F (37.3 °C) 70 18 125/77 97 %   02/10/23 2000 -- 69 -- -- --   02/10/23 1953 98.6 °F (37 °C) 69 18 (!) 141/75 97 %   02/10/23 1739 98.1 °F (36.7 °C) 70 18 129/88 99 %   02/10/23 1738 98.1 °F (36.7 °C) 70 18 129/88 99 %   02/10/23 1453 -- -- -- -- 99 %   02/10/23 1435 98.4 °F (36.9 °C) 69 18 119/63 99 %   02/10/23 1333 98.6 °F (37 °C) 70 18 135/72 99 %   02/10/23 1300 -- 70 16 123/77 94 %   02/10/23 1200 -- 70 15 130/77 96 %         Intake/Output Summary (Last 24 hours) at 2/11/2023 1111  Last data filed at 2/11/2023 0387  Gross per 24 hour   Intake 0 ml   Output --   Net 0 ml          I had a face to face encounter and independently examined this patient on 2/11/2023, as outlined below:  PHYSICAL EXAM:  General: Awake   EENT:  EOMI. Anicteric sclerae. MMM  Resp:  C bilateral air entry no wheezes CV:  Regular  rhythm,  No edema  GI:  Soft, Non distended, N PEG tube present neurologic:  Nonverbal, not following commands  Psych:   Unable to   skin:  No rashes. No jaundice    Reviewed most current lab test results and cultures  YES  Reviewed most current radiology test results   YES  Review and summation of old records today    NO  Reviewed patient's current orders and MAR    YES  PMH/ reviewed - no change compared to H&P  ________________________________________________________________________  Care Plan discussed with:    Comments    x     x son   RN x    Care Manager     Consultant                        Multidiciplinary team rounds were held today with , nursing, pharmacist and clinical coordinator. Patient's plan of care was discussed; medications were reviewed and discharge planning was addressed.      ________________________________________________________________________  Total NON critical care TIME:  35   Minutes    Total CRITICAL CARE TIME Spent:   Minutes non procedure based      Comments   >50% of visit spent in counseling and coordination of care x ________________________________________________________________________  Jacky Ruiz MD     Procedures: see electronic medical records for all procedures/Xrays and details which were not copied into this note but were reviewed prior to creation of Plan. LABS:  I reviewed today's most current labs and imaging studies.   Pertinent labs include:  Recent Labs     02/11/23  0400 02/10/23  0200 02/09/23  1258   WBC 6.4 6.3 5.5   HGB 13.1 12.9 13.0   HCT 41.0 39.6 40.0    325 337       Recent Labs     02/11/23  0400 02/10/23  0200 02/09/23  1258    133* 131*   K 3.6 4.0 4.5   CL 99 98 95*   CO2 30 29 29   * 82 108*   BUN 10 10 12   CREA 0.39* 0.35* 0.35*   CA 8.9 9.2 8.8   MG  --  2.0  --    PHOS  --  3.7  --    ALB  --   --  3.0*   TBILI  --   --  0.4   ALT  --   --  30         Signed: Jacky Ruiz MD

## 2023-02-12 VITALS
WEIGHT: 200 LBS | OXYGEN SATURATION: 100 % | HEART RATE: 71 BPM | RESPIRATION RATE: 18 BRPM | DIASTOLIC BLOOD PRESSURE: 61 MMHG | HEIGHT: 66 IN | TEMPERATURE: 99.1 F | SYSTOLIC BLOOD PRESSURE: 111 MMHG | BODY MASS INDEX: 32.14 KG/M2

## 2023-02-12 LAB
ANION GAP SERPL CALC-SCNC: 6 MMOL/L (ref 5–15)
BASOPHILS # BLD: 0 K/UL (ref 0–0.1)
BASOPHILS NFR BLD: 1 % (ref 0–1)
BUN SERPL-MCNC: 12 MG/DL (ref 6–20)
BUN/CREAT SERPL: 23 (ref 12–20)
CALCIUM SERPL-MCNC: 9.3 MG/DL (ref 8.5–10.1)
CHLORIDE SERPL-SCNC: 97 MMOL/L (ref 97–108)
CO2 SERPL-SCNC: 32 MMOL/L (ref 21–32)
CREAT SERPL-MCNC: 0.53 MG/DL (ref 0.55–1.02)
DIFFERENTIAL METHOD BLD: ABNORMAL
EOSINOPHIL # BLD: 0.1 K/UL (ref 0–0.4)
EOSINOPHIL NFR BLD: 1 % (ref 0–7)
ERYTHROCYTE [DISTWIDTH] IN BLOOD BY AUTOMATED COUNT: 18.7 % (ref 11.5–14.5)
GLUCOSE SERPL-MCNC: 118 MG/DL (ref 65–100)
HCT VFR BLD AUTO: 42.1 % (ref 35–47)
HGB BLD-MCNC: 13.7 G/DL (ref 11.5–16)
IMM GRANULOCYTES # BLD AUTO: 0 K/UL (ref 0–0.04)
IMM GRANULOCYTES NFR BLD AUTO: 1 % (ref 0–0.5)
LYMPHOCYTES # BLD: 0.8 K/UL (ref 0.8–3.5)
LYMPHOCYTES NFR BLD: 13 % (ref 12–49)
MCH RBC QN AUTO: 25.3 PG (ref 26–34)
MCHC RBC AUTO-ENTMCNC: 32.5 G/DL (ref 30–36.5)
MCV RBC AUTO: 77.7 FL (ref 80–99)
MONOCYTES # BLD: 0.7 K/UL (ref 0–1)
MONOCYTES NFR BLD: 11 % (ref 5–13)
NEUTS SEG # BLD: 4.7 K/UL (ref 1.8–8)
NEUTS SEG NFR BLD: 73 % (ref 32–75)
NRBC # BLD: 0 K/UL (ref 0–0.01)
NRBC BLD-RTO: 0 PER 100 WBC
PLATELET # BLD AUTO: 378 K/UL (ref 150–400)
PMV BLD AUTO: 9.6 FL (ref 8.9–12.9)
POTASSIUM SERPL-SCNC: 4.3 MMOL/L (ref 3.5–5.1)
RBC # BLD AUTO: 5.42 M/UL (ref 3.8–5.2)
SODIUM SERPL-SCNC: 135 MMOL/L (ref 136–145)
WBC # BLD AUTO: 6.3 K/UL (ref 3.6–11)

## 2023-02-12 PROCEDURE — 80048 BASIC METABOLIC PNL TOTAL CA: CPT

## 2023-02-12 PROCEDURE — 36415 COLL VENOUS BLD VENIPUNCTURE: CPT

## 2023-02-12 PROCEDURE — 85025 COMPLETE CBC W/AUTO DIFF WBC: CPT

## 2023-02-12 PROCEDURE — 74011000250 HC RX REV CODE- 250: Performed by: INTERNAL MEDICINE

## 2023-02-12 RX ADMIN — SODIUM CHLORIDE, PRESERVATIVE FREE 10 ML: 5 INJECTION INTRAVENOUS at 06:17

## 2023-02-12 NOTE — PROGRESS NOTES
Problem: Pain  Goal: *Control of Pain  Outcome: Progressing Towards Goal     Problem: Patient Education: Go to Patient Education Activity  Goal: Patient/Family Education  Outcome: Progressing Towards Goal     Problem: Pressure Injury - Risk of  Goal: *Prevention of pressure injury  Description: Document Howard Scale and appropriate interventions in the flowsheet. Outcome: Progressing Towards Goal  Note: Pressure Injury Interventions:  Sensory Interventions: Float heels    Moisture Interventions: Absorbent underpads    Activity Interventions: Increase time out of bed    Mobility Interventions: Float heels    Nutrition Interventions: Discuss nutritional consult with provider                     Problem: Patient Education: Go to Patient Education Activity  Goal: Patient/Family Education  Outcome: Progressing Towards Goal     Problem: Falls - Risk of  Goal: *Absence of Falls  Description: Document [facility] Fall Risk and appropriate interventions in the flowsheet.   Outcome: Progressing Towards Goal  Note: Fall Risk Interventions:                 Elimination Interventions: Bed/chair exit alarm

## 2023-02-12 NOTE — PROGRESS NOTES
End of Shift Note    Bedside shift change report given to  Gilmar RN (oncoming nurse) by Lorena Boateng (offgoing nurse). Report included the following information SBAR, Kardex, Intake/Output, and MAR    Shift worked:       Shift summary and any significant changes:    Family present   Repositioned during shift   [facility]  Room air   Pending discharge tomorrow    Concerns for physician to address: None    n None        Activity:  Activity Level: Bed Rest  Number times ambulated in hallways past shift: 0  Number of times OOB to chair past shift: 0    Cardiac:   Cardiac Monitoring: Yes      Cardiac Rhythm: Sinus Rhythm    Access:  Current line(s): PIV     Genitourinary:   Urinary status: external catheter    Respiratory:   O2 Device: None (Room air)  Chronic home O2 use?: NO  Incentive spirometer at bedside: NO       GI:  Last Bowel Movement Date: 02/10/23  Current diet:  DIET NPO Other (Specify); tube feedings  ADULT TUBE FEEDING PEG; Standard with Fiber; Delivery Method: Continuous; Continuous Initial Rate (mL/hr): 20; Continuous Advance Tube Feeding: Yes; Advancement Volume (mL/hr): 10; Advancement Frequency: Q 12 hours; Continuous Goal Rate (mL/hr): 5... Passing flatus: NO  Tolerating current diet: YES       Pain Management:   Patient states pain is manageable on current regimen: YES    Skin:  Howard Score: 13  Interventions: turn team, speciality bed, float heels, and foam dressing    Patient Safety:  Fall Score:  Total Score: 1  Interventions: bed/chair alarm, assistive device (walker, cane, etc), and gripper socks       Length of Stay:  Expected LOS: - - -  Actual LOS: [facility]

## 2023-02-12 NOTE — DISCHARGE SUMMARY
Hospitalist Discharge Summary     Patient ID:  Massiel Velazquez  [id number]  37 y.o.  1940 2/9/2023    PCP on record: Reji Fraire NP    Admit date: 2/9/2023  Discharge date and time: 2/12/2023    DISCHARGE DIAGNOSIS:    hematochezia    CONSULTATIONS:  None    Excerpted HPI from H&P of Kush Simon MD:  Jeanette Gudino is a 78 y.o. 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 History was obtained from son who was at bedside and primary caregiver. Patient has been having bright red blood per rectum for the past 3 days. Patient son has pictures which indeed are bright red blood. She also had some episodes of passing clots. She does not seem to be in any pain. She had been tolerating her tube feeds. Been having any nausea or vomiting. No diarrhea. Patient's son is a primary caretaker and provides excellent care and no signs of any skin breakdown.    ______________________________________________________________________  DISCHARGE SUMMARY/HOSPITAL COURSE:  for full details see H&P, daily progress notes, labs, consult notes. Pt admitted with hematochezia, eliquis was held. No more GI bleed noticed and Hb stable. Eliquis resumed. Pt is stable for dc. Son aware of the plan. Pt is total care, and son is the care taker. _______________________________________________________________________  Patient seen and examined by me on discharge day. Pertinent Findings:  Gen:    Not in distress  Chest: Clear lungs  CVS:   Regular rhythm.   No edema  Abd:  Soft, not distended, peg +  Neuro:  nonverbal  _______________________________________________________________________  DISCHARGE MEDICATIONS:   Current Discharge Medication List        CONTINUE these medications which have NOT CHANGED    Details   cetirizine (ZYRTEC) 5 mg tablet Take 5 mg by mouth.      levETIRAcetam (Keppra) 100 mg/mL solution Take 500 mg by mouth two (2) times a day. acetaminophen (M-PAP) 160 mg/5 mL liquid TAKE 30 ML BY PEG EVERY 6 HOURS AS NEEDED FOR PAIN OR FEVER  Indications: pain associated with arthritis, backache, pain  Qty: 8100 mL, Refills: 1    Comments: Patient averages 3 doses per day = 90 ml/ day = 2700 ml/month  Associated Diagnoses: Osteoarthritis of shoulder, unspecified laterality, unspecified osteoarthritis type; Multilevel degenerative disc disease; Herniated nucleus pulposus of lumbosacral region      guaiFENesin (ROBITUSSIN) 100 mg/5 mL liquid Take 10 mL by mouth three (3) times daily as needed for Cough. Qty: 180 mL, Refills: 0      potassium chloride (KAON 10%) 20 mEq/15 mL solution Take 15 mL by mouth two (2) times a day. Qty: 3800 mL, Refills: 1      amLODIPine (NORVASC) 5 mg tablet Take 1 Tablet by mouth daily. Qty: 90 Tablet, Refills: 1      atorvastatin (LIPITOR) 40 mg tablet Take 1 Tablet by mouth nightly. Qty: 90 Tablet, Refills: 1      apixaban (Eliquis) 5 mg tablet Take 1 Tablet by mouth two (2) times a day. Qty: 180 Tablet, Refills: 1      famotidine (PEPCID) 40 mg/5 mL (8 mg/mL) suspension Take 5 mL by mouth two (2) times a day. Qty: 300 mL, Refills: 5      metoprolol succinate (TOPROL-XL) 25 mg XL tablet Take 1 Tablet by mouth daily. Qty: 90 Tablet, Refills: 1      lactose-reduced food with fibr (Jevity 1.2 Lamont) 0.06 gram-1.2 kcal/mL liqd 1 Dose by PEG Tube route daily. Jevity 1.2 lamont 75ml/hr over 8 hours with free water flushes 4 times daily               Patient Follow Up Instructions:    Activity: Activity as tolerated  Diet: nd tube feed  Wound Care: As directed    Follow-up with 2 weeks inFollow-up tests/labs cbc in 5 days    Follow-up Information       Follow up With Specialties Details Why Contact Info    Bobbye Gerson, NP Nurse Practitioner   [address] [facility] [address] [id number]  [phone]            ________________________________________________________________    Risk of deterioration: Low    Condition at Discharge:  Stable  __________________________________________________________________    Disposition  Home with family, no needs    ____________________________________________________________________    Code Status: Full Code  ___________________________________________________________________      Total time in minutes spent coordinating this discharge (includes going over instructions, follow-up, prescriptions, and preparing report for sign off to her PCP) :  35 minutes    Signed:  Irma Gutiérrez MD

## 2023-02-12 NOTE — PROGRESS NOTES
Transition of Care Plan:     RUR: 16%-\"Moderate Risk\"  Disposition: Home with 24/7 Caregivers  If SNF or IPR: Date FOC offered: N/A  Follow up appointments: PCP & Specialists as indicated   DME needed: The patient has all needed DME at home   Transportation at Discharge: Patint will be discharged today, 2/12/23, at 9 AM via stretcher with [facility]  [place] or means to access home: Yes      IM Medicare Letter: Verbal explanation provided to patient's son via phone  Is patient a  and connected with the [place]? N/A             If yes, was [place] transfer form completed and VA notified? Caregiver Contact: Sravani Freedman, Phone: [phone]  Discharge Caregiver contacted prior to discharge? Yes, CM spoke to the patient's son via phone   Care Conference needed?: No    The patient is scheduled to d/c today, 2/12/23, at 9 AM via [facility]. CM confirmed with [facility] this morning that the patient is still scheduled to be picked-up at 9 AM (Transport #: [id number]). CM placed [facility] paperwork on the patient chart. CM contacted the patient's son, Ritu Schofield (phone: [phone]), and he stated that he is prepared for the patient's arrival at home today. CM provided a verba explanation of the 2nd IM letter and he expressed understanding and had no questions/concerns with her d/c today, 2/12/23. From CM perspective, the patient can be discharged.      Trevon Pettit [address], [address] [facility]

## 2023-02-12 NOTE — PROGRESS NOTES
Problem: Pain  Goal: *Control of Pain  Outcome: Resolved/Met     Problem: Patient Education: Go to Patient Education Activity  Goal: Patient/Family Education  Outcome: Resolved/Met     Problem: Pressure Injury - Risk of  Goal: *Prevention of pressure injury  Description: Document Howard Scale and appropriate interventions in the flowsheet. Outcome: Resolved/Met  Note: Pressure Injury Interventions:  Sensory Interventions: Assess changes in LOC    Moisture Interventions: Absorbent underpads    Activity Interventions: Pressure redistribution bed/mattress(bed type)    Mobility Interventions: Float heels    Nutrition Interventions: Document food/fluid/supplement intake                     Problem: Patient Education: Go to Patient Education Activity  Goal: Patient/Family Education  Outcome: Resolved/Met     Problem: Falls - Risk of  Goal: *Absence of Falls  Description: Document [place]elda Fall Risk and appropriate interventions in the flowsheet.   Outcome: Resolved/Met  Note: Fall Risk Interventions:       Mentation Interventions: Bed/chair exit alarm         Elimination Interventions: Call light in reach, Patient to call for help with toileting needs              Problem: Patient Education: Go to Patient Education Activity  Goal: Patient/Family Education  Outcome: Resolved/Met

## 2023-02-13 ENCOUNTER — PATIENT OUTREACH (OUTPATIENT)
Dept: CASE MANAGEMENT | Age: 83
End: 2023-02-13

## 2023-02-13 NOTE — PROGRESS NOTES
Care Transitions Initial Call    Call within 2 business days of discharge: Yes     Patient Current Location: [place]    Patient: Neema Landeros Patient : 1940 MRN: [id number]    Last Discharge  Street       Date Complaint Diagnosis Description Type Department Provider    23 Rectal Bleeding; Melena Gastrointestinal hemorrhage, unspecified gastrointestinal hemorrhage type ED to Hosp-Admission (Discharged) (ADMIT) [id number] Randolph Porter MD; Tonya Londono. .. Was this an external facility discharge? No Discharge Facility: [facility]    Challenges to be reviewed by the provider   No transition of care outreach indicated due to patient being treated by [address] [place] [address] or Home Based Palliative team.               Method of communication with provider : chart routing    Discussed COVID-19 related testing which was not done at this time. Advance Care Planning:   Does patient have an Advance Directive:  DDNR on file . Inpatient Readmission Risk score: Unplanned Readmit Risk Score: 14.8    Was this a readmission? no   Patient stated reason for the admission: \" blood in stool\"    Patients top risk factors for readmission: medical condition-GI bleed      Care Transition Nurse (CTN) contacted the family by telephone to perform post hospital discharge assessment. Verified name and  with family as identifiers. Provided introduction to self, and explanation of the CTN role. CTN reviewed discharge instructions, medical action plan and red flags with family who verbalized understanding. Were discharge instructions available to patient? yes. Reviewed appropriate site of care based on symptoms and resources available to patient including: Specialist, Provider Home Visits, and When to call 911. Family given an opportunity to ask questions and does not have any further questions or concerns at this time.  The family agrees to contact the PCP office for questions related to their healthcare. Medication reconciliation was performed with family, who verbalizes understanding of administration of home medications. Advised obtaining a 90-day supply of all daily and as-needed medications. Referral to Pharm D needed: no     Home Health/Outpatient orders at discharge: none      Durable Medical Equipment ordered at discharge: None    Was patient discharged with a pulse oximeter? no    Discussed follow-up appointments. If no appointment was previously scheduled, appointment scheduling offered: yes. Is follow up appointment scheduled within 7 days of discharge? no.   [address] follow up appointment(s):   Future Appointments   Date Time Provider Nasim Cerrato   2/22/2023 11:00 AM ANAMARIA Hernandez AMB   4/5/2023  8:00 AM JES KRAUS     Non-[facility] follow up appointment(s): none      No transition of care outreach indicated due to patient discharge to hospice care.

## 2023-02-14 ENCOUNTER — TELEPHONE (OUTPATIENT)
Dept: FAMILY MEDICINE CLINIC | Age: 83
End: 2023-02-14

## 2023-02-14 NOTE — TELEPHONE ENCOUNTER
[address] [facility], [address] [facility], [address]  Phone:  [phone]  Fax:  [phone]    Patient: John Shields  YOB: 1940    Hospital Discharge Transition of Care Note    Date/Time:  2023 8:42 AM    Patient was discharged from [facility] on 23. Patient was hospitalized 23 to 23 and was treated for hematochezia. RRAT score: 34% Moderate    Medical History:     Past Medical History:   Diagnosis Date    Acute pharyngitis 2011    Allergic rhinitis, cause unspecified 2011    Arrhythmia     PVC    Asymptomatic varicose veins 2011    Cancer ([facility] [address]     stage 4 throat     Carpal tunnel syndrome 2011    Degenerative Joint Disese ( improved/resolving) 2011    Degenetrative Dis Disease, Cervical 2011    Diverticulosis of colon (without mention of hemorrhage) 2011    Dysphagia 2011    Edema, peripheral 2011    GERD (gastroesophageal reflux disease)     GERD Gastro- Esophageal Reflux Disease 2011    Herniated nucleus pulposus ( slipped disc) 2011    Menopausal 2011    PUD (peptic ulcer disease)     Pure hypercholesterolemia 2011    Recurrent ear pain 2011    S/P radiation therapy     Scalp lesion 10/23/2014    Sebaceous cyst 2014    Unspecified cataract 2011    Unspecified essential hypertension 2011    Unspecified sleep apnea        This nurse contacted the patient by telephone to perform post hospital discharge assessment. Verified  and address with patient as identifiers. Diet:   Patient reports: Tube Feeding Diet    Activity:    Patient reports:  bedbound    Medication:   Performed medication reconciliation with son Ramos Kearney, and he verbalizes understanding of administration of home medications. There were no barriers to obtaining medications identified at this time.     Support system:  patient and son    Discharge Instructions :  Reviewed discharge instructions with tremayne Young. He verbalizes understanding of discharge instructions and follow-up care. Red Flags:  Son concerned about trying to give patient Zyrtec through G tube as the tablet becomes a gel like substance when mixed with water. Advised that this nurse and provider will research to see if there is a liquid equivalent available. Labs Reviewed:  Recent Labs     02/12/23  0050   WBC 6.3   HGB 13.7   HCT 42.1        Recent Labs     02/12/23  0050   *   K 4.3   CL 97   CO2 32   *   BUN 12   CREA 0.53*   CA 9.3     GOALS:      Knowledge and adherence of prescribed medication (ie. action, side effects, missed dose, etc.). tremayne Young was able to name all medications. he reports she is taking all medications as directed      Develop action plan for Self-Management Chronic Disease. Patient has follow up appointment scheduled with Lieutenant Balbir CONRAD on 2/15/23    PCP/Specialist follow up:   Patient scheduled to follow up with Slava Jerry NP on 2/15/23. Plan for What do Do if a Problem Arises:  Nurse reviewed [facility] 24/7 on call line. tremayne Young is able to verbalize clinical signs and reasons to use EMS services and/or [facility]. Son has [facility] [place] contact number ([phone]) for further questions or concerns.        Estelle Ferrera RN

## 2023-02-15 ENCOUNTER — HOME VISIT (OUTPATIENT)
Dept: FAMILY MEDICINE CLINIC | Age: 83
End: 2023-02-15
Payer: MEDICARE

## 2023-02-15 VITALS
DIASTOLIC BLOOD PRESSURE: 63 MMHG | OXYGEN SATURATION: 95 % | HEART RATE: 76 BPM | TEMPERATURE: 97.5 F | RESPIRATION RATE: 18 BRPM | SYSTOLIC BLOOD PRESSURE: 124 MMHG

## 2023-02-15 DIAGNOSIS — G40.909 SEIZURE DISORDER (HCC): ICD-10-CM

## 2023-02-15 DIAGNOSIS — Z93.1 S/P PERCUTANEOUS ENDOSCOPIC GASTROSTOMY (PEG) TUBE PLACEMENT (HCC): ICD-10-CM

## 2023-02-15 DIAGNOSIS — I69.359 CVA, OLD, HEMIPARESIS (HCC): ICD-10-CM

## 2023-02-15 DIAGNOSIS — K92.1 HEMATOCHEZIA: ICD-10-CM

## 2023-02-15 DIAGNOSIS — I25.10 CORONARY ARTERY DISEASE INVOLVING NATIVE CORONARY ARTERY OF NATIVE HEART WITHOUT ANGINA PECTORIS: ICD-10-CM

## 2023-02-15 DIAGNOSIS — F01.50 VASCULAR DEMENTIA WITHOUT BEHAVIORAL DISTURBANCE (HCC): Primary | ICD-10-CM

## 2023-02-15 DIAGNOSIS — I48.0 PAF (PAROXYSMAL ATRIAL FIBRILLATION) (HCC): ICD-10-CM

## 2023-02-15 DIAGNOSIS — K92.1 HEMATOCHEZIA: Primary | ICD-10-CM

## 2023-02-15 DIAGNOSIS — I10 ESSENTIAL HYPERTENSION: ICD-10-CM

## 2023-02-15 DIAGNOSIS — Z95.0 S/P CARDIAC PACEMAKER PROCEDURE: ICD-10-CM

## 2023-02-15 DIAGNOSIS — M53.9 MULTILEVEL DEGENERATIVE DISC DISEASE: ICD-10-CM

## 2023-02-15 DIAGNOSIS — R13.10 DYSPHAGIA, UNSPECIFIED TYPE: ICD-10-CM

## 2023-02-16 LAB — HEMOCCULT STL QL IA: NEGATIVE

## 2023-02-16 RX ORDER — CETIRIZINE HYDROCHLORIDE 1 MG/ML
5 SOLUTION ORAL DAILY
Qty: 240 ML | Refills: 1 | Status: SHIPPED | OUTPATIENT
Start: 2023-02-16

## 2023-02-16 NOTE — PROGRESS NOTES
Home Based [address] [facility]   [id number]          NOTE: Home Based Primary Care is a PROVIDER (MD/NP) based interdisciplinary practice (RN, LCSW, Pharmacy, Dietician) for patients who cannot access (or have a taxing effort) primary / specialty medical care in an office setting. [facility] is NOT erento but works with [address] when there is a skilled need. Our MD/NPs are integral in Care Transitions; PLEASE CALL [phone] if this patient arrives in the Emergency Department or Hospital.         Date of Current Visit: 2/15/2023     SUMMARY     Andres Arguello is a 78 y.o. female seen in the home today due to taxing effort to seek primary care services in the community s/t stroke with L-sided hemiparesis, vascular dementia    Patient/Family present for Current Visit:  patient, son and primary caregiver Bertha Naranjo  Goals of Care as stated by the patient/family is: comfort    Hospital follow-up visit:  Transition of Care documentation:  Date of hospital admission: 2/9/23  Date of hospital discharge: 2/12/23  Date of professional contact: 2/14/23, please see telephone encounter note from team nurse April (personally reviewed)    FTF visit: today 2/15/23  Complexity of MDM: moderate    Was home health instituted? No    Discharge summary personally reviewed. Items to be followed up on include:  None- labs as needed       ASSESSMENT AND PLAN     1. Vascular dementia without behavioral disturbance ([address]    2. CVA, old, hemiparesis ([address]    3. Dysphagia, unspecified type    4. S/P percutaneous endoscopic gastrostomy (PEG) tube placement ([address]    5. Coronary artery disease involving native coronary artery of native heart without angina pectoris    6. PAF (paroxysmal atrial fibrillation) ([address]    7. S/P cardiac pacemaker procedure    8. Essential hypertension    9. Hematochezia    10. Seizure disorder ([address]    11.  Multilevel degenerative disc disease      Vascular dementia- Minimally responsive to this provider at most visits. Opens eyes today and makes eye contact. She is able to verbalize yes or no. Per son and daughter, she will at times endorse pain or request Tylenol. Not requiring any medications for behavior, continues sleeping well overnight. CVA with L hemiparesis- Right MCA stroke in 2020 with an occluded right internal carotid artery from the carotid bifurcation to the [place] and further intraluminal thrombus at the right MCA bifurcation. Residual dysphagia and L hemiparesis. No contractures currently, son providing passive ROM exercises to extremities. She is functionally dependent for total care. Continue skin protective measures such as air mattress, frequent repositioning, brief changes/ Purewick urinary device, zinc paste, floating heels. Currently on Eliquis (resumed), atorvastatin. Lipid panel 9/22/22 at goal.  CBC 2/12/23 with Hgb WNL. Dysphagia/ PEG tube- s/t stroke, swallow study 7/2022 notes severe oropharyngeal dysphagia. 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 PEG tube in place with last exchange 12/17/22 in ED due to dislodgement. GI follow-up scheduled 3/7/23 for exchange. Currently receiving total nutrition, hydration, and medications through PEG (Jevity 1.2cal for total of 8 hours overnight). No recent aspiration, pneumonia, or apparent weight loss. HOB elevated overnight. CMP obtained 2/9/23 with albumin 3.0.  CAD- Currently managed with atorvastatin, Eliquis. CBC, Lipid panel obtained 9/22/22 and WNL, will repeat q 6 months. Hypertension- At goal with amlodipine, metoprolol via PEG. Compliant with medications. Goal <140/80 due to stroke history but would weigh against age and primary goal of comfort. A-fib/Sick sinus Syndrome/Pacemaker- Medtronic pacemaker in L chest placed 9/2017.   Continues with remote interrogations through office of Dr. Florinda Guzman, cardiologist. Patient unable to endorse symptoms of palpitations or SOB. GERD- Currently managed with Pepcid via PEG. Per son, occasionally has thick secretions that he attributes to worsening symptoms, gives Pepcid and they improve. Discussed suction for oral secretions, but family feels they are well-managed at this time. Seizure disorder- s/t Stroke in 2020. Currently managed on Keppra 500mg BID liquid via PEG, has been compliant with this medication. No seizure activity since April of 2021. DDD- multilevel disc disease. Pain managed with PRN Tylenol liquid via tube. Per family, she is able to endorse pain and request Tylenol. Hematochezia- episode of BRBPR on 2/8/23 and 2/9/23 prompting ED visit. Attributed to diverticular bleed. Eliquis held and bleeding stopped- did not require surgical intervention or transfusion. Eliquis resumed prior to discharge and bleeding has not returned. Denies abdominal pain or tenderness today, tolerating tube feeding. FIT test obtained today due to son's concern for changes in stool color. Discussed s/s that would prompt return to ED with son.      -Labs: Last CBC, CMP 2/12/23  -AMD: No AMD previously completed. She has a son and two daughters who are next of kin. Son is primary caretaker and current healthcare surrogate. Son states that she would want hospitalization if indicated, but no aggressive measures.   (Son types this in phone and shows provider as not to disturb patient with discussion)  -Code status: DNR, DDNR in chart and in patient's room  -Follow up: in 1 month (3/15/23)    Health Maintenance Due   Topic Date Due    DTaP/Tdap/Td series (1 - Tdap) Never done    Shingles Vaccine (1 of 2) Never done       I have left a SUMMARY / INSTRUCTIONS from today's visit with the patient/family in the home          HISTORY:      CHIEF COMPLAINT:    Chief Complaint   Patient presents with    Hospital Follow Up      HPI/SUBJECTIVE:      Ms. Melvin Renteria is an 79 y/o F seen today for transition of care visit after hospitalization 2/9-2/12/23. Ms. Jennifer Cintron is lives in a home with her son, Robert Natarajan, and  full-time caregivers. Robert Natarajan is present for visit today and supplements HPI. She had an episode of bright red blood in stool on 2/8/23. This occurred again 2/9/23 prompting son to take patient to ED. She was admitted and monitored, did not require transfusion or surgical intervention. Eliquis was held with improvement of symptoms. Her Hgb remained stable and Eliquis was resumed prior to discharge. Bleeding was attributed to diverticular bleed. Since returning home, her BM have been relatively normal per son. She has had no nausea, abdominal pain. Abdomen is non-tender on exam.  He is concerned with some return of dark color, FIT test obtained today. Discussed s/s that would prompt ED return with son and he stated understanding. Patient opens eyes during visit today and makes eye contact. She is able to answer yes/no questions. She has vascular dementia, son does not speak of this around her as it causes distress. She is reliant on family and caregivers for total care. She receives all nutrition, hydration, and medications through PEG tube. She had a right MCA stroke in 2020 with residual dysphagia and L-sided hemiparesis. She had a previous hospitalization and recent ED visit for PEG tube malfunction. She has a follow-up appointment arranged with GI for 3/7/23, son is working on arranging transportation to this visit. PEG tube appears patent today, no surrounding redness or warmth, no guarding or appearance of pain. She is managed on Jevity 1.2 for 8 hours per night. She has had no apparent episodes of aspiration or pneumonia, no weight loss. 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 She also has flexed posture of her RUE, but family states she can straighten this arm and occasionally relaxes it. She is currently managed on Lipitor and Eliquis (resumed after hospitalization).   She also has seizure disorder s/t stroke and is currently managed on Keppra with no seizure activity since April 2021. Her chronic medical conditions also include hypertension, afib/SSS s/p pacemaker, and CAD. Her blood pressure is at goal with her current regimen of amlodipine and metoprolol. She has a Medtronic pacemaker in L chest that is checked remotely at office of cardiologist Dr. Saúl Ruffin. Son obtains a reading during our visit today to ensure the device is working. She has scabbing on her scalp due to history of skin cancer with removal of portion of her scalp. Scalp visualized today, quarter-sized open area with pink granulation tissue, minimal bleeding and surrounding scabbing and scars, no drainage or s/s of infection. Per son she will scratch the top of her head occasionally and this removes the scab. He is keeping covered with dry gauze. She has had intermittent episodes of intertrigo. No rash present on exam today. She was also seen by podiatrist since our last visit and toenails were trimmed. Son feels comfortable with her current care and is able to meet her needs. She is taking cetirizine 5mg for allergy symptoms such as watery eyes. Current medication turns to gel when crushed, liquid formulation sent to pharmacy for ease of administration. REVIEW OF SYSTEMS:     Unable to complete ROS as she does not respond to questions. Per son, she has had some episodes of pain in right arm improved with Tylenol. She is sleeping well overnight and frequently during the day. No recent rashes, worsening cough, sick contacts, or fevers. No changes to bowel or bladder habits, no apparent weight loss. PHYSICAL EXAM:     Visit Vitals  /63 (BP 1 Location: Right arm, BP Patient Position: Lying)   Pulse 76   Temp 97.5 °F (36.4 °C) (Temporal)   Resp 18   SpO2 95%       Physical Exam  Constitutional:       General: She is not in acute distress. Appearance: She is ill-appearing.    HENT: Head:      Comments: Scab/lesion to medial top of scalp s/t removal of cancerous cells . Quarter-sized open area with pink wound bed, small amount of bleeding from patient scratching/removing part of scab. Covered with clean dry gauze. No s/s of infection     Mouth/Throat:      Comments: Does not open mouth during visit, per family she is edentulous   Eyes:      General: No scleral icterus. Conjunctiva/sclera: Conjunctivae normal.      Comments: PERRL, arcus senilis present. Opens eyes and briefly makes eye contact but does not track. Neck:      Vascular: No carotid bruit. Cardiovascular:      Rate and Rhythm: Rhythm irregular. Pulses: Normal pulses. Dorsalis pedis pulses are 2+ on the right side and 2+ on the left side. Heart sounds: Normal heart sounds. No murmur heard. Comments: Pacemaker L chest  Pulmonary:      Effort: Pulmonary effort is normal. No respiratory distress. Breath sounds: No rhonchi. Comments: Use of abdominal muscles for respirations (has been consistent throughout visits and family reports is baseline)  Abdominal:      General: Abdomen is flat. Bowel sounds are normal.      Palpations: Abdomen is soft. There is no mass. Tenderness: There is no abdominal tenderness. There is no guarding. Comments: Midline PEG in place surrounded by split gauze. No surrounding redness, warmth, drainage. Tube appears patent   Musculoskeletal:         General: Swelling present. Right lower leg: No edema. Left lower leg: No edema. Right foot: Foot drop present. Left foot: Foot drop present. Comments: Spastic paralysis of left arm and left leg. No contracture of L hand or foot noted. Non-pitting edema of right forearm (no hand or elbow swelling). No warmth, erythema. Skin intact. Feet:      Right foot:      Skin integrity: Skin integrity normal.      Toenail Condition: Right toenails are abnormally thick.  Fungal disease present. Left foot:      Skin integrity: Skin integrity normal.      Toenail Condition: Left toenails are abnormally thick. Fungal disease present. Skin:     General: Skin is warm and dry. Findings: No rash. Neurological:      Motor: Weakness present. Comments: No voluntary movement, does not follow commands.   Opens eyes but does not track         HISTORY:     Past Medical and Surgical History reviewed in [facility] on date of initial visit    Patient Active Problem List   Diagnosis Code    Dysphagia R13.10    Recurrent ear pain H92.09    Acute pharyngitis J02.9    Essential hypertension I10    Edema, peripheral R60.9    Pure hypercholesterolemia E78.00    Allergic rhinitis, cause unspecified J30.9    Diverticulosis of colon (without mention of hemorrhage) K57.30    Herniated nucleus pulposus ( slipped disc) [id number]    Degenetrative Dis Disease, 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    Carpal tunnel syndrome G56.00    Unspecified cataract H26.9    GERD Gastro- Esophageal Reflux Disease K21.9    Degenerative Joint Disese ( improved/resolving) M19.90    Menopausal N95.1    Asymptomatic varicose veins I83.90    Sebaceous cyst L72.3    Keloid L91.0    Scalp lesion L98.9    Abnormal nuclear stress test R94.39    Syncope R55    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    CAD (coronary artery disease) I25.10    S/P cardiac pacemaker procedure Z95.0    Bradycardia R00.1    SSS (sick sinus syndrome) ([facility]) I49.5    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[facility]) I97.190, I44.2    PAF (paroxysmal atrial fibrillation) ([facility]) I48.0    Atrial fibrillation with rapid ventricular response ([facility]) I48.91    CVA, old, hemiparesis ([facility]) G66.686    Vascular dementia without behavioral disturbance ([facility]) F01.50    Cardiac arrhythmia I49.9    Squamous cell carcinoma of scalp C44.42    Seizure disorder ([facility] [address] G40.909    GIB (gastrointestinal bleeding) K92.2     Family History   Problem Relation Age of Onset    Hypertension Mother     Stroke Mother Hypertension Father     Cancer Father         PROSTATE, MELANOMA    Anesth Problems Neg Hx       Social History     Tobacco Use    Smoking status: Never    Smokeless tobacco: Never   Substance Use Topics    Alcohol use: No     Allergies   Allergen Reactions    Latex Rash    Bactrim [Sulfamethoprim Ds] Hives    Aspirin Other (comments)     Anything higher than 81mg makes pt jittery    Clindamycin Rash    Codeine Hives and Itching    Diltiazem Other (comments)    Iodinated Contrast Media Itching    Pcn [Penicillins] Hives and Itching    Tape [Adhesive] Rash      Current Outpatient Medications   Medication Sig    cetirizine (ZYRTEC) 5 mg tablet Take 5 mg by mouth.    levETIRAcetam (KEPPRA) 100 mg/mL solution Take 500 mg by mouth two (2) times a day. acetaminophen (M-PAP) 160 mg/5 mL liquid TAKE 30 ML BY PEG EVERY 6 HOURS AS NEEDED FOR PAIN OR FEVER  Indications: pain associated with arthritis, backache, pain    potassium chloride (KAON 10%) 20 mEq/15 mL solution Take 15 mL by mouth two (2) times a day. amLODIPine (NORVASC) 5 mg tablet Take 1 Tablet by mouth daily. atorvastatin (LIPITOR) 40 mg tablet Take 1 Tablet by mouth nightly. apixaban (Eliquis) 5 mg tablet Take 1 Tablet by mouth two (2) times a day. famotidine (PEPCID) 40 mg/5 mL (8 mg/mL) suspension Take 5 mL by mouth two (2) times a day. metoprolol succinate (TOPROL-XL) 25 mg XL tablet Take 1 Tablet by mouth daily. lactose-reduced food with fibr (Jevity 1.2 [place]) 0.06 gram-1.2 kcal/mL liqd 1 Dose by PEG Tube route daily. Jevity 1.2 lamont 75ml/hr over 8 hours with free water flushes 4 times daily     No current facility-administered medications for this visit.         LAB DATA REVIEWED:     Lab Results   Component Value Date/Time    Hemoglobin A1c 5.7 (H) 07/04/2020 05:43 AM    Hemoglobin A1c (POC) 5.7 08/08/2016 03:43 PM     No results found for: MCACR, MCA1, MCA2, MCA3, MCAU, MCAU2, MCALPOCT  Lab Results   Component Value Date/Time    TSH 0.65 07/04/2020 05:43 AM     No results found for: Sophie Coyle, VD3RIA      Lab Results   Component Value Date/Time    WBC 6.3 02/12/2023 12:50 AM    HGB 13.7 02/12/2023 12:50 AM    PLATELET 242 10/72/4398 12:50 AM     Lab Results   Component Value Date/Time    Sodium 135 (L) 02/12/2023 12:50 AM    Potassium 4.3 02/12/2023 12:50 AM    Chloride 97 02/12/2023 12:50 AM    CO2 32 02/12/2023 12:50 AM    BUN 12 02/12/2023 12:50 AM    Creatinine 0.53 (L) 02/12/2023 12:50 AM    Calcium 9.3 02/12/2023 12:50 AM    Magnesium 2.0 02/10/2023 02:00 AM    Phosphorus 3.7 02/10/2023 02:00 AM      Lab Results   Component Value Date/Time    Alk.  phosphatase 117 02/09/2023 12:58 PM    Protein, total 6.7 02/09/2023 12:58 PM    Albumin 3.0 (L) 02/09/2023 12:58 PM    Globulin 3.7 02/09/2023 12:58 PM     No results found for: IRON, FE, TIBC, IBCT, PSAT, FERR             Renny Mtz NP

## 2023-02-16 NOTE — ACP (ADVANCE CARE PLANNING)
Advance Care Planning     General Advance Care Planning (ACP) Conversation      Date of Conversation: 2/15/2023  Conducted with: Healthcare Decision Maker: Named in Advance Directive or Healthcare Power of [address]:     Primary Decision Maker: Donaldo - Son - [phone]    Primary Decision Maker: Max Hairston - Daughter - [phone]    Primary Decision Maker: Zuleyka Badillo - Daughter - [phone]  Click here to complete [address] including selection of the Healthcare Decision Maker Relationship (ie \"Primary\")    Today we documented Decision Maker(s) consistent with ACP documents on file. Content/Action Overview:    Has ACP document(s) on file - reflects the patient's care preferences  Reviewed DNR/DNI and patient elects DNR order - completed portable DNR form & placed order  Topics discussed: treatment goals, hospitalization preferences, and resuscitation preferences       Length of Voluntary ACP Conversation in minutes:  <16 minutes (Non-Billable)    Lieutenant Balbir NP

## 2023-02-25 ENCOUNTER — TELEPHONE (OUTPATIENT)
Dept: PALLATIVE CARE | Age: 83
End: 2023-02-25

## 2023-02-25 RX ORDER — FAMOTIDINE 40 MG/5ML
40 POWDER, FOR SUSPENSION ORAL 2 TIMES DAILY
Qty: 280 ML | Refills: 0 | Status: SHIPPED | OUTPATIENT
Start: 2023-02-25 | End: 2023-03-25

## 2023-02-27 RX ORDER — FAMOTIDINE 40 MG/5ML
40 POWDER, FOR SUSPENSION ORAL 2 TIMES DAILY
Qty: 280 ML | Refills: 5
Start: 2023-02-27 | End: 2023-08-14

## 2023-03-01 ENCOUNTER — DOCUMENTATION ONLY (OUTPATIENT)
Dept: FAMILY MEDICINE CLINIC | Age: 83
End: 2023-03-01

## 2023-03-01 NOTE — PROGRESS NOTES
Home Based Primary Care  Plan of Care    [facility] Team Members: Liliya Dawson MD; ZION Finley; La Youngblood NP; Nelly Skiff, GAMALIEL; Mae Hickey, GAMALIEL; Marce Conklin RN; Karina Reynolds LCSW    Jorge Bowen  1940 / [id number]  female      Date of Initial Visit (Start of Care): 9/22/22    Diagnosis:   Patient Active Problem List   Diagnosis Code    Dysphagia R13.10    Recurrent ear pain H92.09    Acute pharyngitis J02.9    Essential hypertension I10    Edema, peripheral R60.9    Pure hypercholesterolemia E78.00    Allergic rhinitis, cause unspecified J30.9    Diverticulosis of colon (without mention of hemorrhage) K57.30    Herniated nucleus pulposus ( slipped disc) [id number]    Degenetrative Dis Disease, 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    Carpal tunnel syndrome G56.00    Unspecified cataract H26.9    GERD Gastro- Esophageal Reflux Disease K21.9    Degenerative Joint Disese ( improved/resolving) M19.90    Menopausal N95.1    Asymptomatic varicose veins I83.90    Sebaceous cyst L72.3    Keloid L91.0    Scalp lesion L98.9    Abnormal nuclear stress test R94.39    Syncope R55    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    CAD (coronary artery disease) I25.10    S/P cardiac pacemaker procedure Z95.0    Bradycardia R00.1    SSS (sick sinus syndrome) ([facility]) I49.5    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 female who was referred to the [facility] ([place], [place]) program due to CVA with L hemiparesis, vascular dementia. Patient discussed today for POC review.     Advance Care Planning:  Code status: DNR      Primary Decision Maker: Leonila Seat Son - [phone]    Primary Decision Maker: Marimar Robleror - Daughter - [phone]    Primary Decision Maker: Isacc Jaime - Daughter - [phone]   Advance Care Planning 2/11/2023   Patient's Healthcare Decision Maker is: Legal Next of Kin   Confirm Advance Directive Yes, on file   Does the patient have other document types Other (comment)       DME/Supplies:  Hospital Bed     Allergies   Allergen Reactions    Latex Rash    Bactrim [Sulfamethoprim Ds] Hives    Aspirin Other (comments)     Anything higher than 81mg makes pt jittery    Clindamycin Rash    Codeine Hives and Itching    Diltiazem Other (comments)    Iodinated Contrast Media Itching    Pcn [Penicillins] Hives and Itching    Tape [Adhesive] Rash       Nutritional Requirements:   Tube feeds with G / J tube    Functional/Activity Level:  Bedbound only    Safety Measures:   Aspiration risk, Fall risk, and Self-care deficity    Acuity Level Rating: High    Current Outpatient Medications   Medication Sig    famotidine (PEPCID) 40 mg/5 mL (8 mg/mL) suspension Take 5 mL by mouth two (2) times a day for 168 days. cetirizine (ZYRTEC) 1 mg/mL solution Take 5 mL by mouth daily. levETIRAcetam (KEPPRA) 100 mg/mL solution Take 500 mg by mouth two (2) times a day. acetaminophen (M-PAP) 160 mg/5 mL liquid TAKE 30 ML BY PEG EVERY 6 HOURS AS NEEDED FOR PAIN OR FEVER  Indications: pain associated with arthritis, backache, pain    potassium chloride (KAON 10%) 20 mEq/15 mL solution Take 15 mL by mouth two (2) times a day. amLODIPine (NORVASC) 5 mg tablet Take 1 Tablet by mouth daily. atorvastatin (LIPITOR) 40 mg tablet Take 1 Tablet by mouth nightly. apixaban (Eliquis) 5 mg tablet Take 1 Tablet by mouth two (2) times a day. metoprolol succinate (TOPROL-XL) 25 mg XL tablet Take 1 Tablet by mouth daily. lactose-reduced food with fibr (Jevity 1.2 Imtiaz) 0.06 gram-1.2 kcal/mL liqd 1 Dose by PEG Tube route daily.  Jevity 1.2 imtiaz 75ml/hr over 8 hours with free water flushes 4 times daily     No current facility-administered medications for this visit. The Plan of Care has been reviewed and updated by the Interdisciplinary Group (IDG) as frequently as the patient condition warrants. Plan of Care by Discipline:    1. Provider  Assessment of medication adverse effects, Reduction of polypharmacy within benefit/burden framework appropriate to age, function and disease state, Determine need for laboratory assessment based on patient disease status , Assess results of laboratory testing and adjust treatment plan as appropriate, and Create and implement disease /symptom specific plan to manage high risk conditions / symptoms    2. Nursing  Education regarding disease state, Education for safety; falls, Education for skin care in patients with limited mobility; with focus on preventing skin breakdown, and Skin assessment in patient's with limited mobility    3. Social Work  Assess safety concerns and address as appropriate, Assess for caregiver burden and intervene as psychosocially indicated, Provide information on available community resources, and Offer counseling services for mental health conditions including but not limited to anxiety, depression, and anticipatory grief      Plan of Care Orders / Action Items:  Vascular dementia- Minimally responsive to this provider at most visits. Opens eyes today and makes eye contact. She is able to verbalize yes or no. Per son and daughter, she will at times endorse pain or request Tylenol. Not requiring any medications for behavior, continues sleeping well overnight. CVA with L hemiparesis- Right MCA stroke in 2020 with an occluded right internal carotid artery from the carotid bifurcation to the [place] and further intraluminal thrombus at the right MCA bifurcation. Residual dysphagia and L hemiparesis. No contractures currently, son providing passive ROM exercises to extremities.  She is functionally dependent for total care. Continue skin protective measures such as air mattress, frequent repositioning, brief changes/ Purewick urinary device, zinc paste, floating heels. Currently on Eliquis (resumed), atorvastatin. Lipid panel 9/22/22 at goal.  CBC 2/12/23 with Hgb WNL. Dysphagia/ PEG tube- s/t stroke, swallow study 7/2022 notes severe oropharyngeal dysphagia. 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 PEG tube in place with last exchange 12/17/22 in ED due to dislodgement. GI follow-up scheduled 3/7/23 for exchange. Currently receiving total nutrition, hydration, and medications through PEG (Jevity 1.2cal for total of 8 hours overnight). No recent aspiration, pneumonia, or apparent weight loss. HOB elevated overnight. CMP obtained 2/9/23 with albumin 3.0.  CAD- Currently managed with atorvastatin, Eliquis. CBC, Lipid panel obtained 9/22/22 and WNL, will repeat q 6 months. Hypertension- At goal with amlodipine, metoprolol via PEG. Compliant with medications. Goal <140/80 due to stroke history but would weigh against age and primary goal of comfort. A-fib/Sick sinus Syndrome/Pacemaker- [employer] pacemaker in L chest placed 9/2017. Continues with remote interrogations through office of Dr. Cherylene Slate, cardiologist. Patient unable to endorse symptoms of palpitations or SOB. GERD- Currently managed with Pepcid via PEG. Per son, occasionally has thick secretions that he attributes to worsening symptoms, gives Pepcid and they improve. Discussed suction for oral secretions, but family feels they are well-managed at this time. Seizure disorder- s/t Stroke in 2020. Currently managed on Keppra 500mg BID liquid via PEG, has been compliant with this medication. No seizure activity since April of 2021. DDD- multilevel disc disease. Pain managed with PRN Tylenol liquid via tube. Per family, she is able to endorse pain and request Tylenol. Hematochezia- episode of BRBPR on 2/8/23 and 2/9/23 prompting ED visit. Attributed to diverticular bleed. Eliquis held and bleeding stopped- did not require surgical intervention or transfusion. Eliquis resumed prior to discharge and bleeding has not returned. Denies abdominal pain or tenderness today, tolerating tube feeding. FIT test obtained due to son's concern for changes in stool color. Discussed s/s that would prompt return to ED with son.       -Labs: Last CBC, CMP 2/12/23  -AMD: No AMD previously completed. She has a son and two daughters who are next of kin. Son is primary caretaker and current healthcare surrogate. Son states that she would want hospitalization if indicated, but no aggressive measures.   (Son types this in phone and shows provider as not to disturb patient with discussion)  -Code status: DNR, DDNR in chart and in patient's room  -Follow up: in 1 month (3/15/23)    Health Maintenance   Topic Date Due    DTaP/Tdap/Td series (1 - Tdap) Never done    Shingles Vaccine (1 of 2) Never done    Depression Screen  08/18/2023    Lipid Screen  09/22/2023    Medicare Yearly Exam  10/29/2023    Bone Densitometry (Dexa) Screening  Completed    Flu Vaccine  Completed    COVID-19 Vaccine  Completed    Pneumococcal 65+ years  Completed     Last provider visit 2/15/23  Estimated Visit Frequency:  Monthly Provider Visit  SW visits PRN

## 2023-03-07 ENCOUNTER — HOSPITAL ENCOUNTER (OUTPATIENT)
Dept: INTERVENTIONAL RADIOLOGY/VASCULAR | Age: 83
Discharge: HOME | End: 2023-03-07
Attending: NURSE PRACTITIONER
Payer: MEDICARE

## 2023-03-07 VITALS — WEIGHT: 200 LBS | HEIGHT: 66 IN | BODY MASS INDEX: 32.14 KG/M2

## 2023-03-07 DIAGNOSIS — K31.84 GASTROPARESIS: ICD-10-CM

## 2023-03-07 PROCEDURE — 74011000250 HC RX REV CODE- 250: Performed by: STUDENT IN AN ORGANIZED HEALTH CARE EDUCATION/TRAINING PROGRAM

## 2023-03-07 PROCEDURE — 2709999900 HC NON-CHARGEABLE SUPPLY

## 2023-03-07 PROCEDURE — 77030018617 HC TU FEED GASTMY1 COOK -B

## 2023-03-07 PROCEDURE — C1769 GUIDE WIRE: HCPCS

## 2023-03-07 PROCEDURE — 77030009378 HC DEV TORQ OLCT F/GWIRE MANIP COOK -A

## 2023-03-07 PROCEDURE — 74011250636 HC RX REV CODE- 250/636: Performed by: STUDENT IN AN ORGANIZED HEALTH CARE EDUCATION/TRAINING PROGRAM

## 2023-03-07 PROCEDURE — C1892 INTRO/SHEATH,FIXED,PEEL-AWAY: HCPCS

## 2023-03-07 PROCEDURE — 49450 REPLACE G/C TUBE PERC: CPT

## 2023-03-07 RX ORDER — LIDOCAINE HYDROCHLORIDE 20 MG/ML
18 INJECTION, SOLUTION INFILTRATION; PERINEURAL ONCE
Status: DISCONTINUED | OUTPATIENT
Start: 2023-03-07 | End: 2023-03-08 | Stop reason: HOSPADM

## 2023-03-07 RX ORDER — LIDOCAINE HYDROCHLORIDE 20 MG/ML
2 JELLY TOPICAL ONCE
Status: COMPLETED | OUTPATIENT
Start: 2023-03-07 | End: 2023-03-07

## 2023-03-07 RX ORDER — HEPARIN SODIUM 200 [USP'U]/100ML
400 INJECTION, SOLUTION INTRAVENOUS ONCE
Status: COMPLETED | OUTPATIENT
Start: 2023-03-07 | End: 2023-03-07

## 2023-03-07 RX ADMIN — LIDOCAINE HYDROCHLORIDE 2 ML: 20 JELLY TOPICAL at 14:00

## 2023-03-07 RX ADMIN — HEPARIN SODIUM IN SODIUM CHLORIDE 20 ML: 200 INJECTION INTRAVENOUS at 14:28

## 2023-03-07 NOTE — DISCHARGE INSTRUCTIONS
[facility]  Special Procedures/Radiology Department    Feeding Tube Placement      Watch for signs of infection:   Redness   Fever/Chills   Increased pain or tenderness at the site   Drainage  If this occurs, call your doctor    Your peg tube can be used right away. The position was checked with fluoroscopy    Keep dressing clean and dry.     Other:

## 2023-03-13 ENCOUNTER — TELEPHONE (OUTPATIENT)
Dept: FAMILY MEDICINE CLINIC | Age: 83
End: 2023-03-13

## 2023-03-13 NOTE — TELEPHONE ENCOUNTER
Called patient to confirm their in home visit with Brook Mckeon on Wednesday, 3/15/23, arrival between 12-4pm.  Spoke with Jonathon Gregory, they confirmed the appointment and answered the covid screen questions.  Does anyone in the household have covid NO  Have there been any changes to insurance NO or location NO

## 2023-03-15 ENCOUNTER — HOME VISIT (OUTPATIENT)
Dept: FAMILY MEDICINE CLINIC | Age: 83
End: 2023-03-15
Payer: MEDICARE

## 2023-03-15 VITALS
HEART RATE: 68 BPM | RESPIRATION RATE: 14 BRPM | OXYGEN SATURATION: 95 % | TEMPERATURE: 97.2 F | SYSTOLIC BLOOD PRESSURE: 134 MMHG | DIASTOLIC BLOOD PRESSURE: 76 MMHG

## 2023-03-15 DIAGNOSIS — M53.9 MULTILEVEL DEGENERATIVE DISC DISEASE: ICD-10-CM

## 2023-03-15 DIAGNOSIS — Z95.0 S/P CARDIAC PACEMAKER PROCEDURE: ICD-10-CM

## 2023-03-15 DIAGNOSIS — Z93.1 S/P PERCUTANEOUS ENDOSCOPIC GASTROSTOMY (PEG) TUBE PLACEMENT (HCC): ICD-10-CM

## 2023-03-15 DIAGNOSIS — I10 ESSENTIAL HYPERTENSION: ICD-10-CM

## 2023-03-15 DIAGNOSIS — R13.10 DYSPHAGIA, UNSPECIFIED TYPE: ICD-10-CM

## 2023-03-15 DIAGNOSIS — I69.359 CVA, OLD, HEMIPARESIS (HCC): ICD-10-CM

## 2023-03-15 DIAGNOSIS — I48.0 PAF (PAROXYSMAL ATRIAL FIBRILLATION) (HCC): ICD-10-CM

## 2023-03-15 DIAGNOSIS — G40.909 SEIZURE DISORDER (HCC): ICD-10-CM

## 2023-03-15 DIAGNOSIS — F01.50 VASCULAR DEMENTIA WITHOUT BEHAVIORAL DISTURBANCE (HCC): Primary | ICD-10-CM

## 2023-03-15 DIAGNOSIS — I25.10 CORONARY ARTERY DISEASE INVOLVING NATIVE CORONARY ARTERY OF NATIVE HEART WITHOUT ANGINA PECTORIS: ICD-10-CM

## 2023-03-15 PROCEDURE — 3078F DIAST BP <80 MM HG: CPT | Performed by: NURSE PRACTITIONER

## 2023-03-15 PROCEDURE — 1123F ACP DISCUSS/DSCN MKR DOCD: CPT | Performed by: NURSE PRACTITIONER

## 2023-03-15 PROCEDURE — 99349 HOME/RES VST EST MOD MDM 40: CPT | Performed by: NURSE PRACTITIONER

## 2023-03-15 PROCEDURE — 3075F SYST BP GE 130 - 139MM HG: CPT | Performed by: NURSE PRACTITIONER

## 2023-03-15 NOTE — PROGRESS NOTES
Home Based [address] [facility]   [phone]          NOTE: Home Based Primary Care is a PROVIDER (MD/NP) based interdisciplinary practice (RN, LCSW, Pharmacy, Dietician) for patients who cannot access (or have a taxing effort) primary / specialty medical care in an office setting. [facility] is NOT [employer] but works with [facility] when there is a skilled need. Our MD/NPs are integral in Care Transitions; PLEASE CALL [phone] if this patient arrives in the Emergency Department or Hospital.         Date of Current Visit: 3/15/2023     SUMMARY     Gavi Hartman is a 78 y.o. female seen in the home today due to taxing effort to seek primary care services in the community s/t stroke with L-sided hemiparesis, vascular dementia    Patient/Family present for Current Visit:  patient, son and primary caregiver Dale Tyler  Goals of Care as stated by the patient/family is: comfort    ASSESSMENT AND PLAN     1. Vascular dementia without behavioral disturbance ([address]    2. CVA, old, hemiparesis ([address]    3. Dysphagia, unspecified type    4. S/P percutaneous endoscopic gastrostomy (PEG) tube placement ([address]    5. Coronary artery disease involving native coronary artery of native heart without angina pectoris    6. PAF (paroxysmal atrial fibrillation) ([address]    7. S/P cardiac pacemaker procedure    8. Essential hypertension    9. Seizure disorder ([address]    10. Multilevel degenerative disc disease      Vascular dementia- Minimally responsive to this provider at most visits. Opens eyes today but does not track, grunts in response to son's voice, does not follow commands. Per son and daughter, she will at times endorse pain or request Tylenol. Not requiring any medications for behavior, continues sleeping well overnight.     CVA with L hemiparesis- Right MCA stroke in 2020 with an occluded right internal carotid artery from the carotid bifurcation to the [place] and further intraluminal thrombus at the right MCA bifurcation. Residual dysphagia and L hemiparesis. No contractures but very little active ROM, son providing passive ROM exercises to extremities. She is functionally dependent for total care. Continue skin protective measures such as air mattress, frequent repositioning, brief changes/ Purewick urinary device, zinc paste, floating heels. Currently on Eliquis, atorvastatin. Lipid panel 9/22/22 at goal.  CBC 2/12/23 with Hgb WNL. Dysphagia/ PEG tube- s/t stroke, swallow study 7/2022 notes severe oropharyngeal dysphagia. 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 PEG tube in place with exchange through IR on 3/7/23. Per son, reason for exchange was to provide ENFit tube (DME company will only be providing supplies with this model), but was replaced with the old model. Previous exchange 12/17/22 in ED due to dislodgement. Currently receiving total nutrition, hydration, and medications through PEG (Jevity 1.2cal for total of 8 hours overnight). No recent aspiration, pneumonia, or apparent weight loss. HOB elevated overnight. CMP obtained 2/9/23 with albumin 3.0. Message sent to NP who exchanged G-tube to see if possible to exchange with newer model, will order through IR if this is possible. CAD- Currently managed with atorvastatin, Eliquis. Unable to endorse symptoms. CBC, Lipid panel obtained 9/22/22 and WNL. Hypertension- At goal with amlodipine, metoprolol via PEG. Compliant with medications. Goal <140/80 due to stroke history but would weigh against age and primary goal of comfort. A-fib/Sick sinus Syndrome/Pacemaker- Medtronic pacemaker in L chest placed 9/2017. Continues with remote interrogations through office of Dr. Ashkan Urrutia, cardiologist. Patient unable to endorse symptoms of palpitations or SOB. Continue Eliquis. GERD- Currently managed with Pepcid via PEG.   Per son, occasionally has thick secretions that he attributes to worsening symptoms, gives Pepcid and they improve. Discussed suction for oral secretions, but family feels they are well-managed at this time. Seizure disorder- s/t Stroke in 2020. Currently managed on Keppra 500mg BID liquid via PEG, has been compliant with this medication. No seizure activity since April of 2021. DDD- multilevel disc disease. Pain managed with PRN Tylenol liquid via tube. Per family, she is able to endorse pain and request Tylenol. Unclear source of pain, but Tylenol improves. Discussed nonverbal cues such as brow furrowing, grimace, moaning, restlessness.      -Labs: Last CBC, CMP 2/12/23. Will repeat around August/Sept or with change in condition and add Lipid panel. -AMD: No AMD previously completed. She has a son and two daughters who are next of kin. Son is primary caretaker and current healthcare surrogate. Son states that she would want hospitalization if indicated, but no aggressive measures. (Son types this in phone and shows provider as not to disturb patient with discussion)  -Code status: DNR, DDNR in chart and in patient's room  -Follow up: in 8 weeks (5/17/23)    Health Maintenance Due   Topic Date Due    DTaP/Tdap/Td series (1 - Tdap) Never done    Shingles Vaccine (1 of 2) Never done       I have left a SUMMARY / INSTRUCTIONS from today's visit with the patient/family in the home        HISTORY:      CHIEF COMPLAINT:    Chief Complaint   Patient presents with    Extremity Weakness    Feeding Tube Problem           GERD      HPI/SUBJECTIVE:      Ms. Steven Greenwood is an 77 y/o F seen today for follow-up of multiple chronic conditions. Ms. Lurdes Torres is lives in a home with her son, Kalina Baptiste, and  full-time caregivers. Kalina Baptiste is present for visit today and supplements HPI. She had a recent hospitalization 2/9-2/12/23 after episode of bright red blood in stool attributed to diverticular bleed.   She was admitted and monitored, did not require transfusion or surgical intervention. Eliquis was held with improvement of symptoms. Her Hgb remained stable and Eliquis was resumed prior to discharge. Since returning home, her BM have been relatively normal per son. She had one episode of small amount of blood in stool about 2 weeks ago per son, improved without intervention. FIT test at last visit was negative. She has had no apparent nausea or abdominal pain. Abdomen is non-tender on exam.      Patient primarily keeps eyes closed during visit today and opens only briefly. She moans in response to Isidro's voice, does not respond to this provider. Per son, at times she is able to answer yes/no questions and verbalize pain. She had a right MCA stroke in 2020 with residual dysphagia and L-sided hemiparesis. Son provides passive ROM to prevent contractions. She does not willingly move extremities on exam.  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 She also has flexed posture of her RUE, but family states she can straighten this arm and occasionally relaxes it. Her skin is intact and she is on low air-loss mattress. She has vascular dementia, son does not speak of this around her as it causes distress. She is reliant on family and caregivers for total care. She is currently managed on Lipitor and Eliquis (resumed after hospitalization). She also has seizure disorder s/t stroke and is currently managed on Keppra with no seizure activity since April 2021. Ms. Jessica Ortega receives all nutrition, hydration, and medications through PEG tube. She had a previous hospitalization and recent ED visit for PEG tube malfunction in Dec of 2022. She had a follow-up appointment arranged with GI for 3/7/23 to exchange tube. Son requested change to Enfit G-tube as his supply company ([employer]) is only going to provide supplies for this type of G-tube. She was subsequently sent to IR as tube could not be replaced by GI.   IR note reviewed and tube was replaced with 18Fr and 15cc balloon but with older model (not ENFit). Supplies are not compatible with this tube. Message sent to IR provider to see potential to exchange the tube to this model. Will request GI records as well. PEG tube appears patent today, no surrounding redness or warmth, no guarding or appearance of pain. Son continues to have some old supplies that he is washing so that he can still give mediations and continuous feeding. She is managed on Jevity 1.2 for 8 hours per night. She has had no apparent episodes of aspiration or pneumonia, no weight loss. Her chronic medical conditions also include hypertension, afib/SSS s/p pacemaker, and CAD. Her blood pressure is at goal with her current regimen of amlodipine and metoprolol. She has a Medtronic pacemaker in L chest that is checked remotely at office of cardiologist Dr. Ede Jennings. She has scabbing on her scalp due to history of skin cancer with removal of portion of her scalp. Scalp visualized today, scabbing and scars present with no drainage or s/s of infection. Per son she will scratch the top of her head occasionally and this removes the scab. He is keeping covered with dry gauze or leaving open to air if scabbed. She has had intermittent episodes of intertrigo. No rash present on exam today. Other than requesting exchange of PEG tube, son has no acute concerns for patient today. He feels comfortable with her care and medications. She appears comfortable and her goals of care remain comfort. REVIEW OF SYSTEMS:     Unable to complete ROS as she does not respond to questions. Per son, she has occasional brow furrowing improved with Tylenol. She is sleeping well overnight and frequently during the day. No recent rashes, worsening cough, sick contacts, or fevers. No changes to bowel or bladder habits, no apparent weight loss.        PHYSICAL EXAM:     Visit Vitals  /76 (BP 1 Location: Right upper arm, BP Patient Position: Lying)   Pulse 68 Temp 97.2 °F (36.2 °C) (Temporal)   Resp 14   SpO2 95%       Physical Exam  Constitutional:       General: She is not in acute distress. Appearance: Normal appearance. HENT:      Head: Normocephalic and atraumatic. Comments: Scab to medial top of scalp s/t removal of cancerous cells . Mouth/Throat:      Comments: Does not open mouth during visit, per family she is edentulous   Eyes:      Comments: Unable to assess as she does not open eyes    Neck:      Vascular: No carotid bruit. Cardiovascular:      Rate and Rhythm: Rhythm irregular. Pulses: Normal pulses. Dorsalis pedis pulses are 2+ on the right side and 2+ on the left side. Heart sounds: Normal heart sounds. No murmur heard. Comments: Pacemaker L chest  Pulmonary:      Effort: Pulmonary effort is normal. No respiratory distress. Breath sounds: No rhonchi. Comments: Use of abdominal muscles for respirations (has been consistent throughout visits and family reports is baseline)  Abdominal:      General: Abdomen is flat. Bowel sounds are normal.      Palpations: Abdomen is soft. There is no mass. Tenderness: There is no abdominal tenderness. There is no guarding. Comments: Midline PEG in place surrounded by split gauze. No surrounding redness, warmth, drainage. Tube appears patent   Musculoskeletal:      Right lower leg: No edema. Left lower leg: No edema. Right foot: Foot drop present. Left foot: Foot drop present. Comments: Spastic paralysis of left arm and left leg. No contracture of L hand or foot noted. No warmth, erythema. Skin intact. Feet:      Right foot:      Skin integrity: Skin integrity normal.      Toenail Condition: Right toenails are abnormally thick. Fungal disease present. Left foot:      Skin integrity: Skin integrity normal.      Toenail Condition: Left toenails are abnormally thick. Fungal disease present. Skin:     General: Skin is warm and dry. Findings: No rash. Neurological:      Motor: Weakness present. Comments: No voluntary movement, does not follow commands.   Vocalizes in response to son's voice        HISTORY:     Past Medical and Surgical History reviewed in [facility] on date of initial visit    Patient Active Problem List   Diagnosis Code    Dysphagia R13.10    Recurrent ear pain H92.09    Acute pharyngitis J02.9    Essential hypertension I10    Edema, peripheral R60.9    Pure hypercholesterolemia E78.00    Allergic rhinitis, cause unspecified J30.9    Diverticulosis of colon (without mention of hemorrhage) K57.30    Herniated nucleus pulposus ( slipped disc) [id number]    Degenetrative Dis Disease, 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    Carpal tunnel syndrome G56.00    Unspecified cataract H26.9    GERD Gastro- Esophageal Reflux Disease K21.9    Degenerative Joint Disese ( improved/resolving) M19.90    Menopausal N95.1    Asymptomatic varicose veins I83.90    Sebaceous cyst L72.3    Keloid L91.0    Scalp lesion L98.9    Abnormal nuclear stress test R94.39    Syncope R55    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    CAD (coronary artery disease) I25.10    S/P cardiac pacemaker procedure Z95.0    Bradycardia R00.1    SSS (sick sinus syndrome) ([facility]) I49.5    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, 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    PAF (paroxysmal atrial fibrillation) ([facility]) I48.0    Atrial fibrillation with rapid ventricular response ([facility]) I48.91    CVA, old, hemiparesis ([facility]) F07.458    Vascular dementia without behavioral disturbance ([facility]) F01.50    Cardiac arrhythmia I49.9    Squamous cell carcinoma of scalp C44.42    Seizure disorder ([facility]) G40.909    GIB (gastrointestinal bleeding) K92.2     Family History   Problem Relation Age of Onset    Hypertension Mother     Stroke Mother     Hypertension Father     Cancer Father         PROSTATE, MELANOMA    Anesth Problems Neg Hx       Social History     Tobacco Use    Smoking status: Never    Smokeless tobacco: Never Substance Use Topics    Alcohol use: No     Allergies   Allergen Reactions    Latex Rash    Bactrim [Sulfamethoprim Ds] Hives    Aspirin Other (comments)     Anything higher than 81mg makes pt jittery    Clindamycin Rash    Codeine Hives and Itching    Diltiazem Other (comments)    Iodinated Contrast Media Itching    Pcn [Penicillins] Hives and Itching    Tape [Adhesive] Rash      Current Outpatient Medications   Medication Sig    famotidine (PEPCID) 40 mg/5 mL (8 mg/mL) suspension Take 5 mL by mouth two (2) times a day for 168 days. cetirizine (ZYRTEC) 1 mg/mL solution Take 5 mL by mouth daily. levETIRAcetam (KEPPRA) 100 mg/mL solution Take 500 mg by mouth two (2) times a day. acetaminophen (M-PAP) 160 mg/5 mL liquid TAKE 30 ML BY PEG EVERY 6 HOURS AS NEEDED FOR PAIN OR FEVER  Indications: pain associated with arthritis, backache, pain    potassium chloride (KAON 10%) 20 mEq/15 mL solution Take 15 mL by mouth two (2) times a day. amLODIPine (NORVASC) 5 mg tablet Take 1 Tablet by mouth daily. atorvastatin (LIPITOR) 40 mg tablet Take 1 Tablet by mouth nightly. apixaban (Eliquis) 5 mg tablet Take 1 Tablet by mouth two (2) times a day. metoprolol succinate (TOPROL-XL) 25 mg XL tablet Take 1 Tablet by mouth daily. lactose-reduced food with fibr (Jevity 1.2 [place]) 0.06 gram-1.2 kcal/mL liqd 1 Dose by PEG Tube route daily. Jevity 1.2 lamont 75ml/hr over 8 hours with free water flushes 4 times daily     No current facility-administered medications for this visit.         LAB DATA REVIEWED:     Lab Results   Component Value Date/Time    Hemoglobin A1c 5.7 (H) 07/04/2020 05:43 AM    Hemoglobin A1c (POC) 5.7 08/08/2016 03:43 PM     No results found for: MCACR, MCA1, MCA2, MCA3, MCAU, MCAU2, MCALPOCT  Lab Results   Component Value Date/Time    TSH 0.65 07/04/2020 05:43 AM     No results found for: Merwyn Liliana, [id number]      Lab Results   Component Value Date/Time    WBC 6.3 02/12/2023 12:50 AM    HGB 13.7 02/12/2023 12:50 AM    PLATELET 519 33/63/1260 12:50 AM     Lab Results   Component Value Date/Time    Sodium 135 (L) 02/12/2023 12:50 AM    Potassium 4.3 02/12/2023 12:50 AM    Chloride 97 02/12/2023 12:50 AM    CO2 32 02/12/2023 12:50 AM    BUN 12 02/12/2023 12:50 AM    Creatinine 0.53 (L) 02/12/2023 12:50 AM    Calcium 9.3 02/12/2023 12:50 AM    Magnesium 2.0 02/10/2023 02:00 AM    Phosphorus 3.7 02/10/2023 02:00 AM      Lab Results   Component Value Date/Time    Alk.  phosphatase 117 02/09/2023 12:58 PM    Protein, total 6.7 02/09/2023 12:58 PM    Albumin 3.0 (L) 02/09/2023 12:58 PM    Globulin 3.7 02/09/2023 12:58 PM     No results found for: IRON, FE, TIBC, IBCT, PSAT, FERR             Murali Grayson NP

## 2023-03-17 ENCOUNTER — TELEPHONE (OUTPATIENT)
Dept: FAMILY MEDICINE CLINIC | Age: 83
End: 2023-03-17

## 2023-03-17 NOTE — TELEPHONE ENCOUNTER
Telephone call to son Mehul Salazar to advise that I called [facility] interventional radiology and spoke with Quan Catalan RN who was the patient's nurse during tube replacement. They are having supply chain issues with many/most of their supplies on back order or problems with our new medical supply vendor [employer]. They are having to replace feeding tubes with whatever they can get. [facility] will have no choice but to continue to provide adaptors until these issues ease. Advised son that he can literally wash the adaptor with dishwashing liquid, rinse well and keep re-using same one forever. Mehul Salazar was appreciative of call back.

## 2023-03-30 ENCOUNTER — TELEPHONE (OUTPATIENT)
Dept: FAMILY MEDICINE CLINIC | Age: 83
End: 2023-03-30

## 2023-03-30 DIAGNOSIS — I69.359 CVA, OLD, HEMIPARESIS (HCC): Primary | ICD-10-CM

## 2023-03-30 DIAGNOSIS — I25.10 CORONARY ARTERY DISEASE INVOLVING NATIVE CORONARY ARTERY OF NATIVE HEART WITHOUT ANGINA PECTORIS: ICD-10-CM

## 2023-03-30 RX ORDER — ATORVASTATIN CALCIUM 40 MG/1
40 TABLET, FILM COATED ORAL
Qty: 90 TABLET | Refills: 1 | Status: SHIPPED | OUTPATIENT
Start: 2023-03-30

## 2023-03-30 NOTE — TELEPHONE ENCOUNTER
Fax received from [facility] requesting order for Atorvastatin 40mg refill. Rx sent electronically to pharmacy.

## 2023-04-05 ENCOUNTER — OFFICE VISIT (OUTPATIENT)
Dept: CARDIOLOGY CLINIC | Age: 83
End: 2023-04-05

## 2023-04-05 RX ORDER — ACETAMINOPHEN 160 MG/5ML
LIQUID ORAL
Qty: 473 ML | Refills: 5 | Status: SHIPPED
Start: 2023-04-05

## 2023-04-17 ENCOUNTER — HOSPITAL ENCOUNTER (OUTPATIENT)
Dept: INTERVENTIONAL RADIOLOGY/VASCULAR | Age: 83
Discharge: HOME | End: 2023-04-17
Attending: NURSE PRACTITIONER

## 2023-04-17 ENCOUNTER — HOME VISIT (OUTPATIENT)
Dept: FAMILY MEDICINE CLINIC | Age: 83
End: 2023-04-17
Payer: MEDICARE

## 2023-04-17 VITALS
RESPIRATION RATE: 16 BRPM | DIASTOLIC BLOOD PRESSURE: 66 MMHG | OXYGEN SATURATION: 94 % | TEMPERATURE: 97.8 F | SYSTOLIC BLOOD PRESSURE: 132 MMHG | HEART RATE: 78 BPM

## 2023-04-17 DIAGNOSIS — R13.10 DYSPHAGIA, UNSPECIFIED TYPE: ICD-10-CM

## 2023-04-17 DIAGNOSIS — Z93.1 S/P PERCUTANEOUS ENDOSCOPIC GASTROSTOMY (PEG) TUBE PLACEMENT (HCC): ICD-10-CM

## 2023-04-17 DIAGNOSIS — I69.359 CVA, OLD, HEMIPARESIS (HCC): ICD-10-CM

## 2023-04-17 DIAGNOSIS — F01.50 VASCULAR DEMENTIA WITHOUT BEHAVIORAL DISTURBANCE (HCC): ICD-10-CM

## 2023-04-17 DIAGNOSIS — M53.9 MULTILEVEL DEGENERATIVE DISC DISEASE: ICD-10-CM

## 2023-04-17 DIAGNOSIS — N61.0 CELLULITIS OF LEFT BREAST: Primary | ICD-10-CM

## 2023-04-17 DIAGNOSIS — M19.019 OSTEOARTHRITIS OF SHOULDER, UNSPECIFIED LATERALITY, UNSPECIFIED OSTEOARTHRITIS TYPE: ICD-10-CM

## 2023-04-17 PROCEDURE — 99349 HOME/RES VST EST MOD MDM 40: CPT | Performed by: NURSE PRACTITIONER

## 2023-04-17 PROCEDURE — 3078F DIAST BP <80 MM HG: CPT | Performed by: NURSE PRACTITIONER

## 2023-04-17 PROCEDURE — 3075F SYST BP GE 130 - 139MM HG: CPT | Performed by: NURSE PRACTITIONER

## 2023-04-17 PROCEDURE — 1123F ACP DISCUSS/DSCN MKR DOCD: CPT | Performed by: NURSE PRACTITIONER

## 2023-04-17 RX ORDER — DOXYCYCLINE 25 MG/5ML
100 POWDER, FOR SUSPENSION ORAL EVERY 12 HOURS
Qty: 200 ML | Refills: 0 | Status: SHIPPED | OUTPATIENT
Start: 2023-04-17 | End: 2023-04-22

## 2023-04-17 RX ORDER — ACETAMINOPHEN 160 MG/5ML
LIQUID ORAL
Qty: 473 ML | Refills: 5 | Status: SHIPPED | OUTPATIENT
Start: 2023-04-17

## 2023-04-17 NOTE — PROGRESS NOTES
Home Based [address] [facility]   [id number]          NOTE: Home Based Primary Care is a PROVIDER (MD/NP) based interdisciplinary practice (RN, LCSW, Pharmacy, Dietician) for patients who cannot access (or have a taxing effort) primary / specialty medical care in an office setting. [facility] is NOT [employer] but works with [facility] when there is a skilled need. Our MD/NPs are integral in Care Transitions; PLEASE CALL [phone] if this patient arrives in the Emergency Department or Hospital.         Date of Current Visit: 4/17/2023     SUMMARY     Mark Benson is a 78 y.o. female seen in the home today due to taxing effort to seek primary care services in the community s/t stroke with L-sided hemiparesis, vascular dementia    Patient/Family present for Current Visit:  patient, son and primary caregiver Eugenia Erickson  Goals of Care as stated by the patient/family is: comfort    ASSESSMENT AND PLAN     1. Cellulitis of left breast    2. CVA, old, hemiparesis ([address]    3. Vascular dementia without behavioral disturbance ([address]    4. Osteoarthritis of shoulder, unspecified laterality, unspecified osteoarthritis type    5. Multilevel degenerative disc disease    6. Dysphagia, unspecified type    7. S/P percutaneous endoscopic gastrostomy (PEG) tube placement (HCC)         Cellulitis L breast: Mass noted 4/11/23 by family but daughter reports it has since increased in size. Patient not complaining of pain, no apparent tenderness of L breast on exam today. L breast is more swollen than right with area of redness and mild warmth (not immediately surrounding mass). Firm, moveable, superficial mass noted to lateral L breast without fluctuance or tenderness. No recent trauma, no open areas. No history of breast cancer, but history of several other forms of cancer.   Will treat with doxycycline via PEG at this time and if not improved by later this week, will order diagnostic ultrasound of L breast.  Encouraged elevation, Tylenol for pain relief as needed. CVA with L hemiparesis- Right MCA stroke in 2020 with an occluded right internal carotid artery from the carotid bifurcation to the [place] and further intraluminal thrombus at the right MCA bifurcation. Residual dysphagia and L hemiparesis. Currently with very little active ROM, son providing passive ROM exercises to extremities. She is functionally dependent for total care. Continue skin protective measures such as air mattress, frequent repositioning, brief changes/ Purewick urinary device, zinc paste, floating heels. Currently on Eliquis, atorvastatin. Lipid panel 9/22/22 at goal.  CBC 2/12/23 with Hgb WNL. Continue supportive measures, monitoring labs annually or with change in condition. Vascular dementia- Minimally responsive to this provider at most visits. Opens eyes today but does not track, grunts in response to provider's voice, does not follow commands. Responds, \"I'm good how are you\" to her son during visit. Per son and daughter, she is able to endorse pain and request Tylenol. Not requiring any medications for behavior, continues sleeping well overnight. Dysphagia/ PEG tube- s/t stroke, swallow study 7/2022 notes severe oropharyngeal dysphagia. 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 PEG tube in place with exchange through IR on 3/7/23. Per son, reason for exchange was to provide ENFit tube (DME company will only be providing supplies with this model), but was replaced with the old model due to supply issues. Plans for exchange to new model 4/21/23. Currently receiving total nutrition, hydration, and medications through PEG (Jevity 1.2cal for total of 8 hours overnight). No recent aspiration, pneumonia, or apparent weight loss. HOB elevated overnight. CMP obtained 2/9/23 with albumin 3.0. DDD- multilevel disc disease.   Pain managed with PRN Tylenol liquid via tube. Per family, she is able to endorse pain and request Tylenol. Unclear source of pain, but Tylenol improves. Discussed nonverbal cues such as brow furrowing, grimace, moaning, restlessness. None during visit today, appears very comfortable.     -Labs: Last CBC, CMP 2/12/23. Will repeat around August/Sept or with change in condition and add Lipid panel. -AMD: No AMD previously completed. She has a son and two daughters who are next of kin. Son is primary caretaker and current healthcare surrogate. Son states that she would want hospitalization if indicated, but no aggressive measures. (Son types this in phone and shows provider as not to disturb patient with discussion)  -Code status: DNR, DDNR in chart and in patient's room  -Follow up: in 1 month (5/17/23)    Health Maintenance Due   Topic Date Due    DTaP/Tdap/Td series (1 - Tdap) Never done    Shingles Vaccine (1 of 2) Never done       I have left a SUMMARY / INSTRUCTIONS from today's visit with the patient/family in the home          HISTORY:      CHIEF COMPLAINT:    Chief Complaint   Patient presents with    Breast Mass      HPI/SUBJECTIVE:      Ms. Sindy Nichols is an 77 y/o F seen today for acute visit for swelling and mass of left breast.  Ms. Daryl Hernández is lives in a home with her son, Ravi Pires, and full-time caregivers who are bathing patient today. Ravi Pires and caregivers are present for visit today and supplements HPI. On 4/11/23, Ravi Pires called office after noting a mass of left breast.  He was unsure of duration, but was not aware of it previously, and patient has frequent bathing and thorough skin assessments. Per caregiver, started as a small area and she feels that it has increased in size over the last week. She has had no known injuries, no rashes or skin breakdown to this area. She has previously been treated for intertrigo under breasts, but no active rashes or redness under breast currently.   She has no history of breast cancer or surgeries to this breast or arm, but has had oral and skin cancer. On exam today, patient allows palpation of this area without any grimacing, moaning, or reports of pain. Son speaks to her and she briefly opens eyes and says \"I'm good, how are you. \"  Left breast is slightly more swollen that right with area of redness and slight warmth. Above this area, there is a firm, moveable, tubular-shaped mass about 2 inches long to her lateral breast.  Breast with no open areas, rashes, or tenderness. We discussed differentials including cyst, infection, malignancy. Patient's goals of care are comfort and she would not be a good candidate for surgery or chemotherapy. Will treat with antibiotic given redness and warmth, if not improved can order ultrasound of L breast.  Son in agreement with this plan. Ms. Noe Howard had a right MCA stroke in 2020 with residual dysphagia and L-sided hemiparesis. Son provides passive ROM to prevent contractions. She does not willingly move extremities on exam.  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 She also has flexed posture of her RUE, but family states she can straighten this arm and occasionally relaxes it. Family repositions her for exam today. Her skin is intact and she is on low air-loss mattress. She has vascular dementia, son does not speak of this around her as it causes distress. They do not discuss cancer history or previous history of being on hospice as they wish to avoid distress, rather they type details over phone and show this provider. She is reliant on family and caregivers for total care. She appears comfortable during visit today. Ms. Noe Howard receives all nutrition, hydration, and medications through PEG tube. She had a previous hospitalization and recent ED visit for PEG tube malfunction in Dec of 2022. She had a follow-up appointment arranged with GI for 3/7/23 to exchange tube.   Son requested change to [facility] G-tube as his supply company ([employer]) is only going to provide supplies for this type of G-tube. She was subsequently sent to IR as tube could not be replaced by GI. IR note reviewed and tube was replaced with 18Fr and 15cc balloon but with older model (not ENFit). Supplies are not compatible with this tube. She is scheduled for IR to replace with new model on 4/21/23. PEG tube appears patent today, no surrounding redness or warmth, no guarding or appearance of pain. Son continues to have some old supplies that he is washing so that he can still give mediations and continuous feeding. She is managed on Jevity 1.2 for 8 hours per night. She has had no apparent episodes of aspiration or pneumonia, no weight loss. Discussed antibiotics, next steps, and to call office for changes/new symptoms or concerns. Libra Martínez stated understanding. No other concerns at visit today, will keep visit in 1 month for follow-up of other chronic conditions. REVIEW OF SYSTEMS:     Unable to complete ROS as she does not respond to questions. Per son, she has occasional brow furrowing improved with Tylenol. She is sleeping well overnight and frequently during the day. No recent rashes, worsening cough, sick contacts, or fevers. No changes to bowel or bladder habits, no apparent weight loss. No discharge from breast or appearance of pain in breast.      PHYSICAL EXAM:     Visit Vitals  /66 (BP 1 Location: Right arm, BP Patient Position: Lying)   Pulse 78   Temp 97.8 °F (36.6 °C) (Temporal)   Resp 16   SpO2 94%       Physical Exam  Constitutional:       General: She is not in acute distress. Appearance: Normal appearance. HENT:      Head: Normocephalic and atraumatic. Comments: Scab to medial top of scalp s/t removal of cancerous cells .        Mouth/Throat:      Comments: Does not open mouth during visit, per family she is edentulous   Eyes:      Comments: Unable to assess as she opens eyes only briefly   Neck:      Vascular: No carotid bruit. Cardiovascular:      Rate and Rhythm: Rhythm irregular. Pulses: Normal pulses. Dorsalis pedis pulses are 2+ on the right side and 2+ on the left side. Heart sounds: Normal heart sounds. No murmur heard. Comments: Pacemaker L chest  Pulmonary:      Effort: Pulmonary effort is normal. No respiratory distress. Breath sounds: No rhonchi. Comments: Use of abdominal muscles for respirations (has been consistent throughout visits and family reports is baseline). Frequently holds her breath during exam, requires family encouragement for expiration  Abdominal:      General: Bowel sounds are normal.      Palpations: Abdomen is soft. There is no mass. Tenderness: There is no abdominal tenderness. There is no guarding. Comments: Protuberant abdomen. Midline PEG in place surrounded by split gauze. No surrounding redness, warmth, drainage. Tube appears patent. Musculoskeletal:      Right lower leg: No edema. Left lower leg: No edema. Right foot: Foot drop present. Left foot: Foot drop present. Comments: Spastic paralysis of left arm and left leg. No contracture of L hand or foot noted. No warmth, erythema. Skin intact. Feet:      Right foot:      Skin integrity: Skin integrity normal.      Toenail Condition: Right toenails are abnormally thick. Fungal disease present. Left foot:      Skin integrity: Skin integrity normal.      Toenail Condition: Left toenails are abnormally thick. Fungal disease present. Skin:     General: Skin is warm and dry. Findings: No rash. Neurological:      Motor: Weakness present. Comments: No voluntary movement, does not follow commands. Vocalizes in response to son's voice   Breast:  right breast without swelling, drainage, lesions, masses, or tenderness. Left breast with firm, moveable, tubular-shaped mass about 2 inches long to her lateral breast in right upper quadrant.   Area of redness, slight warmth distal to mass with skin dimpling. No palpable lymphadenopathy to breast or left axilla. Nipple with normal appearance, no drainage.     HISTORY:     Past Medical and Surgical History reviewed in [facility] on date of initial visit    Patient Active Problem List   Diagnosis Code    Dysphagia R13.10    Recurrent ear pain H92.09    Acute pharyngitis J02.9    Essential hypertension I10    Edema, peripheral R60.9    Pure hypercholesterolemia E78.00    Allergic rhinitis, cause unspecified J30.9    Diverticulosis of colon (without mention of hemorrhage) K57.30    Herniated nucleus pulposus ( slipped disc) [id number]    Degenetrative Dis Disease, 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    Carpal tunnel syndrome G56.00    Unspecified cataract H26.9    GERD Gastro- Esophageal Reflux Disease K21.9    Degenerative Joint Disese ( improved/resolving) M19.90    Menopausal N95.1    Asymptomatic varicose veins I83.90    Sebaceous cyst L72.3    Keloid L91.0    Scalp lesion L98.9    Abnormal nuclear stress test R94.39    Syncope R55    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    CAD (coronary artery disease) I25.10    S/P cardiac pacemaker procedure Z95.0    Bradycardia R00.1    SSS (sick sinus syndrome) ([facility]) I49.5    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, I44.2    PAF (paroxysmal atrial fibrillation) ([facility]) I48.0    Atrial fibrillation with rapid ventricular response ([facility]) I48.91    CVA, old, hemiparesis ([facility]) Q92.561    Vascular dementia without behavioral disturbance ([facility]) F01.50    Cardiac arrhythmia I49.9    Squamous cell carcinoma of scalp C44.42    Seizure disorder ([facility]) G40.909    GIB (gastrointestinal bleeding) K92.2     Family History   Problem Relation Age of Onset    Hypertension Mother     Stroke Mother     Hypertension Father     Cancer Father         PROSTATE, MELANOMA    Anesth Problems Neg Hx       Social History     Tobacco Use    Smoking status: Never    Smokeless tobacco: Never   Substance Use Topics Alcohol use: No     Allergies   Allergen Reactions    Latex Rash    Bactrim [Sulfamethoprim Ds] Hives    Aspirin Other (comments)     Anything higher than 81mg makes pt jittery    Clindamycin Rash    Codeine Hives and Itching    Diltiazem Other (comments)    Iodinated Contrast Media Itching    Pcn [Penicillins] Hives and Itching    Tape [Adhesive] Rash      Current Outpatient Medications   Medication Sig    acetaminophen (M-PAP) 160 mg/5 mL liquid TAKE 30 ML BY PEG EVERY 6 HOURS AS NEEDED FOR PAIN OR FEVER  Indications: backache, pain, pain associated with arthritis    atorvastatin (LIPITOR) 40 mg tablet Take 1 Tablet by mouth nightly. famotidine (PEPCID) 40 mg/5 mL (8 mg/mL) suspension Take 5 mL by mouth two (2) times a day for 168 days. cetirizine (ZYRTEC) 1 mg/mL solution Take 5 mL by mouth daily. levETIRAcetam (KEPPRA) 100 mg/mL solution Take 5 mL by mouth two (2) times a day. potassium chloride (KAON 10%) 20 mEq/15 mL solution Take 15 mL by mouth two (2) times a day. amLODIPine (NORVASC) 5 mg tablet Take 1 Tablet by mouth daily. apixaban (Eliquis) 5 mg tablet Take 1 Tablet by mouth two (2) times a day. metoprolol succinate (TOPROL-XL) 25 mg XL tablet Take 1 Tablet by mouth daily. lactose-reduced food with fibr (Jevity 1.2 Lam[place]) 0.06 gram-1.2 kcal/mL liqd 1 Dose by PEG Tube route daily. Jevity 1.2 lamont 75ml/hr over 8 hours with free water flushes 4 times daily    doxycycline monohydrate (VIBRAMYCIN) 25 mg/5 mL susr 20 mL by Per G Tube route every twelve (12) hours for 5 days. No current facility-administered medications for this visit.         LAB DATA REVIEWED:     Lab Results   Component Value Date/Time    Hemoglobin A1c 5.7 (H) 07/04/2020 05:43 AM    Hemoglobin A1c (POC) 5.7 08/08/2016 03:43 PM     No results found for: MCACR, MCA1, MCA2, MCA3, MCAU, MCAU2, MCALPOCT  Lab Results   Component Value Date/Time    TSH 0.65 07/04/2020 05:43 AM     No results found for: Rolando Gee, Nenicholasjacobo Haq, VD3RIA      Lab Results   Component Value Date/Time    WBC 6.3 02/12/2023 12:50 AM    HGB 13.7 02/12/2023 12:50 AM    PLATELET 518 27/91/5479 12:50 AM     Lab Results   Component Value Date/Time    Sodium 135 (L) 02/12/2023 12:50 AM    Potassium 4.3 02/12/2023 12:50 AM    Chloride 97 02/12/2023 12:50 AM    CO2 32 02/12/2023 12:50 AM    BUN 12 02/12/2023 12:50 AM    Creatinine 0.53 (L) 02/12/2023 12:50 AM    Calcium 9.3 02/12/2023 12:50 AM    Magnesium 2.0 02/10/2023 02:00 AM    Phosphorus 3.7 02/10/2023 02:00 AM      Lab Results   Component Value Date/Time    Alk.  phosphatase 117 02/09/2023 12:58 PM    Protein, total 6.7 02/09/2023 12:58 PM    Albumin 3.0 (L) 02/09/2023 12:58 PM    Globulin 3.7 02/09/2023 12:58 PM     No results found for: IRON, FE, TIBC, IBCT, PSAT, FERR             Josh Hope NP

## 2023-04-20 RX ORDER — FAMOTIDINE 40 MG/5ML
40 POWDER, FOR SUSPENSION ORAL 2 TIMES DAILY
Qty: 280 ML | Refills: 5 | Status: SHIPPED | OUTPATIENT
Start: 2023-04-20 | End: 2023-10-05

## 2023-04-20 RX ORDER — FAMOTIDINE 40 MG/5ML
POWDER, FOR SUSPENSION ORAL
Qty: 300 ML | OUTPATIENT
Start: 2023-04-20

## 2023-04-21 ENCOUNTER — HOSPITAL ENCOUNTER (OUTPATIENT)
Dept: GENERAL RADIOLOGY | Age: 83
End: 2023-04-21
Attending: STUDENT IN AN ORGANIZED HEALTH CARE EDUCATION/TRAINING PROGRAM
Payer: MEDICARE

## 2023-04-21 ENCOUNTER — HOSPITAL ENCOUNTER (OUTPATIENT)
Dept: INTERVENTIONAL RADIOLOGY/VASCULAR | Age: 83
End: 2023-04-21
Attending: NURSE PRACTITIONER
Payer: MEDICARE

## 2023-04-21 VITALS
SYSTOLIC BLOOD PRESSURE: 152 MMHG | HEIGHT: 59 IN | RESPIRATION RATE: 16 BRPM | BODY MASS INDEX: 38.3 KG/M2 | HEART RATE: 70 BPM | OXYGEN SATURATION: 97 % | WEIGHT: 190 LBS | DIASTOLIC BLOOD PRESSURE: 70 MMHG | TEMPERATURE: 98.4 F

## 2023-04-21 DIAGNOSIS — K31.84 GASTROPARESIS: ICD-10-CM

## 2023-04-21 PROCEDURE — 74018 RADEX ABDOMEN 1 VIEW: CPT

## 2023-04-21 PROCEDURE — 49450 REPLACE G/C TUBE PERC: CPT

## 2023-04-21 NOTE — DISCHARGE INSTRUCTIONS
[facility]  Radiology Department  [phone]    Radiologist: Yanique Hernandez MD    Date: April 21, 2023      G/J Feeding Tube Placement Discharge Instructions      Go home and rest and restrict your activity the next 24 hours. Resume your previous diet and medications. You may take Tylenol, as directed on the label, for pain or discomfort. Avoid Ibuprofen (Advil, Motrin etc.) and Aspirin today as they may increase your risk of bleeding. Your new tube can be used today. Keep the dressing around the tube clean and dry. Replace the dressing if it becomes moist or soiled. Watch for any signs of infection. .... Redness, drainage, pus, foul odor or fever. Follow up with your doctor as previously discussed.

## 2023-04-21 NOTE — PROGRESS NOTES
Patient arrived to IR via ambulance transport with family present. Patient is resting comfortably at this time. 1054- KUB ordered for placement verification. [facility] NP states the new G tube is in place and patient may be discharged. Family states he has contacted transport.

## 2023-04-23 DIAGNOSIS — K31.84 GASTROPARESIS: Primary | ICD-10-CM

## 2023-04-24 ENCOUNTER — TELEPHONE (OUTPATIENT)
Dept: FAMILY MEDICINE CLINIC | Age: 83
End: 2023-04-24

## 2023-04-24 NOTE — TELEPHONE ENCOUNTER
Call returned to son, Tatiana Hermosillo. Discussed small amount of bright red blood or streak is most likely bleeding in the rectum (hemorrhoid or tear). If worsening, or black/tarry stool would consider FIT, CBC, and holding her anticoagulation. He agrees to continue monitoring and notify if worsening or ongoing symptoms. For her breast, does not seem to be causing acute pain or distress at this time. Could consider ultrasound of breast, but this may not change the treatment plan as she would not want surgical intervention or treatment if it was cancerous. Symptoms have not changed since visit. Tatiana Hermosillo would like to see if this gets better or worse and we can discuss options at later time.

## 2023-04-24 NOTE — TELEPHONE ENCOUNTER
Telephone call from son Corbin Malloy reporting that patient was on Doxycycline x 5 days with no change in the breast swelling. Corbin Malloy stopped doxy yesterday. Noted small streak of black in stool yesterday width of a pen and about 2 inches long. She had one smear of bright red immediately after stool. Sanaz Lock that I would share this information with provider Phylicia Cortez NP and if she has any recommendations then we will call him back.

## 2023-04-26 ENCOUNTER — TELEPHONE (OUTPATIENT)
Dept: FAMILY MEDICINE CLINIC | Age: 83
End: 2023-04-26

## 2023-04-26 DIAGNOSIS — N63.21 MASS OF UPPER OUTER QUADRANT OF LEFT BREAST: Primary | ICD-10-CM

## 2023-04-26 DIAGNOSIS — N63.21 MASS OF UPPER OUTER QUADRANT OF LEFT BREAST: ICD-10-CM

## 2023-04-26 NOTE — TELEPHONE ENCOUNTER
Allyn Escobedo called to request an ultrasound of the patient's swollen breast. His callback if needed is [phone].

## 2023-05-05 RX ORDER — CETIRIZINE HYDROCHLORIDE 1 MG/ML
5 SOLUTION ORAL DAILY
Qty: 240 ML | Refills: 1 | Status: SHIPPED | OUTPATIENT
Start: 2023-05-05

## 2023-05-10 ENCOUNTER — CLINICAL DOCUMENTATION (OUTPATIENT)
Facility: CLINIC | Age: 83
End: 2023-05-10

## 2023-05-10 NOTE — PROGRESS NOTES
Home Based Primary Care  Plan of Care     [facility] Team Members: Mateusz Elizondo MD; GARCIA Fleming; Norah Reyez NP; Sean Steele, NICHOLAS; Madeline Stauffer, NICHOLAS; Lizeth Solis RN; SANAM Mao  1940 / [id number]  female     Date of Initial Visit (Start of Care): 9/22/22     Diagnosis:        Patient Active Problem List   Diagnosis Code    Dysphagia R13.10    Recurrent ear pain H92.09    Acute pharyngitis J02.9    Essential hypertension I10    Edema, peripheral R60.9    Pure hypercholesterolemia E78.00    Allergic rhinitis, cause unspecified J30.9    Diverticulosis of colon (without mention of hemorrhage) K57.30    Herniated nucleus pulposus ( slipped disc) [id number]    Degenetrative Dis Disease, 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    Carpal tunnel syndrome G56.00    Unspecified cataract H26.9    GERD Gastro- Esophageal Reflux Disease K21.9    Degenerative Joint Disese ( improved/resolving) M19.90    Menopausal N95.1    Asymptomatic varicose veins I83.90    Sebaceous cyst L72.3    Keloid L91.0    Scalp lesion L98.9    Abnormal nuclear stress test R94.39    Syncope R55    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[facility]) I49.5    CAD (coronary artery disease) I25.10    S/P cardiac pacemaker procedure Z95.0    Bradycardia R00.1    SSS (sick sinus syndrome) ([facility]) I49.5    Complete AV block due to AV celestino ablation ([facility]) I97.190, I44.2    PAF (paroxysmal atrial fibrillation) ([facility]) I48.0    Atrial fibrillation with rapid ventricular response ([facility]) I48.91    CVA, old, hemiparesis (HCC) K92.930    Vascular dementia without behavioral disturbance ([facility]) F01.50    Cardiac arrhythmia I49.9    Squamous cell carcinoma of scalp C44.42    Seizure disorder ([facility]) G40.909    GIB (gastrointestinal bleeding) K92.2         Patient status summary: 78 y.o. female who was referred to the [facility] program due to CVA with L hemiparesis, vascular dementia. Patient discussed today for POC review.      Advance Care Planning:  Code status: DNR       Primary Decision

## 2023-05-11 ENCOUNTER — CLINICAL DOCUMENTATION (OUTPATIENT)
Facility: CLINIC | Age: 83
End: 2023-05-11

## 2023-05-11 ENCOUNTER — TELEPHONE (OUTPATIENT)
Facility: CLINIC | Age: 83
End: 2023-05-11

## 2023-05-11 NOTE — PROGRESS NOTES
Home Based Primary Care  Plan of Care     [facility] Team Members: Adiel Garcia MD; GARCIA Marsh; Isabella Elizondo NP; Jacoby Marsh, RN; Lorraine Manzo RN; Lizeth Solis RN; Rafi Torres LCSW     Sania Babin  1940 / [id number]  female     Date of Initial Visit (Start of Care): 9/22/22     Diagnosis:        Patient Active Problem List   Diagnosis Code    Dysphagia R13.10    Recurrent ear pain H92.09    Acute pharyngitis J02.9    Essential hypertension I10    Edema, peripheral R60.9    Pure hypercholesterolemia E78.00    Allergic rhinitis, cause unspecified J30.9    Diverticulosis of colon (without mention of hemorrhage) K57.30    Herniated nucleus pulposus ( slipped disc) [id number]    Degenetrative Dis Disease, 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    Carpal tunnel syndrome G56.00    Unspecified cataract H26.9    GERD Gastro- Esophageal Reflux Disease K21.9    Degenerative Joint Disese ( improved/resolving) M19.90    Menopausal N95.1    Asymptomatic varicose veins I83.90    Sebaceous cyst L72.3    Keloid L91.0    Scalp lesion L98.9    Abnormal nuclear stress test R94.39    Syncope R55    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[facility]) I49.5    CAD (coronary artery disease) I25.10    S/P cardiac pacemaker procedure Z95.0    Bradycardia R00.1    SSS (sick sinus syndrome) ([facility]) I49.5    Complete AV block due to AV celestino ablation ([facility]) I97.190, I44.2    PAF (paroxysmal atrial fibrillation) ([facility]) I48.0    Atrial fibrillation with rapid ventricular response ([facility]) I48.91    CVA, old, hemiparesis (HCC) A65.254    Vascular dementia without behavioral disturbance ([facility]) F01.50    Cardiac arrhythmia I49.9    Squamous cell carcinoma of scalp C44.42    Seizure disorder ([facility]) G40.909    GIB (gastrointestinal bleeding) K92.2         Patient status summary: 78 y.o. female who was referred to the [facility] program due to CVA with L hemiparesis, vascular dementia. Patient discussed today for POC review.      Advance Care Planning:  Code status: DNR       Primary Decision

## 2023-05-11 NOTE — TELEPHONE ENCOUNTER
Telephone call from son Cathleen Lopez that he has still not heard from [facility] about left breast ultrasound. Alisha Rivas that [facility] is having staffing issues and delay in services. Advised this nurse will reach out to them by email to ask about status of US and let him know if I hear back.

## 2023-05-16 PROBLEM — Z95.0 PRESENCE OF CARDIAC PACEMAKER: Status: ACTIVE | Noted: 2017-09-29

## 2023-05-16 PROBLEM — I49.9 CARDIAC ARRHYTHMIA: Status: RESOLVED | Noted: 2022-09-22 | Resolved: 2023-05-16

## 2023-05-16 PROBLEM — N63.0 SWELLING OF BREAST: Status: ACTIVE | Noted: 2023-05-16

## 2023-05-16 PROBLEM — I48.91 ATRIAL FIBRILLATION WITH RAPID VENTRICULAR RESPONSE (HCC): Status: RESOLVED | Noted: 2019-05-06 | Resolved: 2023-05-16

## 2023-05-16 PROBLEM — Z93.1 PEG (PERCUTANEOUS ENDOSCOPIC GASTROSTOMY) STATUS (HCC): Status: ACTIVE | Noted: 2023-05-16

## 2023-05-16 NOTE — ASSESSMENT & PLAN NOTE
PEG tube in place s/t stroke with subsequent dysphagia. Last exchange through IR on 3/7/23. Exchanged to new ENFit model 4/21/23. Previous exchange 12/17/22 in ED due to dislodgement. Currently receiving total nutrition, hydration, and medications through PEG (Jevity 1.2cal for total of 8 hours overnight). HOB elevated overnight. CMP obtained 2/9/23 with albumin 3.0.

## 2023-05-16 NOTE — ASSESSMENT & PLAN NOTE
s/t stroke, swallow study 7/2022 notes severe oropharyngeal dysphagia. 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 PEG tube in place and all nutrition and medications given via PEG. No recent aspiration, pneumonia, or apparent weight loss.

## 2023-05-16 NOTE — ASSESSMENT & PLAN NOTE
multilevel disc disease. Pain managed with PRN Tylenol liquid via tube. Per family, she is able to endorse pain and request Tylenol. Unclear source of pain, but Tylenol improves. Discussed nonverbal cues such as brow furrowing, grimace, moaning, restlessness. None during visit today, appears very comfortable.

## 2023-05-16 NOTE — ASSESSMENT & PLAN NOTE
Medtronic pacemaker in L chest placed 9/2017. Continues with remote interrogations through office of Dr. Kathleen Meza, cardiologist. Patient unable to endorse symptoms of palpitations or SOB. Continue Eliquis.

## 2023-05-16 NOTE — ASSESSMENT & PLAN NOTE
Right MCA stroke in 2020 with an occluded right internal carotid artery from the carotid bifurcation to the [place] of [place] and further intraluminal thrombus at the right MCA bifurcation. Residual dysphagia and L hemiparesis. No contractures but very little active ROM, son providing passive ROM exercises to extremities. She is functionally dependent for total care. Continue skin protective measures such as air mattress, frequent repositioning, brief changes/ Purewick urinary device, zinc paste, floating heels. Currently on Eliquis, atorvastatin. Lipid panel 9/22/22 at goal.  CBC 2/12/23 with Hgb WNL.

## 2023-05-16 NOTE — ASSESSMENT & PLAN NOTE
Mass noted 4/11/23 by family but daughter reports it has since increased in size. Patient not complaining of pain, no apparent tenderness of L breast on exam today. Firm, moveable, superficial mass noted to lateral L breast without fluctuance or tenderness. No recent trauma, no open areas. No history of breast cancer, but history of several other forms of cancer. Did not respond to course of doxycycline, diagnostic ultrasound of L breast pending. Continue Tylenol for pain relief as needed.

## 2023-05-16 NOTE — ASSESSMENT & PLAN NOTE
Currently managed with Pepcid via PEG. Per son, occasionally has thick secretions that he attributes to worsening symptoms, gives Pepcid and they improve. Discussed suction for oral secretions, but family feels they are well-managed at this time.

## 2023-05-16 NOTE — ASSESSMENT & PLAN NOTE
Cardiac cath Feb 2016 with 50% occlusion mid LAD, 100% mid RCA with well-developed left to right collaterals. Currently managed with atorvastatin, Eliquis. Unable to endorse symptoms. CBC, Lipid panel obtained 9/22/22 and WNL.

## 2023-05-16 NOTE — ASSESSMENT & PLAN NOTE
At goal with amlodipine, metoprolol. Goal <140/80 due to stroke history but would weigh against age and primary goal of comfort.

## 2023-05-16 NOTE — ASSESSMENT & PLAN NOTE
Medtronic pacemaker in L chest placed 9/2017 (removed ILR).   Continues with remote interrogations through office of Dr. Latricia Ocasio, cardiologist.

## 2023-05-16 NOTE — ASSESSMENT & PLAN NOTE
s/t Stroke in 2020. Currently managed on Keppra 500mg BID liquid via PEG, has been compliant with this medication. No seizure activity since April of 2021.

## 2023-05-16 NOTE — ASSESSMENT & PLAN NOTE
Minimally responsive to this provider at most visits. Opens eyes at times but does not track, grunts in response to son's voice, does not follow commands. Per son and daughter, she will at times endorse pain or request Tylenol. Reliant on caregivers for total care. Not requiring any medications for behavior, continues sleeping well overnight.

## 2023-05-17 ENCOUNTER — NURSE ONLY (OUTPATIENT)
Facility: CLINIC | Age: 83
End: 2023-05-17
Payer: MEDICARE

## 2023-05-17 VITALS
SYSTOLIC BLOOD PRESSURE: 128 MMHG | RESPIRATION RATE: 18 BRPM | HEART RATE: 76 BPM | OXYGEN SATURATION: 98 % | DIASTOLIC BLOOD PRESSURE: 76 MMHG | TEMPERATURE: 97.1 F

## 2023-05-17 DIAGNOSIS — I10 ESSENTIAL HYPERTENSION: ICD-10-CM

## 2023-05-17 DIAGNOSIS — G40.909 SEIZURE DISORDER (HCC): ICD-10-CM

## 2023-05-17 DIAGNOSIS — I25.10 CORONARY ARTERY DISEASE INVOLVING NATIVE CORONARY ARTERY OF NATIVE HEART, UNSPECIFIED WHETHER ANGINA PRESENT: ICD-10-CM

## 2023-05-17 DIAGNOSIS — Z93.1 PEG (PERCUTANEOUS ENDOSCOPIC GASTROSTOMY) STATUS (HCC): ICD-10-CM

## 2023-05-17 DIAGNOSIS — R13.12 OROPHARYNGEAL DYSPHAGIA: ICD-10-CM

## 2023-05-17 DIAGNOSIS — N63.0 SWELLING OF BREAST: ICD-10-CM

## 2023-05-17 DIAGNOSIS — Z95.0 PRESENCE OF CARDIAC PACEMAKER: ICD-10-CM

## 2023-05-17 DIAGNOSIS — M50.30 DEGENERATION OF CERVICAL INTERVERTEBRAL DISC: ICD-10-CM

## 2023-05-17 DIAGNOSIS — I69.359 CVA, OLD, HEMIPARESIS (HCC): ICD-10-CM

## 2023-05-17 DIAGNOSIS — I49.5 SSS (SICK SINUS SYNDROME) (HCC): ICD-10-CM

## 2023-05-17 DIAGNOSIS — K21.9 GASTROESOPHAGEAL REFLUX DISEASE WITHOUT ESOPHAGITIS: ICD-10-CM

## 2023-05-17 DIAGNOSIS — F01.50 VASCULAR DEMENTIA WITHOUT BEHAVIORAL DISTURBANCE (HCC): Primary | ICD-10-CM

## 2023-05-17 PROCEDURE — 1123F ACP DISCUSS/DSCN MKR DOCD: CPT | Performed by: NURSE PRACTITIONER

## 2023-05-17 PROCEDURE — 99349 HOME/RES VST EST MOD MDM 40: CPT | Performed by: NURSE PRACTITIONER

## 2023-05-17 PROCEDURE — 3078F DIAST BP <80 MM HG: CPT | Performed by: NURSE PRACTITIONER

## 2023-05-17 PROCEDURE — 3074F SYST BP LT 130 MM HG: CPT | Performed by: NURSE PRACTITIONER

## 2023-05-17 NOTE — PROGRESS NOTES
guarding. Comments: Midline PEG in place surrounded by split gauze. No surrounding redness, warmth, drainage. Tube appears patent     Musculoskeletal:       Right lower leg: No edema. Left lower leg: No edema. Right foot: Foot drop present. Left foot: Foot drop present. Skin:      General: Skin is warm and dry. Findings: No rash. Neurological:       Motor: Weakness present. Comments: No voluntary movement, does not follow commands.   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 ([facility])    Gastroesophageal reflux disease without esophagitis    Presence of cardiac pacemaker    Syncope    SSS (sick sinus syndrome) ([facility])    CAD (coronary artery disease)    Abnormal nuclear stress test    CVA, old, hemiparesis ([address]    Vascular dementia without behavioral disturbance (HCC)    Squamous cell carcinoma of scalp    Seizure disorder (HCC)    GIB (gastrointestinal bleeding)    PEG (percutaneous endoscopic gastrostomy) status (HCC)    Swelling of breast     Past Surgical History:   Procedure Laterality Date    HYSTERECTOMY (CERVIX STATUS UNKNOWN)      ICAR CATHETER ABLATION ATRIOVENTR NODE FUNCTION N/A 5/6/2019    ABLATION AV NODE performed by James Foley MD at [address], [facility] CATH LAB    INS NEW/RPLCMT PRM PM W/TRANSV ELTRD ATRIAL&VENT  9/30/2017         IR REPLACE G TUBE PERC  3/7/2023    IR REPLACE G TUBE PERC  4/21/2023    ORTHOPEDIC SURGERY  neck/back 2006    OTHER SURGICAL HISTORY  5/8/2014    Punch biopsy, skin lesions, right forearm x2, right calf x1

## 2023-05-22 ENCOUNTER — TELEPHONE (OUTPATIENT)
Facility: CLINIC | Age: 83
End: 2023-05-22

## 2023-05-22 DIAGNOSIS — N63.20 MASS OF LEFT BREAST, UNSPECIFIED QUADRANT: ICD-10-CM

## 2023-05-22 NOTE — TELEPHONE ENCOUNTER
Call placed again to [facility] - I repeated the request for results, as NP has informed that the exam was completed last week. This time, the result was found and faxed to [address] [place] [address].

## 2023-05-22 NOTE — TELEPHONE ENCOUNTER
Call placed to [facility] to ask they patient's breast ultrasound has been completed. I was informed that they don't have any results for this patient.

## 2023-06-12 ENCOUNTER — TELEPHONE (OUTPATIENT)
Facility: CLINIC | Age: 83
End: 2023-06-12

## 2023-06-12 NOTE — TELEPHONE ENCOUNTER
Thedon Medina called to request refill on the patient's liquid tylenol script. The patient uses [facility] and [facility] callback [phone]

## 2023-06-21 ENCOUNTER — TELEPHONE (OUTPATIENT)
Facility: CLINIC | Age: 83
End: 2023-06-21

## 2023-06-21 NOTE — TELEPHONE ENCOUNTER
Telephone call returned to son Zeina Fitch. He reports patient has had a cold with respiratory congestion and cough x last 3 days. He has been giving her Robitussin 10 ml every 4 hours while awake. Son states when he got up yesterday AM, pt had vomited during the night, a large amount that soiled the front of her gown. He states the vomitus was the color of potted meat. He feels that patient coughed until she gagged and that is why she vomited. Inquired if he is aspirating for residual contents of stomach prior to initiating the PEG tube feeding. Zeina Fitch states he checks residual about once a week and was last checked on 6/16. TF runs via pump from 12:45 AM until about 8 AM.. Son also noted pt had 99.6 temp yesterday AM even with receiving Tylenol via PEG Q 6 hours. States no fever last night or today. Zeina Fitch states he is wiping out her mouth frequently to remove moderate amount of clear mucous. Son states she has hx of allergies with congestion in past.  Discussed the need for aspirating stomach contents before starting her tube feeding at least for several days to make sure residual is less than 60 ml. We discussed danger of aspiration when she vomits.

## 2023-06-21 NOTE — TELEPHONE ENCOUNTER
Jesus Alberto Lan called and stated that the patient has a bad cold. She is heavily congested w/ chest congestion and has a cough. He said that he is giving her Robitussin cough medicine. He said she had a fever but not today, he is giving tylenol every 6 hours. Bernardo Hawkins said that the patient vomited a lot yesterday but none today.   His callback is [phone]

## 2023-06-21 NOTE — TELEPHONE ENCOUNTER
Telephone call to son Orsekou Asencioors to advise that provider Ba Guerrero NP is available to visit pt tomorrow between 9 - 1. Leonela Agrawal accepted the visit.

## 2023-06-22 ENCOUNTER — CLINICAL DOCUMENTATION (OUTPATIENT)
Facility: CLINIC | Age: 83
End: 2023-06-22

## 2023-06-22 ENCOUNTER — OFFICE VISIT (OUTPATIENT)
Facility: CLINIC | Age: 83
End: 2023-06-22

## 2023-06-22 VITALS
DIASTOLIC BLOOD PRESSURE: 64 MMHG | RESPIRATION RATE: 24 BRPM | HEART RATE: 72 BPM | SYSTOLIC BLOOD PRESSURE: 124 MMHG | OXYGEN SATURATION: 93 % | TEMPERATURE: 97.4 F

## 2023-06-22 DIAGNOSIS — I69.359 CVA, OLD, HEMIPARESIS (HCC): ICD-10-CM

## 2023-06-22 DIAGNOSIS — K21.9 GASTROESOPHAGEAL REFLUX DISEASE WITHOUT ESOPHAGITIS: ICD-10-CM

## 2023-06-22 DIAGNOSIS — G40.909 SEIZURE DISORDER (HCC): ICD-10-CM

## 2023-06-22 DIAGNOSIS — J18.9 PNEUMONIA DUE TO INFECTIOUS ORGANISM, UNSPECIFIED LATERALITY, UNSPECIFIED PART OF LUNG: Primary | ICD-10-CM

## 2023-06-22 DIAGNOSIS — E66.01 SEVERE OBESITY (BMI 35.0-39.9) WITH COMORBIDITY (HCC): ICD-10-CM

## 2023-06-22 DIAGNOSIS — Z93.1 PEG (PERCUTANEOUS ENDOSCOPIC GASTROSTOMY) STATUS (HCC): ICD-10-CM

## 2023-06-22 DIAGNOSIS — J18.9 PNEUMONIA DUE TO INFECTIOUS ORGANISM, UNSPECIFIED LATERALITY, UNSPECIFIED PART OF LUNG: ICD-10-CM

## 2023-06-22 DIAGNOSIS — F01.50 VASCULAR DEMENTIA WITHOUT BEHAVIORAL DISTURBANCE (HCC): ICD-10-CM

## 2023-06-22 DIAGNOSIS — R13.12 OROPHARYNGEAL DYSPHAGIA: ICD-10-CM

## 2023-06-22 LAB
ALBUMIN SERPL-MCNC: 2.8 G/DL (ref 3.5–5)
ALBUMIN/GLOB SERPL: 0.8 (ref 1.1–2.2)
ALP SERPL-CCNC: 128 U/L (ref 45–117)
ALT SERPL-CCNC: 24 U/L (ref 12–78)
ANION GAP SERPL CALC-SCNC: 7 MMOL/L (ref 5–15)
AST SERPL-CCNC: 14 U/L (ref 15–37)
BILIRUB SERPL-MCNC: 0.4 MG/DL (ref 0.2–1)
BUN SERPL-MCNC: 14 MG/DL (ref 6–20)
BUN/CREAT SERPL: 33 (ref 12–20)
CALCIUM SERPL-MCNC: 8.6 MG/DL (ref 8.5–10.1)
CHLORIDE SERPL-SCNC: 101 MMOL/L (ref 97–108)
CHOLEST SERPL-MCNC: 102 MG/DL
CO2 SERPL-SCNC: 24 MMOL/L (ref 21–32)
CREAT SERPL-MCNC: 0.43 MG/DL (ref 0.55–1.02)
ERYTHROCYTE [DISTWIDTH] IN BLOOD BY AUTOMATED COUNT: 17.9 % (ref 11.5–14.5)
GLOBULIN SER CALC-MCNC: 3.5 G/DL (ref 2–4)
GLUCOSE SERPL-MCNC: 108 MG/DL (ref 65–100)
HCT VFR BLD AUTO: 42.9 % (ref 35–47)
HDLC SERPL-MCNC: 54 MG/DL
HDLC SERPL: 1.9 (ref 0–5)
HGB BLD-MCNC: 13.6 G/DL (ref 11.5–16)
LDLC SERPL CALC-MCNC: 25.8 MG/DL (ref 0–100)
MCH RBC QN AUTO: 25.9 PG (ref 26–34)
MCHC RBC AUTO-ENTMCNC: 31.7 G/DL (ref 30–36.5)
MCV RBC AUTO: 81.6 FL (ref 80–99)
NRBC # BLD: 0 K/UL (ref 0–0.01)
NRBC BLD-RTO: 0 PER 100 WBC
PLATELET # BLD AUTO: 269 K/UL (ref 150–400)
PMV BLD AUTO: 10.5 FL (ref 8.9–12.9)
POTASSIUM SERPL-SCNC: 4.6 MMOL/L (ref 3.5–5.1)
PROT SERPL-MCNC: 6.3 G/DL (ref 6.4–8.2)
RBC # BLD AUTO: 5.26 M/UL (ref 3.8–5.2)
SODIUM SERPL-SCNC: 132 MMOL/L (ref 136–145)
TRIGL SERPL-MCNC: 111 MG/DL
VLDLC SERPL CALC-MCNC: 22.2 MG/DL
WBC # BLD AUTO: 10.9 K/UL (ref 3.6–11)

## 2023-06-22 RX ORDER — MOXIFLOXACIN HYDROCHLORIDE 400 MG/1
400 TABLET ORAL DAILY
Qty: 7 TABLET | Refills: 0 | Status: SHIPPED | OUTPATIENT
Start: 2023-06-22 | End: 2023-06-29

## 2023-06-22 RX ORDER — GUAIFENESIN 600 MG/1
600 TABLET, EXTENDED RELEASE ORAL 2 TIMES DAILY
Qty: 30 TABLET | Refills: 0 | Status: SHIPPED | OUTPATIENT
Start: 2023-06-22 | End: 2023-07-07

## 2023-06-22 NOTE — ASSESSMENT & PLAN NOTE
S/t aspiration event. Son reports vomiting evening of 6/20/23. She has had worsening cough and fever 6/21/23 of 99.6 after Tylenol administration. Very high risk for aspiration events in setting of dysphagia, dementia, tube feeding and high risk for decompensation given comorbidities. Will treat with respiratory fluoroquinolone (allergies to penicillin and clindamycin). Continue Tylenol for fevers, guaifenesin for cough.

## 2023-06-22 NOTE — ASSESSMENT & PLAN NOTE
Right MCA stroke in 2020 with an occluded right internal carotid artery from the carotid bifurcation to the [place] and further intraluminal thrombus at the right MCA bifurcation. Residual dysphagia and L hemiparesis. No contractures but very little active ROM, son providing passive ROM exercises to extremities. She is functionally dependent for total care. Continue skin protective measures such as air mattress, frequent repositioning, brief changes/ Purewick urinary device, zinc paste, floating heels. Currently on Eliquis, atorvastatin. Lipid panel 9/22/22 at goal.  CBC 2/12/23 with Hgb WNL.

## 2023-06-22 NOTE — ASSESSMENT & PLAN NOTE
PEG tube in place s/t stroke with subsequent dysphagia. Last exchanged to new ENFit model 4/21/23. Currently receiving total nutrition, hydration, and medications through PEG (Jevity 1.2cal for total of 8 hours overnight). HOB elevated overnight. CMP obtained 2/9/23 with albumin 3.0. Recent residual ______.

## 2023-06-22 NOTE — PROGRESS NOTES
Home Based [address] [facility]    [id number]      Date of Current Visit: 6/22/2023     SUMMARY   Patient seen in the home today due to taxing effort to seek primary care services in the community s/t stroke with L-sided hemiparesis, vascular dementia     Patient/Family present for Current Visit:  patient, son and primary caregiver Isabel Precise  Goals of Care as stated by the patient/family is: comfort     ASSESSMENT AND PLAN       1. Pneumonia due to infectious organism, unspecified laterality, unspecified part of lung    2. Oropharyngeal dysphagia    3. Severe obesity (BMI 35.0-39. 9) with comorbidity ([facility])    4. Gastroesophageal reflux disease without esophagitis    5. Vascular dementia without behavioral disturbance ([facility])    6. CVA, old, hemiparesis ([facility])    7. PEG (percutaneous endoscopic gastrostomy) status ([facility])    8. Seizure disorder ([facility])      1. Pneumonia due to infectious organism, unspecified laterality, unspecified part of lung   S/t aspiration event, son reports coughing during a turn about 3 days ago. Also reports vomiting evening of 6/20/23. She has had worsening cough and fever 6/21/23 of 99.6 after Tylenol administration. Very high risk for aspiration events in setting of dysphagia, dementia, tube feeding and high risk for decompensation given comorbidities. Will treat with Moxifloxacin (allergies to penicillin and clindamycin). Continue Tylenol for fevers, guaifenesin for cough. CBC obtained today, will evaluate for leukocytosis. Will order suction for home set-up  Orders:  -     CBC; Future  -     Comprehensive Metabolic Panel; Future  -     moxifloxacin (AVELOX) 400 MG tablet; Take 1 tablet by mouth daily for 7 days, Disp-7 tablet, R-0Normal  2. Oropharyngeal dysphagia  s/t stroke. Swallow study 7/2022 notes severe oropharyngeal dysphagia. 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   PEG tube in place and all nutrition and

## 2023-06-22 NOTE — PROGRESS NOTES
Home Based Primary Care  Plan of Care    [facility] Team Members: Mateusz Elizondo MD; GARCIA Fleming; Norah Reyez NP; Sean Steele, RN; Madeline Stauffer, NICHOLAS; Lizeth Solis RN; SANAM Mao  1940 / [id number]  female      Date of Initial Visit (Start of Care): 9/22/22    Diagnosis:   Patient Active Problem List   Diagnosis Code    Dysphagia R13.10    Recurrent ear pain H92.09    Acute pharyngitis J02.9    Essential hypertension I10    Edema, peripheral R60.9    Pure hypercholesterolemia E78.00    Allergic rhinitis, cause unspecified J30.9    Diverticulosis of colon (without mention of hemorrhage) K57.30    Herniated nucleus pulposus ( slipped disc) [id number]    Degenetrative Dis Disease, 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    Carpal tunnel syndrome G56.00    Unspecified cataract H26.9    GERD Gastro- Esophageal Reflux Disease K21.9    Degenerative Joint Disese ( improved/resolving) M19.90    Menopausal N95.1    Asymptomatic varicose veins I83.90    Sebaceous cyst L72.3    Keloid L91.0    Scalp lesion L98.9    Abnormal nuclear stress test R94.39    Syncope R55    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[facility]) I49.5    CAD (coronary artery disease) I25.10    S/P cardiac pacemaker procedure Z95.0    Bradycardia R00.1    SSS (sick sinus syndrome) ([facility]) I49.5    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 female who was referred to the [facility] program due to CVA with L hemiparesis, vascular dementia. Patient discussed today for POC review.     Advance Care Planning:  Code status: DNR      Primary Decision Maker:

## 2023-06-22 NOTE — ASSESSMENT & PLAN NOTE
Currently managed with Pepcid via PEG. Per son, occasionally has thick secretions that he attributes to worsening symptoms, gives Pepcid and they improve. Recent worsening with thick secretions.   Will order suction at this time due to recent concern for aspiration event

## 2023-06-22 NOTE — ASSESSMENT & PLAN NOTE
s/t Stroke in 2020. Currently managed on Keppra 500mg BID liquid via PEG, has been compliant with this medication. No seizure activity since April of 2021.   Keppra level obtained today, results pending

## 2023-06-22 NOTE — ASSESSMENT & PLAN NOTE
s/t stroke. Swallow study 7/2022 notes severe oropharyngeal dysphagia. 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 PEG tube in place and all nutrition and medications given via PEG.   Concern for aspiration given recent vomiting event, cough, and possible fever- will treat with antibiotic

## 2023-06-22 NOTE — ASSESSMENT & PLAN NOTE
Last weight about 190lb, BMI 38.38. Unable to regularly weigh due to bedbound status. Receiving total nutrition through PEG.   Arm circumference ______

## 2023-06-23 LAB — LEVETIRACETAM SERPL-MCNC: 35.5 UG/ML (ref 10–40)

## 2023-06-27 RX ORDER — POTASSIUM CHLORIDE 20MEQ/15ML
LIQUID (ML) ORAL
Qty: 3800 ML | Refills: 1 | Status: SHIPPED | OUTPATIENT
Start: 2023-06-27

## 2023-06-29 ENCOUNTER — TELEPHONE (OUTPATIENT)
Facility: CLINIC | Age: 83
End: 2023-06-29

## 2023-07-05 ENCOUNTER — TELEPHONE (OUTPATIENT)
Facility: CLINIC | Age: 83
End: 2023-07-05

## 2023-07-05 NOTE — TELEPHONE ENCOUNTER
I called Kerline Araiza to remind of the patient's in home visit w/ Sheila Leslie on Thursday, 7/6/2023, arrival between 9am-1pm.  I reached Pranay's  and left a 2nd message to please call the [address] [place] [address] office to confirm the visit.

## 2023-07-06 ENCOUNTER — OFFICE VISIT (OUTPATIENT)
Facility: CLINIC | Age: 83
End: 2023-07-06
Payer: MEDICARE

## 2023-07-06 VITALS
SYSTOLIC BLOOD PRESSURE: 114 MMHG | TEMPERATURE: 97.3 F | HEART RATE: 83 BPM | OXYGEN SATURATION: 95 % | RESPIRATION RATE: 20 BRPM | DIASTOLIC BLOOD PRESSURE: 68 MMHG

## 2023-07-06 DIAGNOSIS — N63.0 SWELLING OF BREAST: ICD-10-CM

## 2023-07-06 DIAGNOSIS — I25.10 CORONARY ARTERY DISEASE INVOLVING NATIVE CORONARY ARTERY OF NATIVE HEART WITHOUT ANGINA PECTORIS: ICD-10-CM

## 2023-07-06 DIAGNOSIS — F01.50 VASCULAR DEMENTIA WITHOUT BEHAVIORAL DISTURBANCE (HCC): ICD-10-CM

## 2023-07-06 DIAGNOSIS — R13.12 OROPHARYNGEAL DYSPHAGIA: ICD-10-CM

## 2023-07-06 DIAGNOSIS — Z93.1 PEG (PERCUTANEOUS ENDOSCOPIC GASTROSTOMY) STATUS (HCC): ICD-10-CM

## 2023-07-06 DIAGNOSIS — G40.909 SEIZURE DISORDER (HCC): ICD-10-CM

## 2023-07-06 DIAGNOSIS — Z23 NEED FOR VACCINATION: ICD-10-CM

## 2023-07-06 DIAGNOSIS — I10 ESSENTIAL HYPERTENSION: ICD-10-CM

## 2023-07-06 DIAGNOSIS — J18.9 PNEUMONIA DUE TO INFECTIOUS ORGANISM, UNSPECIFIED LATERALITY, UNSPECIFIED PART OF LUNG: Primary | ICD-10-CM

## 2023-07-06 DIAGNOSIS — K21.9 GASTROESOPHAGEAL REFLUX DISEASE WITHOUT ESOPHAGITIS: ICD-10-CM

## 2023-07-06 DIAGNOSIS — I49.5 SSS (SICK SINUS SYNDROME) (HCC): ICD-10-CM

## 2023-07-06 DIAGNOSIS — I69.359 CVA, OLD, HEMIPARESIS (HCC): ICD-10-CM

## 2023-07-06 PROBLEM — R00.1 BRADYCARDIA: Status: RESOLVED | Noted: 2017-09-29 | Resolved: 2023-07-06

## 2023-07-06 PROCEDURE — 99349 HOME/RES VST EST MOD MDM 40: CPT | Performed by: NURSE PRACTITIONER

## 2023-07-06 PROCEDURE — G8417 CALC BMI ABV UP PARAM F/U: HCPCS | Performed by: NURSE PRACTITIONER

## 2023-07-06 PROCEDURE — 3074F SYST BP LT 130 MM HG: CPT | Performed by: NURSE PRACTITIONER

## 2023-07-06 PROCEDURE — 1090F PRES/ABSN URINE INCON ASSESS: CPT | Performed by: NURSE PRACTITIONER

## 2023-07-06 PROCEDURE — 1123F ACP DISCUSS/DSCN MKR DOCD: CPT | Performed by: NURSE PRACTITIONER

## 2023-07-06 PROCEDURE — 3078F DIAST BP <80 MM HG: CPT | Performed by: NURSE PRACTITIONER

## 2023-07-06 PROCEDURE — 1036F TOBACCO NON-USER: CPT | Performed by: NURSE PRACTITIONER

## 2023-07-06 RX ORDER — DEXTROMETHORPHAN HBR, GUAIFENESIN, PHENYLEPHRINE HCL 20; 400; 10 MG/10ML; MG/10ML; MG/10ML
10 SUSPENSION ORAL DAILY PRN
COMMUNITY

## 2023-07-06 NOTE — ASSESSMENT & PLAN NOTE
s/t stroke. Swallow study 7/2022 notes severe oropharyngeal dysphagia. 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 PEG tube in place and all nutrition and medications given via PEG. Occasionally has water PO for comfort, no coughing per family. Treated for aspiration pneumonia about 2 weeks ago, still with some coughing but improvement after course of antibiotics. Suction ordered for home use in case of aspiration events.

## 2023-07-06 NOTE — ASSESSMENT & PLAN NOTE
Swelling noted 4/11/23 by family. Patient not complaining of pain, no apparent tenderness of L breast on exam today. Firm, area about 4cm x 8cm of swelling with skin dimpling in 5:00 position without fluctuance or tenderness. No recent trauma, no open areas. No history of breast cancer, but history of several other forms of cancer. Did not respond to course of doxycycline, diagnostic ultrasound 5/22/23 significant for 3 areas with differentials of scarring, fibroma, or neoplasm, recommended Mmg.  Discussed with son and she is not able to tolerate Mmg at this time, goal of care is primarily comfort so would not pursue additional workup unless interferes with comfort.   Continue Tylenol for pain relief as needed, encouraged elevation of this breast.

## 2023-07-06 NOTE — ASSESSMENT & PLAN NOTE
Currently managed with Pepcid via PEG. Per son, occasionally has thick secretions that he attributes to worsening symptoms, gives Pepcid and they improve.

## 2023-07-06 NOTE — PROGRESS NOTES
Home Based [address] [facility]    [phone]     Date of Current Visit: 7/6/2023       SUMMARY   Patient seen in the home today due to taxing effort to seek primary care services in the community s/t stroke with L-sided hemiparesis, vascular dementia     Patient/Family present for Current Visit:  patient, son and primary caregiver Douglas Khan  Goals of Care as stated by the patient/family is: comfort     ASSESSMENT AND PLAN       1. Pneumonia due to infectious organism, unspecified laterality, unspecified part of lung    2. Oropharyngeal dysphagia    3. PEG (percutaneous endoscopic gastrostomy) status ([address])    4. CVA, old, hemiparesis ([address])    5. Vascular dementia without behavioral disturbance ([address])    6. Seizure disorder ([address])    7. Essential hypertension    8. SSS (sick sinus syndrome) (HCC)    9. Coronary artery disease involving native coronary artery of native heart without angina pectoris    10. Gastroesophageal reflux disease without esophagitis    11. Swelling of breast    12. Need for vaccination      1. Pneumonia due to infectious organism, unspecified laterality, unspecified part of lung  Likely s/t aspiration event after vomiting evening of 6/20/23. She has had worsening cough and fever 6/21/23 of 99.6 after Tylenol administration. Very high risk for aspiration events in setting of dysphagia, dementia, tube feeding and high risk for decompensation given comorbidities. Completed course of Moxifloxacin with some improvement, still with cough but decreased. Continue Tylenol for fevers, discussed 2G limit daily. Continue guaifenesin for cough- can resume Tussin Max if unable to clear secretions (discussed about additional ingredients of Tylenol and dextromethorphan). 2. Oropharyngeal dysphagia  s/t stroke. Swallow study 7/2022 notes severe oropharyngeal dysphagia. 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   PEG tube in place and all nutrition

## 2023-07-06 NOTE — ASSESSMENT & PLAN NOTE
Cardiac cath Feb 2016 with 50% occlusion mid LAD, 100% mid RCA with well-developed left to right collaterals. Currently managed with atorvastatin, Eliquis. Unable to endorse symptoms. CBC, Lipid panel obtained 6/2023 WNL.

## 2023-07-06 NOTE — ASSESSMENT & PLAN NOTE
Right MCA stroke in 2020 with an occluded right internal carotid artery from the carotid bifurcation to the [place] of [place] and further intraluminal thrombus at the right MCA bifurcation. Residual dysphagia and L hemiparesis. No contractures but very little active ROM, son providing passive ROM exercises to extremities. She is functionally dependent for total care. Continue skin protective measures such as air mattress, frequent repositioning, brief changes/ Purewick urinary device, zinc paste, floating heels. Currently on Eliquis, atorvastatin. Lipid panel 6/2023 at goal.  CBC 6/2023 with Hgb WNL.

## 2023-07-06 NOTE — ASSESSMENT & PLAN NOTE
s/t Stroke in 2020. Currently managed on Keppra 500mg BID liquid via PEG, has been compliant with this medication. No seizure activity since April of 2021.   Keppra level obtained 6/2023 and WNL

## 2023-07-06 NOTE — ASSESSMENT & PLAN NOTE
Minimally responsive to this provider at most visits. Opens eyes at times but does not track, occasionally vocalizes in response to son's voice, does not follow commands. Per son and daughter, she will at times endorse pain or request Tylenol. Reliant on caregivers for total care. Not requiring any medications for behavior, continues sleeping well overnight.

## 2023-07-06 NOTE — ASSESSMENT & PLAN NOTE
Likely s/t aspiration event after vomiting evening of 6/20/23. She has had worsening cough and fever 6/21/23 of 99.6 after Tylenol administration. Very high risk for aspiration events in setting of dysphagia, dementia, tube feeding and high risk for decompensation given comorbidities. Completed course of Moxifloxacin with some improvement, still with cough but decreased. Continue Tylenol for fevers, discussed 2G limit daily. Continue guaifenesin for cough- can resume Tussin Max if unable to clear secretions (discussed about additional ingredients of Tylenol and dextromethorphan).

## 2023-07-06 NOTE — ASSESSMENT & PLAN NOTE
At goal with amlodipine, metoprolol. Goal <140/80 due to stroke history but would weigh against age and primary goal of comfort.   CMP, CBC 6/22/23 and stable

## 2023-07-06 NOTE — ASSESSMENT & PLAN NOTE
Medtronic pacemaker in L chest placed 9/2017. Continues with remote interrogations through office of Dr. Keiry Terrell, cardiologist. Patient unable to endorse symptoms of palpitations or SOB. Continue Eliquis, no bleeding events since last visit.   CMP 6/2023 stable

## 2023-07-06 NOTE — ASSESSMENT & PLAN NOTE
PEG tube in place s/t stroke with subsequent dysphagia. Last exchanged to new ENFit model 4/21/23. Currently receiving total nutrition, hydration, and medications through PEG (Jevity 1.2cal for total of 8 hours overnight). HOB elevated overnight. CMP obtained 6/2023 with albumin 2.8.

## 2023-07-18 ENCOUNTER — TELEPHONE (OUTPATIENT)
Facility: CLINIC | Age: 83
End: 2023-07-18

## 2023-07-18 RX ORDER — ACETAMINOPHEN 160 MG/5ML
SUSPENSION ORAL
Qty: 473 ML | Refills: 2 | Status: SHIPPED | OUTPATIENT
Start: 2023-07-18

## 2023-07-18 NOTE — TELEPHONE ENCOUNTER
Telephone call returned to micah Argueta to advise that Tylenol liquid refill was sent to pharmacy this morning by Cathy Grullon NP.

## 2023-07-18 NOTE — TELEPHONE ENCOUNTER
Shanta Rodney called to request refill on the patient's liquid tylenol. He stated that the pharmacy said that the refill was not approved. The patient uses [facility] at [address] [facility] and [address] [facility].   His callback if needed is [phone]

## 2023-07-31 ENCOUNTER — TELEPHONE (OUTPATIENT)
Facility: CLINIC | Age: 83
End: 2023-07-31

## 2023-07-31 NOTE — TELEPHONE ENCOUNTER
Cruz Schmidt called to request refill on the patient's allergy medication, 30 ml bottle. Jenae Ca know that he can call the refill in directly to the pharmacy and they will contact us with any questions. Nishant Weeks said that he will call Shabana at [facility] and [facility] [address].   His callback if needed is [phone]`

## 2023-08-01 RX ORDER — CETIRIZINE HYDROCHLORIDE 5 MG/1
5 TABLET ORAL DAILY PRN
Qty: 150 ML | Refills: 2 | Status: SHIPPED | OUTPATIENT
Start: 2023-08-01

## 2023-08-08 ENCOUNTER — TELEPHONE (OUTPATIENT)
Facility: CLINIC | Age: 83
End: 2023-08-08

## 2023-08-08 NOTE — TELEPHONE ENCOUNTER
Called patient to confirm their in home visit with Emre Engle on Wednesday, 8/9/2023, arrival between 12-4pm.  Spoke with Uli West, they confirmed the appointment and answered the covid screen questions. Does anyone in the household have covid NO  Have there been any changes to insurance NO or location NO. Estrellita Haddad said that he has an appointment at 11 am, and will come to the patient's appointment later. Estrellita Haddad said that someone else will be at the appointment to let the NP in for the visit.   Pranay's callback if needed [phone]

## 2023-08-09 ENCOUNTER — OFFICE VISIT (OUTPATIENT)
Facility: CLINIC | Age: 83
End: 2023-08-09
Payer: MEDICARE

## 2023-08-09 VITALS
OXYGEN SATURATION: 94 % | HEART RATE: 71 BPM | TEMPERATURE: 97 F | DIASTOLIC BLOOD PRESSURE: 74 MMHG | RESPIRATION RATE: 18 BRPM | SYSTOLIC BLOOD PRESSURE: 124 MMHG

## 2023-08-09 DIAGNOSIS — Z93.1 PEG (PERCUTANEOUS ENDOSCOPIC GASTROSTOMY) STATUS (HCC): ICD-10-CM

## 2023-08-09 DIAGNOSIS — N63.0 SWELLING OF BREAST: ICD-10-CM

## 2023-08-09 DIAGNOSIS — I10 ESSENTIAL HYPERTENSION: Primary | ICD-10-CM

## 2023-08-09 DIAGNOSIS — I25.10 CORONARY ARTERY DISEASE INVOLVING NATIVE CORONARY ARTERY OF NATIVE HEART WITHOUT ANGINA PECTORIS: ICD-10-CM

## 2023-08-09 DIAGNOSIS — Z95.0 PRESENCE OF CARDIAC PACEMAKER: ICD-10-CM

## 2023-08-09 DIAGNOSIS — F01.50 VASCULAR DEMENTIA WITHOUT BEHAVIORAL DISTURBANCE (HCC): ICD-10-CM

## 2023-08-09 DIAGNOSIS — I69.359 CVA, OLD, HEMIPARESIS (HCC): ICD-10-CM

## 2023-08-09 DIAGNOSIS — I49.5 SSS (SICK SINUS SYNDROME) (HCC): ICD-10-CM

## 2023-08-09 DIAGNOSIS — G40.909 SEIZURE DISORDER (HCC): ICD-10-CM

## 2023-08-09 DIAGNOSIS — R13.12 OROPHARYNGEAL DYSPHAGIA: ICD-10-CM

## 2023-08-09 PROBLEM — J18.9 PNEUMONIA DUE TO INFECTIOUS ORGANISM: Status: RESOLVED | Noted: 2023-06-22 | Resolved: 2023-08-09

## 2023-08-09 PROCEDURE — G8417 CALC BMI ABV UP PARAM F/U: HCPCS | Performed by: NURSE PRACTITIONER

## 2023-08-09 PROCEDURE — 1036F TOBACCO NON-USER: CPT | Performed by: NURSE PRACTITIONER

## 2023-08-09 PROCEDURE — 1123F ACP DISCUSS/DSCN MKR DOCD: CPT | Performed by: NURSE PRACTITIONER

## 2023-08-09 PROCEDURE — 1090F PRES/ABSN URINE INCON ASSESS: CPT | Performed by: NURSE PRACTITIONER

## 2023-08-09 PROCEDURE — 3078F DIAST BP <80 MM HG: CPT | Performed by: NURSE PRACTITIONER

## 2023-08-09 PROCEDURE — 3074F SYST BP LT 130 MM HG: CPT | Performed by: NURSE PRACTITIONER

## 2023-08-09 PROCEDURE — 99349 HOME/RES VST EST MOD MDM 40: CPT | Performed by: NURSE PRACTITIONER

## 2023-08-09 RX ORDER — ACETAMINOPHEN 160 MG/5ML
SUSPENSION ORAL
Qty: 473 ML | Refills: 5 | Status: SHIPPED | OUTPATIENT
Start: 2023-08-09

## 2023-08-09 ASSESSMENT — PATIENT HEALTH QUESTIONNAIRE - PHQ9: DEPRESSION UNABLE TO ASSESS: FUNCTIONAL CAPACITY MOTIVATION LIMITS ACCURACY

## 2023-08-09 NOTE — ASSESSMENT & PLAN NOTE
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 Residual dysphagia and L hemiparesis. No contractures but very little active ROM, son providing passive ROM exercises to extremities. She is functionally dependent for total care. Continue skin protective measures such as air mattress, frequent repositioning, brief changes/ Purewick urinary device, zinc paste, floating heels. Currently on Eliquis, atorvastatin. Lipid panel 6/2023 at goal.  CBC 6/2023 with Hgb WNL.

## 2023-08-09 NOTE — ASSESSMENT & PLAN NOTE
PEG tube in place s/t stroke with subsequent dysphagia. Last exchanged to new ENFit model 4/21/23. Currently receiving total nutrition, hydration, and medications through PEG (Jevity 1.2cal for total of 8 hours overnight). HOB elevated overnight. CMP obtained 6/2023 with albumin 2.8. PEG site clean, tube appears patent.

## 2023-08-09 NOTE — ASSESSMENT & PLAN NOTE
Minimally responsive to this provider at most visits. Opens eyes at times but does not track, occasionally vocalizes in response to son's voice, does not follow commands. Per son and daughter, she will at times endorse pain or request Tylenol. Reliant on caregivers for total care. Not requiring any medications for behavior, continues sleeping well overnight.   Continue skin protective measures, routine schedule, assessing for nonverbal signs of pain and treating as indicated

## 2023-08-09 NOTE — PROGRESS NOTES
tenderness. There is no guarding. Comments: Midline PEG in place surrounded by split gauze. No surrounding redness, warmth, drainage. Tube appears patent     Musculoskeletal:       Right lower leg: No edema. Left lower leg: No edema. Right foot: Foot drop present. Left foot: Foot drop present. Skin:      General: Skin is warm and dry. Findings: No rash. Breast:       Firm, area about 4cm x 8cm of swelling with skin dimpling in 5:00 position without fluctuance or tenderness. No redness, discharge, or lesions. Neurological:       Motor: Weakness present. Comments: No voluntary movement, does not follow commands. 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 ([facility])    Gastroesophageal reflux disease without esophagitis    Presence of cardiac pacemaker    Syncope    SSS (sick sinus syndrome) ([facility])    CAD (coronary artery disease)    Abnormal nuclear stress test    CVA, old, hemiparesis ([address])    Vascular dementia without behavioral disturbance ([facility])    Squamous cell carcinoma of scalp    Seizure disorder ([facility])    GIB (gastrointestinal bleeding)    PEG (percutaneous endoscopic gastrostomy) status ([facility])    Swelling of breast    Severe obesity (BMI 35.0-39. 9) with comorbidity ([address])     Past Surgical History:   Procedure Laterality Date    HYSTERECTOMY (CERVIX STATUS UNKNOWN)      ICAR CATHETER ABLATION ATRIOVENTR NODE FUNCTION N/A 5/6/2019    ABLATION AV NODE performed by Yosef Buckner MD at [facility] CATH LAB    INS NEW/RPLCMT PRM PM W/TRANSV ELTRD ATRIAL&VENT  9/30/2017         IR REPLACE G TUBE PERC

## 2023-08-09 NOTE — ASSESSMENT & PLAN NOTE
s/t stroke. Swallow study 7/2022 notes severe oropharyngeal dysphagia. 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 PEG tube in place and all nutrition and medications given via PEG. Occasionally has water PO for comfort, no coughing per family. Treated for aspiration pneumonia about 6 weeks ago and has significant improvement in breathing on visit today, infrequent coughing per caregivers. Suction ordered for home use in case of aspiration events.

## 2023-08-09 NOTE — ASSESSMENT & PLAN NOTE
Swelling noted 4/11/23 by family. Patient not complaining of pain, mild tenderness on exam today as evidenced by patient fidgeting with palpation of this area. Firm, area about 4cm x 8cm of swelling with skin dimpling in 5:00 position without fluctuance or tenderness. No recent trauma, no open areas. No history of breast cancer, but history of several other forms of cancer. Did not respond to course of doxycycline, diagnostic ultrasound 5/22/23 significant for 3 areas with differentials of scarring, fibroma, or neoplasm, recommended Mmg.  Discussed with son and she is not able to tolerate Mmg at this time, goal of care is primarily comfort so would not pursue additional workup unless interferes with comfort.   Continue Tylenol for pain relief as needed, encouraged elevation of this breast.

## 2023-08-09 NOTE — ASSESSMENT & PLAN NOTE
Medtronic pacemaker in L chest placed 9/2017. Continues with remote interrogations through office of Dr. Clary Can, cardiologist. Patient unable to endorse symptoms of palpitations or SOB. Continue Eliquis, no bleeding events since last visit.   CMP 6/2023 stable

## 2023-08-13 RX ORDER — APIXABAN 5 MG/1
TABLET, FILM COATED ORAL
Qty: 180 TABLET | Refills: 3 | Status: SHIPPED | OUTPATIENT
Start: 2023-08-13

## 2023-08-25 ENCOUNTER — CLINICAL DOCUMENTATION (OUTPATIENT)
Facility: CLINIC | Age: 83
End: 2023-08-25

## 2023-08-25 NOTE — PROGRESS NOTES
Home Based Primary Care  Plan of Care    [facility] Team Members: Mandy Primrose, MD; GARCIA Husain; Emelina Jolly NP; Matt Nunez, NICHOLAS; Anila Khan, NICHOLAS; Lizeth Solis RN; SANAM Tobar Conception  1940 / [id number]  female      Date of Initial Visit (Start of Care): 9/22/22    Diagnosis:   Patient Active Problem List   Diagnosis Code    Dysphagia R13.10    Recurrent ear pain H92.09    Acute pharyngitis J02.9    Essential hypertension I10    Edema, peripheral R60.9    Pure hypercholesterolemia E78.00    Allergic rhinitis, cause unspecified J30.9    Diverticulosis of colon (without mention of hemorrhage) K57.30    Herniated nucleus pulposus ( slipped disc) [id number]    Degenetrative Dis Disease, 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    Carpal tunnel syndrome G56.00    Unspecified cataract H26.9    GERD Gastro- Esophageal Reflux Disease K21.9    Degenerative Joint Disese ( improved/resolving) M19.90    Menopausal N95.1    Asymptomatic varicose veins I83.90    Sebaceous cyst L72.3    Keloid L91.0    Scalp lesion L98.9    Abnormal nuclear stress test R94.39    Syncope R55    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[facility]) I49.5    CAD (coronary artery disease) I25.10    S/P cardiac pacemaker procedure Z95.0    Bradycardia R00.1    SSS (sick sinus syndrome) ([facility]) I49.5    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 female who was referred to the [facility] program due to CVA with L hemiparesis, vascular dementia. Patient discussed today for POC review.     Advance Care Planning:  Code status: DNR      Primary Decision Maker:

## 2023-08-29 ENCOUNTER — TELEPHONE (OUTPATIENT)
Facility: CLINIC | Age: 83
End: 2023-08-29

## 2023-08-29 RX ORDER — LEVETIRACETAM 100 MG/ML
500 SOLUTION ORAL 2 TIMES DAILY
Qty: 900 ML | Refills: 1 | Status: SHIPPED | OUTPATIENT
Start: 2023-08-29

## 2023-08-29 RX ORDER — METOPROLOL SUCCINATE 25 MG/1
25 TABLET, EXTENDED RELEASE ORAL DAILY
Qty: 90 TABLET | Refills: 1 | Status: SHIPPED | OUTPATIENT
Start: 2023-08-29

## 2023-08-29 NOTE — TELEPHONE ENCOUNTER
Telephone call from pharmacist Ebenezer Reynolds with [address] [place] [address] requesting refill prescriptions for Metoprolol, and Keppra. While in discussion asked if they can provide the large volume of liquid tylenol that patient takes, 120 ml per day and Cetirizine liquid. Ebenezer Reynolds states that they do have the Cetirizine liquid but that this nurse will need to check with specialty [address] [place] [address] to see about Tylenol liquid at [phone] to see if available - he thinks they can send a test claim to check availability. Telephone call to [address] [place] [address] - they were able to run a test claim for liquid tylenol to determine that they don't carry it and cannot get it. Advised that we continue to order from local pharmacy. [employer] sent for metoprolol and keppra refills.

## 2023-09-06 ENCOUNTER — TELEPHONE (OUTPATIENT)
Facility: CLINIC | Age: 83
End: 2023-09-06

## 2023-09-06 NOTE — TELEPHONE ENCOUNTER
Called patient to confirm their in home visit with Rodríguez Berkowitz on Thursday, 9/7/23, arrival between 9am-1pm.  Spoke with Marcelo Garcia, they confirmed the appointment and answered the covid screen questions.  Does anyone in the household have covid NO  Have there been any changes to insurance NO or location NO.

## 2023-09-07 ENCOUNTER — TELEPHONE (OUTPATIENT)
Facility: CLINIC | Age: 83
End: 2023-09-07

## 2023-09-07 ENCOUNTER — OFFICE VISIT (OUTPATIENT)
Facility: CLINIC | Age: 83
End: 2023-09-07
Payer: MEDICARE

## 2023-09-07 VITALS
TEMPERATURE: 96.8 F | HEART RATE: 71 BPM | RESPIRATION RATE: 16 BRPM | DIASTOLIC BLOOD PRESSURE: 70 MMHG | SYSTOLIC BLOOD PRESSURE: 124 MMHG | OXYGEN SATURATION: 97 %

## 2023-09-07 DIAGNOSIS — Z93.1 PEG (PERCUTANEOUS ENDOSCOPIC GASTROSTOMY) STATUS (HCC): ICD-10-CM

## 2023-09-07 DIAGNOSIS — I69.359 CVA, OLD, HEMIPARESIS (HCC): ICD-10-CM

## 2023-09-07 DIAGNOSIS — I10 ESSENTIAL HYPERTENSION: ICD-10-CM

## 2023-09-07 DIAGNOSIS — I49.5 SSS (SICK SINUS SYNDROME) (HCC): ICD-10-CM

## 2023-09-07 DIAGNOSIS — F01.50 VASCULAR DEMENTIA WITHOUT BEHAVIORAL DISTURBANCE (HCC): ICD-10-CM

## 2023-09-07 DIAGNOSIS — K62.5 BRIGHT RED BLOOD PER RECTUM: Primary | ICD-10-CM

## 2023-09-07 DIAGNOSIS — R13.12 OROPHARYNGEAL DYSPHAGIA: ICD-10-CM

## 2023-09-07 PROCEDURE — 1123F ACP DISCUSS/DSCN MKR DOCD: CPT | Performed by: NURSE PRACTITIONER

## 2023-09-07 PROCEDURE — 1090F PRES/ABSN URINE INCON ASSESS: CPT | Performed by: NURSE PRACTITIONER

## 2023-09-07 PROCEDURE — 1036F TOBACCO NON-USER: CPT | Performed by: NURSE PRACTITIONER

## 2023-09-07 PROCEDURE — 99350 HOME/RES VST EST HIGH MDM 60: CPT | Performed by: NURSE PRACTITIONER

## 2023-09-07 PROCEDURE — G8417 CALC BMI ABV UP PARAM F/U: HCPCS | Performed by: NURSE PRACTITIONER

## 2023-09-07 PROCEDURE — 3078F DIAST BP <80 MM HG: CPT | Performed by: NURSE PRACTITIONER

## 2023-09-07 PROCEDURE — 3074F SYST BP LT 130 MM HG: CPT | Performed by: NURSE PRACTITIONER

## 2023-09-07 RX ORDER — FAMOTIDINE 40 MG/5ML
5 POWDER, FOR SUSPENSION ORAL DAILY
COMMUNITY
Start: 2023-08-29

## 2023-09-07 NOTE — PROGRESS NOTES
Substance Use Topics    Alcohol use: No    Drug use: No     Family History   Problem Relation Age of Onset    Hypertension Mother     Stroke Mother     Anesth Problems Neg Hx     Cancer Father         PROSTATE, MELANOMA    Hypertension Father      Allergies   Allergen Reactions    Latex Rash    Sulfa Antibiotics Hives    Aspirin Other (See Comments)     Anything higher than 81mg makes pt jittery    Codeine Hives and Itching    Diltiazem Other (See Comments)    Iodinated Contrast Media Itching    Penicillins Hives and Itching    Adhesive Tape Rash    Clindamycin Rash     Current Outpatient Medications   Medication Sig    famotidine (PEPCID) 40 MG/5ML suspension 5 mLs by Per G Tube route daily    levETIRAcetam (KEPPRA) 100 MG/ML solution Take 5 mLs by mouth 2 times daily    metoprolol succinate (TOPROL XL) 25 MG extended release tablet Take 1 tablet by mouth daily    acetaminophen (M-PAP) 160 MG/5ML liquid TAKE 30 ML VIA PEG EVERY 6 HOURS AS NEEDED FOR FEVER AND PAIN    cetirizine HCl (ZYRTEC) 5 MG/5ML SOLN 5 mLs by Per G Tube route daily as needed (for allergy symptoms)    Phenylephrine-DM-GG (TUSSIN CF MAX MULTI-SYMPTOM) 5- MG/5ML LIQD 10 mLs by PEG Tube route daily as needed    potassium chloride 20 MEQ/15ML (10%) oral solution TAKE 15 ML BY MOUTH TWICE DAILY    Nutritional Supplements (JEVITY 1.2 JOSIAH PO) 75 mL/1.7m2/hr by PEG Tube route daily 75 ml/hr for 8 hours per day, free water flushes 4 x daily    amLODIPine (NORVASC) 5 MG tablet Take by mouth daily    atorvastatin (LIPITOR) 40 MG tablet Take by mouth    ELIQUIS 5 MG TABS tablet TAKE 1 TABLET TWICE DAILY (Patient not taking: Reported on 9/7/2023)     No current facility-administered medications for this visit.      Lab Results   Component Value Date/Time    HGB 13.6 06/22/2023 12:00 PM    HCT 42.9 06/22/2023 12:00 PM    MCV 81.6 06/22/2023 12:00 PM    MCH 25.9 (L) 06/22/2023 12:00 PM    MCHC 31.7 06/22/2023 12:00 PM    RDW 17.9 (H) 06/22/2023 12:00 PM

## 2023-09-07 NOTE — TELEPHONE ENCOUNTER
Telephone call to son Shyam Mai, no answer, left message asking if this nurse can visit tomorrow 9/8 between 11 and 11:30 AM.  Requested call back.

## 2023-09-08 ENCOUNTER — TELEPHONE (OUTPATIENT)
Facility: CLINIC | Age: 83
End: 2023-09-08

## 2023-09-08 ENCOUNTER — OFFICE VISIT (OUTPATIENT)
Facility: CLINIC | Age: 83
End: 2023-09-08

## 2023-09-08 ENCOUNTER — TELEPHONE (OUTPATIENT)
Age: 83
End: 2023-09-08

## 2023-09-08 DIAGNOSIS — K92.1 GASTROINTESTINAL HEMORRHAGE WITH MELENA: Primary | ICD-10-CM

## 2023-09-08 DIAGNOSIS — K92.1 GASTROINTESTINAL HEMORRHAGE WITH MELENA: ICD-10-CM

## 2023-09-08 LAB
ERYTHROCYTE [DISTWIDTH] IN BLOOD BY AUTOMATED COUNT: 17.2 % (ref 11.5–14.5)
HCT VFR BLD AUTO: 41.7 % (ref 35–47)
HGB BLD-MCNC: 12.9 G/DL (ref 11.5–16)
MCH RBC QN AUTO: 25.8 PG (ref 26–34)
MCHC RBC AUTO-ENTMCNC: 30.9 G/DL (ref 30–36.5)
MCV RBC AUTO: 83.4 FL (ref 80–99)
NRBC # BLD: 0 K/UL (ref 0–0.01)
NRBC BLD-RTO: 0 PER 100 WBC
PLATELET # BLD AUTO: 286 K/UL (ref 150–400)
PMV BLD AUTO: 10.2 FL (ref 8.9–12.9)
RBC # BLD AUTO: 5 M/UL (ref 3.8–5.2)
WBC # BLD AUTO: 6.9 K/UL (ref 3.6–11)

## 2023-09-08 RX ORDER — POTASSIUM CHLORIDE 20MEQ/15ML
20 LIQUID (ML) ORAL 2 TIMES DAILY
Qty: 3800 ML | Refills: 1 | Status: SHIPPED | OUTPATIENT
Start: 2023-09-08

## 2023-09-08 NOTE — PROGRESS NOTES
Home visit for lab draw    S - Son Missie Skiff reports that patient had another stool with bright red blood last evening, same amount her showed to provider during her visit yesterday. O - Venipuncture x 1 in right forearm, blood sample obtained for CBC. Pt did not react to blood draw, eyes remained closed for this visit. Son reports that last dose of Eliquis was yesterday morning and he plans to hold for 1 week as discussed with provider. We discussed that with patient having 2 GI bleeds in a short period of time that provider will likely discuss with Missie Skiff risks of continuing anticoagulant vs benefit of receiving the medication. Son states that with last GI bleed all that was done in hospital was they held the Eliquis and did daily CBC to monitor her. He would prefer to do this same thing but keep her at home. A - No new complaints, pt had tylenol just before this visit and Missie Skiff states that it relaxes her and makes her sleepy. P - Update provider.

## 2023-09-09 NOTE — TELEPHONE ENCOUNTER
Received phone call from pt's son Cornelious Kussmaul. Since changing primary care to [address] [place] [address] they have had difficulty getting prescriptions filled as the insurance company is sending it to her prior PCP. Refill given for potassium 20meq bid.

## 2023-09-12 ENCOUNTER — TELEPHONE (OUTPATIENT)
Facility: CLINIC | Age: 83
End: 2023-09-12

## 2023-09-12 RX ORDER — ACETAMINOPHEN 160 MG/5ML
SUSPENSION ORAL
Qty: 3600 ML | Refills: 5 | Status: SHIPPED | OUTPATIENT
Start: 2023-09-12

## 2023-09-12 NOTE — TELEPHONE ENCOUNTER
Telephone call to son Rosanna Burks. Advised that we have sent in refill for liquid Tylenol 3600 ml per month with 5 refills. 120 ml per day x 30 days = 3600 ml. Discussed with Rosanna Burks to check all of pt's active prescription bottles to make sure that they all say Rocio Mast as prescriber. Advised that old PCP was getting requests for refills and that Rosanna Burks should call [facility] and [address] [place] [address] Pharmacies to advise that all rx refills should only be sent to Rocio Mast NP. He advised that he will follow up. He also reports that patient's stools have returned to normal color with no more bright red since holding the Eliquis.

## 2023-09-13 ENCOUNTER — TELEPHONE (OUTPATIENT)
Facility: CLINIC | Age: 83
End: 2023-09-13

## 2023-09-13 NOTE — TELEPHONE ENCOUNTER
Call placed to son, Georgie Argueta, to follow up on blood in stool and holding Eliquis. Team nurse April spoke to Georgie Argueta yesterday and he reported no more blood in stool. Left detailed voicemail discussing this and recommending stopping Eliquis.   Left callback number if he would like to discuss or ask quetisons about this plan (we discussed during recent visit also, so this was an expected recommendation)

## 2023-09-17 ENCOUNTER — CLINICAL DOCUMENTATION (OUTPATIENT)
Age: 83
End: 2023-09-17

## 2023-09-18 ENCOUNTER — TELEPHONE (OUTPATIENT)
Facility: CLINIC | Age: 83
End: 2023-09-18

## 2023-09-18 NOTE — TELEPHONE ENCOUNTER
Kavita Crawford is the patient's son. He called to express concern about stopping the patient's Eliquis medication. He said that when the Eliquis was stopped, the bleeding stopped, but he is concerned about another blood clot. Austyn Rodrigues asks if the Eliquis will be tapered or stopped all at once. He expresses concern again about another blood clot forming. Sonia BRANDT# [phone].

## 2023-09-18 NOTE — TELEPHONE ENCOUNTER
Call returned to [facility] regarding Eliquis dosage. [facility] has concerns over stopping this medication since many of her current medications are related to stroke. Discussed we could reduce dose to 2.5mg BID and continue to monitor for bleeding (last bleeding resolved and Hgb did not drop, but would need close monitoring which [facility] is able to manage).       Rx sent to pharmacy of new dose

## 2023-09-25 RX ORDER — CETIRIZINE HYDROCHLORIDE 5 MG/1
5 TABLET ORAL DAILY PRN
Qty: 150 ML | Refills: 2 | Status: SHIPPED | OUTPATIENT
Start: 2023-09-25

## 2023-10-06 RX ORDER — ATORVASTATIN CALCIUM 40 MG/1
40 TABLET, FILM COATED ORAL
Qty: 90 TABLET | Refills: 1 | Status: SHIPPED | OUTPATIENT
Start: 2023-10-06

## 2023-10-09 ENCOUNTER — TELEPHONE (OUTPATIENT)
Facility: CLINIC | Age: 83
End: 2023-10-09

## 2023-10-09 NOTE — TELEPHONE ENCOUNTER
Madhuri Rashid is the patient's son. He called to update Audrey Lyon NP that [address] told him the NP needs to call them to renew the patient's paperwork. It is .    Pranay's CB# [phone]

## 2023-10-11 NOTE — TELEPHONE ENCOUNTER
Telephone call to [address], spoke with Benji Malloy to determine that they do no need any further documentation from our office, they are waiting on info from the insurance company. Telephone call to son Virginia Ojeda to advise of above conversation. He will follow up with the insurance company.

## 2023-10-23 RX ORDER — FAMOTIDINE 40 MG/5ML
40 POWDER, FOR SUSPENSION ORAL 2 TIMES DAILY
Qty: 300 ML | Refills: 5 | Status: SHIPPED | OUTPATIENT
Start: 2023-10-23

## 2023-10-24 ENCOUNTER — CLINICAL DOCUMENTATION (OUTPATIENT)
Facility: CLINIC | Age: 83
End: 2023-10-24

## 2023-10-24 NOTE — PROGRESS NOTES
Tube feeds with G / J tube    Functional/Activity Level:  Bedbound only    Safety Measures:   Aspiration risk, Fall risk, and Self-care deficit    Acuity Level Rating: High    Current Outpatient Medications   Medication Sig    famotidine (PEPCID) 40 MG/5ML suspension 5 mLs by Per G Tube route 2 times daily    atorvastatin (LIPITOR) 40 MG tablet TAKE 1 TABLET BY MOUTH EVERY NIGHT    cetirizine HCl (ZYRTEC) 5 MG/5ML SOLN 5 mLs by Per G Tube route daily as needed (for allergy symptoms)    apixaban (ELIQUIS) 2.5 MG TABS tablet Take 1 tablet by mouth 2 times daily    acetaminophen (M-PAP) 160 MG/5ML liquid TAKE 30 ML VIA PEG EVERY 6 HOURS AS NEEDED FOR FEVER AND PAIN    potassium chloride 20 MEQ/15ML (10%) oral solution Take 15 mLs by mouth 2 times daily    levETIRAcetam (KEPPRA) 100 MG/ML solution Take 5 mLs by mouth 2 times daily    metoprolol succinate (TOPROL XL) 25 MG extended release tablet Take 1 tablet by mouth daily    Phenylephrine-DM-GG (TUSSIN CF MAX MULTI-SYMPTOM) 5- MG/5ML LIQD 10 mLs by PEG Tube route daily as needed    Nutritional Supplements (JEVITY 1.2 JOSIAH PO) 75 mL/1.7m2/hr by PEG Tube route daily 75 ml/hr for 8 hours per day, free water flushes 4 x daily    amLODIPine (NORVASC) 5 MG tablet Take by mouth daily     No current facility-administered medications for this visit. PLAN OF CARE    The Plan of Care is initiated within 15 days of the initial provider visit and updated at least every 60 days or sooner, based on patient's changing condition. Plan of Care Orders / Action Items:  Refer to most recent provider visit note for specifics re: plan of care.   No recent changes to plan of care          Estimated Visit Frequency:  Monthly  SW visits PRN

## 2023-10-25 ENCOUNTER — TELEPHONE (OUTPATIENT)
Facility: CLINIC | Age: 83
End: 2023-10-25

## 2023-10-25 RX ORDER — AMLODIPINE BESYLATE 5 MG/1
5 TABLET ORAL DAILY
Qty: 90 TABLET | Refills: 1 | Status: SHIPPED | OUTPATIENT
Start: 2023-10-25

## 2023-10-25 NOTE — TELEPHONE ENCOUNTER
Zoë Zheng called to request refill on the patient's amlodipine script. He stated that it is still under the former PCP, and he received a text message from the pharmacy that  the prescriber declined to refill. Amlodipine, mg tabs/1 per day. The aptient uses [facility] on [address] [place] [address].     Pranay's CB# if needed is [phone]

## 2023-10-26 ENCOUNTER — TELEPHONE (OUTPATIENT)
Facility: CLINIC | Age: 83
End: 2023-10-26

## 2023-10-26 NOTE — TELEPHONE ENCOUNTER
Called patient to confirm their in home visit with Ramirez Esqueda on 10/30/23, arrival between 9-1. Spoke with son, they confirmed the appointment and answered the covid screen questions.  Does anyone in the household have covid no  Have there been any changes to insurance no or location no

## 2023-10-30 ENCOUNTER — OFFICE VISIT (OUTPATIENT)
Facility: CLINIC | Age: 83
End: 2023-10-30
Payer: MEDICARE

## 2023-10-30 VITALS
RESPIRATION RATE: 14 BRPM | OXYGEN SATURATION: 96 % | SYSTOLIC BLOOD PRESSURE: 120 MMHG | HEART RATE: 69 BPM | DIASTOLIC BLOOD PRESSURE: 68 MMHG | TEMPERATURE: 97.9 F

## 2023-10-30 DIAGNOSIS — I10 ESSENTIAL HYPERTENSION: ICD-10-CM

## 2023-10-30 DIAGNOSIS — G40.909 SEIZURE DISORDER (HCC): ICD-10-CM

## 2023-10-30 DIAGNOSIS — I69.359 CVA, OLD, HEMIPARESIS (HCC): ICD-10-CM

## 2023-10-30 DIAGNOSIS — I49.5 SSS (SICK SINUS SYNDROME) (HCC): ICD-10-CM

## 2023-10-30 DIAGNOSIS — F01.50 VASCULAR DEMENTIA WITHOUT BEHAVIORAL DISTURBANCE (HCC): Primary | ICD-10-CM

## 2023-10-30 DIAGNOSIS — R13.12 OROPHARYNGEAL DYSPHAGIA: ICD-10-CM

## 2023-10-30 DIAGNOSIS — I25.10 CORONARY ARTERY DISEASE INVOLVING NATIVE CORONARY ARTERY OF NATIVE HEART WITHOUT ANGINA PECTORIS: ICD-10-CM

## 2023-10-30 DIAGNOSIS — K21.9 GASTROESOPHAGEAL REFLUX DISEASE WITHOUT ESOPHAGITIS: ICD-10-CM

## 2023-10-30 DIAGNOSIS — Z93.1 PEG (PERCUTANEOUS ENDOSCOPIC GASTROSTOMY) STATUS (HCC): ICD-10-CM

## 2023-10-30 PROBLEM — K92.2 GIB (GASTROINTESTINAL BLEEDING): Status: RESOLVED | Noted: 2023-02-09 | Resolved: 2023-10-30

## 2023-10-30 PROCEDURE — G8417 CALC BMI ABV UP PARAM F/U: HCPCS | Performed by: NURSE PRACTITIONER

## 2023-10-30 PROCEDURE — 1036F TOBACCO NON-USER: CPT | Performed by: NURSE PRACTITIONER

## 2023-10-30 PROCEDURE — 3078F DIAST BP <80 MM HG: CPT | Performed by: NURSE PRACTITIONER

## 2023-10-30 PROCEDURE — 99349 HOME/RES VST EST MOD MDM 40: CPT | Performed by: NURSE PRACTITIONER

## 2023-10-30 PROCEDURE — 3074F SYST BP LT 130 MM HG: CPT | Performed by: NURSE PRACTITIONER

## 2023-10-30 PROCEDURE — 1123F ACP DISCUSS/DSCN MKR DOCD: CPT | Performed by: NURSE PRACTITIONER

## 2023-10-30 PROCEDURE — 1090F PRES/ABSN URINE INCON ASSESS: CPT | Performed by: NURSE PRACTITIONER

## 2023-10-30 PROCEDURE — G8484 FLU IMMUNIZE NO ADMIN: HCPCS | Performed by: NURSE PRACTITIONER

## 2023-10-30 ASSESSMENT — PATIENT HEALTH QUESTIONNAIRE - PHQ9
SUM OF ALL RESPONSES TO PHQ QUESTIONS 1-9: 0
2. FEELING DOWN, DEPRESSED OR HOPELESS: 0
SUM OF ALL RESPONSES TO PHQ QUESTIONS 1-9: 0
SUM OF ALL RESPONSES TO PHQ9 QUESTIONS 1 & 2: 0
SUM OF ALL RESPONSES TO PHQ QUESTIONS 1-9: 0
DEPRESSION UNABLE TO ASSESS: FUNCTIONAL CAPACITY MOTIVATION LIMITS ACCURACY
1. LITTLE INTEREST OR PLEASURE IN DOING THINGS: 0
SUM OF ALL RESPONSES TO PHQ QUESTIONS 1-9: 0

## 2023-10-30 NOTE — ASSESSMENT & PLAN NOTE
s/t stroke. Swallow study 7/2022 notes severe oropharyngeal dysphagia. 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 PEG tube in place and all nutrition and medications given via PEG. Occasionally has water PO for comfort, no worsening cough per family. Treated for aspiration pneumonia about 5 months ago. Suction ordered for home use in case of aspiration events.

## 2023-10-30 NOTE — PROGRESS NOTES
improvement of symptoms. Her Hgb remained stable and Eliquis was resumed prior to discharge. Since returning home, she has had one recurrent episode of blood in stool September 2023. Eliquis was held again with resolution of symptoms, restarted at half dose without further episodes. Hgb obtained 9/2023 after bleeding event and was stable. Patient primarily keeps eyes closed during visits and occasionally vocalizes in response to voice. Per son, at times she is able to answer yes/no questions and verbalize pain. She had a right MCA stroke in 2020 with residual dysphagia and L-sided hemiparesis. Son provides passive ROM to prevent contractions. She does not willingly move extremities on exam, but can squeeze right hand on command. 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 She also has flexed posture of her RUE, but family states she can straighten this arm and occasionally relaxes it. Her skin is intact, and she is on low air-loss mattress. She has vascular dementia, son does not speak of this around her as it causes distress, but he reports behaviors have been relatively stable. She sleeps throughout the night and mostly during the day with periods of awakening. She is reliant on family and caregivers for total care. She is currently managed on Lipitor and Eliquis (resumed after hospitalization). She also has seizure disorder s/t stroke and is currently managed on Keppra with no seizure activity since April 2021. Ms. Bladimir Mason receives all nutrition, hydration, and medications through PEG tube. She had replacement of PEG tube through IR and is now using ENFit model. She is managed on Jevity 1.2 for 8 hours per night. She has had no apparent  episodes of aspiration or pneumonia, no weight loss. PEG site has had no s/s of infection, reviewed with family who would call office if any symptoms noted. Her blood pressure is at goal with her current regimen of amlodipine and metoprolol.   She has a

## 2023-10-30 NOTE — ASSESSMENT & PLAN NOTE
At goal with amlodipine, metoprolol. Goal <140/80 due to stroke history but would weigh against age and primary goal of comfort. Son has wrist cuff, but result today was significantly different from manual and complicated by patient inability to fully cooperate (flexed during BP measurement).   CMP, CBC 6/22/23 and stable

## 2023-10-30 NOTE — ASSESSMENT & PLAN NOTE
Minimally responsive to this provider at most visits. Opens eyes at times but does not track, occasionally vocalizes in response to son's voice, does not follow commands. Per son, she is able to endorse pain. Reliant on caregivers for total care. Not requiring any medications for behavior, continues sleeping well overnight.   Continue skin protective measures, routine schedule, assessing for nonverbal signs of pain and treating as indicated

## 2023-10-30 NOTE — ASSESSMENT & PLAN NOTE
Right MCA stroke in 2020 with an occluded right internal carotid artery from the carotid bifurcation to the [place] of [place]ul and further intraluminal thrombus at the right MCA bifurcation. Residual dysphagia and L hemiparesis. No contractures but very little active ROM, son providing passive ROM exercises to extremities. She is functionally dependent for total care. Continue skin protective measures such as air mattress, frequent repositioning, brief changes/ Purewick urinary device, zinc paste, floating heels. Currently on atorvastatin (Eliquis held in setting of BRBPR). Lipid panel 6/2023 at goal.  CBC 9/2023 with Hgb WNL.

## 2023-10-30 NOTE — ASSESSMENT & PLAN NOTE
Medtronic pacemaker in L chest placed 9/2017. Continues with remote interrogations through office of Dr. Jim Anderson, cardiologist. Patient unable to endorse symptoms of palpitations or SOB. Currently managed on metoprolol and Eliquis (2.5mg dose in setting of recent ? GIB)

## 2023-10-30 NOTE — ASSESSMENT & PLAN NOTE
Cardiac cath Feb 2016 with 50% occlusion mid LAD, 100% mid RCA with well-developed left to right collaterals. Currently managed with atorvastatin, Eliquis. Unable to endorse symptoms. Lipid panel  6/2023at goal, CBC 9/2023 stable.

## 2023-11-16 ENCOUNTER — TELEPHONE (OUTPATIENT)
Facility: CLINIC | Age: 83
End: 2023-11-16

## 2023-11-16 RX ORDER — POTASSIUM CHLORIDE 20MEQ/15ML
20 LIQUID (ML) ORAL 2 TIMES DAILY
Qty: 2800 ML | Refills: 1 | Status: SHIPPED | OUTPATIENT
Start: 2023-11-16

## 2023-11-16 RX ORDER — LEVETIRACETAM 100 MG/ML
500 SOLUTION ORAL 2 TIMES DAILY
Qty: 900 ML | Refills: 1 | Status: SHIPPED | OUTPATIENT
Start: 2023-11-16 | End: 2023-11-16 | Stop reason: SDUPTHER

## 2023-11-16 RX ORDER — LEVETIRACETAM 100 MG/ML
500 SOLUTION ORAL 2 TIMES DAILY
Qty: 300 ML | Refills: 0 | Status: SHIPPED | OUTPATIENT
Start: 2023-11-16

## 2023-11-16 NOTE — TELEPHONE ENCOUNTER
Levetiracetam - Pilar Vasquez callback and left a  requesting a return call. I called Virginia Ojeda back. He stated that Cornia Britt will send the levetiracetam and it will arrive in 3-5 business days. Potassium - Virginia Ojeda said that this script is under the patient's former PCP and needs to be refilled. Virginia Ojeda asks for a callback since he is not sure if the seisure mediation will arrive in time. He is not sure if he needs to get a partial refill from [facility].   # [phone]

## 2023-11-16 NOTE — TELEPHONE ENCOUNTER
Juanpablo Jose called to request refill on the patient's levetiracetam script. He said that the patient's refill order never arrived from [facility] and she is running out of medication. He asks for a partial refill from [facility] on [address]. Kwaku Benjamin said that he would also follow up with [place] and callback to [facility].     His CB# [phone]

## 2023-11-16 NOTE — TELEPHONE ENCOUNTER
Prescription of Potassium requested by Ruthie Blackburn (fax) has already been sent to pharmacy by Lizeth Pillai RN.

## 2023-11-16 NOTE — TELEPHONE ENCOUNTER
Telephone call returned to son Odette Kern. He would like a 30 day fill of Levetiracetam sent to local [facility] and then a 90 day fill sent to [facility]. Also needs Potassium refill sent to [address] [facility]. Advised these will be sent as requested.

## 2023-11-21 ENCOUNTER — TELEPHONE (OUTPATIENT)
Facility: CLINIC | Age: 83
End: 2023-11-21

## 2023-11-21 RX ORDER — POTASSIUM CHLORIDE 20MEQ/15ML
20 LIQUID (ML) ORAL 2 TIMES DAILY
Qty: 900 ML | Refills: 0 | Status: SHIPPED | OUTPATIENT
Start: 2023-11-21

## 2023-11-21 NOTE — TELEPHONE ENCOUNTER
Darryl called from [facility] regarding the patient's potassium script refill.  Darryl said that Pranay Alvarez was on the line with her, he also called in while I was on the phone with Darryl.  I put her on hold and tried to answer but we were disconnected.  I went back on the line with Darryl and repeated to her all that Pranay Alvarez had said regarding the prescription being reduced to 900 tabs but still declined by the pharmacy as too early to refill.    Darryl stated that the patient's order has not been mailed yet and will not arrive for 5-7 days.  She expressed confusion as to why the pharmacy would not refill and stated \"it shouldn't be a problem to get a refill\" and suggested a \"short\" script.    [facility] CB#[phone]

## 2023-11-21 NOTE — TELEPHONE ENCOUNTER
Brandon Sequeira called to update Martha Campbell NP that the short order on the patient [place] was declined by the pharmacy. Kody Brenner said that they told him it was too early to refill. He states that the patient mail order of [address] arrived, so she has the medication now. Potassium - Kody Brenner said that the patient has about 1.5 days of medication left and it will be 7-10 days before the prescription arrives from [facility]. He asks if a partial script can be sent to the [facility].     Pranay's CB# [phone]

## 2023-11-21 NOTE — TELEPHONE ENCOUNTER
Hyun Santos called and left a VM that he had a message from the TBS that it is too soon to refill the Potassium. Phil Rm said that he can see where the script was changed from 2365 tabs to 900 tabs, but the pharmacy is still saying its too early to fill. He expressed concern that the pharmacy will be closed for Thanksgiving and he will not be able to get the medication. Pranay's CB# [phone] if needed.

## 2023-11-21 NOTE — TELEPHONE ENCOUNTER
Telephone call returned to son Heidy Ring to advise that we sent the short refill for Potassium to the local [facility] as he requested. Tiara Nurse that at this point it is between him and the insurance to get it paid for. Advised that he would also have the option of purchasing the potassium out-of-pocket as the local [facility] has a valid prescription. Heidy Ring acknowledged and will follow up with insurance company.

## 2023-12-07 ENCOUNTER — TELEPHONE (OUTPATIENT)
Facility: CLINIC | Age: 83
End: 2023-12-07

## 2023-12-07 NOTE — TELEPHONE ENCOUNTER
Called patient to confirm their in home visit with Quiana Clemons on 12/13/23, arrival between 9-1. Spoke with son, they confirmed the appointment and answered the covid screen questions.  Does anyone in the household have covid no  Have there been any changes to insurance no or location no

## 2023-12-12 ENCOUNTER — CLINICAL DOCUMENTATION (OUTPATIENT)
Facility: CLINIC | Age: 83
End: 2023-12-12

## 2023-12-12 PROBLEM — D68.69 SECONDARY HYPERCOAGULABLE STATE (HCC): Status: ACTIVE | Noted: 2023-12-12

## 2023-12-12 NOTE — PROGRESS NOTES
[address] [facility] at [address] [facility] [address] [facility], [address] [facility], [address] [place] [address] Team Members: Geovanna Graham MD; Deann Henry NP; Mariana Soler NP; Bhanu Hernandez, RN; Luis Kelly, RN; Lizeth Solis, RN; Shirin Finley LCSW    Pedro Mendoza  1940 / [id number]  female    Date of Initial Visit (Start of Care): 9/22/22    Diagnoses  Patient Active Problem List   Diagnosis    Essential hypertension    Edema    Carpal tunnel syndrome    Degeneration of cervical intervertebral disc    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HCC)    PAF (paroxysmal atrial fibrillation) (HCC)    Dysphagia    Pure hypercholesterolemia    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 9) with comorbidity [facility])       Advance Care Planning:    Code Status: DNR        Primary Decision Maker (Active): Brent Andino Child - [phone]    Primary Decision Maker: Nabeel Andino Child - [phone]    Primary Decision Maker: Annika Andino Child - [phone]       12/12/2023     9:37 AM   Demographics   Marital Status        DME/Supplies:  Hospital Bed, [employer], and tube feeding, suction     Allergies   Allergen Reactions    Latex Rash    Sulfa Antibiotics Hives    Aspirin Other (See Comments)     Anything higher than 81mg makes pt jittery    Codeine Hives and Itching    Diltiazem Other (See Comments)    Iodinated Contrast Media Itching    Penicillins Hives and Itching    Adhesive Tape Rash    Clindamycin Rash       Nutritional Requirements:   Tube feeds with G / J

## 2023-12-12 NOTE — ASSESSMENT & PLAN NOTE
At goal with amlodipine, metoprolol. Goal <140/80 due to stroke history but would weigh against age and primary goal of comfort. Son has wrist cuff, but result today was significantly different from manual and complicated by patient inability to fully cooperate (flexed during BP measurement).   CMP 6/2023, CBC 9/2023  stable

## 2023-12-12 NOTE — ASSESSMENT & PLAN NOTE
Currently with pacemaker. Patient unable to endorse symptoms of palpitations or SOB. Currently managed on metoprolol and Eliquis (2.5mg dose in setting of recent ? GIB)

## 2023-12-12 NOTE — ASSESSMENT & PLAN NOTE
Cardiac cath Feb 2016 with 50% occlusion mid LAD, 100% mid RCA with well-developed left to right collaterals. Currently managed with atorvastatin, Eliquis. Unable to endorse symptoms. Lipid panel  6/2023 at goal, CBC 9/2023 stable.

## 2023-12-12 NOTE — ASSESSMENT & PLAN NOTE
s/t stroke. Swallow study 7/2022 notes severe oropharyngeal dysphagia. 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 PEG tube in place and all nutrition and medications given via PEG. Occasionally has water PO for comfort, no worsening cough per family. Treated for aspiration pneumonia once in the previous year. Suction ordered for home use in case of aspiration events.    She does have comfort sips of water, per family does not cough during this as they give very small amount

## 2023-12-13 ENCOUNTER — OFFICE VISIT (OUTPATIENT)
Facility: CLINIC | Age: 83
End: 2023-12-13
Payer: MEDICARE

## 2023-12-13 VITALS
RESPIRATION RATE: 18 BRPM | DIASTOLIC BLOOD PRESSURE: 58 MMHG | HEART RATE: 72 BPM | TEMPERATURE: 97.8 F | OXYGEN SATURATION: 95 % | SYSTOLIC BLOOD PRESSURE: 112 MMHG

## 2023-12-13 DIAGNOSIS — Z95.0 PRESENCE OF CARDIAC PACEMAKER: ICD-10-CM

## 2023-12-13 DIAGNOSIS — F01.50 VASCULAR DEMENTIA WITHOUT BEHAVIORAL DISTURBANCE (HCC): ICD-10-CM

## 2023-12-13 DIAGNOSIS — R13.12 OROPHARYNGEAL DYSPHAGIA: ICD-10-CM

## 2023-12-13 DIAGNOSIS — Z93.1 PEG (PERCUTANEOUS ENDOSCOPIC GASTROSTOMY) STATUS (HCC): ICD-10-CM

## 2023-12-13 DIAGNOSIS — I25.10 CORONARY ARTERY DISEASE INVOLVING NATIVE CORONARY ARTERY OF NATIVE HEART WITHOUT ANGINA PECTORIS: ICD-10-CM

## 2023-12-13 DIAGNOSIS — I10 ESSENTIAL HYPERTENSION: Primary | ICD-10-CM

## 2023-12-13 DIAGNOSIS — I49.5 SSS (SICK SINUS SYNDROME) (HCC): ICD-10-CM

## 2023-12-13 DIAGNOSIS — Z00.00 ENCOUNTER FOR ANNUAL WELLNESS VISIT (AWV) IN MEDICARE PATIENT: ICD-10-CM

## 2023-12-13 DIAGNOSIS — I69.359 CVA, OLD, HEMIPARESIS (HCC): ICD-10-CM

## 2023-12-13 DIAGNOSIS — G40.909 SEIZURE DISORDER (HCC): ICD-10-CM

## 2023-12-13 PROBLEM — D68.69 SECONDARY HYPERCOAGULABLE STATE (HCC): Status: RESOLVED | Noted: 2023-12-12 | Resolved: 2023-12-13

## 2023-12-13 PROCEDURE — 3078F DIAST BP <80 MM HG: CPT | Performed by: NURSE PRACTITIONER

## 2023-12-13 PROCEDURE — G0439 PPPS, SUBSEQ VISIT: HCPCS | Performed by: NURSE PRACTITIONER

## 2023-12-13 PROCEDURE — 99349 HOME/RES VST EST MOD MDM 40: CPT | Performed by: NURSE PRACTITIONER

## 2023-12-13 PROCEDURE — 3074F SYST BP LT 130 MM HG: CPT | Performed by: NURSE PRACTITIONER

## 2023-12-13 PROCEDURE — 1123F ACP DISCUSS/DSCN MKR DOCD: CPT | Performed by: NURSE PRACTITIONER

## 2023-12-13 PROCEDURE — 1036F TOBACCO NON-USER: CPT | Performed by: NURSE PRACTITIONER

## 2023-12-13 PROCEDURE — 1090F PRES/ABSN URINE INCON ASSESS: CPT | Performed by: NURSE PRACTITIONER

## 2023-12-13 PROCEDURE — G8417 CALC BMI ABV UP PARAM F/U: HCPCS | Performed by: NURSE PRACTITIONER

## 2023-12-13 PROCEDURE — G8484 FLU IMMUNIZE NO ADMIN: HCPCS | Performed by: NURSE PRACTITIONER

## 2023-12-13 ASSESSMENT — PATIENT HEALTH QUESTIONNAIRE - PHQ9
2. FEELING DOWN, DEPRESSED OR HOPELESS: 0
SUM OF ALL RESPONSES TO PHQ9 QUESTIONS 1 & 2: 0
1. LITTLE INTEREST OR PLEASURE IN DOING THINGS: 0
SUM OF ALL RESPONSES TO PHQ QUESTIONS 1-9: 0
DEPRESSION UNABLE TO ASSESS: FUNCTIONAL CAPACITY MOTIVATION LIMITS ACCURACY

## 2023-12-13 NOTE — ASSESSMENT & PLAN NOTE
Minimally responsive to this provider at most visits. Opens eyes at times but does not track, occasionally vocalizes in response to family's voice, does not follow commands. Per son, she is able to endorse pain. Reliant on caregivers for total care. Not requiring any medications for behavior, continues sleeping well overnight. Continue skin protective measures, routine schedule, assessing for nonverbal signs of pain and treating as indicated. Per family she is less interactive and vocalizing very infrequently.

## 2023-12-13 NOTE — PROGRESS NOTES
This is the Subsequent Medicare Annual Wellness Exam, performed 12 months or more after the Initial AWV or the last Subsequent AWV    I have reviewed the patient's medical history in detail and updated the computerized patient record. Assessment/Plan   Education and counseling provided:  Are appropriate based on today's review and evaluation and patient has received all age-recommended screening and preventative that she is able to access at this time (does not have UTD vaccines that are not available via home delivery)    1. Essential hypertension  Assessment & Plan:  At goal with amlodipine, metoprolol. Goal <140/80 due to stroke history but would weigh against age and primary goal of comfort. Son has wrist cuff, but result today was significantly different from manual and complicated by patient inability to fully cooperate (flexed during BP measurement). CMP 6/2023, CBC 9/2023  stable  2. SSS (sick sinus syndrome) (HCC)  Assessment & Plan:  Currently with pacemaker. Patient unable to endorse symptoms of palpitations or SOB. Currently managed on metoprolol and Eliquis (2.5mg dose in setting of recent ? GIB)  3. Presence of cardiac pacemaker  Assessment & Plan:  [employer] pacemaker in L chest placed 9/2017. Continues with remote interrogations through office of Dr. Julian Gu, cardiologist.   4. Coronary artery disease involving native coronary artery of native heart without angina pectoris  Assessment & Plan:  Cardiac cath Feb 2016 with 50% occlusion mid LAD, 100% mid RCA with well-developed left to right collaterals. Currently managed with atorvastatin, Eliquis. Unable to endorse symptoms. Lipid panel  6/2023 at goal, CBC 9/2023 stable. 5. Oropharyngeal dysphagia  Assessment & Plan:  s/t stroke. Swallow study 7/2022 notes severe oropharyngeal dysphagia. 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 PEG tube in place and all nutrition and medications given via PEG.
metoprolol succinate (TOPROL XL) 25 MG extended release tablet Take 1 tablet by mouth daily    Phenylephrine-DM-GG (TUSSIN CF MAX MULTI-SYMPTOM) 5- MG/5ML LIQD 10 mLs by PEG Tube route daily as needed    Nutritional Supplements (JEVITY 1.2 JOSIAH PO) 75 mL/1.7m2/hr by PEG Tube route daily 75 ml/hr for 8 hours per day, free water flushes 4 x daily     No current facility-administered medications for this visit.      Lab Results   Component Value Date/Time    HGB 12.9 09/08/2023 11:10 AM    HCT 41.7 09/08/2023 11:10 AM    MCV 83.4 09/08/2023 11:10 AM    MCH 25.8 (L) 09/08/2023 11:10 AM    MCHC 30.9 09/08/2023 11:10 AM    RDW 17.2 (H) 09/08/2023 11:10 AM    WBC 6.9 09/08/2023 11:10 AM     09/08/2023 11:10 AM     Lab Results   Component Value Date/Time    CREATININE 0.43 (L) 06/22/2023 12:00 PM    BUN 14 06/22/2023 12:00 PM    EGFR >60 02/12/2023 12:50 AM     (L) 06/22/2023 12:00 PM    K 4.6 06/22/2023 12:00 PM    CALCIUM 8.6 06/22/2023 12:00 PM    CO2 24 06/22/2023 12:00 PM     06/22/2023 12:00 PM    MG 2.0 02/10/2023 02:00 AM    PHOS 3.7 02/10/2023 02:00 AM    LABALBU 2.8 (L) 06/22/2023 12:00 PM     Lab Results   Component Value Date/Time    LABA1C 5.7 (H) 07/04/2020 05:43 AM    TSH 0.65 07/04/2020 05:43 AM    INR 1.1 07/03/2020 03:43 PM    PROTIME 11.5 (H) 07/03/2020 03:43 PM    CHOL 102 06/22/2023 12:00 PM    TRIG 111 06/22/2023 12:00 PM    LDLCALC 25.8 06/22/2023 12:00 PM    HDL 54 06/22/2023 12:00 PM                  PINEDA Dumont NP

## 2023-12-13 NOTE — ASSESSMENT & PLAN NOTE
Right MCA stroke in 2020 with an occluded right internal carotid artery from the carotid bifurcation to the [place] of [place] and further intraluminal thrombus at the right MCA bifurcation. Residual dysphagia and L hemiparesis. No contractures but very little active ROM, son providing passive ROM exercises to extremities. She is functionally dependent for total care. Continue skin protective measures such as air mattress, frequent repositioning, brief changes/ Purewick urinary device, zinc paste, floating heels. Currently on atorvastatin (Eliquis held in setting of BRBPR). Lipid panel 6/2023 at goal.  CBC 9/2023 with Hgb WNL.

## 2024-01-01 NOTE — PROGRESS NOTES
Spoke with Dr Miles Ugarte surgeon at [facility]  Ms. Pemberton surgery is pending for next Tuesday. Reviewed Dr Jaxson Quach last office note with her and recommendation for surgery vs pacemaker before surgery. She is not on AV lou blocking agents. Losartan and norvasc for BP. Rythmol d/c at last office visit 1/25/17  Recommend atropine at bedside during surgery. She has an extensive malignant area on her scalp that needs to be removed and flap will need to be created as well.      Will have Dr. Nevaeh Monsivais last office note faxed to Dr Mariana Gómez fax #[phone] Based on # of Babies in Utero = <1> (2024 11:38:32)  Extramural Delivery = *  Gestational Age of Birth = <40w6d> (2024 11:38:32)  Number of Prenatal Care Visits = <14> (2024 11:38:32)  EFW = <3600> (2024 10:41:27)  Birthweight = *    * if criteria is not previously documented

## 2024-01-03 RX ORDER — CETIRIZINE HYDROCHLORIDE 1 MG/ML
SOLUTION ORAL
Qty: 150 ML | Refills: 2 | Status: SHIPPED | OUTPATIENT
Start: 2024-01-03

## 2024-01-23 ENCOUNTER — TELEPHONE (OUTPATIENT)
Facility: CLINIC | Age: 84
End: 2024-01-23

## 2024-01-23 RX ORDER — LEVETIRACETAM 100 MG/ML
500 SOLUTION ORAL 2 TIMES DAILY
Qty: 300 ML | Refills: 5 | Status: SHIPPED | OUTPATIENT
Start: 2024-01-23

## 2024-01-23 NOTE — TELEPHONE ENCOUNTER
Medication Refill -    Keppra - enough left for a few days    Pharmacy - [facility]    Details - Pranay Alvarez is the patient's son.  He called to request refill on the patient's script.  He states that [facility] mail order pharmacy has been sending them incorrect order refills, ex. 5 bottles of potassium and 1 bottle of keppra.    Pranay's callback if needed is [phone]

## 2024-01-25 ENCOUNTER — TELEPHONE (OUTPATIENT)
Facility: CLINIC | Age: 84
End: 2024-01-25

## 2024-01-25 NOTE — TELEPHONE ENCOUNTER
Medication Refill Change -     Kepra - Pranay Alvarez spoke with the patient's Insurance company, [email or web address].  The patient's refill was denied.  [email or web address] said that it is too soon to refill and they are processing the patient's regular refill in mail order. The patient will be out of medication before the mail order arrives.     Short fill - Pranay requests a short fill of the Kepra to tied over the patient until her mail order prescription arrives.    Pranay's callback is [phone]

## 2024-01-25 NOTE — TELEPHONE ENCOUNTER
Telephone call returned to son Pranay, no answer, left detailed VM to advise that this nurse sent the Keppra refill to [facility] on 1/23 with his first request.  [facility] that he just needs to call [facility] and tell them he will pay for the med out of pocket.  Advised to call our office for any questions or concerns.

## 2024-01-27 ENCOUNTER — APPOINTMENT (OUTPATIENT)
Facility: HOSPITAL | Age: 84
End: 2024-01-27
Payer: MEDICARE

## 2024-01-27 ENCOUNTER — TELEPHONE (OUTPATIENT)
Age: 84
End: 2024-01-27

## 2024-01-27 ENCOUNTER — HOSPITAL ENCOUNTER (EMERGENCY)
Facility: HOSPITAL | Age: 84
Discharge: HOME | End: 2024-01-27
Attending: STUDENT IN AN ORGANIZED HEALTH CARE EDUCATION/TRAINING PROGRAM
Payer: MEDICARE

## 2024-01-27 VITALS
OXYGEN SATURATION: 95 % | DIASTOLIC BLOOD PRESSURE: 74 MMHG | HEART RATE: 69 BPM | RESPIRATION RATE: 18 BRPM | TEMPERATURE: 97.9 F | SYSTOLIC BLOOD PRESSURE: 156 MMHG

## 2024-01-27 DIAGNOSIS — K94.23 PEG TUBE MALFUNCTION (HCC): Primary | ICD-10-CM

## 2024-01-27 PROCEDURE — 74018 RADEX ABDOMEN 1 VIEW: CPT

## 2024-01-27 PROCEDURE — 99283 EMERGENCY DEPT VISIT LOW MDM: CPT

## 2024-01-27 PROCEDURE — 6360000004 HC RX CONTRAST MEDICATION: Performed by: STUDENT IN AN ORGANIZED HEALTH CARE EDUCATION/TRAINING PROGRAM

## 2024-01-27 RX ADMIN — DIATRIZOATE MEGLUMINE AND DIATRIZOATE SODIUM 20 ML: 660; 100 LIQUID ORAL; RECTAL at 12:25

## 2024-01-27 NOTE — TELEPHONE ENCOUNTER
Patient's son Pranay called Saturday morning. He said his mom's peg tube came out and he doesn't know how to put it back in. Discussed that he will have to go to the ED for this. He expressed understanding.

## 2024-01-27 NOTE — ED PROVIDER NOTES
[facility] EMERGENCY DEPT  EMERGENCY DEPARTMENT ENCOUNTER       Pt Name: Mamie Kenyon  MRN: [id number]  Birthdate 1940  Date of evaluation: 1/27/2024  Provider: Brandon Garcia MD   PCP: Shalini Clancy APRN - NP  Note Started: 1:34 PM EST 1/27/24     CHIEF COMPLAINT       Chief Complaint   Patient presents with    Feeding Tube Problem     Pt presents to ER from home via EMS after her PEG tube accidentally came out. Pt is non-verbal and bed bound at baseline - son at bedside, reports pt is at baseline. He is just looking to have the midline placed feeding tube replaced.         HISTORY OF PRESENT ILLNESS: 1 or more elements      History From: Patient's Son  HPI Limitations: Other (h/o stroke resulting in current presenting state patient, she is nonverbal)     Mamie Kenyon is a 83 y.o. female who presents because her PEG tube fell out at home.  Her son provides all of her history.  Fell out earlier this morning, brought in to have the tube replaced.  No discharge, no erythema.     Nursing Notes were all reviewed and agreed with or any disagreements were addressed in the HPI.     REVIEW OF SYSTEMS      Review of Systems     Positives and Pertinent negatives as per HPI.    PAST HISTORY     Past Medical History:  Past Medical History:   Diagnosis Date    Acute pharyngitis 2/8/2011    Allergic rhinitis, cause unspecified 2/8/2011    Arrhythmia     PVC    Asymptomatic varicose veins 2/8/2011    Cancer (HCC)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     Carpal tunnel syndrome 2/8/2011    Degeneration of cervical intervertebral disc 2/8/2011    Displacement of intervertebral disc, site unspecified, without myelopathy 2/8/2011    Diverticulosis of colon (without mention of hemorrhage) 2/8/2011    Dysphagia 2/8/2011    Ear pain 2/8/2011    Edema 2/8/2011    Esophageal reflux 2/8/2011    GERD (gastroesophageal reflux disease)     Osteoarthrosis, unspecified whether generalized or localized, unspecified site 2/8/2011    PUD (peptic ulcer  mouth daily     potassium chloride 20 MEQ/15ML (10%) oral solution  Take 15 mLs by mouth 2 times daily     Tussin CF Max Multi-Symptom 5- MG/5ML Liqd  Generic drug: Phenylephrine-DM-GG                DISCONTINUED MEDICATIONS:  Discharge Medication List as of 1/27/2024  1:35 PM          I am the Primary Clinician of Record.   Brandon Garcia MD (electronically signed)      (Please note that parts of this dictation were completed with voice recognition software. Quite often unanticipated grammatical, syntax, homophones, and other interpretive errors are inadvertently transcribed by the computer software. Please disregards these errors. Please excuse any errors that have escaped final proofreading.)         Brandon Garcia MD  01/27/24 [id number]

## 2024-01-27 NOTE — ED NOTES
MD reviewed the provider's instructions with the patient, answering all questions to her son's satisfaction.

## 2024-01-27 NOTE — ED NOTES
Pt presents to ER from home via EMS after her PEG tube accidentally came out. Pt is non-verbal and bed bound at baseline - son at bedside, reports pt is at baseline. He is just looking to have the midline placed feeding tube replaced.

## 2024-02-05 ENCOUNTER — TELEPHONE (OUTPATIENT)
Facility: CLINIC | Age: 84
End: 2024-02-05

## 2024-02-05 NOTE — TELEPHONE ENCOUNTER
Called patient to confirm their in home visit with Manuela Clancy on 2/9/2024, arrival between 9-1.  Spoke with Pranay, they confirmed the appointment and answered the covid screen questions. Does anyone in the household have covid no  Have there been any changes to insurance no or location no      Son has mentioned that there has been issues with getting the COVID vaccine for the patient.

## 2024-02-06 ENCOUNTER — CLINICAL DOCUMENTATION (OUTPATIENT)
Facility: CLINIC | Age: 84
End: 2024-02-06

## 2024-02-06 NOTE — PROGRESS NOTES
[facility] - Plan of Care  [address], [address]  [place] [address]    [facility] Team Members: Renae Ortez MD; Fariba Neal NP; Manuela Clancy NP; Carito Milan, RN; Jesus Devi, RN; Lizeth Solis, RN; Sarah Almanza LCSW    Mamie Kenyon  1940 / [id number]  female    Date of Initial Visit (Start of Care): 9/22/22    Diagnoses  Patient Active Problem List   Diagnosis    Essential hypertension    Edema    Carpal tunnel syndrome    Degeneration of cervical intervertebral disc    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HCC)    PAF (paroxysmal atrial fibrillation) (HCC)    Dysphagia    Pure hypercholesterolemia    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)    Gastroesophageal reflux disease without esophagitis    Presence of cardiac pacemaker    Syncope    SSS (sick sinus syndrome) (HCC)    CAD (coronary artery disease)    Abnormal nuclear stress test    CVA, old, hemiparesis (HCC)    Vascular dementia without behavioral disturbance (HCC)    Squamous cell carcinoma of scalp    Seizure disorder (HCC)    PEG (percutaneous endoscopic gastrostomy) status ([facility])    Swelling of breast    Severe obesity (BMI 35.0-39.9) with comorbidity (HCC)       Advance Care Planning:    Code Status: Prior        Primary Decision Maker (Active): Pranay Alvarez - Child - [phone]    Primary Decision Maker: Valarie Alvarez - Child - [phone]    Primary Decision Maker: Brandi Alvarez - Child - [phone]       2/6/2024     9:31 AM   Demographics   Marital Status        DME/Supplies:  Hospital Bed, [employer], and tube feeding, suction     Allergies   Allergen Reactions    Latex Rash    Sulfa Antibiotics Hives    Aspirin Other (See Comments)     Anything higher than 81mg makes pt jittery    Codeine Hives and Itching    Diltiazem Other (See Comments)    Iodinated Contrast Media Itching    Penicillins Hives and Itching    Adhesive Tape Rash    Clindamycin Rash       Nutritional Requirements:   Tube feeds with G / J

## 2024-02-09 ENCOUNTER — OFFICE VISIT (OUTPATIENT)
Facility: CLINIC | Age: 84
End: 2024-02-09

## 2024-02-09 VITALS
DIASTOLIC BLOOD PRESSURE: 66 MMHG | RESPIRATION RATE: 18 BRPM | SYSTOLIC BLOOD PRESSURE: 118 MMHG | OXYGEN SATURATION: 95 % | HEART RATE: 69 BPM | TEMPERATURE: 97.7 F

## 2024-02-09 DIAGNOSIS — Z93.1 PEG (PERCUTANEOUS ENDOSCOPIC GASTROSTOMY) STATUS (HCC): ICD-10-CM

## 2024-02-09 DIAGNOSIS — I44.2 COMPLETE AV BLOCK DUE TO AV NODAL ABLATION (HCC): ICD-10-CM

## 2024-02-09 DIAGNOSIS — I10 ESSENTIAL HYPERTENSION: Primary | ICD-10-CM

## 2024-02-09 DIAGNOSIS — Z23 NEED FOR VACCINATION: ICD-10-CM

## 2024-02-09 DIAGNOSIS — I69.359 CVA, OLD, HEMIPARESIS (HCC): ICD-10-CM

## 2024-02-09 DIAGNOSIS — Z95.0 PRESENCE OF CARDIAC PACEMAKER: ICD-10-CM

## 2024-02-09 DIAGNOSIS — I25.10 CORONARY ARTERY DISEASE INVOLVING NATIVE CORONARY ARTERY OF NATIVE HEART WITHOUT ANGINA PECTORIS: ICD-10-CM

## 2024-02-09 DIAGNOSIS — G40.909 SEIZURE DISORDER (HCC): ICD-10-CM

## 2024-02-09 DIAGNOSIS — F01.50 VASCULAR DEMENTIA WITHOUT BEHAVIORAL DISTURBANCE (HCC): ICD-10-CM

## 2024-02-09 DIAGNOSIS — I49.5 SSS (SICK SINUS SYNDROME) (HCC): ICD-10-CM

## 2024-02-09 DIAGNOSIS — R13.12 OROPHARYNGEAL DYSPHAGIA: ICD-10-CM

## 2024-02-09 DIAGNOSIS — N63.0 SWELLING OF BREAST: ICD-10-CM

## 2024-02-09 DIAGNOSIS — B37.2 CANDIDAL INTERTRIGO: ICD-10-CM

## 2024-02-09 DIAGNOSIS — I97.190 COMPLETE AV BLOCK DUE TO AV NODAL ABLATION (HCC): ICD-10-CM

## 2024-02-09 PROBLEM — I48.0 PAF (PAROXYSMAL ATRIAL FIBRILLATION) (HCC): Status: RESOLVED | Noted: 2019-05-06 | Resolved: 2024-02-09

## 2024-02-09 RX ORDER — CLOTRIMAZOLE 1 %
CREAM (GRAM) TOPICAL
Qty: 28 G | Refills: 1 | Status: SHIPPED | OUTPATIENT
Start: 2024-02-09 | End: 2024-02-16

## 2024-02-09 NOTE — ASSESSMENT & PLAN NOTE
-Swelling noted 4/11/23 by family  -Patient not complaining of pain, mild tenderness on exam today as evidenced by patient fidgeting with palpation of this area  -Firm, area about 4cm x 8cm of swelling with skin dimpling in 5:00 position without fluctuance or tenderness  -No history of breast cancer, but history of several other forms of cancer. -Did not respond to course of doxycycline  -diagnostic ultrasound 5/22/23 significant for 3 areas with differentials of scarring, fibroma, or neoplasm, recommended Mmg.    -Discussed with son and she is not able to tolerate Mmg at this time, goal of care is primarily comfort so would not pursue additional workup unless interferes with comfort.    -Continue Tylenol for pain relief as needed, encouraged elevation of this breast.

## 2024-02-09 NOTE — ASSESSMENT & PLAN NOTE
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

## 2024-02-09 NOTE — ASSESSMENT & PLAN NOTE
-of groin, no open areas but darkened/reddish discoloration in folds  -family changing briefs often, using Purewick for urinary incontinence, using cornstarch overnight, but area stays moist due to immobility and size  -has not responded to nystatin powder or barrier cream  -will send clotrimazole cream for two weeks as previous areas (antecubital) responded well to this

## 2024-02-09 NOTE — ASSESSMENT & PLAN NOTE
-PEG tube in place s/t stroke with subsequent dysphagia  -Transitioned to new ENFit model 4/21/23   -ED visit due to PEG tube dysfunction (fell out) 1/27/24  -Currently receiving total nutrition, hydration, and medications through PEG (Jevity 1.2cal for total of 8 hours overnight)  -HOB elevated overnight  -CMP obtained 6/2023 with albumin 2.8.    -PEG site clean, tube appears patent.

## 2024-02-09 NOTE — ASSESSMENT & PLAN NOTE
-Right MCA stroke in 2020 with an occluded right internal carotid artery from the carotid bifurcation to the [place] of [place] and further intraluminal thrombus at the right MCA bifurcation  -Residual dysphagia and L hemiparesis  -No contractures but very little active ROM, son providing passive ROM exercises to extremities  -She is functionally dependent for total care  -Continue skin protective measures   -Currently on atorvastatin, Eliquis   -Lipid panel 6/2023 at goal.  CBC 9/2023 with Hgb WNL.

## 2024-02-09 NOTE — PROGRESS NOTES
powder or barrier cream  -will send clotrimazole cream for two weeks as previous areas (antecubital) responded well to this  Orders:  -     clotrimazole (LOTRIMIN AF) 1 % cream; Apply topically 2 times daily., Disp-28 g, R-1, Normal  13. Need for vaccination   Son discussed vaccinations that she would be eligible for, particularly in setting of increased respiratory illness during this season.  Discussed [facility] has home-vaccine program for $100 fee for service.  He was very interested, phone number provided at visit.  He plans to inquire about Covid booster and RSV vaccine    -Labs: Last CBC, CMP, Lipid panel, Keppra 6/22/23, CBC repeated 9/2023   -AMD: No AMD previously completed.  She has a son and two daughters who are next of kin.  Son is primary caretaker and current healthcare  surrogate.  Son states that she would want hospitalization if indicated, but no aggressive measures.  (Son types this in phone and shows provider as not to disturb patient with discussion)  -Code status: DNR, DDNR in chart and in patient's room  -Follow up: in 8 weeks (4/3/24), sooner as needed.       Phone number provided for [facility] for Covid booster and RSV  Health Maintenance Due   Topic Date Due    DTaP/Tdap/Td vaccine (1 - Tdap) Never done    Shingles vaccine (1 of 2) Never done    Respiratory Syncytial Virus (RSV) Pregnant or age 60 yrs+ (1 - 1-dose 60+ series) Never done    COVID-19 Vaccine (6 - 2023-24 season) 09/01/2023    Annual Wellness Visit (Medicare Advantage)  01/01/2024       I have left a SUMMARY / INSTRUCTIONS from today's visit with the patient/family in the home         HISTORY:        Chief Complaint   Patient presents with    Follow-up    Hypertension    Dementia     HPI:  Ms. Mamie Kenyon is an 83 y.o. F with PMH stroke, vascular dementia, dysphagia with PEG, SSS with pacemaker, hypertension, CAD, GERD, and seizure disorder. Ms. Kenyon is lives in a home with her son, Pranay, and full-time

## 2024-02-09 NOTE — ASSESSMENT & PLAN NOTE
-son requests please do not discuss this diagnosis in front of patient  -Opens eyes at times but does not track, occasionally vocalizes in response to family's voice, does not follow commands.    -Per son, she is able to endorse pain.   -Reliant on caregivers for total care    -Continue skin protective measures, routine schedule, assessing for nonverbal signs of pain and treating as indicated.    -not on any memory supportive medications and unlikely to benefit at this stage, has not needed any medications for agitation or sleep

## 2024-02-09 NOTE — ASSESSMENT & PLAN NOTE
-Goal <140/80 due to stroke history but would weigh against age and primary goal of comfort   At goal with amlodipine, metoprolol  -Son has wrist cuff, but result today was significantly different from manual and complicated by patient inability to fully cooperate (flexed during BP measurement)  - CMP 6/2023, CBC 9/2023 stable

## 2024-02-09 NOTE — ASSESSMENT & PLAN NOTE
-noted May 2019  -s/p ablation  -now with pacemaker monitored by office of Dr. Muñoz with no recent issues

## 2024-02-09 NOTE — ASSESSMENT & PLAN NOTE
-s/t Stroke in 2020  -Currently managed on Keppra 500mg BID liquid via PEG, has been compliant with this medication  -No seizure activity since April of 2021  -Keppra level obtained 6/2023 and WNL

## 2024-02-09 NOTE — ASSESSMENT & PLAN NOTE
-Cardiac cath Feb 2016 with 50% occlusion mid LAD, 100% mid RCA with well-developed left to right collaterals  -Currently managed with atorvastatin, Eliquis  -Unable to endorse symptoms of CP, SOB but no increased work of breathing or signs of distress  -Lipid panel  6/2023 at goal, CBC 9/2023 stable.

## 2024-02-09 NOTE — ASSESSMENT & PLAN NOTE
-[employer] pacemaker in L chest placed 9/2017  -Continues with remote interrogations through office of Dr. Muñoz, cardiologist.

## 2024-02-15 ENCOUNTER — SOCIAL WORK (OUTPATIENT)
Facility: CLINIC | Age: 84
End: 2024-02-15

## 2024-02-15 NOTE — PROGRESS NOTES
Social Determinants of Health screening updated.     BROOK Joyce, LCSW  Licensed Clinical   [facility]  (O) [phone]  (C) [phone]

## 2024-03-18 RX ORDER — CETIRIZINE HYDROCHLORIDE 1 MG/ML
SOLUTION ORAL
Qty: 150 ML | Refills: 2 | Status: SHIPPED | OUTPATIENT
Start: 2024-03-18

## 2024-03-25 RX ORDER — FAMOTIDINE 40 MG/5ML
POWDER, FOR SUSPENSION ORAL
Qty: 300 ML | Refills: 5 | Status: SHIPPED | OUTPATIENT
Start: 2024-03-25

## 2024-03-29 ENCOUNTER — TELEPHONE (OUTPATIENT)
Facility: CLINIC | Age: 84
End: 2024-03-29

## 2024-03-29 NOTE — TELEPHONE ENCOUNTER
Called patient to confirm their in home visit with Manuela Clancy on 4/3/2024, arrival between 9-1.  Spoke with son, they confirmed the appointment and answered the covid screen questions. Does anyone in the household have covid no  Have there been any changes to insurance no or location no

## 2024-04-02 ENCOUNTER — CLINICAL DOCUMENTATION (OUTPATIENT)
Facility: CLINIC | Age: 84
End: 2024-04-02

## 2024-04-03 ENCOUNTER — OFFICE VISIT (OUTPATIENT)
Facility: CLINIC | Age: 84
End: 2024-04-03
Payer: MEDICARE

## 2024-04-03 VITALS
DIASTOLIC BLOOD PRESSURE: 80 MMHG | SYSTOLIC BLOOD PRESSURE: 124 MMHG | RESPIRATION RATE: 16 BRPM | HEART RATE: 70 BPM | TEMPERATURE: 97.6 F | OXYGEN SATURATION: 94 %

## 2024-04-03 DIAGNOSIS — I69.359 CVA, OLD, HEMIPARESIS (HCC): ICD-10-CM

## 2024-04-03 DIAGNOSIS — M50.30 DEGENERATION OF CERVICAL INTERVERTEBRAL DISC: ICD-10-CM

## 2024-04-03 DIAGNOSIS — I44.2 COMPLETE AV BLOCK DUE TO AV NODAL ABLATION (HCC): ICD-10-CM

## 2024-04-03 DIAGNOSIS — N63.0 SWELLING OF BREAST: ICD-10-CM

## 2024-04-03 DIAGNOSIS — I25.10 CORONARY ARTERY DISEASE INVOLVING NATIVE CORONARY ARTERY OF NATIVE HEART WITHOUT ANGINA PECTORIS: ICD-10-CM

## 2024-04-03 DIAGNOSIS — K21.9 GASTROESOPHAGEAL REFLUX DISEASE WITHOUT ESOPHAGITIS: ICD-10-CM

## 2024-04-03 DIAGNOSIS — F01.50 VASCULAR DEMENTIA WITHOUT BEHAVIORAL DISTURBANCE (HCC): ICD-10-CM

## 2024-04-03 DIAGNOSIS — G40.909 SEIZURE DISORDER (HCC): ICD-10-CM

## 2024-04-03 DIAGNOSIS — I49.5 SSS (SICK SINUS SYNDROME) (HCC): ICD-10-CM

## 2024-04-03 DIAGNOSIS — R13.12 OROPHARYNGEAL DYSPHAGIA: ICD-10-CM

## 2024-04-03 DIAGNOSIS — Z00.00 MEDICARE ANNUAL WELLNESS VISIT, SUBSEQUENT: Primary | ICD-10-CM

## 2024-04-03 DIAGNOSIS — Z93.1 PEG (PERCUTANEOUS ENDOSCOPIC GASTROSTOMY) STATUS (HCC): ICD-10-CM

## 2024-04-03 DIAGNOSIS — I10 ESSENTIAL HYPERTENSION: ICD-10-CM

## 2024-04-03 DIAGNOSIS — I97.190 COMPLETE AV BLOCK DUE TO AV NODAL ABLATION (HCC): ICD-10-CM

## 2024-04-03 LAB
ALBUMIN SERPL-MCNC: 3 G/DL (ref 3.5–5)
ALBUMIN/GLOB SERPL: 0.8 (ref 1.1–2.2)
ALP SERPL-CCNC: 145 U/L (ref 45–117)
ALT SERPL-CCNC: 25 U/L (ref 12–78)
ANION GAP SERPL CALC-SCNC: 6 MMOL/L (ref 5–15)
AST SERPL-CCNC: 25 U/L (ref 15–37)
BILIRUB SERPL-MCNC: 0.5 MG/DL (ref 0.2–1)
BUN SERPL-MCNC: 10 MG/DL (ref 6–20)
BUN/CREAT SERPL: 22 (ref 12–20)
CALCIUM SERPL-MCNC: 8.8 MG/DL (ref 8.5–10.1)
CHLORIDE SERPL-SCNC: 102 MMOL/L (ref 97–108)
CHOLEST SERPL-MCNC: 109 MG/DL
CO2 SERPL-SCNC: 27 MMOL/L (ref 21–32)
CREAT SERPL-MCNC: 0.45 MG/DL (ref 0.55–1.02)
ERYTHROCYTE [DISTWIDTH] IN BLOOD BY AUTOMATED COUNT: 19.5 % (ref 11.5–14.5)
GLOBULIN SER CALC-MCNC: 3.8 G/DL (ref 2–4)
GLUCOSE SERPL-MCNC: 95 MG/DL (ref 65–100)
HCT VFR BLD AUTO: 48.1 % (ref 35–47)
HDLC SERPL-MCNC: 48 MG/DL
HDLC SERPL: 2.3 (ref 0–5)
HGB BLD-MCNC: 15.4 G/DL (ref 11.5–16)
LDLC SERPL CALC-MCNC: 38 MG/DL (ref 0–100)
MCH RBC QN AUTO: 26.1 PG (ref 26–34)
MCHC RBC AUTO-ENTMCNC: 32 G/DL (ref 30–36.5)
MCV RBC AUTO: 81.5 FL (ref 80–99)
NRBC # BLD: 0 K/UL (ref 0–0.01)
NRBC BLD-RTO: 0 PER 100 WBC
PLATELET # BLD AUTO: 237 K/UL (ref 150–400)
PMV BLD AUTO: 10.2 FL (ref 8.9–12.9)
POTASSIUM SERPL-SCNC: 4.7 MMOL/L (ref 3.5–5.1)
PROT SERPL-MCNC: 6.8 G/DL (ref 6.4–8.2)
RBC # BLD AUTO: 5.9 M/UL (ref 3.8–5.2)
SODIUM SERPL-SCNC: 135 MMOL/L (ref 136–145)
TRIGL SERPL-MCNC: 115 MG/DL
VLDLC SERPL CALC-MCNC: 23 MG/DL
WBC # BLD AUTO: 5.7 K/UL (ref 3.6–11)

## 2024-04-03 PROCEDURE — G0439 PPPS, SUBSEQ VISIT: HCPCS | Performed by: NURSE PRACTITIONER

## 2024-04-03 PROCEDURE — 36415 COLL VENOUS BLD VENIPUNCTURE: CPT | Performed by: NURSE PRACTITIONER

## 2024-04-03 PROCEDURE — 3074F SYST BP LT 130 MM HG: CPT | Performed by: NURSE PRACTITIONER

## 2024-04-03 PROCEDURE — 1123F ACP DISCUSS/DSCN MKR DOCD: CPT | Performed by: NURSE PRACTITIONER

## 2024-04-03 PROCEDURE — G8417 CALC BMI ABV UP PARAM F/U: HCPCS | Performed by: NURSE PRACTITIONER

## 2024-04-03 PROCEDURE — 99349 HOME/RES VST EST MOD MDM 40: CPT | Performed by: NURSE PRACTITIONER

## 2024-04-03 PROCEDURE — 1090F PRES/ABSN URINE INCON ASSESS: CPT | Performed by: NURSE PRACTITIONER

## 2024-04-03 PROCEDURE — 3079F DIAST BP 80-89 MM HG: CPT | Performed by: NURSE PRACTITIONER

## 2024-04-03 PROCEDURE — 1036F TOBACCO NON-USER: CPT | Performed by: NURSE PRACTITIONER

## 2024-04-03 RX ORDER — ACETAMINOPHEN 160 MG/5ML
30 LIQUID ORAL EVERY 6 HOURS PRN
COMMUNITY
Start: 2024-03-29

## 2024-04-03 ASSESSMENT — PATIENT HEALTH QUESTIONNAIRE - PHQ9: DEPRESSION UNABLE TO ASSESS: FUNCTIONAL CAPACITY MOTIVATION LIMITS ACCURACY

## 2024-04-03 NOTE — ASSESSMENT & PLAN NOTE
-son requests please do not discuss this diagnosis in front of patient  -Opens eyes at times but does not track, occasionally vocalizes in response to family's voice, does not follow commands.    -Per son, she is able to endorse pain  -Continue skin protective measures, routine schedule, assessing for nonverbal signs of pain and treating as indicated.    -not on any memory supportive medications and unlikely to benefit at this stage, has not needed any medications for agitation or sleep   -continue supportive measures, primarily comfort-focused care

## 2024-04-03 NOTE — ASSESSMENT & PLAN NOTE
-At goal during visit today, would weigh strict management against age, goals of care  -very difficult to obtain BP due to contracture of arms and muscle twitching  -CBC, CMP obtained today  -continue amlodipine, metoprolol

## 2024-04-03 NOTE — ASSESSMENT & PLAN NOTE
-Right MCA stroke in 2020 with an occluded right internal carotid artery from the carotid bifurcation to the [place] and further intraluminal thrombus at the right MCA bifurcation  -Residual dysphagia and L hemiparesis  -some contractures of L arm, very little active ROM, son providing passive ROM exercises to extremities  -She is functionally dependent for total care  -Continue skin protective measures   -Currently on atorvastatin, Eliquis   -Lipid panel and CBC repeated today

## 2024-04-03 NOTE — ASSESSMENT & PLAN NOTE
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

## 2024-04-03 NOTE — ASSESSMENT & PLAN NOTE
-Currently with pacemaker monitored by Dr. Muñoz   -last cardiology remote check 9/2023, significant for 9 second of VT, ventricular pacing is 99% with AF usually  -Dr. Muñoz does not recommend stress test or echo or upgrade to ICD  -Patient unable to endorse symptoms of palpitations or SOB  -continue metoprolol and Eliquis (2.5mg dose in setting of recent BRBPR x 2)

## 2024-04-03 NOTE — ASSESSMENT & PLAN NOTE
-Pain managed with PRN Tylenol liquid via tube  -patient able to endorse pain per family  -encourage monitoring for nonverbal cues such as brow furrowing, grimace, moaning, restlessness

## 2024-04-03 NOTE — ASSESSMENT & PLAN NOTE
-PEG tube in place s/t stroke with subsequent dysphagia  -Transitioned to new ENFit model 4/21/23   -ED visit due to PEG tube dysfunction (fell out) 1/27/24  -Currently receiving total nutrition, hydration, and medications through PEG (Jevity 1.2cal for total of 8 hours overnight)  -HOB elevated overnight  -no recent aspiration events or weight loss/gain, continue current plan

## 2024-04-03 NOTE — ASSESSMENT & PLAN NOTE
-Currently managed on Keppra 500mg BID liquid via PEG  -No seizure activity since April of 2021  -Keppra level obtained 6/2023 and WNL  -kidney function repeated today

## 2024-04-03 NOTE — PROGRESS NOTES
Comments)    Iodinated Contrast Media Itching    Penicillins Hives and Itching    Adhesive Tape Rash    Clindamycin Rash     Prior to Visit Medications    Medication Sig Taking? Authorizing Provider   Acetaminophen Childrens 160 MG/5ML SOLN Take 30 mLs by mouth every 6 hours as needed (pain, fever) Do not exceed 3000mg daily Yes Kalie Calhoun MD   famotidine (PEPCID) 40 MG/5ML suspension SHAKE LIQUID AND TAKE 5 ML VIA G-TUBE TWICE DAILY  Shalini Clancy APRN - NP   cetirizine (ZYRTEC) 1 MG/ML SOLN syrup TAKE 5 ML VIA GTUBE DAILY AS NEEDED FOR ALLERGY SYMPTOMS  Shalini Clancy APRN - NP   levETIRAcetam (KEPPRA) 100 MG/ML oral solution 5 mLs by PEG Tube route 2 times daily  Shalini Clancy APRN - NP   potassium chloride 20 MEQ/15ML (10%) oral solution Take 15 mLs by mouth 2 times daily  Shalini Clancy APRN - NP   amLODIPine (NORVASC) 5 MG tablet Take 1 tablet by mouth daily  Shalini Clancy APRN - NP   atorvastatin (LIPITOR) 40 MG tablet TAKE 1 TABLET BY MOUTH EVERY NIGHT  Shalini Clancy APRN - NP   apixaban (ELIQUIS) 2.5 MG TABS tablet Take 1 tablet by mouth 2 times daily  Shalini Clancy APRN - NP   metoprolol succinate (TOPROL XL) 25 MG extended release tablet Take 1 tablet by mouth daily  Clancy, Shalini, APRN - NP   Phenylephrine-DM-GG (TUSSIN CF MAX MULTI-SYMPTOM) 5- MG/5ML LIQD 10 mLs by PEG Tube route daily as needed  Kalie Calhoun MD   Nutritional Supplements (JEVITY 1.2 JOSIAH PO) 75 mL/1.7m2/hr by PEG Tube route daily 75 ml/hr for 8 hours per day, free water flushes 4 x daily  Kalie Calhoun MD CareTeam (Including outside providers/suppliers regularly involved in providing care):   Patient Care Team:  Shalini Clancy APRN - NP as PCP - General  Shalini Clancy APRN - NP as PCP - Empaneled Provider  Dr. Muñoz- cardiology for monitoring of ILR     Reviewed and updated this visit:  Tobacco  Allergies  Meds  Problems  Med Hx  Surg Hx  Soc Hx  
06/22/2023 12:00 PM    MG 2.0 02/10/2023 02:00 AM    PHOS 3.7 02/10/2023 02:00 AM    LABALBU 2.8 (L) 06/22/2023 12:00 PM     Lab Results   Component Value Date/Time    LABA1C 5.7 (H) 07/04/2020 05:43 AM    TSH 0.65 07/04/2020 05:43 AM    INR 1.1 07/03/2020 03:43 PM    PROTIME 11.5 (H) 07/03/2020 03:43 PM    CHOL 102 06/22/2023 12:00 PM    TRIG 111 06/22/2023 12:00 PM    LDLCALC 25.8 06/22/2023 12:00 PM    HDL 54 06/22/2023 12:00 PM                  PINEDA AGUSTIN - NP

## 2024-04-03 NOTE — ASSESSMENT & PLAN NOTE
-Swelling noted 4/11/23 by family  -No history of breast cancer, but history of several other forms of cancer  -diagnostic ultrasound 5/22/23 significant for 3 areas with differentials of scarring, fibroma, or neoplasm, recommended Mmg.    -Discussed with son and she is not able to tolerate Mmg at this time, goal of care is primarily comfort so would not pursue additional workup unless interferes with comfort.    -Continue Tylenol for pain relief as needed, encouraged elevation of this breast

## 2024-04-03 NOTE — ASSESSMENT & PLAN NOTE
-noted May 2019  -s/p ablation  -now with pacemaker/ILR monitored by office of Dr. Muñoz with no recent issues

## 2024-04-03 NOTE — ASSESSMENT & PLAN NOTE
-unable to endorse symptoms but son reports intermittent thick secretions and this appears to help  -continue Pepcid via PEG

## 2024-04-03 NOTE — ASSESSMENT & PLAN NOTE
-Cardiac cath Feb 2016 with 50% occlusion mid LAD, 100% mid RCA with well-developed left to right collaterals  -Echo 2020 with normal EF  -Currently managed with atorvastatin, Eliquis (reduced dose due to GIB)  -Unable to endorse symptoms of CP, SOB but no increased work of breathing or signs of distress

## 2024-04-08 RX ORDER — ATORVASTATIN CALCIUM 40 MG/1
40 TABLET, FILM COATED ORAL
Qty: 90 TABLET | Refills: 1 | Status: SHIPPED | OUTPATIENT
Start: 2024-04-08

## 2024-04-08 RX ORDER — ACETAMINOPHEN 160 MG/5ML
30 LIQUID ORAL EVERY 6 HOURS
Qty: 3600 ML | Refills: 5 | Status: SHIPPED | OUTPATIENT
Start: 2024-04-08

## 2024-04-15 ENCOUNTER — TELEPHONE (OUTPATIENT)
Facility: CLINIC | Age: 84
End: 2024-04-15

## 2024-04-15 NOTE — TELEPHONE ENCOUNTER
Left a VM for Pranay Alvarez offering an appointment for the patient's next in home visit and to please call [facility] about the appointment.

## 2024-04-15 NOTE — TELEPHONE ENCOUNTER
Pranya Kenyon returned my call and accepted the appointment offer: Monday, 7/1/2024, arrival between 9am-1pm.

## 2024-04-22 RX ORDER — APIXABAN 2.5 MG/1
2.5 TABLET, FILM COATED ORAL 2 TIMES DAILY
Qty: 180 TABLET | Refills: 1 | Status: SHIPPED | OUTPATIENT
Start: 2024-04-22

## 2024-04-22 RX ORDER — LEVETIRACETAM 100 MG/ML
SOLUTION ORAL
Qty: 120 ML | Refills: 0 | Status: SHIPPED | OUTPATIENT
Start: 2024-04-22

## 2024-04-30 ENCOUNTER — TELEPHONE (OUTPATIENT)
Facility: CLINIC | Age: 84
End: 2024-04-30

## 2024-04-30 RX ORDER — AMLODIPINE BESYLATE 5 MG/1
5 TABLET ORAL DAILY
Qty: 90 TABLET | Refills: 1 | Status: SHIPPED | OUTPATIENT
Start: 2024-04-30 | End: 2024-04-30 | Stop reason: SDUPTHER

## 2024-04-30 RX ORDER — AMLODIPINE BESYLATE 5 MG/1
5 TABLET ORAL DAILY
Qty: 90 TABLET | Refills: 1 | Status: SHIPPED | OUTPATIENT
Start: 2024-04-30

## 2024-04-30 NOTE — TELEPHONE ENCOUNTER
Telephone call to son, he reports that pharmacy says that Amlodipine refill is being rejected.  With Pranay on the phone this nurse sent refill of Amlodipine #90 with one refill.  He says pt is doing well.

## 2024-04-30 NOTE — TELEPHONE ENCOUNTER
The patient's son Pranay states pharmacy advised bp medication is not approved. No reason provided.  # [phone]

## 2024-04-30 NOTE — TELEPHONE ENCOUNTER
Left a message for return call. Pt verified by name and .  Pt calling states she is unable to sleep at night,   Pt states is she does fall asleep she wake up in the middle of the night and can't go back to sleep.  Pt states she is extremely tired.  Pt states her therapist started her on SSRI about 4 weeks ago.  Pt questions if she can use testosterone cream.  States had it but never used it.  Pt's las appt was 2023.  Pt is aware nurse will send message to Mitesh Guerrero.

## 2024-05-14 RX ORDER — POTASSIUM CHLORIDE 20MEQ/15ML
LIQUID (ML) ORAL
Qty: 2700 ML | Refills: 3 | Status: SHIPPED | OUTPATIENT
Start: 2024-05-14

## 2024-05-28 ENCOUNTER — TELEPHONE (OUTPATIENT)
Facility: CLINIC | Age: 84
End: 2024-05-28

## 2024-05-28 NOTE — TELEPHONE ENCOUNTER
Pranay Alvarez called to update that there is still no response on the patient's medicaid approval.  He also states that Shalini Clancy is no in net work with Erica.  I let Pranay know that the patient's new PCP is Luisa Lomeli NP and to notify the patient's insurance company.  Pranay acknowledged and said that he would update on progress with medicaid.  Pranay's callback if needed is [phone]

## 2024-06-11 RX ORDER — CETIRIZINE HYDROCHLORIDE 1 MG/ML
SOLUTION ORAL
Qty: 150 ML | Refills: 2 | Status: SHIPPED | OUTPATIENT
Start: 2024-06-11

## 2024-06-11 RX ORDER — CETIRIZINE HYDROCHLORIDE 1 MG/ML
SOLUTION ORAL
Qty: 150 ML | Refills: 2 | Status: SHIPPED | OUTPATIENT
Start: 2024-06-11 | End: 2024-06-11 | Stop reason: SDUPTHER

## 2024-06-11 NOTE — TELEPHONE ENCOUNTER
Mediation Refill - Pranay Alvarez called to request refill on the patient's cetirizine, 1 mg/ml - completely out.  The patient uses [place] [facility] and Harjit Pires's callback if needed is [phone]

## 2024-06-20 ENCOUNTER — HOSPITAL ENCOUNTER (EMERGENCY)
Facility: HOSPITAL | Age: 84
Discharge: HOME | End: 2024-06-21
Attending: EMERGENCY MEDICINE
Payer: MEDICARE

## 2024-06-20 ENCOUNTER — CLINICAL DOCUMENTATION (OUTPATIENT)
Age: 84
End: 2024-06-20

## 2024-06-20 DIAGNOSIS — K94.23 PEG TUBE MALFUNCTION (HCC): Primary | ICD-10-CM

## 2024-06-20 PROCEDURE — 99283 EMERGENCY DEPT VISIT LOW MDM: CPT

## 2024-06-21 ENCOUNTER — APPOINTMENT (OUTPATIENT)
Facility: HOSPITAL | Age: 84
End: 2024-06-21
Payer: MEDICARE

## 2024-06-21 VITALS
SYSTOLIC BLOOD PRESSURE: 150 MMHG | OXYGEN SATURATION: 94 % | HEART RATE: 70 BPM | HEIGHT: 62 IN | TEMPERATURE: 98 F | BODY MASS INDEX: 40.12 KG/M2 | DIASTOLIC BLOOD PRESSURE: 80 MMHG | WEIGHT: 218.03 LBS | RESPIRATION RATE: 16 BRPM

## 2024-06-21 PROCEDURE — 6360000004 HC RX CONTRAST MEDICATION: Performed by: EMERGENCY MEDICINE

## 2024-06-21 PROCEDURE — 43762 RPLC GTUBE NO REVJ TRC: CPT

## 2024-06-21 PROCEDURE — 74018 RADEX ABDOMEN 1 VIEW: CPT

## 2024-06-21 RX ADMIN — DIATRIZOATE MEGLUMINE AND DIATRIZOATE SODIUM 15 ML: 660; 100 LIQUID ORAL; RECTAL at 02:28

## 2024-06-21 ASSESSMENT — LIFESTYLE VARIABLES
HOW OFTEN DO YOU HAVE A DRINK CONTAINING ALCOHOL: PATIENT UNABLE TO ANSWER
HOW MANY STANDARD DRINKS CONTAINING ALCOHOL DO YOU HAVE ON A TYPICAL DAY: PATIENT UNABLE TO ANSWER

## 2024-06-21 ASSESSMENT — ENCOUNTER SYMPTOMS
DIARRHEA: 0
NAUSEA: 0
ABDOMINAL PAIN: 0
VOMITING: 0
SHORTNESS OF BREATH: 0

## 2024-06-21 ASSESSMENT — PAIN - FUNCTIONAL ASSESSMENT: PAIN_FUNCTIONAL_ASSESSMENT: FACE, LEGS, ACTIVITY, CRY, AND CONSOLABILITY (FLACC)

## 2024-06-21 NOTE — PROGRESS NOTES
On call documentation .    Patient son Pranay called for peg tube leaking possibly dislodged, advised to go to ER.

## 2024-06-21 NOTE — ED NOTES
Patient discharged from the ED by MD Roseanna. Diagnosis, medications, precautions and follow-ups were reviewed with the patient/family. Questions were asked and answered prior to departure. Patient departed the ED via stretcher and was accompanied by [facility] and family back to home.

## 2024-06-21 NOTE — ED NOTES
This nurse aided the MD in replacing the patient's PEG tube. KUB Xray was ordered for confirmation of placement.

## 2024-06-21 NOTE — ED PROVIDER NOTES
2:28 AM        CONTINUE these medications which have NOT CHANGED    Details   cetirizine (ZYRTEC) 1 MG/ML SOLN syrup TAKE 5 ML VIA GTUBE DAILY AS NEEDED FOR ALLERGY SYMPTOMS, Disp-150 mL, R-2Normal      potassium chloride 20 MEQ/15ML (10%) oral solution MIX 15 MLS IN 4 OUNCES OF LIQUID AND DRINK TWICE DAILY, Disp-2700 mL, R-3Normal      amLODIPine (NORVASC) 5 MG tablet Take 1 tablet by mouth daily, Disp-90 tablet, R-1Normal      ELIQUIS 2.5 MG TABS tablet TAKE 1 TABLET BY MOUTH TWICE DAILY, Disp-180 tablet, R-1Normal      levETIRAcetam (KEPPRA) 100 MG/ML oral solution TAKE 1 ML BY MOUTH TWICE DAILY, Disp-120 mL, R-0Normal      atorvastatin (LIPITOR) 40 MG tablet TAKE 1 TABLET BY MOUTH EVERY NIGHT, Disp-90 tablet, R-1Normal      Acetaminophen Childrens 160 MG/5ML SOLN Take 30 mLs by mouth every 6 hours Dispense 3600 ml per month with 5 refills, Disp-3600 mL, R-5Normal      famotidine (PEPCID) 40 MG/5ML suspension SHAKE LIQUID AND TAKE 5 ML VIA G-TUBE TWICE DAILY, Disp-300 mL, R-5Normal      metoprolol succinate (TOPROL XL) 25 MG extended release tablet Take 1 tablet by mouth daily, Disp-90 tablet, R-1Normal      Phenylephrine-DM-GG (TUSSIN CF MAX MULTI-SYMPTOM) 5- MG/5ML LIQD 10 mLs by PEG Tube route daily as neededHistorical Med      Nutritional Supplements (JEVITY 1.2 JOSIAH PO) 75 mL/1.7m2/hr by PEG Tube route daily 75 ml/hr for 8 hours per day, free water flushes 4 x dailyHistorical Med               PAST HISTORY       Past Medical History:  Past Medical History:   Diagnosis Date    Acute pharyngitis 2/8/2011    Allergic rhinitis, cause unspecified 2/8/2011    Arrhythmia     PVC    Asymptomatic varicose veins 2/8/2011    Cancer (HCC)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     Carpal tunnel syndrome 2/8/2011    Degeneration of cervical intervertebral disc 2/8/2011    Displacement of intervertebral disc, site unspecified, without myelopathy 2/8/2011    Diverticulosis of colon (without mention of hemorrhage) 2/8/2011

## 2024-06-21 NOTE — DISCHARGE INSTRUCTIONS
It was a pleasure taking care of you in our Emergency Department today.  We know that when you come to [facility], you are entrusting us with your health, comfort, and safety.  Our physicians and nurses honor that trust, and truly appreciate the opportunity to care for you and your loved ones.      We also value your feedback.  If you receive a survey about your Emergency Department experience today, please fill it out.  We care about our patients' feedback, and we listen to what you have to say.  Thank you!      Dr. Rossy Condon MD.

## 2024-06-25 ENCOUNTER — TELEPHONE (OUTPATIENT)
Facility: CLINIC | Age: 84
End: 2024-06-25

## 2024-06-25 NOTE — TELEPHONE ENCOUNTER
Called patient to confirm their in home visit with Luisa Lomeli on 07/01/2024, arrival between 9-1.  Spoke with rPanay Alvarez, they confirmed the appointment and answered the covid screen questions. Does anyone in the household have covid No  Have there been any changes to insurance No or location No

## 2024-07-01 ENCOUNTER — OFFICE VISIT (OUTPATIENT)
Facility: CLINIC | Age: 84
End: 2024-07-01
Payer: MEDICARE

## 2024-07-01 VITALS
SYSTOLIC BLOOD PRESSURE: 166 MMHG | RESPIRATION RATE: 21 BRPM | TEMPERATURE: 97.3 F | OXYGEN SATURATION: 98 % | DIASTOLIC BLOOD PRESSURE: 99 MMHG | HEART RATE: 70 BPM

## 2024-07-01 DIAGNOSIS — I10 ESSENTIAL HYPERTENSION: Primary | ICD-10-CM

## 2024-07-01 DIAGNOSIS — R13.12 OROPHARYNGEAL DYSPHAGIA: ICD-10-CM

## 2024-07-01 DIAGNOSIS — I44.2 COMPLETE AV BLOCK DUE TO AV NODAL ABLATION (HCC): ICD-10-CM

## 2024-07-01 DIAGNOSIS — Z93.1 PEG (PERCUTANEOUS ENDOSCOPIC GASTROSTOMY) STATUS (HCC): ICD-10-CM

## 2024-07-01 DIAGNOSIS — I97.190 COMPLETE AV BLOCK DUE TO AV NODAL ABLATION (HCC): ICD-10-CM

## 2024-07-01 DIAGNOSIS — F01.50 VASCULAR DEMENTIA WITHOUT BEHAVIORAL DISTURBANCE (HCC): ICD-10-CM

## 2024-07-01 DIAGNOSIS — G40.909 SEIZURE DISORDER (HCC): ICD-10-CM

## 2024-07-01 DIAGNOSIS — I69.359 CVA, OLD, HEMIPARESIS (HCC): ICD-10-CM

## 2024-07-01 DIAGNOSIS — I49.5 SSS (SICK SINUS SYNDROME) (HCC): ICD-10-CM

## 2024-07-01 PROCEDURE — G8417 CALC BMI ABV UP PARAM F/U: HCPCS | Performed by: NURSE PRACTITIONER

## 2024-07-01 PROCEDURE — 3077F SYST BP >= 140 MM HG: CPT | Performed by: NURSE PRACTITIONER

## 2024-07-01 PROCEDURE — 1090F PRES/ABSN URINE INCON ASSESS: CPT | Performed by: NURSE PRACTITIONER

## 2024-07-01 PROCEDURE — 1036F TOBACCO NON-USER: CPT | Performed by: NURSE PRACTITIONER

## 2024-07-01 PROCEDURE — 1123F ACP DISCUSS/DSCN MKR DOCD: CPT | Performed by: NURSE PRACTITIONER

## 2024-07-01 PROCEDURE — 3080F DIAST BP >= 90 MM HG: CPT | Performed by: NURSE PRACTITIONER

## 2024-07-01 PROCEDURE — 99348 HOME/RES VST EST LOW MDM 30: CPT | Performed by: NURSE PRACTITIONER

## 2024-07-01 RX ORDER — LEVETIRACETAM 100 MG/ML
SOLUTION ORAL
Qty: 120 ML | Refills: 0 | Status: SHIPPED | OUTPATIENT
Start: 2024-07-01 | End: 2024-07-02

## 2024-07-01 ASSESSMENT — ENCOUNTER SYMPTOMS
TROUBLE SWALLOWING: 1
BLOOD IN STOOL: 0
COUGH: 0
DIARRHEA: 0
SHORTNESS OF BREATH: 0
WHEEZING: 0
EYE REDNESS: 0
VOMITING: 0
EYE DISCHARGE: 0
FACIAL SWELLING: 0

## 2024-07-01 NOTE — ASSESSMENT & PLAN NOTE
-Right MCA stroke~2020 with residual dysphagia and L hemiparesis, and mostly nonverbal.   -Remains bed bound and is total assist, son providing passive ROM exercises to extremities.  -On exam able to provide communicate to simple \"yes/no\" questions, no new neuro deficits noted.   -Continue skin protective measures.  -Currently on atorvastatin, Eliquis.  -Continue to monitor.

## 2024-07-01 NOTE — PROGRESS NOTES
[facility]    [phone]      NOTE: [facility] is a PROVIDER (MD/NP) based interdisciplinary practice (RN, LCSW) for patients who cannot access (or have a taxing effort) primary / speciality medical care in an office setting. [facility] is NOT Home Health Care but works with Home Health Care Agencies.when there is a skilled need. Our MD/NPs are integral in Care Transitions; PLEASE CALL  if this patient arrives in the Emergency Department or Hospital.        Date of Current Visit:24  Patient/Family present for Current Visit: Patient, son - Pranay  Goals of Care as stated by the patient/family is: comfort, wound want hospitalization for acute and potentially reversible causes like infection, bleeding event      Preferences for hospitalization if that were to be necessary:  [x] Patient DOES NOT WANT hospitalization; focus all treatments at home  [] Patient WOULD WANT hospitalization for potentially reversible causes  Patient [facility] hospitalization at: [facility]    Mamie Kenyon (: 1940) is a 83 y.o. female, patient, here for evaluation of the following chief complaint(s):  Follow-up       ASSESSMENT/PLAN:  Below is the assessment and plan developed based on review of pertinent history, physical exam, labs, studies, and medications.    1. Essential hypertension  Assessment & Plan:  -BP elevated today, per son home readings with SBP ranging 120 and below, she had just received BP medications this AM.  -Patient w/o acute s/s, she was able to deny any chest pain, palpitations, dizziness, or SOB.  -Per son has been stable w/o acute change at him since last visit.   -Would weigh strict management against age, goals of care  -Discussed increase in BP medications if SBP consistently >160 or symptomatic, and follow up for any acute changes.  -Continue Amlodipine 5 mg Qd and Metoprolol 25 mg QD.  -Continue to monitor.   2. Seizure

## 2024-07-01 NOTE — ASSESSMENT & PLAN NOTE
-2/2 CVA~2020.  -On Keppra 500 mg BID liquid via PEG.  -No seizure activity since April of 2021.  -Keppra level obtained 6/2023 and WNL.  -Continue current management.   -Monitor for toxicity.  -Follow up for any acute changes.

## 2024-07-01 NOTE — ASSESSMENT & PLAN NOTE
-Son requests please do not discuss this diagnosis in front of patient.  -Opens eyes at times but does not track, occasionally vocalizes in response to family's voice, does not follow commands.    -Per son, she is able to endorse pain, and no new mood/behavioral issues.   -Continue skin protective measures, routine schedule, assessing for nonverbal signs of pain and treating as indicated.    -Not on any memory supportive medications and unlikely to benefit at this stage, has not needed any medications for agitation or sleep.  -Continue supportive care.

## 2024-07-01 NOTE — ASSESSMENT & PLAN NOTE
-BP elevated today, per son home readings with SBP ranging 120 and below, she had just received BP medications this AM.  -Patient w/o acute s/s, she was able to deny any chest pain, palpitations, dizziness, or SOB.  -Per son has been stable w/o acute change at him since last visit.   -Would weigh strict management against age, goals of care  -Discussed increase in BP medications if SBP consistently >160 or symptomatic, and follow up for any acute changes.  -Continue Amlodipine 5 mg Qd and Metoprolol 25 mg QD.  -Continue to monitor.

## 2024-07-01 NOTE — ASSESSMENT & PLAN NOTE
-2/2 CVA~2020.  -Swallow study 7/2022 notes severe oropharyngeal dysphagia.  -Also history of Stage 4 squamous cell cancer at right base of tongue s/p radiation.  -PEG tube in place and receiving all nutrition and medications.  -6/20/24 for leaking PEG tube, underwent replacement in ED w/o complication and confirmation of placement with X-ray.  -Occasionally has water PO for comfort/dryness, no worsening cough per family    -Continue PRN suction for home use in case of aspiration events.

## 2024-07-01 NOTE — ASSESSMENT & PLAN NOTE
-S/p ablation and pacemaker in place.   -Currently with pacemaker monitored by Dr. Muñoz   -Last cardiology remote check 9/2023, no recommend stress test or echo or upgrade to ICD.  -Patient generally nonverbal, but denies any chest pain or discomfort today.  -Continue metoprolol and Eliquis.  -Continue to monitor.

## 2024-07-02 ENCOUNTER — TELEPHONE (OUTPATIENT)
Facility: CLINIC | Age: 84
End: 2024-07-02

## 2024-07-02 DIAGNOSIS — G40.909 SEIZURE DISORDER (HCC): Primary | ICD-10-CM

## 2024-07-02 RX ORDER — LEVETIRACETAM 100 MG/ML
1 SOLUTION ORAL 2 TIMES DAILY
Qty: 240 ML | Refills: 3 | Status: SHIPPED | OUTPATIENT
Start: 2024-07-02

## 2024-07-02 NOTE — TELEPHONE ENCOUNTER
VM - 9:34 am - Pranay Alvarez is the patient son.  He called to request refill on the patient's keppra script.  He said that the pharmacy will not fill it. They said that the insurance company \"kicked it back\" stating that it was too early to refill.      I returned Pranay's call and he reports that the insurance company said to have the provider issue a new script in order to fill it now, and the pharmacy recommended having the provider increase the quantity of medication.  The patient uses [facility] on [address]  Pranay's callback is [phone]

## 2024-07-02 NOTE — PROGRESS NOTES
New prescription for Levetiracetam (Keppra) 100 MG/ML oral solution - take 1 ML PO TWICE DAILY.  Dispense: 240 ML  Pharmacy: [facility] on [address]

## 2024-07-23 ENCOUNTER — CLINICAL DOCUMENTATION (OUTPATIENT)
Facility: CLINIC | Age: 84
End: 2024-07-23

## 2024-07-23 NOTE — PROGRESS NOTES
[facility] - Plan of Care  [address], [address]  [place] [address]    [facility] Team Members: Renae Ortez MD; Fariba Neal NP; Manuela Clancy NP; Carito Milan, NICHOLAS; Jesus Devi, RN; Lizeth Solis, RN; Sarah Almanza LCSW    Mamie Kenyon  1940 / [id number]  female    Date of Initial Visit (Start of Care): 9/22/22    Diagnoses  Patient Active Problem List   Diagnosis    Essential hypertension    Edema    Carpal tunnel syndrome    Degeneration of cervical intervertebral disc    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HCC)    Dysphagia    Pure hypercholesterolemia    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)    Gastroesophageal reflux disease without esophagitis    Presence of cardiac pacemaker    SSS (sick sinus syndrome) (HCC)    CAD (coronary artery disease)    CVA, old, hemiparesis (HCC)    Vascular dementia without behavioral disturbance (HCC)    Squamous cell carcinoma of scalp    Seizure disorder (HCC)    PEG (percutaneous endoscopic gastrostomy) status (HCC)    Swelling of breast    Severe obesity (BMI 35.0-39.9) with comorbidity (HCC)    Candidal intertrigo       Advance Care Planning:    Code Status: DNR        Primary Decision Maker (Active): Pranay Alvarez - Child - [phone]    Primary Decision Maker: Valarie Alvarez - Child - [phone]    Primary Decision Maker: Brandi Alvarez  Child - [phone]       7/23/2024     9:53 AM   Demographics   Marital Status        DME/Supplies:  Hospital Bed, Nirmal Lift, and tube feeding, suction     Allergies   Allergen Reactions    Latex Rash    Sulfa Antibiotics Hives    Aspirin Other (See Comments)     Anything higher than 81mg makes pt jittery    Codeine Hives and Itching    Diltiazem Other (See Comments)    Iodinated Contrast Media Itching    Penicillins Hives and Itching    Adhesive Tape Rash    Clindamycin Rash       Nutritional Requirements:   Tube feeds with G / J tube    Functional/Activity Level:  Bedbound only    Safety

## 2024-07-27 PROCEDURE — 93294 REM INTERROG EVL PM/LDLS PM: CPT | Performed by: INTERNAL MEDICINE

## 2024-08-12 RX ORDER — METOPROLOL SUCCINATE 25 MG/1
25 TABLET, EXTENDED RELEASE ORAL DAILY
Qty: 90 TABLET | Refills: 1 | Status: SHIPPED | OUTPATIENT
Start: 2024-08-12

## 2024-08-12 NOTE — TELEPHONE ENCOUNTER
Received call from Pranay Alvarez requesting Metoprolol 25 mg refill. To be called in to [facility] on [address] pharmacy.

## 2024-08-30 ENCOUNTER — TELEPHONE (OUTPATIENT)
Facility: CLINIC | Age: 84
End: 2024-08-30

## 2024-08-30 DIAGNOSIS — J30.9 ALLERGIC RHINITIS, UNSPECIFIED SEASONALITY, UNSPECIFIED TRIGGER: Primary | ICD-10-CM

## 2024-08-30 RX ORDER — CETIRIZINE HYDROCHLORIDE 1 MG/ML
SOLUTION ORAL
Qty: 150 ML | Refills: 2 | Status: SHIPPED | OUTPATIENT
Start: 2024-08-30

## 2024-08-30 NOTE — TELEPHONE ENCOUNTER
VM - 9:26 am - Pranay Alvarez is the patient's son.  He called to request refill on the patient's cetirizine, 1 mg/ml medication.  The pharmacy said that there are no refills on the script and a providers authorization is required.  Pranay's callback is [phone]

## 2024-09-17 ENCOUNTER — CLINICAL DOCUMENTATION (OUTPATIENT)
Facility: CLINIC | Age: 84
End: 2024-09-17

## 2024-09-30 ENCOUNTER — TELEPHONE (OUTPATIENT)
Facility: CLINIC | Age: 84
End: 2024-09-30

## 2024-09-30 NOTE — TELEPHONE ENCOUNTER
Called patient to confirm their in home visit with Luisa Lomeli on 10/03/2024, arrival between 9-1.  Spoke with Pranay Alvarez, they confirmed the appointment and answered the covid screen questions. Does anyone in the household have covid No  Have there been any changes to insurance No or location No

## 2024-10-02 PROBLEM — N63.0 SWELLING OF BREAST: Status: RESOLVED | Noted: 2023-05-16 | Resolved: 2024-10-02

## 2024-10-02 PROBLEM — E78.49 OTHER HYPERLIPIDEMIA: Status: ACTIVE | Noted: 2024-10-02

## 2024-10-02 PROBLEM — E66.01 SEVERE OBESITY (BMI 35.0-39.9) WITH COMORBIDITY: Status: RESOLVED | Noted: 2023-06-22 | Resolved: 2024-10-02

## 2024-10-02 ASSESSMENT — ENCOUNTER SYMPTOMS
VOMITING: 0
EYE REDNESS: 0
BLOOD IN STOOL: 0
DIARRHEA: 0
WHEEZING: 0
EYE DISCHARGE: 0
TROUBLE SWALLOWING: 1
COUGH: 0
FACIAL SWELLING: 0
SHORTNESS OF BREATH: 0

## 2024-10-02 NOTE — PROGRESS NOTES
[facility]    [phone]      NOTE: [facility] is a PROVIDER (MD/NP) based interdisciplinary practice (RN, LCSW) for patients who cannot access (or have a taxing effort) primary / speciality medical care in an office setting. Primary Care At Home is NOT Home Health Care but works with Home Health Care Agencies.when there is a skilled need. Our MD/NPs are integral in Care Transitions; PLEASE CALL  if this patient arrives in the Emergency Department or Hospital.        Date of Current Visit:10/3/24  Patient/Family present for Current Visit: Patient, son Thu Pires  Goals of Care as stated by the patient/family is: comfort, wound want hospitalization for acute and potentially reversible causes like infection, bleeding event      Preferences for hospitalization if that were to be necessary:  [x] Patient DOES NOT WANT hospitalization; focus all treatments at home  [] Patient WOULD WANT hospitalization for potentially reversible causes  Patient prefers hospitalization at: [facility]    Mamie Kenyon (: 1940) is a 83 y.o. female, patient, here for evaluation of the following chief complaint(s):  Follow-up       ASSESSMENT/PLAN:  Below is the assessment and plan developed based on review of pertinent history, physical exam, labs, studies, and medications.    1. CVA, old, hemiparesis ([facility])  2. Left hemiparesis (HCC)  3. PEG (percutaneous endoscopic gastrostomy) status ([facility])  4. Oropharyngeal dysphagia  5. Other hyperlipidemia  -     atorvastatin (LIPITOR) 40 MG tablet; Take 1 tablet by mouth nightly, Disp-90 tablet, R-3Normal  6. Vascular dementia without behavioral disturbance ([facility])  7. Essential hypertension  -     amLODIPine (NORVASC) 5 MG tablet; Take 1 tablet by mouth daily, Disp-90 tablet, R-3Normal  8. Seizure disorder ([facility])  9. 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 SSS (sick sinus syndrome) ([facility])    1. H/o CVA  2. L

## 2024-10-03 ENCOUNTER — OFFICE VISIT (OUTPATIENT)
Facility: CLINIC | Age: 84
End: 2024-10-03

## 2024-10-03 ENCOUNTER — TELEPHONE (OUTPATIENT)
Facility: CLINIC | Age: 84
End: 2024-10-03

## 2024-10-03 VITALS
HEART RATE: 86 BPM | SYSTOLIC BLOOD PRESSURE: 139 MMHG | DIASTOLIC BLOOD PRESSURE: 71 MMHG | RESPIRATION RATE: 20 BRPM | OXYGEN SATURATION: 98 % | TEMPERATURE: 97.8 F

## 2024-10-03 DIAGNOSIS — Z93.1 PEG (PERCUTANEOUS ENDOSCOPIC GASTROSTOMY) STATUS (HCC): ICD-10-CM

## 2024-10-03 DIAGNOSIS — I97.190 COMPLETE AV BLOCK DUE TO AV NODAL ABLATION (HCC): ICD-10-CM

## 2024-10-03 DIAGNOSIS — G81.94 LEFT HEMIPARESIS (HCC): ICD-10-CM

## 2024-10-03 DIAGNOSIS — E78.49 OTHER HYPERLIPIDEMIA: ICD-10-CM

## 2024-10-03 DIAGNOSIS — I10 ESSENTIAL HYPERTENSION: ICD-10-CM

## 2024-10-03 DIAGNOSIS — I44.2 COMPLETE AV BLOCK DUE TO AV NODAL ABLATION (HCC): ICD-10-CM

## 2024-10-03 DIAGNOSIS — G40.909 SEIZURE DISORDER (HCC): ICD-10-CM

## 2024-10-03 DIAGNOSIS — F01.50 VASCULAR DEMENTIA WITHOUT BEHAVIORAL DISTURBANCE (HCC): ICD-10-CM

## 2024-10-03 DIAGNOSIS — I49.5 SSS (SICK SINUS SYNDROME) (HCC): ICD-10-CM

## 2024-10-03 DIAGNOSIS — I69.359 CVA, OLD, HEMIPARESIS (HCC): Primary | ICD-10-CM

## 2024-10-03 DIAGNOSIS — R13.12 OROPHARYNGEAL DYSPHAGIA: ICD-10-CM

## 2024-10-03 PROBLEM — Z95.0 PRESENCE OF CARDIAC PACEMAKER: Status: RESOLVED | Noted: 2017-09-29 | Resolved: 2024-10-03

## 2024-10-03 PROBLEM — B37.2 CANDIDAL INTERTRIGO: Status: RESOLVED | Noted: 2024-02-09 | Resolved: 2024-10-03

## 2024-10-03 RX ORDER — AMLODIPINE BESYLATE 5 MG/1
5 TABLET ORAL DAILY
Qty: 90 TABLET | Refills: 3 | Status: SHIPPED | OUTPATIENT
Start: 2024-10-03

## 2024-10-03 RX ORDER — ATORVASTATIN CALCIUM 40 MG/1
40 TABLET, FILM COATED ORAL NIGHTLY
Qty: 90 TABLET | Refills: 3 | Status: SHIPPED | OUTPATIENT
Start: 2024-10-03 | End: 2025-09-28

## 2024-10-03 NOTE — TELEPHONE ENCOUNTER
LCSW rec'd referral from [facility] NP to reach out to son due to changes in pt's insurance and questions that he had. LCSW called son, no answer. LCSW left voicemail, provided contact information, and encouraged a call back at his convenience.     BROOK Joyce, TOREYW     [facility]  Primary Care at [facility]   [address], [address]   [place] [address] (C) [phone]  (O) [phone]  [email or web address]

## 2024-10-21 DIAGNOSIS — I44.2 COMPLETE AV BLOCK DUE TO AV NODAL ABLATION (HCC): Primary | ICD-10-CM

## 2024-10-21 DIAGNOSIS — I10 ESSENTIAL HYPERTENSION: ICD-10-CM

## 2024-10-21 DIAGNOSIS — I97.190 COMPLETE AV BLOCK DUE TO AV NODAL ABLATION (HCC): Primary | ICD-10-CM

## 2024-10-21 DIAGNOSIS — I69.359 CVA, OLD, HEMIPARESIS (HCC): ICD-10-CM

## 2024-10-21 RX ORDER — METOPROLOL SUCCINATE 25 MG/1
25 TABLET, EXTENDED RELEASE ORAL DAILY
Qty: 90 TABLET | Refills: 1 | Status: SHIPPED | OUTPATIENT
Start: 2024-10-21 | End: 2025-04-19

## 2024-11-04 RX ORDER — ACETAMINOPHEN 160 MG/5ML
30 LIQUID ORAL EVERY 6 HOURS
Qty: 3600 ML | Refills: 5 | Status: SHIPPED | OUTPATIENT
Start: 2024-11-04 | End: 2024-11-06 | Stop reason: SDUPTHER

## 2024-11-05 ENCOUNTER — TELEPHONE (OUTPATIENT)
Facility: CLINIC | Age: 84
End: 2024-11-05

## 2024-11-05 NOTE — TELEPHONE ENCOUNTER
Pranay Alvarez called to update that the pharmacy is showing no refills in process on the patients acetaminophen script.  I checked the patient's chart and noticed that we had received a confirmation receipt from the pharmacy on 11/4/2024.  He acknowledged and states he will check with the pharmacy.  His callback if needed is [phone].

## 2024-11-06 DIAGNOSIS — R13.12 OROPHARYNGEAL DYSPHAGIA: Primary | ICD-10-CM

## 2024-11-06 DIAGNOSIS — M19.019 PRIMARY OSTEOARTHRITIS OF SHOULDER, UNSPECIFIED LATERALITY: ICD-10-CM

## 2024-11-06 RX ORDER — ACETAMINOPHEN 160 MG/5ML
LIQUID ORAL
Qty: 3600 ML | Refills: 5 | Status: SHIPPED | OUTPATIENT
Start: 2024-11-06

## 2024-11-06 NOTE — TELEPHONE ENCOUNTER
Call placed to [facility] pharmacy - Confirmation received that Tylenol liquid Rx was received (today).

## 2024-11-12 ENCOUNTER — CLINICAL DOCUMENTATION (OUTPATIENT)
Facility: CLINIC | Age: 84
End: 2024-11-12

## 2024-11-12 NOTE — PROGRESS NOTES
[facility] - Plan of Care  [address], [address]  [place] [address]    [facility] Team Members: Dr. 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)    Gastroesophageal reflux disease without esophagitis    SSS (sick sinus syndrome) (HCC)    CVA, old, hemiparesis (HCC)    Vascular dementia without behavioral disturbance (HCC)    Seizure disorder (HCC)    PEG (percutaneous endoscopic gastrostomy) status (HCC)    Other hyperlipidemia    Left hemiparesis (HCC)       Advance Care Planning:    Code Status: DNR        Primary Decision Maker (Active): Pranay Alvarez  Child - [phone]    Primary Decision Maker: Valarie Alvarez  Child  [phone]    Primary Decision Maker: Brandi Alvarez  Child  [phone]       11/12/2024     9:48 AM   Demographics   Marital Status        DME/Supplies:  Hospital Bed, Nirmal Lift, and tube feeding, suction     Allergies   Allergen Reactions    Latex Rash    Sulfa Antibiotics Hives    Aspirin Other (See Comments)     Anything higher than 81mg makes pt jittery    Codeine Hives and Itching    Diltiazem Other (See Comments)    Iodinated Contrast Media Itching    Penicillins Hives and Itching    Adhesive Tape Rash    Clindamycin Rash       Nutritional Requirements:   Tube feeds with G / J tube    Functional/Activity Level:  Bedbound only    Safety Measures:   Aspiration risk, Fall risk, and Self-care deficit    Acuity Level Rating: High    Current Outpatient Medications   Medication Sig    acetaminophen (M-PAP) 160 MG/5ML liquid TAKE 30 ML VIA PEG EVERY 6 HOURS AS NEEDED FOR FEVER AND PAIN    apixaban (ELIQUIS) 2.5 MG TABS tablet Take 1 tablet by mouth 2

## 2024-11-25 ENCOUNTER — IMMUNIZATION (OUTPATIENT)
Age: 84
End: 2024-11-25

## 2024-11-25 PROCEDURE — G0008 ADMIN INFLUENZA VIRUS VAC: HCPCS | Performed by: PEDIATRICS

## 2024-11-25 PROCEDURE — 90653 IIV ADJUVANT VACCINE IM: CPT | Performed by: PEDIATRICS

## 2025-01-03 DIAGNOSIS — M19.019 PRIMARY OSTEOARTHRITIS OF SHOULDER, UNSPECIFIED LATERALITY: ICD-10-CM

## 2025-01-03 RX ORDER — ACETAMINOPHEN 160 MG/5ML
LIQUID ORAL
Qty: 3600 ML | Refills: 5 | Status: SHIPPED | OUTPATIENT
Start: 2025-01-03

## 2025-01-03 NOTE — TELEPHONE ENCOUNTER
Called patient to confirm their in home visit with Luisa Lomeli on 01/09/2025, arrival between 9-1.  Spoke with Pranay Alvarez, they confirmed the appointment and answered the covid screen questions. Does anyone in the household have covid No  Have there been any changes to insurance No or location No    The patient's son, Pranay is asking to change script for liquid Tylenol to 3 bottles. Every 4 days is not working.

## 2025-01-08 ASSESSMENT — ENCOUNTER SYMPTOMS
DIARRHEA: 0
FACIAL SWELLING: 0
COUGH: 0
SHORTNESS OF BREATH: 0
TROUBLE SWALLOWING: 1
EYE DISCHARGE: 0
VOMITING: 0
EYE REDNESS: 0
WHEEZING: 0
BLOOD IN STOOL: 0

## 2025-01-08 NOTE — PROGRESS NOTES
[facility]    [phone]      NOTE: [facility] is a PROVIDER (MD/NP) based interdisciplinary practice (RN, LCSW) for patients who cannot access (or have a taxing effort) primary / speciality medical care in an office setting. Primary Care At Home is NOT Home Health Care but works with Home Health Care Agencies.when there is a skilled need. Our MD/NPs are integral in Care Transitions; PLEASE CALL  if this patient arrives in the Emergency Department or Hospital.        Date of Current Visit:25  Patient/Family present for Current Visit: Patient, son - Pranay  Goals of Care as stated by the patient/family is: comfort, wound want hospitalization for acute and potentially reversible causes like infection, bleeding event      Preferences for hospitalization if that were to be necessary:  [x] Patient DOES NOT WANT hospitalization; focus all treatments at home  [] Patient WOULD WANT hospitalization for potentially reversible causes  Patient prefers hospitalization at: [facility]    Mamie Kenyon (: 1940) is a 84 y.o. female, patient, here for evaluation of the following chief complaint(s):  Follow-up       ASSESSMENT/PLAN:  Below is the assessment and plan developed based on review of pertinent history, physical exam, labs, studies, and medications.    1. Vascular dementia without behavioral disturbance (HCC)  2. Essential hypertension  3. CVA, old, hemiparesis (HCC)  4. Left hemiparesis (HCC)  5. PEG (percutaneous endoscopic gastrostomy) status (HCC)  6. Oropharyngeal dysphagia  7. Seizure disorder (HCC)  8. Other hyperlipidemia    1. Vascular dementia  -Son requests that dementia diagnosis not to be discussed in front of the patient.   -Patient opens eyes at times but does not track, occasionally vocalizes in response to family's voice, does not follow commands.    -Per son patient is able to endorse pain, and no new mood/behavioral issues

## 2025-01-09 ENCOUNTER — OFFICE VISIT (OUTPATIENT)
Facility: CLINIC | Age: 85
End: 2025-01-09

## 2025-01-09 VITALS
RESPIRATION RATE: 20 BRPM | DIASTOLIC BLOOD PRESSURE: 90 MMHG | OXYGEN SATURATION: 96 % | SYSTOLIC BLOOD PRESSURE: 142 MMHG | TEMPERATURE: 98.7 F | HEART RATE: 70 BPM

## 2025-01-09 DIAGNOSIS — I10 ESSENTIAL HYPERTENSION: ICD-10-CM

## 2025-01-09 DIAGNOSIS — G40.909 SEIZURE DISORDER (HCC): ICD-10-CM

## 2025-01-09 DIAGNOSIS — Z93.1 PEG (PERCUTANEOUS ENDOSCOPIC GASTROSTOMY) STATUS (HCC): ICD-10-CM

## 2025-01-09 DIAGNOSIS — R13.12 OROPHARYNGEAL DYSPHAGIA: ICD-10-CM

## 2025-01-09 DIAGNOSIS — E78.49 OTHER HYPERLIPIDEMIA: ICD-10-CM

## 2025-01-09 DIAGNOSIS — F01.50 VASCULAR DEMENTIA WITHOUT BEHAVIORAL DISTURBANCE (HCC): Primary | ICD-10-CM

## 2025-01-09 DIAGNOSIS — I69.359 CVA, OLD, HEMIPARESIS (HCC): ICD-10-CM

## 2025-01-09 DIAGNOSIS — G81.94 LEFT HEMIPARESIS (HCC): ICD-10-CM

## 2025-01-16 RX ORDER — FAMOTIDINE 40 MG/5ML
40 POWDER, FOR SUSPENSION ORAL 2 TIMES DAILY
Qty: 300 ML | Refills: 5 | Status: SHIPPED | OUTPATIENT
Start: 2025-01-16

## 2025-02-07 DIAGNOSIS — J30.9 ALLERGIC RHINITIS, UNSPECIFIED SEASONALITY, UNSPECIFIED TRIGGER: ICD-10-CM

## 2025-02-07 RX ORDER — CETIRIZINE HYDROCHLORIDE 1 MG/ML
SOLUTION ORAL
Qty: 150 ML | Refills: 2 | Status: SHIPPED | OUTPATIENT
Start: 2025-02-07

## 2025-02-10 ENCOUNTER — TELEPHONE (OUTPATIENT)
Facility: CLINIC | Age: 85
End: 2025-02-10

## 2025-02-10 NOTE — TELEPHONE ENCOUNTER
VM - 9:04 am - Pranay Alvarez called to update that the patient is heavily congested.  He is giving her Robitussin CF, but there is no improvement.  He asks if she can be seen today.  # [phone]

## 2025-02-10 NOTE — TELEPHONE ENCOUNTER
Telephone call to son Pranay.  He reports pt has been sick since Sat with congestion and cough that is not responding to Robitussin Cold and Flu.  He states she is wheezing and he can hear crackling when she breathes.  He states that acid reflux is also much worse.  He found out this AM that her caregiver went to ER and tested positive for flu.  She takes Tylenol TID scheduled so not likely to have fever.  Advised Pranay that he should call  as they have ability to test her for flu.  Instructed to call our office if  is unable to see her.

## 2025-02-24 ENCOUNTER — TELEPHONE (OUTPATIENT)
Facility: CLINIC | Age: 85
End: 2025-02-24

## 2025-02-24 NOTE — TELEPHONE ENCOUNTER
Telephone call returned to son, he reports brief improvement in pt's congestion and cough with prednisone and abx prescribed by [facility].  But now cough and congestion is lingering and he is concerned.  Offered visit with Luisa Lomeli NP on 2/25 at 8 AM - visit accepted and scheduled.

## 2025-02-24 NOTE — TELEPHONE ENCOUNTER
The patient's son, Pranay states she needs a stronger medication. She was seen by [facility]. OTC meds are not helping. She has finished taking antibiotics and steroids. She is still having a cough. Please give him a call back [phone].

## 2025-02-25 ENCOUNTER — OFFICE VISIT (OUTPATIENT)
Facility: CLINIC | Age: 85
End: 2025-02-25
Payer: MEDICARE

## 2025-02-25 VITALS
DIASTOLIC BLOOD PRESSURE: 72 MMHG | SYSTOLIC BLOOD PRESSURE: 147 MMHG | OXYGEN SATURATION: 96 % | RESPIRATION RATE: 21 BRPM | HEART RATE: 69 BPM | TEMPERATURE: 98.4 F

## 2025-02-25 DIAGNOSIS — R09.89 CHEST CONGESTION: ICD-10-CM

## 2025-02-25 DIAGNOSIS — R05.2 SUBACUTE COUGH: Primary | ICD-10-CM

## 2025-02-25 PROBLEM — J11.1 FLU: Status: ACTIVE | Noted: 2025-02-25

## 2025-02-25 PROBLEM — R05.9 COUGH: Status: ACTIVE | Noted: 2025-02-25

## 2025-02-25 PROCEDURE — 1160F RVW MEDS BY RX/DR IN RCRD: CPT | Performed by: NURSE PRACTITIONER

## 2025-02-25 PROCEDURE — 1090F PRES/ABSN URINE INCON ASSESS: CPT | Performed by: NURSE PRACTITIONER

## 2025-02-25 PROCEDURE — 99348 HOME/RES VST EST LOW MDM 30: CPT | Performed by: NURSE PRACTITIONER

## 2025-02-25 PROCEDURE — G8417 CALC BMI ABV UP PARAM F/U: HCPCS | Performed by: NURSE PRACTITIONER

## 2025-02-25 PROCEDURE — 1036F TOBACCO NON-USER: CPT | Performed by: NURSE PRACTITIONER

## 2025-02-25 PROCEDURE — 1123F ACP DISCUSS/DSCN MKR DOCD: CPT | Performed by: NURSE PRACTITIONER

## 2025-02-25 PROCEDURE — 1159F MED LIST DOCD IN RCRD: CPT | Performed by: NURSE PRACTITIONER

## 2025-02-25 ASSESSMENT — ENCOUNTER SYMPTOMS
TROUBLE SWALLOWING: 1
FACIAL SWELLING: 0
DIARRHEA: 0
BLOOD IN STOOL: 0
SHORTNESS OF BREATH: 0
COUGH: 1
EYE DISCHARGE: 0
WHEEZING: 0
EYE REDNESS: 0
VOMITING: 0

## 2025-02-25 NOTE — PROGRESS NOTES
[facility]    [phone]      NOTE: [facility] is a PROVIDER (MD/NP) based interdisciplinary practice (RN, LCSW) for patients who cannot access (or have a taxing effort) primary / speciality medical care in an office setting. Primary Care At Home is NOT Home Health Care but works with Home Health Care Agencies.when there is a skilled need. Our MD/NPs are integral in Care Transitions; PLEASE CALL  if this patient arrives in the Emergency Department or Hospital.        Date of Current Visit:25  Patient/Family present for Current Visit: Patient, son - Pranay  Goals of Care as stated by the patient/family is: comfort, wound want hospitalization for acute and potentially reversible causes like infection, bleeding event      Preferences for hospitalization if that were to be necessary:  [x] Patient DOES NOT WANT hospitalization; focus all treatments at home  [] Patient WOULD WANT hospitalization for potentially reversible causes  Patient prefers hospitalization at: [facility]    Mamie Kenyon (: 1940) is a 84 y.o. female, patient, here for evaluation of the following chief complaint(s):  Cough       ASSESSMENT/PLAN:  Below is the assessment and plan developed based on review of pertinent history, physical exam, labs, studies, and medications.    1. Subacute cough  2. Chest congestion    1. Subacute cough  2. Chest congestion  -Seen by [facility] 25 for productive cough and wheezing, family with URI s/s and eventually dx of flu.  -COVID & flu test negative, but high suspicion for pneumonia and started on Azithromycin x4 days and Prednisone x5 days with improvement.  -Follow up today for concern of lingering cough and congestion, per son has been giving PRN OTC Guaifenesin and Dextromethorphan, he states improvement today.  -Son reports no noted fever/chills, no body aches or pain, no nausea/vomiting, has completed

## 2025-03-03 ENCOUNTER — TELEPHONE (OUTPATIENT)
Facility: CLINIC | Age: 85
End: 2025-03-03

## 2025-03-03 SDOH — SOCIAL STABILITY: SOCIAL NETWORK: IN A TYPICAL WEEK, HOW MANY TIMES DO YOU TALK ON THE PHONE WITH FAMILY, FRIENDS, OR NEIGHBORS?: PATIENT UNABLE TO ANSWER

## 2025-03-03 SDOH — ECONOMIC STABILITY: TRANSPORTATION INSECURITY: IN THE PAST 12 MONTHS, HAS LACK OF TRANSPORTATION KEPT YOU FROM MEDICAL APPOINTMENTS OR FROM GETTING MEDICATIONS?: NO

## 2025-03-03 SDOH — SOCIAL STABILITY: SOCIAL NETWORK: HOW OFTEN DO YOU ATTEND MEETINGS OF THE CLUBS OR ORGANIZATIONS YOU BELONG TO?: NEVER

## 2025-03-03 SDOH — SOCIAL STABILITY: SOCIAL INSECURITY: WITHIN THE LAST YEAR, HAVE YOU BEEN HUMILIATED OR EMOTIONALLY ABUSED IN OTHER WAYS BY YOUR PARTNER OR EX-PARTNER?: NO

## 2025-03-03 SDOH — SOCIAL STABILITY: SOCIAL INSECURITY: ARE YOU MARRIED, WIDOWED, DIVORCED, SEPARATED, NEVER MARRIED, OR LIVING WITH A PARTNER?: WIDOWED

## 2025-03-03 SDOH — HEALTH STABILITY: MENTAL HEALTH: HOW MANY DRINKS CONTAINING ALCOHOL DO YOU HAVE ON A TYPICAL DAY WHEN YOU ARE DRINKING?: PATIENT DOES NOT DRINK

## 2025-03-03 SDOH — SOCIAL STABILITY: SOCIAL INSECURITY
WITHIN THE LAST YEAR, HAVE YOU BEEN KICKED, HIT, SLAPPED, OR OTHERWISE PHYSICALLY HURT BY YOUR PARTNER OR EX-PARTNER?: NO

## 2025-03-03 SDOH — HEALTH STABILITY: MENTAL HEALTH
DO YOU FEEL STRESS - TENSE, RESTLESS, NERVOUS, OR ANXIOUS, OR UNABLE TO SLEEP AT NIGHT BECAUSE YOUR MIND IS TROUBLED ALL THE TIME - THESE DAYS?: NOT AT ALL

## 2025-03-03 SDOH — SOCIAL STABILITY: SOCIAL INSECURITY
WITHIN THE LAST YEAR, HAVE YOU BEEN RAPED OR FORCED TO HAVE ANY KIND OF SEXUAL ACTIVITY BY YOUR PARTNER OR EX-PARTNER?: NO

## 2025-03-03 SDOH — ECONOMIC STABILITY: FOOD INSECURITY: WITHIN THE PAST 12 MONTHS, THE FOOD YOU BOUGHT JUST DIDN'T LAST AND YOU DIDN'T HAVE MONEY TO GET MORE.: NEVER TRUE

## 2025-03-03 SDOH — SOCIAL STABILITY: SOCIAL NETWORK: HOW OFTEN DO YOU ATTEND CHURCH OR RELIGIOUS SERVICES?: NEVER

## 2025-03-03 SDOH — ECONOMIC STABILITY: FOOD INSECURITY: HOW HARD IS IT FOR YOU TO PAY FOR THE VERY BASICS LIKE FOOD, HOUSING, MEDICAL CARE, AND HEATING?: HARD

## 2025-03-03 SDOH — SOCIAL STABILITY: SOCIAL INSECURITY: WITHIN THE LAST YEAR, HAVE YOU BEEN AFRAID OF YOUR PARTNER OR EX-PARTNER?: NO

## 2025-03-03 SDOH — SOCIAL STABILITY: SOCIAL NETWORK
DO YOU BELONG TO ANY CLUBS OR ORGANIZATIONS SUCH AS CHURCH GROUPS, UNIONS, FRATERNAL OR ATHLETIC GROUPS, OR SCHOOL GROUPS?: NO

## 2025-03-03 SDOH — SOCIAL STABILITY: SOCIAL NETWORK: HOW OFTEN DO YOU GET TOGETHER WITH FRIENDS OR RELATIVES?: PATIENT UNABLE TO ANSWER

## 2025-03-03 SDOH — ECONOMIC STABILITY: FOOD INSECURITY: WITHIN THE PAST 12 MONTHS, YOU WORRIED THAT YOUR FOOD WOULD RUN OUT BEFORE YOU GOT THE MONEY TO BUY MORE.: NEVER TRUE

## 2025-03-03 SDOH — HEALTH STABILITY: MENTAL HEALTH: HOW OFTEN DO YOU HAVE A DRINK CONTAINING ALCOHOL?: NEVER

## 2025-03-03 SDOH — HEALTH STABILITY: PHYSICAL HEALTH: ON AVERAGE, HOW MANY MINUTES DO YOU ENGAGE IN EXERCISE AT THIS LEVEL?: 0 MIN

## 2025-03-03 SDOH — ECONOMIC STABILITY: HOUSING INSECURITY: IN THE LAST 12 MONTHS, WAS THERE A TIME WHEN YOU WERE NOT ABLE TO PAY THE MORTGAGE OR RENT ON TIME?: NO

## 2025-03-03 SDOH — HEALTH STABILITY: PHYSICAL HEALTH: ON AVERAGE, HOW MANY DAYS PER WEEK DO YOU ENGAGE IN MODERATE TO STRENUOUS EXERCISE (LIKE A BRISK WALK)?: 0 DAYS

## 2025-03-03 ASSESSMENT — ACTIVITIES OF DAILY LIVING (ADL): LACK_OF_TRANSPORTATION: NO

## 2025-03-03 NOTE — TELEPHONE ENCOUNTER
LCSW called and spoke with pt's son, for completion of Social Determinants of Health screening update. He stated that his mother lost her Medicaid in March 2024 (1 year ago), for no apparent reason. He has applied and reapplied 3 times, and he has become increasingly frustrated as he has rec'd no response from [facility] (where pt has her residence, but it is not primary residence since she lives with her son) and [facility] (where she lives with son). He spoke with a representative from Medicaid, and was informed to apply again for the 4th time, and to apply for [facility] and not [facility] since her primary residence is now in his home in [place]. [employer] screening has already been done and she has been approved for in-home personal care, however her Medicaid has to also be approved in order to utilize the LTC services. LCSW validated this, and encouraged him to apply again through the State hotline, using his residence as her primary residence, and then it will be funneled to [facility]. LCSW validated his expressed frustrations. He stated he is now $25-28K in debt because he has to pay out of pocket for all of his mother's supplies and the 2 aides that take care of his mother 24/7. Supplies and caregiving had been covered by Medicaid LTC benefit. LCSW provided reflective listening, validation, encouragement, and support. LCSW to remain available for any additional support or resources as needed.     BROOK Joyce, LCSW     [facility]  Primary Care at Home  [facility]   [address], [address]   [place] [address] (C) [phone]  (O) [phone]  [email or web address]

## 2025-03-05 RX ORDER — POTASSIUM CHLORIDE 20MEQ/15ML
20 LIQUID (ML) ORAL 2 TIMES DAILY
Qty: 2700 ML | Refills: 3 | Status: SHIPPED | OUTPATIENT
Start: 2025-03-05

## 2025-03-05 NOTE — TELEPHONE ENCOUNTER
Fax received from [facility] pharmacy requesting refill of Potassium  - Take 15mL in 4 ounces of liquid BID.    Rx pended to PCP.

## 2025-03-27 PROBLEM — R05.9 COUGH: Status: RESOLVED | Noted: 2025-02-25 | Resolved: 2025-03-27

## 2025-04-09 ENCOUNTER — TELEPHONE (OUTPATIENT)
Facility: CLINIC | Age: 85
End: 2025-04-09

## 2025-04-09 NOTE — TELEPHONE ENCOUNTER
Called patient to confirm their in home visit with Luisa Lomeli on 04/15/25, arrival between 9-1.  Spoke with Pranay Alvarez, they confirmed the appointment and answered the covid screen questions. Does anyone in the household have covid no  Have there been any changes to insurance no or location no

## 2025-04-14 ASSESSMENT — ENCOUNTER SYMPTOMS
SHORTNESS OF BREATH: 0
COUGH: 0
BLOOD IN STOOL: 0
VOMITING: 0
EYE DISCHARGE: 0
EYE REDNESS: 0
FACIAL SWELLING: 0
TROUBLE SWALLOWING: 1
WHEEZING: 0
DIARRHEA: 0

## 2025-04-14 NOTE — PROGRESS NOTES
[facility]    [phone]      NOTE: [facility] Home is a PROVIDER (MD/NP) based interdisciplinary practice (RN, LCSW) for patients who cannot access (or have a taxing effort) primary / speciality medical care in an office setting. Primary Care At Home is NOT Home Health Care but works with Home Health Care Agencies.when there is a skilled need. Our MD/NPs are integral in Care Transitions; PLEASE CALL  if this patient arrives in the Emergency Department or Hospital.        Date of Current Visit:4/15/25  Patient/Family present for Current Visit: Patient, son - Pranay  Goals of Care as stated by the patient/family is: comfort, wound want hospitalization for acute and potentially reversible causes like infection, bleeding event      Preferences for hospitalization if that were to be necessary:  [x] Patient DOES NOT WANT hospitalization; focus all treatments at home  [] Patient WOULD WANT hospitalization for potentially reversible causes  Patient prefers hospitalization at: [facility]    Mamie Kenyon (: 1940) is a 84 y.o. female, patient, here for evaluation of the following chief complaint(s):  Follow-up and Medicare AWV       ASSESSMENT/PLAN:  Below is the assessment and plan developed based on review of pertinent history, physical exam, labs, studies, and medications.    1. Essential hypertension  2. CVA, old, hemiparesis (HCC)  3. Left hemiparesis (HCC)  4. Vascular dementia without behavioral disturbance (HCC)  5. PEG (percutaneous endoscopic gastrostomy) status ([facility])  6. Oropharyngeal dysphagia  7. Seizure disorder (HCC)  8. Swelling of right upper extremity  -     Vascular duplex upper extremity venous left; Future  -     XR HUMERUS RIGHT (MIN 2 VIEWS); Future  9. Encounter for annual wellness visit (AWV) in Medicare patient    1. Hypertension  -BP at goal but for age and comorbidity is WNL.   -No reports of chest pain, palpitations,

## 2025-04-15 ENCOUNTER — OFFICE VISIT (OUTPATIENT)
Facility: CLINIC | Age: 85
End: 2025-04-15
Payer: MEDICARE

## 2025-04-15 VITALS
SYSTOLIC BLOOD PRESSURE: 106 MMHG | HEART RATE: 69 BPM | DIASTOLIC BLOOD PRESSURE: 67 MMHG | OXYGEN SATURATION: 98 % | RESPIRATION RATE: 20 BRPM

## 2025-04-15 DIAGNOSIS — M79.89 SWELLING OF RIGHT UPPER EXTREMITY: ICD-10-CM

## 2025-04-15 DIAGNOSIS — I10 ESSENTIAL HYPERTENSION: Primary | ICD-10-CM

## 2025-04-15 DIAGNOSIS — Z00.00 ENCOUNTER FOR ANNUAL WELLNESS VISIT (AWV) IN MEDICARE PATIENT: ICD-10-CM

## 2025-04-15 DIAGNOSIS — I69.359 CVA, OLD, HEMIPARESIS (HCC): ICD-10-CM

## 2025-04-15 DIAGNOSIS — G81.94 LEFT HEMIPARESIS (HCC): ICD-10-CM

## 2025-04-15 DIAGNOSIS — F01.50 VASCULAR DEMENTIA WITHOUT BEHAVIORAL DISTURBANCE (HCC): ICD-10-CM

## 2025-04-15 DIAGNOSIS — R13.12 OROPHARYNGEAL DYSPHAGIA: ICD-10-CM

## 2025-04-15 DIAGNOSIS — G40.909 SEIZURE DISORDER (HCC): ICD-10-CM

## 2025-04-15 DIAGNOSIS — Z93.1 PEG (PERCUTANEOUS ENDOSCOPIC GASTROSTOMY) STATUS (HCC): ICD-10-CM

## 2025-04-15 PROCEDURE — G8417 CALC BMI ABV UP PARAM F/U: HCPCS | Performed by: NURSE PRACTITIONER

## 2025-04-15 PROCEDURE — G0439 PPPS, SUBSEQ VISIT: HCPCS | Performed by: NURSE PRACTITIONER

## 2025-04-15 PROCEDURE — 1036F TOBACCO NON-USER: CPT | Performed by: NURSE PRACTITIONER

## 2025-04-15 PROCEDURE — 1123F ACP DISCUSS/DSCN MKR DOCD: CPT | Performed by: NURSE PRACTITIONER

## 2025-04-15 PROCEDURE — 1090F PRES/ABSN URINE INCON ASSESS: CPT | Performed by: NURSE PRACTITIONER

## 2025-04-15 PROCEDURE — 1126F AMNT PAIN NOTED NONE PRSNT: CPT | Performed by: NURSE PRACTITIONER

## 2025-04-15 PROCEDURE — 99348 HOME/RES VST EST LOW MDM 30: CPT | Performed by: NURSE PRACTITIONER

## 2025-04-15 PROCEDURE — 1159F MED LIST DOCD IN RCRD: CPT | Performed by: NURSE PRACTITIONER

## 2025-04-15 PROCEDURE — 1160F RVW MEDS BY RX/DR IN RCRD: CPT | Performed by: NURSE PRACTITIONER

## 2025-04-15 NOTE — PROGRESS NOTES
This is the Subsequent Medicare Annual Wellness Exam, performed 12 months or more after the Initial AWV or the last Subsequent AWV    I have reviewed the patient's medical history in detail and updated the computerized patient record.       Assessment/Plan   Education and counseling provided:  Are appropriate based on today's review and evaluation    1. Essential hypertension  2. CVA, old, hemiparesis (HCC)  3. Left hemiparesis (HCC)  4. Vascular dementia without behavioral disturbance (HCC)  5. PEG (percutaneous endoscopic gastrostomy) status (HCC)  6. Oropharyngeal dysphagia  7. Seizure disorder (HCC)  8. Swelling of right upper extremity  -     Vascular duplex upper extremity venous left; Future  -     XR HUMERUS RIGHT (MIN 2 VIEWS); Future  9. Encounter for annual wellness visit (AWV) in Medicare patient       Depression Risk Factor Screening         4/3/2024     2:36 PM   PHQ-9    Depression Unable to Assess Functional capacity motivation limits accuracy         Alcohol & Drug Abuse Risk Screen     Do you average more than 1 drink per night or more than 7 drinks a week:  no    On any one occasion in the past three months have you have had more than 3 drinks containing alcohol: no    Functional Ability and Level of Safety   Hearing:  unable to assess, but able to respond to questions occasionally, but mostly remains nonverbal.     Activities of Daily Living:  The home contains: handrails and grab bars  The patient needs help with : transportation, shopping, preparing meals, housework, managing medication, eating, laundry, dressing, bathing , laundry, bathroom needs, and hygiene     Ambulation: Patient ambulates bed bound    Failed to redirect to the Timeline version of the REVFS SmartLink.        Abuse Screen: The patient is not abused.     Cognitive Screening    Has your family/caregiver stated any concerns about your memory: yes    Cognitive screening: Deferred (confirmed diagnosis of dementia)   Screening

## 2025-04-16 DIAGNOSIS — M79.89 SWELLING OF RIGHT UPPER EXTREMITY: ICD-10-CM

## 2025-04-17 ENCOUNTER — APPOINTMENT (OUTPATIENT)
Facility: HOSPITAL | Age: 85
End: 2025-04-17
Payer: MEDICARE

## 2025-04-17 ENCOUNTER — HOSPITAL ENCOUNTER (EMERGENCY)
Facility: HOSPITAL | Age: 85
Discharge: HOME | End: 2025-04-17
Attending: EMERGENCY MEDICINE
Payer: MEDICARE

## 2025-04-17 VITALS
OXYGEN SATURATION: 95 % | BODY MASS INDEX: 40.12 KG/M2 | HEART RATE: 65 BPM | RESPIRATION RATE: 18 BRPM | SYSTOLIC BLOOD PRESSURE: 144 MMHG | DIASTOLIC BLOOD PRESSURE: 75 MMHG | WEIGHT: 218 LBS | HEIGHT: 62 IN | TEMPERATURE: 97.7 F

## 2025-04-17 DIAGNOSIS — Z43.1 PEG (PERCUTANEOUS ENDOSCOPIC GASTROSTOMY) ADJUSTMENT/REPLACEMENT/REMOVAL (HCC): Primary | ICD-10-CM

## 2025-04-17 PROCEDURE — 6360000004 HC RX CONTRAST MEDICATION: Performed by: EMERGENCY MEDICINE

## 2025-04-17 PROCEDURE — 99283 EMERGENCY DEPT VISIT LOW MDM: CPT

## 2025-04-17 PROCEDURE — 74018 RADEX ABDOMEN 1 VIEW: CPT

## 2025-04-17 PROCEDURE — 43762 RPLC GTUBE NO REVJ TRC: CPT

## 2025-04-17 PROCEDURE — 6370000000 HC RX 637 (ALT 250 FOR IP): Performed by: EMERGENCY MEDICINE

## 2025-04-17 RX ORDER — ACETAMINOPHEN 160 MG/5ML
650 LIQUID ORAL
Status: COMPLETED | OUTPATIENT
Start: 2025-04-17 | End: 2025-04-17

## 2025-04-17 RX ORDER — DIATRIZOATE MEGLUMINE AND DIATRIZOATE SODIUM 660; 100 MG/ML; MG/ML
20 SOLUTION ORAL; RECTAL
Status: DISCONTINUED | OUTPATIENT
Start: 2025-04-17 | End: 2025-04-17 | Stop reason: HOSPADM

## 2025-04-17 RX ADMIN — ACETAMINOPHEN 650 MG: 160 SOLUTION ORAL at 03:40

## 2025-04-17 RX ADMIN — DIATRIZOATE MEGLUMINE AND DIATRIZOATE SODIUM 20 ML: 660; 100 LIQUID ORAL; RECTAL at 02:35

## 2025-04-17 ASSESSMENT — LIFESTYLE VARIABLES
HOW MANY STANDARD DRINKS CONTAINING ALCOHOL DO YOU HAVE ON A TYPICAL DAY: PATIENT DOES NOT DRINK
HOW OFTEN DO YOU HAVE A DRINK CONTAINING ALCOHOL: NEVER

## 2025-04-17 ASSESSMENT — PAIN - FUNCTIONAL ASSESSMENT: PAIN_FUNCTIONAL_ASSESSMENT: FACE, LEGS, ACTIVITY, CRY, AND CONSOLABILITY (FLACC)

## 2025-04-17 NOTE — ED PROVIDER NOTES
EMERGENCY DEPARTMENT HISTORY AND PHYSICAL EXAM     ----------------------------------------------------------------------------  Please note that this dictation was completed with [employer], the [employer] voice recognition software.  Quite often unanticipated grammatical, syntax, homophones, and other interpretive errors are inadvertently transcribed by the computer software.  Please disregard these errors.  Please excuse any errors that have escaped final proofreading  ----------------------------------------------------------------------------      Date: 4/17/2025  Patient Name: Mamie Kenyon      HISTORY OF PRESENT ILLNESS     Chief Complaint   Patient presents with    BIBEMS from home with cc of PEG tube coming out.     BIBEMS from home with cc of PEG tube falling out at home. Family states no other complaint. Is nonverbal and A/O x 0 at baseline due to a stroke close to 10 years ago.        History obtainted from:  EMS    Other independent source of history: Family    HPI: Mamie Kenyon is a 84 y.o. female, with significant pmhx of previous stroke leading patient nonverbal at baseline, peptic ulcer disease, cholesterol, diverticulosis, who presents via EMS to the ED with c/o PEG tube displacement per family.  Noted to have 18 French PEG tube was transported with her.  Unsure of timeline of displacement.      PCP: Luisa Lomeli, APRN - NP    Allergy List:   Allergies   Allergen Reactions    Latex Rash    Sulfa Antibiotics Hives    Aspirin Other (See Comments)     Anything higher than 81mg makes pt jittery    Codeine Hives and Itching    Diltiazem Other (See Comments)    Iodinated Contrast Media Itching    Penicillins Hives and Itching    Adhesive Tape Rash    Clindamycin Rash         CURRENT MEDICATIONS      Previous Medications    ACETAMINOPHEN (M-PAP) 160 MG/5ML LIQUID    TAKE 30 ML VIA PEG EVERY 6 HOURS AS NEEDED FOR FEVER AND PAIN    AMLODIPINE (NORVASC) 5 MG TABLET    Take 1 tablet by mouth daily    APIXABAN  SEVERE (giving consideration to thing such as systemic symptoms, impact on quality of life, morbidity and mortality).  Clinical lab tests: ordered as appropriate, reviewed and interpreted by me   Radiology studies: (if indicated) ordered as appropriate & images reviewed and interpreted by me  Reviewed and summarized all available results: yes  Review and summarize available past medical records: yes  Independent visualization of images, ECGs, or specimens (if performed): yes  Independent history obtained from other sources such as family, EMS, bystanders, law enforcement (if available): yes    FINAL IMPRESSION     1. PEG (percutaneous endoscopic gastrostomy) adjustment/replacement/removal (HCC)         DISPOSITION/PLAN      Discharge to home     Mamie Kenyon's  results have been reviewed with her.  She has been counseled regarding her diagnosis, treatment, and plan.   I have discussed with the patient and/or caregiver my initial clinical impression which is based on an evidence-based clinical evaluation of the patient and interpretation of available results. Involved patient and/or caregiver in management, treatment options and final disposition. Patient/caregiver verbalize understanding of and agreement. We agree that at this time additional imaging or blood work is not emergently needed. She verbally conveys understanding and agreement of the signs, symptoms, diagnosis, treatment and prognosis and additionally agrees to follow up as discussed.  She also agrees with the care-plan.  I believe that all of her questions have been answered.  I have also provided discharge instructions for her that include: educational information regarding their diagnosis and treatment, and list of reasons why they would want to return to the ED prior to their follow-up appointment, should her condition change.     PATIENT REFERRED TO:  Luisa Lomeli, APRN - NP  [address]  [address]  [facility]

## 2025-04-17 NOTE — DISCHARGE INSTRUCTIONS
It was a pleasure taking care of you in our Emergency Department today.  We know that when you come to [facility], you are entrusting us with your health, comfort, and safety.  Our physicians and nurses honor that trust, and truly appreciate the opportunity to care for you and your loved ones.      We also value your feedback.  If you receive a survey about your Emergency Department experience today, please fill it out.  We care about our patients' feedback, and we listen to what you have to say.  Thank you!      Dr. Rossy Condon MD.

## 2025-04-21 DIAGNOSIS — M79.89 SWELLING OF RIGHT UPPER EXTREMITY: ICD-10-CM

## 2025-04-22 DIAGNOSIS — I97.190 COMPLETE AV BLOCK DUE TO AV NODAL ABLATION (HCC): ICD-10-CM

## 2025-04-22 DIAGNOSIS — I10 ESSENTIAL HYPERTENSION: ICD-10-CM

## 2025-04-22 DIAGNOSIS — I44.2 COMPLETE AV BLOCK DUE TO AV NODAL ABLATION (HCC): ICD-10-CM

## 2025-04-22 DIAGNOSIS — I69.359 CVA, OLD, HEMIPARESIS (HCC): ICD-10-CM

## 2025-04-22 RX ORDER — METOPROLOL SUCCINATE 25 MG/1
25 TABLET, EXTENDED RELEASE ORAL DAILY
Qty: 90 TABLET | Refills: 1 | Status: SHIPPED | OUTPATIENT
Start: 2025-04-22 | End: 2025-10-19

## 2025-04-22 NOTE — TELEPHONE ENCOUNTER
Fax received from [facility] pharmacy requesting refill of Metoprolol Succ 25mg  - Take 1 tab PO Daily.    Rx pended to PCP.

## 2025-04-23 ENCOUNTER — TELEPHONE (OUTPATIENT)
Facility: CLINIC | Age: 85
End: 2025-04-23

## 2025-04-23 NOTE — TELEPHONE ENCOUNTER
Yvrose is an RN with [employer].  She called to request a f/u visit for the patient who was d/c from [facility] ED on 4/17.  I let Yvrose know that if a f/u visit is needed we will contact the patient's family directly to schedule.  Yvrose acknowledged.  Her callback if needed is [phone] Ext [phone]

## 2025-04-28 DIAGNOSIS — J30.9 ALLERGIC RHINITIS, UNSPECIFIED SEASONALITY, UNSPECIFIED TRIGGER: ICD-10-CM

## 2025-04-28 RX ORDER — CETIRIZINE HYDROCHLORIDE 1 MG/ML
SOLUTION ORAL
Qty: 150 ML | Refills: 2 | Status: SHIPPED | OUTPATIENT
Start: 2025-04-28

## 2025-04-28 NOTE — TELEPHONE ENCOUNTER
Faxed request received from [facility] [place] for refill of Cetirizine.  Refill pended to Luisa Lomeli NP.

## 2025-07-21 DIAGNOSIS — J30.9 ALLERGIC RHINITIS, UNSPECIFIED SEASONALITY, UNSPECIFIED TRIGGER: ICD-10-CM

## 2025-07-21 RX ORDER — CETIRIZINE HYDROCHLORIDE 1 MG/ML
SOLUTION ORAL
Qty: 150 ML | Refills: 2 | Status: SHIPPED | OUTPATIENT
Start: 2025-07-21

## 2025-07-21 NOTE — TELEPHONE ENCOUNTER
Fax received from [facility] pharmacy requesting refill of Cetirizine 1mg/mL  - Take 5mL via G tube daily PRN.    Rx pended to PCP.

## 2025-07-24 ENCOUNTER — TELEPHONE (OUTPATIENT)
Facility: CLINIC | Age: 85
End: 2025-07-24

## 2025-07-24 NOTE — TELEPHONE ENCOUNTER
Pranay Alvarez confirmed the patient's in home visit on 7/29/25 with Luisa Lomeli NP, arrival between 9am-1pm.  No Changes to insurance or location, no covid in the household.  CB# if needed is [phone]

## 2025-07-24 NOTE — TELEPHONE ENCOUNTER
Called patient to confirm their in home visit with Luisa Lomeli on 07/29/2025, arrival between 9-1.  Got voicemail. Left message.

## 2025-07-28 DIAGNOSIS — M19.019 PRIMARY OSTEOARTHRITIS OF SHOULDER, UNSPECIFIED LATERALITY: ICD-10-CM

## 2025-07-28 RX ORDER — ACETAMINOPHEN 160 MG/5ML
LIQUID ORAL
Qty: 3600 ML | Refills: 5 | Status: SHIPPED | OUTPATIENT
Start: 2025-07-28

## 2025-07-28 ASSESSMENT — ENCOUNTER SYMPTOMS
FACIAL SWELLING: 0
DIARRHEA: 0
VOMITING: 0
SHORTNESS OF BREATH: 0
EYE DISCHARGE: 0
EYE REDNESS: 0
COUGH: 0
WHEEZING: 0
TROUBLE SWALLOWING: 1
BLOOD IN STOOL: 0

## 2025-07-28 NOTE — PROGRESS NOTES
[facility]    [phone]      NOTE: [facility] is a PROVIDER (MD/NP) based interdisciplinary practice (RN, LCSW) for patients who cannot access (or have a taxing effort) primary / speciality medical care in an office setting. [facility] is NOT Home Health Care but works with Home Health Care Agencies.when there is a skilled need. Our MD/NPs are integral in Care Transitions; PLEASE CALL  if this patient arrives in the Emergency Department or Hospital.        Date of Current Visit:25  Patient/Family present for Current Visit: Patient, son - Pranay  Goals of Care as stated by the patient/family is: comfort, wound want hospitalization for acute and potentially reversible causes like infection, bleeding event      Preferences for hospitalization if that were to be necessary:  [x] Patient DOES NOT WANT hospitalization; focus all treatments at home  [] Patient WOULD WANT hospitalization for potentially reversible causes  Patient prefers hospitalization at: [facility]    Mamie Kenyon (: 1940) is a 84 y.o. female, patient, here for evaluation of the following chief complaint(s):  Follow-up Chronic Condition and Medicare AWV       ASSESSMENT/PLAN:  Below is the assessment and plan developed based on review of pertinent history, physical exam, labs, studies, and medications.    1. Vascular dementia without behavioral disturbance (HCC)  2. Oropharyngeal dysphagia  3. PEG (percutaneous endoscopic gastrostomy) status (HCC)  4. Essential hypertension  5. CVA, old, hemiparesis (HCC)  6. Left hemiparesis (HCC)  7. Seizure disorder (HCC)  8. Swelling of right upper extremity  9. Encounter for annual wellness visit (AWV) in Medicare patient    1. Vascular dementia  -Son requests that dementia diagnosis not to be discussed in front of the patient.   -Per son patient is able to endorse pain, and no new mood/behavioral issues reported.  -Today

## 2025-07-29 ENCOUNTER — OFFICE VISIT (OUTPATIENT)
Facility: CLINIC | Age: 85
End: 2025-07-29
Payer: MEDICARE

## 2025-07-29 VITALS
OXYGEN SATURATION: 99 % | SYSTOLIC BLOOD PRESSURE: 157 MMHG | HEART RATE: 67 BPM | RESPIRATION RATE: 21 BRPM | DIASTOLIC BLOOD PRESSURE: 99 MMHG | TEMPERATURE: 97 F

## 2025-07-29 DIAGNOSIS — I69.359 CVA, OLD, HEMIPARESIS (HCC): ICD-10-CM

## 2025-07-29 DIAGNOSIS — R13.12 OROPHARYNGEAL DYSPHAGIA: ICD-10-CM

## 2025-07-29 DIAGNOSIS — F01.50 VASCULAR DEMENTIA WITHOUT BEHAVIORAL DISTURBANCE (HCC): Primary | ICD-10-CM

## 2025-07-29 DIAGNOSIS — I10 ESSENTIAL HYPERTENSION: ICD-10-CM

## 2025-07-29 DIAGNOSIS — M79.89 SWELLING OF RIGHT UPPER EXTREMITY: ICD-10-CM

## 2025-07-29 DIAGNOSIS — G81.94 LEFT HEMIPARESIS (HCC): ICD-10-CM

## 2025-07-29 DIAGNOSIS — G40.909 SEIZURE DISORDER (HCC): ICD-10-CM

## 2025-07-29 DIAGNOSIS — Z00.00 ENCOUNTER FOR ANNUAL WELLNESS VISIT (AWV) IN MEDICARE PATIENT: ICD-10-CM

## 2025-07-29 DIAGNOSIS — Z93.1 PEG (PERCUTANEOUS ENDOSCOPIC GASTROSTOMY) STATUS (HCC): ICD-10-CM

## 2025-07-29 PROCEDURE — G8417 CALC BMI ABV UP PARAM F/U: HCPCS | Performed by: NURSE PRACTITIONER

## 2025-07-29 PROCEDURE — 1126F AMNT PAIN NOTED NONE PRSNT: CPT | Performed by: NURSE PRACTITIONER

## 2025-07-29 PROCEDURE — 1123F ACP DISCUSS/DSCN MKR DOCD: CPT | Performed by: NURSE PRACTITIONER

## 2025-07-29 PROCEDURE — 1036F TOBACCO NON-USER: CPT | Performed by: NURSE PRACTITIONER

## 2025-07-29 PROCEDURE — 1159F MED LIST DOCD IN RCRD: CPT | Performed by: NURSE PRACTITIONER

## 2025-07-29 PROCEDURE — G0439 PPPS, SUBSEQ VISIT: HCPCS | Performed by: NURSE PRACTITIONER

## 2025-07-29 PROCEDURE — 1090F PRES/ABSN URINE INCON ASSESS: CPT | Performed by: NURSE PRACTITIONER

## 2025-07-29 PROCEDURE — 1160F RVW MEDS BY RX/DR IN RCRD: CPT | Performed by: NURSE PRACTITIONER

## 2025-07-29 PROCEDURE — 99348 HOME/RES VST EST LOW MDM 30: CPT | Performed by: NURSE PRACTITIONER

## 2025-07-29 NOTE — PROGRESS NOTES
This is the Subsequent Medicare Annual Wellness Exam, performed 12 months or more after the Initial AWV or the last Subsequent AWV    I have reviewed the patient's medical history in detail and updated the computerized patient record.       Assessment/Plan   Education and counseling provided:  Are appropriate based on today's review and evaluation    1. Vascular dementia without behavioral disturbance (HCC)  2. Oropharyngeal dysphagia  3. PEG (percutaneous endoscopic gastrostomy) status (HCC)  4. Essential hypertension  5. CVA, old, hemiparesis (HCC)  6. Left hemiparesis (HCC)  7. Seizure disorder (HCC)  8. Swelling of right upper extremity  9. Encounter for annual wellness visit (AWV) in Medicare patient       Depression Risk Factor Screening         4/3/2024     2:36 PM   PHQ-9    Depression Unable to Assess Functional capacity motivation limits accuracy         Alcohol & Drug Abuse Risk Screen     Do you average more than 1 drink per night or more than 7 drinks a week:  no    On any one occasion in the past three months have you have had more than 3 drinks containing alcohol: no    Functional Ability and Level of Safety   Hearing: hearing is good     Activities of Daily Living:  The home contains: handrails and grab bars  The patient needs help with : transportation, shopping, preparing meals, housework, managing medication, eating, laundry, dressing, bathing , laundry, bathroom needs, hygiene, and phone     Ambulation: Patient ambulates bed bound    Failed to redirect to the Timeline version of the REVFS SmartLink.        Abuse Screen: The patient is not abused.     Cognitive Screening    Has your family/caregiver stated any concerns about your memory: yes    Cognitive screening: Deferred (confirmed diagnosis of dementia)   Screening test used: N/A    Health Maintenance Due     Health Maintenance Due   Topic Date Due    DTaP/Tdap/Td vaccine (1 - Tdap) Never done    Shingles vaccine (1 of 2) Never done

## 2025-08-13 ENCOUNTER — TELEPHONE (OUTPATIENT)
Facility: CLINIC | Age: 85
End: 2025-08-13

## 2025-09-03 ENCOUNTER — HOSPITAL ENCOUNTER (EMERGENCY)
Facility: HOSPITAL | Age: 85
Discharge: HOME | End: 2025-09-04
Attending: EMERGENCY MEDICINE
Payer: MEDICARE

## 2025-09-03 ENCOUNTER — APPOINTMENT (OUTPATIENT)
Facility: HOSPITAL | Age: 85
End: 2025-09-03
Payer: MEDICARE

## 2025-09-03 VITALS
SYSTOLIC BLOOD PRESSURE: 155 MMHG | OXYGEN SATURATION: 96 % | DIASTOLIC BLOOD PRESSURE: 74 MMHG | TEMPERATURE: 97.7 F | BODY MASS INDEX: 47.23 KG/M2 | HEIGHT: 58 IN | WEIGHT: 225 LBS | HEART RATE: 70 BPM

## 2025-09-03 DIAGNOSIS — T85.528A DISLODGED GASTROSTOMY TUBE: Primary | ICD-10-CM

## 2025-09-03 PROCEDURE — 6360000004 HC RX CONTRAST MEDICATION: Performed by: EMERGENCY MEDICINE

## 2025-09-03 PROCEDURE — 99284 EMERGENCY DEPT VISIT MOD MDM: CPT

## 2025-09-03 PROCEDURE — 74018 RADEX ABDOMEN 1 VIEW: CPT

## 2025-09-03 PROCEDURE — 2500000003 HC RX 250 WO HCPCS: Performed by: EMERGENCY MEDICINE

## 2025-09-03 PROCEDURE — 6360000002 HC RX W HCPCS: Performed by: EMERGENCY MEDICINE

## 2025-09-03 PROCEDURE — 96374 THER/PROPH/DIAG INJ IV PUSH: CPT

## 2025-09-03 PROCEDURE — 96375 TX/PRO/DX INJ NEW DRUG ADDON: CPT

## 2025-09-03 PROCEDURE — 43762 RPLC GTUBE NO REVJ TRC: CPT

## 2025-09-03 RX ORDER — DIPHENHYDRAMINE HYDROCHLORIDE 50 MG/ML
25 INJECTION, SOLUTION INTRAMUSCULAR; INTRAVENOUS
Status: COMPLETED | OUTPATIENT
Start: 2025-09-03 | End: 2025-09-03

## 2025-09-03 RX ORDER — DIATRIZOATE MEGLUMINE AND DIATRIZOATE SODIUM 660; 100 MG/ML; MG/ML
30 SOLUTION ORAL; RECTAL
Status: DISCONTINUED | OUTPATIENT
Start: 2025-09-03 | End: 2025-09-04 | Stop reason: HOSPADM

## 2025-09-03 RX ADMIN — DIATRIZOATE MEGLUMINE AND DIATRIZOATE SODIUM 30 ML: 660; 100 LIQUID ORAL; RECTAL at 22:29

## 2025-09-03 RX ADMIN — WATER 40 MG: 1 INJECTION INTRAMUSCULAR; INTRAVENOUS; SUBCUTANEOUS at 21:11

## 2025-09-03 RX ADMIN — DIPHENHYDRAMINE HYDROCHLORIDE 25 MG: 50 INJECTION INTRAMUSCULAR; INTRAVENOUS at 21:12

## 2025-09-05 ENCOUNTER — HOSPITAL ENCOUNTER (EMERGENCY)
Facility: HOSPITAL | Age: 85
Discharge: HOME | End: 2025-09-05
Attending: EMERGENCY MEDICINE

## 2025-09-05 VITALS
DIASTOLIC BLOOD PRESSURE: 75 MMHG | HEART RATE: 69 BPM | TEMPERATURE: 98.3 F | SYSTOLIC BLOOD PRESSURE: 167 MMHG | RESPIRATION RATE: 18 BRPM | OXYGEN SATURATION: 97 %

## 2025-09-05 DIAGNOSIS — T85.528A DISLODGED GASTROSTOMY TUBE: Primary | ICD-10-CM

## 2025-09-06 ENCOUNTER — HOSPITAL ENCOUNTER (EMERGENCY)
Facility: HOSPITAL | Age: 85
Discharge: HOME | End: 2025-09-06
Payer: MEDICARE

## 2025-09-06 ENCOUNTER — APPOINTMENT (OUTPATIENT)
Facility: HOSPITAL | Age: 85
End: 2025-09-06
Payer: MEDICARE

## 2025-09-06 VITALS
TEMPERATURE: 97.8 F | HEART RATE: 88 BPM | RESPIRATION RATE: 18 BRPM | SYSTOLIC BLOOD PRESSURE: 127 MMHG | DIASTOLIC BLOOD PRESSURE: 82 MMHG | OXYGEN SATURATION: 95 %

## 2025-09-06 DIAGNOSIS — T85.528A DISLODGED GASTROSTOMY TUBE: Primary | ICD-10-CM

## 2025-09-06 PROCEDURE — 74018 RADEX ABDOMEN 1 VIEW: CPT

## 2025-09-06 PROCEDURE — 6360000004 HC RX CONTRAST MEDICATION: Performed by: PHYSICIAN ASSISTANT

## 2025-09-06 RX ORDER — DIATRIZOATE MEGLUMINE AND DIATRIZOATE SODIUM 660; 100 MG/ML; MG/ML
30 SOLUTION ORAL; RECTAL
Status: DISCONTINUED | OUTPATIENT
Start: 2025-09-06 | End: 2025-09-06 | Stop reason: HOSPADM

## 2025-09-06 RX ADMIN — DIATRIZOATE MEGLUMINE AND DIATRIZOATE SODIUM 30 ML: 660; 100 LIQUID ORAL; RECTAL at 17:47

## 2025-09-06 ASSESSMENT — PAIN SCALES - GENERAL: PAINLEVEL_OUTOF10: 0

## (undated) DEVICE — NEEDLE ANGIO 18GAX7CM SECURELOC

## (undated) DEVICE — SOLIDIFIER MEDC 1200ML -- CONVERT TO 356117

## (undated) DEVICE — SYR 3ML LL TIP 1/10ML GRAD --

## (undated) DEVICE — Device: Brand: PADPRO

## (undated) DEVICE — YANKAUER,TAPERED BULBOUS TIP,W/O VENT: Brand: MEDLINE

## (undated) DEVICE — Z DISCONTINUED PER MEDLINE LINE GAS SAMPLING O2/CO2 LNG AD 13 FT NSL W/ TBNG FILTERLINE

## (undated) DEVICE — 1200 GUARD II KIT W/5MM TUBE W/O VAC TUBE: Brand: GUARDIAN

## (undated) DEVICE — DRESSING,STRATASORB,COMP,ISLAND,6"X7.5": Brand: MEDLINE

## (undated) DEVICE — TAPE,CLOTH/SILK,CURAD,3"X10YD,LF,40/CS: Brand: CURAD

## (undated) DEVICE — Device

## (undated) DEVICE — CANNULA NSL ORAL AD FOR CAPNOFLEX CO2 O2 AIRLFE

## (undated) DEVICE — BASIN EMSIS 16OZ GRAPHITE PLAS KID SHP MOLD GRAD FOR ORAL

## (undated) DEVICE — SYR 10ML LUER LOK 1/5ML GRAD --

## (undated) DEVICE — SUREFLEX STEERABLE SHEATH; TRANSSEPTAL DILATOR; J-TIP GUIDEWIRE: Brand: SUREFLEX STEERABLE GUIDING SHEATH

## (undated) DEVICE — BLOCK BITE ENDOSCP AD 21 MM W/ DIL BLU LF DISP

## (undated) DEVICE — SET ADMIN 16ML TBNG L100IN 2 Y INJ SITE IV PIGGY BK DISP

## (undated) DEVICE — NEEDLE HYPO 18GA L1.5IN PNK S STL HUB POLYPR SHLD REG BVL

## (undated) DEVICE — TOWEL 4 PLY TISS 19X30 SUE WHT

## (undated) DEVICE — PACK PROCEDURE SURG HRT CATH

## (undated) DEVICE — SOLID-TIP ABLATION CATHETER CABLE, STERILE CABLE: Brand: BLAZER™

## (undated) DEVICE — NEONATAL-ADULT SPO2 SENSOR: Brand: NELLCOR

## (undated) DEVICE — NON-REM POLYHESIVE PATIENT RETURN ELECTRODE: Brand: VALLEYLAB

## (undated) DEVICE — TEMPERATURE ABLATION CATHETER: Brand: BLAZER® II XP

## (undated) DEVICE — ELECTRODE,RADIOTRANSLUCENT,FOAM,5PK: Brand: MEDLINE

## (undated) DEVICE — CATH IV AUTOGRD BC PNK 20GA 25 -- INSYTE